# Patient Record
Sex: MALE | Race: WHITE | Employment: OTHER | URBAN - METROPOLITAN AREA
[De-identification: names, ages, dates, MRNs, and addresses within clinical notes are randomized per-mention and may not be internally consistent; named-entity substitution may affect disease eponyms.]

---

## 2017-01-09 ENCOUNTER — OFFICE VISIT (OUTPATIENT)
Dept: CARDIOLOGY CLINIC | Age: 77
End: 2017-01-09

## 2017-01-09 VITALS
BODY MASS INDEX: 26.67 KG/M2 | SYSTOLIC BLOOD PRESSURE: 116 MMHG | WEIGHT: 180.6 LBS | DIASTOLIC BLOOD PRESSURE: 68 MMHG | HEART RATE: 68 BPM

## 2017-01-09 DIAGNOSIS — I25.10 ATHEROSCLEROSIS OF NATIVE CORONARY ARTERY OF NATIVE HEART WITHOUT ANGINA PECTORIS: ICD-10-CM

## 2017-01-09 DIAGNOSIS — I49.5 SINUS NODE DYSFUNCTION (HCC): ICD-10-CM

## 2017-01-09 DIAGNOSIS — I10 ESSENTIAL HYPERTENSION: Primary | ICD-10-CM

## 2017-01-09 PROCEDURE — 99214 OFFICE O/P EST MOD 30 MIN: CPT | Performed by: INTERNAL MEDICINE

## 2017-01-11 DIAGNOSIS — I82.492 DEEP VEIN THROMBOSIS (DVT) OF OTHER VEIN OF LEFT LOWER EXTREMITY: Primary | ICD-10-CM

## 2017-01-12 ENCOUNTER — HOSPITAL ENCOUNTER (OUTPATIENT)
Dept: VASCULAR LAB | Age: 77
Discharge: HOME | End: 2017-01-12
Attending: FAMILY MEDICINE | Admitting: FAMILY MEDICINE

## 2017-01-12 DIAGNOSIS — I82.492 ACUTE EMBOLISM AND THROMBOSIS OF OTHER SPECIFIED DEEP VEIN OF LEFT LOWER EXTREMITY (HCC): ICD-10-CM

## 2017-01-12 DIAGNOSIS — I82.492 DEEP VEIN THROMBOSIS (DVT) OF OTHER VEIN OF LEFT LOWER EXTREMITY: Primary | ICD-10-CM

## 2017-01-29 ENCOUNTER — CARE COORDINATION (OUTPATIENT)
Dept: CASE MANAGEMENT | Age: 77
End: 2017-01-29

## 2017-01-30 ENCOUNTER — OFFICE VISIT (OUTPATIENT)
Dept: CARDIOLOGY CLINIC | Age: 77
End: 2017-01-30

## 2017-01-30 ENCOUNTER — TELEPHONE (OUTPATIENT)
Dept: PHARMACY | Facility: CLINIC | Age: 77
End: 2017-01-30

## 2017-01-30 VITALS
HEART RATE: 72 BPM | DIASTOLIC BLOOD PRESSURE: 74 MMHG | SYSTOLIC BLOOD PRESSURE: 128 MMHG | HEIGHT: 70 IN | BODY MASS INDEX: 27.83 KG/M2 | WEIGHT: 194.4 LBS

## 2017-01-30 DIAGNOSIS — I50.22 CHF (CONGESTIVE HEART FAILURE), NYHA CLASS I, CHRONIC, SYSTOLIC (HCC): ICD-10-CM

## 2017-01-30 DIAGNOSIS — I48.0 PAROXYSMAL ATRIAL FIBRILLATION (HCC): Primary | ICD-10-CM

## 2017-01-30 DIAGNOSIS — I25.10 CORONARY ARTERY DISEASE INVOLVING NATIVE CORONARY ARTERY OF NATIVE HEART WITHOUT ANGINA PECTORIS: ICD-10-CM

## 2017-01-30 PROCEDURE — 1036F TOBACCO NON-USER: CPT | Performed by: INTERNAL MEDICINE

## 2017-01-30 PROCEDURE — G8484 FLU IMMUNIZE NO ADMIN: HCPCS | Performed by: INTERNAL MEDICINE

## 2017-01-30 PROCEDURE — G8420 CALC BMI NORM PARAMETERS: HCPCS | Performed by: INTERNAL MEDICINE

## 2017-01-30 PROCEDURE — G8427 DOCREV CUR MEDS BY ELIG CLIN: HCPCS | Performed by: INTERNAL MEDICINE

## 2017-01-30 PROCEDURE — G8598 ASA/ANTIPLAT THER USED: HCPCS | Performed by: INTERNAL MEDICINE

## 2017-01-30 PROCEDURE — 99214 OFFICE O/P EST MOD 30 MIN: CPT | Performed by: INTERNAL MEDICINE

## 2017-01-30 PROCEDURE — 4040F PNEUMOC VAC/ADMIN/RCVD: CPT | Performed by: INTERNAL MEDICINE

## 2017-01-30 PROCEDURE — 1111F DSCHRG MED/CURRENT MED MERGE: CPT | Performed by: INTERNAL MEDICINE

## 2017-01-30 PROCEDURE — 1123F ACP DISCUSS/DSCN MKR DOCD: CPT | Performed by: INTERNAL MEDICINE

## 2017-01-30 RX ORDER — FUROSEMIDE 40 MG/1
40 TABLET ORAL 2 TIMES DAILY
Qty: 60 TABLET | Refills: 3 | Status: SHIPPED | OUTPATIENT
Start: 2017-01-30 | End: 2017-01-30 | Stop reason: SDUPTHER

## 2017-01-30 RX ORDER — ISOSORBIDE MONONITRATE 30 MG/1
30 TABLET, EXTENDED RELEASE ORAL DAILY
Qty: 30 TABLET | Refills: 0 | Status: SHIPPED | OUTPATIENT
Start: 2017-01-30 | End: 2017-02-07 | Stop reason: SDUPTHER

## 2017-01-30 RX ORDER — POTASSIUM CHLORIDE 1.5 G/1.77G
20 POWDER, FOR SOLUTION ORAL 2 TIMES DAILY
Qty: 30 EACH | Refills: 3 | Status: SHIPPED | OUTPATIENT
Start: 2017-01-30 | End: 2017-01-30 | Stop reason: SDUPTHER

## 2017-01-30 RX ORDER — FUROSEMIDE 40 MG/1
40 TABLET ORAL 2 TIMES DAILY
Qty: 60 TABLET | Refills: 0 | Status: SHIPPED | OUTPATIENT
Start: 2017-01-30 | End: 2017-01-30 | Stop reason: SDUPTHER

## 2017-01-30 RX ORDER — FUROSEMIDE 40 MG/1
40 TABLET ORAL 2 TIMES DAILY
Qty: 60 TABLET | Refills: 0 | Status: SHIPPED | OUTPATIENT
Start: 2017-01-30 | End: 2017-02-28 | Stop reason: SDUPTHER

## 2017-01-30 RX ORDER — POTASSIUM CHLORIDE 1.5 G/1.77G
20 POWDER, FOR SOLUTION ORAL 2 TIMES DAILY
Qty: 60 EACH | Refills: 0 | Status: SHIPPED | OUTPATIENT
Start: 2017-01-30 | End: 2017-07-05 | Stop reason: DRUGHIGH

## 2017-02-01 ENCOUNTER — OFFICE VISIT (OUTPATIENT)
Dept: FAMILY MEDICINE CLINIC | Age: 77
End: 2017-02-01

## 2017-02-01 VITALS
RESPIRATION RATE: 15 BRPM | HEART RATE: 63 BPM | OXYGEN SATURATION: 98 % | WEIGHT: 189.9 LBS | BODY MASS INDEX: 28.78 KG/M2 | HEIGHT: 68 IN | SYSTOLIC BLOOD PRESSURE: 136 MMHG | DIASTOLIC BLOOD PRESSURE: 62 MMHG

## 2017-02-01 DIAGNOSIS — K62.5 GASTROINTESTINAL HEMORRHAGE ASSOCIATED WITH ANORECTAL SOURCE: Primary | ICD-10-CM

## 2017-02-01 DIAGNOSIS — I25.10 CORONARY ARTERY DISEASE INVOLVING NATIVE CORONARY ARTERY OF NATIVE HEART WITHOUT ANGINA PECTORIS: ICD-10-CM

## 2017-02-01 DIAGNOSIS — I49.5 SICK SINUS SYNDROME (HCC): ICD-10-CM

## 2017-02-01 DIAGNOSIS — D50.0 IRON DEFICIENCY ANEMIA DUE TO CHRONIC BLOOD LOSS: ICD-10-CM

## 2017-02-01 DIAGNOSIS — I10 ESSENTIAL HYPERTENSION: ICD-10-CM

## 2017-02-01 DIAGNOSIS — E78.2 MIXED HYPERLIPIDEMIA: ICD-10-CM

## 2017-02-01 DIAGNOSIS — I50.22 CHF (CONGESTIVE HEART FAILURE), NYHA CLASS I, CHRONIC, SYSTOLIC (HCC): ICD-10-CM

## 2017-02-01 DIAGNOSIS — I20.9 ANGINAL SYNDROME (HCC): ICD-10-CM

## 2017-02-01 LAB
ANION GAP SERPL CALCULATED.3IONS-SCNC: 13 MMOL/L (ref 3–16)
BASOPHILS ABSOLUTE: 0.1 K/UL (ref 0–0.2)
BASOPHILS RELATIVE PERCENT: 1 %
BUN BLDV-MCNC: 13 MG/DL (ref 7–20)
CALCIUM SERPL-MCNC: 8.3 MG/DL (ref 8.3–10.6)
CHLORIDE BLD-SCNC: 100 MMOL/L (ref 99–110)
CO2: 27 MMOL/L (ref 21–32)
CREAT SERPL-MCNC: 1.2 MG/DL (ref 0.8–1.3)
EOSINOPHILS ABSOLUTE: 0.4 K/UL (ref 0–0.6)
EOSINOPHILS RELATIVE PERCENT: 6.7 %
GFR AFRICAN AMERICAN: >60
GFR NON-AFRICAN AMERICAN: 59
GLUCOSE BLD-MCNC: 86 MG/DL (ref 70–99)
HCT VFR BLD CALC: 29.3 % (ref 40.5–52.5)
HEMOGLOBIN: 9.6 G/DL (ref 13.5–17.5)
LYMPHOCYTES ABSOLUTE: 1.9 K/UL (ref 1–5.1)
LYMPHOCYTES RELATIVE PERCENT: 30.7 %
MCH RBC QN AUTO: 29.2 PG (ref 26–34)
MCHC RBC AUTO-ENTMCNC: 32.8 G/DL (ref 31–36)
MCV RBC AUTO: 89.2 FL (ref 80–100)
MONOCYTES ABSOLUTE: 0.6 K/UL (ref 0–1.3)
MONOCYTES RELATIVE PERCENT: 8.8 %
NEUTROPHILS ABSOLUTE: 3.3 K/UL (ref 1.7–7.7)
NEUTROPHILS RELATIVE PERCENT: 52.8 %
PDW BLD-RTO: 15.8 % (ref 12.4–15.4)
PLATELET # BLD: 199 K/UL (ref 135–450)
PMV BLD AUTO: 9.2 FL (ref 5–10.5)
POTASSIUM SERPL-SCNC: 4.3 MMOL/L (ref 3.5–5.1)
RBC # BLD: 3.28 M/UL (ref 4.2–5.9)
SODIUM BLD-SCNC: 140 MMOL/L (ref 136–145)
WBC # BLD: 6.3 K/UL (ref 4–11)

## 2017-02-01 ASSESSMENT — PATIENT HEALTH QUESTIONNAIRE - PHQ9
SUM OF ALL RESPONSES TO PHQ9 QUESTIONS 1 & 2: 2
2. FEELING DOWN, DEPRESSED OR HOPELESS: 2
1. LITTLE INTEREST OR PLEASURE IN DOING THINGS: 0
SUM OF ALL RESPONSES TO PHQ QUESTIONS 1-9: 2

## 2017-02-03 ENCOUNTER — CARE COORDINATION (OUTPATIENT)
Dept: CASE MANAGEMENT | Age: 77
End: 2017-02-03

## 2017-02-08 ENCOUNTER — CARE COORDINATION (OUTPATIENT)
Dept: CASE MANAGEMENT | Age: 77
End: 2017-02-08

## 2017-02-08 RX ORDER — ISOSORBIDE MONONITRATE 30 MG/1
30 TABLET, EXTENDED RELEASE ORAL DAILY
Qty: 90 TABLET | Refills: 3 | Status: SHIPPED | OUTPATIENT
Start: 2017-02-08 | End: 2017-02-20 | Stop reason: DRUGHIGH

## 2017-02-15 ENCOUNTER — CARE COORDINATION (OUTPATIENT)
Dept: CASE MANAGEMENT | Age: 77
End: 2017-02-15

## 2017-02-15 ENCOUNTER — CARE COORDINATION (OUTPATIENT)
Dept: FAMILY MEDICINE CLINIC | Age: 77
End: 2017-02-15

## 2017-02-20 ENCOUNTER — OFFICE VISIT (OUTPATIENT)
Dept: CARDIOLOGY CLINIC | Age: 77
End: 2017-02-20

## 2017-02-20 VITALS
BODY MASS INDEX: 27.83 KG/M2 | WEIGHT: 183 LBS | SYSTOLIC BLOOD PRESSURE: 150 MMHG | HEART RATE: 68 BPM | DIASTOLIC BLOOD PRESSURE: 70 MMHG

## 2017-02-20 DIAGNOSIS — I48.0 PAROXYSMAL ATRIAL FIBRILLATION (HCC): ICD-10-CM

## 2017-02-20 DIAGNOSIS — I10 ESSENTIAL HYPERTENSION: Primary | ICD-10-CM

## 2017-02-20 DIAGNOSIS — Z95.0 PACEMAKER: ICD-10-CM

## 2017-02-20 DIAGNOSIS — I49.5 SSS (SICK SINUS SYNDROME) (HCC): ICD-10-CM

## 2017-02-20 PROCEDURE — G8420 CALC BMI NORM PARAMETERS: HCPCS | Performed by: INTERNAL MEDICINE

## 2017-02-20 PROCEDURE — 1036F TOBACCO NON-USER: CPT | Performed by: INTERNAL MEDICINE

## 2017-02-20 PROCEDURE — G8598 ASA/ANTIPLAT THER USED: HCPCS | Performed by: INTERNAL MEDICINE

## 2017-02-20 PROCEDURE — G8427 DOCREV CUR MEDS BY ELIG CLIN: HCPCS | Performed by: INTERNAL MEDICINE

## 2017-02-20 PROCEDURE — 1123F ACP DISCUSS/DSCN MKR DOCD: CPT | Performed by: INTERNAL MEDICINE

## 2017-02-20 PROCEDURE — 1111F DSCHRG MED/CURRENT MED MERGE: CPT | Performed by: INTERNAL MEDICINE

## 2017-02-20 PROCEDURE — 4040F PNEUMOC VAC/ADMIN/RCVD: CPT | Performed by: INTERNAL MEDICINE

## 2017-02-20 PROCEDURE — 99214 OFFICE O/P EST MOD 30 MIN: CPT | Performed by: INTERNAL MEDICINE

## 2017-02-20 PROCEDURE — G8484 FLU IMMUNIZE NO ADMIN: HCPCS | Performed by: INTERNAL MEDICINE

## 2017-02-20 RX ORDER — IRON POLYSACCHARIDE COMPLEX 180 MG
CAPSULE ORAL
Refills: 0 | COMMUNITY
Start: 2017-02-03 | End: 2018-03-21

## 2017-02-20 RX ORDER — POLYETHYLENE GLYCOL 3350 17 G/17G
17 POWDER ORAL DAILY
COMMUNITY
End: 2021-08-25 | Stop reason: ALTCHOICE

## 2017-02-20 ASSESSMENT — ENCOUNTER SYMPTOMS
SHORTNESS OF BREATH: 0
ABDOMINAL DISTENTION: 0
APNEA: 0
CHEST TIGHTNESS: 0
CHOKING: 0
COUGH: 0

## 2017-02-21 RX ORDER — METOPROLOL SUCCINATE 25 MG/1
25 TABLET, EXTENDED RELEASE ORAL DAILY
Qty: 30 TABLET | Refills: 3 | Status: SHIPPED | OUTPATIENT
Start: 2017-02-21 | End: 2017-07-05 | Stop reason: DRUGHIGH

## 2017-02-21 RX ORDER — ISOSORBIDE MONONITRATE 60 MG/1
60 TABLET, EXTENDED RELEASE ORAL DAILY
Qty: 30 TABLET | Refills: 3 | Status: SHIPPED | OUTPATIENT
Start: 2017-02-21 | End: 2017-07-05 | Stop reason: DRUGHIGH

## 2017-02-26 PROCEDURE — 99496 TRANSJ CARE MGMT HIGH F2F 7D: CPT | Performed by: FAMILY MEDICINE

## 2017-02-27 ENCOUNTER — OFFICE VISIT (OUTPATIENT)
Dept: CARDIOLOGY CLINIC | Age: 77
End: 2017-02-27

## 2017-02-27 ENCOUNTER — HOSPITAL ENCOUNTER (OUTPATIENT)
Dept: OTHER | Age: 77
Discharge: HOME | End: 2017-02-27
Attending: INTERNAL MEDICINE | Admitting: INTERNAL MEDICINE

## 2017-02-27 VITALS
SYSTOLIC BLOOD PRESSURE: 134 MMHG | DIASTOLIC BLOOD PRESSURE: 82 MMHG | BODY MASS INDEX: 26.37 KG/M2 | RESPIRATION RATE: 18 BRPM | HEIGHT: 70 IN | HEART RATE: 71 BPM | WEIGHT: 184.2 LBS

## 2017-02-27 DIAGNOSIS — I49.5 SINUS NODE DYSFUNCTION (HCC): ICD-10-CM

## 2017-02-27 DIAGNOSIS — I10 ESSENTIAL HYPERTENSION: ICD-10-CM

## 2017-02-27 DIAGNOSIS — Z95.0 PACEMAKER: ICD-10-CM

## 2017-02-27 DIAGNOSIS — I48.0 PAROXYSMAL ATRIAL FIBRILLATION (HCC): Primary | ICD-10-CM

## 2017-02-27 DIAGNOSIS — R00.1 BRADYCARDIA: ICD-10-CM

## 2017-02-27 DIAGNOSIS — I25.10 ATHEROSCLEROSIS OF NATIVE CORONARY ARTERY OF NATIVE HEART WITHOUT ANGINA PECTORIS: ICD-10-CM

## 2017-02-27 LAB
A/G RATIO: 1.1 (ref 1.1–2.2)
ALBUMIN SERPL-MCNC: 3.7 G/DL (ref 3.4–5)
ALP BLD-CCNC: 74 U/L (ref 40–129)
ALT SERPL-CCNC: 10 U/L (ref 10–40)
ANION GAP SERPL CALCULATED.3IONS-SCNC: 12 MMOL/L (ref 3–16)
AST SERPL-CCNC: 19 U/L (ref 15–37)
BASOPHILS ABSOLUTE: 0 K/UL (ref 0–0.2)
BASOPHILS RELATIVE PERCENT: 0.1 %
BILIRUB SERPL-MCNC: 0.4 MG/DL (ref 0–1)
BUN BLDV-MCNC: 18 MG/DL (ref 7–20)
CALCIUM SERPL-MCNC: 8.6 MG/DL (ref 8.3–10.6)
CHLORIDE BLD-SCNC: 102 MMOL/L (ref 99–110)
CO2: 26 MMOL/L (ref 21–32)
CREAT SERPL-MCNC: 1 MG/DL (ref 0.8–1.3)
EOSINOPHILS ABSOLUTE: 0.5 K/UL (ref 0–0.6)
EOSINOPHILS RELATIVE PERCENT: 6.9 %
GFR AFRICAN AMERICAN: >60
GFR NON-AFRICAN AMERICAN: >60
GLOBULIN: 3.4 G/DL
GLUCOSE BLD-MCNC: 79 MG/DL (ref 70–99)
HCT VFR BLD CALC: 32.5 % (ref 40.5–52.5)
HEMOGLOBIN: 10.7 G/DL (ref 13.5–17.5)
LYMPHOCYTES ABSOLUTE: 2.1 K/UL (ref 1–5.1)
LYMPHOCYTES RELATIVE PERCENT: 29.4 %
MCH RBC QN AUTO: 28.6 PG (ref 26–34)
MCHC RBC AUTO-ENTMCNC: 32.8 G/DL (ref 31–36)
MCV RBC AUTO: 87 FL (ref 80–100)
MONOCYTES ABSOLUTE: 0.5 K/UL (ref 0–1.3)
MONOCYTES RELATIVE PERCENT: 7 %
NEUTROPHILS ABSOLUTE: 4 K/UL (ref 1.7–7.7)
NEUTROPHILS RELATIVE PERCENT: 56.6 %
PDW BLD-RTO: 15.1 % (ref 12.4–15.4)
PLATELET # BLD: 241 K/UL (ref 135–450)
PMV BLD AUTO: 9 FL (ref 5–10.5)
POTASSIUM SERPL-SCNC: 4.5 MMOL/L (ref 3.5–5.1)
RBC # BLD: 3.73 M/UL (ref 4.2–5.9)
SODIUM BLD-SCNC: 140 MMOL/L (ref 136–145)
TOTAL PROTEIN: 7.1 G/DL (ref 6.4–8.2)
WBC # BLD: 7 K/UL (ref 4–11)

## 2017-02-27 PROCEDURE — 1036F TOBACCO NON-USER: CPT | Performed by: NURSE PRACTITIONER

## 2017-02-27 PROCEDURE — 99214 OFFICE O/P EST MOD 30 MIN: CPT | Performed by: NURSE PRACTITIONER

## 2017-02-27 PROCEDURE — 1111F DSCHRG MED/CURRENT MED MERGE: CPT | Performed by: NURSE PRACTITIONER

## 2017-02-27 PROCEDURE — G8598 ASA/ANTIPLAT THER USED: HCPCS | Performed by: NURSE PRACTITIONER

## 2017-02-27 PROCEDURE — G8420 CALC BMI NORM PARAMETERS: HCPCS | Performed by: NURSE PRACTITIONER

## 2017-02-27 PROCEDURE — 1123F ACP DISCUSS/DSCN MKR DOCD: CPT | Performed by: NURSE PRACTITIONER

## 2017-02-27 PROCEDURE — 4040F PNEUMOC VAC/ADMIN/RCVD: CPT | Performed by: NURSE PRACTITIONER

## 2017-02-27 PROCEDURE — G8484 FLU IMMUNIZE NO ADMIN: HCPCS | Performed by: NURSE PRACTITIONER

## 2017-02-27 PROCEDURE — G8427 DOCREV CUR MEDS BY ELIG CLIN: HCPCS | Performed by: NURSE PRACTITIONER

## 2017-02-28 RX ORDER — FUROSEMIDE 40 MG/1
TABLET ORAL
Qty: 60 TABLET | Refills: 0 | Status: SHIPPED | OUTPATIENT
Start: 2017-02-28 | End: 2017-07-24 | Stop reason: SDUPTHER

## 2017-03-02 RX ORDER — OSELTAMIVIR PHOSPHATE 75 MG/1
CAPSULE ORAL
Qty: 10 CAPSULE | Refills: 0 | Status: SHIPPED | OUTPATIENT
Start: 2017-03-02 | End: 2017-03-17

## 2017-03-03 ENCOUNTER — OFFICE VISIT (OUTPATIENT)
Dept: CARDIOLOGY CLINIC | Age: 77
End: 2017-03-03

## 2017-03-03 VITALS
HEART RATE: 52 BPM | SYSTOLIC BLOOD PRESSURE: 120 MMHG | WEIGHT: 183.6 LBS | DIASTOLIC BLOOD PRESSURE: 68 MMHG | BODY MASS INDEX: 26.34 KG/M2

## 2017-03-03 DIAGNOSIS — I48.0 PAROXYSMAL ATRIAL FIBRILLATION (HCC): ICD-10-CM

## 2017-03-03 DIAGNOSIS — I10 ESSENTIAL HYPERTENSION: ICD-10-CM

## 2017-03-03 DIAGNOSIS — I49.5 SICK SINUS SYNDROME (HCC): Primary | ICD-10-CM

## 2017-03-03 PROCEDURE — G8428 CUR MEDS NOT DOCUMENT: HCPCS | Performed by: INTERNAL MEDICINE

## 2017-03-03 PROCEDURE — 4040F PNEUMOC VAC/ADMIN/RCVD: CPT | Performed by: INTERNAL MEDICINE

## 2017-03-03 PROCEDURE — G8420 CALC BMI NORM PARAMETERS: HCPCS | Performed by: INTERNAL MEDICINE

## 2017-03-03 PROCEDURE — 1036F TOBACCO NON-USER: CPT | Performed by: INTERNAL MEDICINE

## 2017-03-03 PROCEDURE — 99214 OFFICE O/P EST MOD 30 MIN: CPT | Performed by: INTERNAL MEDICINE

## 2017-03-03 PROCEDURE — G8598 ASA/ANTIPLAT THER USED: HCPCS | Performed by: INTERNAL MEDICINE

## 2017-03-03 PROCEDURE — 1123F ACP DISCUSS/DSCN MKR DOCD: CPT | Performed by: INTERNAL MEDICINE

## 2017-03-03 PROCEDURE — G8484 FLU IMMUNIZE NO ADMIN: HCPCS | Performed by: INTERNAL MEDICINE

## 2017-03-06 RX ORDER — DRONEDARONE 400 MG/1
TABLET, FILM COATED ORAL
Qty: 180 TABLET | Refills: 0 | Status: SHIPPED | OUTPATIENT
Start: 2017-03-06 | End: 2017-06-02 | Stop reason: SDUPTHER

## 2017-03-20 ENCOUNTER — OFFICE VISIT (OUTPATIENT)
Dept: FAMILY MEDICINE CLINIC | Age: 77
End: 2017-03-20

## 2017-03-20 VITALS
HEART RATE: 66 BPM | WEIGHT: 189.4 LBS | HEIGHT: 69 IN | RESPIRATION RATE: 14 BRPM | BODY MASS INDEX: 28.05 KG/M2 | SYSTOLIC BLOOD PRESSURE: 130 MMHG | DIASTOLIC BLOOD PRESSURE: 80 MMHG | OXYGEN SATURATION: 99 %

## 2017-03-20 DIAGNOSIS — D50.0 IRON DEFICIENCY ANEMIA DUE TO CHRONIC BLOOD LOSS: Primary | ICD-10-CM

## 2017-03-20 LAB — HGB, POC: 11.4

## 2017-03-20 PROCEDURE — 4040F PNEUMOC VAC/ADMIN/RCVD: CPT | Performed by: FAMILY MEDICINE

## 2017-03-20 PROCEDURE — 1123F ACP DISCUSS/DSCN MKR DOCD: CPT | Performed by: FAMILY MEDICINE

## 2017-03-20 PROCEDURE — G8420 CALC BMI NORM PARAMETERS: HCPCS | Performed by: FAMILY MEDICINE

## 2017-03-20 PROCEDURE — 1036F TOBACCO NON-USER: CPT | Performed by: FAMILY MEDICINE

## 2017-03-20 PROCEDURE — G8484 FLU IMMUNIZE NO ADMIN: HCPCS | Performed by: FAMILY MEDICINE

## 2017-03-20 PROCEDURE — G8598 ASA/ANTIPLAT THER USED: HCPCS | Performed by: FAMILY MEDICINE

## 2017-03-20 PROCEDURE — 99213 OFFICE O/P EST LOW 20 MIN: CPT | Performed by: FAMILY MEDICINE

## 2017-03-20 PROCEDURE — G8427 DOCREV CUR MEDS BY ELIG CLIN: HCPCS | Performed by: FAMILY MEDICINE

## 2017-03-20 PROCEDURE — 85018 HEMOGLOBIN: CPT | Performed by: FAMILY MEDICINE

## 2017-03-23 RX ORDER — RANOLAZINE 500 MG/1
TABLET, FILM COATED, EXTENDED RELEASE ORAL
Qty: 180 TABLET | Refills: 1 | Status: SHIPPED | OUTPATIENT
Start: 2017-03-23 | End: 2017-09-19 | Stop reason: SDUPTHER

## 2017-03-29 ENCOUNTER — HOSPITAL ENCOUNTER (OUTPATIENT)
Dept: OTHER | Age: 77
Discharge: HOME | End: 2017-03-29
Attending: INTERNAL MEDICINE | Admitting: INTERNAL MEDICINE

## 2017-03-29 LAB
BASOPHILS ABSOLUTE: 0 K/UL (ref 0–0.2)
BASOPHILS RELATIVE PERCENT: 0.8 %
EOSINOPHILS ABSOLUTE: 0.2 K/UL (ref 0–0.6)
EOSINOPHILS RELATIVE PERCENT: 4.4 %
HCT VFR BLD CALC: 33.8 % (ref 40.5–52.5)
HEMOGLOBIN: 11 G/DL (ref 13.5–17.5)
LYMPHOCYTES ABSOLUTE: 1.6 K/UL (ref 1–5.1)
LYMPHOCYTES RELATIVE PERCENT: 31.8 %
MCH RBC QN AUTO: 27.7 PG (ref 26–34)
MCHC RBC AUTO-ENTMCNC: 32.5 G/DL (ref 31–36)
MCV RBC AUTO: 85.3 FL (ref 80–100)
MONOCYTES ABSOLUTE: 0.6 K/UL (ref 0–1.3)
MONOCYTES RELATIVE PERCENT: 11.6 %
NEUTROPHILS ABSOLUTE: 2.6 K/UL (ref 1.7–7.7)
NEUTROPHILS RELATIVE PERCENT: 51.4 %
PDW BLD-RTO: 15.3 % (ref 12.4–15.4)
PLATELET # BLD: 166 K/UL (ref 135–450)
PMV BLD AUTO: 9.5 FL (ref 5–10.5)
RBC # BLD: 3.96 M/UL (ref 4.2–5.9)
WBC # BLD: 5.1 K/UL (ref 4–11)

## 2017-04-07 ENCOUNTER — HOSPITAL ENCOUNTER (OUTPATIENT)
Dept: ENDOSCOPY | Age: 77
Discharge: HOME | End: 2017-04-07
Attending: INTERNAL MEDICINE | Admitting: INTERNAL MEDICINE

## 2017-04-07 VITALS
BODY MASS INDEX: 26.66 KG/M2 | SYSTOLIC BLOOD PRESSURE: 134 MMHG | TEMPERATURE: 98 F | HEIGHT: 69 IN | RESPIRATION RATE: 18 BRPM | WEIGHT: 180 LBS | OXYGEN SATURATION: 99 % | DIASTOLIC BLOOD PRESSURE: 67 MMHG | HEART RATE: 58 BPM

## 2017-04-07 ASSESSMENT — PAIN - FUNCTIONAL ASSESSMENT: PAIN_FUNCTIONAL_ASSESSMENT: 0-10

## 2017-05-04 ENCOUNTER — HOSPITAL ENCOUNTER (OUTPATIENT)
Dept: OTHER | Age: 77
Discharge: HOME | End: 2017-05-04
Attending: INTERNAL MEDICINE | Admitting: INTERNAL MEDICINE

## 2017-05-04 LAB
BASOPHILS ABSOLUTE: 0 K/UL (ref 0–0.2)
BASOPHILS RELATIVE PERCENT: 0.7 %
EOSINOPHILS ABSOLUTE: 0.2 K/UL (ref 0–0.6)
EOSINOPHILS RELATIVE PERCENT: 2.7 %
HCT VFR BLD CALC: 34.6 % (ref 40.5–52.5)
HEMOGLOBIN: 11.3 G/DL (ref 13.5–17.5)
LYMPHOCYTES ABSOLUTE: 2.4 K/UL (ref 1–5.1)
LYMPHOCYTES RELATIVE PERCENT: 37.7 %
MCH RBC QN AUTO: 27.9 PG (ref 26–34)
MCHC RBC AUTO-ENTMCNC: 32.7 G/DL (ref 31–36)
MCV RBC AUTO: 85.2 FL (ref 80–100)
MONOCYTES ABSOLUTE: 0.5 K/UL (ref 0–1.3)
MONOCYTES RELATIVE PERCENT: 8.5 %
NEUTROPHILS ABSOLUTE: 3.3 K/UL (ref 1.7–7.7)
NEUTROPHILS RELATIVE PERCENT: 50.4 %
PDW BLD-RTO: 16.7 % (ref 12.4–15.4)
PLATELET # BLD: 179 K/UL (ref 135–450)
PMV BLD AUTO: 9.2 FL (ref 5–10.5)
RBC # BLD: 4.06 M/UL (ref 4.2–5.9)
WBC # BLD: 6.5 K/UL (ref 4–11)

## 2017-05-08 ENCOUNTER — OFFICE VISIT (OUTPATIENT)
Dept: CARDIOLOGY CLINIC | Age: 77
End: 2017-05-08

## 2017-05-08 VITALS
SYSTOLIC BLOOD PRESSURE: 122 MMHG | BODY MASS INDEX: 27.32 KG/M2 | WEIGHT: 185 LBS | DIASTOLIC BLOOD PRESSURE: 66 MMHG | HEART RATE: 82 BPM

## 2017-05-08 DIAGNOSIS — I49.5 SICK SINUS SYNDROME (HCC): ICD-10-CM

## 2017-05-08 DIAGNOSIS — I10 ESSENTIAL HYPERTENSION: ICD-10-CM

## 2017-05-08 DIAGNOSIS — E78.2 MIXED HYPERLIPIDEMIA: Primary | ICD-10-CM

## 2017-05-08 DIAGNOSIS — I48.0 PAROXYSMAL ATRIAL FIBRILLATION (HCC): ICD-10-CM

## 2017-05-08 PROCEDURE — G8598 ASA/ANTIPLAT THER USED: HCPCS | Performed by: INTERNAL MEDICINE

## 2017-05-08 PROCEDURE — 93000 ELECTROCARDIOGRAM COMPLETE: CPT | Performed by: INTERNAL MEDICINE

## 2017-05-08 PROCEDURE — 99214 OFFICE O/P EST MOD 30 MIN: CPT | Performed by: INTERNAL MEDICINE

## 2017-05-08 PROCEDURE — 1036F TOBACCO NON-USER: CPT | Performed by: INTERNAL MEDICINE

## 2017-05-08 PROCEDURE — G8420 CALC BMI NORM PARAMETERS: HCPCS | Performed by: INTERNAL MEDICINE

## 2017-05-08 PROCEDURE — 4040F PNEUMOC VAC/ADMIN/RCVD: CPT | Performed by: INTERNAL MEDICINE

## 2017-05-08 PROCEDURE — G8427 DOCREV CUR MEDS BY ELIG CLIN: HCPCS | Performed by: INTERNAL MEDICINE

## 2017-05-08 PROCEDURE — 1123F ACP DISCUSS/DSCN MKR DOCD: CPT | Performed by: INTERNAL MEDICINE

## 2017-05-09 ENCOUNTER — HOSPITAL ENCOUNTER (OUTPATIENT)
Dept: OTHER | Age: 77
Discharge: HOME | End: 2017-05-09
Attending: INTERNAL MEDICINE | Admitting: INTERNAL MEDICINE

## 2017-05-09 LAB
A/G RATIO: 1.3 (ref 1.1–2.2)
ALBUMIN SERPL-MCNC: 3.8 G/DL (ref 3.4–5)
ALP BLD-CCNC: 67 U/L (ref 40–129)
ALT SERPL-CCNC: 13 U/L (ref 10–40)
ANION GAP SERPL CALCULATED.3IONS-SCNC: 11 MMOL/L (ref 3–16)
AST SERPL-CCNC: 22 U/L (ref 15–37)
BASOPHILS ABSOLUTE: 0 K/UL (ref 0–0.2)
BASOPHILS RELATIVE PERCENT: 0.4 %
BILIRUB SERPL-MCNC: 0.5 MG/DL (ref 0–1)
BUN BLDV-MCNC: 13 MG/DL (ref 7–20)
CALCIUM SERPL-MCNC: 8.4 MG/DL (ref 8.3–10.6)
CHLORIDE BLD-SCNC: 102 MMOL/L (ref 99–110)
CO2: 27 MMOL/L (ref 21–32)
CREAT SERPL-MCNC: 1 MG/DL (ref 0.8–1.3)
EOSINOPHILS ABSOLUTE: 0.2 K/UL (ref 0–0.6)
EOSINOPHILS RELATIVE PERCENT: 3.4 %
GFR AFRICAN AMERICAN: >60
GFR NON-AFRICAN AMERICAN: >60
GLOBULIN: 3 G/DL
GLUCOSE BLD-MCNC: 110 MG/DL (ref 70–99)
HCT VFR BLD CALC: 33.1 % (ref 40.5–52.5)
HEMOGLOBIN: 10.7 G/DL (ref 13.5–17.5)
LYMPHOCYTES ABSOLUTE: 1.8 K/UL (ref 1–5.1)
LYMPHOCYTES RELATIVE PERCENT: 36 %
MCH RBC QN AUTO: 27.4 PG (ref 26–34)
MCHC RBC AUTO-ENTMCNC: 32.4 G/DL (ref 31–36)
MCV RBC AUTO: 84.6 FL (ref 80–100)
MONOCYTES ABSOLUTE: 0.4 K/UL (ref 0–1.3)
MONOCYTES RELATIVE PERCENT: 8.1 %
NEUTROPHILS ABSOLUTE: 2.7 K/UL (ref 1.7–7.7)
NEUTROPHILS RELATIVE PERCENT: 52.1 %
PDW BLD-RTO: 17.1 % (ref 12.4–15.4)
PLATELET # BLD: 166 K/UL (ref 135–450)
PMV BLD AUTO: 9.2 FL (ref 5–10.5)
POTASSIUM SERPL-SCNC: 4.6 MMOL/L (ref 3.5–5.1)
RBC # BLD: 3.92 M/UL (ref 4.2–5.9)
SODIUM BLD-SCNC: 140 MMOL/L (ref 136–145)
TOTAL PROTEIN: 6.8 G/DL (ref 6.4–8.2)
WBC # BLD: 5.1 K/UL (ref 4–11)

## 2017-05-10 ENCOUNTER — TELEPHONE (OUTPATIENT)
Dept: CARDIOLOGY | Age: 77
End: 2017-05-10

## 2017-05-10 DIAGNOSIS — K92.2 GASTROINTESTINAL HEMORRHAGE, UNSPECIFIED GASTROINTESTINAL HEMORRHAGE TYPE: Primary | ICD-10-CM

## 2017-05-16 ENCOUNTER — HOSPITAL ENCOUNTER (OUTPATIENT)
Dept: OTHER | Age: 77
Discharge: HOME | End: 2017-05-16
Attending: INTERNAL MEDICINE | Admitting: INTERNAL MEDICINE

## 2017-05-16 LAB
HCT VFR BLD CALC: 34.2 % (ref 40.5–52.5)
HEMOGLOBIN: 11.2 G/DL (ref 13.5–17.5)
MCH RBC QN AUTO: 27.6 PG (ref 26–34)
MCHC RBC AUTO-ENTMCNC: 32.7 G/DL (ref 31–36)
MCV RBC AUTO: 84.4 FL (ref 80–100)
PDW BLD-RTO: 17.2 % (ref 12.4–15.4)
PLATELET # BLD: 176 K/UL (ref 135–450)
PMV BLD AUTO: 9.2 FL (ref 5–10.5)
RBC # BLD: 4.05 M/UL (ref 4.2–5.9)
WBC # BLD: 8.6 K/UL (ref 4–11)

## 2017-06-05 RX ORDER — DRONEDARONE 400 MG/1
TABLET, FILM COATED ORAL
Qty: 180 TABLET | Refills: 3 | Status: SHIPPED | OUTPATIENT
Start: 2017-06-05 | End: 2018-05-31 | Stop reason: SDUPTHER

## 2017-06-08 ENCOUNTER — HOSPITAL ENCOUNTER (OUTPATIENT)
Dept: OTHER | Age: 77
Discharge: HOME | End: 2017-06-08
Attending: INTERNAL MEDICINE | Admitting: INTERNAL MEDICINE

## 2017-06-08 LAB
BASOPHILS ABSOLUTE: 0 K/UL (ref 0–0.2)
BASOPHILS RELATIVE PERCENT: 0.5 %
EOSINOPHILS ABSOLUTE: 0.2 K/UL (ref 0–0.6)
EOSINOPHILS RELATIVE PERCENT: 3.3 %
HCT VFR BLD CALC: 34.9 % (ref 40.5–52.5)
HEMOGLOBIN: 11.4 G/DL (ref 13.5–17.5)
LYMPHOCYTES ABSOLUTE: 2.1 K/UL (ref 1–5.1)
LYMPHOCYTES RELATIVE PERCENT: 33.5 %
MCH RBC QN AUTO: 27.3 PG (ref 26–34)
MCHC RBC AUTO-ENTMCNC: 32.6 G/DL (ref 31–36)
MCV RBC AUTO: 83.8 FL (ref 80–100)
MONOCYTES ABSOLUTE: 0.5 K/UL (ref 0–1.3)
MONOCYTES RELATIVE PERCENT: 8.3 %
NEUTROPHILS ABSOLUTE: 3.4 K/UL (ref 1.7–7.7)
NEUTROPHILS RELATIVE PERCENT: 54.4 %
OVALOCYTES: ABNORMAL
PDW BLD-RTO: 17.3 % (ref 12.4–15.4)
PLATELET # BLD: 206 K/UL (ref 135–450)
PMV BLD AUTO: 9.1 FL (ref 5–10.5)
POIKILOCYTES: ABNORMAL
RBC # BLD: 4.17 M/UL (ref 4.2–5.9)
WBC # BLD: 6.3 K/UL (ref 4–11)

## 2017-06-12 ENCOUNTER — OFFICE VISIT (OUTPATIENT)
Dept: CARDIOLOGY CLINIC | Age: 77
End: 2017-06-12

## 2017-06-12 VITALS
BODY MASS INDEX: 27.32 KG/M2 | HEART RATE: 70 BPM | DIASTOLIC BLOOD PRESSURE: 60 MMHG | SYSTOLIC BLOOD PRESSURE: 110 MMHG | WEIGHT: 185 LBS

## 2017-06-12 DIAGNOSIS — E78.1 PURE HYPERGLYCERIDEMIA: ICD-10-CM

## 2017-06-12 DIAGNOSIS — I48.0 PAROXYSMAL ATRIAL FIBRILLATION (HCC): Primary | ICD-10-CM

## 2017-06-12 DIAGNOSIS — I10 ESSENTIAL HYPERTENSION: ICD-10-CM

## 2017-06-12 PROCEDURE — G8598 ASA/ANTIPLAT THER USED: HCPCS | Performed by: INTERNAL MEDICINE

## 2017-06-12 PROCEDURE — 93000 ELECTROCARDIOGRAM COMPLETE: CPT | Performed by: INTERNAL MEDICINE

## 2017-06-12 PROCEDURE — G8419 CALC BMI OUT NRM PARAM NOF/U: HCPCS | Performed by: INTERNAL MEDICINE

## 2017-06-12 PROCEDURE — 99213 OFFICE O/P EST LOW 20 MIN: CPT | Performed by: INTERNAL MEDICINE

## 2017-06-12 PROCEDURE — 4040F PNEUMOC VAC/ADMIN/RCVD: CPT | Performed by: INTERNAL MEDICINE

## 2017-06-12 PROCEDURE — 1036F TOBACCO NON-USER: CPT | Performed by: INTERNAL MEDICINE

## 2017-06-12 PROCEDURE — G8427 DOCREV CUR MEDS BY ELIG CLIN: HCPCS | Performed by: INTERNAL MEDICINE

## 2017-06-12 PROCEDURE — 1123F ACP DISCUSS/DSCN MKR DOCD: CPT | Performed by: INTERNAL MEDICINE

## 2017-06-15 ENCOUNTER — OFFICE VISIT (OUTPATIENT)
Dept: DERMATOLOGY | Age: 77
End: 2017-06-15

## 2017-06-15 DIAGNOSIS — L82.0 INFLAMED SEBORRHEIC KERATOSIS: ICD-10-CM

## 2017-06-15 DIAGNOSIS — L57.0 AK (ACTINIC KERATOSIS): Primary | ICD-10-CM

## 2017-06-15 PROCEDURE — 17000 DESTRUCT PREMALG LESION: CPT | Performed by: DERMATOLOGY

## 2017-06-15 PROCEDURE — 1036F TOBACCO NON-USER: CPT | Performed by: DERMATOLOGY

## 2017-06-15 PROCEDURE — 17110 DESTRUCTION B9 LES UP TO 14: CPT | Performed by: DERMATOLOGY

## 2017-06-16 RX ORDER — ROSUVASTATIN CALCIUM 5 MG/1
TABLET, COATED ORAL
Qty: 90 TABLET | Refills: 3 | Status: SHIPPED | OUTPATIENT
Start: 2017-06-16 | End: 2018-06-11 | Stop reason: SDUPTHER

## 2017-06-20 ENCOUNTER — NURSE ONLY (OUTPATIENT)
Dept: CARDIOLOGY CLINIC | Age: 77
End: 2017-06-20

## 2017-06-20 DIAGNOSIS — Z95.0 CARDIAC PACEMAKER IN SITU: ICD-10-CM

## 2017-06-20 DIAGNOSIS — R00.1 BRADYCARDIA: ICD-10-CM

## 2017-06-20 PROCEDURE — 93294 REM INTERROG EVL PM/LDLS PM: CPT | Performed by: INTERNAL MEDICINE

## 2017-06-20 PROCEDURE — 93296 REM INTERROG EVL PM/IDS: CPT | Performed by: INTERNAL MEDICINE

## 2017-07-03 ENCOUNTER — HOSPITAL ENCOUNTER (OUTPATIENT)
Dept: OTHER | Age: 77
Discharge: HOME | End: 2017-07-03
Attending: INTERNAL MEDICINE | Admitting: INTERNAL MEDICINE

## 2017-07-03 DIAGNOSIS — I10 ESSENTIAL HYPERTENSION: ICD-10-CM

## 2017-07-03 DIAGNOSIS — I48.0 PAROXYSMAL ATRIAL FIBRILLATION (HCC): ICD-10-CM

## 2017-07-03 DIAGNOSIS — E78.1 PURE HYPERGLYCERIDEMIA: ICD-10-CM

## 2017-07-03 LAB
A/G RATIO: 1.1 (ref 1.1–2.2)
ALBUMIN SERPL-MCNC: 3.9 G/DL (ref 3.4–5)
ALP BLD-CCNC: 67 U/L (ref 40–129)
ALT SERPL-CCNC: 13 U/L (ref 10–40)
ANION GAP SERPL CALCULATED.3IONS-SCNC: 13 MMOL/L (ref 3–16)
AST SERPL-CCNC: 23 U/L (ref 15–37)
BASOPHILS ABSOLUTE: 0 K/UL (ref 0–0.2)
BASOPHILS RELATIVE PERCENT: 0.4 %
BILIRUB SERPL-MCNC: 0.5 MG/DL (ref 0–1)
BUN BLDV-MCNC: 15 MG/DL (ref 7–20)
CALCIUM SERPL-MCNC: 8.7 MG/DL (ref 8.3–10.6)
CHLORIDE BLD-SCNC: 105 MMOL/L (ref 99–110)
CO2: 27 MMOL/L (ref 21–32)
CREAT SERPL-MCNC: 1 MG/DL (ref 0.8–1.3)
EOSINOPHILS ABSOLUTE: 0.3 K/UL (ref 0–0.6)
EOSINOPHILS RELATIVE PERCENT: 4.9 %
GFR AFRICAN AMERICAN: >60
GFR NON-AFRICAN AMERICAN: >60
GLOBULIN: 3.5 G/DL
GLUCOSE BLD-MCNC: 79 MG/DL (ref 70–99)
HCT VFR BLD CALC: 33.4 % (ref 40.5–52.5)
HEMOGLOBIN: 11.2 G/DL (ref 13.5–17.5)
LYMPHOCYTES ABSOLUTE: 2.5 K/UL (ref 1–5.1)
LYMPHOCYTES RELATIVE PERCENT: 42 %
MCH RBC QN AUTO: 28.9 PG (ref 26–34)
MCHC RBC AUTO-ENTMCNC: 33.5 G/DL (ref 31–36)
MCV RBC AUTO: 86.2 FL (ref 80–100)
MONOCYTES ABSOLUTE: 0.5 K/UL (ref 0–1.3)
MONOCYTES RELATIVE PERCENT: 8.7 %
NEUTROPHILS ABSOLUTE: 2.6 K/UL (ref 1.7–7.7)
NEUTROPHILS RELATIVE PERCENT: 44 %
PDW BLD-RTO: 16.8 % (ref 12.4–15.4)
PLATELET # BLD: 174 K/UL (ref 135–450)
PMV BLD AUTO: 9.2 FL (ref 5–10.5)
POTASSIUM SERPL-SCNC: 4.5 MMOL/L (ref 3.5–5.1)
RBC # BLD: 3.88 M/UL (ref 4.2–5.9)
SODIUM BLD-SCNC: 145 MMOL/L (ref 136–145)
TOTAL PROTEIN: 7.4 G/DL (ref 6.4–8.2)
WBC # BLD: 5.9 K/UL (ref 4–11)

## 2017-07-05 ENCOUNTER — TELEPHONE (OUTPATIENT)
Dept: CARDIOLOGY CLINIC | Age: 77
End: 2017-07-05

## 2017-07-07 ENCOUNTER — OFFICE VISIT (OUTPATIENT)
Dept: CARDIOLOGY CLINIC | Age: 77
End: 2017-07-07

## 2017-07-07 VITALS
WEIGHT: 189.2 LBS | BODY MASS INDEX: 27.94 KG/M2 | DIASTOLIC BLOOD PRESSURE: 60 MMHG | SYSTOLIC BLOOD PRESSURE: 130 MMHG | HEART RATE: 65 BPM

## 2017-07-07 DIAGNOSIS — Z95.0 CARDIAC PACEMAKER IN SITU: ICD-10-CM

## 2017-07-07 DIAGNOSIS — I48.0 PAROXYSMAL ATRIAL FIBRILLATION (HCC): Primary | ICD-10-CM

## 2017-07-07 DIAGNOSIS — I25.10 CORONARY ARTERY DISEASE INVOLVING NATIVE CORONARY ARTERY OF NATIVE HEART WITHOUT ANGINA PECTORIS: ICD-10-CM

## 2017-07-07 PROCEDURE — G8598 ASA/ANTIPLAT THER USED: HCPCS | Performed by: INTERNAL MEDICINE

## 2017-07-07 PROCEDURE — G8419 CALC BMI OUT NRM PARAM NOF/U: HCPCS | Performed by: INTERNAL MEDICINE

## 2017-07-07 PROCEDURE — 1036F TOBACCO NON-USER: CPT | Performed by: INTERNAL MEDICINE

## 2017-07-07 PROCEDURE — 4040F PNEUMOC VAC/ADMIN/RCVD: CPT | Performed by: INTERNAL MEDICINE

## 2017-07-07 PROCEDURE — G8427 DOCREV CUR MEDS BY ELIG CLIN: HCPCS | Performed by: INTERNAL MEDICINE

## 2017-07-07 PROCEDURE — 1123F ACP DISCUSS/DSCN MKR DOCD: CPT | Performed by: INTERNAL MEDICINE

## 2017-07-07 PROCEDURE — 93000 ELECTROCARDIOGRAM COMPLETE: CPT | Performed by: INTERNAL MEDICINE

## 2017-07-07 PROCEDURE — 99214 OFFICE O/P EST MOD 30 MIN: CPT | Performed by: INTERNAL MEDICINE

## 2017-07-07 RX ORDER — PANTOPRAZOLE SODIUM 40 MG/1
40 TABLET, DELAYED RELEASE ORAL
Qty: 30 TABLET | Refills: 5 | Status: SHIPPED | OUTPATIENT
Start: 2017-07-07 | End: 2017-12-30 | Stop reason: SDUPTHER

## 2017-07-07 RX ORDER — NITROGLYCERIN 0.4 MG/1
0.4 TABLET SUBLINGUAL EVERY 5 MIN PRN
Qty: 25 TABLET | Refills: 11 | Status: SHIPPED | OUTPATIENT
Start: 2017-07-07 | End: 2018-10-29 | Stop reason: SDUPTHER

## 2017-07-07 RX ORDER — LISINOPRIL 5 MG/1
5 TABLET ORAL DAILY
Qty: 30 TABLET | Refills: 5 | Status: SHIPPED | OUTPATIENT
Start: 2017-07-07 | End: 2017-12-29

## 2017-07-11 ENCOUNTER — OFFICE VISIT (OUTPATIENT)
Dept: FAMILY MEDICINE CLINIC | Age: 77
End: 2017-07-11

## 2017-07-11 VITALS
WEIGHT: 187 LBS | HEART RATE: 59 BPM | HEIGHT: 69 IN | OXYGEN SATURATION: 98 % | DIASTOLIC BLOOD PRESSURE: 50 MMHG | RESPIRATION RATE: 16 BRPM | BODY MASS INDEX: 27.7 KG/M2 | SYSTOLIC BLOOD PRESSURE: 98 MMHG

## 2017-07-11 DIAGNOSIS — E78.2 MIXED HYPERLIPIDEMIA: ICD-10-CM

## 2017-07-11 DIAGNOSIS — Z95.0 CARDIAC PACEMAKER IN SITU: ICD-10-CM

## 2017-07-11 DIAGNOSIS — I25.10 CORONARY ARTERY DISEASE INVOLVING NATIVE CORONARY ARTERY OF NATIVE HEART WITHOUT ANGINA PECTORIS: ICD-10-CM

## 2017-07-11 DIAGNOSIS — I48.0 PAROXYSMAL ATRIAL FIBRILLATION (HCC): ICD-10-CM

## 2017-07-11 DIAGNOSIS — I10 ESSENTIAL HYPERTENSION: ICD-10-CM

## 2017-07-11 DIAGNOSIS — I50.22 CHF (CONGESTIVE HEART FAILURE), NYHA CLASS I, CHRONIC, SYSTOLIC (HCC): Primary | ICD-10-CM

## 2017-07-11 PROCEDURE — G8427 DOCREV CUR MEDS BY ELIG CLIN: HCPCS | Performed by: FAMILY MEDICINE

## 2017-07-11 PROCEDURE — G8419 CALC BMI OUT NRM PARAM NOF/U: HCPCS | Performed by: FAMILY MEDICINE

## 2017-07-11 PROCEDURE — 4040F PNEUMOC VAC/ADMIN/RCVD: CPT | Performed by: FAMILY MEDICINE

## 2017-07-11 PROCEDURE — 1036F TOBACCO NON-USER: CPT | Performed by: FAMILY MEDICINE

## 2017-07-11 PROCEDURE — G8598 ASA/ANTIPLAT THER USED: HCPCS | Performed by: FAMILY MEDICINE

## 2017-07-11 PROCEDURE — 99214 OFFICE O/P EST MOD 30 MIN: CPT | Performed by: FAMILY MEDICINE

## 2017-07-11 PROCEDURE — 1123F ACP DISCUSS/DSCN MKR DOCD: CPT | Performed by: FAMILY MEDICINE

## 2017-07-24 RX ORDER — FUROSEMIDE 40 MG/1
TABLET ORAL
Qty: 180 TABLET | Refills: 0 | Status: SHIPPED | OUTPATIENT
Start: 2017-07-24 | End: 2017-10-22 | Stop reason: SDUPTHER

## 2017-07-24 RX ORDER — NITROGLYCERIN 80 MG/1
PATCH TRANSDERMAL
Qty: 90 PATCH | Refills: 2 | Status: SHIPPED | OUTPATIENT
Start: 2017-07-24 | End: 2018-08-31 | Stop reason: SDUPTHER

## 2017-08-10 ENCOUNTER — HOSPITAL ENCOUNTER (OUTPATIENT)
Dept: OTHER | Age: 77
Discharge: HOME | End: 2017-08-10
Attending: INTERNAL MEDICINE | Admitting: INTERNAL MEDICINE

## 2017-08-10 LAB
BASOPHILS ABSOLUTE: 0 K/UL (ref 0–0.2)
BASOPHILS RELATIVE PERCENT: 0.5 %
EOSINOPHILS ABSOLUTE: 0.2 K/UL (ref 0–0.6)
EOSINOPHILS RELATIVE PERCENT: 4.1 %
HCT VFR BLD CALC: 31.7 % (ref 40.5–52.5)
HEMOGLOBIN: 10.5 G/DL (ref 13.5–17.5)
LYMPHOCYTES ABSOLUTE: 2.5 K/UL (ref 1–5.1)
LYMPHOCYTES RELATIVE PERCENT: 41.3 %
MCH RBC QN AUTO: 28.9 PG (ref 26–34)
MCHC RBC AUTO-ENTMCNC: 33.2 G/DL (ref 31–36)
MCV RBC AUTO: 87.2 FL (ref 80–100)
MONOCYTES ABSOLUTE: 0.6 K/UL (ref 0–1.3)
MONOCYTES RELATIVE PERCENT: 9.3 %
NEUTROPHILS ABSOLUTE: 2.7 K/UL (ref 1.7–7.7)
NEUTROPHILS RELATIVE PERCENT: 44.8 %
PDW BLD-RTO: 15.4 % (ref 12.4–15.4)
PLATELET # BLD: 198 K/UL (ref 135–450)
PMV BLD AUTO: 9 FL (ref 5–10.5)
RBC # BLD: 3.64 M/UL (ref 4.2–5.9)
WBC # BLD: 6 K/UL (ref 4–11)

## 2017-08-11 ENCOUNTER — OFFICE VISIT (OUTPATIENT)
Dept: CARDIOLOGY CLINIC | Age: 77
End: 2017-08-11

## 2017-08-11 VITALS
SYSTOLIC BLOOD PRESSURE: 120 MMHG | DIASTOLIC BLOOD PRESSURE: 68 MMHG | BODY MASS INDEX: 26.76 KG/M2 | WEIGHT: 181.2 LBS | HEART RATE: 76 BPM

## 2017-08-11 DIAGNOSIS — I25.10 CORONARY ARTERY DISEASE INVOLVING NATIVE CORONARY ARTERY OF NATIVE HEART WITHOUT ANGINA PECTORIS: Primary | ICD-10-CM

## 2017-08-11 PROCEDURE — 99214 OFFICE O/P EST MOD 30 MIN: CPT | Performed by: INTERNAL MEDICINE

## 2017-08-11 PROCEDURE — 93000 ELECTROCARDIOGRAM COMPLETE: CPT | Performed by: INTERNAL MEDICINE

## 2017-08-11 PROCEDURE — G8427 DOCREV CUR MEDS BY ELIG CLIN: HCPCS | Performed by: INTERNAL MEDICINE

## 2017-08-11 PROCEDURE — G8419 CALC BMI OUT NRM PARAM NOF/U: HCPCS | Performed by: INTERNAL MEDICINE

## 2017-08-11 PROCEDURE — 4040F PNEUMOC VAC/ADMIN/RCVD: CPT | Performed by: INTERNAL MEDICINE

## 2017-08-11 PROCEDURE — G8598 ASA/ANTIPLAT THER USED: HCPCS | Performed by: INTERNAL MEDICINE

## 2017-08-11 PROCEDURE — 1036F TOBACCO NON-USER: CPT | Performed by: INTERNAL MEDICINE

## 2017-08-11 PROCEDURE — 1123F ACP DISCUSS/DSCN MKR DOCD: CPT | Performed by: INTERNAL MEDICINE

## 2017-08-11 RX ORDER — ACETAMINOPHEN 500 MG
500 TABLET ORAL
COMMUNITY
Start: 2017-07-14 | End: 2018-03-21

## 2017-08-17 ENCOUNTER — OFFICE VISIT (OUTPATIENT)
Dept: FAMILY MEDICINE CLINIC | Age: 77
End: 2017-08-17

## 2017-08-17 VITALS
HEART RATE: 72 BPM | WEIGHT: 181 LBS | BODY MASS INDEX: 26.81 KG/M2 | OXYGEN SATURATION: 98 % | DIASTOLIC BLOOD PRESSURE: 70 MMHG | SYSTOLIC BLOOD PRESSURE: 124 MMHG | RESPIRATION RATE: 14 BRPM | HEIGHT: 69 IN

## 2017-08-17 DIAGNOSIS — E78.2 MIXED HYPERLIPIDEMIA: ICD-10-CM

## 2017-08-17 DIAGNOSIS — I50.22 CHF (CONGESTIVE HEART FAILURE), NYHA CLASS I, CHRONIC, SYSTOLIC (HCC): ICD-10-CM

## 2017-08-17 DIAGNOSIS — I50.22 CHF (CONGESTIVE HEART FAILURE), NYHA CLASS I, CHRONIC, SYSTOLIC (HCC): Primary | ICD-10-CM

## 2017-08-17 DIAGNOSIS — I48.0 PAROXYSMAL ATRIAL FIBRILLATION (HCC): ICD-10-CM

## 2017-08-17 DIAGNOSIS — I25.10 CORONARY ARTERY DISEASE INVOLVING NATIVE CORONARY ARTERY OF NATIVE HEART WITHOUT ANGINA PECTORIS: ICD-10-CM

## 2017-08-17 DIAGNOSIS — I10 ESSENTIAL HYPERTENSION: ICD-10-CM

## 2017-08-17 LAB
BASOPHILS ABSOLUTE: 0 K/UL (ref 0–0.2)
BASOPHILS RELATIVE PERCENT: 0.4 %
EOSINOPHILS ABSOLUTE: 0.3 K/UL (ref 0–0.6)
EOSINOPHILS RELATIVE PERCENT: 4.6 %
HCT VFR BLD CALC: 33.1 % (ref 40.5–52.5)
HEMOGLOBIN: 11.1 G/DL (ref 13.5–17.5)
LYMPHOCYTES ABSOLUTE: 1.9 K/UL (ref 1–5.1)
LYMPHOCYTES RELATIVE PERCENT: 29.7 %
MCH RBC QN AUTO: 29.3 PG (ref 26–34)
MCHC RBC AUTO-ENTMCNC: 33.4 G/DL (ref 31–36)
MCV RBC AUTO: 87.9 FL (ref 80–100)
MONOCYTES ABSOLUTE: 0.6 K/UL (ref 0–1.3)
MONOCYTES RELATIVE PERCENT: 8.7 %
NEUTROPHILS ABSOLUTE: 3.6 K/UL (ref 1.7–7.7)
NEUTROPHILS RELATIVE PERCENT: 56.6 %
PDW BLD-RTO: 15.9 % (ref 12.4–15.4)
PLATELET # BLD: 190 K/UL (ref 135–450)
PMV BLD AUTO: 9.1 FL (ref 5–10.5)
RBC # BLD: 3.77 M/UL (ref 4.2–5.9)
WBC # BLD: 6.4 K/UL (ref 4–11)

## 2017-08-17 PROCEDURE — 1036F TOBACCO NON-USER: CPT | Performed by: FAMILY MEDICINE

## 2017-08-17 PROCEDURE — G8598 ASA/ANTIPLAT THER USED: HCPCS | Performed by: FAMILY MEDICINE

## 2017-08-17 PROCEDURE — 36415 COLL VENOUS BLD VENIPUNCTURE: CPT | Performed by: FAMILY MEDICINE

## 2017-08-17 PROCEDURE — G8419 CALC BMI OUT NRM PARAM NOF/U: HCPCS | Performed by: FAMILY MEDICINE

## 2017-08-17 PROCEDURE — 4040F PNEUMOC VAC/ADMIN/RCVD: CPT | Performed by: FAMILY MEDICINE

## 2017-08-17 PROCEDURE — G8427 DOCREV CUR MEDS BY ELIG CLIN: HCPCS | Performed by: FAMILY MEDICINE

## 2017-08-17 PROCEDURE — 99214 OFFICE O/P EST MOD 30 MIN: CPT | Performed by: FAMILY MEDICINE

## 2017-08-17 PROCEDURE — 1123F ACP DISCUSS/DSCN MKR DOCD: CPT | Performed by: FAMILY MEDICINE

## 2017-08-18 LAB
ANION GAP SERPL CALCULATED.3IONS-SCNC: 12 MMOL/L (ref 3–16)
BUN BLDV-MCNC: 16 MG/DL (ref 7–20)
CALCIUM SERPL-MCNC: 9.1 MG/DL (ref 8.3–10.6)
CHLORIDE BLD-SCNC: 103 MMOL/L (ref 99–110)
CO2: 25 MMOL/L (ref 21–32)
CREAT SERPL-MCNC: 0.8 MG/DL (ref 0.8–1.3)
GFR AFRICAN AMERICAN: >60
GFR NON-AFRICAN AMERICAN: >60
GLUCOSE BLD-MCNC: 78 MG/DL (ref 70–99)
POTASSIUM SERPL-SCNC: 4.8 MMOL/L (ref 3.5–5.1)
SODIUM BLD-SCNC: 140 MMOL/L (ref 136–145)

## 2017-08-29 ENCOUNTER — PROCEDURE VISIT (OUTPATIENT)
Dept: CARDIOLOGY CLINIC | Age: 77
End: 2017-08-29

## 2017-08-29 ENCOUNTER — OFFICE VISIT (OUTPATIENT)
Dept: CARDIOLOGY CLINIC | Age: 77
End: 2017-08-29

## 2017-08-29 VITALS
WEIGHT: 179.6 LBS | DIASTOLIC BLOOD PRESSURE: 60 MMHG | HEART RATE: 65 BPM | BODY MASS INDEX: 26.52 KG/M2 | SYSTOLIC BLOOD PRESSURE: 120 MMHG

## 2017-08-29 DIAGNOSIS — I48.91 ATRIAL FIBRILLATION, UNSPECIFIED TYPE (HCC): ICD-10-CM

## 2017-08-29 DIAGNOSIS — R00.1 BRADYCARDIA: ICD-10-CM

## 2017-08-29 DIAGNOSIS — Z95.0 CARDIAC PACEMAKER IN SITU: ICD-10-CM

## 2017-08-29 DIAGNOSIS — I48.0 PAROXYSMAL ATRIAL FIBRILLATION (HCC): Primary | ICD-10-CM

## 2017-08-29 PROCEDURE — G8419 CALC BMI OUT NRM PARAM NOF/U: HCPCS | Performed by: INTERNAL MEDICINE

## 2017-08-29 PROCEDURE — 4040F PNEUMOC VAC/ADMIN/RCVD: CPT | Performed by: INTERNAL MEDICINE

## 2017-08-29 PROCEDURE — 1123F ACP DISCUSS/DSCN MKR DOCD: CPT | Performed by: INTERNAL MEDICINE

## 2017-08-29 PROCEDURE — G8598 ASA/ANTIPLAT THER USED: HCPCS | Performed by: INTERNAL MEDICINE

## 2017-08-29 PROCEDURE — 99215 OFFICE O/P EST HI 40 MIN: CPT | Performed by: INTERNAL MEDICINE

## 2017-08-29 PROCEDURE — 93280 PM DEVICE PROGR EVAL DUAL: CPT | Performed by: INTERNAL MEDICINE

## 2017-08-29 PROCEDURE — 1036F TOBACCO NON-USER: CPT | Performed by: INTERNAL MEDICINE

## 2017-08-29 PROCEDURE — G8427 DOCREV CUR MEDS BY ELIG CLIN: HCPCS | Performed by: INTERNAL MEDICINE

## 2017-09-19 RX ORDER — RANOLAZINE 500 MG/1
TABLET, FILM COATED, EXTENDED RELEASE ORAL
Qty: 180 TABLET | Refills: 3 | Status: SHIPPED | OUTPATIENT
Start: 2017-09-19 | End: 2018-09-14 | Stop reason: SDUPTHER

## 2017-09-20 ENCOUNTER — HOSPITAL ENCOUNTER (OUTPATIENT)
Dept: OTHER | Age: 77
Discharge: HOME | End: 2017-09-20
Attending: INTERNAL MEDICINE | Admitting: INTERNAL MEDICINE

## 2017-09-20 LAB
BASOPHILS ABSOLUTE: 0 K/UL (ref 0–0.2)
BASOPHILS RELATIVE PERCENT: 0.3 %
EOSINOPHILS ABSOLUTE: 0.3 K/UL (ref 0–0.6)
EOSINOPHILS RELATIVE PERCENT: 5.2 %
FERRITIN: 113.3 NG/ML (ref 30–400)
HCT VFR BLD CALC: 33.7 % (ref 40.5–52.5)
HEMOGLOBIN: 11.3 G/DL (ref 13.5–17.5)
IRON SATURATION: 22 % (ref 20–50)
IRON: 63 UG/DL (ref 59–158)
LYMPHOCYTES ABSOLUTE: 2.3 K/UL (ref 1–5.1)
LYMPHOCYTES RELATIVE PERCENT: 39.6 %
MCH RBC QN AUTO: 29.6 PG (ref 26–34)
MCHC RBC AUTO-ENTMCNC: 33.4 G/DL (ref 31–36)
MCV RBC AUTO: 88.6 FL (ref 80–100)
MONOCYTES ABSOLUTE: 0.4 K/UL (ref 0–1.3)
MONOCYTES RELATIVE PERCENT: 7.2 %
NEUTROPHILS ABSOLUTE: 2.8 K/UL (ref 1.7–7.7)
NEUTROPHILS RELATIVE PERCENT: 47.7 %
PDW BLD-RTO: 15.3 % (ref 12.4–15.4)
PLATELET # BLD: 187 K/UL (ref 135–450)
PMV BLD AUTO: 9.4 FL (ref 5–10.5)
RBC # BLD: 3.81 M/UL (ref 4.2–5.9)
TOTAL IRON BINDING CAPACITY: 282 UG/DL (ref 260–445)
WBC # BLD: 5.9 K/UL (ref 4–11)

## 2017-09-25 ENCOUNTER — OFFICE VISIT (OUTPATIENT)
Dept: CARDIOLOGY CLINIC | Age: 77
End: 2017-09-25

## 2017-09-25 VITALS
DIASTOLIC BLOOD PRESSURE: 70 MMHG | SYSTOLIC BLOOD PRESSURE: 124 MMHG | WEIGHT: 178.8 LBS | BODY MASS INDEX: 26.4 KG/M2 | HEART RATE: 78 BPM

## 2017-09-25 DIAGNOSIS — I48.0 PAROXYSMAL ATRIAL FIBRILLATION (HCC): Primary | ICD-10-CM

## 2017-09-25 PROCEDURE — 4040F PNEUMOC VAC/ADMIN/RCVD: CPT | Performed by: INTERNAL MEDICINE

## 2017-09-25 PROCEDURE — G8598 ASA/ANTIPLAT THER USED: HCPCS | Performed by: INTERNAL MEDICINE

## 2017-09-25 PROCEDURE — G8428 CUR MEDS NOT DOCUMENT: HCPCS | Performed by: INTERNAL MEDICINE

## 2017-09-25 PROCEDURE — G8417 CALC BMI ABV UP PARAM F/U: HCPCS | Performed by: INTERNAL MEDICINE

## 2017-09-25 PROCEDURE — 1123F ACP DISCUSS/DSCN MKR DOCD: CPT | Performed by: INTERNAL MEDICINE

## 2017-09-25 PROCEDURE — 99213 OFFICE O/P EST LOW 20 MIN: CPT | Performed by: INTERNAL MEDICINE

## 2017-09-25 PROCEDURE — 93000 ELECTROCARDIOGRAM COMPLETE: CPT | Performed by: INTERNAL MEDICINE

## 2017-09-25 PROCEDURE — 1036F TOBACCO NON-USER: CPT | Performed by: INTERNAL MEDICINE

## 2017-09-26 RX ORDER — POTASSIUM CHLORIDE 750 MG/1
TABLET, EXTENDED RELEASE ORAL
Qty: 270 TABLET | Refills: 3 | Status: SHIPPED | OUTPATIENT
Start: 2017-09-26 | End: 2018-09-23 | Stop reason: SDUPTHER

## 2017-09-30 ENCOUNTER — CARE COORDINATION (OUTPATIENT)
Dept: CARE COORDINATION | Age: 77
End: 2017-09-30

## 2017-10-11 ENCOUNTER — OFFICE VISIT (OUTPATIENT)
Dept: FAMILY MEDICINE CLINIC | Age: 77
End: 2017-10-11

## 2017-10-11 VITALS
DIASTOLIC BLOOD PRESSURE: 68 MMHG | BODY MASS INDEX: 27.02 KG/M2 | SYSTOLIC BLOOD PRESSURE: 124 MMHG | WEIGHT: 183 LBS | HEART RATE: 72 BPM | TEMPERATURE: 96.9 F | RESPIRATION RATE: 16 BRPM

## 2017-10-11 DIAGNOSIS — I10 ESSENTIAL HYPERTENSION: ICD-10-CM

## 2017-10-11 DIAGNOSIS — I25.10 CORONARY ARTERY DISEASE INVOLVING NATIVE CORONARY ARTERY OF NATIVE HEART WITHOUT ANGINA PECTORIS: ICD-10-CM

## 2017-10-11 DIAGNOSIS — E78.2 MIXED HYPERLIPIDEMIA: Primary | ICD-10-CM

## 2017-10-11 PROCEDURE — 4040F PNEUMOC VAC/ADMIN/RCVD: CPT | Performed by: FAMILY MEDICINE

## 2017-10-11 PROCEDURE — G8598 ASA/ANTIPLAT THER USED: HCPCS | Performed by: FAMILY MEDICINE

## 2017-10-11 PROCEDURE — 1036F TOBACCO NON-USER: CPT | Performed by: FAMILY MEDICINE

## 2017-10-11 PROCEDURE — G8427 DOCREV CUR MEDS BY ELIG CLIN: HCPCS | Performed by: FAMILY MEDICINE

## 2017-10-11 PROCEDURE — 99214 OFFICE O/P EST MOD 30 MIN: CPT | Performed by: FAMILY MEDICINE

## 2017-10-11 PROCEDURE — G8482 FLU IMMUNIZE ORDER/ADMIN: HCPCS | Performed by: FAMILY MEDICINE

## 2017-10-11 PROCEDURE — G8417 CALC BMI ABV UP PARAM F/U: HCPCS | Performed by: FAMILY MEDICINE

## 2017-10-11 PROCEDURE — 1123F ACP DISCUSS/DSCN MKR DOCD: CPT | Performed by: FAMILY MEDICINE

## 2017-10-11 NOTE — PROGRESS NOTES
Rosalie Rose is a 68 y.o. male. HPI:sees cardio, electrophysiologist, gi, pain, pod  No new chest pain lately  Watchman procedure was offered by electrophysiologist but his cardiologist Dr Alessio Rainey does not feel he is a candidate for that  Supposed be taking Lasix 40 twice a day but only takes 20 in today because he doesn't like to go the bathroom that often  No dyspnea on exertion but does have some increased edema  Very tired all the time which he continues to his fentanyl patch she gets from pain doctors, they do not want to change that medicine this time  Meds, vitamins and allergies reviewed with pt    ROS: No TIA's or unusual headaches, no dysphagia. No prolonged cough. No dyspnea or chest pain on exertion. No abdominal pain, change in bowel habits, black or bloody stools. No urinary tract symptoms. No new or unusual musculoskeletal symptoms. Prior to Visit Medications    Medication Sig Taking?  Authorizing Provider   KLOR-CON M10 10 MEQ extended release tablet TAKE 3 TABLETS DAILY Yes Russel Givens MD   RANEXA 500 MG extended release tablet TAKE 1 TABLET TWICE A DAY Yes Russel Givens MD   furosemide (LASIX) 40 MG tablet TAKE 1 TABLET TWICE A DAY Yes Russel Givens MD   nitroGLYCERIN (NITRODUR) 0.4 MG/HR APPLY 1 PATCH DAILY Yes Russel Givens MD   naproxen (NAPROSYN) 500 MG tablet Take 0.5 tablets by mouth 2 times daily (with meals) Yes Ezekiel Gandhi CNP   pantoprazole (PROTONIX) 40 MG tablet Take 1 tablet by mouth every morning (before breakfast) Yes Russel Givens MD   nitroGLYCERIN (NITROSTAT) 0.4 MG SL tablet Place 1 tablet under the tongue every 5 minutes as needed for Chest pain Yes Russel Givens MD   lisinopril (PRINIVIL;ZESTRIL) 5 MG tablet Take 1 tablet by mouth daily Yes Russel Givens MD   isosorbide mononitrate (IMDUR) 30 MG extended release tablet Take 30 mg by mouth daily Yes Historical Provider, MD   metoprolol succinate (TOPROL XL) 25 MG extended same medication    3.5 chf  Stable for now but worry about his edema and decreasing his Lasix so that in the future he may become short of breath    Suggest to compromise and have him take Lasix 20 mg orally with 40 mg every other day    4 anemia  Table same medication i.e. iron tablet daily    5 chronic pain  Follow-up with pain doctor and voiced concerns about fatigue      25 minutes with patient.  50% of time discussing treatment options

## 2017-10-16 ENCOUNTER — CARE COORDINATION (OUTPATIENT)
Dept: CARE COORDINATION | Age: 77
End: 2017-10-16

## 2017-10-16 NOTE — CARE COORDINATION
Second outreach call attempted to discuss [facility] and benefits. Unable to reach pt. Message left with reason for call requesting call back. If no return call will attempt to reach pt in a week. Chart reviewed. Pt's PCP recently saw pt and expressed concerns related to how pt was taking his Lasix (half dose) and possibility of SOB if dose not being increased. Will attempt to address at time of next call.      Lucrecia BALBUENAN, RN Care Coordinator  [address] [place] [address]  [phone]

## 2017-10-17 ENCOUNTER — OFFICE VISIT (OUTPATIENT)
Dept: CARDIOLOGY CLINIC | Age: 77
End: 2017-10-17

## 2017-10-17 ENCOUNTER — PROCEDURE VISIT (OUTPATIENT)
Dept: CARDIOLOGY CLINIC | Age: 77
End: 2017-10-17

## 2017-10-17 VITALS
SYSTOLIC BLOOD PRESSURE: 100 MMHG | BODY MASS INDEX: 27.43 KG/M2 | WEIGHT: 181 LBS | HEART RATE: 66 BPM | HEIGHT: 68 IN | DIASTOLIC BLOOD PRESSURE: 70 MMHG

## 2017-10-17 DIAGNOSIS — I48.91 ATRIAL FIBRILLATION, UNSPECIFIED TYPE (HCC): ICD-10-CM

## 2017-10-17 DIAGNOSIS — I25.10 CORONARY ARTERY DISEASE INVOLVING NATIVE CORONARY ARTERY OF NATIVE HEART WITHOUT ANGINA PECTORIS: Primary | ICD-10-CM

## 2017-10-17 DIAGNOSIS — R07.9 CHEST PAIN, UNSPECIFIED TYPE: ICD-10-CM

## 2017-10-17 DIAGNOSIS — I48.0 PAROXYSMAL ATRIAL FIBRILLATION (HCC): ICD-10-CM

## 2017-10-17 DIAGNOSIS — Z95.0 CARDIAC PACEMAKER IN SITU: ICD-10-CM

## 2017-10-17 DIAGNOSIS — R00.1 BRADYCARDIA: ICD-10-CM

## 2017-10-17 PROCEDURE — G8482 FLU IMMUNIZE ORDER/ADMIN: HCPCS | Performed by: INTERNAL MEDICINE

## 2017-10-17 PROCEDURE — G8427 DOCREV CUR MEDS BY ELIG CLIN: HCPCS | Performed by: INTERNAL MEDICINE

## 2017-10-17 PROCEDURE — 1123F ACP DISCUSS/DSCN MKR DOCD: CPT | Performed by: INTERNAL MEDICINE

## 2017-10-17 PROCEDURE — 1036F TOBACCO NON-USER: CPT | Performed by: INTERNAL MEDICINE

## 2017-10-17 PROCEDURE — G8417 CALC BMI ABV UP PARAM F/U: HCPCS | Performed by: INTERNAL MEDICINE

## 2017-10-17 PROCEDURE — 99214 OFFICE O/P EST MOD 30 MIN: CPT | Performed by: INTERNAL MEDICINE

## 2017-10-17 PROCEDURE — 93280 PM DEVICE PROGR EVAL DUAL: CPT | Performed by: INTERNAL MEDICINE

## 2017-10-17 PROCEDURE — G8598 ASA/ANTIPLAT THER USED: HCPCS | Performed by: INTERNAL MEDICINE

## 2017-10-17 PROCEDURE — 4040F PNEUMOC VAC/ADMIN/RCVD: CPT | Performed by: INTERNAL MEDICINE

## 2017-10-17 NOTE — PROGRESS NOTES
Interrogation and programming evaluation of the device shows normal function, with stable sensing and pacing thresholds. See interrogation for details. The pt will see Dr Harleen Moon today. Recheck remotely 3 mos.

## 2017-10-23 ENCOUNTER — CARE COORDINATION (OUTPATIENT)
Dept: CARE COORDINATION | Age: 77
End: 2017-10-23

## 2017-10-23 NOTE — CARE COORDINATION
Third Outreach call attempted. Unable to reach pt or leave message due to phone continually ringing. No further outreach at this time.     Octavia BALBUENAN, RN Care Coordinator  [address] [place] [address]  [phone]

## 2017-10-24 RX ORDER — FUROSEMIDE 40 MG/1
TABLET ORAL
Qty: 180 TABLET | Refills: 5 | Status: SHIPPED | OUTPATIENT
Start: 2017-10-24 | End: 2018-10-01 | Stop reason: ALTCHOICE

## 2017-11-22 RX ORDER — METOPROLOL SUCCINATE 25 MG/1
12.5 TABLET, EXTENDED RELEASE ORAL 2 TIMES DAILY
Qty: 30 TABLET | Refills: 5 | Status: SHIPPED | OUTPATIENT
Start: 2017-11-22 | End: 2017-12-27 | Stop reason: SDUPTHER

## 2017-12-11 ENCOUNTER — OFFICE VISIT (OUTPATIENT)
Dept: DERMATOLOGY | Age: 77
End: 2017-12-11

## 2017-12-11 DIAGNOSIS — D22.5 NEVUS OF BACK: ICD-10-CM

## 2017-12-11 DIAGNOSIS — Z85.828 HISTORY OF BASAL CELL CARCINOMA: ICD-10-CM

## 2017-12-11 DIAGNOSIS — L72.0 EPIDERMOID CYST: ICD-10-CM

## 2017-12-11 DIAGNOSIS — L82.1 SK (SEBORRHEIC KERATOSIS): Primary | ICD-10-CM

## 2017-12-11 DIAGNOSIS — Z87.2 HISTORY OF ACTINIC KERATOSIS: ICD-10-CM

## 2017-12-11 PROCEDURE — 4040F PNEUMOC VAC/ADMIN/RCVD: CPT | Performed by: DERMATOLOGY

## 2017-12-11 PROCEDURE — 99213 OFFICE O/P EST LOW 20 MIN: CPT | Performed by: DERMATOLOGY

## 2017-12-11 PROCEDURE — 1036F TOBACCO NON-USER: CPT | Performed by: DERMATOLOGY

## 2017-12-11 PROCEDURE — G8482 FLU IMMUNIZE ORDER/ADMIN: HCPCS | Performed by: DERMATOLOGY

## 2017-12-11 PROCEDURE — G8598 ASA/ANTIPLAT THER USED: HCPCS | Performed by: DERMATOLOGY

## 2017-12-11 PROCEDURE — 1123F ACP DISCUSS/DSCN MKR DOCD: CPT | Performed by: DERMATOLOGY

## 2017-12-11 PROCEDURE — G8427 DOCREV CUR MEDS BY ELIG CLIN: HCPCS | Performed by: DERMATOLOGY

## 2017-12-11 PROCEDURE — G8417 CALC BMI ABV UP PARAM F/U: HCPCS | Performed by: DERMATOLOGY

## 2017-12-11 NOTE — PROGRESS NOTES
[facility]  MD Elizabeth Jamison [address]  1940    68 y.o. male     Date of Visit: 12/11/2017    Chief Complaint: skin lesions    History of Present Illness:    1., 2.  Here today for several lesions/growths on the back. 3.  He complains of an asymptomatic lesion on the back of the neck. 4.  He has history of AKs - not aware of any new lesions today. 5.  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 Past Medical History, Family History, Surgical History, Medications and Allergies reviewed.     Past Medical History:   Diagnosis Date    Allergic rhinitis     Atrial fibrillation (HCC)     Benign prostatic hypertrophy     CAD (coronary artery disease)     Cancer (HCC)     skin    CH[facility]congestive heart failure) ([facility]) 08/17/2017    DDD (degenerative disc disease), lumbar     Falls 08/17/2017    Hyperlipidemia     Hypertension     MI (myocardial infarction)     MRSA (methicillin resistant staph aureus) culture positive     h/o infection in the blood    Pneumonia     S/P PTCA (percutaneous transluminal coronary angioplasty) stents x 8    SSS (sick sinus syndrome) ([address]     Unspecified sleep apnea      Past Surgical History:   Procedure Laterality Date    APPENDECTOMY      CARDIAC PACEMAKER PLACEMENT      CARPAL TUNNEL RELEASE      CORONARY ANGIOPLASTY      CORONARY ANGIOPLASTY WITH STENT PLACEMENT      DIAGNOSTIC CARDIAC CATH LAB PROCEDURE      EYE SURGERY      PACEMAKER PLACEMENT         Allergies   Allergen Reactions    Avelox [Moxifloxacin Hcl In Nacl] Anaphylaxis    Epinephrine Base     Other      [place] per PT - swelling size of silver dollar at site per PT      Outpatient Prescriptions Marked as Taking for the 12/11/17 encounter (Office Visit) with [place] MD Martha posterolateral neck - round skin colored nodule with overlying punctum. 4.  Clear. 5.  Clear. Assessment and Plan     1. SK (seborrheic keratosis) - few    Reassurance. 2. Nevus of back - benign appearing    Reassurance. 3. Epidermoid cyst - not inflamed, asymptomatic    Reassurance. 4. History of actinic keratosis - clear    Encouraged sun protective behaviors. 5. History of basal cell carcinoma - clear    Call for any signs of recurrence. Return in about 6 months (around 6/11/2018).

## 2017-12-27 NOTE — TELEPHONE ENCOUNTER
Medication: Metoprolol     Strength:12.5 mg     Directions: One tablet by mouth two times daily     Last visit :11/22/2017    Next Visit 12/29/2017 [place])    Last fill: 10/17/2017

## 2017-12-28 RX ORDER — METOPROLOL SUCCINATE 25 MG/1
12.5 TABLET, EXTENDED RELEASE ORAL 2 TIMES DAILY
Qty: 90 TABLET | Refills: 3 | Status: SHIPPED | OUTPATIENT
Start: 2017-12-28 | End: 2017-12-29 | Stop reason: SDUPTHER

## 2017-12-29 ENCOUNTER — OFFICE VISIT (OUTPATIENT)
Dept: CARDIOLOGY CLINIC | Age: 77
End: 2017-12-29

## 2017-12-29 VITALS
HEART RATE: 66 BPM | DIASTOLIC BLOOD PRESSURE: 60 MMHG | SYSTOLIC BLOOD PRESSURE: 90 MMHG | WEIGHT: 180.2 LBS | BODY MASS INDEX: 27.4 KG/M2

## 2017-12-29 DIAGNOSIS — I48.0 PAROXYSMAL ATRIAL FIBRILLATION (HCC): Primary | ICD-10-CM

## 2017-12-29 DIAGNOSIS — I49.5 SICK SINUS SYNDROME (HCC): ICD-10-CM

## 2017-12-29 DIAGNOSIS — I25.10 CORONARY ARTERY DISEASE INVOLVING NATIVE CORONARY ARTERY OF NATIVE HEART WITHOUT ANGINA PECTORIS: ICD-10-CM

## 2017-12-29 DIAGNOSIS — I10 ESSENTIAL HYPERTENSION: ICD-10-CM

## 2017-12-29 PROCEDURE — 93000 ELECTROCARDIOGRAM COMPLETE: CPT | Performed by: INTERNAL MEDICINE

## 2017-12-29 PROCEDURE — 1123F ACP DISCUSS/DSCN MKR DOCD: CPT | Performed by: INTERNAL MEDICINE

## 2017-12-29 PROCEDURE — G8417 CALC BMI ABV UP PARAM F/U: HCPCS | Performed by: INTERNAL MEDICINE

## 2017-12-29 PROCEDURE — 1036F TOBACCO NON-USER: CPT | Performed by: INTERNAL MEDICINE

## 2017-12-29 PROCEDURE — G8598 ASA/ANTIPLAT THER USED: HCPCS | Performed by: INTERNAL MEDICINE

## 2017-12-29 PROCEDURE — 4040F PNEUMOC VAC/ADMIN/RCVD: CPT | Performed by: INTERNAL MEDICINE

## 2017-12-29 PROCEDURE — 99213 OFFICE O/P EST LOW 20 MIN: CPT | Performed by: INTERNAL MEDICINE

## 2017-12-29 PROCEDURE — G8482 FLU IMMUNIZE ORDER/ADMIN: HCPCS | Performed by: INTERNAL MEDICINE

## 2017-12-29 PROCEDURE — G8428 CUR MEDS NOT DOCUMENT: HCPCS | Performed by: INTERNAL MEDICINE

## 2017-12-29 RX ORDER — METOPROLOL SUCCINATE 25 MG/1
12.5 TABLET, EXTENDED RELEASE ORAL 2 TIMES DAILY
Qty: 90 TABLET | Refills: 3 | Status: SHIPPED | OUTPATIENT
Start: 2017-12-29 | End: 2018-12-22 | Stop reason: SDUPTHER

## 2017-12-29 RX ORDER — ISOSORBIDE MONONITRATE 30 MG/1
30 TABLET, EXTENDED RELEASE ORAL DAILY
Qty: 90 TABLET | Refills: 3 | Status: SHIPPED | OUTPATIENT
Start: 2017-12-29 | End: 2018-12-24 | Stop reason: SDUPTHER

## 2018-01-02 RX ORDER — PANTOPRAZOLE SODIUM 40 MG/1
TABLET, DELAYED RELEASE ORAL
Qty: 30 TABLET | Refills: 5 | Status: SHIPPED | OUTPATIENT
Start: 2018-01-02 | End: 2019-07-18 | Stop reason: SDUPTHER

## 2018-01-02 RX ORDER — LISINOPRIL 5 MG/1
5 TABLET ORAL DAILY
Qty: 30 TABLET | Refills: 5 | Status: SHIPPED | OUTPATIENT
Start: 2018-01-02 | End: 2018-04-26

## 2018-01-02 NOTE — TELEPHONE ENCOUNTER
Requested Prescriptions     Pending Prescriptions Disp Refills    pantoprazole (PROTONIX) 40 MG tablet [Pharmacy Med Name: PANTOPRAZOLE 40MG TABLETS] 30 tablet 5     Sig: TAKE 1 TABLET BY MOUTH EVERY MORNING BEFORE BREAKFAST    lisinopril (PRINIVIL;ZESTRIL) 5 MG tablet [Pharmacy Med Name: LISINOPRIL 5MG TABLETS] 30 tablet 5     Sig: TAKE 1 TABLET BY MOUTH DAILY         Last ov:12/29/17    Next ov:3/9/18    Last fill:7/7/17                 Lisinopril d/c 12/29/17

## 2018-01-10 ENCOUNTER — OFFICE VISIT (OUTPATIENT)
Dept: FAMILY MEDICINE CLINIC | Age: 78
End: 2018-01-10

## 2018-01-10 VITALS
BODY MASS INDEX: 26.51 KG/M2 | TEMPERATURE: 97.6 F | HEIGHT: 69 IN | WEIGHT: 179 LBS | DIASTOLIC BLOOD PRESSURE: 72 MMHG | RESPIRATION RATE: 14 BRPM | SYSTOLIC BLOOD PRESSURE: 122 MMHG | OXYGEN SATURATION: 97 %

## 2018-01-10 DIAGNOSIS — E78.2 MIXED HYPERLIPIDEMIA: ICD-10-CM

## 2018-01-10 DIAGNOSIS — Z12.5 PROSTATE CANCER SCREENING: ICD-10-CM

## 2018-01-10 DIAGNOSIS — R53.83 OTHER FATIGUE: ICD-10-CM

## 2018-01-10 DIAGNOSIS — I10 ESSENTIAL HYPERTENSION: Primary | ICD-10-CM

## 2018-01-10 DIAGNOSIS — I48.0 PAROXYSMAL ATRIAL FIBRILLATION (HCC): ICD-10-CM

## 2018-01-10 DIAGNOSIS — N40.0 BENIGN PROSTATIC HYPERPLASIA WITHOUT LOWER URINARY TRACT SYMPTOMS: ICD-10-CM

## 2018-01-10 DIAGNOSIS — Z87.891 FORMER HEAVY TOBACCO SMOKER: ICD-10-CM

## 2018-01-10 DIAGNOSIS — I25.10 CORONARY ARTERY DISEASE INVOLVING NATIVE CORONARY ARTERY OF NATIVE HEART WITHOUT ANGINA PECTORIS: ICD-10-CM

## 2018-01-10 DIAGNOSIS — Z95.0 CARDIAC PACEMAKER IN SITU: ICD-10-CM

## 2018-01-10 PROCEDURE — G8427 DOCREV CUR MEDS BY ELIG CLIN: HCPCS | Performed by: FAMILY MEDICINE

## 2018-01-10 PROCEDURE — 1123F ACP DISCUSS/DSCN MKR DOCD: CPT | Performed by: FAMILY MEDICINE

## 2018-01-10 PROCEDURE — 99214 OFFICE O/P EST MOD 30 MIN: CPT | Performed by: FAMILY MEDICINE

## 2018-01-10 PROCEDURE — 4040F PNEUMOC VAC/ADMIN/RCVD: CPT | Performed by: FAMILY MEDICINE

## 2018-01-10 PROCEDURE — G8417 CALC BMI ABV UP PARAM F/U: HCPCS | Performed by: FAMILY MEDICINE

## 2018-01-10 PROCEDURE — G8599 NO ASA/ANTIPLAT THER USE RNG: HCPCS | Performed by: FAMILY MEDICINE

## 2018-01-10 PROCEDURE — G8482 FLU IMMUNIZE ORDER/ADMIN: HCPCS | Performed by: FAMILY MEDICINE

## 2018-01-10 PROCEDURE — 1036F TOBACCO NON-USER: CPT | Performed by: FAMILY MEDICINE

## 2018-01-10 NOTE — PROGRESS NOTES
Yes Ana Danielle MD   naproxen (NAPROSYN) 500 MG tablet Take 0.5 tablets by mouth 2 times daily (with meals) Yes Baldomero Gandhi CNP   nitroGLYCERIN (NITROSTAT) 0.4 MG SL tablet Place 1 tablet under the tongue every 5 minutes as needed for Chest pain Yes Ana Danielle MD   rosuvastatin (CRESTOR) 5 MG tablet TAKE 1 TABLET DAILY Yes Ana Danielle MD   MULTAQ 400 MG TABS TAKE 1 TABLET TWICE A DAY WITH MEALS Yes Ana Danielle MD   PROFE 391.3 (180 FE) MG CAPS TK ONE C PO BID Yes Historical Provider, MD   polyethylene glycol (MIRALAX) POWD powder Take 17 g by mouth daily Yes Historical Provider, MD   Coenzyme Q10 (CO Q 10) 100 MG CAPS Take 200 mg by mouth daily Yes Historical Provider, MD   fentaNYL ([address]) 50 MCG/HR Place 1 patch onto the skin every 48 hours Yes Eleni Hardy MD   therapeutic multivitamin-minerals (THERAGRAN-M) tablet Take 1 tablet by mouth daily Indications: WITH IRON  Yes Historical Provider, MD       Past Medical History:   Diagnosis Date    Allergic rhinitis     [facility]     Benign prostatic hypertrophy     CAD (coronary artery disease)     Cancer ([facility]     skin    CHF (congestive heart failure) ([facility] 08/17/2017    DDD (degenerative disc disease), lumbar     Falls 08/17/2017    Hyperlipidemia     Hypertension     MI (myocardial infarction)     MRSA (methicillin resistant staph aureus) culture positive     h/o infection in the blood    Pneumonia     S/P PTCA (percutaneous transluminal coronary angioplasty) stents x 8    SSS (sick sinus syndrome) ([facility]     Unspecified sleep apnea        Social History   Substance Use Topics    Smoking status: Former Smoker     Packs/day: 1.00     Years: 30.00     Quit date: 4/12/2004    Smokeless tobacco: Never Used    Alcohol use No       Family History   Problem Relation Age of Onset    Cancer Sister     Coronary Art Dis Brother        Allergies   Allergen Reactions    Avelox [Moxifloxacin Hcl In

## 2018-02-06 ENCOUNTER — NURSE ONLY (OUTPATIENT)
Dept: CARDIOLOGY CLINIC | Age: 78
End: 2018-02-06

## 2018-02-06 DIAGNOSIS — R00.1 BRADYCARDIA: ICD-10-CM

## 2018-02-06 DIAGNOSIS — Z95.0 CARDIAC PACEMAKER IN SITU: ICD-10-CM

## 2018-02-06 NOTE — LETTER
[address] [facility] [phone] [address] [facility] [phone]    Pacemaker/Defibrillator Clinic            02/12/18        [address] [facility] [address]        Dear Riaz Levine      This letter is to inform you that we received a transmission from your monitor at home that checks your pacemaker and/or defibrillator, or implanted heart monitor. Everything is within normal limits. Your next scheduled transmission date is 7/10/18. You will receive a reminder call 1-3 days beforehand. Please remember to transmit only on your scheduled date and only once. Please be aware that the remote device transmission sites are periodically monitored only during regular business hours during which simultaneous in-office device clinics are being run. If your transmission requires attention, we will contact you as soon as possible. Thank you.                  Tala Veras

## 2018-02-07 PROCEDURE — 93294 REM INTERROG EVL PM/LDLS PM: CPT | Performed by: INTERNAL MEDICINE

## 2018-02-07 PROCEDURE — 93296 REM INTERROG EVL PM/IDS: CPT | Performed by: INTERNAL MEDICINE

## 2018-02-23 ENCOUNTER — CARE COORDINATION (OUTPATIENT)
Dept: FAMILY MEDICINE CLINIC | Age: 78
End: 2018-02-23

## 2018-02-23 NOTE — LETTER
2/23/2018    [id number] have been selected by your primary care provider to participate in a Care Coordination Program that provides additional support and resources to provide a higher level of care to our patients. One of those resources is a Nurse Care Coordinator, Jonny Nolan who can offer support in managing the health of our patients who have chronic health conditions, multiple health conditions, and or complex health needs. Examples of the types of support Jonny Nolan RN will provide include service coordination (finding providers in your network and community, assistance in scheduling services, ensuring test results are available, etc), education, and promoting your ability to follow your doctor's recommended treatment plan. You have been selected to take part in this unique program that will include one on one interaction with Jonny Nolan RN. Jonny Nolan RN will work with you following some of your appointments with me in our office. She/He will also contact you via phone to help you learn and understand how to manage your health and work towards your chosen health goals. I hope that you will be as excited by this program as we are. Jonny Nolan RN will be contacting you in the next week to speak with you regarding your plan of care.     Sincerely,     Emanuel Bautista MD

## 2018-02-23 NOTE — CARE COORDINATION
Initial Outreach letter mailed. Will follow up with pt in 2 weeks regarding falls/Community Resources.      Darryl BALBUENAN, RN Care Coordinator  [facility],  [facility],   [address] [place] [address] Alex   [phone]

## 2018-03-07 ENCOUNTER — CARE COORDINATION (OUTPATIENT)
Dept: CARE COORDINATION | Age: 78
End: 2018-03-07

## 2018-03-07 NOTE — CARE COORDINATION
Second Outreach call attempted. Unable to reach pt. Message left requesting return call. F/U letter mailed with contact information. Will attempt to reach pt in next 2 weeks.     Royal BALBUENAN, RN Care Coordinator  [facility],   [address] [place] [address]   [phone]

## 2018-03-07 NOTE — LETTER
3/7/2018    [address]    I am following up on my previous contact. I wanted to introduce myself and the care coordination program offered here at Emanuel Bautista MD's office. Our goal is to support you in your efforts to be as healthy as possible. [facility] ([facility]) is committed to walk with you on your journey to better health. Please let me know if you have any questions or if you would like to schedule an appointment with me. You can reach me at the numbers listed below.      In good health,     Akanksha BALBUENAN, RN Care Coordinator  [facility],  [facility],   [address] [facility]   [phone]

## 2018-03-09 ENCOUNTER — HOSPITAL ENCOUNTER (OUTPATIENT)
Dept: OTHER | Age: 78
Discharge: HOME | End: 2018-03-09
Attending: FAMILY MEDICINE | Admitting: FAMILY MEDICINE

## 2018-03-09 LAB
A/G RATIO: 1.3 (ref 1.1–2.2)
ALBUMIN SERPL-MCNC: 4.1 G/DL (ref 3.4–5)
ALP BLD-CCNC: 71 U/L (ref 40–129)
ALT SERPL-CCNC: 11 U/L (ref 10–40)
ANION GAP SERPL CALCULATED.3IONS-SCNC: 16 MMOL/L (ref 3–16)
AST SERPL-CCNC: 20 U/L (ref 15–37)
BASOPHILS ABSOLUTE: 0 K/UL (ref 0–0.2)
BASOPHILS RELATIVE PERCENT: 0.7 %
BILIRUB SERPL-MCNC: 0.5 MG/DL (ref 0–1)
BUN BLDV-MCNC: 17 MG/DL (ref 7–20)
CALCIUM SERPL-MCNC: 8.7 MG/DL (ref 8.3–10.6)
CHLORIDE BLD-SCNC: 104 MMOL/L (ref 99–110)
CO2: 25 MMOL/L (ref 21–32)
CREAT SERPL-MCNC: 1.1 MG/DL (ref 0.8–1.3)
EOSINOPHILS ABSOLUTE: 0.2 K/UL (ref 0–0.6)
EOSINOPHILS RELATIVE PERCENT: 3.5 %
GFR AFRICAN AMERICAN: >60
GFR NON-AFRICAN AMERICAN: >60
GLOBULIN: 3.2 G/DL
GLUCOSE BLD-MCNC: 85 MG/DL (ref 70–99)
HCT VFR BLD CALC: 33.8 % (ref 40.5–52.5)
HEMOGLOBIN: 11.3 G/DL (ref 13.5–17.5)
LYMPHOCYTES ABSOLUTE: 2.2 K/UL (ref 1–5.1)
LYMPHOCYTES RELATIVE PERCENT: 37.9 %
MCH RBC QN AUTO: 29.8 PG (ref 26–34)
MCHC RBC AUTO-ENTMCNC: 33.6 G/DL (ref 31–36)
MCV RBC AUTO: 88.7 FL (ref 80–100)
MONOCYTES ABSOLUTE: 0.5 K/UL (ref 0–1.3)
MONOCYTES RELATIVE PERCENT: 8.8 %
NEUTROPHILS ABSOLUTE: 2.9 K/UL (ref 1.7–7.7)
NEUTROPHILS RELATIVE PERCENT: 49.1 %
PDW BLD-RTO: 14.9 % (ref 12.4–15.4)
PLATELET # BLD: 182 K/UL (ref 135–450)
PMV BLD AUTO: 9.5 FL (ref 5–10.5)
POTASSIUM SERPL-SCNC: 4.8 MMOL/L (ref 3.5–5.1)
PROSTATE SPECIFIC ANTIGEN: 0.04 NG/ML (ref 0–4)
RBC # BLD: 3.81 M/UL (ref 4.2–5.9)
SODIUM BLD-SCNC: 145 MMOL/L (ref 136–145)
TOTAL PROTEIN: 7.3 G/DL (ref 6.4–8.2)
TSH SERPL DL<=0.05 MIU/L-ACNC: 3.41 UIU/ML (ref 0.27–4.2)
WBC # BLD: 5.8 K/UL (ref 4–11)

## 2018-03-15 ENCOUNTER — OFFICE VISIT (OUTPATIENT)
Dept: FAMILY MEDICINE CLINIC | Age: 78
End: 2018-03-15

## 2018-03-15 VITALS
OXYGEN SATURATION: 95 % | TEMPERATURE: 98 F | HEIGHT: 65 IN | DIASTOLIC BLOOD PRESSURE: 60 MMHG | RESPIRATION RATE: 14 BRPM | SYSTOLIC BLOOD PRESSURE: 110 MMHG | WEIGHT: 180 LBS | BODY MASS INDEX: 29.99 KG/M2 | HEART RATE: 71 BPM

## 2018-03-15 DIAGNOSIS — I48.0 PAROXYSMAL ATRIAL FIBRILLATION (HCC): ICD-10-CM

## 2018-03-15 DIAGNOSIS — I20.9 ANGINAL SYNDROME (HCC): ICD-10-CM

## 2018-03-15 DIAGNOSIS — I25.10 CORONARY ARTERY DISEASE INVOLVING NATIVE CORONARY ARTERY OF NATIVE HEART WITHOUT ANGINA PECTORIS: ICD-10-CM

## 2018-03-15 DIAGNOSIS — R60.0 LOCALIZED EDEMA: Primary | ICD-10-CM

## 2018-03-15 DIAGNOSIS — I50.9 CONGESTIVE HEART FAILURE, UNSPECIFIED CONGESTIVE HEART FAILURE CHRONICITY, UNSPECIFIED CONGESTIVE HEART FAILURE TYPE: ICD-10-CM

## 2018-03-15 DIAGNOSIS — I10 ESSENTIAL HYPERTENSION: ICD-10-CM

## 2018-03-15 DIAGNOSIS — I50.22 CHRONIC SYSTOLIC CONGESTIVE HEART FAILURE, NYHA CLASS 1 (HCC): ICD-10-CM

## 2018-03-15 PROCEDURE — 1123F ACP DISCUSS/DSCN MKR DOCD: CPT | Performed by: FAMILY MEDICINE

## 2018-03-15 PROCEDURE — G8482 FLU IMMUNIZE ORDER/ADMIN: HCPCS | Performed by: FAMILY MEDICINE

## 2018-03-15 PROCEDURE — G8599 NO ASA/ANTIPLAT THER USE RNG: HCPCS | Performed by: FAMILY MEDICINE

## 2018-03-15 PROCEDURE — 99213 OFFICE O/P EST LOW 20 MIN: CPT | Performed by: FAMILY MEDICINE

## 2018-03-15 PROCEDURE — 4040F PNEUMOC VAC/ADMIN/RCVD: CPT | Performed by: FAMILY MEDICINE

## 2018-03-15 PROCEDURE — G8417 CALC BMI ABV UP PARAM F/U: HCPCS | Performed by: FAMILY MEDICINE

## 2018-03-15 PROCEDURE — 1036F TOBACCO NON-USER: CPT | Performed by: FAMILY MEDICINE

## 2018-03-15 PROCEDURE — G8427 DOCREV CUR MEDS BY ELIG CLIN: HCPCS | Performed by: FAMILY MEDICINE

## 2018-03-15 RX ORDER — CEPHALEXIN 500 MG/1
500 CAPSULE ORAL 3 TIMES DAILY
Qty: 21 CAPSULE | Refills: 0 | Status: SHIPPED | OUTPATIENT
Start: 2018-03-15 | End: 2018-03-21

## 2018-03-15 ASSESSMENT — PATIENT HEALTH QUESTIONNAIRE - PHQ9
2. FEELING DOWN, DEPRESSED OR HOPELESS: 0
SUM OF ALL RESPONSES TO PHQ QUESTIONS 1-9: 0
1. LITTLE INTEREST OR PLEASURE IN DOING THINGS: 0
SUM OF ALL RESPONSES TO PHQ9 QUESTIONS 1 & 2: 0

## 2018-03-15 NOTE — PROGRESS NOTES
clicks  PULM:  Chest is clear, no wheezing ,  symmetric air entry throughout both lung fields. NT  EXT: Bilateral 3-4+ pitting edema with weeping of clear/yellow fluid from the left shin slight redness in the left shin but no warmth or tenderness  NEURO: Alert and oriented ×3    ASSESSMENT/PLAN:  1. Localized edema  Increase Lasix to 40 mg daily elevate feet, low-salt diet  Question early cellulitis-Keflex 500 3 times a day for 7 days  Return to office 6 weeks and check BNP/BMP    2. Chronic systolic congestive heart failure, NYHA class 1 (HCC)  Continue present medication and increase Lasix as above    3. Essential hypertension  Watch for hypotension    4. Coronary artery disease involving native coronary artery of native heart without angina pectoris  Stable continue present medication    5.  Paroxysmal atrial fibrillation (HCC)  Normal sinus rhythm today

## 2018-03-16 ENCOUNTER — TELEPHONE (OUTPATIENT)
Dept: FAMILY MEDICINE CLINIC | Age: 78
End: 2018-03-16

## 2018-03-16 RX ORDER — CLINDAMYCIN HYDROCHLORIDE 150 MG/1
150 CAPSULE ORAL 3 TIMES DAILY
Qty: 30 CAPSULE | Refills: 0 | Status: SHIPPED | OUTPATIENT
Start: 2018-03-16 | End: 2018-03-26

## 2018-03-19 ENCOUNTER — TELEPHONE (OUTPATIENT)
Dept: FAMILY MEDICINE CLINIC | Age: 78
End: 2018-03-19

## 2018-03-20 ENCOUNTER — CARE COORDINATION (OUTPATIENT)
Dept: CARE COORDINATION | Age: 78
End: 2018-03-20

## 2018-03-21 ENCOUNTER — OFFICE VISIT (OUTPATIENT)
Dept: FAMILY MEDICINE CLINIC | Age: 78
End: 2018-03-21

## 2018-03-21 VITALS
SYSTOLIC BLOOD PRESSURE: 120 MMHG | BODY MASS INDEX: 28.49 KG/M2 | WEIGHT: 171 LBS | HEART RATE: 67 BPM | HEIGHT: 65 IN | OXYGEN SATURATION: 96 % | DIASTOLIC BLOOD PRESSURE: 80 MMHG

## 2018-03-21 DIAGNOSIS — R60.0 LOCALIZED EDEMA: ICD-10-CM

## 2018-03-21 DIAGNOSIS — I48.0 PAROXYSMAL ATRIAL FIBRILLATION (HCC): ICD-10-CM

## 2018-03-21 DIAGNOSIS — L03.116 CELLULITIS OF LEFT LOWER EXTREMITY: Primary | ICD-10-CM

## 2018-03-21 DIAGNOSIS — I10 ESSENTIAL HYPERTENSION: ICD-10-CM

## 2018-03-21 DIAGNOSIS — I25.10 CORONARY ARTERY DISEASE INVOLVING NATIVE CORONARY ARTERY OF NATIVE HEART WITHOUT ANGINA PECTORIS: ICD-10-CM

## 2018-03-21 DIAGNOSIS — I50.22 CHRONIC SYSTOLIC CONGESTIVE HEART FAILURE, NYHA CLASS 1 (HCC): ICD-10-CM

## 2018-03-21 PROCEDURE — G8482 FLU IMMUNIZE ORDER/ADMIN: HCPCS | Performed by: FAMILY MEDICINE

## 2018-03-21 PROCEDURE — G8427 DOCREV CUR MEDS BY ELIG CLIN: HCPCS | Performed by: FAMILY MEDICINE

## 2018-03-21 PROCEDURE — G8599 NO ASA/ANTIPLAT THER USE RNG: HCPCS | Performed by: FAMILY MEDICINE

## 2018-03-21 PROCEDURE — 1036F TOBACCO NON-USER: CPT | Performed by: FAMILY MEDICINE

## 2018-03-21 PROCEDURE — G8417 CALC BMI ABV UP PARAM F/U: HCPCS | Performed by: FAMILY MEDICINE

## 2018-03-21 PROCEDURE — 4040F PNEUMOC VAC/ADMIN/RCVD: CPT | Performed by: FAMILY MEDICINE

## 2018-03-21 PROCEDURE — 99213 OFFICE O/P EST LOW 20 MIN: CPT | Performed by: FAMILY MEDICINE

## 2018-03-21 PROCEDURE — 1123F ACP DISCUSS/DSCN MKR DOCD: CPT | Performed by: FAMILY MEDICINE

## 2018-03-21 RX ORDER — SULFAMETHOXAZOLE AND TRIMETHOPRIM 800; 160 MG/1; MG/1
1 TABLET ORAL 2 TIMES DAILY
Qty: 20 TABLET | Refills: 0 | Status: SHIPPED | OUTPATIENT
Start: 2018-03-21 | End: 2018-03-27 | Stop reason: SDUPTHER

## 2018-03-21 RX ORDER — SULFAMETHOXAZOLE AND TRIMETHOPRIM 800; 160 MG/1; MG/1
1 TABLET ORAL 2 TIMES DAILY
Qty: 20 TABLET | Refills: 0 | Status: SHIPPED | OUTPATIENT
Start: 2018-03-21 | End: 2018-03-31

## 2018-03-22 NOTE — PROGRESS NOTES
tablet TAKE 1 TABLET TWICE A DAY Yes Vladimir Patel MD   KLOR-CON M10 10 MEQ extended release tablet TAKE 3 TABLETS DAILY Yes Vladimir Patel MD   RANEXA 500 MG extended release tablet TAKE 1 TABLET TWICE A DAY Yes Vladimir Patel MD   nitroGLYCERIN (NITRODUR) 0.4 MG/HR APPLY 1 PATCH DAILY Yes Vladimir Patel MD   nitroGLYCERIN (NITROSTAT) 0.4 MG SL tablet Place 1 tablet under the tongue every 5 minutes as needed for Chest pain Yes Vladimir Patel MD   rosuvastatin (CRESTOR) 5 MG tablet TAKE 1 TABLET DAILY Yes Vladimir Patel MD   MULTAQ 400 MG TABS TAKE 1 TABLET TWICE A DAY WITH MEALS Yes Vladimir Patel MD   polyethylene glycol (MIRALAX) POWD powder Take 17 g by mouth daily Yes Historical Provider, MD   Coenzyme Q10 (CO Q 10) 100 MG CAPS Take 200 mg by mouth daily Yes Historical Provider, MD   fentaNYL (DURAGESIC) 50 MCG/HR Place 1 patch onto the skin every 48 hours Yes Kiah Dennis MD       OBJECTIVE:  /80   Pulse 67   Ht 5' 5\" (1.651 m)   Wt 171 lb (77.6 kg)   SpO2 96%   BMI 28.46 kg/m²   GEN:  in NAD  HEENT:  NCAT, TMs:normal and throat: clear  NECK:  Supple without adenopathy. CV:  Regular rate and rhythm, S1 and S2 normal, no murmurs, clicks  PULM:  Chest is clear, no wheezing ,  symmetric air entry throughout both lung fields. ABD: Soft, NT  EXT: 2+ pitting edema left, 1 + right ankle, 5 open superficial ulcer with surrounding redness, slo pus  NEURO: Alert and oriented ×3    ASSESSMENT/PLAN:  1. Cellulitis of left lower extremity  Getting worse, add  - sulfamethoxazole-trimethoprim (BACTRIM DS) 800-160 MG per tablet; Take 1 tablet by mouth 2 times daily for 10 days  Dispense: 20 tablet; Refill: 0  Finish clinda  2. Localized edema  Inc lasix to 40 bid   Get bedside commode    3. Paroxysmal atrial fibrillation (HCC)  stable    4. Coronary artery disease involving native coronary artery of native heart without angina pectoris  stable    5.  Chronic systolic congestive heart

## 2018-03-27 ENCOUNTER — OFFICE VISIT (OUTPATIENT)
Dept: FAMILY MEDICINE CLINIC | Age: 78
End: 2018-03-27

## 2018-03-27 VITALS
HEART RATE: 74 BPM | OXYGEN SATURATION: 94 % | SYSTOLIC BLOOD PRESSURE: 120 MMHG | BODY MASS INDEX: 25.33 KG/M2 | DIASTOLIC BLOOD PRESSURE: 70 MMHG | HEIGHT: 69 IN | WEIGHT: 171 LBS | RESPIRATION RATE: 14 BRPM

## 2018-03-27 DIAGNOSIS — L03.116 CELLULITIS OF LEFT LOWER EXTREMITY: Primary | ICD-10-CM

## 2018-03-27 PROCEDURE — G8599 NO ASA/ANTIPLAT THER USE RNG: HCPCS | Performed by: FAMILY MEDICINE

## 2018-03-27 PROCEDURE — 1123F ACP DISCUSS/DSCN MKR DOCD: CPT | Performed by: FAMILY MEDICINE

## 2018-03-27 PROCEDURE — G8427 DOCREV CUR MEDS BY ELIG CLIN: HCPCS | Performed by: FAMILY MEDICINE

## 2018-03-27 PROCEDURE — 1036F TOBACCO NON-USER: CPT | Performed by: FAMILY MEDICINE

## 2018-03-27 PROCEDURE — 99213 OFFICE O/P EST LOW 20 MIN: CPT | Performed by: FAMILY MEDICINE

## 2018-03-27 PROCEDURE — G8482 FLU IMMUNIZE ORDER/ADMIN: HCPCS | Performed by: FAMILY MEDICINE

## 2018-03-27 PROCEDURE — 4040F PNEUMOC VAC/ADMIN/RCVD: CPT | Performed by: FAMILY MEDICINE

## 2018-03-27 PROCEDURE — G8417 CALC BMI ABV UP PARAM F/U: HCPCS | Performed by: FAMILY MEDICINE

## 2018-03-27 NOTE — PATIENT INSTRUCTIONS
Patient Education        Cellulitis: Care Instructions  Your Care Instructions    Cellulitis is a skin infection. It often occurs after a break in the skin from a scrape, cut, bite, or puncture, or after a rash. The doctor has checked you carefully, but problems can develop later. If you notice any problems or new symptoms, get medical treatment right away. Follow-up care is a key part of your treatment and safety. Be sure to make and go to all appointments, and call your doctor if you are having problems. It's also a good idea to know your test results and keep a list of the medicines you take. How can you care for yourself at home? · Take your antibiotics as directed. Do not stop taking them just because you feel better. You need to take the full course of antibiotics. · Prop up the infected area on pillows to reduce pain and swelling. Try to keep the area above the level of your heart as often as you can. · If your doctor told you how to care for your wound, follow your doctor's instructions. If you did not get instructions, follow this general advice:  ¨ Wash the wound with clean water 2 times a day. Don't use hydrogen peroxide or alcohol, which can slow healing. ¨ You may cover the wound with a thin layer of petroleum jelly, such as Vaseline, and a nonstick bandage. ¨ Apply more petroleum jelly and replace the bandage as needed. · Be safe with medicines. Take pain medicines exactly as directed. ¨ If the doctor gave you a prescription medicine for pain, take it as prescribed. ¨ If you are not taking a prescription pain medicine, ask your doctor if you can take an over-the-counter medicine. To prevent cellulitis in the future  · Try to prevent cuts, scrapes, or other injuries to your skin. Cellulitis most often occurs where there is a break in the skin. · If you get a scrape, cut, mild burn, or bite, wash the wound with clean water as soon as you can to help avoid infection.  Don't use hydrogen peroxide or alcohol, which can slow healing. · If you have swelling in your legs (edema), support stockings and good skin care may help prevent leg sores and cellulitis. · Take care of your feet, especially if you have diabetes or other conditions that increase the risk of infection. Wear shoes and socks. Do not go barefoot. If you have athlete's foot or other skin problems on your feet, talk to your doctor about how to treat them. When should you call for help? Call your doctor now or seek immediate medical care if:  ? · You have signs that your infection is getting worse, such as:  ¨ Increased pain, swelling, warmth, or redness. ¨ Red streaks leading from the area. ¨ Pus draining from the area. ¨ A fever. ? · You get a rash. ? Watch closely for changes in your health, and be sure to contact your doctor if:  ? · You are not getting better after 1 day (24 hours). ? · You do not get better as expected. Where can you learn more? Go to https://[employer]Dreams Ed[employer]oft.[employer]agBet. org and sign in to your [employer] account. Enter T859 in the [employer] box to learn more about \"Cellulitis: Care Instructions. \"     If you do not have an account, please click on the \"Sign Up Now\" link. Current as of: October 13, 2016  Content Version: 11.5  © 1221-8348 [employer], Incorporated. Care instructions adapted under license by [facility] ([facility]). If you have questions about a medical condition or this instruction, always ask your healthcare professional. [place] [address] any warranty or liability for your use of this information.

## 2018-04-10 ENCOUNTER — OFFICE VISIT (OUTPATIENT)
Dept: FAMILY MEDICINE CLINIC | Age: 78
End: 2018-04-10

## 2018-04-10 VITALS
WEIGHT: 172 LBS | HEART RATE: 63 BPM | RESPIRATION RATE: 14 BRPM | OXYGEN SATURATION: 96 % | SYSTOLIC BLOOD PRESSURE: 110 MMHG | BODY MASS INDEX: 25.48 KG/M2 | DIASTOLIC BLOOD PRESSURE: 70 MMHG | HEIGHT: 69 IN

## 2018-04-10 DIAGNOSIS — I10 ESSENTIAL HYPERTENSION: ICD-10-CM

## 2018-04-10 DIAGNOSIS — L03.116 CELLULITIS OF LEFT LOWER EXTREMITY: ICD-10-CM

## 2018-04-10 DIAGNOSIS — I25.10 CORONARY ARTERY DISEASE INVOLVING NATIVE CORONARY ARTERY OF NATIVE HEART WITHOUT ANGINA PECTORIS: Primary | ICD-10-CM

## 2018-04-10 DIAGNOSIS — I50.22 CHRONIC SYSTOLIC CONGESTIVE HEART FAILURE, NYHA CLASS 1 (HCC): ICD-10-CM

## 2018-04-10 PROCEDURE — 99213 OFFICE O/P EST LOW 20 MIN: CPT | Performed by: FAMILY MEDICINE

## 2018-04-10 PROCEDURE — G8417 CALC BMI ABV UP PARAM F/U: HCPCS | Performed by: FAMILY MEDICINE

## 2018-04-10 PROCEDURE — 1123F ACP DISCUSS/DSCN MKR DOCD: CPT | Performed by: FAMILY MEDICINE

## 2018-04-10 PROCEDURE — 1036F TOBACCO NON-USER: CPT | Performed by: FAMILY MEDICINE

## 2018-04-10 PROCEDURE — G8599 NO ASA/ANTIPLAT THER USE RNG: HCPCS | Performed by: FAMILY MEDICINE

## 2018-04-10 PROCEDURE — 4040F PNEUMOC VAC/ADMIN/RCVD: CPT | Performed by: FAMILY MEDICINE

## 2018-04-10 PROCEDURE — G8427 DOCREV CUR MEDS BY ELIG CLIN: HCPCS | Performed by: FAMILY MEDICINE

## 2018-04-11 ENCOUNTER — OFFICE VISIT (OUTPATIENT)
Dept: CARDIOLOGY CLINIC | Age: 78
End: 2018-04-11

## 2018-04-11 ENCOUNTER — PROCEDURE VISIT (OUTPATIENT)
Dept: CARDIOLOGY CLINIC | Age: 78
End: 2018-04-11

## 2018-04-11 VITALS — WEIGHT: 170.6 LBS | HEART RATE: 66 BPM | BODY MASS INDEX: 25.19 KG/M2

## 2018-04-11 DIAGNOSIS — Z95.0 PACEMAKER: ICD-10-CM

## 2018-04-11 DIAGNOSIS — I48.0 PAF (PAROXYSMAL ATRIAL FIBRILLATION) (HCC): ICD-10-CM

## 2018-04-11 DIAGNOSIS — Z95.0 CARDIAC PACEMAKER IN SITU: Primary | ICD-10-CM

## 2018-04-11 DIAGNOSIS — I49.5 SICK SINUS SYNDROME (HCC): ICD-10-CM

## 2018-04-11 DIAGNOSIS — I49.5 SSS (SICK SINUS SYNDROME) (HCC): Primary | ICD-10-CM

## 2018-04-11 PROCEDURE — 93280 PM DEVICE PROGR EVAL DUAL: CPT | Performed by: INTERNAL MEDICINE

## 2018-04-11 PROCEDURE — 1123F ACP DISCUSS/DSCN MKR DOCD: CPT | Performed by: INTERNAL MEDICINE

## 2018-04-11 PROCEDURE — 4040F PNEUMOC VAC/ADMIN/RCVD: CPT | Performed by: INTERNAL MEDICINE

## 2018-04-11 PROCEDURE — G8427 DOCREV CUR MEDS BY ELIG CLIN: HCPCS | Performed by: INTERNAL MEDICINE

## 2018-04-11 PROCEDURE — 99214 OFFICE O/P EST MOD 30 MIN: CPT | Performed by: INTERNAL MEDICINE

## 2018-04-11 PROCEDURE — 1036F TOBACCO NON-USER: CPT | Performed by: INTERNAL MEDICINE

## 2018-04-11 PROCEDURE — G8599 NO ASA/ANTIPLAT THER USE RNG: HCPCS | Performed by: INTERNAL MEDICINE

## 2018-04-11 PROCEDURE — G8417 CALC BMI ABV UP PARAM F/U: HCPCS | Performed by: INTERNAL MEDICINE

## 2018-04-26 ENCOUNTER — OFFICE VISIT (OUTPATIENT)
Dept: CARDIOLOGY CLINIC | Age: 78
End: 2018-04-26

## 2018-04-26 VITALS
HEART RATE: 80 BPM | SYSTOLIC BLOOD PRESSURE: 106 MMHG | WEIGHT: 170.6 LBS | DIASTOLIC BLOOD PRESSURE: 60 MMHG | BODY MASS INDEX: 25.19 KG/M2

## 2018-04-26 DIAGNOSIS — Z98.61 POSTSURGICAL PERCUTANEOUS TRANSLUMINAL CORONARY ANGIOPLASTY STATUS: Primary | ICD-10-CM

## 2018-04-26 DIAGNOSIS — E78.2 MIXED HYPERLIPIDEMIA: ICD-10-CM

## 2018-04-26 DIAGNOSIS — I49.5 SICK SINUS SYNDROME (HCC): ICD-10-CM

## 2018-04-26 PROCEDURE — 99213 OFFICE O/P EST LOW 20 MIN: CPT | Performed by: INTERNAL MEDICINE

## 2018-04-26 PROCEDURE — 1123F ACP DISCUSS/DSCN MKR DOCD: CPT | Performed by: INTERNAL MEDICINE

## 2018-04-26 PROCEDURE — G8417 CALC BMI ABV UP PARAM F/U: HCPCS | Performed by: INTERNAL MEDICINE

## 2018-04-26 PROCEDURE — 4040F PNEUMOC VAC/ADMIN/RCVD: CPT | Performed by: INTERNAL MEDICINE

## 2018-04-26 PROCEDURE — 1036F TOBACCO NON-USER: CPT | Performed by: INTERNAL MEDICINE

## 2018-04-26 PROCEDURE — G8599 NO ASA/ANTIPLAT THER USE RNG: HCPCS | Performed by: INTERNAL MEDICINE

## 2018-04-26 PROCEDURE — G8427 DOCREV CUR MEDS BY ELIG CLIN: HCPCS | Performed by: INTERNAL MEDICINE

## 2018-05-21 ENCOUNTER — HOSPITAL ENCOUNTER (OUTPATIENT)
Dept: OTHER | Age: 78
Discharge: HOME | End: 2018-05-21
Attending: INTERNAL MEDICINE | Admitting: INTERNAL MEDICINE

## 2018-05-21 LAB
BASOPHILS ABSOLUTE: 0 K/UL (ref 0–0.2)
BASOPHILS RELATIVE PERCENT: 0.5 %
EOSINOPHILS ABSOLUTE: 0.2 K/UL (ref 0–0.6)
EOSINOPHILS RELATIVE PERCENT: 3.4 %
HCT VFR BLD CALC: 31.7 % (ref 40.5–52.5)
HEMOGLOBIN: 11 G/DL (ref 13.5–17.5)
LYMPHOCYTES ABSOLUTE: 2.5 K/UL (ref 1–5.1)
LYMPHOCYTES RELATIVE PERCENT: 33.9 %
MCH RBC QN AUTO: 30 PG (ref 26–34)
MCHC RBC AUTO-ENTMCNC: 34.6 G/DL (ref 31–36)
MCV RBC AUTO: 86.5 FL (ref 80–100)
MONOCYTES ABSOLUTE: 0.7 K/UL (ref 0–1.3)
MONOCYTES RELATIVE PERCENT: 9.2 %
NEUTROPHILS ABSOLUTE: 3.8 K/UL (ref 1.7–7.7)
NEUTROPHILS RELATIVE PERCENT: 53 %
PDW BLD-RTO: 14.5 % (ref 12.4–15.4)
PLATELET # BLD: 183 K/UL (ref 135–450)
PMV BLD AUTO: 9 FL (ref 5–10.5)
RBC # BLD: 3.67 M/UL (ref 4.2–5.9)
WBC # BLD: 7.3 K/UL (ref 4–11)

## 2018-05-25 ENCOUNTER — OFFICE VISIT (OUTPATIENT)
Dept: FAMILY MEDICINE CLINIC | Age: 78
End: 2018-05-25

## 2018-05-25 VITALS
WEIGHT: 173.4 LBS | DIASTOLIC BLOOD PRESSURE: 58 MMHG | SYSTOLIC BLOOD PRESSURE: 102 MMHG | HEART RATE: 95 BPM | OXYGEN SATURATION: 98 % | BODY MASS INDEX: 25.61 KG/M2

## 2018-05-25 DIAGNOSIS — I48.0 PAROXYSMAL ATRIAL FIBRILLATION (HCC): ICD-10-CM

## 2018-05-25 DIAGNOSIS — I25.10 CORONARY ARTERY DISEASE INVOLVING NATIVE CORONARY ARTERY OF NATIVE HEART WITHOUT ANGINA PECTORIS: ICD-10-CM

## 2018-05-25 DIAGNOSIS — L03.116 CELLULITIS OF LEFT LOWER EXTREMITY: Primary | ICD-10-CM

## 2018-05-25 PROCEDURE — 1123F ACP DISCUSS/DSCN MKR DOCD: CPT | Performed by: FAMILY MEDICINE

## 2018-05-25 PROCEDURE — 4040F PNEUMOC VAC/ADMIN/RCVD: CPT | Performed by: FAMILY MEDICINE

## 2018-05-25 PROCEDURE — G8417 CALC BMI ABV UP PARAM F/U: HCPCS | Performed by: FAMILY MEDICINE

## 2018-05-25 PROCEDURE — 1036F TOBACCO NON-USER: CPT | Performed by: FAMILY MEDICINE

## 2018-05-25 PROCEDURE — G8599 NO ASA/ANTIPLAT THER USE RNG: HCPCS | Performed by: FAMILY MEDICINE

## 2018-05-25 PROCEDURE — 99213 OFFICE O/P EST LOW 20 MIN: CPT | Performed by: FAMILY MEDICINE

## 2018-05-25 PROCEDURE — G8427 DOCREV CUR MEDS BY ELIG CLIN: HCPCS | Performed by: FAMILY MEDICINE

## 2018-05-25 RX ORDER — CLINDAMYCIN HYDROCHLORIDE 300 MG/1
300 CAPSULE ORAL 3 TIMES DAILY
Qty: 30 CAPSULE | Refills: 0 | Status: SHIPPED | OUTPATIENT
Start: 2018-05-25 | End: 2018-05-31 | Stop reason: SDUPTHER

## 2018-05-25 RX ORDER — ONDANSETRON 4 MG/1
4 TABLET, ORALLY DISINTEGRATING ORAL EVERY 8 HOURS PRN
Qty: 10 TABLET | Refills: 1 | Status: SHIPPED | OUTPATIENT
Start: 2018-05-25 | End: 2018-05-31 | Stop reason: SDUPTHER

## 2018-05-31 ENCOUNTER — OFFICE VISIT (OUTPATIENT)
Dept: FAMILY MEDICINE CLINIC | Age: 78
End: 2018-05-31

## 2018-05-31 VITALS
DIASTOLIC BLOOD PRESSURE: 70 MMHG | HEART RATE: 70 BPM | SYSTOLIC BLOOD PRESSURE: 122 MMHG | RESPIRATION RATE: 15 BRPM | OXYGEN SATURATION: 97 % | WEIGHT: 169 LBS | BODY MASS INDEX: 24.96 KG/M2

## 2018-05-31 DIAGNOSIS — L03.116 CELLULITIS OF LEFT LOWER EXTREMITY: ICD-10-CM

## 2018-05-31 PROCEDURE — 1123F ACP DISCUSS/DSCN MKR DOCD: CPT | Performed by: FAMILY MEDICINE

## 2018-05-31 PROCEDURE — G8599 NO ASA/ANTIPLAT THER USE RNG: HCPCS | Performed by: FAMILY MEDICINE

## 2018-05-31 PROCEDURE — 99213 OFFICE O/P EST LOW 20 MIN: CPT | Performed by: FAMILY MEDICINE

## 2018-05-31 PROCEDURE — 4040F PNEUMOC VAC/ADMIN/RCVD: CPT | Performed by: FAMILY MEDICINE

## 2018-05-31 PROCEDURE — G8420 CALC BMI NORM PARAMETERS: HCPCS | Performed by: FAMILY MEDICINE

## 2018-05-31 PROCEDURE — 1036F TOBACCO NON-USER: CPT | Performed by: FAMILY MEDICINE

## 2018-05-31 PROCEDURE — G8427 DOCREV CUR MEDS BY ELIG CLIN: HCPCS | Performed by: FAMILY MEDICINE

## 2018-05-31 RX ORDER — SULFAMETHOXAZOLE AND TRIMETHOPRIM 800; 160 MG/1; MG/1
1 TABLET ORAL 2 TIMES DAILY
Qty: 20 TABLET | Refills: 0 | Status: SHIPPED | OUTPATIENT
Start: 2018-05-31 | End: 2018-10-01 | Stop reason: SDUPTHER

## 2018-05-31 RX ORDER — ONDANSETRON 4 MG/1
4 TABLET, ORALLY DISINTEGRATING ORAL EVERY 8 HOURS PRN
Qty: 10 TABLET | Refills: 1 | Status: SHIPPED | OUTPATIENT
Start: 2018-05-31 | End: 2019-07-02 | Stop reason: SDUPTHER

## 2018-05-31 RX ORDER — CLINDAMYCIN HYDROCHLORIDE 300 MG/1
300 CAPSULE ORAL 3 TIMES DAILY
Qty: 30 CAPSULE | Refills: 0 | Status: SHIPPED | OUTPATIENT
Start: 2018-05-31 | End: 2018-06-10

## 2018-06-06 RX ORDER — DRONEDARONE 400 MG/1
TABLET, FILM COATED ORAL
Qty: 180 TABLET | Refills: 3 | Status: SHIPPED | OUTPATIENT
Start: 2018-06-06 | End: 2019-05-26 | Stop reason: SDUPTHER

## 2018-06-13 ENCOUNTER — OFFICE VISIT (OUTPATIENT)
Dept: FAMILY MEDICINE CLINIC | Age: 78
End: 2018-06-13

## 2018-06-13 VITALS
TEMPERATURE: 97.6 F | SYSTOLIC BLOOD PRESSURE: 122 MMHG | DIASTOLIC BLOOD PRESSURE: 64 MMHG | OXYGEN SATURATION: 98 % | WEIGHT: 168 LBS | BODY MASS INDEX: 24.81 KG/M2

## 2018-06-13 DIAGNOSIS — I48.0 PAROXYSMAL ATRIAL FIBRILLATION (HCC): ICD-10-CM

## 2018-06-13 DIAGNOSIS — I10 ESSENTIAL HYPERTENSION: ICD-10-CM

## 2018-06-13 DIAGNOSIS — I50.22 CHRONIC SYSTOLIC CONGESTIVE HEART FAILURE, NYHA CLASS 1 (HCC): ICD-10-CM

## 2018-06-13 DIAGNOSIS — L03.116 CELLULITIS OF LEFT LOWER EXTREMITY: Primary | ICD-10-CM

## 2018-06-13 PROCEDURE — G8420 CALC BMI NORM PARAMETERS: HCPCS | Performed by: FAMILY MEDICINE

## 2018-06-13 PROCEDURE — 1123F ACP DISCUSS/DSCN MKR DOCD: CPT | Performed by: FAMILY MEDICINE

## 2018-06-13 PROCEDURE — G8427 DOCREV CUR MEDS BY ELIG CLIN: HCPCS | Performed by: FAMILY MEDICINE

## 2018-06-13 PROCEDURE — 1036F TOBACCO NON-USER: CPT | Performed by: FAMILY MEDICINE

## 2018-06-13 PROCEDURE — 4040F PNEUMOC VAC/ADMIN/RCVD: CPT | Performed by: FAMILY MEDICINE

## 2018-06-13 PROCEDURE — G8599 NO ASA/ANTIPLAT THER USE RNG: HCPCS | Performed by: FAMILY MEDICINE

## 2018-06-13 PROCEDURE — 99213 OFFICE O/P EST LOW 20 MIN: CPT | Performed by: FAMILY MEDICINE

## 2018-06-13 RX ORDER — ROSUVASTATIN CALCIUM 5 MG/1
TABLET, COATED ORAL
Qty: 90 TABLET | Refills: 3 | Status: SHIPPED | OUTPATIENT
Start: 2018-06-13 | End: 2019-06-06 | Stop reason: SDUPTHER

## 2018-07-10 ENCOUNTER — OFFICE VISIT (OUTPATIENT)
Dept: FAMILY MEDICINE CLINIC | Age: 78
End: 2018-07-10

## 2018-07-10 ENCOUNTER — NURSE ONLY (OUTPATIENT)
Dept: CARDIOLOGY CLINIC | Age: 78
End: 2018-07-10

## 2018-07-10 VITALS
HEART RATE: 62 BPM | OXYGEN SATURATION: 96 % | BODY MASS INDEX: 24.96 KG/M2 | RESPIRATION RATE: 16 BRPM | DIASTOLIC BLOOD PRESSURE: 68 MMHG | WEIGHT: 169 LBS | SYSTOLIC BLOOD PRESSURE: 132 MMHG

## 2018-07-10 DIAGNOSIS — I25.10 CORONARY ARTERY DISEASE INVOLVING NATIVE CORONARY ARTERY OF NATIVE HEART WITHOUT ANGINA PECTORIS: ICD-10-CM

## 2018-07-10 DIAGNOSIS — Z95.0 CARDIAC PACEMAKER IN SITU: ICD-10-CM

## 2018-07-10 DIAGNOSIS — I50.22 CHRONIC SYSTOLIC CONGESTIVE HEART FAILURE, NYHA CLASS 1 (HCC): ICD-10-CM

## 2018-07-10 DIAGNOSIS — I48.0 PAROXYSMAL ATRIAL FIBRILLATION (HCC): Primary | ICD-10-CM

## 2018-07-10 DIAGNOSIS — R00.1 BRADYCARDIA: ICD-10-CM

## 2018-07-10 DIAGNOSIS — I10 ESSENTIAL HYPERTENSION: ICD-10-CM

## 2018-07-10 DIAGNOSIS — Z23 NEED FOR SHINGLES VACCINE: ICD-10-CM

## 2018-07-10 DIAGNOSIS — E78.2 MIXED HYPERLIPIDEMIA: ICD-10-CM

## 2018-07-10 PROCEDURE — 93294 REM INTERROG EVL PM/LDLS PM: CPT | Performed by: INTERNAL MEDICINE

## 2018-07-10 PROCEDURE — 4040F PNEUMOC VAC/ADMIN/RCVD: CPT | Performed by: FAMILY MEDICINE

## 2018-07-10 PROCEDURE — 99214 OFFICE O/P EST MOD 30 MIN: CPT | Performed by: FAMILY MEDICINE

## 2018-07-10 PROCEDURE — 93296 REM INTERROG EVL PM/IDS: CPT | Performed by: INTERNAL MEDICINE

## 2018-07-10 PROCEDURE — G8420 CALC BMI NORM PARAMETERS: HCPCS | Performed by: FAMILY MEDICINE

## 2018-07-10 PROCEDURE — G8427 DOCREV CUR MEDS BY ELIG CLIN: HCPCS | Performed by: FAMILY MEDICINE

## 2018-07-10 PROCEDURE — 1036F TOBACCO NON-USER: CPT | Performed by: FAMILY MEDICINE

## 2018-07-10 PROCEDURE — 1123F ACP DISCUSS/DSCN MKR DOCD: CPT | Performed by: FAMILY MEDICINE

## 2018-07-10 PROCEDURE — G8598 ASA/ANTIPLAT THER USED: HCPCS | Performed by: FAMILY MEDICINE

## 2018-07-10 NOTE — PROGRESS NOTES
Kenyatta Olmos is a 66 y.o. male. HPI: Here for 3 month follow-up  Sees pain doctor every 2 months and gets his fentanyl patch 50 mg every 48 hours  This controls his pain but does make him sleepy  The rash and open areas on his legs have resolved but he still has some pedal edema  No chest pain at this time  Back on iron for mild anemia  Back on baby aspirin for A. fib but hesitant to take any stronger due to history of GI bleed  Does bruise easily but no blood in the urine blood in the stool or  Meds, vitamins and allergies reviewed with pt    ROS: No TIA's or unusual headaches, no dysphagia. No prolonged cough. No dyspnea or chest pain on exertion. No abdominal pain, change in bowel habits, black or bloody stools. No urinary tract symptoms. No new or unusual musculoskeletal symptoms. Prior to Visit Medications    Medication Sig Taking?  Authorizing Provider   Polysaccharide Iron Complex (PROFE PO) Take 185 mg by mouth Yes Historical Provider, MD   rosuvastatin (CRESTOR) 5 MG tablet TAKE 1 TABLET DAILY Yes Yolanda Puentes MD   MULTAQ 400 MG TABS TAKE 1 TABLET TWICE A DAY WITH MEALS Yes Yolanda Puentes MD   ondansetron (ZOFRAN ODT) 4 MG disintegrating tablet Take 1 tablet by mouth every 8 hours as needed for Nausea or Vomiting Yes Yoandy Muñoz MD   pantoprazole (PROTONIX) 40 MG tablet TAKE 1 TABLET BY MOUTH EVERY MORNING BEFORE BREAKFAST Yes Yolanda Puentes MD   metoprolol succinate (TOPROL XL) 25 MG extended release tablet Take 0.5 tablets by mouth 2 times daily Yes Yolanda Puentes MD   isosorbide mononitrate (IMDUR) 30 MG extended release tablet Take 1 tablet by mouth daily  Patient taking differently: Take 30 mg by mouth daily  Yes Yolanda Puentes MD   furosemide (LASIX) 40 MG tablet TAKE 1 TABLET TWICE A DAY Yes Yolanda Puentes MD   KLOR-CON M10 10 MEQ extended release tablet TAKE 3 TABLETS DAILY Yes Yolanda Puentes MD   RANEXA 500 MG extended release tablet TAKE 1 TABLET TWICE A DAY Yes Court Salazar MD   nitroGLYCERIN (NITRODUR) 0.4 MG/HR APPLY 1 PATCH DAILY Yes Court Salazar MD   nitroGLYCERIN (NITROSTAT) 0.4 MG SL tablet Place 1 tablet under the tongue every 5 minutes as needed for Chest pain Yes Court Salazar MD   polyethylene glycol (MIRALAX) POWD powder Take 17 g by mouth daily Yes Historical Provider, MD   Coenzyme Q10 (CO Q 10) 100 MG CAPS Take 200 mg by mouth daily Yes Historical Provider, MD   fentaNYL ([address]) 50 MCG/HR Place 1 patch onto the skin every 48 hours Yes Madison Unger MD       Past Medical History:   Diagnosis Date    Allergic rhinitis     [facility] fibrillation ([facility]     Benign prostatic hypertrophy     CAD (coronary artery disease)     Cancer ([facility]     skin    CHF (congestive heart failure) ([facility] 08/17/2017    DDD (degenerative disc disease), lumbar     Falls 08/17/2017    Hyperlipidemia     Hypertension     MI (myocardial infarction)     MRSA (methicillin resistant staph aureus) culture positive     h/o infection in the blood    Pneumonia     S/P PTCA (percutaneous transluminal coronary angioplasty) stents x 8    SSS (sick sinus syndrome) ([facility]     Unspecified sleep apnea        Social History   Substance Use Topics    Smoking status: Former Smoker     Packs/day: 1.00     Years: 30.00     Quit date: 4/12/2004    Smokeless tobacco: Never Used    Alcohol use No       Family History   Problem Relation Age of Onset    Cancer Sister     Coronary Art Dis Brother        Allergies   Allergen Reactions    Avelox [Moxifloxacin Hcl In Nacl] Anaphylaxis    Epinephrine Base     Other      [place] per PT - swelling size of silver dollar at site per PT     Keflex [Cephalexin] Nausea And Vomiting       OBJECTIVE:  /68 (Site: Right Arm, Position: Sitting, Cuff Size: Medium Adult)   Pulse 62   Resp 16   Wt 169 lb (76.7 kg)   SpO2 96%   BMI 24.96 kg/m²   GEN:  in NAD, Flat affect  NECK:  Supple without adenopathy.  No bruits  CV: Irregularly irregular rate and rhythm, S1 and S2 normal, no murmurs, clicks  PULM:  Chest is clear, no wheezing ,  symmetric air entry throughout both lung fields. EXT: No rash 1+ pitting edema right leg 2+ pitting edema left ankle  NEURO: nonfocal      Lab Results   Component Value Date     03/09/2018    K 4.8 03/09/2018     03/09/2018    CO2 25 03/09/2018    BUN 17 03/09/2018    CREATININE 1.1 03/09/2018    GLUCOSE 85 03/09/2018    CALCIUM 8.7 03/09/2018    PROT 7.3 03/09/2018    LABALBU 4.1 03/09/2018    BILITOT 0.5 03/09/2018    ALKPHOS 71 03/09/2018    AST 20 03/09/2018    ALT 11 03/09/2018    LABGLOM >60 03/09/2018    GFRAA >60 03/09/2018    AGRATIO 1.3 03/09/2018    GLOB 3.2 03/09/2018     Lab Results   Component Value Date    WBC 7.3 05/21/2018    HGB 11.0 (L) 05/21/2018    HCT 31.7 (L) 05/21/2018    MCV 86.5 05/21/2018     05/21/2018     Lab Results   Component Value Date    PSA 0.04 03/09/2018    PSA 0.35 02/21/2013    PSADIA 0.24 08/05/2014     Lab Results   Component Value Date    CHOL 112 06/16/2015    CHOL 101 08/05/2014    CHOL 97 02/21/2013     Lab Results   Component Value Date    TRIG 88 06/16/2015    TRIG 86 08/05/2014    TRIG 96 02/21/2013     Lab Results   Component Value Date    HDL 42 06/16/2015    HDL 38 (L) 08/05/2014    HDL 32 (L) 02/21/2013     Lab Results   Component Value Date    LDLCALC 52 06/16/2015    LDLCALC 46 08/05/2014    LDLCALC 46 02/21/2013     Lab Results   Component Value Date    LABVLDL 18 06/16/2015    LABVLDL 17 08/05/2014    LABVLDL 19 02/21/2013     No results found for: CHOLHDLRATIO     OAS report reviewed and assessed. Consistent. ASSESSMENT/PLAN:  1. Paroxysmal atrial fibrillation (HCC)  Stable continue present medication    2. Chronic systolic congestive heart failure, NYHA class 1 ([address]  Well compensated, continue present medication    3. Mixed hyperlipidemia  Stable and at goal, continue present medication    4.  Coronary artery disease involving native coronary artery of native heart without angina pectoris  sTable continue present medication    5. Cardiac pacemaker in situ  Just had pacemaker check up via phone today awaiting results    6. Essential hypertension  Stable continue present medication    7. Need for shingles vaccine  Despite having the Zostavax for 5 years ago suggest a new shingles vaccine  - Zoster recombinant [facility])    8 anemia  Back on iron    #9 chronic pain  OARRS report reviewed and assessed. Consistent.   Managed by pain specialist    #10 health maintenance not interested and lung cancer screening CT scan    Spent 25 minutes with patient and his wife, greater than 50% of time discussing all his needs, counseling medication use, coordinating care

## 2018-07-10 NOTE — LETTER
[address] [facility] [phone]  [place]- [address] [phone]    Pacemaker/Defibrillator Clinic            07/13/18        [address] [facility] [address]        Dear Kimmie Rodrigues      This letter is to inform you that we received a transmission from your monitor at home that checks your pacemaker and/or defibrillator, or implanted heart monitor. Your next scheduled transmission date is 1/15/19. You will receive a reminder call 1-3 days beforehand. Please remember to transmit only on your scheduled date and only once. Please be aware that the remote device transmission sites are periodically monitored only during regular business hours during which simultaneous in-office device clinics are being run. If your transmission requires attention, we will contact you as soon as possible. Thank you.                  Tala Veras

## 2018-09-05 RX ORDER — NITROGLYCERIN 80 MG/1
PATCH TRANSDERMAL
Qty: 90 PATCH | Refills: 0 | Status: SHIPPED | OUTPATIENT
Start: 2018-09-05 | End: 2019-06-14 | Stop reason: SDUPTHER

## 2018-09-06 ENCOUNTER — OFFICE VISIT (OUTPATIENT)
Dept: DERMATOLOGY | Age: 78
End: 2018-09-06

## 2018-09-06 DIAGNOSIS — L90.9 SKIN ATROPHY: Primary | ICD-10-CM

## 2018-09-06 DIAGNOSIS — Z85.828 HISTORY OF BASAL CELL CARCINOMA: ICD-10-CM

## 2018-09-06 DIAGNOSIS — D48.5 NEOPLASM OF UNCERTAIN BEHAVIOR OF SKIN: ICD-10-CM

## 2018-09-06 PROCEDURE — G8420 CALC BMI NORM PARAMETERS: HCPCS | Performed by: DERMATOLOGY

## 2018-09-06 PROCEDURE — 99213 OFFICE O/P EST LOW 20 MIN: CPT | Performed by: DERMATOLOGY

## 2018-09-06 PROCEDURE — 1123F ACP DISCUSS/DSCN MKR DOCD: CPT | Performed by: DERMATOLOGY

## 2018-09-06 PROCEDURE — 1101F PT FALLS ASSESS-DOCD LE1/YR: CPT | Performed by: DERMATOLOGY

## 2018-09-06 PROCEDURE — 4040F PNEUMOC VAC/ADMIN/RCVD: CPT | Performed by: DERMATOLOGY

## 2018-09-06 PROCEDURE — G8598 ASA/ANTIPLAT THER USED: HCPCS | Performed by: DERMATOLOGY

## 2018-09-06 PROCEDURE — 1036F TOBACCO NON-USER: CPT | Performed by: DERMATOLOGY

## 2018-09-06 PROCEDURE — G8427 DOCREV CUR MEDS BY ELIG CLIN: HCPCS | Performed by: DERMATOLOGY

## 2018-09-06 PROCEDURE — 11100 PR BIOPSY OF SKIN LESION: CPT | Performed by: DERMATOLOGY

## 2018-09-06 NOTE — PROGRESS NOTES
[facility]  Ana Paula Auguste MD  [address]  1940    66 y.o. male     Date of Visit: 9/6/2018    Chief Complaint: skin lesions    History of Present Illness:    1. He c/o fragile skin on the arms - tears easily. Also bruises easily. 2.  Unknown duration of a pink lesion on the R temple. 3.  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 Past Medical History, Family History, Surgical History, Medications and Allergies reviewed.     Past Medical History:   Diagnosis Date    Allergic rhinitis     Atrial fibrillation (HCC)     Benign prostatic hypertrophy     CAD (coronary artery disease)     Cancer (HCC)     skin    CHF (congestive heart failure) ([address] 08/17/2017    DDD (degenerative disc disease), lumbar     Falls 08/17/2017    Hyperlipidemia     Hypertension     MI (myocardial infarction) ([address]     MRSA (methicillin resistant staph aureus) culture positive     h/o infection in the blood    Pneumonia     S/P PTCA (percutaneous transluminal coronary angioplasty) stents x 8    SSS (sick sinus syndrome) ([address]     Unspecified sleep apnea      Past Surgical History:   Procedure Laterality Date    APPENDECTOMY      CARDIAC PACEMAKER PLACEMENT      CARPAL TUNNEL RELEASE      CORONARY ANGIOPLASTY      CORONARY ANGIOPLASTY WITH STENT PLACEMENT      DIAGNOSTIC CARDIAC CATH LAB PROCEDURE      EYE SURGERY      PACEMAKER PLACEMENT         Allergies   Allergen Reactions    Avelox [Moxifloxacin Hcl In Nacl] Anaphylaxis    Epinephrine Base     Other      [place] per PT - swelling size of silver dollar at site per PT     Keflex [Cephalexin] Nausea And Vomiting     Outpatient Prescriptions Marked as Taking for the 9/6/18 encounter (Office Visit) with Kristan Goltz, MD   Medication Sig and Plan     1. Skin atrophy - age related    Education and reassurance. 2. Neoplasm of uncertain behavior of skin, R temple - r/o BCC    Discussed possible diagnosis; patient agreeable to biopsy (verbal consent obtained). The area(s) to be biopsied were marked with a surgical pen. Alcohol was used to cleanse the site. Local anesthesia was acheived with 1% lidocaine without epinephrine. Shave biopsy was performed using a razor blade. Hemostasis was achieved with aluminum chloride. The wound(s) were dressed with petrolatum and covered with a bandage. Wound care instructions were reviewed. 1 Specimen (s) sent to pathology. The specimen bottles were appropriately labeled. We also reviewed the risks of bleeding, scar, and infection. 3. History of basal cell carcinoma - clear    Sun protective behaviors and self skin examinations were encouraged. Call for any new or concerning lesions. Return in about 1 year (around 9/6/2019).

## 2018-09-10 ENCOUNTER — HOSPITAL ENCOUNTER (OUTPATIENT)
Dept: OTHER | Age: 78
Discharge: HOME | End: 2018-09-10
Attending: INTERNAL MEDICINE | Admitting: INTERNAL MEDICINE

## 2018-09-10 LAB
BASOPHILS ABSOLUTE: 0 K/UL (ref 0–0.2)
BASOPHILS RELATIVE PERCENT: 0.5 %
DERMATOLOGY PATHOLOGY REPORT: ABNORMAL
EOSINOPHILS ABSOLUTE: 0.1 K/UL (ref 0–0.6)
EOSINOPHILS RELATIVE PERCENT: 2.6 %
HCT VFR BLD CALC: 32.8 % (ref 40.5–52.5)
HEMOGLOBIN: 10.9 G/DL (ref 13.5–17.5)
LYMPHOCYTES ABSOLUTE: 2 K/UL (ref 1–5.1)
LYMPHOCYTES RELATIVE PERCENT: 34.5 %
MCH RBC QN AUTO: 29.2 PG (ref 26–34)
MCHC RBC AUTO-ENTMCNC: 33.2 G/DL (ref 31–36)
MCV RBC AUTO: 88 FL (ref 80–100)
MONOCYTES ABSOLUTE: 0.6 K/UL (ref 0–1.3)
MONOCYTES RELATIVE PERCENT: 9.5 %
NEUTROPHILS ABSOLUTE: 3.1 K/UL (ref 1.7–7.7)
NEUTROPHILS RELATIVE PERCENT: 52.9 %
PDW BLD-RTO: 15.2 % (ref 12.4–15.4)
PLATELET # BLD: 190 K/UL (ref 135–450)
PMV BLD AUTO: 9 FL (ref 5–10.5)
RBC # BLD: 3.73 M/UL (ref 4.2–5.9)
WBC # BLD: 5.8 K/UL (ref 4–11)

## 2018-09-14 RX ORDER — RANOLAZINE 500 MG/1
TABLET, FILM COATED, EXTENDED RELEASE ORAL
Qty: 180 TABLET | Refills: 1 | Status: SHIPPED | OUTPATIENT
Start: 2018-09-14 | End: 2019-03-13 | Stop reason: SDUPTHER

## 2018-09-24 RX ORDER — POTASSIUM CHLORIDE 750 MG/1
TABLET, EXTENDED RELEASE ORAL
Qty: 270 TABLET | Refills: 1 | Status: SHIPPED | OUTPATIENT
Start: 2018-09-24 | End: 2019-03-22 | Stop reason: SDUPTHER

## 2018-09-25 ENCOUNTER — OFFICE VISIT (OUTPATIENT)
Dept: FAMILY MEDICINE CLINIC | Age: 78
End: 2018-09-25
Payer: MEDICARE

## 2018-09-25 VITALS
BODY MASS INDEX: 25.12 KG/M2 | HEIGHT: 69 IN | HEART RATE: 76 BPM | OXYGEN SATURATION: 96 % | DIASTOLIC BLOOD PRESSURE: 66 MMHG | SYSTOLIC BLOOD PRESSURE: 120 MMHG | WEIGHT: 169.6 LBS

## 2018-09-25 DIAGNOSIS — I10 ESSENTIAL HYPERTENSION: ICD-10-CM

## 2018-09-25 DIAGNOSIS — I25.10 CORONARY ARTERY DISEASE INVOLVING NATIVE CORONARY ARTERY OF NATIVE HEART WITHOUT ANGINA PECTORIS: ICD-10-CM

## 2018-09-25 DIAGNOSIS — I48.0 PAROXYSMAL ATRIAL FIBRILLATION (HCC): Primary | ICD-10-CM

## 2018-09-25 DIAGNOSIS — E78.2 MIXED HYPERLIPIDEMIA: ICD-10-CM

## 2018-09-25 DIAGNOSIS — I50.22 CHRONIC SYSTOLIC CONGESTIVE HEART FAILURE, NYHA CLASS 1 (HCC): ICD-10-CM

## 2018-09-25 DIAGNOSIS — R60.1 GENERALIZED EDEMA: ICD-10-CM

## 2018-09-25 LAB
A/G RATIO: 1.2 (ref 1.1–2.2)
ALBUMIN SERPL-MCNC: 4.1 G/DL (ref 3.4–5)
ALP BLD-CCNC: 82 U/L (ref 40–129)
ALT SERPL-CCNC: 12 U/L (ref 10–40)
ANION GAP SERPL CALCULATED.3IONS-SCNC: 15 MMOL/L (ref 3–16)
AST SERPL-CCNC: 25 U/L (ref 15–37)
BASOPHILS ABSOLUTE: 0 K/UL (ref 0–0.2)
BASOPHILS RELATIVE PERCENT: 0.4 %
BILIRUB SERPL-MCNC: 0.4 MG/DL (ref 0–1)
BUN BLDV-MCNC: 21 MG/DL (ref 7–20)
CALCIUM SERPL-MCNC: 8.7 MG/DL (ref 8.3–10.6)
CHLORIDE BLD-SCNC: 97 MMOL/L (ref 99–110)
CO2: 28 MMOL/L (ref 21–32)
CREAT SERPL-MCNC: 1.3 MG/DL (ref 0.8–1.3)
EOSINOPHILS ABSOLUTE: 0.2 K/UL (ref 0–0.6)
EOSINOPHILS RELATIVE PERCENT: 3.3 %
GFR AFRICAN AMERICAN: >60
GFR NON-AFRICAN AMERICAN: 53
GLOBULIN: 3.4 G/DL
GLUCOSE BLD-MCNC: 76 MG/DL (ref 70–99)
HCT VFR BLD CALC: 31.6 % (ref 40.5–52.5)
HEMOGLOBIN: 10.7 G/DL (ref 13.5–17.5)
LYMPHOCYTES ABSOLUTE: 1.9 K/UL (ref 1–5.1)
LYMPHOCYTES RELATIVE PERCENT: 33.7 %
MCH RBC QN AUTO: 29.6 PG (ref 26–34)
MCHC RBC AUTO-ENTMCNC: 33.9 G/DL (ref 31–36)
MCV RBC AUTO: 87.3 FL (ref 80–100)
MONOCYTES ABSOLUTE: 0.5 K/UL (ref 0–1.3)
MONOCYTES RELATIVE PERCENT: 9.1 %
NEUTROPHILS ABSOLUTE: 3 K/UL (ref 1.7–7.7)
NEUTROPHILS RELATIVE PERCENT: 53.5 %
PDW BLD-RTO: 14.7 % (ref 12.4–15.4)
PLATELET # BLD: 192 K/UL (ref 135–450)
PMV BLD AUTO: 9.2 FL (ref 5–10.5)
POTASSIUM SERPL-SCNC: 3.6 MMOL/L (ref 3.5–5.1)
RBC # BLD: 3.62 M/UL (ref 4.2–5.9)
SODIUM BLD-SCNC: 140 MMOL/L (ref 136–145)
TOTAL PROTEIN: 7.5 G/DL (ref 6.4–8.2)
WBC # BLD: 5.7 K/UL (ref 4–11)

## 2018-09-25 PROCEDURE — G8427 DOCREV CUR MEDS BY ELIG CLIN: HCPCS | Performed by: FAMILY MEDICINE

## 2018-09-25 PROCEDURE — 1101F PT FALLS ASSESS-DOCD LE1/YR: CPT | Performed by: FAMILY MEDICINE

## 2018-09-25 PROCEDURE — 1123F ACP DISCUSS/DSCN MKR DOCD: CPT | Performed by: FAMILY MEDICINE

## 2018-09-25 PROCEDURE — G8510 SCR DEP NEG, NO PLAN REQD: HCPCS | Performed by: FAMILY MEDICINE

## 2018-09-25 PROCEDURE — 4040F PNEUMOC VAC/ADMIN/RCVD: CPT | Performed by: FAMILY MEDICINE

## 2018-09-25 PROCEDURE — 99214 OFFICE O/P EST MOD 30 MIN: CPT | Performed by: FAMILY MEDICINE

## 2018-09-25 PROCEDURE — G8417 CALC BMI ABV UP PARAM F/U: HCPCS | Performed by: FAMILY MEDICINE

## 2018-09-25 PROCEDURE — G8598 ASA/ANTIPLAT THER USED: HCPCS | Performed by: FAMILY MEDICINE

## 2018-09-25 PROCEDURE — 1036F TOBACCO NON-USER: CPT | Performed by: FAMILY MEDICINE

## 2018-09-25 RX ORDER — FUROSEMIDE 40 MG/1
TABLET ORAL
Qty: 180 TABLET | Refills: 5 | Status: CANCELLED | OUTPATIENT
Start: 2018-09-25

## 2018-09-25 RX ORDER — TORSEMIDE 100 MG/1
100 TABLET ORAL DAILY
Qty: 30 TABLET | Refills: 3 | Status: SHIPPED | OUTPATIENT
Start: 2018-09-25 | End: 2018-12-04

## 2018-09-25 ASSESSMENT — PATIENT HEALTH QUESTIONNAIRE - PHQ9
SUM OF ALL RESPONSES TO PHQ9 QUESTIONS 1 & 2: 0
SUM OF ALL RESPONSES TO PHQ QUESTIONS 1-9: 0
1. LITTLE INTEREST OR PLEASURE IN DOING THINGS: 0
2. FEELING DOWN, DEPRESSED OR HOPELESS: 0
SUM OF ALL RESPONSES TO PHQ QUESTIONS 1-9: 0

## 2018-09-25 NOTE — PROGRESS NOTES
tablet Take 0.5 tablets by mouth 2 times daily Yes Delano Bamberger, MD   isosorbide mononitrate (IMDUR) 30 MG extended release tablet Take 1 tablet by mouth daily  Patient taking differently: Take 30 mg by mouth daily  Yes Delano Bamberger, MD   furosemide (LASIX) 40 MG tablet TAKE 1 TABLET TWICE A DAY Yes Delano Bamberger, MD   nitroGLYCERIN (NITROSTAT) 0.4 MG SL tablet Place 1 tablet under the tongue every 5 minutes as needed for Chest pain Yes Delano Bamberger, MD   polyethylene glycol (MIRALAX) POWD powder Take 17 g by mouth daily Yes Historical Provider, MD   Coenzyme Q10 (CO Q 10) 100 MG CAPS Take 200 mg by mouth daily Yes Historical Provider, MD   fentaNYL (DURAGESIC) 50 MCG/HR Place 1 patch onto the skin every 48 hours Yes Apollo Reid MD       Past Medical History:   Diagnosis Date    Allergic rhinitis     [facility]     Benign prostatic hypertrophy     CAD (coronary artery disease)     Cancer ([facility]     skin    CHF (congestive heart failure) ([facility] 08/17/2017    DDD (degenerative disc disease), lumbar     Falls 08/17/2017    Hyperlipidemia     Hypertension     [facility]     MRSA (methicillin resistant staph aureus) culture positive     h/o infection in the blood    Pneumonia     S/P PTCA (percutaneous transluminal coronary angioplasty) stents x 8    SSS (sick sinus syndrome) ([facility]     Unspecified sleep apnea        Social History   Substance Use Topics    Smoking status: Former Smoker     Packs/day: 1.00     Years: 30.00     Quit date: 4/12/2004    Smokeless tobacco: Never Used    Alcohol use No       Family History   Problem Relation Age of Onset    Cancer Sister     Coronary Art Dis Brother        Allergies   Allergen Reactions    Avelox [Moxifloxacin Hcl In Nacl] Anaphylaxis    Epinephrine Base     Other      [place] per PT - swelling size of silver dollar at site per PT     Keflex [Cephalexin] Nausea And Vomiting       OBJECTIVE:  BP 120/66   Pulse 76   Ht 5' 9\" (1.753 m)   Wt 169 lb 9.6 oz (76.9 kg)   SpO2 96%   BMI 25.05 kg/m²   GEN:  in NADFlat affect  NECK:  Supple without adenopathy. No bruits  CV:  Regular rate and rhythm, S1 and S2 normal, no murmurs, clicks  PULM:  Chest is clear, no wheezing ,  symmetric air entry throughout both lung fields. EXT: 1+ pitting ankle edema on the right, 2-3+ pitting ankle edema on the left  2 cm superficial vesicle on the right it is open pink and dry 3 cm vesicle on the left that is open and draining clear fluid    Lab Results   Component Value Date     03/09/2018    K 4.8 03/09/2018     03/09/2018    CO2 25 03/09/2018    BUN 17 03/09/2018    CREATININE 1.1 03/09/2018    GLUCOSE 85 03/09/2018    CALCIUM 8.7 03/09/2018      Lab Results   Component Value Date    WBC 5.8 09/10/2018    HGB 10.9 (L) 09/10/2018    HCT 32.8 (L) 09/10/2018    MCV 88.0 09/10/2018     09/10/2018     Lab Results   Component Value Date    CHOL 112 06/16/2015    CHOL 101 08/05/2014    CHOL 97 02/21/2013     Lab Results   Component Value Date    TRIG 88 06/16/2015    TRIG 86 08/05/2014    TRIG 96 02/21/2013     Lab Results   Component Value Date    HDL 42 06/16/2015    HDL 38 (L) 08/05/2014    HDL 32 (L) 02/21/2013     Lab Results   Component Value Date    LDLCALC 52 06/16/2015    LDLCALC 46 08/05/2014    LDLCALC 46 02/21/2013     Lab Results   Component Value Date    LABVLDL 18 06/16/2015    LABVLDL 17 08/05/2014    LABVLDL 19 02/21/2013     No results found for: CHOLHDLRATIO  ASSESSMENT/PLAN:  1. Paroxysmal atrial fibrillation (HCC)  In normal sinus rhythm today    2. Chronic systolic congestive heart failure, NYHA class 1 (HCC)  Appears to be well compensated continue present medication    3. Mixed hyperlipidemia  Stable, continue present medication    4. Coronary artery disease involving native coronary artery of native heart without angina pectoris  Stable, continue present medication    5.  Essential

## 2018-09-26 ENCOUNTER — TELEPHONE (OUTPATIENT)
Dept: FAMILY MEDICINE CLINIC | Age: 78
End: 2018-09-26

## 2018-09-26 DIAGNOSIS — L03.116 CELLULITIS OF LEFT LOWER EXTREMITY: Primary | ICD-10-CM

## 2018-09-26 RX ORDER — CLINDAMYCIN HYDROCHLORIDE 300 MG/1
300 CAPSULE ORAL 3 TIMES DAILY
Qty: 30 CAPSULE | Refills: 0 | Status: SHIPPED | OUTPATIENT
Start: 2018-09-26 | End: 2018-10-11 | Stop reason: SDUPTHER

## 2018-10-01 ENCOUNTER — TELEPHONE (OUTPATIENT)
Dept: FAMILY MEDICINE CLINIC | Age: 78
End: 2018-10-01

## 2018-10-01 ENCOUNTER — OFFICE VISIT (OUTPATIENT)
Dept: FAMILY MEDICINE CLINIC | Age: 78
End: 2018-10-01
Payer: MEDICARE

## 2018-10-01 VITALS
HEART RATE: 72 BPM | WEIGHT: 168 LBS | BODY MASS INDEX: 24.81 KG/M2 | DIASTOLIC BLOOD PRESSURE: 62 MMHG | OXYGEN SATURATION: 99 % | SYSTOLIC BLOOD PRESSURE: 118 MMHG

## 2018-10-01 DIAGNOSIS — L03.116 CELLULITIS OF LEFT LOWER EXTREMITY: Primary | ICD-10-CM

## 2018-10-01 DIAGNOSIS — R60.9 CHRONIC EDEMA: ICD-10-CM

## 2018-10-01 PROCEDURE — 4040F PNEUMOC VAC/ADMIN/RCVD: CPT | Performed by: NURSE PRACTITIONER

## 2018-10-01 PROCEDURE — G8427 DOCREV CUR MEDS BY ELIG CLIN: HCPCS | Performed by: NURSE PRACTITIONER

## 2018-10-01 PROCEDURE — G8598 ASA/ANTIPLAT THER USED: HCPCS | Performed by: NURSE PRACTITIONER

## 2018-10-01 PROCEDURE — 1101F PT FALLS ASSESS-DOCD LE1/YR: CPT | Performed by: NURSE PRACTITIONER

## 2018-10-01 PROCEDURE — G8420 CALC BMI NORM PARAMETERS: HCPCS | Performed by: NURSE PRACTITIONER

## 2018-10-01 PROCEDURE — 1036F TOBACCO NON-USER: CPT | Performed by: NURSE PRACTITIONER

## 2018-10-01 PROCEDURE — G8482 FLU IMMUNIZE ORDER/ADMIN: HCPCS | Performed by: NURSE PRACTITIONER

## 2018-10-01 PROCEDURE — 1123F ACP DISCUSS/DSCN MKR DOCD: CPT | Performed by: NURSE PRACTITIONER

## 2018-10-01 PROCEDURE — 99213 OFFICE O/P EST LOW 20 MIN: CPT | Performed by: NURSE PRACTITIONER

## 2018-10-01 RX ORDER — SULFAMETHOXAZOLE AND TRIMETHOPRIM 800; 160 MG/1; MG/1
1 TABLET ORAL 2 TIMES DAILY
Qty: 20 TABLET | Refills: 0 | Status: SHIPPED | OUTPATIENT
Start: 2018-10-01 | End: 2018-10-11 | Stop reason: SDUPTHER

## 2018-10-01 ASSESSMENT — ENCOUNTER SYMPTOMS
ORTHOPNEA: 0
SHORTNESS OF BREATH: 0

## 2018-10-03 ENCOUNTER — TELEPHONE (OUTPATIENT)
Dept: DERMATOLOGY | Age: 78
End: 2018-10-03

## 2018-10-05 ENCOUNTER — OFFICE VISIT (OUTPATIENT)
Dept: DERMATOLOGY | Age: 78
End: 2018-10-05
Payer: MEDICARE

## 2018-10-05 DIAGNOSIS — C44.310 BCC (BASAL CELL CARCINOMA), FACE: Primary | ICD-10-CM

## 2018-10-05 PROCEDURE — 17280 DSTR MAL LS F/E/E/N/L/M .5/<: CPT | Performed by: DERMATOLOGY

## 2018-10-11 ENCOUNTER — OFFICE VISIT (OUTPATIENT)
Dept: FAMILY MEDICINE CLINIC | Age: 78
End: 2018-10-11
Payer: MEDICARE

## 2018-10-11 VITALS
HEART RATE: 70 BPM | DIASTOLIC BLOOD PRESSURE: 78 MMHG | WEIGHT: 166 LBS | OXYGEN SATURATION: 94 % | BODY MASS INDEX: 24.51 KG/M2 | RESPIRATION RATE: 15 BRPM | SYSTOLIC BLOOD PRESSURE: 124 MMHG

## 2018-10-11 DIAGNOSIS — L03.116 CELLULITIS OF LEFT LOWER EXTREMITY WITHOUT FOOT: Primary | ICD-10-CM

## 2018-10-11 PROCEDURE — 1101F PT FALLS ASSESS-DOCD LE1/YR: CPT | Performed by: FAMILY MEDICINE

## 2018-10-11 PROCEDURE — 4040F PNEUMOC VAC/ADMIN/RCVD: CPT | Performed by: FAMILY MEDICINE

## 2018-10-11 PROCEDURE — G8598 ASA/ANTIPLAT THER USED: HCPCS | Performed by: FAMILY MEDICINE

## 2018-10-11 PROCEDURE — 99213 OFFICE O/P EST LOW 20 MIN: CPT | Performed by: FAMILY MEDICINE

## 2018-10-11 PROCEDURE — G8482 FLU IMMUNIZE ORDER/ADMIN: HCPCS | Performed by: FAMILY MEDICINE

## 2018-10-11 PROCEDURE — G8420 CALC BMI NORM PARAMETERS: HCPCS | Performed by: FAMILY MEDICINE

## 2018-10-11 PROCEDURE — 1036F TOBACCO NON-USER: CPT | Performed by: FAMILY MEDICINE

## 2018-10-11 PROCEDURE — G8427 DOCREV CUR MEDS BY ELIG CLIN: HCPCS | Performed by: FAMILY MEDICINE

## 2018-10-11 PROCEDURE — 1123F ACP DISCUSS/DSCN MKR DOCD: CPT | Performed by: FAMILY MEDICINE

## 2018-10-11 RX ORDER — SULFAMETHOXAZOLE AND TRIMETHOPRIM 800; 160 MG/1; MG/1
1 TABLET ORAL 2 TIMES DAILY
Qty: 20 TABLET | Refills: 0 | Status: SHIPPED | OUTPATIENT
Start: 2018-10-11 | End: 2018-11-08 | Stop reason: SDUPTHER

## 2018-10-11 RX ORDER — CLINDAMYCIN HYDROCHLORIDE 300 MG/1
300 CAPSULE ORAL 3 TIMES DAILY
Qty: 30 CAPSULE | Refills: 0 | Status: SHIPPED | OUTPATIENT
Start: 2018-10-11 | End: 2018-11-08 | Stop reason: SDUPTHER

## 2018-10-11 NOTE — PROGRESS NOTES
 No narrative on file         OBJECTIVE:  /78 (Site: Left Upper Arm, Position: Sitting, Cuff Size: Small Adult)   Pulse 70   Resp 15   Wt 166 lb (75.3 kg)   SpO2 94%   BMI 24.51 kg/m²   GEN:  WDWN, NAD  HEENT:  NCAT  EXT: 1+ pitting edema bilaterally. No erythema in R lower leg.  5cm area of erythema in L lateral lower leg  PSYCH: normal mood and affect. Intact judgement and insight  NEURO: A&O x 3    ASSESSMENT/PLAN:  1. Cellulitis of left lower extremity without foot  Continue abx until completely resolved  - sulfamethoxazole-trimethoprim (BACTRIM DS) 800-160 MG per tablet; Take 1 tablet by mouth 2 times daily for 10 days  Dispense: 20 tablet; Refill: 0  - clindamycin (CLEOCIN) 300 MG capsule; Take 1 capsule by mouth 3 times daily for 10 days  Dispense: 30 capsule;  Refill: 0

## 2018-10-16 ENCOUNTER — OFFICE VISIT (OUTPATIENT)
Dept: CARDIOLOGY CLINIC | Age: 78
End: 2018-10-16
Payer: MEDICARE

## 2018-10-16 ENCOUNTER — PROCEDURE VISIT (OUTPATIENT)
Dept: CARDIOLOGY CLINIC | Age: 78
End: 2018-10-16
Payer: MEDICARE

## 2018-10-16 VITALS
HEART RATE: 67 BPM | BODY MASS INDEX: 24.59 KG/M2 | HEIGHT: 69 IN | OXYGEN SATURATION: 94 % | DIASTOLIC BLOOD PRESSURE: 68 MMHG | RESPIRATION RATE: 16 BRPM | WEIGHT: 166 LBS | SYSTOLIC BLOOD PRESSURE: 112 MMHG

## 2018-10-16 DIAGNOSIS — R00.1 BRADYCARDIA: ICD-10-CM

## 2018-10-16 DIAGNOSIS — Z95.0 PACEMAKER: ICD-10-CM

## 2018-10-16 DIAGNOSIS — I25.10 CORONARY ARTERY DISEASE INVOLVING NATIVE CORONARY ARTERY OF NATIVE HEART WITHOUT ANGINA PECTORIS: ICD-10-CM

## 2018-10-16 DIAGNOSIS — I49.5 SSS (SICK SINUS SYNDROME) (HCC): ICD-10-CM

## 2018-10-16 DIAGNOSIS — R00.1 BRADYCARDIA: Primary | ICD-10-CM

## 2018-10-16 DIAGNOSIS — I49.5 SICK SINUS SYNDROME (HCC): ICD-10-CM

## 2018-10-16 DIAGNOSIS — Z95.0 CARDIAC PACEMAKER IN SITU: ICD-10-CM

## 2018-10-16 PROCEDURE — G8427 DOCREV CUR MEDS BY ELIG CLIN: HCPCS | Performed by: NURSE PRACTITIONER

## 2018-10-16 PROCEDURE — 4040F PNEUMOC VAC/ADMIN/RCVD: CPT | Performed by: NURSE PRACTITIONER

## 2018-10-16 PROCEDURE — 1123F ACP DISCUSS/DSCN MKR DOCD: CPT | Performed by: NURSE PRACTITIONER

## 2018-10-16 PROCEDURE — 1036F TOBACCO NON-USER: CPT | Performed by: NURSE PRACTITIONER

## 2018-10-16 PROCEDURE — 99214 OFFICE O/P EST MOD 30 MIN: CPT | Performed by: NURSE PRACTITIONER

## 2018-10-16 PROCEDURE — 93280 PM DEVICE PROGR EVAL DUAL: CPT | Performed by: INTERNAL MEDICINE

## 2018-10-16 PROCEDURE — G8420 CALC BMI NORM PARAMETERS: HCPCS | Performed by: NURSE PRACTITIONER

## 2018-10-16 PROCEDURE — 1101F PT FALLS ASSESS-DOCD LE1/YR: CPT | Performed by: NURSE PRACTITIONER

## 2018-10-16 PROCEDURE — G8482 FLU IMMUNIZE ORDER/ADMIN: HCPCS | Performed by: NURSE PRACTITIONER

## 2018-10-16 PROCEDURE — G8598 ASA/ANTIPLAT THER USED: HCPCS | Performed by: NURSE PRACTITIONER

## 2018-10-29 ENCOUNTER — OFFICE VISIT (OUTPATIENT)
Dept: CARDIOLOGY CLINIC | Age: 78
End: 2018-10-29
Payer: MEDICARE

## 2018-10-29 VITALS
SYSTOLIC BLOOD PRESSURE: 110 MMHG | DIASTOLIC BLOOD PRESSURE: 70 MMHG | WEIGHT: 170 LBS | HEIGHT: 70 IN | OXYGEN SATURATION: 98 % | HEART RATE: 64 BPM | BODY MASS INDEX: 24.34 KG/M2

## 2018-10-29 DIAGNOSIS — R00.1 BRADYCARDIA: ICD-10-CM

## 2018-10-29 DIAGNOSIS — Z95.0 CARDIAC PACEMAKER IN SITU: ICD-10-CM

## 2018-10-29 DIAGNOSIS — I25.10 CORONARY ARTERY DISEASE INVOLVING NATIVE CORONARY ARTERY OF NATIVE HEART WITHOUT ANGINA PECTORIS: ICD-10-CM

## 2018-10-29 DIAGNOSIS — I49.5 SICK SINUS SYNDROME (HCC): Primary | ICD-10-CM

## 2018-10-29 PROCEDURE — G8598 ASA/ANTIPLAT THER USED: HCPCS | Performed by: INTERNAL MEDICINE

## 2018-10-29 PROCEDURE — 99213 OFFICE O/P EST LOW 20 MIN: CPT | Performed by: INTERNAL MEDICINE

## 2018-10-29 PROCEDURE — G8482 FLU IMMUNIZE ORDER/ADMIN: HCPCS | Performed by: INTERNAL MEDICINE

## 2018-10-29 PROCEDURE — G8427 DOCREV CUR MEDS BY ELIG CLIN: HCPCS | Performed by: INTERNAL MEDICINE

## 2018-10-29 PROCEDURE — G8420 CALC BMI NORM PARAMETERS: HCPCS | Performed by: INTERNAL MEDICINE

## 2018-10-29 PROCEDURE — 1123F ACP DISCUSS/DSCN MKR DOCD: CPT | Performed by: INTERNAL MEDICINE

## 2018-10-29 PROCEDURE — 1101F PT FALLS ASSESS-DOCD LE1/YR: CPT | Performed by: INTERNAL MEDICINE

## 2018-10-29 PROCEDURE — 4040F PNEUMOC VAC/ADMIN/RCVD: CPT | Performed by: INTERNAL MEDICINE

## 2018-10-29 PROCEDURE — 1036F TOBACCO NON-USER: CPT | Performed by: INTERNAL MEDICINE

## 2018-11-08 ENCOUNTER — OFFICE VISIT (OUTPATIENT)
Dept: FAMILY MEDICINE CLINIC | Age: 78
End: 2018-11-08
Payer: MEDICARE

## 2018-11-08 VITALS
BODY MASS INDEX: 24.36 KG/M2 | OXYGEN SATURATION: 97 % | HEART RATE: 80 BPM | WEIGHT: 169.8 LBS | DIASTOLIC BLOOD PRESSURE: 74 MMHG | RESPIRATION RATE: 16 BRPM | SYSTOLIC BLOOD PRESSURE: 120 MMHG

## 2018-11-08 DIAGNOSIS — T78.40XA ALLERGIC REACTION, INITIAL ENCOUNTER: ICD-10-CM

## 2018-11-08 DIAGNOSIS — L03.116 CELLULITIS OF LEFT LOWER EXTREMITY WITHOUT FOOT: Primary | ICD-10-CM

## 2018-11-08 PROCEDURE — G8598 ASA/ANTIPLAT THER USED: HCPCS | Performed by: FAMILY MEDICINE

## 2018-11-08 PROCEDURE — 4040F PNEUMOC VAC/ADMIN/RCVD: CPT | Performed by: FAMILY MEDICINE

## 2018-11-08 PROCEDURE — 1101F PT FALLS ASSESS-DOCD LE1/YR: CPT | Performed by: FAMILY MEDICINE

## 2018-11-08 PROCEDURE — 1123F ACP DISCUSS/DSCN MKR DOCD: CPT | Performed by: FAMILY MEDICINE

## 2018-11-08 PROCEDURE — 99214 OFFICE O/P EST MOD 30 MIN: CPT | Performed by: FAMILY MEDICINE

## 2018-11-08 PROCEDURE — G8420 CALC BMI NORM PARAMETERS: HCPCS | Performed by: FAMILY MEDICINE

## 2018-11-08 PROCEDURE — 1036F TOBACCO NON-USER: CPT | Performed by: FAMILY MEDICINE

## 2018-11-08 PROCEDURE — G8427 DOCREV CUR MEDS BY ELIG CLIN: HCPCS | Performed by: FAMILY MEDICINE

## 2018-11-08 PROCEDURE — G8482 FLU IMMUNIZE ORDER/ADMIN: HCPCS | Performed by: FAMILY MEDICINE

## 2018-11-08 RX ORDER — CLINDAMYCIN HYDROCHLORIDE 300 MG/1
300 CAPSULE ORAL 3 TIMES DAILY
Qty: 42 CAPSULE | Refills: 0 | Status: SHIPPED | OUTPATIENT
Start: 2018-11-08 | End: 2018-11-21 | Stop reason: SDUPTHER

## 2018-11-08 RX ORDER — SULFAMETHOXAZOLE AND TRIMETHOPRIM 800; 160 MG/1; MG/1
1 TABLET ORAL 2 TIMES DAILY
Qty: 28 TABLET | Refills: 0 | Status: SHIPPED | OUTPATIENT
Start: 2018-11-08 | End: 2018-11-21 | Stop reason: SDUPTHER

## 2018-11-08 NOTE — PROGRESS NOTES
Nain Saunders is a 66 y.o. male. HPI:  Recent cellulitis that resolved about 2 weeks ago so stopped abx. A few days ago symptoms returned. Has has swelling, redness, pain in L lower leg. Back of L leg has been weeping clear fluid. Restarted clinda/bactrim 2 days ago, thinks it looks better already. No fevers/chills. Had cut on finger, applied polysporin with bandaid and developed bright red bumpy rash where bandaid was applied. ROS:  Gen:  Denies fever, chills, headaches. HEENT:  Denies cold symptoms, sore throat. CV:  Denies chest pain or tightness, palpitations. Pulm:  Denies shortness of breath, cough. Abd:  Denies abdominal pain, change in bowel habits. I have reviewed the patient's medical/surgical/family/social in detail and updated the computerized patient record as appropriate.     Current Outpatient Prescriptions   Medication Sig Dispense Refill                  torsemide (DEMADEX) 100 MG tablet Take 1 tablet by mouth daily 30 tablet 3    KLOR-CON M10 10 MEQ extended release tablet TAKE 3 TABLETS DAILY 270 tablet 1    RANEXA 500 MG extended release tablet TAKE 1 TABLET TWICE A  tablet 1    nitroGLYCERIN (NITRODUR) 0.4 MG/HR APPLY 1 PATCH DAILY 90 patch 0    Polysaccharide Iron Complex (PROFE PO) Take 185 mg by mouth      aspirin 81 MG tablet Take 81 mg by mouth daily      rosuvastatin (CRESTOR) 5 MG tablet TAKE 1 TABLET DAILY 90 tablet 3    MULTAQ 400 MG TABS TAKE 1 TABLET TWICE A DAY WITH MEALS 180 tablet 3    ondansetron (ZOFRAN ODT) 4 MG disintegrating tablet Take 1 tablet by mouth every 8 hours as needed for Nausea or Vomiting 10 tablet 1    pantoprazole (PROTONIX) 40 MG tablet TAKE 1 TABLET BY MOUTH EVERY MORNING BEFORE BREAKFAST 30 tablet 5    metoprolol succinate (TOPROL XL) 25 MG extended release tablet Take 0.5 tablets by mouth 2 times daily 90 tablet 3    isosorbide mononitrate (IMDUR) 30 MG extended release tablet Take 1 tablet by mouth daily (Patient

## 2018-11-21 ENCOUNTER — OFFICE VISIT (OUTPATIENT)
Dept: FAMILY MEDICINE CLINIC | Age: 78
End: 2018-11-21
Payer: MEDICARE

## 2018-11-21 VITALS
OXYGEN SATURATION: 98 % | DIASTOLIC BLOOD PRESSURE: 80 MMHG | RESPIRATION RATE: 16 BRPM | HEART RATE: 78 BPM | SYSTOLIC BLOOD PRESSURE: 122 MMHG

## 2018-11-21 DIAGNOSIS — L03.116 CELLULITIS OF LEFT LOWER EXTREMITY WITHOUT FOOT: ICD-10-CM

## 2018-11-21 PROCEDURE — G8482 FLU IMMUNIZE ORDER/ADMIN: HCPCS | Performed by: FAMILY MEDICINE

## 2018-11-21 PROCEDURE — 1036F TOBACCO NON-USER: CPT | Performed by: FAMILY MEDICINE

## 2018-11-21 PROCEDURE — 1123F ACP DISCUSS/DSCN MKR DOCD: CPT | Performed by: FAMILY MEDICINE

## 2018-11-21 PROCEDURE — G8598 ASA/ANTIPLAT THER USED: HCPCS | Performed by: FAMILY MEDICINE

## 2018-11-21 PROCEDURE — 4040F PNEUMOC VAC/ADMIN/RCVD: CPT | Performed by: FAMILY MEDICINE

## 2018-11-21 PROCEDURE — 99213 OFFICE O/P EST LOW 20 MIN: CPT | Performed by: FAMILY MEDICINE

## 2018-11-21 PROCEDURE — G8427 DOCREV CUR MEDS BY ELIG CLIN: HCPCS | Performed by: FAMILY MEDICINE

## 2018-11-21 PROCEDURE — G8420 CALC BMI NORM PARAMETERS: HCPCS | Performed by: FAMILY MEDICINE

## 2018-11-21 PROCEDURE — 1101F PT FALLS ASSESS-DOCD LE1/YR: CPT | Performed by: FAMILY MEDICINE

## 2018-11-21 RX ORDER — SULFAMETHOXAZOLE AND TRIMETHOPRIM 800; 160 MG/1; MG/1
1 TABLET ORAL 2 TIMES DAILY
Qty: 28 TABLET | Refills: 0 | Status: SHIPPED | OUTPATIENT
Start: 2018-11-21 | End: 2019-07-02 | Stop reason: SDUPTHER

## 2018-11-21 RX ORDER — CLINDAMYCIN HYDROCHLORIDE 300 MG/1
300 CAPSULE ORAL 3 TIMES DAILY
Qty: 42 CAPSULE | Refills: 0 | Status: SHIPPED | OUTPATIENT
Start: 2018-11-21 | End: 2018-12-05

## 2018-11-29 ENCOUNTER — OFFICE VISIT (OUTPATIENT)
Dept: ORTHOPEDIC SURGERY | Age: 78
End: 2018-11-29
Payer: MEDICARE

## 2018-11-29 VITALS
WEIGHT: 170 LBS | DIASTOLIC BLOOD PRESSURE: 71 MMHG | HEART RATE: 68 BPM | SYSTOLIC BLOOD PRESSURE: 133 MMHG | HEIGHT: 70 IN | BODY MASS INDEX: 24.34 KG/M2

## 2018-11-29 DIAGNOSIS — S32.010A CLOSED COMPRESSION FRACTURE OF FIRST LUMBAR VERTEBRA, INITIAL ENCOUNTER: Primary | ICD-10-CM

## 2018-11-29 DIAGNOSIS — M54.5 CHRONIC LOW BACK PAIN, UNSPECIFIED BACK PAIN LATERALITY, WITH SCIATICA PRESENCE UNSPECIFIED: ICD-10-CM

## 2018-11-29 DIAGNOSIS — S22.000A CLOSED COMPRESSION FRACTURE OF THORACIC VERTEBRA, INITIAL ENCOUNTER (HCC): ICD-10-CM

## 2018-11-29 DIAGNOSIS — G89.29 CHRONIC LOW BACK PAIN, UNSPECIFIED BACK PAIN LATERALITY, WITH SCIATICA PRESENCE UNSPECIFIED: ICD-10-CM

## 2018-11-29 PROCEDURE — 99213 OFFICE O/P EST LOW 20 MIN: CPT | Performed by: NURSE PRACTITIONER

## 2018-11-29 PROCEDURE — G8427 DOCREV CUR MEDS BY ELIG CLIN: HCPCS | Performed by: NURSE PRACTITIONER

## 2018-11-29 PROCEDURE — G8482 FLU IMMUNIZE ORDER/ADMIN: HCPCS | Performed by: NURSE PRACTITIONER

## 2018-11-29 PROCEDURE — G8420 CALC BMI NORM PARAMETERS: HCPCS | Performed by: NURSE PRACTITIONER

## 2018-11-29 PROCEDURE — 1101F PT FALLS ASSESS-DOCD LE1/YR: CPT | Performed by: NURSE PRACTITIONER

## 2018-11-30 ENCOUNTER — TELEPHONE (OUTPATIENT)
Dept: FAMILY MEDICINE CLINIC | Age: 78
End: 2018-11-30

## 2018-11-30 ENCOUNTER — TELEPHONE (OUTPATIENT)
Dept: ORTHOPEDIC SURGERY | Age: 78
End: 2018-11-30

## 2018-11-30 DIAGNOSIS — L03.119 CELLULITIS OF LOWER EXTREMITY, UNSPECIFIED LATERALITY: Primary | ICD-10-CM

## 2018-11-30 NOTE — TELEPHONE ENCOUNTER
Spoke to pt and he has not completed his antibiotics.  Pt stated that Dr. Celio Perez suggested wound care

## 2018-11-30 NOTE — TELEPHONE ENCOUNTER
Patient is calling back about celluitis infection in left leg. Dr. Radu Marks asked to have him call back in a week from last ov. Seems to be worse not improving please advise lpt still swollen and tender red.  And warm and still draining

## 2018-12-04 ENCOUNTER — HOSPITAL ENCOUNTER (OUTPATIENT)
Dept: VASCULAR LAB | Age: 78
Discharge: HOME | End: 2018-12-04
Payer: MEDICARE

## 2018-12-04 ENCOUNTER — HOSPITAL ENCOUNTER (OUTPATIENT)
Dept: WOUND CARE | Age: 78
Discharge: HOME | End: 2018-12-04
Payer: MEDICARE

## 2018-12-04 VITALS
HEIGHT: 70 IN | TEMPERATURE: 97.4 F | WEIGHT: 170 LBS | HEART RATE: 65 BPM | SYSTOLIC BLOOD PRESSURE: 107 MMHG | RESPIRATION RATE: 18 BRPM | BODY MASS INDEX: 24.34 KG/M2 | DIASTOLIC BLOOD PRESSURE: 52 MMHG

## 2018-12-04 DIAGNOSIS — I73.9 PVD (PERIPHERAL VASCULAR DISEASE) (HCC): Primary | ICD-10-CM

## 2018-12-04 DIAGNOSIS — R60.0 EDEMA OF LEFT LOWER EXTREMITY: ICD-10-CM

## 2018-12-04 DIAGNOSIS — R60.0 LOCALIZED EDEMA: ICD-10-CM

## 2018-12-04 DIAGNOSIS — I87.2 VENOUS (PERIPHERAL) INSUFFICIENCY: ICD-10-CM

## 2018-12-04 PROCEDURE — 93971 EXTREMITY STUDY: CPT

## 2018-12-04 PROCEDURE — 99212 OFFICE O/P EST SF 10 MIN: CPT

## 2018-12-04 RX ORDER — FUROSEMIDE 40 MG/1
40 TABLET ORAL 2 TIMES DAILY
COMMUNITY
End: 2019-07-18 | Stop reason: SDUPTHER

## 2018-12-05 NOTE — PROGRESS NOTES
suggesting dried out drainage surrounded by some erythema. No open ulcer seen. The 2nd toes show hammer toe deformity with a nodular callus tissue protruding and impinging on the grt toe bilaterally. The left 2nd toe is mildly swollen with mild erythema. Neurologic:  no cranial nerve deficit, coordination and speech normal.      Assessment:      Patient Active Problem List   Diagnosis Code    Hyperlipidemia E78.5    Benign prostatic hyperplasia N40.0    Paroxysmal atrial fibrillation (HCC) I48.0    Coronary artery disease involving native coronary artery of native heart without angina pectoris I25.10    Cardiac pacemaker in situ Z95.0    Postsurgical percutaneous transluminal coronary angioplasty status Z98.61    History of nonmelanoma skin cancer Z85.828    Tinea manuum, pedis, and unguium B35.2, B35.3, B35.1    AK (actinic keratosis) L57.0    Angina pectoris ([facility]) I20.9    CHF (congestive heart failure), NYHA class I ([facility]) I50.9    Essential hypertension I10    Sick sinus syndrome ([facility]) I49.5    Bradycardia R00.1    Gastrointestinal hemorrhage K92.2    Pure hyperglyceridemia E78.1    Localized edema R60.0    Venous (peripheral) insufficiency I87.2            Procedure Note : none           Plan:       Treatment Note please see attached Discharge Instructions    New Medication(s) at this visit:   New Prescriptions    No medications on file       Smoking Cessation: Counseling given: Not Answered        In my professional opinion this patient would benefit from HBO Therapy: No       Patient is to return to wound care center in:  1 week(s)    Written patient dismissal instructions given to patient and signed by patient or POA. Discharge [facility] Physician Orders and Discharge Instructions  [facility] [address]  [address] [facility] [facility]  Telephone: [phone]    NAME:  Amira Gold  DATE OF BIRTH:

## 2018-12-11 ENCOUNTER — HOSPITAL ENCOUNTER (OUTPATIENT)
Dept: WOUND CARE | Age: 78
Discharge: HOME | End: 2018-12-11
Payer: MEDICARE

## 2018-12-11 VITALS
TEMPERATURE: 97.5 F | DIASTOLIC BLOOD PRESSURE: 63 MMHG | HEART RATE: 76 BPM | SYSTOLIC BLOOD PRESSURE: 114 MMHG | RESPIRATION RATE: 20 BRPM

## 2018-12-11 PROCEDURE — 29581 APPL MULTLAYER CMPRN SYS LEG: CPT

## 2018-12-11 PROCEDURE — 6370000000 HC RX 637 (ALT 250 FOR IP): Performed by: SURGERY

## 2018-12-11 RX ORDER — LIDOCAINE HYDROCHLORIDE 40 MG/ML
2.5 SOLUTION TOPICAL ONCE
Status: COMPLETED | OUTPATIENT
Start: 2018-12-11 | End: 2018-12-11

## 2018-12-11 RX ADMIN — LIDOCAINE HYDROCHLORIDE 2.5 ML: 40 SOLUTION TOPICAL at 10:59

## 2018-12-11 ASSESSMENT — PAIN DESCRIPTION - ORIENTATION: ORIENTATION: MID;LOWER

## 2018-12-11 ASSESSMENT — PAIN DESCRIPTION - ONSET: ONSET: ON-GOING

## 2018-12-11 ASSESSMENT — PAIN DESCRIPTION - PAIN TYPE: TYPE: CHRONIC PAIN

## 2018-12-11 ASSESSMENT — PAIN DESCRIPTION - LOCATION: LOCATION: BACK

## 2018-12-11 ASSESSMENT — PAIN SCALES - GENERAL: PAINLEVEL_OUTOF10: 3

## 2018-12-11 ASSESSMENT — PAIN DESCRIPTION - PROGRESSION: CLINICAL_PROGRESSION: NOT CHANGED

## 2018-12-11 ASSESSMENT — PAIN DESCRIPTION - DESCRIPTORS: DESCRIPTORS: ACHING;NUMBNESS

## 2018-12-11 ASSESSMENT — PAIN DESCRIPTION - FREQUENCY: FREQUENCY: CONTINUOUS

## 2018-12-12 NOTE — PROGRESS NOTES
by mouth daily ) 90 tablet 3    Coenzyme Q10 (CO Q 10) 100 MG CAPS Take 200 mg by mouth daily      fentaNYL (DURAGESIC) 50 MCG/HR Place 1 patch onto the skin every 48 hours 15 patch 0    ondansetron (ZOFRAN ODT) 4 MG disintegrating tablet Take 1 tablet by mouth every 8 hours as needed for Nausea or Vomiting 10 tablet 1    polyethylene glycol (MIRALAX) POWD powder Take 17 g by mouth daily       No current facility-administered medications on file prior to encounter. REVIEW OF SYSTEMS    A comprehensive review of systems was negative. Objective:      /63   Pulse 76   Temp 97.5 °F (36.4 °C) (Oral)   Resp 20     Wt Readings from Last 3 Encounters:   12/04/18 170 lb (77.1 kg)   11/29/18 170 lb (77.1 kg)   11/08/18 169 lb 12.8 oz (77 kg)       PHYSICAL EXAM    General Appearance: alert and oriented to person, place and time, well developed and well- nourished, in no acute distress  Skin: warm and dry, no rash or erythema  Head: normocephalic and atraumatic  Eyes: pupils equal, round, and reactive to light, extraocular eye movements intact, conjunctivae normal  ENT:  external ear and ear canal normal bilaterally, nose without deformity  Neck: supple and non-tender without mass, no thyromegaly or thyroid nodules, no cervical lymphadenopathy  Pulmonary/Chest: clear to auscultation bilaterally- no wheezes, rales or rhonchi, normal air movement, no respiratory distress  Cardiovascular: normal rate, regular rhythm, normal S1 and S2, no murmurs, rubs, clicks, or gallops  Abdomen: soft, non-tender, non-distended, normal bowel sounds, no masses or organomegaly  Extremities: no cyanosis, clubbing. 2-3 + edema both legs. Lt leg with crusted drainage and mild erythema.   Neurologic:  no cranial nerve deficit, coordination and speech normal      Assessment:      Patient Active Problem List   Diagnosis Code    Hyperlipidemia E78.5    Benign prostatic hyperplasia N40.0    Paroxysmal atrial fibrillation ([facility] diabetic diet and check glucose prior to meals or as instructed by your physician. Dietary Supplements:  [] Ensure EnLive [] Jeff [] 30ml ProMod/ProStat   [] Other:   Take supplements twice a day or as directed as followed:     Smoking:  Counseling given: Not Answered    Please talk with your Primary Health Care Provider about assistance to help stop smoking. Please read the additional information attached to these instructions if included. Return Appointment:  [] Wound and dressing supply provider:   [] ECF or Home Healthcare:  [x] Wound reassessment : This Friday  [x] Return Appointment: With Dr. Autumn Schreiber  in  [facility])      [] Ordered tests:  [] Blood work [] Vascular Test(arterial/venous)   [] X-ray  [] Wound Culture   [] Other:    Referrals: (see attached referral)    [] Weight Management [] Diabetes Education [] Vascular Surgery      [] Endocrinology  [] Nutrition Counseling  [] Lymphedema Therapy                  [] Plastic & Rescontruction Surgery    Your nurse  is:  Gricelda Stratton Dr.     Electronically signed by Brigida Soto RN on 12/11/2018 at 7:51 R Laura Huff 8 Information: Should you experience any significant changes in your wound(s) or have questions about your wound care, please contact the [address] [place] [address] at [phone] Monday-Friday from 8:00 am - 4:30 pm except for Wednesdays which hours are from 8:00 am - 2:00 pm.   If you need help with your wound outside these hours and cannot wait until we are again available, contact your PCP or go to the hospital emergency room. PLEASE NOTE: IF YOU ARE UNABLE TO OBTAIN WOUND SUPPLIES, CONTINUE TO USE THE SUPPLIES YOU HAVE AVAILABLE UNTIL YOU ARE ABLE TO REACH US. IT IS MOST IMPORTANT TO KEEP THE WOUND COVERED AT ALL TIMES.       Physician orders by:     [] Dr Cal Diallo [] Dr Jeniffer Robbins    [] Dr Yeimy Rock     [x] Dr Josesito Mancini   [] Dr Carine Pham  [] Dr Kai Nagel  [] Dr Renee Limb       Physician

## 2018-12-14 ENCOUNTER — HOSPITAL ENCOUNTER (OUTPATIENT)
Dept: WOUND CARE | Age: 78
Discharge: HOME | End: 2018-12-14
Payer: MEDICARE

## 2018-12-14 PROCEDURE — 29581 APPL MULTLAYER CMPRN SYS LEG: CPT

## 2018-12-18 ENCOUNTER — HOSPITAL ENCOUNTER (OUTPATIENT)
Dept: WOUND CARE | Age: 78
Discharge: HOME | End: 2018-12-18
Payer: MEDICARE

## 2018-12-18 VITALS
TEMPERATURE: 98.2 F | RESPIRATION RATE: 18 BRPM | DIASTOLIC BLOOD PRESSURE: 49 MMHG | SYSTOLIC BLOOD PRESSURE: 101 MMHG | HEART RATE: 62 BPM

## 2018-12-18 PROCEDURE — 29581 APPL MULTLAYER CMPRN SYS LEG: CPT

## 2018-12-18 RX ORDER — CHOLECALCIFEROL (VITAMIN D3) 125 MCG
500 CAPSULE ORAL DAILY
COMMUNITY

## 2018-12-18 ASSESSMENT — PAIN SCALES - GENERAL: PAINLEVEL_OUTOF10: 0

## 2018-12-18 NOTE — PROGRESS NOTES
Multilayer Compression Wrap   (Not Unna) Below the Knee    NAME:  Kamini Richardson OF BIRTH:  1940  MEDICAL RECORD NUMBER:  [id number]  DATE:  12/18/2018       [x] Removed old Multilayer wrap if present and washed leg with mild soap/water.  [x] Applied moisturizing agent to dry skin as needed.  [x] Applied primary and secondary dressing as ordered     [x] Applied multilayered dressing below the knee to Bilateral lower leg(s)  (2 Layer Lite compression wrap) per 's instructions.  [x] Instructed patient/caregiver not to remove dressing and to keep it clean and dry.  [x] Instructed patient/caregiver on complications to report to provider, such as pain, numbness in toes, heavy drainage, and slippage of dressing.  [x] Instructed patient on purpose of compression dressing and on activity and exercise recommendations.        Applied per   Guidelines    Electronically signed by Marcin Kowalski RN on 12/18/2018 at 10:17 AM

## 2018-12-18 NOTE — PROGRESS NOTES
[facility]  Progress Note and Procedure Note      Deloris Pena RECORD NUMBER:  [id number]  AGE: 66 y.o. GENDER: male  : 1940  EPISODE DATE:  2018    Subjective:     Chief Complaint   Patient presents with    Wound Check     bilateral lower legs         HISTORY of PRESENT ILLNESS HPI      Juan Grant is a 66 y. o. male who presents today for wound/ulcer evaluation. History of Wound Context: pt presents with swelling and ulceration lt leg several months. Has had chronic swelling but gives h/o cellulitis and ulceration with drainage for the past few months. Has been treated with antibiotics with some relief. Currently has been prescribed clindamycin and Bactrim DS for 14 days. Has problems with kyphosis and compression fractures of the vertebra with chronic back pain issues. H/o A-Fib. Not on anticoagulants except ASA d/t GI bleed. Has coronary stents. Has pacemaker. H/o CHF and MI.   Wound/Ulcer Pain Timing/Severity: mild  Quality of pain: aching  Severity:  2 / 10   Modifying Factors: None  Associated Signs/Symptoms: edema, erythema, drainage and pain     Ulcer Identification:  Ulcer Type: venous  Contributing Factors: edema and venous stasis     Wound: N/A             PAST MEDICAL HISTORY        Diagnosis Date    Allergic rhinitis     Atrial fibrillation (HCC)     Benign prostatic hypertrophy     CAD (coronary artery disease)     Cancer (HCC)     skin    CHF [facility]congestive heart failure) ([facility]) 2017    DDD (degenerative disc disease), lumbar     Falls 2017    GI bleeding     Hyperlipidemia     Hypertension     MI (myocardial infarction) ([address]     MRSA (methicillin resistant staph aureus) culture positive     h/o infection in the blood    Pneumonia     S/P PTCA (percutaneous transluminal coronary angioplasty) stents x 8    SSS (sick sinus syndrome) ([address]     Unspecified sleep apnea        PAST SURGICAL HISTORY    Past Surgical History:  Paroxysmal atrial fibrillation (HCC) I48.0    Coronary artery disease involving native coronary artery of native heart without angina pectoris I25.10    Cardiac pacemaker in situ Z95.0    Postsurgical percutaneous transluminal coronary angioplasty status Z98.61    History of nonmelanoma skin cancer Z85.828    Tinea manuum, pedis, and unguium B35.2, B35.3, B35.1    AK (actinic keratosis) L57.0    Angina pectoris (HCC) I20.9    CHF (congestive heart failure), NYHA class I (HCC) I50.9    Essential hypertension I10    Sick sinus syndrome (HCC) I49.5    Bradycardia R00.1    Gastrointestinal hemorrhage K92.2    Pure hyperglyceridemia E78.1    Localized edema R60.0    Venous (peripheral) insufficiency I87.2            Procedure Note : none             Plan:       Treatment Note please see attached Discharge Instructions    New Medication(s) at this visit:   Eliseo Baez    by Does not apply route 1 pair 20-30 mmHg compression stockings, wide band with grippers. Smoking Cessation: Counseling given: Not Answered        In my professional opinion this patient would benefit from HBO Therapy: No       Patient is to return to wound care center in:  1 week(s)    Written patient dismissal instructions given to patient and signed by patient or POA. Discharge [facility] Physician Orders and Discharge Instructions  [facility] [address] [address]  Telephone: [phone]    NAME:  Beti Bernabe OF BIRTH:  1940  MEDICAL RECORD NUMBER:  [id number]  DATE:  12/18/2018    Wash hands with soap and water prior to and after every dressing change. Wound Cleansing:   · Do not scrub or use excessive force. · With each dressing change, rinse wounds with 0.9% Saline. (May use wound wash or soft contact solution. Both can be purchased at a local drug store).    · If

## 2018-12-21 ENCOUNTER — TELEPHONE (OUTPATIENT)
Dept: WOUND CARE | Age: 78
End: 2018-12-21

## 2018-12-21 NOTE — TELEPHONE ENCOUNTER
Vaishali Hernandez from [employer] called [phone]), states home care would like to still come out to patients house to check on dressing, yesterday (12/20) checked on patient, coflex wraps were too tight, small adjustments were made to help loosen the wrap and allow the patient to continue wearing the wrap till next week. Wrap was cut slightly and reinforced with kerlex to help keep compression up.

## 2018-12-26 ENCOUNTER — HOSPITAL ENCOUNTER (OUTPATIENT)
Dept: WOUND CARE | Age: 78
Discharge: HOME | End: 2018-12-26
Payer: MEDICARE

## 2018-12-26 VITALS
HEART RATE: 64 BPM | RESPIRATION RATE: 18 BRPM | SYSTOLIC BLOOD PRESSURE: 107 MMHG | TEMPERATURE: 98.1 F | DIASTOLIC BLOOD PRESSURE: 57 MMHG

## 2018-12-26 DIAGNOSIS — I87.2 VENOUS (PERIPHERAL) INSUFFICIENCY: ICD-10-CM

## 2018-12-26 DIAGNOSIS — R60.0 LOCALIZED EDEMA: Primary | ICD-10-CM

## 2018-12-26 PROCEDURE — 99212 OFFICE O/P EST SF 10 MIN: CPT | Performed by: SPECIALIST

## 2018-12-26 PROCEDURE — 99212 OFFICE O/P EST SF 10 MIN: CPT

## 2018-12-28 RX ORDER — ISOSORBIDE MONONITRATE 30 MG/1
TABLET, EXTENDED RELEASE ORAL
Qty: 90 TABLET | Refills: 2 | Status: SHIPPED | OUTPATIENT
Start: 2018-12-28 | End: 2019-04-10 | Stop reason: ALTCHOICE

## 2018-12-28 RX ORDER — METOPROLOL SUCCINATE 25 MG/1
TABLET, EXTENDED RELEASE ORAL
Qty: 90 TABLET | Refills: 3 | Status: SHIPPED | OUTPATIENT
Start: 2018-12-28 | End: 2019-04-09 | Stop reason: SDUPTHER

## 2019-01-08 ENCOUNTER — OFFICE VISIT (OUTPATIENT)
Dept: FAMILY MEDICINE CLINIC | Age: 79
End: 2019-01-08
Payer: MEDICARE

## 2019-01-08 VITALS
HEART RATE: 64 BPM | BODY MASS INDEX: 23.91 KG/M2 | SYSTOLIC BLOOD PRESSURE: 95 MMHG | DIASTOLIC BLOOD PRESSURE: 60 MMHG | OXYGEN SATURATION: 98 % | WEIGHT: 167 LBS | HEIGHT: 70 IN

## 2019-01-08 DIAGNOSIS — I10 ESSENTIAL HYPERTENSION: ICD-10-CM

## 2019-01-08 DIAGNOSIS — I48.0 PAROXYSMAL ATRIAL FIBRILLATION (HCC): Primary | ICD-10-CM

## 2019-01-08 DIAGNOSIS — I20.9 ANGINA PECTORIS (HCC): ICD-10-CM

## 2019-01-08 DIAGNOSIS — I50.22 CHRONIC SYSTOLIC CONGESTIVE HEART FAILURE, NYHA CLASS 1 (HCC): ICD-10-CM

## 2019-01-08 DIAGNOSIS — E78.2 MIXED HYPERLIPIDEMIA: ICD-10-CM

## 2019-01-08 DIAGNOSIS — I25.10 CORONARY ARTERY DISEASE INVOLVING NATIVE CORONARY ARTERY OF NATIVE HEART WITHOUT ANGINA PECTORIS: ICD-10-CM

## 2019-01-08 LAB
ANION GAP SERPL CALCULATED.3IONS-SCNC: 17 MMOL/L (ref 3–16)
BASOPHILS ABSOLUTE: 0 K/UL (ref 0–0.2)
BASOPHILS RELATIVE PERCENT: 0.2 %
BUN BLDV-MCNC: 18 MG/DL (ref 7–20)
CALCIUM SERPL-MCNC: 9.3 MG/DL (ref 8.3–10.6)
CHLORIDE BLD-SCNC: 99 MMOL/L (ref 99–110)
CO2: 28 MMOL/L (ref 21–32)
CREAT SERPL-MCNC: 1.3 MG/DL (ref 0.8–1.3)
EOSINOPHILS ABSOLUTE: 0.4 K/UL (ref 0–0.6)
EOSINOPHILS RELATIVE PERCENT: 5.7 %
GFR AFRICAN AMERICAN: >60
GFR NON-AFRICAN AMERICAN: 53
GLUCOSE BLD-MCNC: 80 MG/DL (ref 70–99)
HCT VFR BLD CALC: 35.8 % (ref 40.5–52.5)
HEMOGLOBIN: 11.9 G/DL (ref 13.5–17.5)
LYMPHOCYTES ABSOLUTE: 1.5 K/UL (ref 1–5.1)
LYMPHOCYTES RELATIVE PERCENT: 24.1 %
MCH RBC QN AUTO: 28.9 PG (ref 26–34)
MCHC RBC AUTO-ENTMCNC: 33.2 G/DL (ref 31–36)
MCV RBC AUTO: 87.3 FL (ref 80–100)
MONOCYTES ABSOLUTE: 0.5 K/UL (ref 0–1.3)
MONOCYTES RELATIVE PERCENT: 7.5 %
NEUTROPHILS ABSOLUTE: 3.8 K/UL (ref 1.7–7.7)
NEUTROPHILS RELATIVE PERCENT: 62.5 %
PDW BLD-RTO: 15.4 % (ref 12.4–15.4)
PLATELET # BLD: 212 K/UL (ref 135–450)
PMV BLD AUTO: 9.8 FL (ref 5–10.5)
POTASSIUM SERPL-SCNC: 4 MMOL/L (ref 3.5–5.1)
PRO-BNP: 661 PG/ML (ref 0–449)
RBC # BLD: 4.1 M/UL (ref 4.2–5.9)
SODIUM BLD-SCNC: 144 MMOL/L (ref 136–145)
WBC # BLD: 6.2 K/UL (ref 4–11)

## 2019-01-08 PROCEDURE — G8427 DOCREV CUR MEDS BY ELIG CLIN: HCPCS | Performed by: FAMILY MEDICINE

## 2019-01-08 PROCEDURE — 1036F TOBACCO NON-USER: CPT | Performed by: FAMILY MEDICINE

## 2019-01-08 PROCEDURE — 1123F ACP DISCUSS/DSCN MKR DOCD: CPT | Performed by: FAMILY MEDICINE

## 2019-01-08 PROCEDURE — 4040F PNEUMOC VAC/ADMIN/RCVD: CPT | Performed by: FAMILY MEDICINE

## 2019-01-08 PROCEDURE — G8482 FLU IMMUNIZE ORDER/ADMIN: HCPCS | Performed by: FAMILY MEDICINE

## 2019-01-08 PROCEDURE — G8420 CALC BMI NORM PARAMETERS: HCPCS | Performed by: FAMILY MEDICINE

## 2019-01-08 PROCEDURE — G8598 ASA/ANTIPLAT THER USED: HCPCS | Performed by: FAMILY MEDICINE

## 2019-01-08 PROCEDURE — 99214 OFFICE O/P EST MOD 30 MIN: CPT | Performed by: FAMILY MEDICINE

## 2019-01-08 PROCEDURE — 1101F PT FALLS ASSESS-DOCD LE1/YR: CPT | Performed by: FAMILY MEDICINE

## 2019-01-15 ENCOUNTER — NURSE ONLY (OUTPATIENT)
Dept: CARDIOLOGY CLINIC | Age: 79
End: 2019-01-15
Payer: MEDICARE

## 2019-01-15 DIAGNOSIS — R00.1 BRADYCARDIA: ICD-10-CM

## 2019-01-15 DIAGNOSIS — Z95.0 CARDIAC PACEMAKER IN SITU: ICD-10-CM

## 2019-01-15 PROCEDURE — 93296 REM INTERROG EVL PM/IDS: CPT | Performed by: INTERNAL MEDICINE

## 2019-01-15 PROCEDURE — 93294 REM INTERROG EVL PM/LDLS PM: CPT | Performed by: INTERNAL MEDICINE

## 2019-02-08 ENCOUNTER — OFFICE VISIT (OUTPATIENT)
Dept: CARDIOLOGY CLINIC | Age: 79
End: 2019-02-08
Payer: MEDICARE

## 2019-02-08 VITALS
SYSTOLIC BLOOD PRESSURE: 110 MMHG | DIASTOLIC BLOOD PRESSURE: 80 MMHG | WEIGHT: 171 LBS | HEART RATE: 70 BPM | BODY MASS INDEX: 24.54 KG/M2

## 2019-02-08 DIAGNOSIS — E78.00 PURE HYPERCHOLESTEROLEMIA: ICD-10-CM

## 2019-02-08 DIAGNOSIS — I10 ESSENTIAL HYPERTENSION: ICD-10-CM

## 2019-02-08 DIAGNOSIS — I48.0 PAROXYSMAL ATRIAL FIBRILLATION (HCC): Primary | ICD-10-CM

## 2019-02-08 DIAGNOSIS — I25.10 CORONARY ARTERY DISEASE INVOLVING NATIVE CORONARY ARTERY OF NATIVE HEART WITHOUT ANGINA PECTORIS: ICD-10-CM

## 2019-02-08 DIAGNOSIS — R60.0 LOCALIZED EDEMA: ICD-10-CM

## 2019-02-08 DIAGNOSIS — I50.20 SYSTOLIC CONGESTIVE HEART FAILURE, NYHA CLASS 1, UNSPECIFIED CONGESTIVE HEART FAILURE CHRONICITY (HCC): ICD-10-CM

## 2019-02-08 PROCEDURE — 4040F PNEUMOC VAC/ADMIN/RCVD: CPT | Performed by: INTERNAL MEDICINE

## 2019-02-08 PROCEDURE — G8420 CALC BMI NORM PARAMETERS: HCPCS | Performed by: INTERNAL MEDICINE

## 2019-02-08 PROCEDURE — G8598 ASA/ANTIPLAT THER USED: HCPCS | Performed by: INTERNAL MEDICINE

## 2019-02-08 PROCEDURE — G8428 CUR MEDS NOT DOCUMENT: HCPCS | Performed by: INTERNAL MEDICINE

## 2019-02-08 PROCEDURE — G8482 FLU IMMUNIZE ORDER/ADMIN: HCPCS | Performed by: INTERNAL MEDICINE

## 2019-02-08 PROCEDURE — 1123F ACP DISCUSS/DSCN MKR DOCD: CPT | Performed by: INTERNAL MEDICINE

## 2019-02-08 PROCEDURE — 99214 OFFICE O/P EST MOD 30 MIN: CPT | Performed by: INTERNAL MEDICINE

## 2019-02-08 PROCEDURE — 1101F PT FALLS ASSESS-DOCD LE1/YR: CPT | Performed by: INTERNAL MEDICINE

## 2019-02-08 PROCEDURE — 1036F TOBACCO NON-USER: CPT | Performed by: INTERNAL MEDICINE

## 2019-02-13 ENCOUNTER — OFFICE VISIT (OUTPATIENT)
Dept: DERMATOLOGY | Age: 79
End: 2019-02-13
Payer: MEDICARE

## 2019-02-13 DIAGNOSIS — L57.0 AK (ACTINIC KERATOSIS): Primary | ICD-10-CM

## 2019-02-13 DIAGNOSIS — L72.0 EPIDERMOID CYST: ICD-10-CM

## 2019-02-13 DIAGNOSIS — B37.42 CANDIDAL BALANITIS: ICD-10-CM

## 2019-02-13 PROCEDURE — 1123F ACP DISCUSS/DSCN MKR DOCD: CPT | Performed by: DERMATOLOGY

## 2019-02-13 PROCEDURE — G8427 DOCREV CUR MEDS BY ELIG CLIN: HCPCS | Performed by: DERMATOLOGY

## 2019-02-13 PROCEDURE — 4040F PNEUMOC VAC/ADMIN/RCVD: CPT | Performed by: DERMATOLOGY

## 2019-02-13 PROCEDURE — 1101F PT FALLS ASSESS-DOCD LE1/YR: CPT | Performed by: DERMATOLOGY

## 2019-02-13 PROCEDURE — G8420 CALC BMI NORM PARAMETERS: HCPCS | Performed by: DERMATOLOGY

## 2019-02-13 PROCEDURE — G8482 FLU IMMUNIZE ORDER/ADMIN: HCPCS | Performed by: DERMATOLOGY

## 2019-02-13 PROCEDURE — G8598 ASA/ANTIPLAT THER USED: HCPCS | Performed by: DERMATOLOGY

## 2019-02-13 PROCEDURE — 1036F TOBACCO NON-USER: CPT | Performed by: DERMATOLOGY

## 2019-02-13 PROCEDURE — 99213 OFFICE O/P EST LOW 20 MIN: CPT | Performed by: DERMATOLOGY

## 2019-02-13 RX ORDER — NYSTATIN 100000 U/G
CREAM TOPICAL
Qty: 15 G | Refills: 1 | Status: SHIPPED | OUTPATIENT
Start: 2019-02-13 | End: 2021-08-25 | Stop reason: ALTCHOICE

## 2019-02-13 RX ORDER — NYSTATIN 100000 U/G
CREAM TOPICAL
Qty: 15 G | Refills: 1 | Status: SHIPPED | OUTPATIENT
Start: 2019-02-13 | End: 2019-02-13 | Stop reason: SDUPTHER

## 2019-03-13 RX ORDER — RANOLAZINE 500 MG/1
TABLET, FILM COATED, EXTENDED RELEASE ORAL
Qty: 180 TABLET | Refills: 2 | Status: SHIPPED | OUTPATIENT
Start: 2019-03-13 | End: 2019-05-13

## 2019-03-25 RX ORDER — POTASSIUM CHLORIDE 750 MG/1
TABLET, EXTENDED RELEASE ORAL
Qty: 270 TABLET | Refills: 2 | Status: SHIPPED | OUTPATIENT
Start: 2019-03-25 | End: 2019-12-24

## 2019-04-03 NOTE — PROGRESS NOTES
[facility]   Cardiac Consultation    Referring Provider:  Brielle Moscoso MD     Chief Concerns: PAF, SSS, bradycardia, s/p DDD- pacemaker follow up. HPI:  Manuela Franco is a 78 y.o. male being followed for PAF, SSS, s/p MDT DDD PPM (Dr Anabella Everett). PMH of CAD, MI, HLD, PAF, SSS, and bradycardia. Also h/o GI bleeding r/t diverticular disease (resolved), & fall with head and hip trauma (resolved). He has been off Xarelto. Dr Anabella Everett had discussed Watchman procedure in the past, but he was not interested. We discussed the Watchman procedure again today. He has been seeing Dr. Daryle Puff for GI, but office notes not available. The pt reports that he needs his esophagus stretched. JONNIE-VASc 4. Currently on Multaq 400 mg BID and Toprol XL 12.5 mg BID. Device interrogation today shows normally functioning PPM.  AP 90%,  0.1%. AF burden is 0.1%. He's had 6 episodes of AF, 3 of them lasting > 5 min and the longest being 13 min. Denies complaints of palpitations, dizziness, CP, SOB, orthopnea, presyncope, or syncope. He reports having issues with nausea every afternoon. He has been trying to adjust the timing of his meds with some improvement. Past Medical History:   has a past medical history of Allergic rhinitis, Atrial fibrillation ([address] Benign prostatic hypertrophy, CAD (coronary artery disease), Cancer ([address] CHF (congestive heart failure) ([address] DDD (degenerative disc disease), lumbar, Falls, GI bleeding, Hyperlipidemia, Hypertension, MI (myocardial infarction) ([address] MRSA (methicillin resistant staph aureus) culture positive, Pneumonia, S/P PTCA (percutaneous transluminal coronary angioplasty), SSS (sick sinus syndrome) ([address] and Unspecified sleep apnea. Surgical History:   has a past surgical history that includes Carpal tunnel release; Coronary angioplasty with stent; Diagnostic Cardiac Cath Lab Procedure; Coronary angioplasty; Cardiac pacemaker placement;  Appendectomy; fentaNYL (DURAGESIC) 50 MCG/HR Place 1 patch onto the skin every 48 hours 15 patch 0     No facility-administered encounter medications on file as of 4/10/2019. Allergies: Avelox [moxifloxacin hcl in nacl]; Epinephrine base; Other; Keflex [cephalexin]; and Polysporin [bacitracin-polymyxin b]     Review of Systems   Constitutional: Negative. HENT: Negative. Eyes: Negative. Respiratory: Negative. Cardiovascular: Negative. Gastrointestinal: Negative. Genitourinary: Negative. Musculoskeletal: Negative. Skin: Negative. Neurological: Negative. Hematological: Negative. Psychiatric/Behavioral: Negative. There were no vitals taken for this visit. ECG 4/3/19, Personally reviewed. Echo 12/12/15  -Normal left ventricle size, wall thickness and systolic function with an   estimated ejection fraction of 55%.  -No regional wall motion abnormalities are seen.   -Pacer / ICD wire is visualized in the right heart.   -There is mild-moderate tricuspid regurgitation with RVSP estimated at 38   mmHg. Objective:  Physical Exam   Constitutional: He is oriented to person, place, and time. He appears well-developed and well-nourished. HENT:   Head: Normocephalic and atraumatic. Eyes: Pupils are equal, round, and reactive to light. Neck: Normal range of motion. Cardiovascular: Normal rate, regular rhythm and normal heart sounds. Pulmonary/Chest: Effort normal and breath sounds normal.   Abdominal: Soft. No tenderness. Musculoskeletal: Normal range of motion. He exhibits no edema. Neurological: He is alert and oriented to person, place, and time. Skin: Skin is warm and dry. Psychiatric: He has a normal mood and affect. Assessment:  1. SSS-s/p DDD Medtronic pacemaker (1/18/18) by Dr Aristeo Tobar. AP 85%. Device function appropriately. 2. PAF- today EKG showed SR. Pacemaker showed no AF detected since 3/2017.  He has been off Xarelto d/t GI bleeding & recent fall-head &

## 2019-04-09 ENCOUNTER — OFFICE VISIT (OUTPATIENT)
Dept: FAMILY MEDICINE CLINIC | Age: 79
End: 2019-04-09
Payer: MEDICARE

## 2019-04-09 VITALS
HEART RATE: 69 BPM | WEIGHT: 177.8 LBS | DIASTOLIC BLOOD PRESSURE: 72 MMHG | HEIGHT: 70 IN | OXYGEN SATURATION: 98 % | BODY MASS INDEX: 25.45 KG/M2 | SYSTOLIC BLOOD PRESSURE: 130 MMHG

## 2019-04-09 DIAGNOSIS — E78.2 MIXED HYPERLIPIDEMIA: Primary | ICD-10-CM

## 2019-04-09 DIAGNOSIS — I25.10 CORONARY ARTERY DISEASE INVOLVING NATIVE CORONARY ARTERY OF NATIVE HEART WITHOUT ANGINA PECTORIS: ICD-10-CM

## 2019-04-09 DIAGNOSIS — I10 ESSENTIAL HYPERTENSION: ICD-10-CM

## 2019-04-09 DIAGNOSIS — I48.0 PAROXYSMAL ATRIAL FIBRILLATION (HCC): ICD-10-CM

## 2019-04-09 PROCEDURE — 1123F ACP DISCUSS/DSCN MKR DOCD: CPT | Performed by: FAMILY MEDICINE

## 2019-04-09 PROCEDURE — G8598 ASA/ANTIPLAT THER USED: HCPCS | Performed by: FAMILY MEDICINE

## 2019-04-09 PROCEDURE — 99214 OFFICE O/P EST MOD 30 MIN: CPT | Performed by: FAMILY MEDICINE

## 2019-04-09 PROCEDURE — 1036F TOBACCO NON-USER: CPT | Performed by: FAMILY MEDICINE

## 2019-04-09 PROCEDURE — G8417 CALC BMI ABV UP PARAM F/U: HCPCS | Performed by: FAMILY MEDICINE

## 2019-04-09 PROCEDURE — G8427 DOCREV CUR MEDS BY ELIG CLIN: HCPCS | Performed by: FAMILY MEDICINE

## 2019-04-09 PROCEDURE — 4040F PNEUMOC VAC/ADMIN/RCVD: CPT | Performed by: FAMILY MEDICINE

## 2019-04-09 ASSESSMENT — PATIENT HEALTH QUESTIONNAIRE - PHQ9
2. FEELING DOWN, DEPRESSED OR HOPELESS: 0
1. LITTLE INTEREST OR PLEASURE IN DOING THINGS: 0
SUM OF ALL RESPONSES TO PHQ9 QUESTIONS 1 & 2: 0
SUM OF ALL RESPONSES TO PHQ QUESTIONS 1-9: 0
SUM OF ALL RESPONSES TO PHQ QUESTIONS 1-9: 0

## 2019-04-09 NOTE — PROGRESS NOTES
Lenoria Cowden is a 78 y.o. male. HPI: Here with wife for [facility] medical visit  Still sees a pain specialist where he gets fentanyl patch 50 mg every 2 days  Continues to have low back pain  Had cut his Lasix back from 40 mg 4 times a day down to 1-2 a day  Having some nausea and abdominal discomfort thinks it could be due to his cardiac meds  Seizes electro cardiologist in 2 days  Sees his regular cardiologist in 3 weeks  Has several toes are bothersome but surgery is the recommended treatment he does not wish to undergo anesthesia  Meds, vitamins and allergies reviewed with pt    ROS: No TIA's or unusual headaches, no dysphagia. No prolonged cough. No dyspnea or chest pain on exertion. No abdominal pain, change in bowel habits, black or bloody stools. No urinary tract symptoms. No new or unusual musculoskeletal symptoms. Prior to Visit Medications    Medication Sig Taking? Authorizing Provider   KLOR-CON M10 10 MEQ extended release tablet TAKE 3 TABLETS DAILY Yes Ingrid Bassett MD   RANEXA 500 MG extended release tablet TAKE 1 TABLET TWICE A DAY Yes SCOTT Go CNP   nystatin (MYCOSTATIN) 994952 UNIT/GM cream Apply topically 2 times daily until improved. Yes Sanjuanita Mckeon MD   metoprolol succinate (TOPROL XL) 25 MG extended release tablet TAKE ONE-HALF (1/2) TABLET TWICE A DAY Yes SCOTT Larsen CNP   vitamin B-12 (CYANOCOBALAMIN) 500 MCG tablet Take 500 mcg by mouth daily Yes Historical Provider, MD   Compression Stockings MISC by Does not apply route 1 pair 20-30 mmHg compression stockings, wide band with grippers.  Yes Asa Pastor MD   furosemide (LASIX) 40 MG tablet Take 40 mg by mouth 2 times daily 2 TABS BID Yes Historical Provider, MD   nitroGLYCERIN (NITRODUR) 0.4 MG/HR APPLY 1 PATCH DAILY Yes Ingrid Bassett MD   Polysaccharide Iron Complex (PROFE PO) Take 185 mg by mouth Yes Historical Provider, MD   aspirin 81 MG tablet Take 81 mg by mouth daily Yes Historical Provider, MD   rosuvastatin (CRESTOR) 5 MG tablet TAKE 1 TABLET DAILY Yes Milton Bae MD   MULTAQ 400 MG TABS TAKE 1 TABLET TWICE A DAY WITH MEALS Yes Milton Bae MD   ondansetron (ZOFRAN ODT) 4 MG disintegrating tablet Take 1 tablet by mouth every 8 hours as needed for Nausea or Vomiting Yes Pete Quinones MD   pantoprazole (PROTONIX) 40 MG tablet TAKE 1 TABLET BY MOUTH EVERY MORNING BEFORE BREAKFAST Yes Milton Bae MD   polyethylene glycol (MIRALAX) POWD powder Take 17 g by mouth daily Yes Historical Provider, MD   Coenzyme Q10 (CO Q 10) 100 MG CAPS Take 200 mg by mouth daily Yes Historical Provider, MD   fentaNYL ([address]) 50 MCG/HR Place 1 patch onto the skin every 48 hours Yes Peggy De La Rosa MD   isosorbide mononitrate (IMDUR) 30 MG extended release tablet TAKE 1 TABLET DAILY  Bryant Conrad, APRN - CNP       Past Medical History:   Diagnosis Date    Allergic rhinitis     [facility] fibrillation ([facility]     Benign prostatic hypertrophy     CAD (coronary artery disease)     Cancer ([address]     skin    CHF (congestive heart failure) ([address] 08/17/2017    DDD (degenerative disc disease), lumbar     Falls 08/17/2017    GI bleeding     Hyperlipidemia     Hypertension     MI (myocardial infarction) ([address]     MRSA (methicillin resistant staph aureus) culture positive     h/o infection in the blood    Pneumonia     S/P PTCA (percutaneous transluminal coronary angioplasty) stents x 8    SSS (sick sinus syndrome) ([facility]     Unspecified sleep apnea        Social History     Tobacco Use    Smoking status: Former Smoker     Packs/day: 1.00     Years: 30.00     Pack years: 30.00     Last attempt to quit: 4/12/2004     Years since quitting: 15.0    Smokeless tobacco: Never Used   Substance Use Topics    Alcohol use: No     Alcohol/week: 0.0 oz    Drug use: No       Family History   Problem Relation Age of Onset    Cancer Sister     Coronary Art Dis Brother        Allergies  Paroxysmal atrial fibrillation (HCC) Appears to be in normal sinus rhythm today, cannot have anticoagulation due to prior bleeding     Coronary artery disease involving native coronary artery of native heart without angina pectoris Stable follow-up with cardiology     Essential hypertension Stable continue present medication    Acute on chronic congestive heart failure-increase Lasix to 40-60 mg daily follow-up with cardiology  Nausea-suspect due to his cardiac meds will have him discuss with his electrical cardiologist  Visitations do only for the new shingles vaccine at the pharmacy when available    And 25 minutes with patient and his wife greater than 50% of time reviewing his medications, listing and supporting counseling him with his chronic pain issues, and adjusting his meds to help with his congestive heart failure while corneas clear

## 2019-04-10 ENCOUNTER — OFFICE VISIT (OUTPATIENT)
Dept: CARDIOLOGY CLINIC | Age: 79
End: 2019-04-10
Payer: MEDICARE

## 2019-04-10 ENCOUNTER — PROCEDURE VISIT (OUTPATIENT)
Dept: CARDIOLOGY CLINIC | Age: 79
End: 2019-04-10
Payer: MEDICARE

## 2019-04-10 VITALS
BODY MASS INDEX: 25.53 KG/M2 | SYSTOLIC BLOOD PRESSURE: 122 MMHG | HEIGHT: 69 IN | WEIGHT: 172.4 LBS | DIASTOLIC BLOOD PRESSURE: 70 MMHG | HEART RATE: 65 BPM

## 2019-04-10 DIAGNOSIS — I48.0 PAROXYSMAL ATRIAL FIBRILLATION (HCC): ICD-10-CM

## 2019-04-10 DIAGNOSIS — I49.5 SICK SINUS SYNDROME (HCC): ICD-10-CM

## 2019-04-10 DIAGNOSIS — I50.20 SYSTOLIC CONGESTIVE HEART FAILURE, NYHA CLASS 1, UNSPECIFIED CONGESTIVE HEART FAILURE CHRONICITY (HCC): ICD-10-CM

## 2019-04-10 DIAGNOSIS — I49.5 SSS (SICK SINUS SYNDROME) (HCC): Primary | ICD-10-CM

## 2019-04-10 DIAGNOSIS — Z95.0 PACEMAKER: ICD-10-CM

## 2019-04-10 DIAGNOSIS — Z95.0 CARDIAC PACEMAKER IN SITU: ICD-10-CM

## 2019-04-10 DIAGNOSIS — R00.1 BRADYCARDIA: ICD-10-CM

## 2019-04-10 DIAGNOSIS — I25.10 CORONARY ARTERY DISEASE INVOLVING NATIVE CORONARY ARTERY OF NATIVE HEART WITHOUT ANGINA PECTORIS: ICD-10-CM

## 2019-04-10 DIAGNOSIS — I10 ESSENTIAL HYPERTENSION: ICD-10-CM

## 2019-04-10 PROCEDURE — 93280 PM DEVICE PROGR EVAL DUAL: CPT | Performed by: INTERNAL MEDICINE

## 2019-04-10 PROCEDURE — 93000 ELECTROCARDIOGRAM COMPLETE: CPT | Performed by: INTERNAL MEDICINE

## 2019-04-10 PROCEDURE — 4040F PNEUMOC VAC/ADMIN/RCVD: CPT | Performed by: INTERNAL MEDICINE

## 2019-04-10 PROCEDURE — G8417 CALC BMI ABV UP PARAM F/U: HCPCS | Performed by: INTERNAL MEDICINE

## 2019-04-10 PROCEDURE — G8427 DOCREV CUR MEDS BY ELIG CLIN: HCPCS | Performed by: INTERNAL MEDICINE

## 2019-04-10 PROCEDURE — G8598 ASA/ANTIPLAT THER USED: HCPCS | Performed by: INTERNAL MEDICINE

## 2019-04-10 PROCEDURE — 1123F ACP DISCUSS/DSCN MKR DOCD: CPT | Performed by: INTERNAL MEDICINE

## 2019-04-10 PROCEDURE — 99214 OFFICE O/P EST MOD 30 MIN: CPT | Performed by: INTERNAL MEDICINE

## 2019-04-10 PROCEDURE — 1036F TOBACCO NON-USER: CPT | Performed by: INTERNAL MEDICINE

## 2019-04-10 NOTE — PROGRESS NOTES
Interrogation and programming evaluation of the device shows normal function, with stable sensing and pacing thresholds. See interrogation for details. The pt saw Dr Danyel Yi today. Recheck remotely 3 mos.

## 2019-04-10 NOTE — PATIENT INSTRUCTIONS
Ask Dr. Herminia Geller about risk of GI bleeding. Dr. Brittney Peña would like to start blood thinner for Afib, if he feels it is safe to do so.

## 2019-04-12 RX ORDER — METOPROLOL SUCCINATE 25 MG/1
12.5 TABLET, EXTENDED RELEASE ORAL 2 TIMES DAILY
Qty: 30 TABLET | Refills: 6 | Status: SHIPPED | OUTPATIENT
Start: 2019-04-12 | End: 2019-06-20

## 2019-04-12 RX ORDER — METOPROLOL SUCCINATE 25 MG/1
TABLET, EXTENDED RELEASE ORAL
Qty: 90 TABLET | Refills: 2 | Status: SHIPPED | OUTPATIENT
Start: 2019-04-12 | End: 2019-12-26

## 2019-05-13 ENCOUNTER — OFFICE VISIT (OUTPATIENT)
Dept: CARDIOLOGY CLINIC | Age: 79
End: 2019-05-13
Payer: MEDICARE

## 2019-05-13 VITALS
BODY MASS INDEX: 25.99 KG/M2 | SYSTOLIC BLOOD PRESSURE: 110 MMHG | HEART RATE: 60 BPM | WEIGHT: 176 LBS | DIASTOLIC BLOOD PRESSURE: 70 MMHG

## 2019-05-13 DIAGNOSIS — I10 ESSENTIAL HYPERTENSION: ICD-10-CM

## 2019-05-13 DIAGNOSIS — E78.2 MIXED HYPERLIPIDEMIA: ICD-10-CM

## 2019-05-13 DIAGNOSIS — R00.1 BRADYCARDIA: ICD-10-CM

## 2019-05-13 DIAGNOSIS — I50.30 DIASTOLIC CONGESTIVE HEART FAILURE, NYHA CLASS 1, UNSPECIFIED CONGESTIVE HEART FAILURE CHRONICITY (HCC): ICD-10-CM

## 2019-05-13 DIAGNOSIS — Z98.61 CAD S/P PERCUTANEOUS CORONARY ANGIOPLASTY: ICD-10-CM

## 2019-05-13 DIAGNOSIS — I48.0 PAROXYSMAL ATRIAL FIBRILLATION (HCC): ICD-10-CM

## 2019-05-13 DIAGNOSIS — Z95.0 CARDIAC PACEMAKER IN SITU: Primary | ICD-10-CM

## 2019-05-13 DIAGNOSIS — I25.10 CAD S/P PERCUTANEOUS CORONARY ANGIOPLASTY: ICD-10-CM

## 2019-05-13 PROCEDURE — 99214 OFFICE O/P EST MOD 30 MIN: CPT | Performed by: INTERNAL MEDICINE

## 2019-05-13 PROCEDURE — 4040F PNEUMOC VAC/ADMIN/RCVD: CPT | Performed by: INTERNAL MEDICINE

## 2019-05-13 PROCEDURE — G8598 ASA/ANTIPLAT THER USED: HCPCS | Performed by: INTERNAL MEDICINE

## 2019-05-13 PROCEDURE — G8417 CALC BMI ABV UP PARAM F/U: HCPCS | Performed by: INTERNAL MEDICINE

## 2019-05-13 PROCEDURE — 1036F TOBACCO NON-USER: CPT | Performed by: INTERNAL MEDICINE

## 2019-05-13 PROCEDURE — G8427 DOCREV CUR MEDS BY ELIG CLIN: HCPCS | Performed by: INTERNAL MEDICINE

## 2019-05-13 PROCEDURE — 1123F ACP DISCUSS/DSCN MKR DOCD: CPT | Performed by: INTERNAL MEDICINE

## 2019-05-13 RX ORDER — FLUTICASONE PROPIONATE 50 MCG
1 SPRAY, SUSPENSION (ML) NASAL DAILY
COMMUNITY
End: 2021-08-25

## 2019-05-13 NOTE — PROGRESS NOTES
Subjective:      Patient ID: Harlan Aguero is a 78 y.o. male. CC:  S/p GI bleed. F/u HTN    HPI: Mr Harmon He is here for a 6 moth f/u. Carolyn Cruz He denies chest pain today but has chronic swelling. He c/o of fatigue and low b/p. No LOC but fell and hit his hip and has bad leg pain. Back is bad and is using a 4 pt walker. Worried about bleeding and back surgeons shy away from surgery on back d/t bleeding. Has paroxysmal brief episodes of AF, less than 1%. He had life threatening GI bleeding on NOAC. He doesn't want watchman.         Allergies   Allergen Reactions    Avelox [Moxifloxacin Hcl In Nacl] Anaphylaxis    Epinephrine Base     Other      [place] per PT - swelling size of silver dollar at site per PT     Keflex [Cephalexin] Nausea And Vomiting    Polysporin [Bacitracin-Polymyxin B] Rash        Social History     Socioeconomic History    Marital status:      Spouse name: Not on file    Number of children: Not on file    Years of education: Not on file    Highest education level: Not on file   Occupational History    Not on file   Social Needs    Financial resource strain: Not on file    Food insecurity:     Worry: Not on file     Inability: Not on file    Transportation needs:     Medical: Not on file     Non-medical: Not on file   Tobacco Use    Smoking status: Former Smoker     Packs/day: 1.00     Years: 30.00     Pack years: 30.00     Last attempt to quit: 4/12/2004     Years since quitting: 15.0    Smokeless tobacco: Never Used   Substance and Sexual Activity    Alcohol use: No     Alcohol/week: 0.0 oz    Drug use: No    Sexual activity: Yes     Comment:  living with spouse   Lifestyle    Physical activity:     Days per week: Not on file     Minutes per session: Not on file    Stress: Not on file   Relationships    Social connections:     Talks on phone: Not on file     Gets together: Not on file     Attends Jewish service: Not on file     Active member of club or organization: Not on file     Attends meetings of clubs or organizations: Not on file     Relationship status: Not on file    Intimate partner violence:     Fear of current or ex partner: Not on file     Emotionally abused: Not on file     Physically abused: Not on file     Forced sexual activity: Not on file   Other Topics Concern    Not on file   Social History Narrative    Not on file        Patient has a family history includes Cancer in his sister; Coronary Art Dis in his brother. Patient  has a past medical history of Allergic rhinitis, Atrial fibrillation ([address] Benign prostatic hypertrophy, CAD (coronary artery disease), Cancer ([address] CHF (congestive heart failure) ([address] DDD (degenerative disc disease), lumbar, Falls, GI bleeding, Hyperlipidemia, Hypertension, [facility]) [facility] MRSA (methicillin resistant staph aureus) culture positive, Pneumonia, S/P PTCA (percutaneous transluminal coronary angioplasty), SSS (sick sinus syndrome) ([facility] and Unspecified sleep apnea. Current Outpatient Medications   Medication Sig Dispense Refill    fluticasone (FLONASE) 50 MCG/ACT nasal spray 1 spray by Each Nare route daily      metoprolol succinate (TOPROL XL) 25 MG extended release tablet Take 0.5 tablets by mouth 2 times daily 30 tablet 6    KLOR-CON M10 10 MEQ extended release tablet TAKE 3 TABLETS DAILY 270 tablet 2    vitamin B-12 (CYANOCOBALAMIN) 500 MCG tablet Take 500 mcg by mouth daily      Compression Stockings MISC by Does not apply route 1 pair 20-30 mmHg compression stockings, wide band with grippers.  1 each 2    furosemide (LASIX) 40 MG tablet Take 40 mg by mouth 2 times daily 2 TABS BID      nitroGLYCERIN (NITRODUR) 0.4 MG/HR APPLY 1 PATCH DAILY 90 patch 0    Polysaccharide Iron Complex (PROFE PO) Take 185 mg by mouth      aspirin 81 MG tablet Take 81 mg by mouth daily      rosuvastatin (CRESTOR) 5 MG tablet TAKE 1 TABLET DAILY 90 tablet 3    MULTAQ 400 MG TABS TAKE 1 He has no rebound and no guarding. Musculoskeletal: tender left leg and swelling. Lymphadenopathy:     He has no cervical adenopathy. Neurological: He is alert and oriented to person, place, and time. No cranial nerve deficit. Coordination normal.   Skin: Skin is warm and dry. No rash noted. He is not diaphoretic. No erythema. No pallor. Psychiatric: He has a normal mood and affect. His behavior is normal. Judgment and thought content normal.       Assessment:       Diagnosis Orders   1. Cardiac pacemaker in situ  Lipid Panel    CBC    Comprehensive Metabolic Panel   2. Paroxysmal atrial fibrillation (HCC)  Lipid Panel    CBC    Comprehensive Metabolic Panel   3. Diastolic congestive heart failure, NYHA class 1, unspecified congestive heart failure chronicity (HCC)  Lipid Panel    CBC    Comprehensive Metabolic Panel   4. Essential hypertension  Lipid Panel    CBC    Comprehensive Metabolic Panel   5. Bradycardia  Lipid Panel    CBC    Comprehensive Metabolic Panel   6. Mixed hyperlipidemia  Lipid Panel    CBC    Comprehensive Metabolic Panel   7. CAD S/P percutaneous coronary angioplasty  Lipid Panel    CBC    Comprehensive Metabolic Panel           Plan:        CAD:  Stable. No chest pains but had GI bleed - still off A/C for paroxysmal a fib. Can get Gi eval to include EGD and colonoscopy. Stop ranexa. Rhythm: MRI compatible pacer  HTN:  Episodic and low today. Had LLE skin infection and wears compression stockings. Now taking lasix 40 mg am and pm.  AT/AF:  Has episodes and feels tired. Taking multaq to maintain SR. Reviewed jesse vasc.

## 2019-05-28 NOTE — TELEPHONE ENCOUNTER
Requested Prescriptions     Pending Prescriptions Disp Refills    MULTAQ 400 MG TABS [Pharmacy Med Name: Sara Rivas TABS 400MG] 180 tablet 1     Sig: TAKE 1 TABLET TWICE A DAY WITH MEALS         Last ov:5/13/19    Next ov:7/10/19    Last fill:6/6/18    Last labs:1/8/19

## 2019-05-30 RX ORDER — DRONEDARONE 400 MG/1
TABLET, FILM COATED ORAL
Qty: 180 TABLET | Refills: 1 | Status: SHIPPED | OUTPATIENT
Start: 2019-05-30 | End: 2019-12-24

## 2019-06-06 RX ORDER — ROSUVASTATIN CALCIUM 5 MG/1
TABLET, COATED ORAL
Qty: 90 TABLET | Refills: 3 | Status: SHIPPED | OUTPATIENT
Start: 2019-06-06 | End: 2020-06-03

## 2019-06-06 NOTE — TELEPHONE ENCOUNTER
Requested Prescriptions     Pending Prescriptions Disp Refills    rosuvastatin (CRESTOR) 5 MG tablet [Pharmacy Med Name: ROSUVASTATIN TABS 5MG] 90 tablet 3     Sig: TAKE 1 TABLET DAILY           Last ov:5/13/19    Next ov:7/10/19    Last fill:6/13/18    Last labs:1/8/19

## 2019-06-17 NOTE — TELEPHONE ENCOUNTER
Requested Prescriptions     Pending Prescriptions Disp Refills    nitroGLYCERIN (NITRODUR) 0.4 MG/HR [Pharmacy Med Name: NITROGLYCERIN TD PATCH 0.4MG/H]  0     Sig: APPLY 1 PATCH DAILY     Number: 90    Refills: 0    Last Office Visit: 05/13/2019    Next Office Visit: 07/10/2019    Last Refill: 09/05/2018    Last Labs: 01/08/2019 BNP/ BMP/CBC

## 2019-06-19 RX ORDER — NITROGLYCERIN 80 MG/1
PATCH TRANSDERMAL
Qty: 90 PATCH | Refills: 0 | Status: SHIPPED | OUTPATIENT
Start: 2019-06-19 | End: 2019-09-23 | Stop reason: SDUPTHER

## 2019-06-20 ENCOUNTER — OFFICE VISIT (OUTPATIENT)
Dept: FAMILY MEDICINE CLINIC | Age: 79
End: 2019-06-20
Payer: MEDICARE

## 2019-06-20 ENCOUNTER — TELEPHONE (OUTPATIENT)
Dept: FAMILY MEDICINE CLINIC | Age: 79
End: 2019-06-20

## 2019-06-20 VITALS
WEIGHT: 178 LBS | SYSTOLIC BLOOD PRESSURE: 120 MMHG | BODY MASS INDEX: 26.29 KG/M2 | OXYGEN SATURATION: 97 % | DIASTOLIC BLOOD PRESSURE: 80 MMHG | HEART RATE: 63 BPM

## 2019-06-20 DIAGNOSIS — L03.119 CELLULITIS OF LOWER LEG: Primary | ICD-10-CM

## 2019-06-20 PROCEDURE — G8598 ASA/ANTIPLAT THER USED: HCPCS | Performed by: FAMILY MEDICINE

## 2019-06-20 PROCEDURE — 1036F TOBACCO NON-USER: CPT | Performed by: FAMILY MEDICINE

## 2019-06-20 PROCEDURE — 4040F PNEUMOC VAC/ADMIN/RCVD: CPT | Performed by: FAMILY MEDICINE

## 2019-06-20 PROCEDURE — G8427 DOCREV CUR MEDS BY ELIG CLIN: HCPCS | Performed by: FAMILY MEDICINE

## 2019-06-20 PROCEDURE — G8417 CALC BMI ABV UP PARAM F/U: HCPCS | Performed by: FAMILY MEDICINE

## 2019-06-20 PROCEDURE — 99214 OFFICE O/P EST MOD 30 MIN: CPT | Performed by: FAMILY MEDICINE

## 2019-06-20 PROCEDURE — 1123F ACP DISCUSS/DSCN MKR DOCD: CPT | Performed by: FAMILY MEDICINE

## 2019-06-20 RX ORDER — SULFAMETHOXAZOLE AND TRIMETHOPRIM 800; 160 MG/1; MG/1
1 TABLET ORAL 2 TIMES DAILY
Qty: 20 TABLET | Refills: 0 | Status: SHIPPED | OUTPATIENT
Start: 2019-06-20 | End: 2019-06-30

## 2019-06-20 NOTE — PROGRESS NOTES
[address] Note    Date: 6/20/2019                                               Subjective/Objective:     Chief Complaint   Patient presents with    Leg Injury     Infected. . bleeding. . puss. [place] HPI   Rash with sore on L leg starting 1-2 days ago. Is draining some pus. +pain starting last night. Is getting bigger. Pt has hx of several infections over the last year. Has hx of CHF and leg edema. Last infection was 12/2018. Bactrim DS has worked well in the past. Got sick with Keflex, and clinda was ineffective. Pt was released from wound clinic a few months ago.            Patient Active Problem List    Diagnosis Date Noted    Pure hyperglyceridemia      Priority: High    Gastrointestinal hemorrhage      Priority: High    Localized edema 12/04/2018    Venous (peripheral) insufficiency 12/04/2018    Bradycardia     Sick sinus syndrome ([address] 08/29/2016    CHF (congestive heart failure), NYHA class I (HCC)     Essential hypertension     Angina pectoris ([address] 06/16/2015    History of nonmelanoma skin cancer 10/01/2013    Tinea manuum, pedis, and unguium 10/01/2013    AK (actinic keratosis) 10/01/2013    Coronary artery disease involving native coronary artery of native heart without angina pectoris 06/17/2011    Cardiac pacemaker in situ 06/17/2011    Postsurgical percutaneous transluminal coronary angioplasty status 06/17/2011    Hyperlipidemia     Benign prostatic hyperplasia     Paroxysmal atrial fibrillation (HCC)        Past Medical History:   Diagnosis Date    Allergic rhinitis     Atrial fibrillation (HCC)     Benign prostatic hypertrophy     CAD (coronary artery disease)     Cancer (HCC)     skin    CHF (congestive heart failure) ([address] 08/17/2017    DDD (degenerative disc disease), lumbar     Falls 08/17/2017    GI bleeding     Hyperlipidemia     Hypertension     MI (myocardial infarction) ([address]     MRSA (methicillin resistant staph aureus) culture positive     h/o infection in the blood    Pneumonia     S/P PTCA (percutaneous transluminal coronary angioplasty) stents x 8    SSS (sick sinus syndrome) (HCC)     Unspecified sleep apnea        Current Outpatient Medications   Medication Sig Dispense Refill    sulfamethoxazole-trimethoprim (BACTRIM DS;SEPTRA DS) 800-160 MG per tablet Take 1 tablet by mouth 2 times daily for 10 days 20 tablet 0    nitroGLYCERIN (NITRODUR) 0.4 MG/HR APPLY 1 PATCH DAILY 90 patch 0    rosuvastatin (CRESTOR) 5 MG tablet TAKE 1 TABLET DAILY 90 tablet 3    MULTAQ 400 MG TABS TAKE 1 TABLET TWICE A DAY WITH MEALS 180 tablet 1    fluticasone (FLONASE) 50 MCG/ACT nasal spray 1 spray by Each Nare route daily      metoprolol succinate (TOPROL XL) 25 MG extended release tablet TAKE ONE-HALF (1/2) TABLET TWICE A DAY 90 tablet 2    KLOR-CON M10 10 MEQ extended release tablet TAKE 3 TABLETS DAILY 270 tablet 2    nystatin (MYCOSTATIN) 718419 UNIT/GM cream Apply topically 2 times daily until improved. 15 g 1    vitamin B-12 (CYANOCOBALAMIN) 500 MCG tablet Take 500 mcg by mouth daily      Compression Stockings MISC by Does not apply route 1 pair 20-30 mmHg compression stockings, wide band with grippers.  1 each 2    furosemide (LASIX) 40 MG tablet Take 40 mg by mouth 2 times daily 2 TABS BID      Polysaccharide Iron Complex (PROFE PO) Take 185 mg by mouth      aspirin 81 MG tablet Take 81 mg by mouth daily      ondansetron (ZOFRAN ODT) 4 MG disintegrating tablet Take 1 tablet by mouth every 8 hours as needed for Nausea or Vomiting 10 tablet 1    pantoprazole (PROTONIX) 40 MG tablet TAKE 1 TABLET BY MOUTH EVERY MORNING BEFORE BREAKFAST 30 tablet 5    polyethylene glycol (MIRALAX) POWD powder Take 17 g by mouth daily      Coenzyme Q10 (CO Q 10) 100 MG CAPS Take 200 mg by mouth daily      fentaNYL (DURAGESIC) 50 MCG/HR Place 1 patch onto the skin every 48 hours 15 patch 0     No current facility-administered medications for this visit. Allergies   Allergen Reactions    Avelox [Moxifloxacin Hcl In Nacl] Anaphylaxis    Epinephrine Base     Other      [place] per PT - swelling size of silver dollar at site per PT     Keflex [Cephalexin] Nausea And Vomiting    Polysporin [Bacitracin-Polymyxin B] Rash       Review of Systems   No vomiting, no fever    Vitals:  /80   Pulse 63   Wt 178 lb (80.7 kg)   SpO2 97%   BMI 26.29 kg/m²     Physical Exam   General:  Well-appearing, NAD, alert, non-toxic  HEENT:  Normocephalic, atraumatic. CHEST/LUNGS: No dyspnea  EXTREMETIES: +2+edema of BL lower legs  SKIN:  +erythema and warmth of L lower leg, confluent. +1.5cm open sore on lateral lower leg with some white drainage  PSYCH:  A+O x 3; normal affect  NEURO:  GCS 15, CN2-12 grossly intact, no focal motor/sensory deficits, + ambulating with cane, normal speech      Assessment/Plan     70-year-old male with cellulitis of the left lower leg. Will treat with Bactrim. Patient advised to go the emergency room if symptoms worsen on the medicine. Daily wound care advised. Continue Lasix for CHF. Discussed with patient medication/s dosage, instructions, potential S/E, A/R and Drug Interaction  Educational material provided regarding patient's condition  Tylenol or Motrin as needed for pain or fever  Advise to return here if worse or go to nearest ER  Encourage fluids  Pt discharged in stable condition at 12:33      1. Cellulitis of lower leg    - sulfamethoxazole-trimethoprim (BACTRIM DS;SEPTRA DS) 800-160 MG per tablet; Take 1 tablet by mouth 2 times daily for 10 days  Dispense: 20 tablet; Refill: 0       No orders of the defined types were placed in this encounter. Return if symptoms worsen or fail to improve.     Sonny Acuña MD    6/20/2019  12:32 PM

## 2019-06-20 NOTE — TELEPHONE ENCOUNTER
Pt called and stated he has an absess on his left leg that has reopened and is draining. Pt would like to be seen today.  Please call back [phone]

## 2019-06-20 NOTE — PATIENT INSTRUCTIONS

## 2019-06-24 ENCOUNTER — OFFICE VISIT (OUTPATIENT)
Dept: FAMILY MEDICINE CLINIC | Age: 79
End: 2019-06-24
Payer: MEDICARE

## 2019-06-24 VITALS
OXYGEN SATURATION: 97 % | HEART RATE: 87 BPM | SYSTOLIC BLOOD PRESSURE: 130 MMHG | BODY MASS INDEX: 25.25 KG/M2 | WEIGHT: 171 LBS | DIASTOLIC BLOOD PRESSURE: 80 MMHG

## 2019-06-24 DIAGNOSIS — L03.119 CELLULITIS OF LOWER LEG: Primary | ICD-10-CM

## 2019-06-24 PROCEDURE — G8427 DOCREV CUR MEDS BY ELIG CLIN: HCPCS | Performed by: FAMILY MEDICINE

## 2019-06-24 PROCEDURE — 4040F PNEUMOC VAC/ADMIN/RCVD: CPT | Performed by: FAMILY MEDICINE

## 2019-06-24 PROCEDURE — 1123F ACP DISCUSS/DSCN MKR DOCD: CPT | Performed by: FAMILY MEDICINE

## 2019-06-24 PROCEDURE — G8598 ASA/ANTIPLAT THER USED: HCPCS | Performed by: FAMILY MEDICINE

## 2019-06-24 PROCEDURE — 1036F TOBACCO NON-USER: CPT | Performed by: FAMILY MEDICINE

## 2019-06-24 PROCEDURE — 99213 OFFICE O/P EST LOW 20 MIN: CPT | Performed by: FAMILY MEDICINE

## 2019-06-24 PROCEDURE — G8417 CALC BMI ABV UP PARAM F/U: HCPCS | Performed by: FAMILY MEDICINE

## 2019-06-24 RX ORDER — CLINDAMYCIN HYDROCHLORIDE 300 MG/1
300 CAPSULE ORAL 3 TIMES DAILY
Qty: 30 CAPSULE | Refills: 0 | Status: SHIPPED | OUTPATIENT
Start: 2019-06-24 | End: 2019-08-29 | Stop reason: SDUPTHER

## 2019-06-24 NOTE — PROGRESS NOTES
[address] Note    Date: 6/24/2019                                               Subjective/Objective:     Chief Complaint   Patient presents with    Check-Up     Infected leg. HPI   Patient is here for follow-up on cellulitis of left leg starting 5 to 6 days ago. He started taking Bactrim 4 days ago. Patient reports that symptoms have improved somewhat.          Patient Active Problem List    Diagnosis Date Noted    Pure hyperglyceridemia      Priority: High    Gastrointestinal hemorrhage      Priority: High    Localized edema 12/04/2018    Venous (peripheral) insufficiency 12/04/2018    Bradycardia     Sick sinus syndrome ([address] 08/29/2016    CHF (congestive heart failure), NYHA class I ([facility])     Essential hypertension     Angina pectoris ([address] 06/16/2015    History of nonmelanoma skin cancer 10/01/2013    Tinea manuum, pedis, and unguium 10/01/2013    AK (actinic keratosis) 10/01/2013    Coronary artery disease involving native coronary artery of native heart without angina pectoris 06/17/2011    Cardiac pacemaker in situ 06/17/2011    Postsurgical percutaneous transluminal coronary angioplasty status 06/17/2011    Hyperlipidemia     Benign prostatic hyperplasia     Paroxysmal atrial fibrillation (HCC)        Past Medical History:   Diagnosis Date    Allergic rhinitis     Atrial fibrillation (HCC)     Benign prostatic hypertrophy     CAD (coronary artery disease)     Cancer (HCC)     skin    CHF (congestive heart failure) ([address] 08/17/2017    DDD (degenerative disc disease), lumbar     Falls 08/17/2017    GI bleeding     Hyperlipidemia     Hypertension     MI (myocardial infarction) ([address]     MRSA (methicillin resistant staph aureus) culture positive     h/o infection in the blood    Pneumonia     S/P PTCA (percutaneous transluminal coronary angioplasty) stents x 8    SSS (sick sinus syndrome) ([address]     Unspecified sleep apnea        Current Outpatient Medications   Medication Sig Dispense Refill    clindamycin (CLEOCIN) 300 MG capsule Take 1 capsule by mouth 3 times daily for 10 days 30 capsule 0    sulfamethoxazole-trimethoprim (BACTRIM DS;SEPTRA DS) 800-160 MG per tablet Take 1 tablet by mouth 2 times daily for 10 days 20 tablet 0    nitroGLYCERIN (NITRODUR) 0.4 MG/HR APPLY 1 PATCH DAILY 90 patch 0    rosuvastatin (CRESTOR) 5 MG tablet TAKE 1 TABLET DAILY 90 tablet 3    MULTAQ 400 MG TABS TAKE 1 TABLET TWICE A DAY WITH MEALS 180 tablet 1    fluticasone (FLONASE) 50 MCG/ACT nasal spray 1 spray by Each Nare route daily      metoprolol succinate (TOPROL XL) 25 MG extended release tablet TAKE ONE-HALF (1/2) TABLET TWICE A DAY 90 tablet 2    KLOR-CON M10 10 MEQ extended release tablet TAKE 3 TABLETS DAILY 270 tablet 2    nystatin (MYCOSTATIN) 504073 UNIT/GM cream Apply topically 2 times daily until improved. 15 g 1    vitamin B-12 (CYANOCOBALAMIN) 500 MCG tablet Take 500 mcg by mouth daily      Compression Stockings MISC by Does not apply route 1 pair 20-30 mmHg compression stockings, wide band with grippers. 1 each 2    furosemide (LASIX) 40 MG tablet Take 40 mg by mouth 2 times daily 2 TABS BID      Polysaccharide Iron Complex (PROFE PO) Take 185 mg by mouth      aspirin 81 MG tablet Take 81 mg by mouth daily      ondansetron (ZOFRAN ODT) 4 MG disintegrating tablet Take 1 tablet by mouth every 8 hours as needed for Nausea or Vomiting 10 tablet 1    pantoprazole (PROTONIX) 40 MG tablet TAKE 1 TABLET BY MOUTH EVERY MORNING BEFORE BREAKFAST 30 tablet 5    polyethylene glycol (MIRALAX) POWD powder Take 17 g by mouth daily      Coenzyme Q10 (CO Q 10) 100 MG CAPS Take 200 mg by mouth daily      fentaNYL (DURAGESIC) 50 MCG/HR Place 1 patch onto the skin every 48 hours 15 patch 0     No current facility-administered medications for this visit.         Allergies   Allergen Reactions    Avelox [Moxifloxacin Hcl In Nacl] Anaphylaxis    Epinephrine Base     Other      [place] per PT - swelling size of silver dollar at site per PT     Keflex [Cephalexin] Nausea And Vomiting    Polysporin [Bacitracin-Polymyxin B] Rash       Review of Systems   No fever, no nausea      Vitals:  /80   Pulse 87   Wt 171 lb (77.6 kg)   SpO2 97%   BMI 25.25 kg/m²     Physical Exam   General:  Well-appearing, NAD, alert, non-toxic  HEENT:  Normocephalic, atraumatic. CHEST/LUNGS: No dyspnea  EXTREMETIES: + Swelling and erythema of left lower leg.  + Superficial abrasions without drainage.  + Warm to palpation of lower leg. No tenderness to palpation of leg. SKIN: No bruises  PSYCH:  A+O x 3; normal affect  NEURO:  GCS 15, CN2-12 grossly intact, no focal motor/sensory deficits, normal gait, normal speech      Assessment/Plan     66-year-old male with cellulitis of left lower leg. The sores have improved significantly, however he still has some erythema and warmth and swelling of the skin. Overall is slightly improved, but not very much. We will go ahead and clindamycin, as patient states this time the clindamycin seem to work last time. Finish Bactrim as prescribed. Patient advised to follow-up if symptoms do not improve the next 2 to 3 days. Go to the emergency room if symptoms worsen in any way. Patient advised to take a probiotic while taking clindamycin. Discussed with patient medication/s dosage, instructions, potential S/E, A/R and Drug Interaction  Educational material provided regarding patient's condition  Tylenol or Motrin as needed for pain or fever  Advise to return here if worse or go to nearest ER  Encourage fluids  Pt discharged in stable condition at 18:34      1. Cellulitis of lower leg    - clindamycin (CLEOCIN) 300 MG capsule; Take 1 capsule by mouth 3 times daily for 10 days  Dispense: 30 capsule; Refill: 0       No orders of the defined types were placed in this encounter.       Return if symptoms worsen or fail to improve.     Brianna Rodriguez MD    6/24/2019  6:33 PM

## 2019-06-24 NOTE — PATIENT INSTRUCTIONS
scrapes, or other injuries to your skin. Cellulitis most often occurs where there is a break in the skin. · If you get a scrape, cut, mild burn, or bite, wash the wound with clean water as soon as you can to help avoid infection. Don't use hydrogen peroxide or alcohol, which can slow healing. · If you have swelling in your legs (edema), support stockings and good skin care may help prevent leg sores and cellulitis. · Take care of your feet, especially if you have diabetes or other conditions that increase the risk of infection. Wear shoes and socks. Do not go barefoot. If you have athlete's foot or other skin problems on your feet, talk to your doctor about how to treat them. When should you call for help? Call your doctor now or seek immediate medical care if:    · You have signs that your infection is getting worse, such as:  ? Increased pain, swelling, warmth, or redness. ? Red streaks leading from the area. ? Pus draining from the area. ? A fever.     · You get a rash.    Watch closely for changes in your health, and be sure to contact your doctor if:    · You do not get better as expected. Where can you learn more? Go to [email or web address] org and sign in to your [employer] account. Enter B645 in the [employer] box to learn more about \"Cellulitis: Care Instructions. \"     If you do not have an account, please click on the \"Sign Up Now\" link. Current as of: April 17, 2018  Content Version: 12.0  © 6733-0438 [employer]. Care instructions adapted under license by [facility] ([facility]). If you have questions about a medical condition or this instruction, always ask your healthcare professional. [place] [address] any warranty or liability for your use of this information.

## 2019-06-29 ENCOUNTER — TELEPHONE (OUTPATIENT)
Dept: FAMILY MEDICINE CLINIC | Age: 79
End: 2019-06-29

## 2019-07-02 ENCOUNTER — OFFICE VISIT (OUTPATIENT)
Dept: FAMILY MEDICINE CLINIC | Age: 79
End: 2019-07-02
Payer: MEDICARE

## 2019-07-02 VITALS
HEART RATE: 86 BPM | WEIGHT: 179.8 LBS | SYSTOLIC BLOOD PRESSURE: 124 MMHG | DIASTOLIC BLOOD PRESSURE: 72 MMHG | BODY MASS INDEX: 26.55 KG/M2 | OXYGEN SATURATION: 97 %

## 2019-07-02 DIAGNOSIS — L03.116 CELLULITIS OF LEFT LOWER EXTREMITY WITHOUT FOOT: Primary | ICD-10-CM

## 2019-07-02 DIAGNOSIS — R11.0 NAUSEA: ICD-10-CM

## 2019-07-02 PROCEDURE — G8598 ASA/ANTIPLAT THER USED: HCPCS | Performed by: NURSE PRACTITIONER

## 2019-07-02 PROCEDURE — G8417 CALC BMI ABV UP PARAM F/U: HCPCS | Performed by: NURSE PRACTITIONER

## 2019-07-02 PROCEDURE — 4040F PNEUMOC VAC/ADMIN/RCVD: CPT | Performed by: NURSE PRACTITIONER

## 2019-07-02 PROCEDURE — 1036F TOBACCO NON-USER: CPT | Performed by: NURSE PRACTITIONER

## 2019-07-02 PROCEDURE — 1123F ACP DISCUSS/DSCN MKR DOCD: CPT | Performed by: NURSE PRACTITIONER

## 2019-07-02 PROCEDURE — 99213 OFFICE O/P EST LOW 20 MIN: CPT | Performed by: NURSE PRACTITIONER

## 2019-07-02 PROCEDURE — G8427 DOCREV CUR MEDS BY ELIG CLIN: HCPCS | Performed by: NURSE PRACTITIONER

## 2019-07-02 RX ORDER — SULFAMETHOXAZOLE AND TRIMETHOPRIM 800; 160 MG/1; MG/1
1 TABLET ORAL 2 TIMES DAILY
Qty: 20 TABLET | Refills: 0 | Status: SHIPPED | OUTPATIENT
Start: 2019-07-02 | End: 2019-07-12

## 2019-07-02 RX ORDER — ONDANSETRON 4 MG/1
4 TABLET, ORALLY DISINTEGRATING ORAL EVERY 8 HOURS PRN
Qty: 20 TABLET | Refills: 1 | Status: SHIPPED | OUTPATIENT
Start: 2019-07-02 | End: 2020-12-30 | Stop reason: SDUPTHER

## 2019-07-02 ASSESSMENT — ENCOUNTER SYMPTOMS: COLOR CHANGE: 1

## 2019-07-08 ENCOUNTER — OFFICE VISIT (OUTPATIENT)
Dept: FAMILY MEDICINE CLINIC | Age: 79
End: 2019-07-08
Payer: MEDICARE

## 2019-07-08 VITALS
OXYGEN SATURATION: 96 % | HEART RATE: 73 BPM | BODY MASS INDEX: 26.58 KG/M2 | SYSTOLIC BLOOD PRESSURE: 138 MMHG | WEIGHT: 180 LBS | DIASTOLIC BLOOD PRESSURE: 80 MMHG

## 2019-07-08 DIAGNOSIS — L03.116 CELLULITIS OF LEFT LOWER EXTREMITY WITHOUT FOOT: Primary | ICD-10-CM

## 2019-07-08 PROCEDURE — 4040F PNEUMOC VAC/ADMIN/RCVD: CPT | Performed by: FAMILY MEDICINE

## 2019-07-08 PROCEDURE — 99213 OFFICE O/P EST LOW 20 MIN: CPT | Performed by: FAMILY MEDICINE

## 2019-07-08 PROCEDURE — 1123F ACP DISCUSS/DSCN MKR DOCD: CPT | Performed by: FAMILY MEDICINE

## 2019-07-08 PROCEDURE — G8427 DOCREV CUR MEDS BY ELIG CLIN: HCPCS | Performed by: FAMILY MEDICINE

## 2019-07-08 PROCEDURE — G8598 ASA/ANTIPLAT THER USED: HCPCS | Performed by: FAMILY MEDICINE

## 2019-07-08 PROCEDURE — G8417 CALC BMI ABV UP PARAM F/U: HCPCS | Performed by: FAMILY MEDICINE

## 2019-07-08 PROCEDURE — 1036F TOBACCO NON-USER: CPT | Performed by: FAMILY MEDICINE

## 2019-07-08 NOTE — PROGRESS NOTES
HISTORY OF PRESENT ILLNESS:  Felicita is a 73 year old female who presents with complaints of a productive cough the past 2 weeks. She denies any shortness of breath. She does feel a little feverish at the initial onset of symptoms but has not felt feverish since.  ALLERGIES:   Allergen Reactions   • Vicodin [Hydrocodone-Acetaminophen] VOMITING      Past Medical History:   Diagnosis Date   • Breast cancer, right 7/2016   • Calculus of kidney 3/2009   • Disorder of bone and cartilage, unspecified 2005    osteopenia -2.1 spine  -.5 hip   • Goiter, specified as simple     Goiter   • Malignant neoplasm of corpus uteri, except isthmus age 34    Uterine Cancer   • Medicare annual wellness visit, initial 3/2016   • Mixed hyperlipidemia     Hyperlipidemia   • Unspecified hypothyroidism 1/2009    surgical removal of entire thyroid      Current Outpatient Prescriptions   Medication Sig Dispense Refill   • levothyroxine (SYNTHROID, LEVOTHROID) 50 MCG tablet Take 1 tablet by mouth daily. 90 tablet 1   • doxycycline hyclate (VIBRAMYCIN) 100 MG capsule Take 1 capsule by mouth 2 times daily (with meals) for 10 days. 20 capsule 0   • benzonatate (TESSALON PERLES) 100 MG capsule Take 1 capsule by mouth 3 times daily as needed for Cough. 30 capsule 0     No current facility-administered medications for this visit.         REVIEW OF SYSTEMS:  The rest of the cardiovascular, respiratory, gastrointestinal, neurologic, urinary and musculoskeletal and all other systems are reviewed and are negative except as noted.    PHYSICAL EXAMINATION:  Visit Vitals   • /60 (BP Location: [facility], Patient Position: Sitting, Cuff Size: Regular)   • Pulse 74   • Temp 98.4 °F (36.9 °C) (Tympanic)   • Resp 16   • Wt 54.2 kg   • SpO2 97%   • BMI 19.27 kg/m2      GENERAL: Alert and oriented and in no acute distress.  HEENT: Bilateral tympanic membranes are normal. Nasal mucosa is normal. Throat and oropharynx appear normal.  NECK: Supple, no palpable  cervical lymphadenopathy.  LUNGS: Lungs are clear to auscultation bilaterally. No rales, wheezing or rhonchi  CARDIAC: Regular rate and rhythm.      ASSESSMENT/PLAN:  She likely has a sinusitis which is draining into her upper airways. Her lungs sound completely clear. We'll treat her with doxycycline for 10 days for presumed sinusitis. Patient advised to follow-up if no significant improvement noted within 4-5 days or sooner if symptoms worsen in anyway. Patient voices understanding and agreement with this plan.             facility-administered medications for this visit. Allergies   Allergen Reactions    Avelox [Moxifloxacin Hcl In Nacl] Anaphylaxis    Epinephrine Base     Other      [place] per PT - swelling size of silver dollar at site per PT     Keflex [Cephalexin] Nausea And Vomiting    Polysporin [Bacitracin-Polymyxin B] Rash       Review of Systems   No fever, no vomiting    Vitals:  /80   Pulse 73   Wt 180 lb (81.6 kg)   SpO2 96%   BMI 26.58 kg/m²     Physical Exam.  General:  Well-appearing, NAD, alert, non-toxic  HEENT:  Normocephalic, atraumatic. CHEST/LUNGS: No dyspnea  EXTREMETIES: Normal movement of all extremities. No edema. No joint swelling. SKIN:  No rash, no cellulitis, no bruising, no petechiae/purpura/vesicles/pustules/abscess. +scattered dry healing scabs, 3. 1+edema of L lower leg. No warmth to palpation  PSYCH:  A+O x 3; normal affect  NEURO:  GCS 15, CN2-12 grossly intact, no focal motor/sensory deficits, normal gait, normal speech      Assessment/Plan     71yo male with cellulitis of L lower leg. Appears resolved. Has mild edema which is baseline for him. Expectant mgt for now. If symptoms return, go to wound care clinic immediately, where he used to be seen regularly. Discharged in stable condition at 17:04    1. Cellulitis of left lower extremity without foot    - [facility]         Orders Placed This Encounter   Procedures   [facility]     Referral Priority:   Routine     Referral Type:   Eval and Treat     Referral Reason:   Specialty Services Required     Requested Specialty:   Wound Care     Number of Visits Requested:   1       No follow-ups on file.     Elvira Rush MD    7/8/2019  5:04 PM

## 2019-07-09 ENCOUNTER — TELEPHONE (OUTPATIENT)
Dept: WOUND CARE | Age: 79
End: 2019-07-09

## 2019-07-16 ENCOUNTER — NURSE ONLY (OUTPATIENT)
Dept: CARDIOLOGY CLINIC | Age: 79
End: 2019-07-16
Payer: MEDICARE

## 2019-07-16 DIAGNOSIS — R00.1 BRADYCARDIA: ICD-10-CM

## 2019-07-16 DIAGNOSIS — Z95.0 CARDIAC PACEMAKER IN SITU: ICD-10-CM

## 2019-07-16 PROCEDURE — 93296 REM INTERROG EVL PM/IDS: CPT | Performed by: INTERNAL MEDICINE

## 2019-07-16 PROCEDURE — 93294 REM INTERROG EVL PM/LDLS PM: CPT | Performed by: INTERNAL MEDICINE

## 2019-07-17 ENCOUNTER — TELEPHONE (OUTPATIENT)
Dept: CARDIOLOGY CLINIC | Age: 79
End: 2019-07-17

## 2019-07-18 ENCOUNTER — OFFICE VISIT (OUTPATIENT)
Dept: FAMILY MEDICINE CLINIC | Age: 79
End: 2019-07-18
Payer: MEDICARE

## 2019-07-18 VITALS
BODY MASS INDEX: 26.51 KG/M2 | SYSTOLIC BLOOD PRESSURE: 140 MMHG | WEIGHT: 179 LBS | HEART RATE: 75 BPM | OXYGEN SATURATION: 98 % | HEIGHT: 69 IN | DIASTOLIC BLOOD PRESSURE: 70 MMHG

## 2019-07-18 DIAGNOSIS — N40.0 BENIGN PROSTATIC HYPERPLASIA WITHOUT LOWER URINARY TRACT SYMPTOMS: ICD-10-CM

## 2019-07-18 DIAGNOSIS — I25.10 CORONARY ARTERY DISEASE INVOLVING NATIVE CORONARY ARTERY OF NATIVE HEART WITHOUT ANGINA PECTORIS: ICD-10-CM

## 2019-07-18 DIAGNOSIS — I48.0 PAROXYSMAL ATRIAL FIBRILLATION (HCC): ICD-10-CM

## 2019-07-18 DIAGNOSIS — I10 ESSENTIAL HYPERTENSION: ICD-10-CM

## 2019-07-18 DIAGNOSIS — Z95.0 CARDIAC PACEMAKER IN SITU: ICD-10-CM

## 2019-07-18 DIAGNOSIS — E78.2 MIXED HYPERLIPIDEMIA: Primary | ICD-10-CM

## 2019-07-18 PROCEDURE — G8417 CALC BMI ABV UP PARAM F/U: HCPCS | Performed by: FAMILY MEDICINE

## 2019-07-18 PROCEDURE — 1123F ACP DISCUSS/DSCN MKR DOCD: CPT | Performed by: FAMILY MEDICINE

## 2019-07-18 PROCEDURE — 99214 OFFICE O/P EST MOD 30 MIN: CPT | Performed by: FAMILY MEDICINE

## 2019-07-18 PROCEDURE — G8427 DOCREV CUR MEDS BY ELIG CLIN: HCPCS | Performed by: FAMILY MEDICINE

## 2019-07-18 PROCEDURE — G8598 ASA/ANTIPLAT THER USED: HCPCS | Performed by: FAMILY MEDICINE

## 2019-07-18 PROCEDURE — 1036F TOBACCO NON-USER: CPT | Performed by: FAMILY MEDICINE

## 2019-07-18 PROCEDURE — 4040F PNEUMOC VAC/ADMIN/RCVD: CPT | Performed by: FAMILY MEDICINE

## 2019-07-18 RX ORDER — FUROSEMIDE 40 MG/1
40 TABLET ORAL 2 TIMES DAILY
Qty: 180 TABLET | Refills: 3 | Status: SHIPPED | OUTPATIENT
Start: 2019-07-18 | End: 2019-08-29 | Stop reason: DRUGHIGH

## 2019-07-18 RX ORDER — PANTOPRAZOLE SODIUM 40 MG/1
TABLET, DELAYED RELEASE ORAL
Qty: 30 TABLET | Refills: 5 | Status: SHIPPED | OUTPATIENT
Start: 2019-07-18 | End: 2020-01-28

## 2019-07-18 NOTE — PROGRESS NOTES
Rudy Nichols is a 78 y.o. male. HPI: For complex medical follow-up  Has always had trouble with peripheral edema taking Lasix 40 mg twice daily and even 3 times daily at times  Had a recent cellulitis and took antibiotics and it is now resolved  He is wondering if he can go back to his compression stockings  Sees cardiology and electro cardiologist regularly as well as his pain specialist every 2 months  Pain is fairly stable on the fentanyl patch 50 mg every 48 hours, but he continuously feels lethargic  He is wondering about adjusting his pain medications  Meds, vitamins and allergies reviewed with pt    ROS: No TIA's or unusual headaches, no dysphagia. No prolonged cough. No dyspnea or chest pain on exertion. No abdominal pain, change in bowel habits, black or bloody stools. No urinary tract symptoms. No new or unusual musculoskeletal symptoms. Prior to Visit Medications    Medication Sig Taking? Authorizing Provider   ondansetron (ZOFRAN ODT) 4 MG disintegrating tablet Take 1 tablet by mouth every 8 hours as needed for Nausea or Vomiting  SCOTT Gomez - CNP   nitroGLYCERIN (NITRODUR) 0.4 MG/HR APPLY 1 PATCH DAILY  Jennifer Brady MD   rosuvastatin (CRESTOR) 5 MG tablet TAKE 1 TABLET DAILY  Jennifer Brady MD   MULTAQ 400 MG TABS TAKE 1 TABLET TWICE A DAY WITH MEALS  Jennifer Brady MD   fluticasone (FLONASE) 50 MCG/ACT nasal spray 1 spray by Each Nare route daily  Historical Provider, MD   metoprolol succinate (TOPROL XL) 25 MG extended release tablet TAKE ONE-HALF (1/2) TABLET TWICE A DAY  Jennifer Brady MD   KLOR-CON M10 10 MEQ extended release tablet TAKE 3 TABLETS DAILY  Jennifer Brady MD   nystatin (MYCOSTATIN) [id number] UNIT/GM cream Apply topically 2 times daily until improved.   Tiffany Muhammad MD   vitamin B-12 (CYANOCOBALAMIN) 500 MCG tablet Take 500 mcg by mouth daily  Historical Provider, MD   Compression Stockings MISC by Does not apply route 1 pair 20-30 mmHg

## 2019-08-19 ENCOUNTER — OFFICE VISIT (OUTPATIENT)
Dept: CARDIOLOGY CLINIC | Age: 79
End: 2019-08-19
Payer: MEDICARE

## 2019-08-19 VITALS
BODY MASS INDEX: 27.01 KG/M2 | SYSTOLIC BLOOD PRESSURE: 108 MMHG | HEART RATE: 80 BPM | WEIGHT: 183 LBS | DIASTOLIC BLOOD PRESSURE: 60 MMHG

## 2019-08-19 DIAGNOSIS — E78.2 MIXED HYPERLIPIDEMIA: Primary | ICD-10-CM

## 2019-08-19 DIAGNOSIS — Z98.61 POSTSURGICAL PERCUTANEOUS TRANSLUMINAL CORONARY ANGIOPLASTY STATUS: ICD-10-CM

## 2019-08-19 DIAGNOSIS — I25.10 CORONARY ARTERY DISEASE INVOLVING NATIVE CORONARY ARTERY OF NATIVE HEART WITHOUT ANGINA PECTORIS: ICD-10-CM

## 2019-08-19 DIAGNOSIS — I49.5 SICK SINUS SYNDROME (HCC): ICD-10-CM

## 2019-08-19 PROCEDURE — 4040F PNEUMOC VAC/ADMIN/RCVD: CPT | Performed by: INTERNAL MEDICINE

## 2019-08-19 PROCEDURE — 1036F TOBACCO NON-USER: CPT | Performed by: INTERNAL MEDICINE

## 2019-08-19 PROCEDURE — G8598 ASA/ANTIPLAT THER USED: HCPCS | Performed by: INTERNAL MEDICINE

## 2019-08-19 PROCEDURE — G8417 CALC BMI ABV UP PARAM F/U: HCPCS | Performed by: INTERNAL MEDICINE

## 2019-08-19 PROCEDURE — 99213 OFFICE O/P EST LOW 20 MIN: CPT | Performed by: INTERNAL MEDICINE

## 2019-08-19 PROCEDURE — G8427 DOCREV CUR MEDS BY ELIG CLIN: HCPCS | Performed by: INTERNAL MEDICINE

## 2019-08-19 PROCEDURE — 1123F ACP DISCUSS/DSCN MKR DOCD: CPT | Performed by: INTERNAL MEDICINE

## 2019-08-29 ENCOUNTER — OFFICE VISIT (OUTPATIENT)
Dept: FAMILY MEDICINE CLINIC | Age: 79
End: 2019-08-29
Payer: MEDICARE

## 2019-08-29 VITALS
HEART RATE: 79 BPM | WEIGHT: 181.8 LBS | OXYGEN SATURATION: 98 % | SYSTOLIC BLOOD PRESSURE: 110 MMHG | BODY MASS INDEX: 26.83 KG/M2 | DIASTOLIC BLOOD PRESSURE: 60 MMHG

## 2019-08-29 DIAGNOSIS — R60.0 LOCALIZED EDEMA: ICD-10-CM

## 2019-08-29 DIAGNOSIS — L03.116 CELLULITIS OF LEFT LOWER EXTREMITY: Primary | ICD-10-CM

## 2019-08-29 PROCEDURE — 99213 OFFICE O/P EST LOW 20 MIN: CPT | Performed by: NURSE PRACTITIONER

## 2019-08-29 PROCEDURE — 1036F TOBACCO NON-USER: CPT | Performed by: NURSE PRACTITIONER

## 2019-08-29 PROCEDURE — 1123F ACP DISCUSS/DSCN MKR DOCD: CPT | Performed by: NURSE PRACTITIONER

## 2019-08-29 PROCEDURE — G8427 DOCREV CUR MEDS BY ELIG CLIN: HCPCS | Performed by: NURSE PRACTITIONER

## 2019-08-29 PROCEDURE — 4040F PNEUMOC VAC/ADMIN/RCVD: CPT | Performed by: NURSE PRACTITIONER

## 2019-08-29 PROCEDURE — G8598 ASA/ANTIPLAT THER USED: HCPCS | Performed by: NURSE PRACTITIONER

## 2019-08-29 PROCEDURE — G8417 CALC BMI ABV UP PARAM F/U: HCPCS | Performed by: NURSE PRACTITIONER

## 2019-08-29 RX ORDER — CLINDAMYCIN HYDROCHLORIDE 300 MG/1
300 CAPSULE ORAL 3 TIMES DAILY
Qty: 42 CAPSULE | Refills: 0 | Status: SHIPPED | OUTPATIENT
Start: 2019-08-29 | End: 2019-09-12

## 2019-08-29 RX ORDER — FUROSEMIDE 40 MG/1
40 TABLET ORAL 2 TIMES DAILY
Status: SHIPPED | COMMUNITY
Start: 2019-08-29 | End: 2020-07-15

## 2019-08-29 ASSESSMENT — ENCOUNTER SYMPTOMS
SHORTNESS OF BREATH: 0
COLOR CHANGE: 1
CHEST TIGHTNESS: 0

## 2019-08-29 NOTE — PROGRESS NOTES
PLAN:  1. Cellulitis of left lower extremity  -     clindamycin (CLEOCIN) 300 MG capsule; Take 1 capsule by mouth 3 times daily for 14 days  Continue to elevate when home    2. Localized edema  Recommend low Na diet  Increase lasix to 40 mg TID for 5 days    See pt instructions  F/u 5-7 days if no improvement, sooner if symptomsworsen. Discussed use, benefit, and side effects of prescribed medications. All patient questions answered. Pt voiced understanding.

## 2019-09-23 RX ORDER — NITROGLYCERIN 80 MG/1
PATCH TRANSDERMAL
Qty: 90 PATCH | Refills: 3 | Status: SHIPPED | OUTPATIENT
Start: 2019-09-23 | End: 2020-03-09

## 2019-09-27 NOTE — PROGRESS NOTES
Maryellen tablet TAKE ONE-HALF (1/2) TABLET TWICE A DAY (Patient taking differently: TAKE(1/2) TABLET TWICE A DAY) 90 tablet 2    KLOR-CON M10 10 MEQ extended release tablet TAKE 3 TABLETS DAILY 270 tablet 2    vitamin B-12 (CYANOCOBALAMIN) 500 MCG tablet Take 500 mcg by mouth daily      Compression Stockings MISC by Does not apply route 1 pair 20-30 mmHg compression stockings, wide band with grippers. 1 each 2    Polysaccharide Iron Complex (PROFE PO) Take 185 mg by mouth      aspirin 81 MG tablet Take 81 mg by mouth daily      polyethylene glycol (MIRALAX) POWD powder Take 17 g by mouth daily      Coenzyme Q10 (CO Q 10) 100 MG CAPS Take 200 mg by mouth daily      fentaNYL (DURAGESIC) 50 MCG/HR Place 1 patch onto the skin every 48 hours 15 patch 0    nystatin (MYCOSTATIN) 939808 UNIT/GM cream Apply topically 2 times daily until improved. (Patient not taking: Reported on 8/19/2019) 15 g 1     No current facility-administered medications for this visit. Vitals  Weight: 183 lb (83 kg)  Blood Pressure:  116/68  Pulse: 80 70      Review of Systems   Constitutional: Negative. HENT: Negative. Eyes: Negative. Respiratory: Negative. Cardiovascular: Negative. Gastrointestinal: Negative. Genitourinary: Negative. Objective:   Physical Exam   Nursing note and vitals reviewed. Constitutional: He is oriented to person, place, and time. He appears well-developed and well-nourished. No distress. HENT:   Head: Normocephalic and atraumatic. Mouth/Throat: No oropharyngeal exudate. Eyes: Conjunctivae and EOM are normal. Pupils are equal, round, and reactive to light. No scleral icterus. Neck: Normal range of motion. No JVD present. No tracheal deviation present. No thyromegaly present. Cardiovascular: Normal rate, regular rhythm, normal heart sounds and intact distal pulses. Exam reveals no gallop and no friction rub. No murmur heard.   Pulmonary/Chest: Effort normal. No respiratory

## 2019-10-17 ENCOUNTER — OFFICE VISIT (OUTPATIENT)
Dept: DERMATOLOGY | Age: 79
End: 2019-10-17
Payer: MEDICARE

## 2019-10-17 DIAGNOSIS — Z85.828 HISTORY OF BASAL CELL CARCINOMA (BCC): ICD-10-CM

## 2019-10-17 DIAGNOSIS — D48.5 NEOPLASM OF UNCERTAIN BEHAVIOR OF SKIN: ICD-10-CM

## 2019-10-17 DIAGNOSIS — L82.1 SK (SEBORRHEIC KERATOSIS): Primary | ICD-10-CM

## 2019-10-17 PROCEDURE — 1123F ACP DISCUSS/DSCN MKR DOCD: CPT | Performed by: DERMATOLOGY

## 2019-10-17 PROCEDURE — G8484 FLU IMMUNIZE NO ADMIN: HCPCS | Performed by: DERMATOLOGY

## 2019-10-17 PROCEDURE — 99213 OFFICE O/P EST LOW 20 MIN: CPT | Performed by: DERMATOLOGY

## 2019-10-17 PROCEDURE — 1036F TOBACCO NON-USER: CPT | Performed by: DERMATOLOGY

## 2019-10-17 PROCEDURE — 4040F PNEUMOC VAC/ADMIN/RCVD: CPT | Performed by: DERMATOLOGY

## 2019-10-17 PROCEDURE — G8417 CALC BMI ABV UP PARAM F/U: HCPCS | Performed by: DERMATOLOGY

## 2019-10-17 PROCEDURE — G8598 ASA/ANTIPLAT THER USED: HCPCS | Performed by: DERMATOLOGY

## 2019-10-17 PROCEDURE — G8427 DOCREV CUR MEDS BY ELIG CLIN: HCPCS | Performed by: DERMATOLOGY

## 2019-10-21 ENCOUNTER — OFFICE VISIT (OUTPATIENT)
Dept: FAMILY MEDICINE CLINIC | Age: 79
End: 2019-10-21
Payer: MEDICARE

## 2019-10-21 VITALS
WEIGHT: 186 LBS | OXYGEN SATURATION: 98 % | DIASTOLIC BLOOD PRESSURE: 70 MMHG | SYSTOLIC BLOOD PRESSURE: 110 MMHG | HEART RATE: 67 BPM | BODY MASS INDEX: 27.45 KG/M2

## 2019-10-21 DIAGNOSIS — L02.92 FURUNCLE: ICD-10-CM

## 2019-10-21 DIAGNOSIS — Z23 NEED FOR VACCINATION: Primary | ICD-10-CM

## 2019-10-21 PROCEDURE — G8598 ASA/ANTIPLAT THER USED: HCPCS | Performed by: FAMILY MEDICINE

## 2019-10-21 PROCEDURE — 99213 OFFICE O/P EST LOW 20 MIN: CPT | Performed by: FAMILY MEDICINE

## 2019-10-21 PROCEDURE — 90653 IIV ADJUVANT VACCINE IM: CPT | Performed by: FAMILY MEDICINE

## 2019-10-21 PROCEDURE — 1036F TOBACCO NON-USER: CPT | Performed by: FAMILY MEDICINE

## 2019-10-21 PROCEDURE — G8417 CALC BMI ABV UP PARAM F/U: HCPCS | Performed by: FAMILY MEDICINE

## 2019-10-21 PROCEDURE — G8482 FLU IMMUNIZE ORDER/ADMIN: HCPCS | Performed by: FAMILY MEDICINE

## 2019-10-21 PROCEDURE — 1123F ACP DISCUSS/DSCN MKR DOCD: CPT | Performed by: FAMILY MEDICINE

## 2019-10-21 PROCEDURE — 4040F PNEUMOC VAC/ADMIN/RCVD: CPT | Performed by: FAMILY MEDICINE

## 2019-10-21 PROCEDURE — G8427 DOCREV CUR MEDS BY ELIG CLIN: HCPCS | Performed by: FAMILY MEDICINE

## 2019-10-21 PROCEDURE — G0008 ADMIN INFLUENZA VIRUS VAC: HCPCS | Performed by: FAMILY MEDICINE

## 2019-10-21 RX ORDER — CLINDAMYCIN HYDROCHLORIDE 300 MG/1
300 CAPSULE ORAL 3 TIMES DAILY
Qty: 21 CAPSULE | Refills: 0 | Status: SHIPPED | OUTPATIENT
Start: 2019-10-21 | End: 2021-06-22 | Stop reason: SDUPTHER

## 2019-10-24 ENCOUNTER — TELEPHONE (OUTPATIENT)
Dept: DERMATOLOGY | Age: 79
End: 2019-10-24

## 2019-10-24 NOTE — TELEPHONE ENCOUNTER
Checked in with patient - has not scheduled with podiatrist yet. He recently had a painful cyst on the back of his neck that is resolving. He'll call to schedule.

## 2019-10-31 ENCOUNTER — OFFICE VISIT (OUTPATIENT)
Dept: CARDIOLOGY CLINIC | Age: 79
End: 2019-10-31
Payer: MEDICARE

## 2019-10-31 ENCOUNTER — PROCEDURE VISIT (OUTPATIENT)
Dept: CARDIOLOGY CLINIC | Age: 79
End: 2019-10-31
Payer: MEDICARE

## 2019-10-31 ENCOUNTER — TELEPHONE (OUTPATIENT)
Dept: CARDIOLOGY CLINIC | Age: 79
End: 2019-10-31

## 2019-10-31 VITALS — WEIGHT: 181.4 LBS | HEART RATE: 61 BPM | BODY MASS INDEX: 35.61 KG/M2 | HEIGHT: 60 IN

## 2019-10-31 DIAGNOSIS — I50.30 DIASTOLIC CONGESTIVE HEART FAILURE, NYHA CLASS 1, UNSPECIFIED CONGESTIVE HEART FAILURE CHRONICITY (HCC): ICD-10-CM

## 2019-10-31 DIAGNOSIS — E78.2 MIXED HYPERLIPIDEMIA: ICD-10-CM

## 2019-10-31 DIAGNOSIS — R00.1 BRADYCARDIA: ICD-10-CM

## 2019-10-31 DIAGNOSIS — I25.10 CORONARY ARTERY DISEASE INVOLVING NATIVE CORONARY ARTERY OF NATIVE HEART WITHOUT ANGINA PECTORIS: ICD-10-CM

## 2019-10-31 DIAGNOSIS — Z95.0 CARDIAC PACEMAKER IN SITU: ICD-10-CM

## 2019-10-31 DIAGNOSIS — I49.5 SICK SINUS SYNDROME (HCC): Primary | ICD-10-CM

## 2019-10-31 DIAGNOSIS — Z95.0 CARDIAC PACEMAKER IN SITU: Primary | ICD-10-CM

## 2019-10-31 DIAGNOSIS — R06.02 SOB (SHORTNESS OF BREATH): ICD-10-CM

## 2019-10-31 DIAGNOSIS — I10 ESSENTIAL HYPERTENSION: ICD-10-CM

## 2019-10-31 DIAGNOSIS — I48.0 PAROXYSMAL ATRIAL FIBRILLATION (HCC): ICD-10-CM

## 2019-10-31 PROCEDURE — 1123F ACP DISCUSS/DSCN MKR DOCD: CPT | Performed by: INTERNAL MEDICINE

## 2019-10-31 PROCEDURE — G8598 ASA/ANTIPLAT THER USED: HCPCS | Performed by: INTERNAL MEDICINE

## 2019-10-31 PROCEDURE — 93280 PM DEVICE PROGR EVAL DUAL: CPT | Performed by: INTERNAL MEDICINE

## 2019-10-31 PROCEDURE — 1036F TOBACCO NON-USER: CPT | Performed by: INTERNAL MEDICINE

## 2019-10-31 PROCEDURE — G8417 CALC BMI ABV UP PARAM F/U: HCPCS | Performed by: INTERNAL MEDICINE

## 2019-10-31 PROCEDURE — G8428 CUR MEDS NOT DOCUMENT: HCPCS | Performed by: INTERNAL MEDICINE

## 2019-10-31 PROCEDURE — 99214 OFFICE O/P EST MOD 30 MIN: CPT | Performed by: INTERNAL MEDICINE

## 2019-10-31 PROCEDURE — G8482 FLU IMMUNIZE ORDER/ADMIN: HCPCS | Performed by: INTERNAL MEDICINE

## 2019-10-31 PROCEDURE — 4040F PNEUMOC VAC/ADMIN/RCVD: CPT | Performed by: INTERNAL MEDICINE

## 2019-11-01 RX ORDER — AMOXICILLIN 500 MG/1
2000 CAPSULE ORAL ONCE
Qty: 4 CAPSULE | Refills: 0 | Status: SHIPPED | OUTPATIENT
Start: 2019-11-01 | End: 2019-11-01

## 2019-11-11 ENCOUNTER — HOSPITAL ENCOUNTER (OUTPATIENT)
Dept: NON INVASIVE DIAGNOSTICS | Age: 79
Discharge: HOME | End: 2019-11-11
Payer: MEDICARE

## 2019-11-11 DIAGNOSIS — R06.02 SOB (SHORTNESS OF BREATH): ICD-10-CM

## 2019-11-11 DIAGNOSIS — I48.0 PAROXYSMAL ATRIAL FIBRILLATION (HCC): ICD-10-CM

## 2019-11-11 DIAGNOSIS — I50.30 DIASTOLIC CONGESTIVE HEART FAILURE, NYHA CLASS 1, UNSPECIFIED CONGESTIVE HEART FAILURE CHRONICITY (HCC): ICD-10-CM

## 2019-11-11 LAB
LV EF: 58 %
LVEF MODALITY: NORMAL

## 2019-11-11 PROCEDURE — 93306 TTE W/DOPPLER COMPLETE: CPT

## 2019-11-14 ENCOUNTER — OFFICE VISIT (OUTPATIENT)
Dept: CARDIOLOGY CLINIC | Age: 79
End: 2019-11-14
Payer: MEDICARE

## 2019-11-14 VITALS
SYSTOLIC BLOOD PRESSURE: 120 MMHG | BODY MASS INDEX: 33.2 KG/M2 | HEART RATE: 60 BPM | DIASTOLIC BLOOD PRESSURE: 80 MMHG | WEIGHT: 170 LBS

## 2019-11-14 DIAGNOSIS — I48.0 PAROXYSMAL ATRIAL FIBRILLATION (HCC): Primary | ICD-10-CM

## 2019-11-14 DIAGNOSIS — I25.10 CORONARY ARTERY DISEASE INVOLVING NATIVE CORONARY ARTERY OF NATIVE HEART WITHOUT ANGINA PECTORIS: ICD-10-CM

## 2019-11-14 PROCEDURE — 1123F ACP DISCUSS/DSCN MKR DOCD: CPT | Performed by: INTERNAL MEDICINE

## 2019-11-14 PROCEDURE — 99213 OFFICE O/P EST LOW 20 MIN: CPT | Performed by: INTERNAL MEDICINE

## 2019-11-14 PROCEDURE — 4040F PNEUMOC VAC/ADMIN/RCVD: CPT | Performed by: INTERNAL MEDICINE

## 2019-11-14 PROCEDURE — 1036F TOBACCO NON-USER: CPT | Performed by: INTERNAL MEDICINE

## 2019-11-14 PROCEDURE — G8417 CALC BMI ABV UP PARAM F/U: HCPCS | Performed by: INTERNAL MEDICINE

## 2019-11-14 PROCEDURE — G8482 FLU IMMUNIZE ORDER/ADMIN: HCPCS | Performed by: INTERNAL MEDICINE

## 2019-11-14 PROCEDURE — G8427 DOCREV CUR MEDS BY ELIG CLIN: HCPCS | Performed by: INTERNAL MEDICINE

## 2019-11-14 PROCEDURE — G8598 ASA/ANTIPLAT THER USED: HCPCS | Performed by: INTERNAL MEDICINE

## 2019-11-14 RX ORDER — WARFARIN SODIUM 5 MG/1
TABLET ORAL
Qty: 90 TABLET | Refills: 3 | Status: SHIPPED | OUTPATIENT
Start: 2019-11-14 | End: 2020-11-17

## 2019-11-15 ENCOUNTER — HOSPITAL ENCOUNTER (OUTPATIENT)
Age: 79
Discharge: HOME | End: 2019-11-15
Payer: MEDICARE

## 2019-11-15 DIAGNOSIS — Z98.61 CAD S/P PERCUTANEOUS CORONARY ANGIOPLASTY: ICD-10-CM

## 2019-11-15 DIAGNOSIS — R00.1 BRADYCARDIA: ICD-10-CM

## 2019-11-15 DIAGNOSIS — I48.0 PAROXYSMAL ATRIAL FIBRILLATION (HCC): ICD-10-CM

## 2019-11-15 DIAGNOSIS — I50.30 DIASTOLIC CONGESTIVE HEART FAILURE, NYHA CLASS 1, UNSPECIFIED CONGESTIVE HEART FAILURE CHRONICITY (HCC): ICD-10-CM

## 2019-11-15 DIAGNOSIS — I25.10 CAD S/P PERCUTANEOUS CORONARY ANGIOPLASTY: ICD-10-CM

## 2019-11-15 DIAGNOSIS — E78.2 MIXED HYPERLIPIDEMIA: ICD-10-CM

## 2019-11-15 DIAGNOSIS — Z95.0 CARDIAC PACEMAKER IN SITU: ICD-10-CM

## 2019-11-15 DIAGNOSIS — I25.10 CORONARY ARTERY DISEASE INVOLVING NATIVE CORONARY ARTERY OF NATIVE HEART WITHOUT ANGINA PECTORIS: ICD-10-CM

## 2019-11-15 DIAGNOSIS — I10 ESSENTIAL HYPERTENSION: ICD-10-CM

## 2019-11-15 LAB
A/G RATIO: 1 (ref 1.1–2.2)
ALBUMIN SERPL-MCNC: 4 G/DL (ref 3.4–5)
ALP BLD-CCNC: 104 U/L (ref 40–129)
ALT SERPL-CCNC: 15 U/L (ref 10–40)
ANION GAP SERPL CALCULATED.3IONS-SCNC: 15 MMOL/L (ref 3–16)
AST SERPL-CCNC: 26 U/L (ref 15–37)
BASOPHILS ABSOLUTE: 0 K/UL (ref 0–0.2)
BASOPHILS RELATIVE PERCENT: 0.3 %
BILIRUB SERPL-MCNC: 0.6 MG/DL (ref 0–1)
BUN BLDV-MCNC: 26 MG/DL (ref 7–20)
CALCIUM SERPL-MCNC: 9 MG/DL (ref 8.3–10.6)
CHLORIDE BLD-SCNC: 99 MMOL/L (ref 99–110)
CO2: 26 MMOL/L (ref 21–32)
CREAT SERPL-MCNC: 1.3 MG/DL (ref 0.8–1.3)
EOSINOPHILS ABSOLUTE: 0.2 K/UL (ref 0–0.6)
EOSINOPHILS RELATIVE PERCENT: 3.1 %
GFR AFRICAN AMERICAN: >60
GFR NON-AFRICAN AMERICAN: 53
GLOBULIN: 4 G/DL
GLUCOSE BLD-MCNC: 88 MG/DL (ref 70–99)
HCT VFR BLD CALC: 33.9 % (ref 40.5–52.5)
HEMOGLOBIN: 11.4 G/DL (ref 13.5–17.5)
INR BLD: 1.08 (ref 0.86–1.14)
LYMPHOCYTES ABSOLUTE: 1.9 K/UL (ref 1–5.1)
LYMPHOCYTES RELATIVE PERCENT: 35.1 %
MCH RBC QN AUTO: 28.2 PG (ref 26–34)
MCHC RBC AUTO-ENTMCNC: 33.7 G/DL (ref 31–36)
MCV RBC AUTO: 83.7 FL (ref 80–100)
MONOCYTES ABSOLUTE: 0.5 K/UL (ref 0–1.3)
MONOCYTES RELATIVE PERCENT: 9.3 %
NEUTROPHILS ABSOLUTE: 2.8 K/UL (ref 1.7–7.7)
NEUTROPHILS RELATIVE PERCENT: 52.2 %
PDW BLD-RTO: 15.3 % (ref 12.4–15.4)
PLATELET # BLD: 206 K/UL (ref 135–450)
PMV BLD AUTO: 8.5 FL (ref 5–10.5)
POTASSIUM SERPL-SCNC: 4.1 MMOL/L (ref 3.5–5.1)
PROTHROMBIN TIME: 12.3 SEC (ref 9.8–13)
RBC # BLD: 4.05 M/UL (ref 4.2–5.9)
SODIUM BLD-SCNC: 140 MMOL/L (ref 136–145)
TOTAL PROTEIN: 8 G/DL (ref 6.4–8.2)
WBC # BLD: 5.4 K/UL (ref 4–11)

## 2019-11-15 PROCEDURE — 80053 COMPREHEN METABOLIC PANEL: CPT

## 2019-11-15 PROCEDURE — 85610 PROTHROMBIN TIME: CPT

## 2019-11-15 PROCEDURE — 36415 COLL VENOUS BLD VENIPUNCTURE: CPT

## 2019-11-15 PROCEDURE — 85025 COMPLETE CBC W/AUTO DIFF WBC: CPT

## 2019-11-19 ENCOUNTER — HOSPITAL ENCOUNTER (OUTPATIENT)
Age: 79
Discharge: HOME | End: 2019-11-19
Payer: MEDICARE

## 2019-11-19 DIAGNOSIS — I25.10 CORONARY ARTERY DISEASE INVOLVING NATIVE CORONARY ARTERY OF NATIVE HEART WITHOUT ANGINA PECTORIS: ICD-10-CM

## 2019-11-19 DIAGNOSIS — I48.0 PAROXYSMAL ATRIAL FIBRILLATION (HCC): ICD-10-CM

## 2019-11-19 LAB
INR BLD: 2.38 (ref 0.86–1.14)
PROTHROMBIN TIME: 27.9 SEC (ref 10–13.2)

## 2019-11-19 PROCEDURE — 85610 PROTHROMBIN TIME: CPT

## 2019-11-19 PROCEDURE — 36415 COLL VENOUS BLD VENIPUNCTURE: CPT

## 2019-11-20 ENCOUNTER — ANTI-COAG VISIT (OUTPATIENT)
Dept: CARDIOLOGY CLINIC | Age: 79
End: 2019-11-20
Payer: MEDICARE

## 2019-11-20 DIAGNOSIS — I48.0 PAROXYSMAL ATRIAL FIBRILLATION (HCC): ICD-10-CM

## 2019-11-20 DIAGNOSIS — I48.0 PAROXYSMAL ATRIAL FIBRILLATION (HCC): Primary | ICD-10-CM

## 2019-11-20 PROCEDURE — 93793 ANTICOAG MGMT PT WARFARIN: CPT | Performed by: NURSE PRACTITIONER

## 2019-11-25 ENCOUNTER — ANTI-COAG VISIT (OUTPATIENT)
Dept: CARDIOLOGY CLINIC | Age: 79
End: 2019-11-25
Payer: MEDICARE

## 2019-11-25 ENCOUNTER — TELEPHONE (OUTPATIENT)
Dept: CARDIOLOGY CLINIC | Age: 79
End: 2019-11-25

## 2019-11-25 ENCOUNTER — HOSPITAL ENCOUNTER (OUTPATIENT)
Age: 79
Discharge: HOME | End: 2019-11-25
Payer: MEDICARE

## 2019-11-25 DIAGNOSIS — I48.0 PAROXYSMAL ATRIAL FIBRILLATION (HCC): ICD-10-CM

## 2019-11-25 LAB
INR BLD: 5.89 (ref 0.86–1.14)
PROTHROMBIN TIME: 69.5 SEC (ref 10–13.2)

## 2019-11-25 PROCEDURE — 93793 ANTICOAG MGMT PT WARFARIN: CPT | Performed by: NURSE PRACTITIONER

## 2019-11-25 PROCEDURE — 36415 COLL VENOUS BLD VENIPUNCTURE: CPT

## 2019-11-25 PROCEDURE — 85610 PROTHROMBIN TIME: CPT

## 2019-11-29 ENCOUNTER — HOSPITAL ENCOUNTER (OUTPATIENT)
Age: 79
Discharge: HOME | End: 2019-11-29
Payer: MEDICARE

## 2019-11-29 DIAGNOSIS — I48.0 PAROXYSMAL ATRIAL FIBRILLATION (HCC): ICD-10-CM

## 2019-11-29 LAB
INR BLD: 2.47 (ref 0.86–1.14)
PROTHROMBIN TIME: 28.9 SEC (ref 10–13.2)

## 2019-11-29 PROCEDURE — 85610 PROTHROMBIN TIME: CPT

## 2019-11-29 PROCEDURE — 36415 COLL VENOUS BLD VENIPUNCTURE: CPT

## 2019-12-02 ENCOUNTER — HOSPITAL ENCOUNTER (OUTPATIENT)
Age: 79
Discharge: HOME | End: 2019-12-02
Payer: MEDICARE

## 2019-12-02 ENCOUNTER — ANTI-COAG VISIT (OUTPATIENT)
Dept: CARDIOLOGY CLINIC | Age: 79
End: 2019-12-02
Payer: MEDICARE

## 2019-12-02 ENCOUNTER — ANTI-COAG VISIT (OUTPATIENT)
Dept: CARDIOLOGY CLINIC | Age: 79
End: 2019-12-02

## 2019-12-02 ENCOUNTER — OFFICE VISIT (OUTPATIENT)
Dept: CARDIOLOGY CLINIC | Age: 79
End: 2019-12-02
Payer: MEDICARE

## 2019-12-02 VITALS
WEIGHT: 180 LBS | BODY MASS INDEX: 35.15 KG/M2 | HEART RATE: 70 BPM | DIASTOLIC BLOOD PRESSURE: 74 MMHG | SYSTOLIC BLOOD PRESSURE: 110 MMHG

## 2019-12-02 DIAGNOSIS — I48.0 PAROXYSMAL ATRIAL FIBRILLATION (HCC): Primary | ICD-10-CM

## 2019-12-02 DIAGNOSIS — I10 ESSENTIAL HYPERTENSION: ICD-10-CM

## 2019-12-02 DIAGNOSIS — I25.10 CORONARY ARTERY DISEASE INVOLVING NATIVE CORONARY ARTERY OF NATIVE HEART WITHOUT ANGINA PECTORIS: ICD-10-CM

## 2019-12-02 DIAGNOSIS — I48.0 PAROXYSMAL ATRIAL FIBRILLATION (HCC): ICD-10-CM

## 2019-12-02 DIAGNOSIS — I50.21 ACUTE SYSTOLIC CONGESTIVE HEART FAILURE, NYHA CLASS 1 (HCC): ICD-10-CM

## 2019-12-02 LAB
INR BLD: 4.1 (ref 0.86–1.14)
PROTHROMBIN TIME: 48.2 SEC (ref 10–13.2)

## 2019-12-02 PROCEDURE — 85610 PROTHROMBIN TIME: CPT

## 2019-12-02 PROCEDURE — G8482 FLU IMMUNIZE ORDER/ADMIN: HCPCS | Performed by: INTERNAL MEDICINE

## 2019-12-02 PROCEDURE — G8417 CALC BMI ABV UP PARAM F/U: HCPCS | Performed by: INTERNAL MEDICINE

## 2019-12-02 PROCEDURE — 36415 COLL VENOUS BLD VENIPUNCTURE: CPT

## 2019-12-02 PROCEDURE — 4040F PNEUMOC VAC/ADMIN/RCVD: CPT | Performed by: INTERNAL MEDICINE

## 2019-12-02 PROCEDURE — 1123F ACP DISCUSS/DSCN MKR DOCD: CPT | Performed by: INTERNAL MEDICINE

## 2019-12-02 PROCEDURE — 1036F TOBACCO NON-USER: CPT | Performed by: INTERNAL MEDICINE

## 2019-12-02 PROCEDURE — G8598 ASA/ANTIPLAT THER USED: HCPCS | Performed by: INTERNAL MEDICINE

## 2019-12-02 PROCEDURE — 99213 OFFICE O/P EST LOW 20 MIN: CPT | Performed by: INTERNAL MEDICINE

## 2019-12-02 PROCEDURE — G8427 DOCREV CUR MEDS BY ELIG CLIN: HCPCS | Performed by: INTERNAL MEDICINE

## 2019-12-02 PROCEDURE — 93793 ANTICOAG MGMT PT WARFARIN: CPT | Performed by: NURSE PRACTITIONER

## 2019-12-02 RX ORDER — WARFARIN SODIUM 1 MG/1
1 TABLET ORAL DAILY
Qty: 60 TABLET | Refills: 3 | Status: SHIPPED | OUTPATIENT
Start: 2019-12-02 | End: 2019-12-09 | Stop reason: SDUPTHER

## 2019-12-06 ENCOUNTER — ANTI-COAG VISIT (OUTPATIENT)
Dept: CARDIOLOGY CLINIC | Age: 79
End: 2019-12-06
Payer: MEDICARE

## 2019-12-06 ENCOUNTER — HOSPITAL ENCOUNTER (OUTPATIENT)
Age: 79
Discharge: HOME | End: 2019-12-06
Payer: MEDICARE

## 2019-12-06 DIAGNOSIS — I48.0 PAROXYSMAL ATRIAL FIBRILLATION (HCC): ICD-10-CM

## 2019-12-06 DIAGNOSIS — I25.10 CORONARY ARTERY DISEASE INVOLVING NATIVE CORONARY ARTERY OF NATIVE HEART WITHOUT ANGINA PECTORIS: ICD-10-CM

## 2019-12-06 LAB
A/G RATIO: 0.9 (ref 1.1–2.2)
ALBUMIN SERPL-MCNC: 3.8 G/DL (ref 3.4–5)
ALP BLD-CCNC: 100 U/L (ref 40–129)
ALT SERPL-CCNC: 13 U/L (ref 10–40)
ANION GAP SERPL CALCULATED.3IONS-SCNC: 13 MMOL/L (ref 3–16)
AST SERPL-CCNC: 25 U/L (ref 15–37)
BASOPHILS ABSOLUTE: 0 K/UL (ref 0–0.2)
BASOPHILS RELATIVE PERCENT: 0.4 %
BILIRUB SERPL-MCNC: 0.5 MG/DL (ref 0–1)
BUN BLDV-MCNC: 20 MG/DL (ref 7–20)
CALCIUM SERPL-MCNC: 9 MG/DL (ref 8.3–10.6)
CHLORIDE BLD-SCNC: 97 MMOL/L (ref 99–110)
CO2: 27 MMOL/L (ref 21–32)
CREAT SERPL-MCNC: 1.2 MG/DL (ref 0.8–1.3)
EOSINOPHILS ABSOLUTE: 0.1 K/UL (ref 0–0.6)
EOSINOPHILS RELATIVE PERCENT: 1.7 %
GFR AFRICAN AMERICAN: >60
GFR NON-AFRICAN AMERICAN: 58
GLOBULIN: 4.2 G/DL
GLUCOSE BLD-MCNC: 88 MG/DL (ref 70–99)
HCT VFR BLD CALC: 34.3 % (ref 40.5–52.5)
HEMOGLOBIN: 11.4 G/DL (ref 13.5–17.5)
INR BLD: 1.59 (ref 0.86–1.14)
LYMPHOCYTES ABSOLUTE: 1.9 K/UL (ref 1–5.1)
LYMPHOCYTES RELATIVE PERCENT: 31.3 %
MCH RBC QN AUTO: 27.9 PG (ref 26–34)
MCHC RBC AUTO-ENTMCNC: 33.3 G/DL (ref 31–36)
MCV RBC AUTO: 83.7 FL (ref 80–100)
MONOCYTES ABSOLUTE: 0.5 K/UL (ref 0–1.3)
MONOCYTES RELATIVE PERCENT: 9.1 %
NEUTROPHILS ABSOLUTE: 3.4 K/UL (ref 1.7–7.7)
NEUTROPHILS RELATIVE PERCENT: 57.5 %
PDW BLD-RTO: 15 % (ref 12.4–15.4)
PLATELET # BLD: 198 K/UL (ref 135–450)
PMV BLD AUTO: 8.9 FL (ref 5–10.5)
POTASSIUM SERPL-SCNC: 4.2 MMOL/L (ref 3.5–5.1)
PROTHROMBIN TIME: 18.5 SEC (ref 10–13.2)
RBC # BLD: 4.1 M/UL (ref 4.2–5.9)
SODIUM BLD-SCNC: 137 MMOL/L (ref 136–145)
TOTAL PROTEIN: 8 G/DL (ref 6.4–8.2)
WBC # BLD: 5.9 K/UL (ref 4–11)

## 2019-12-06 PROCEDURE — 93793 ANTICOAG MGMT PT WARFARIN: CPT | Performed by: NURSE PRACTITIONER

## 2019-12-06 PROCEDURE — 85025 COMPLETE CBC W/AUTO DIFF WBC: CPT

## 2019-12-06 PROCEDURE — 85610 PROTHROMBIN TIME: CPT

## 2019-12-06 PROCEDURE — 36415 COLL VENOUS BLD VENIPUNCTURE: CPT

## 2019-12-06 PROCEDURE — 80053 COMPREHEN METABOLIC PANEL: CPT

## 2019-12-09 ENCOUNTER — OFFICE VISIT (OUTPATIENT)
Dept: FAMILY MEDICINE CLINIC | Age: 79
End: 2019-12-09
Payer: MEDICARE

## 2019-12-09 VITALS
OXYGEN SATURATION: 96 % | SYSTOLIC BLOOD PRESSURE: 130 MMHG | HEART RATE: 64 BPM | WEIGHT: 181 LBS | HEIGHT: 60 IN | TEMPERATURE: 97.1 F | DIASTOLIC BLOOD PRESSURE: 74 MMHG | BODY MASS INDEX: 35.53 KG/M2

## 2019-12-09 DIAGNOSIS — I48.0 PAROXYSMAL ATRIAL FIBRILLATION (HCC): ICD-10-CM

## 2019-12-09 DIAGNOSIS — K62.5 GASTROINTESTINAL HEMORRHAGE ASSOCIATED WITH ANORECTAL SOURCE: ICD-10-CM

## 2019-12-09 DIAGNOSIS — E78.2 MIXED HYPERLIPIDEMIA: ICD-10-CM

## 2019-12-09 DIAGNOSIS — I50.21 ACUTE SYSTOLIC CONGESTIVE HEART FAILURE, NYHA CLASS 1 (HCC): ICD-10-CM

## 2019-12-09 DIAGNOSIS — I49.5 SICK SINUS SYNDROME (HCC): ICD-10-CM

## 2019-12-09 DIAGNOSIS — I10 ESSENTIAL HYPERTENSION: ICD-10-CM

## 2019-12-09 DIAGNOSIS — Z00.00 ROUTINE GENERAL MEDICAL EXAMINATION AT A HEALTH CARE FACILITY: Primary | ICD-10-CM

## 2019-12-09 DIAGNOSIS — N40.0 BENIGN PROSTATIC HYPERPLASIA WITHOUT LOWER URINARY TRACT SYMPTOMS: ICD-10-CM

## 2019-12-09 PROCEDURE — 1123F ACP DISCUSS/DSCN MKR DOCD: CPT | Performed by: FAMILY MEDICINE

## 2019-12-09 PROCEDURE — 4040F PNEUMOC VAC/ADMIN/RCVD: CPT | Performed by: FAMILY MEDICINE

## 2019-12-09 PROCEDURE — G0438 PPPS, INITIAL VISIT: HCPCS | Performed by: FAMILY MEDICINE

## 2019-12-09 PROCEDURE — G8598 ASA/ANTIPLAT THER USED: HCPCS | Performed by: FAMILY MEDICINE

## 2019-12-09 PROCEDURE — G8482 FLU IMMUNIZE ORDER/ADMIN: HCPCS | Performed by: FAMILY MEDICINE

## 2019-12-09 ASSESSMENT — PATIENT HEALTH QUESTIONNAIRE - PHQ9: SUM OF ALL RESPONSES TO PHQ QUESTIONS 1-9: 13

## 2019-12-13 ENCOUNTER — HOSPITAL ENCOUNTER (OUTPATIENT)
Age: 79
Discharge: HOME | End: 2019-12-13
Payer: MEDICARE

## 2019-12-13 DIAGNOSIS — I48.0 PAROXYSMAL ATRIAL FIBRILLATION (HCC): ICD-10-CM

## 2019-12-13 LAB
INR BLD: 1.44 (ref 0.86–1.14)
PROTHROMBIN TIME: 16.8 SEC (ref 10–13.2)

## 2019-12-13 PROCEDURE — 85610 PROTHROMBIN TIME: CPT

## 2019-12-13 PROCEDURE — 36415 COLL VENOUS BLD VENIPUNCTURE: CPT

## 2019-12-17 ENCOUNTER — ANTI-COAG VISIT (OUTPATIENT)
Dept: CARDIOLOGY CLINIC | Age: 79
End: 2019-12-17

## 2019-12-20 ENCOUNTER — HOSPITAL ENCOUNTER (OUTPATIENT)
Age: 79
Discharge: HOME | End: 2019-12-20
Payer: MEDICARE

## 2019-12-20 DIAGNOSIS — I48.0 PAROXYSMAL ATRIAL FIBRILLATION (HCC): ICD-10-CM

## 2019-12-20 LAB
INR BLD: 1.27 (ref 0.86–1.14)
PROTHROMBIN TIME: 14.8 SEC (ref 10–13.2)

## 2019-12-20 PROCEDURE — 36415 COLL VENOUS BLD VENIPUNCTURE: CPT

## 2019-12-20 PROCEDURE — 85610 PROTHROMBIN TIME: CPT

## 2019-12-24 RX ORDER — DRONEDARONE 400 MG/1
TABLET, FILM COATED ORAL
Qty: 180 TABLET | Refills: 4 | Status: SHIPPED | OUTPATIENT
Start: 2019-12-24 | End: 2021-02-22

## 2019-12-24 RX ORDER — POTASSIUM CHLORIDE 750 MG/1
TABLET, EXTENDED RELEASE ORAL
Qty: 270 TABLET | Refills: 4 | Status: SHIPPED | OUTPATIENT
Start: 2019-12-24 | End: 2021-02-22

## 2019-12-26 RX ORDER — METOPROLOL SUCCINATE 25 MG/1
TABLET, EXTENDED RELEASE ORAL
Qty: 90 TABLET | Refills: 4 | Status: SHIPPED | OUTPATIENT
Start: 2019-12-26 | End: 2021-02-22

## 2019-12-27 ENCOUNTER — HOSPITAL ENCOUNTER (OUTPATIENT)
Age: 79
Discharge: HOME | End: 2019-12-27
Payer: MEDICARE

## 2019-12-27 ENCOUNTER — ANTI-COAG VISIT (OUTPATIENT)
Dept: CARDIOLOGY CLINIC | Age: 79
End: 2019-12-27
Payer: MEDICARE

## 2019-12-27 LAB
INR BLD: 1.44 (ref 0.86–1.14)
PROTHROMBIN TIME: 16.8 SEC (ref 10–13.2)

## 2019-12-27 PROCEDURE — 85610 PROTHROMBIN TIME: CPT

## 2019-12-27 PROCEDURE — 93793 ANTICOAG MGMT PT WARFARIN: CPT | Performed by: NURSE PRACTITIONER

## 2019-12-27 PROCEDURE — 36415 COLL VENOUS BLD VENIPUNCTURE: CPT

## 2019-12-27 NOTE — PROGRESS NOTES
ANTICOAGULATION MONITORING    Nery Veronica, 1940      Anticoagulation Indication(s):  Afib    Referring Physician:   Dr. Mimi Contreras  Goal INR Range:  2.0-3.0  Duration of Anticoagulation Therapy:  Indefinite  Home or Lab Draw: Lab  Product patient has at home: warfarin 5 mg    Recent INR Results:  Lab Results   Component Value Date    INR 1.27 (H) 12/20/2019    INR 1.44 (H) 12/13/2019    INR 1.59 (H) 12/06/2019    INR 4.10 (H) 12/02/2019       INR Summary                          Warfarin regimen (mg)  Date INR A/P Sun Mon Tue Wed Thu Fri Sat Mg/wk   12/20/19 1.27 Below goal, increase by 5 2.5 2 2.5 2 2.5 10 2 23.5   12/13/19 1.44 Below goal, increase by 3 2.5 2 2.5 2 2.5 5 2 18.5   12/6/19 1.59 Below goal, increase by. 2.5 1 2.5 1 2.5 5 1 15.5   12/2/19 4.10 Above goal, hold tonight  hold 2.5 2.5 2.5 Recheck     11/29/19 2.47 At goal, no change 5 2.5 2.5 2.5 2.5 5 5 25   11/25/16 5.86 Above goal 5 hold hold 2.5 5 recheck  12.5   11/19/19 2.38 At goal, continue 5 5 5 5 5 5 5 35         Assessment/Plan:    Recent hospitalizations/HC visits None reported   Recent medication changes None reported   Medications taken regularly that may interact with warfarin or alter INR No significant drug interactions identified   Warfarin dose taken as prescribed Patient uses pillbox? no   Signs/symptoms of bleeding History of bleeding? Yes, GIB   Vitamin K intake Normally has ~1-2 servings of green, leafy vegetables per week   Recent vomiting/diarrhea/fever None reported   Changes in weight, activity, stress None reported   Tobacco or alcohol use Patient reports smoking 0 PPD  Patient reports having 0 drinks per day   Upcoming surgeries or procedures None reported     Patient was also reminded to maintain consistent vitamin K intake and call with any bleeding, medication changes, or fever/vomiting/diarrhea. Next INR check:  12/27/19    LVM for pt to call back to confirm dose.  .    Addendum:  Reviewed documentation and plan of care from RN  Agree with above  Monica Friedman NP

## 2019-12-27 NOTE — PROGRESS NOTES
Pt returned call he stated that he takes coumadin   Monday 2.5 mg   Tuesday 2.5 mg  Wednesday 2 mg  Thursday 2.5 mg  Friday 5 mg  Saturday 2 mg  Sunday 2.5 mg     He also stated he was told to take 10 mg of coumadin today. Pt feels this dose is to high and will continue with previous dosing. He will call back Monday to speak with Teena Fischer to figure out how to get his blood stabilized.

## 2019-12-31 ENCOUNTER — ANTI-COAG VISIT (OUTPATIENT)
Dept: CARDIOLOGY CLINIC | Age: 79
End: 2019-12-31
Payer: MEDICARE

## 2019-12-31 PROCEDURE — 93793 ANTICOAG MGMT PT WARFARIN: CPT | Performed by: NURSE PRACTITIONER

## 2019-12-31 NOTE — PROGRESS NOTES
ANTICOAGULATION MONITORING    De Watson, 1940      Anticoagulation Indication(s):  Afib    Referring Physician:   Dr. Mauricio Laura  Goal INR Range:  2.0-3.0  Duration of Anticoagulation Therapy:  Indefinite  Home or Lab Draw: Lab  Product patient has at home: warfarin 5 mg    Recent INR Results:  Lab Results   Component Value Date    INR 1.44 (H) 12/27/2019    INR 1.27 (H) 12/20/2019    INR 1.44 (H) 12/13/2019    INR 1.59 (H) 12/06/2019       INR Summary                          Warfarin regimen (mg)  Date INR A/P Sun Mon Tue Wed Thu Fri Sat Mg/wk   12/27/19 1.44 Below goal, increased by 6.5 mg See note 2.5 5 2.5 5 2.5 5 2.5 25   12/20/19 1.27 Below goal, increase by 5 2.5 2 2.5 2 2.5 10(Pt did not increase as instructed) he took 5 mg 2 23.5    (he actually took 18.5 mg)   12/13/19 1.44 Below goal, increase by 3 2.5 2 2.5 2 2.5 5 2 18.5   12/6/19 1.59 Below goal, increase by. 2.5 1 2.5 1 2.5 5 1 15.5   12/2/19 4.10 Above goal, hold tonight  hold 2.5 2.5 2.5 Recheck     11/29/19 2.47 At goal, no change 5 2.5 2.5 2.5 2.5 5 5 25   11/25/16 5.86 Above goal 5 hold hold 2.5 5 recheck  12.5   11/19/19 2.38 At goal, continue 5 5 5 5 5 5 5 35         Assessment/Plan:    Recent hospitalizations/HC visits None reported   Recent medication changes None reported   Medications taken regularly that may interact with warfarin or alter INR No significant drug interactions identified   Warfarin dose taken as prescribed Patient uses pillbox? no   Signs/symptoms of bleeding History of bleeding?  Yes, GIB   Vitamin K intake Normally has ~1-2 servings of green, leafy vegetables per week   Recent vomiting/diarrhea/fever None reported   Changes in weight, activity, stress None reported   Tobacco or alcohol use Patient reports smoking 0 PPD  Patient reports having 0 drinks per day   Upcoming surgeries or procedures None reported     Patient was also reminded to maintain consistent vitamin K intake and call with any bleeding, medication changes, or fever/vomiting/diarrhea. Next INR check:  1/6/20    Spoke with pt and confirmed dose. Addendum:  Reviewed documentation and plan of care from RN  Agree with above  Savanna Dawkins NP     Pt took 18.5 mg last week.  Will increase by 6.5 mg for a total of 25 mg

## 2020-01-06 ENCOUNTER — HOSPITAL ENCOUNTER (OUTPATIENT)
Age: 80
Discharge: HOME | End: 2020-01-06
Payer: MEDICARE

## 2020-01-06 LAB
INR BLD: 2.82 (ref 0.86–1.14)
PROTHROMBIN TIME: 33 SEC (ref 10–13.2)

## 2020-01-06 PROCEDURE — 36415 COLL VENOUS BLD VENIPUNCTURE: CPT

## 2020-01-06 PROCEDURE — 85610 PROTHROMBIN TIME: CPT

## 2020-01-07 ENCOUNTER — TELEPHONE (OUTPATIENT)
Dept: FAMILY MEDICINE CLINIC | Age: 80
End: 2020-01-07

## 2020-01-13 ENCOUNTER — HOSPITAL ENCOUNTER (OUTPATIENT)
Age: 80
Discharge: HOME | End: 2020-01-13
Payer: MEDICARE

## 2020-01-13 LAB
INR BLD: 2.82 (ref 0.86–1.14)
PROTHROMBIN TIME: 33.1 SEC (ref 10–13.2)

## 2020-01-13 PROCEDURE — 36415 COLL VENOUS BLD VENIPUNCTURE: CPT

## 2020-01-13 PROCEDURE — 85610 PROTHROMBIN TIME: CPT

## 2020-01-15 ENCOUNTER — ANTI-COAG VISIT (OUTPATIENT)
Dept: CARDIOLOGY CLINIC | Age: 80
End: 2020-01-15
Payer: MEDICARE

## 2020-01-15 PROCEDURE — 93793 ANTICOAG MGMT PT WARFARIN: CPT | Performed by: NURSE PRACTITIONER

## 2020-01-15 NOTE — PROGRESS NOTES
ANTICOAGULATION MONITORING    Maru Sequeira, 1940      Anticoagulation Indication(s):  Afib    Referring Physician:   Dr. Zoltan Wagoner  Goal INR Range:  2.0-3.0  Duration of Anticoagulation Therapy:  Indefinite  Home or Lab Draw: Lab  Product patient has at home: warfarin 5 mg    Recent INR Results:  Lab Results   Component Value Date    INR 2.82 (H) 01/13/2020    INR 2.82 (H) 01/06/2020    INR 1.44 (H) 12/27/2019    INR 1.27 (H) 12/20/2019       INR Summary                          Warfarin regimen (mg)  Date INR A/P Sun Mon Tue Wed Thu Fri Sat Mg/wk   1/13/20 2.8 At goal, no change 2.5 5 2.5 5 2.5 5 2.5 25   12/27/19 1.44 Below goal, increased by 6.5 mg See note 2.5 5 2.5 5 2.5 5 2.5 25   12/20/19 1.27 Below goal, increase by 5 2.5 2 2.5 2 2.5 10(Pt did not increase as instructed) he took 5 mg 2 23.5    (he actually took 18.5 mg)   12/13/19 1.44 Below goal, increase by 3 2.5 2 2.5 2 2.5 5 2 18.5   12/6/19 1.59 Below goal, increase by. 2.5 1 2.5 1 2.5 5 1 15.5   12/2/19 4.10 Above goal, hold tonight  hold 2.5 2.5 2.5 Recheck     11/29/19 2.47 At goal, no change 5 2.5 2.5 2.5 2.5 5 5 25   11/25/16 5.86 Above goal 5 hold hold 2.5 5 recheck  12.5   11/19/19 2.38 At goal, continue 5 5 5 5 5 5 5 35         Assessment/Plan:    Recent hospitalizations/HC visits None reported   Recent medication changes None reported   Medications taken regularly that may interact with warfarin or alter INR No significant drug interactions identified   Warfarin dose taken as prescribed Patient uses pillbox? no   Signs/symptoms of bleeding History of bleeding?  Yes, GIB   Vitamin K intake Normally has ~1-2 servings of green, leafy vegetables per week   Recent vomiting/diarrhea/fever None reported   Changes in weight, activity, stress None reported   Tobacco or alcohol use Patient reports smoking 0 PPD  Patient reports having 0 drinks per day   Upcoming surgeries or procedures None reported     Patient was also reminded to maintain

## 2020-01-27 ENCOUNTER — HOSPITAL ENCOUNTER (OUTPATIENT)
Age: 80
Discharge: HOME | End: 2020-01-27
Payer: MEDICARE

## 2020-01-27 LAB
INR BLD: 2.64 (ref 0.86–1.14)
PROTHROMBIN TIME: 30.9 SEC (ref 10–13.2)

## 2020-01-27 PROCEDURE — 85610 PROTHROMBIN TIME: CPT

## 2020-01-27 PROCEDURE — 36415 COLL VENOUS BLD VENIPUNCTURE: CPT

## 2020-01-28 ENCOUNTER — ANTI-COAG VISIT (OUTPATIENT)
Dept: CARDIOLOGY CLINIC | Age: 80
End: 2020-01-28

## 2020-01-28 RX ORDER — PANTOPRAZOLE SODIUM 40 MG/1
TABLET, DELAYED RELEASE ORAL
Qty: 90 TABLET | Refills: 3 | Status: SHIPPED | OUTPATIENT
Start: 2020-01-28 | End: 2021-01-25

## 2020-01-28 NOTE — TELEPHONE ENCOUNTER
Medication:   Requested Prescriptions     Pending Prescriptions Disp Refills    pantoprazole (PROTONIX) 40 MG tablet [Pharmacy Med Name: PANTOPRAZOLE 40MG TABLETS] 30 tablet 5     Sig: TAKE 1 TABLET BY MOUTH EVERY MORNING BEFORE BREAKFAST        Last Filled:  7/18/19 #30, 5 RF    Patient Phone Number: [phone] (home)     Last appt: 12/9/2019 Annual exam  Next appt: 6/9/2020

## 2020-02-04 NOTE — PROGRESS NOTES
[address]   Electrophysiology    Date: 2/5/2020      Chief Complaint   Patient presents with    Bradycardia    Atrial Fibrillation     Cardiac HX: Chandu Lu is a 78 y.o.  man with a h/o of HLD, HTN, BARRY, CAD, s/p MI, s/p PCI x 8, follows with Dr. Jermaine Tovar,  pAF - on Multaq and Xarelto, SSS, s/p dual chamber MDT PPM (1/18/2008, Dr. Abril Perez), s/p gen change (10/10/2016, Dr. Gianna Caceres), chronic dCHF, s/p GIB (1/2018) on Xarelto requiring blood transfusion, [address] [place] [address] d/c'd, Watchman discussed - patient not interested, now on Warfarin. Today: Patient is here for 3 month f/u. He had seen Dr. Jermaine Tovar and was placed on warfarin - having some issue with getting the blood regulated. He does c/o bilat LE edema, the L>R, LLE appears to be reddened and he has been tx'd for cellulitis over the past year. He does not sleep well at night. 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 Device check shows a low AF burden of 1.0%, HR avg in AF in the 90's. He denies CP or SOB - his main c/o is of fatigue.      Home medications:   Current Outpatient Medications on File Prior to Visit   Medication Sig Dispense Refill    pantoprazole (PROTONIX) 40 MG tablet TAKE 1 TABLET BY MOUTH EVERY MORNING BEFORE BREAKFAST 90 tablet 3    metoprolol succinate (TOPROL XL) 25 MG extended release tablet TAKE ONE-HALF (1/2) TABLET TWICE A DAY 90 tablet 4    MULTAQ 400 MG TABS TAKE 1 TABLET TWICE A DAY WITH MEALS 180 tablet 4    KLOR-CON M10 10 MEQ extended release tablet TAKE 3 TABLETS DAILY 270 tablet 4    warfarin (COUMADIN) 5 MG tablet Take 1 pill daily at 6 pm. (Patient taking differently: PATIENT TAKES 2.5 IN THE EVENING  ALTERNATE WITH 1MG IN THE EVENING) 90 tablet 3    nitroGLYCERIN (NITRODUR) 0.4 MG/HR APPLY 1 PATCH DAILY 90 patch 3    furosemide (LASIX) 40 MG tablet Take 1 tablet by mouth 2 times daily 2 TABS BID      ondansetron (ZOFRAN ODT) 4 MG disintegrating tablet Take 1 tablet by mouth every 8 hours as needed for Nausea or Vomiting 20 tablet 1    rosuvastatin (CRESTOR) 5 MG tablet TAKE 1 TABLET DAILY 90 tablet 3    fluticasone (FLONASE) 50 MCG/ACT nasal spray 1 spray by Each Nare route daily      nystatin (MYCOSTATIN) 570622 UNIT/GM cream Apply topically 2 times daily until improved. 15 g 1    vitamin B-12 (CYANOCOBALAMIN) 500 MCG tablet Take 500 mcg by mouth daily      Compression Stockings MISC by Does not apply route 1 pair 20-30 mmHg compression stockings, wide band with grippers. 1 each 2    Polysaccharide Iron Complex (PROFE PO) Take 185 mg by mouth      aspirin 81 MG tablet Take 81 mg by mouth daily      polyethylene glycol (MIRALAX) POWD powder Take 17 g by mouth daily      Coenzyme Q10 (CO Q 10) 100 MG CAPS Take 200 mg by mouth daily      fentaNYL (DURAGESIC) 50 MCG/HR Place 1 patch onto the skin every 48 hours 15 patch 0     No current facility-administered medications on file prior to visit.         Past Medical History:   Diagnosis Date    Allergic rhinitis     Atrial fibrillation (HCC)     Benign prostatic hypertrophy     CAD (coronary artery disease)     Cancer (HCC)     skin    CHF (congestive heart failure) ([address] 08/17/2017    Coronary artery disease involving native coronary artery of native heart without angina pectoris 6/17/2011    DDD (degenerative disc disease), lumbar     Falls 08/17/2017    GI bleeding     Hyperlipidemia     Hypertension     MI (myocardial infarction) ([address]     MRSA (methicillin resistant staph aureus) culture positive     h/o infection in the blood    Pneumonia     S/P PTCA (percutaneous transluminal coronary angioplasty) stents x 8    SSS (sick sinus syndrome) ([address]     Unspecified sleep apnea     Venous (peripheral) insufficiency 12/4/2018        Past Surgical History:   Procedure Laterality Date    APPENDECTOMY      CARDIAC PACEMAKER PLACEMENT      CARPAL TUNNEL RELEASE      CORONARY ANGIOPLASTY      CORONARY ANGIOPLASTY WITH STENT PLACEMENT      DIAGNOSTIC CARDIAC CATH LAB PROCEDURE      EYE SURGERY      PACEMAKER PLACEMENT         Allergies   Allergen Reactions    Avelox [Moxifloxacin Hcl In Nacl] Anaphylaxis    Epinephrine Base     Other      [place] per PT - swelling size of silver dollar at site per PT     Keflex [Cephalexin] Nausea And Vomiting    Polysporin [Bacitracin-Polymyxin B] Rash       Social History:  Reviewed. reports that he quit smoking about 15 years ago. He has a 30.00 pack-year smoking history. He has never used smokeless tobacco. He reports that he does not drink alcohol or use drugs. Family History:  Reviewed. family history includes Cancer in his sister; Coronary Art Dis in his brother. Review of System:    · Constitutional: No fevers, chills. · Eyes: No visual changes or diplopia. No scleral icterus. · ENT: No Headaches. No mouth sores or sore throat. · Cardiovascular: Yes for chronic chest pain, Yes for dyspnea on exertion, Yes for palpitations or No for loss of consciousness. No cough, hemoptysis, No for pleuritic pain, or phlebitis. · Respiratory: No for cough or wheezing. No hematemesis. · Gastrointestinal: No abdominal pain, blood in stools. · Genitourinary: No dysuria, or hematuria. · Musculoskeletal: No gait disturbance,    · Integumentary: No rash or pruritis. · Neurological: No headache, change in muscle strength, numbness or tingling. · Psychiatric: No anxiety, or depression. · Endocrine: No temperature intolerance. No excessive thirst, fluid intake, or urination. · Hem/Lymph: No abnormal bruising or bleeding, blood clots or swollen lymph nodes. · Allergic/Immunologic: No nasal congestion or hives. Physical Examination:  Vitals:    02/05/20 1348   BP: 130/80   Pulse: 77        Wt Readings from Last 3 Encounters:   02/05/20 187 lb (84.8 kg)   12/09/19 181 lb (82.1 kg)   12/02/19 180 lb (81.6 kg)     · Constitutional: Oriented. No distress.    · Head: carotid artery appears to have a lower limits of 16-49%   diameter reducing stenosis based on velocity criteria. - The left internal carotid artery appears to have a 1-15% diameter reducing   stenosis based on velocity criteria. - Normal antegrade flow noted in vertebral arteries bilaterally. Patient Active Problem List    Diagnosis Date Noted    Pure hyperglyceridemia      Priority: High    Gastrointestinal hemorrhage      Priority: High    Bradycardia      Priority: Low    Sick sinus syndrome (HCC) 08/29/2016     Priority: Low    CHF (congestive heart failure), NYHA class I ([facility]      Priority: Low    Essential hypertension      Priority: Low    Angina pectoris ([facility] 06/16/2015     Priority: Low    History of nonmelanoma skin cancer 10/01/2013     Priority: Low    Tinea manuum, pedis, and unguium 10/01/2013     Priority: Low    AK (actinic keratosis) 10/01/2013     Priority: Low    Coronary artery disease involving native coronary artery of native heart without angina pectoris 06/17/2011     Priority: Low    Cardiac pacemaker in situ 06/17/2011     Priority: Low    Postsurgical percutaneous transluminal coronary angioplasty status 06/17/2011     Priority: Low    Hyperlipidemia      Priority: Low    Benign prostatic hyperplasia      Priority: Low    Paroxysmal atrial fibrillation ([address]      Priority: Low    Localized edema 12/04/2018    Venous (peripheral) insufficiency 12/04/2018       Assessment:   1. Paroxysmal atrial fibrillation (HCC)    2. SSS (sick sinus syndrome) ([facility]    3. Sinus bradycardia    4. Pacemaker    5.  Chronic diastolic CHF (congestive heart failure) ([facility]      Cardiac HX: Sammy Romero is a 78 y.o.  man with a h/o of HLD, HTN, BARRY, CAD, s/p MI, s/p PCI x 8, follows with Dr. Opal Thompson,  pAF - on Multaq and Xarelto, SSS, s/p dual chamber MDT PPM (1/18/2008, Dr. Osmin Serrano), s/p gen change (10/10/2016, Dr. Devendra Ramirez), chronic dCHF, s/p GIB (1/2018) on Xarelto requiring blood transfusion, [facility] d/c'd, Edmundo discussed - patient not interested, now on Warfarin. CHADSVASC 4. TSH 3.41 (3/2018). Bradycardia/SSS  - S/p dual chamber MDT PPM - (implant 2008, gen change 2016)   - Device check today showed AP 91%, V paced 0.4%. AF burden 1.0%, longest episode 4 hours, other parameters are stable. - On Toprol XL 25 mg daily   - F/u with Dr. Mariam Sahni in 3 months  - ECG ordered and results personally reviewed     pAF  - Low AF burden on device - 1.0%, longest 4 hours  - On warfarin - last INR 2.64 - managed by our office  - On Multaq 400 mg BID - continue  - Consider increasing BB if HR elevated in AF    Chronic dCHF  - 2-3+ bilat LE edema  - L>R  - On lasix 40 mg BID - minimal improvement of edema - sometimes takes 3 tabs daily  - Daily weights    CP/CAD  - Follows with Dr. Radha Scott  - On isosorbide - no c/o CP, only fatigue  - On ASA, statin, BB     EF of 00%  No systolic HF  Statin for CAD, on Xarelto for AF  No tobacco use. All questions and concerns were addressed to the patient/family. Alternatives to my treatment were discussed. The note was completed using EMR. Every effort was made to ensure accuracy; however, inadvertent computerized transcription errors may be present.      Thank you for allowing me to participate in the care of [address] [place] [address]

## 2020-02-05 ENCOUNTER — OFFICE VISIT (OUTPATIENT)
Dept: CARDIOLOGY CLINIC | Age: 80
End: 2020-02-05
Payer: MEDICARE

## 2020-02-05 ENCOUNTER — NURSE ONLY (OUTPATIENT)
Dept: CARDIOLOGY CLINIC | Age: 80
End: 2020-02-05
Payer: MEDICARE

## 2020-02-05 ENCOUNTER — HOSPITAL ENCOUNTER (OUTPATIENT)
Age: 80
Discharge: HOME | End: 2020-02-05
Payer: MEDICARE

## 2020-02-05 VITALS
SYSTOLIC BLOOD PRESSURE: 130 MMHG | HEIGHT: 65 IN | BODY MASS INDEX: 31.16 KG/M2 | HEART RATE: 77 BPM | DIASTOLIC BLOOD PRESSURE: 80 MMHG | WEIGHT: 187 LBS

## 2020-02-05 LAB
INR BLD: 3.93 (ref 0.86–1.14)
PROTHROMBIN TIME: 46.2 SEC (ref 10–13.2)

## 2020-02-05 PROCEDURE — G8482 FLU IMMUNIZE ORDER/ADMIN: HCPCS | Performed by: NURSE PRACTITIONER

## 2020-02-05 PROCEDURE — 93280 PM DEVICE PROGR EVAL DUAL: CPT | Performed by: INTERNAL MEDICINE

## 2020-02-05 PROCEDURE — 85610 PROTHROMBIN TIME: CPT

## 2020-02-05 PROCEDURE — 1036F TOBACCO NON-USER: CPT | Performed by: NURSE PRACTITIONER

## 2020-02-05 PROCEDURE — 99214 OFFICE O/P EST MOD 30 MIN: CPT | Performed by: NURSE PRACTITIONER

## 2020-02-05 PROCEDURE — 93000 ELECTROCARDIOGRAM COMPLETE: CPT | Performed by: NURSE PRACTITIONER

## 2020-02-05 PROCEDURE — 1123F ACP DISCUSS/DSCN MKR DOCD: CPT | Performed by: NURSE PRACTITIONER

## 2020-02-05 PROCEDURE — G8427 DOCREV CUR MEDS BY ELIG CLIN: HCPCS | Performed by: NURSE PRACTITIONER

## 2020-02-05 PROCEDURE — 36415 COLL VENOUS BLD VENIPUNCTURE: CPT

## 2020-02-05 PROCEDURE — G8417 CALC BMI ABV UP PARAM F/U: HCPCS | Performed by: NURSE PRACTITIONER

## 2020-02-05 PROCEDURE — 4040F PNEUMOC VAC/ADMIN/RCVD: CPT | Performed by: NURSE PRACTITIONER

## 2020-02-06 ENCOUNTER — ANTI-COAG VISIT (OUTPATIENT)
Dept: CARDIOLOGY CLINIC | Age: 80
End: 2020-02-06
Payer: MEDICARE

## 2020-02-06 LAB
BASOPHILS ABSOLUTE: 0.03 THOU/MCL (ref 0–0.2)
BASOPHILS ABSOLUTE: 1 %
EOSINOPHILS ABSOLUTE: 0.17 THOU/MCL (ref 0.03–0.45)
EOSINOPHILS RELATIVE PERCENT: 3 %
HCT VFR BLD CALC: 30.9 % (ref 40–50)
HEMOGLOBIN: 10.2 G/DL (ref 13.5–16.5)
LYMPHOCYTES ABSOLUTE: 1.82 THOU/MCL (ref 1–4)
LYMPHOCYTES RELATIVE PERCENT: 29 %
MCH RBC QN AUTO: 27 PG (ref 27–33)
MCHC RBC AUTO-ENTMCNC: 32.9 G/DL (ref 32–36)
MCV RBC AUTO: 82 FL (ref 82–97)
MONOCYTES # BLD: 9 %
MONOCYTES ABSOLUTE: 0.59 THOU/MCL (ref 0.2–0.9)
NEUTROPHILS ABSOLUTE: 3.7 THOU/MCL (ref 1.8–7.7)
PDW BLD-RTO: 16.2 %
PLATELET # BLD: 209 THOU/MCL (ref 140–375)
PMV BLD AUTO: 9.8 FL (ref 7.4–11.5)
RBC # BLD: 3.77 MIL/MCL (ref 4.4–5.8)
SEG NEUTROPHILS: 58 %
WBC # BLD: 6.3 THOU/MCL (ref 3.6–10.5)

## 2020-02-06 PROCEDURE — 93793 ANTICOAG MGMT PT WARFARIN: CPT | Performed by: NURSE PRACTITIONER

## 2020-02-06 NOTE — PROGRESS NOTES
[address] [facility], 1940      Anticoagulation Indication(s):  Afib    Referring Physician:   Dr. Mauricio Laura  Goal INR Range:  2.0-3.0  Duration of Anticoagulation Therapy:  Indefinite  Home or Lab Draw: Lab  Product patient has at home: warfarin 5 mg    Recent INR Results:  Lab Results   Component Value Date    INR 3.93 (H) 02/05/2020    INR 2.64 (H) 01/27/2020    INR 2.82 (H) 01/13/2020    INR 2.82 (H) 01/06/2020       INR Summary                          Warfarin regimen (mg)  Date INR A/P Sun Mon Tue Wed Thu Fri Sat Mg/wk   2/5/20 3.93 Above goal,hold todays dose and decrease by 2.5 2.5 5 2.5 5 hold 2.5 2.5 20   1/27/20 2.64 At goal, no change 2.5 5 2.5 5 2.5 5 2.5 25   1/13/20 2.8 At goal, no change 2.5 5 2.5 5 2.5 5 2.5 25   12/27/19 1.44 Below goal, increased by 6.5 mg See note 2.5 5 2.5 5 2.5 5 2.5 25   12/20/19 1.27 Below goal, increase by 5 2.5 2 2.5 2 2.5 10(Pt did not increase as instructed) he took 5 mg 2 23.5    (he actually took 18.5 mg)   12/13/19 1.44 Below goal, increase by 3 2.5 2 2.5 2 2.5 5 2 18.5   12/6/19 1.59 Below goal, increase by. 2.5 1 2.5 1 2.5 5 1 15.5   12/2/19 4.10 Above goal, hold tonight  hold 2.5 2.5 2.5 Recheck     11/29/19 2.47 At goal, no change 5 2.5 2.5 2.5 2.5 5 5 25   11/25/16 5.86 Above goal 5 hold hold 2.5 5 recheck  12.5   11/19/19 2.38 At goal, continue 5 5 5 5 5 5 5 35         Assessment/Plan:    Recent hospitalizations/HC visits None reported   Recent medication changes None reported   Medications taken regularly that may interact with warfarin or alter INR No significant drug interactions identified   Warfarin dose taken as prescribed Patient uses pillbox? no   Signs/symptoms of bleeding History of bleeding?  Yes, GIB   Vitamin K intake Normally has ~1-2 servings of green, leafy vegetables per week   Recent vomiting/diarrhea/fever None reported   Changes in weight, activity, stress None reported   Tobacco or alcohol use Patient reports smoking 0 PPD  Patient reports having 0 drinks per day   Upcoming surgeries or procedures None reported     Patient was also reminded to maintain consistent vitamin K intake and call with any bleeding, medication changes, or fever/vomiting/diarrhea. Next INR check:  2/6/20    Spoke with pt regarding results. Pt verbalized understanding and all questions answered at this time.     Addendum:  Reviewed documentation and plan of care from RN  Agree with above  Xiao Tsang NP

## 2020-02-14 ENCOUNTER — HOSPITAL ENCOUNTER (OUTPATIENT)
Age: 80
Discharge: HOME | End: 2020-02-14
Payer: MEDICARE

## 2020-02-14 LAB
INR BLD: 2.27 (ref 0.86–1.14)
PROTHROMBIN TIME: 26.5 SEC (ref 10–13.2)

## 2020-02-14 PROCEDURE — 85610 PROTHROMBIN TIME: CPT

## 2020-02-14 PROCEDURE — 36415 COLL VENOUS BLD VENIPUNCTURE: CPT

## 2020-02-17 ENCOUNTER — TELEPHONE (OUTPATIENT)
Dept: FAMILY MEDICINE CLINIC | Age: 80
End: 2020-02-17

## 2020-02-17 ENCOUNTER — ANTI-COAG VISIT (OUTPATIENT)
Dept: CARDIOLOGY CLINIC | Age: 80
End: 2020-02-17
Payer: MEDICARE

## 2020-02-17 PROCEDURE — 93793 ANTICOAG MGMT PT WARFARIN: CPT | Performed by: NURSE PRACTITIONER

## 2020-02-17 NOTE — PROGRESS NOTES
[address] [facility], 1940      Anticoagulation Indication(s):  Afib    Referring Physician:   Dr. Leonard Gordon  Goal INR Range:  2.0-3.0  Duration of Anticoagulation Therapy:  Indefinite  Home or Lab Draw: Lab  Product patient has at home: warfarin 5 mg    Recent INR Results:  Lab Results   Component Value Date    INR 2.27 (H) 02/14/2020    INR 3.93 (H) 02/05/2020    INR 2.64 (H) 01/27/2020    INR 2.82 (H) 01/13/2020       INR Summary                          Warfarin regimen (mg)  Date INR A/P Sun Mon Tue Wed Thu Fri Sat Mg/wk   2/14/20 2.27 At goal, no change 2.5 5 2.5 5 2.5 2.5 2.5 22.5   2/5/20 3.93 Above goal,hold todays dose and decrease by 2.5 2.5 5 2.5 5 hold 2.5 2.5 20   1/27/20 2.64 At goal, no change 2.5 5 2.5 5 2.5 5 2.5 25   1/13/20 2.8 At goal, no change 2.5 5 2.5 5 2.5 5 2.5 25   12/27/19 1.44 Below goal, increased by 6.5 mg See note 2.5 5 2.5 5 2.5 5 2.5 25   12/20/19 1.27 Below goal, increase by 5 2.5 2 2.5 2 2.5 10(Pt did not increase as instructed) he took 5 mg 2 23.5    (he actually took 18.5 mg)   12/13/19 1.44 Below goal, increase by 3 2.5 2 2.5 2 2.5 5 2 18.5   12/6/19 1.59 Below goal, increase by. 2.5 1 2.5 1 2.5 5 1 15.5   12/2/19 4.10 Above goal, hold tonight  hold 2.5 2.5 2.5 Recheck     11/29/19 2.47 At goal, no change 5 2.5 2.5 2.5 2.5 5 5 25   11/25/16 5.86 Above goal 5 hold hold 2.5 5 recheck  12.5   11/19/19 2.38 At goal, continue 5 5 5 5 5 5 5 35         Assessment/Plan:    Recent hospitalizations/HC visits None reported   Recent medication changes None reported   Medications taken regularly that may interact with warfarin or alter INR No significant drug interactions identified   Warfarin dose taken as prescribed Patient uses pillbox? no   Signs/symptoms of bleeding History of bleeding?  Yes, GIB   Vitamin K intake Normally has ~1-2 servings of green, leafy vegetables per week   Recent vomiting/diarrhea/fever None reported   Changes in weight, activity,

## 2020-02-26 ENCOUNTER — HOSPITAL ENCOUNTER (OUTPATIENT)
Age: 80
Discharge: HOME | End: 2020-02-26
Payer: MEDICARE

## 2020-02-26 LAB
INR BLD: 2.78 (ref 0.86–1.14)
PROTHROMBIN TIME: 32.6 SEC (ref 10–13.2)

## 2020-02-26 PROCEDURE — 85610 PROTHROMBIN TIME: CPT

## 2020-02-26 PROCEDURE — 36415 COLL VENOUS BLD VENIPUNCTURE: CPT

## 2020-02-27 ENCOUNTER — ANTI-COAG VISIT (OUTPATIENT)
Dept: CARDIOLOGY CLINIC | Age: 80
End: 2020-02-27
Payer: MEDICARE

## 2020-02-27 PROCEDURE — 93793 ANTICOAG MGMT PT WARFARIN: CPT | Performed by: NURSE PRACTITIONER

## 2020-03-05 ENCOUNTER — HOSPITAL ENCOUNTER (OUTPATIENT)
Age: 80
Discharge: HOME | End: 2020-03-05
Payer: MEDICARE

## 2020-03-05 LAB
INR BLD: 3.07 (ref 0.86–1.14)
PROTHROMBIN TIME: 36 SEC (ref 10–13.2)

## 2020-03-05 PROCEDURE — 85610 PROTHROMBIN TIME: CPT

## 2020-03-05 PROCEDURE — 36415 COLL VENOUS BLD VENIPUNCTURE: CPT

## 2020-03-06 ENCOUNTER — ANTI-COAG VISIT (OUTPATIENT)
Dept: CARDIOLOGY CLINIC | Age: 80
End: 2020-03-06
Payer: MEDICARE

## 2020-03-06 PROCEDURE — 93793 ANTICOAG MGMT PT WARFARIN: CPT | Performed by: NURSE PRACTITIONER

## 2020-03-06 NOTE — PROGRESS NOTES
~1-2 servings of green, leafy vegetables per week   Recent vomiting/diarrhea/fever None reported   Changes in weight, activity, stress None reported   Tobacco or alcohol use Patient reports smoking 0 PPD  Patient reports having 0 drinks per day   Upcoming surgeries or procedures None reported     Patient was also reminded to maintain consistent vitamin K intake and call with any bleeding, medication changes, or fever/vomiting/diarrhea.     Next INR check:  3/11/20    Spoke with pt, instructed to hold todays dose of Coumadin    Addendum:  Reviewed documentation and plan of care from RN  Agree with above  Tomasz Castillo NP

## 2020-03-09 ENCOUNTER — OFFICE VISIT (OUTPATIENT)
Dept: CARDIOLOGY CLINIC | Age: 80
End: 2020-03-09
Payer: MEDICARE

## 2020-03-09 VITALS
SYSTOLIC BLOOD PRESSURE: 110 MMHG | HEART RATE: 77 BPM | DIASTOLIC BLOOD PRESSURE: 58 MMHG | WEIGHT: 186 LBS | BODY MASS INDEX: 30.95 KG/M2

## 2020-03-09 PROCEDURE — G8482 FLU IMMUNIZE ORDER/ADMIN: HCPCS | Performed by: INTERNAL MEDICINE

## 2020-03-09 PROCEDURE — 4040F PNEUMOC VAC/ADMIN/RCVD: CPT | Performed by: INTERNAL MEDICINE

## 2020-03-09 PROCEDURE — 99213 OFFICE O/P EST LOW 20 MIN: CPT | Performed by: INTERNAL MEDICINE

## 2020-03-09 PROCEDURE — 1036F TOBACCO NON-USER: CPT | Performed by: INTERNAL MEDICINE

## 2020-03-09 PROCEDURE — G8417 CALC BMI ABV UP PARAM F/U: HCPCS | Performed by: INTERNAL MEDICINE

## 2020-03-09 PROCEDURE — G8427 DOCREV CUR MEDS BY ELIG CLIN: HCPCS | Performed by: INTERNAL MEDICINE

## 2020-03-09 PROCEDURE — 1123F ACP DISCUSS/DSCN MKR DOCD: CPT | Performed by: INTERNAL MEDICINE

## 2020-03-09 RX ORDER — NITROGLYCERIN 40 MG/1
1 PATCH TRANSDERMAL DAILY
Qty: 30 PATCH | Refills: 3 | Status: SHIPPED | OUTPATIENT
Start: 2020-03-09 | End: 2020-03-11 | Stop reason: SDUPTHER

## 2020-03-09 NOTE — PROGRESS NOTES
Subjective:      Patient ID: Cathlene Kehr is a [de-identified] y.o. male. CC:  S/p GI bleed. F/u HTN  CAD. S/p pacer. HPI: Mr Kaylynn Blum is here for a 6 moth f/u. [facility] He denies chest pain today but has chronic swelling. He c/o of fatigue and low b/p. No LOC but fell and hit his hip and has bad leg pain. Back is bad and is using a 4 pt walker. Worried about bleeding and back surgeons shy away from surgery on back d/t bleeding. Has paroxysmal brief episodes of AF, less than 1%. He had life threatening GI bleeding on NOAC. He doesn't want watchman. Has more AF on pacer interrogation. GI said ok for [facility]. Has edema. Has dizziness and will decrease nitrodur patch 0.2mg/hr.          Allergies   Allergen Reactions    Avelox [Moxifloxacin Hcl In Nacl] Anaphylaxis    Epinephrine Base     Other      [place] per PT - swelling size of silver dollar at site per PT     Keflex [Cephalexin] Nausea And Vomiting    Polysporin [Bacitracin-Polymyxin B] Rash        Social History     Socioeconomic History    Marital status:      Spouse name: Not on file    Number of children: Not on file    Years of education: Not on file    Highest education level: Not on file   Occupational History    Not on file   Social Needs    Financial resource strain: Not on file    Food insecurity     Worry: Not on file     Inability: Not on file    Transportation needs     Medical: Not on file     Non-medical: Not on file   Tobacco Use    Smoking status: Former Smoker     Packs/day: 1.00     Years: 30.00     Pack years: 30.00     Last attempt to quit: 4/12/2004     Years since quitting: 15.9    Smokeless tobacco: Never Used   Substance and Sexual Activity    Alcohol use: No     Alcohol/week: 0.0 standard drinks    Drug use: No    Sexual activity: Yes     Comment:  living with spouse   Lifestyle    Physical activity     Days per week: Not on file     Minutes per session: Not on file    Stress: Not on file   Relationships    regular rhythm, normal heart sounds and intact distal pulses. Exam reveals no gallop and no friction rub. No murmur heard. Pulmonary/Chest: Effort normal. No respiratory distress. He has no wheezes. He has no rales. He exhibits no tenderness. Abdominal: Soft. Bowel sounds are normal. He exhibits no distension and no mass. No tenderness. He has no rebound and no guarding. Musculoskeletal: tender left leg and swelling. Lymphadenopathy:     He has no cervical adenopathy. Neurological: He is alert and oriented to person, place, and time. No cranial nerve deficit. Coordination normal.   Skin: Skin is warm and dry. No rash noted. He is not diaphoretic. No erythema. No pallor. Psychiatric: He has a normal mood and affect. His behavior is normal. Judgment and thought content normal.       Assessment:       Diagnosis Orders   1. Essential hypertension     2. Angina pectoris ([address]     3. Coronary artery disease involving native coronary artery of native heart without angina pectoris     4. Mixed hyperlipidemia             Plan:        CAD:  Stable. No chest pains but had GI bleed - still off A/C for paroxysmal a fib. Off ranexa. Rhythm: MRI compatible pacer   HTN:  Episodic and low today. Had LLE skin infection and wears compression stockings. Now taking lasix 40 mg am and pm.  AT/AF:  Has episodes and feels tired. Taking multaq to maintain SR. Reviewed chads vasc. Check labs. He has tremendous fatigue.

## 2020-03-16 RX ORDER — NITROGLYCERIN 40 MG/1
1 PATCH TRANSDERMAL DAILY
Qty: 90 PATCH | Refills: 3 | Status: SHIPPED | OUTPATIENT
Start: 2020-03-16 | End: 2021-02-25

## 2020-03-24 ENCOUNTER — HOSPITAL ENCOUNTER (OUTPATIENT)
Age: 80
Discharge: HOME | End: 2020-03-24
Payer: MEDICARE

## 2020-03-24 LAB
BASOPHILS ABSOLUTE: 0 K/UL (ref 0–0.2)
BASOPHILS RELATIVE PERCENT: 0.5 %
EOSINOPHILS ABSOLUTE: 0.4 K/UL (ref 0–0.6)
EOSINOPHILS RELATIVE PERCENT: 7.7 %
HCT VFR BLD CALC: 32.2 % (ref 40.5–52.5)
HEMOGLOBIN: 10.6 G/DL (ref 13.5–17.5)
INR BLD: 3.42 (ref 0.86–1.14)
LYMPHOCYTES ABSOLUTE: 2 K/UL (ref 1–5.1)
LYMPHOCYTES RELATIVE PERCENT: 34.3 %
MCH RBC QN AUTO: 26.7 PG (ref 26–34)
MCHC RBC AUTO-ENTMCNC: 32.9 G/DL (ref 31–36)
MCV RBC AUTO: 81 FL (ref 80–100)
MONOCYTES ABSOLUTE: 0.6 K/UL (ref 0–1.3)
MONOCYTES RELATIVE PERCENT: 9.7 %
NEUTROPHILS ABSOLUTE: 2.8 K/UL (ref 1.7–7.7)
NEUTROPHILS RELATIVE PERCENT: 47.8 %
PDW BLD-RTO: 16.1 % (ref 12.4–15.4)
PLATELET # BLD: 202 K/UL (ref 135–450)
PMV BLD AUTO: 9 FL (ref 5–10.5)
PROTHROMBIN TIME: 40.2 SEC (ref 10–13.2)
RBC # BLD: 3.97 M/UL (ref 4.2–5.9)
WBC # BLD: 5.8 K/UL (ref 4–11)

## 2020-03-24 PROCEDURE — 85025 COMPLETE CBC W/AUTO DIFF WBC: CPT

## 2020-03-24 PROCEDURE — 36415 COLL VENOUS BLD VENIPUNCTURE: CPT

## 2020-03-24 PROCEDURE — 85610 PROTHROMBIN TIME: CPT

## 2020-03-30 ENCOUNTER — ANTI-COAG VISIT (OUTPATIENT)
Dept: CARDIOLOGY CLINIC | Age: 80
End: 2020-03-30

## 2020-03-30 ENCOUNTER — TELEPHONE (OUTPATIENT)
Dept: CARDIOLOGY CLINIC | Age: 80
End: 2020-03-30

## 2020-03-30 DIAGNOSIS — I48.91 ATRIAL FIBRILLATION, UNSPECIFIED TYPE (HCC): ICD-10-CM

## 2020-03-30 LAB
INR BLD: 3.7 (ref 0.86–1.14)
PROTHROMBIN TIME: 43.5 SEC (ref 10–13.2)

## 2020-03-30 NOTE — PROGRESS NOTES
[address] [facility], 1940      Anticoagulation Indication(s):  Afib    Referring Physician:   Dr. Shane Ayala  Goal INR Range:  2.0-3.0  Duration of Anticoagulation Therapy:  Indefinite  Home or Lab Draw: Lab  Product patient has at home: warfarin 5 mg    Recent INR Results:  Lab Results   Component Value Date    INR 3.42 (H) 03/24/2020    INR 3.07 (H) 03/05/2020    INR 2.78 (H) 02/26/2020    INR 2.27 (H) 02/14/2020     INR was 3.42   On 3/24/20    INR Summary                          Warfarin regimen (mg)  Date INR A/P Sun Mon Tue Wed Thu Fri Sat Mg/wk   3/5/20 3.07 Above goal, hold todays dose 2.5 5 2.5 5 2.5 hold 2.5 20   2/26/20 2.78 At goal, no change 2.5 5 2.5 5 2.5 2.5 2.5 22.5   2/14/20 2.27 At goal, no change 2.5 5 2.5 5 2.5 2.5 2.5 22.5   2/5/20 3.93 Above goal,hold todays dose and decrease by 2.5 2.5 5 2.5 5 hold 2.5 2.5 20   1/27/20 2.64 At goal, no change 2.5 5 2.5 5 2.5 5 2.5 25   1/13/20 2.8 At goal, no change 2.5 5 2.5 5 2.5 5 2.5 25   12/27/19 1.44 Below goal, increased by 6.5 mg See note 2.5 5 2.5 5 2.5 5 2.5 25   12/20/19 1.27 Below goal, increase by 5 2.5 2 2.5 2 2.5 10(Pt did not increase as instructed) he took 5 mg 2 23.5    (he actually took 18.5 mg)   12/13/19 1.44 Below goal, increase by 3 2.5 2 2.5 2 2.5 5 2 18.5   12/6/19 1.59 Below goal, increase by. 2.5 1 2.5 1 2.5 5 1 15.5   12/2/19 4.10 Above goal, hold tonight  hold 2.5 2.5 2.5 Recheck     11/29/19 2.47 At goal, no change 5 2.5 2.5 2.5 2.5 5 5 25   11/25/16 5.86 Above goal 5 hold hold 2.5 5 recheck  12.5   11/19/19 2.38 At goal, continue 5 5 5 5 5 5 5 35         Assessment/Plan:    Recent hospitalizations/HC visits None reported   Recent medication changes None reported   Medications taken regularly that may interact with warfarin or alter INR No significant drug interactions identified   Warfarin dose taken as prescribed Patient uses pillbox? no   Signs/symptoms of bleeding History of bleeding?  Yes, GIB Vitamin K intake Normally has ~1-2 servings of green, leafy vegetables per week   Recent vomiting/diarrhea/fever None reported   Changes in weight, activity, stress None reported   Tobacco or alcohol use Patient reports smoking 0 PPD  Patient reports having 0 drinks per day   Upcoming surgeries or procedures None reported     Patient was also reminded to maintain consistent vitamin K intake and call with any bleeding, medication changes, or fever/vomiting/diarrhea. Next INR check:  3/30/20    Instructed pt to go and get INR rechecked. Results were not sent to this office.   Pt voiced an understanding     INR was 3.42   On 3/24/20

## 2020-03-30 NOTE — TELEPHONE ENCOUNTER
Patient called requesting to speak to a nurse regarding his blood thinner please call to discuss this matter regarding his INR patient had blood work  done 3/24/20 haven't received a call from anyone please advised

## 2020-03-31 ENCOUNTER — ANTI-COAG VISIT (OUTPATIENT)
Dept: CARDIOLOGY CLINIC | Age: 80
End: 2020-03-31
Payer: MEDICARE

## 2020-03-31 PROCEDURE — 93793 ANTICOAG MGMT PT WARFARIN: CPT | Performed by: NURSE PRACTITIONER

## 2020-03-31 NOTE — PROGRESS NOTES
prescribed Patient uses pillbox? no   Signs/symptoms of bleeding History of bleeding? Yes, GIB   Vitamin K intake Normally has ~1-2 servings of green, leafy vegetables per week   Recent vomiting/diarrhea/fever None reported   Changes in weight, activity, stress None reported   Tobacco or alcohol use Patient reports smoking 0 PPD  Patient reports having 0 drinks per day   Upcoming surgeries or procedures None reported     Patient was also reminded to maintain consistent vitamin K intake and call with any bleeding, medication changes, or fever/vomiting/diarrhea. Next INR check: 4/6/20    Informed pt, to hold todays dose and to decrease dose to 2.5mg everyday.     Reviewed INR   At goal / not at goal INR   Next date blood to be drawn as above     IRINA Mcgregor FNP

## 2020-04-07 LAB
INR BLD: 1.7 (ref 0.8–1.2)
PROTHROMBIN TIME: 20 SECONDS (ref 11.7–14.2)

## 2020-04-08 ENCOUNTER — ANTI-COAG VISIT (OUTPATIENT)
Dept: CARDIOLOGY CLINIC | Age: 80
End: 2020-04-08
Payer: MEDICARE

## 2020-04-08 PROCEDURE — 93793 ANTICOAG MGMT PT WARFARIN: CPT | Performed by: NURSE PRACTITIONER

## 2020-04-08 NOTE — PROGRESS NOTES
[address] [facility], 1940      Anticoagulation Indication(s):  Afib    Referring Physician:   Dr. Emily Casiano  Goal INR Range:  2.0-3.0  Duration of Anticoagulation Therapy:  Indefinite  Home or Lab Draw: Lab  Product patient has at home: warfarin 5 mg    Recent INR Results:  Lab Results   Component Value Date    INR 1.7 (H) 04/07/2020    INR 3.70 (H) 03/30/2020    INR 3.42 (H) 03/24/2020    INR 3.07 (H) 03/05/2020         INR Summary                          Warfarin regimen (mg)  Date INR A/P Sun Mon Tue Wed Thu Fri Sat Mg/wk   4/7/20 1.7 Below goal, increase by 2.5 2.5 2.5 2.5 5 2.5 2.5 2.5 20   3/30/20 3.70 Above goal, hold todays  And decrease by 5mg  2.5 2.5 hold 2.5 2.5 2.5 2.5 15   3/5/20 3.07 Above goal, hold todays dose 2.5 5 2.5 5 2.5 hold 2.5 20   2/26/20 2.78 At goal, no change 2.5 5 2.5 5 2.5 2.5 2.5 22.5   2/14/20 2.27 At goal, no change 2.5 5 2.5 5 2.5 2.5 2.5 22.5   2/5/20 3.93 Above goal,hold todays dose and decrease by 2.5 2.5 5 2.5 5 hold 2.5 2.5 20   1/27/20 2.64 At goal, no change 2.5 5 2.5 5 2.5 5 2.5 25   1/13/20 2.8 At goal, no change 2.5 5 2.5 5 2.5 5 2.5 25   12/27/19 1.44 Below goal, increased by 6.5 mg See note 2.5 5 2.5 5 2.5 5 2.5 25   12/20/19 1.27 Below goal, increase by 5 2.5 2 2.5 2 2.5 10(Pt did not increase as instructed) he took 5 mg 2 23.5    (he actually took 18.5 mg)   12/13/19 1.44 Below goal, increase by 3 2.5 2 2.5 2 2.5 5 2 18.5   12/6/19 1.59 Below goal, increase by.   2.5 1 2.5 1 2.5 5 1 15.5   12/2/19 4.10 Above goal, hold tonight  hold 2.5 2.5 2.5 Recheck     11/29/19 2.47 At goal, no change 5 2.5 2.5 2.5 2.5 5 5 25   11/25/16 5.86 Above goal 5 hold hold 2.5 5 recheck  12.5   11/19/19 2.38 At goal, continue 5 5 5 5 5 5 5 35         Assessment/Plan:    Recent hospitalizations/HC visits None reported   Recent medication changes None reported   Medications taken regularly that may interact with warfarin or alter INR No significant drug

## 2020-04-15 LAB
INR BLD: 1.9 (ref 0.8–1.2)
PROTHROMBIN TIME: 22.1 SECONDS (ref 11.7–14.2)

## 2020-04-16 ENCOUNTER — ANTI-COAG VISIT (OUTPATIENT)
Dept: CARDIOLOGY CLINIC | Age: 80
End: 2020-04-16
Payer: MEDICARE

## 2020-04-16 PROCEDURE — 93793 ANTICOAG MGMT PT WARFARIN: CPT | Performed by: NURSE PRACTITIONER

## 2020-04-30 LAB
INR BLD: 1.8 (ref 0.8–1.2)
PROTHROMBIN TIME: 21.4 SECONDS (ref 11.7–14.2)

## 2020-05-05 ENCOUNTER — TELEPHONE (OUTPATIENT)
Dept: CARDIOLOGY CLINIC | Age: 80
End: 2020-05-05

## 2020-05-05 ENCOUNTER — ANTI-COAG VISIT (OUTPATIENT)
Dept: CARDIOLOGY CLINIC | Age: 80
End: 2020-05-05
Payer: MEDICARE

## 2020-05-05 PROCEDURE — 93793 ANTICOAG MGMT PT WARFARIN: CPT | Performed by: NURSE PRACTITIONER

## 2020-05-07 LAB
INR BLD: 2.1 (ref 0.8–1.2)
PROTHROMBIN TIME: 23.9 SECONDS (ref 11.7–14.2)

## 2020-05-08 ENCOUNTER — ANTI-COAG VISIT (OUTPATIENT)
Dept: CARDIOLOGY CLINIC | Age: 80
End: 2020-05-08
Payer: MEDICARE

## 2020-05-08 ENCOUNTER — TELEPHONE (OUTPATIENT)
Dept: CARDIOLOGY CLINIC | Age: 80
End: 2020-05-08

## 2020-05-08 PROCEDURE — 93793 ANTICOAG MGMT PT WARFARIN: CPT | Performed by: NURSE PRACTITIONER

## 2020-05-13 ENCOUNTER — PATIENT MESSAGE (OUTPATIENT)
Dept: FAMILY MEDICINE CLINIC | Age: 80
End: 2020-05-13

## 2020-05-14 LAB
INR BLD: 2.7 (ref 0.8–1.2)
PROTHROMBIN TIME: 29.1 SECONDS (ref 11.7–14.2)

## 2020-05-14 RX ORDER — CLINDAMYCIN HYDROCHLORIDE 150 MG/1
150 CAPSULE ORAL 3 TIMES DAILY
Qty: 21 CAPSULE | Refills: 0 | Status: SHIPPED | OUTPATIENT
Start: 2020-05-14 | End: 2020-05-21

## 2020-05-14 NOTE — TELEPHONE ENCOUNTER
From: Lito Ribeiro  To: Colten Hull MD  Sent: 5/13/2020 6:33 PM EDT  Subject: Prescription Question    Hi Dr. Destiny Holman, This is Andreea Baez on behalf of my father Shailesh Rod. He has been having external fluid draining from his lower legs where his edema acts up at this time of year and he has rather rapidly developed a large, ulcerated sore. It's approx 2-3 inches diameter and draining a yellowish thick liquid. I will attach a picture as well. If you are able to send in a prescription to [facility] for an oral antibiotic so we can avoid the extra exposure of bringing him in for an appt that would be great.   Thanks again for all your help, Andreea Baez

## 2020-05-18 ENCOUNTER — ANTI-COAG VISIT (OUTPATIENT)
Dept: CARDIOLOGY CLINIC | Age: 80
End: 2020-05-18
Payer: MEDICARE

## 2020-05-18 ENCOUNTER — TELEPHONE (OUTPATIENT)
Dept: CARDIOLOGY CLINIC | Age: 80
End: 2020-05-18

## 2020-05-18 PROCEDURE — 93793 ANTICOAG MGMT PT WARFARIN: CPT | Performed by: NURSE PRACTITIONER

## 2020-05-18 NOTE — PROGRESS NOTES
[address] [facility], 1940      Anticoagulation Indication(s):  Afib  pAF   Referring Physician:   Dr. Bubba Arana  Goal INR Range:  2.0-3.0  Duration of Anticoagulation Therapy:  Indefinite  Home or Lab Draw: Lab  Product patient has at home: warfarin 5 mg    Recent INR Results:  Lab Results   Component Value Date    INR 2.7 (H) 05/14/2020    INR 2.1 (H) 05/07/2020    INR 1.8 (H) 04/30/2020    INR 1.9 (H) 04/15/2020       INR Summary                          Warfarin regimen (mg)  Date INR A/P Sun Mon Tue Wed Thu Fri Sat Mg/wk   5/14/20 2.7 At goal, continue dose 2.5 2.5 5 5 2.5 2.5 2.5 22.5   5/7/20 2.1 At goal, continue dose 2.5 2.5 5 5 2.5 2.5 2.5 22.5   4/30/20 1.8 Below goal, increase dose by 2.5 mg 2.5 2.5 5 5 2.5 2.5 2.5 22.5   4/15/20 1.9 At goal, no change 2.5 2.5 2.5 5 2.5 2.5 2.5 20   4/7/20 1.7 Below goal, increase by 2.5 2.5 2.5 2.5 5 2.5 2.5 2.5 20   3/30/20 3.70 Above goal, hold todays  And decrease by 5mg  2.5 2.5 hold 2.5 2.5 2.5 2.5 15   3/5/20 3.07 Above goal, hold todays dose 2.5 5 2.5 5 2.5 hold 2.5 20   2/26/20 2.78 At goal, no change 2.5 5 2.5 5 2.5 2.5 2.5 22.5   2/14/20 2.27 At goal, no change 2.5 5 2.5 5 2.5 2.5 2.5 22.5   2/5/20 3.93 Above goal,hold todays dose and decrease by 2.5 2.5 5 2.5 5 hold 2.5 2.5 20   1/27/20 2.64 At goal, no change 2.5 5 2.5 5 2.5 5 2.5 25   1/13/20 2.8 At goal, no change 2.5 5 2.5 5 2.5 5 2.5 25   12/27/19 1.44 Below goal, increased by 6.5 mg See note 2.5 5 2.5 5 2.5 5 2.5 25   12/20/19 1.27 Below goal, increase by 5 2.5 2 2.5 2 2.5 10(Pt did not increase as instructed) he took 5 mg 2 23.5    (he actually took 18.5 mg)   12/13/19 1.44 Below goal, increase by 3 2.5 2 2.5 2 2.5 5 2 18.5   12/6/19 1.59 Below goal, increase by.   2.5 1 2.5 1 2.5 5 1 15.5   12/2/19 4.10 Above goal, hold tonight  hold 2.5 2.5 2.5 Recheck     11/29/19 2.47 At goal, no change 5 2.5 2.5 2.5 2.5 5 5 25   11/25/16 5.86 Above goal 5 hold hold 2.5 5 recheck  12.5 11/19/19 2.38 At goal, continue 5 5 5 5 5 5 5 35         Assessment/Plan:    Recent hospitalizations/HC visits None reported   Recent medication changes None reported   Medications taken regularly that may interact with warfarin or alter INR No significant drug interactions identified   Warfarin dose taken as prescribed Patient uses pillbox? no   Signs/symptoms of bleeding History of bleeding? Yes, GIB   Vitamin K intake Normally has ~1-2 servings of green, leafy vegetables per week   Recent vomiting/diarrhea/fever None reported   Changes in weight, activity, stress None reported   Tobacco or alcohol use Patient reports smoking 0 PPD  Patient reports having 0 drinks per day   Upcoming surgeries or procedures None reported     Patient was also reminded to maintain consistent vitamin K intake and call with any bleeding, medication changes, or fever/vomiting/diarrhea. Next INR check: 5/21/20  Reviewed INR   At goal  Next date blood to be drawn  5/21/20     IRINA Rodriguez FNP           Informed pt INR was within goal, reviewed pt's dosing, recheck in 1 week. Pt verbalized understanding.

## 2020-05-18 NOTE — TELEPHONE ENCOUNTER
Patient calling to receive his INR results from last week. Usually he hears the following day but has not heard anything. His results are 2.7 per chart. Please call patient with dosage instructions.

## 2020-05-19 ENCOUNTER — NURSE ONLY (OUTPATIENT)
Dept: CARDIOLOGY CLINIC | Age: 80
End: 2020-05-19
Payer: MEDICARE

## 2020-05-19 PROCEDURE — 93296 REM INTERROG EVL PM/IDS: CPT | Performed by: INTERNAL MEDICINE

## 2020-05-19 PROCEDURE — 93294 REM INTERROG EVL PM/LDLS PM: CPT | Performed by: INTERNAL MEDICINE

## 2020-05-21 LAB
INR BLD: 2.7 (ref 0.8–1.2)
PROTHROMBIN TIME: 29.4 SECONDS (ref 11.7–14.2)

## 2020-05-27 LAB
INR BLD: 2 (ref 0.8–1.2)
PROTHROMBIN TIME: 23 SECONDS (ref 11.7–14.2)

## 2020-05-28 ENCOUNTER — ANTI-COAG VISIT (OUTPATIENT)
Dept: CARDIOLOGY CLINIC | Age: 80
End: 2020-05-28
Payer: MEDICARE

## 2020-05-28 PROCEDURE — 93793 ANTICOAG MGMT PT WARFARIN: CPT | Performed by: NURSE PRACTITIONER

## 2020-05-28 NOTE — PROGRESS NOTES
[address] [facility], 1940      Anticoagulation Indication(s):  Afib  pAF   Referring Physician:   Dr. Angelika Simpson  Goal INR Range:  2.0-3.0  Duration of Anticoagulation Therapy:  Indefinite  Home or Lab Draw: Lab  Product patient has at home: warfarin 5 mg    Recent INR Results:  Lab Results   Component Value Date    INR 2.0 (H) 05/27/2020    INR 2.7 (H) 05/21/2020    INR 2.7 (H) 05/14/2020    INR 2.1 (H) 05/07/2020       INR Summary                          Warfarin regimen (mg)  Date INR A/P Sun Mon Tue Wed Thu Fri Sat Mg/wk   5/27/20 2.0 At goal, continue dose 2.5 2.5 5 5 2.5 2.5 2.5 22.5   5/14/20 2.7 At goal, continue dose 2.5 2.5 5 5 2.5 2.5 2.5 22.5   5/7/20 2.1 At goal, continue dose 2.5 2.5 5 5 2.5 2.5 2.5 22.5   4/30/20 1.8 Below goal, increase dose by 2.5 mg 2.5 2.5 5 5 2.5 2.5 2.5 22.5   4/15/20 1.9 At goal, no change 2.5 2.5 2.5 5 2.5 2.5 2.5 20   4/7/20 1.7 Below goal, increase by 2.5 2.5 2.5 2.5 5 2.5 2.5 2.5 20   3/30/20 3.70 Above goal, hold todays  And decrease by 5mg  2.5 2.5 hold 2.5 2.5 2.5 2.5 15   3/5/20 3.07 Above goal, hold todays dose 2.5 5 2.5 5 2.5 hold 2.5 20   2/26/20 2.78 At goal, no change 2.5 5 2.5 5 2.5 2.5 2.5 22.5   2/14/20 2.27 At goal, no change 2.5 5 2.5 5 2.5 2.5 2.5 22.5   2/5/20 3.93 Above goal,hold todays dose and decrease by 2.5 2.5 5 2.5 5 hold 2.5 2.5 20   1/27/20 2.64 At goal, no change 2.5 5 2.5 5 2.5 5 2.5 25   1/13/20 2.8 At goal, no change 2.5 5 2.5 5 2.5 5 2.5 25   12/27/19 1.44 Below goal, increased by 6.5 mg See note 2.5 5 2.5 5 2.5 5 2.5 25   12/20/19 1.27 Below goal, increase by 5 2.5 2 2.5 2 2.5 10(Pt did not increase as instructed) he took 5 mg 2 23.5    (he actually took 18.5 mg)   12/13/19 1.44 Below goal, increase by 3 2.5 2 2.5 2 2.5 5 2 18.5   12/6/19 1.59 Below goal, increase by.   2.5 1 2.5 1 2.5 5 1 15.5   12/2/19 4.10 Above goal, hold tonight  hold 2.5 2.5 2.5 Recheck     11/29/19 2.47 At goal, no change 5 2.5 2.5 2.5 2.5 5 5

## 2020-06-02 ENCOUNTER — OFFICE VISIT (OUTPATIENT)
Dept: FAMILY MEDICINE CLINIC | Age: 80
End: 2020-06-02
Payer: MEDICARE

## 2020-06-02 ENCOUNTER — NURSE TRIAGE (OUTPATIENT)
Dept: OTHER | Facility: CLINIC | Age: 80
End: 2020-06-02

## 2020-06-02 ENCOUNTER — TELEPHONE (OUTPATIENT)
Dept: FAMILY MEDICINE CLINIC | Age: 80
End: 2020-06-02

## 2020-06-02 VITALS
DIASTOLIC BLOOD PRESSURE: 76 MMHG | HEART RATE: 67 BPM | OXYGEN SATURATION: 94 % | SYSTOLIC BLOOD PRESSURE: 114 MMHG | HEIGHT: 65 IN | BODY MASS INDEX: 29.16 KG/M2 | WEIGHT: 175 LBS

## 2020-06-02 PROCEDURE — 4040F PNEUMOC VAC/ADMIN/RCVD: CPT | Performed by: FAMILY MEDICINE

## 2020-06-02 PROCEDURE — 1123F ACP DISCUSS/DSCN MKR DOCD: CPT | Performed by: FAMILY MEDICINE

## 2020-06-02 PROCEDURE — 99213 OFFICE O/P EST LOW 20 MIN: CPT | Performed by: FAMILY MEDICINE

## 2020-06-02 PROCEDURE — G8417 CALC BMI ABV UP PARAM F/U: HCPCS | Performed by: FAMILY MEDICINE

## 2020-06-02 PROCEDURE — G8427 DOCREV CUR MEDS BY ELIG CLIN: HCPCS | Performed by: FAMILY MEDICINE

## 2020-06-02 PROCEDURE — 1036F TOBACCO NON-USER: CPT | Performed by: FAMILY MEDICINE

## 2020-06-02 ASSESSMENT — PATIENT HEALTH QUESTIONNAIRE - PHQ9
SUM OF ALL RESPONSES TO PHQ QUESTIONS 1-9: 0
SUM OF ALL RESPONSES TO PHQ9 QUESTIONS 1 & 2: 0
1. LITTLE INTEREST OR PLEASURE IN DOING THINGS: 0
2. FEELING DOWN, DEPRESSED OR HOPELESS: 0
SUM OF ALL RESPONSES TO PHQ QUESTIONS 1-9: 0

## 2020-06-02 NOTE — TELEPHONE ENCOUNTER
I am at home doing video visits today. It also looks like everyone else in my practice at the office is full today but if there is an open slot he could be offered an appointment with 1 of them.   Otherwise I will squeeze him in tomorrow on Wednesday, Kecia 3 at 12 noon

## 2020-06-02 NOTE — PROGRESS NOTES
of native heart without angina pectoris 2011    DDD (degenerative disc disease), lumbar     Falls 2017    GI bleeding     Hyperlipidemia     Hypertension     MI ([address]ocardial infarction) [address] [address]     MRSA (methicillin resistant staph aureus) culture positive     h/o infection in the blood    Pneumonia     S/P PTCA (percutaneous transluminal coronary angioplasty) stents x 8    SSS (sick sinus syndrome) ([facility] [address]     Unspecified sleep apnea     Venous (peripheral) insufficiency 2018       Social History     Tobacco Use    Smoking status: Former Smoker     Packs/day: 1.00     Years: 30.00     Pack years: 30.00     Last attempt to quit: 2004     Years since quittin.1    Smokeless tobacco: Never Used   Substance Use Topics    Alcohol use: No     Alcohol/week: 0.0 standard drinks       Family History   Problem Relation Age of Onset    Cancer Sister     Coronary Art Dis Brother        OBJECTIVE:  /76   Pulse 67   Ht 5' 5\" (1.651 m)   Wt 175 lb (79.4 kg)   SpO2 94%   BMI 29.12 kg/m²   GEN:  in NAD, pacing  HEENT:  NCAT, large wax plug in his right canal  NECK:  Supple without adenopathy. PULM:  Chest is clear, no wheezing ,  symmetric air entry throughout both lung fields. ABD: Soft, NT  EXT: No rash or edema  NEURO: Alert oriented ×3, uses cane    ASSESSMENT/PLAN:  1.  Hearing loss due to cerumen impaction, right  Ear lavage help loosen it up and then I was able to curette a large piece of wax out of his right ear canal  .  2. Proxy a fib  Check PT/INR at [facility] lab as they do not want to go to [facility] lab, too far

## 2020-06-03 ENCOUNTER — TELEPHONE (OUTPATIENT)
Dept: FAMILY MEDICINE CLINIC | Age: 80
End: 2020-06-03

## 2020-06-03 RX ORDER — ROSUVASTATIN CALCIUM 5 MG/1
TABLET, COATED ORAL
Qty: 90 TABLET | Refills: 3 | Status: SHIPPED | OUTPATIENT
Start: 2020-06-03 | End: 2021-03-01 | Stop reason: SDUPTHER

## 2020-06-03 NOTE — TELEPHONE ENCOUNTER
Ariadna with [facility] advised that PT was not collected yesterday while patient was in office. She had no additional questions.

## 2020-06-03 NOTE — TELEPHONE ENCOUNTER
Ariadna from [facility] called asking if the PT was collected at the office yesterday and if it is still at the office?   Contact Jose Jones at [facility] [phone]

## 2020-06-04 ENCOUNTER — ANTI-COAG VISIT (OUTPATIENT)
Dept: CARDIOLOGY CLINIC | Age: 80
End: 2020-06-04
Payer: MEDICARE

## 2020-06-04 PROCEDURE — 93793 ANTICOAG MGMT PT WARFARIN: CPT | Performed by: NURSE PRACTITIONER

## 2020-06-17 ENCOUNTER — ANTI-COAG VISIT (OUTPATIENT)
Dept: CARDIOLOGY CLINIC | Age: 80
End: 2020-06-17
Payer: MEDICARE

## 2020-06-17 PROCEDURE — 93793 ANTICOAG MGMT PT WARFARIN: CPT | Performed by: NURSE PRACTITIONER

## 2020-06-25 ENCOUNTER — OFFICE VISIT (OUTPATIENT)
Dept: FAMILY MEDICINE CLINIC | Age: 80
End: 2020-06-25
Payer: MEDICARE

## 2020-06-25 VITALS
HEART RATE: 62 BPM | WEIGHT: 171 LBS | DIASTOLIC BLOOD PRESSURE: 80 MMHG | OXYGEN SATURATION: 94 % | SYSTOLIC BLOOD PRESSURE: 124 MMHG | BODY MASS INDEX: 28.49 KG/M2 | TEMPERATURE: 99 F | HEIGHT: 65 IN

## 2020-06-25 PROCEDURE — 4040F PNEUMOC VAC/ADMIN/RCVD: CPT | Performed by: FAMILY MEDICINE

## 2020-06-25 PROCEDURE — 99213 OFFICE O/P EST LOW 20 MIN: CPT | Performed by: FAMILY MEDICINE

## 2020-06-25 PROCEDURE — 1036F TOBACCO NON-USER: CPT | Performed by: FAMILY MEDICINE

## 2020-06-25 PROCEDURE — 1123F ACP DISCUSS/DSCN MKR DOCD: CPT | Performed by: FAMILY MEDICINE

## 2020-06-25 PROCEDURE — G8417 CALC BMI ABV UP PARAM F/U: HCPCS | Performed by: FAMILY MEDICINE

## 2020-06-25 PROCEDURE — G8427 DOCREV CUR MEDS BY ELIG CLIN: HCPCS | Performed by: FAMILY MEDICINE

## 2020-07-02 ENCOUNTER — ANTI-COAG VISIT (OUTPATIENT)
Dept: CARDIOLOGY CLINIC | Age: 80
End: 2020-07-02
Payer: MEDICARE

## 2020-07-02 PROCEDURE — 93793 ANTICOAG MGMT PT WARFARIN: CPT | Performed by: NURSE PRACTITIONER

## 2020-07-02 NOTE — PROGRESS NOTES
[address] [facility], 1940      Anticoagulation Indication(s):  Afib  pAF   Referring Physician:   Dr. Samantha Hernandez  Goal INR Range:  2.0-3.0  Duration of Anticoagulation Therapy:  Indefinite  Home or Lab Draw: Lab  Product patient has at home: warfarin 5 mg    Recent INR Results:  Lab Results   Component Value Date    INR 1.8 (H) 07/01/2020    INR 1.9 (H) 06/17/2020    INR 2.1 (H) 06/04/2020    INR 2.0 (H) 05/27/2020       INR Summary                          Warfarin regimen (mg)  Date INR A/P Sun Mon Tue Wed Thu Fri Sat Mg/wk   7/1/20 1.8 Below goal, increase by 2.5 2.5 2.5 5 5 5 2.5 2.5 25   6/17/20 1.9 At goal, no change 2.5 2.5 5 5 2.5 2.5 2.5 22.5   6/4/20 2.1 At goal, continue dose 2.5 2.5 5 5 2.5 2.5 2.5 22.5   5/27/20 2.0 At goal, continue dose 2.5 2.5 5 5 2.5 2.5 2.5 22.5   5/14/20 2.7 At goal, continue dose 2.5 2.5 5 5 2.5 2.5 2.5 22.5   5/7/20 2.1 At goal, continue dose 2.5 2.5 5 5 2.5 2.5 2.5 22.5   4/30/20 1.8 Below goal, increase dose by 2.5 mg 2.5 2.5 5 5 2.5 2.5 2.5 22.5   4/15/20 1.9 At goal, no change 2.5 2.5 2.5 5 2.5 2.5 2.5 20   4/7/20 1.7 Below goal, increase by 2.5 2.5 2.5 2.5 5 2.5 2.5 2.5 20   3/30/20 3.70 Above goal, hold todays  And decrease by 5mg  2.5 2.5 hold 2.5 2.5 2.5 2.5 15   3/5/20 3.07 Above goal, hold todays dose 2.5 5 2.5 5 2.5 hold 2.5 20   2/26/20 2.78 At goal, no change 2.5 5 2.5 5 2.5 2.5 2.5 22.5   2/14/20 2.27 At goal, no change 2.5 5 2.5 5 2.5 2.5 2.5 22.5   2/5/20 3.93 Above goal,hold todays dose and decrease by 2.5 2.5 5 2.5 5 hold 2.5 2.5 20   1/27/20 2.64 At goal, no change 2.5 5 2.5 5 2.5 5 2.5 25   1/13/20 2.8 At goal, no change 2.5 5 2.5 5 2.5 5 2.5 25   12/27/19 1.44 Below goal, increased by 6.5 mg See note 2.5 5 2.5 5 2.5 5 2.5 25   12/20/19 1.27 Below goal, increase by 5 2.5 2 2.5 2 2.5 10(Pt did not increase as instructed) he took 5 mg 2 23.5    (he actually took 18.5 mg)   12/13/19 1.44 Below goal, increase by 3 2.5 2 2.5 2 2.5 5 2 18.5   12/6/19 1.59 Below goal, increase by. 2.5 1 2.5 1 2.5 5 1 15.5   12/2/19 4.10 Above goal, hold tonight  hold 2.5 2.5 2.5 Recheck     11/29/19 2.47 At goal, no change 5 2.5 2.5 2.5 2.5 5 5 25   11/25/16 5.86 Above goal 5 hold hold 2.5 5 recheck  12.5   11/19/19 2.38 At goal, continue 5 5 5 5 5 5 5 35         Assessment/Plan:    Recent hospitalizations/HC visits None reported   Recent medication changes None reported   Medications taken regularly that may interact with warfarin or alter INR No significant drug interactions identified   Warfarin dose taken as prescribed Patient uses pillbox? no   Signs/symptoms of bleeding History of bleeding? Yes, GIB   Vitamin K intake Normally has ~1-2 servings of green, leafy vegetables per week   Recent vomiting/diarrhea/fever None reported   Changes in weight, activity, stress None reported   Tobacco or alcohol use Patient reports smoking 0 PPD  Patient reports having 0 drinks per day   Upcoming surgeries or procedures None reported     Patient was also reminded to maintain consistent vitamin K intake and call with any bleeding, medication changes, or fever/vomiting/diarrhea.     Next INR check: 7/15/20  Reviewed INR   Next date blood to be drawn  7/15/20     IRINA Painting          Instructed pt to increase Coumadin to 5 mg on Thursdays

## 2020-07-06 NOTE — PROGRESS NOTES
[address]   Cardiac Consultation    Referring Provider:  Ismael Akbar MD     Chief Concerns: PAF, SSS, bradycardia, s/p DDD- pacemaker follow up. HPI:  Ermias South is a [de-identified] y.o. male being followed for PAF, SSS, s/p MDT DDD PPM (10/10/16, Dr Ashley Carranza). PMH of CAD, MI, HLD, PAF, SSS, and bradycardia. Also h/o GI bleeding r/t diverticular disease (resolved), & fall with head and hip trauma (resolved). He has been off Xarelto. Dr Ashley Carranza had discussed Watchman procedure in the past, but he was not interested. I have discussed the Watchman procedure in the past; however, he remains uninterested. He has been seeing Dr. Audrey Elam for GI, but office notes not available. Pt reports that he has been seeing Dr. Audrey Elam and he remains off AC. JONNIE-VASc 4.  Echo (11/11/19) showed EF of 55-60%. Currently on Multaq 400 mg BID, Toprol XL 12.5 mg BID, baby ASA, and warfarin (resumed by Dr. Davon Harris in 11/2019). He is here today in a wheelchair accompanied by his daughter. Device interrogation today shows normally functioning PPM.  AP 83%,  0.5%. AF burden is 1.3%. This is likely and underestimate. 52 of the AF episodes lasted more than 10 min. NANCY at 6.5 years. His main complaint today is fatigue. He stays fatigued until later in the afternoon, and then he \"perks up. \"  Denies complaints of palpitations, dizziness, CP, SOB, orthopnea, presyncope, or syncope. He has a bad back with 2 collapsed vertebrates and nothing can be done. 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     Past Medical History:   has a past medical history of Allergic rhinitis, Atrial fibrillation ([address] Benign prostatic hypertrophy, CAD (coronary artery disease), Cancer ([address] CHF (congestive heart failure) ([address] Coronary artery disease involving native coronary artery of native heart without angina pectoris, DDD (degenerative disc disease), lumbar, Falls, GI bleeding, Hyperlipidemia, Hypertension, MI (myocardial infarction) ([facility] [address] MRSA (methicillin resistant staph aureus) culture positive, Pneumonia, S/P PTCA (percutaneous transluminal coronary angioplasty), SSS (sick sinus syndrome) ([facility] [address] Unspecified sleep apnea, and Venous (peripheral) insufficiency. Surgical History:   has a past surgical history that includes Carpal tunnel release; Coronary angioplasty with stent; Diagnostic Cardiac Cath Lab Procedure; Coronary angioplasty; Cardiac pacemaker placement; Appendectomy; pacemaker placement; and eye surgery. Social History:   reports that he quit smoking about 16 years ago. He has a 30.00 pack-year smoking history. He has never used smokeless tobacco. He reports that he does not drink alcohol or use drugs. Family History:  family history includes Cancer in his sister; Coronary Art Dis in his brother.      Home Medications:  Outpatient Encounter Medications as of 7/8/2020   Medication Sig Dispense Refill    ferrous sulfate (IRON 325) 325 (65 Fe) MG tablet Take 180 mg by mouth daily (with breakfast)       rosuvastatin (CRESTOR) 5 MG tablet TAKE 1 TABLET DAILY 90 tablet 3    nitroGLYCERIN (NITRO-DUR) 0.2 MG/HR Place 1 patch onto the skin daily 90 patch 3    pantoprazole (PROTONIX) 40 MG tablet TAKE 1 TABLET BY MOUTH EVERY MORNING BEFORE BREAKFAST 90 tablet 3    metoprolol succinate (TOPROL XL) 25 MG extended release tablet TAKE ONE-HALF (1/2) TABLET TWICE A DAY 90 tablet 4    MULTAQ 400 MG TABS TAKE 1 TABLET TWICE A DAY WITH MEALS 180 tablet 4    KLOR-CON M10 10 MEQ extended release tablet TAKE 3 TABLETS DAILY 270 tablet 4    warfarin (COUMADIN) 5 MG tablet Take 1 pill daily at 6 pm. (Patient taking differently: PATIENT TAKES 2.5 IN THE EVENING  ALTERNATE WITH 1MG IN THE EVENING) 90 tablet 3    furosemide (LASIX) 40 MG tablet Take 1 tablet by mouth 2 times daily 2 TABS BID      ondansetron (ZOFRAN ODT) 4 MG disintegrating tablet Take 1 tablet by mouth every 8 hours as needed for Nausea or Vomiting 20 tablet 1    fluticasone (FLONASE) 50 MCG/ACT nasal spray 1 spray by Each Nare route daily      nystatin (MYCOSTATIN) 751661 UNIT/GM cream Apply topically 2 times daily until improved. 15 g 1    vitamin B-12 (CYANOCOBALAMIN) 500 MCG tablet Take 500 mcg by mouth daily      Compression Stockings MISC by Does not apply route 1 pair 20-30 mmHg compression stockings, wide band with grippers. 1 each 2    Polysaccharide Iron Complex (PROFE PO) Take 185 mg by mouth      aspirin 81 MG tablet Take 81 mg by mouth daily      polyethylene glycol (MIRALAX) POWD powder Take 17 g by mouth daily      Coenzyme Q10 (CO Q 10) 100 MG CAPS Take 200 mg by mouth daily      fentaNYL (DURAGESIC) 50 MCG/HR Place 1 patch onto the skin every 48 hours 15 patch 0     No facility-administered encounter medications on file as of 7/8/2020. Allergies: Avelox [moxifloxacin hcl in nacl]; Epinephrine base; Other; Keflex [cephalexin]; and Polysporin [bacitracin-polymyxin b]     Review of Systems   Constitutional: Negative. HENT: Negative. Eyes: Negative. Respiratory: Negative. Cardiovascular: Negative. Gastrointestinal: Negative. Genitourinary: Negative. Musculoskeletal: Negative. Skin: Negative. Neurological: Negative. Hematological: Negative. Psychiatric/Behavioral: Negative. BP (!) 110/55   Pulse 64   Temp 97.1 °F (36.2 °C)   Wt 172 lb 9.6 oz (78.3 kg)   BMI 28.72 kg/m²     ECG 7/8/20, Personally reviewed. AP    Echo 11/11/19  Summary   Left ventricular cavity size is normal. There is mild concentric left   ventricular hypertrophy. Left ventricular function is normal with ejection   fraction estimated at 55-60%. No regional wall motion abnormalities are   noted. Indeterminate diastolic function. Aortic valve appears   thickened/calcified but opens adequately. Mild tricuspid regurgitation.    Estimated pulmonary artery systolic pressure is at 50 mmHg assuming a right   atrial pressure of 15 mmHg. Objective:  Physical Exam   Constitutional: He is oriented to person, place, and time. He appears well-developed and well-nourished. HENT:   Head: Normocephalic and atraumatic. Eyes: Pupils are equal, round, and reactive to light. Neck: Normal range of motion. Cardiovascular: Normal rate, regular rhythm and normal heart sounds. Pulmonary/Chest: Effort normal and breath sounds normal.   Abdominal: Soft. No tenderness. Musculoskeletal: Normal range of motion. He exhibits no edema. Neurological: He is alert and oriented to person, place, and time. Skin: Skin is warm and dry. Psychiatric: He has a normal mood and affect. Assessment:  1. SSS-s/p DDD Medtronic pacemaker 10/10/16. AP 88%. Device function appropriately. 2. PAF- Pacemaker showed no AF detected since 3/2017. He has been off Xarelto d/t GI bleeding &  fall-head & hip trauma. JONNIE-VASc:4.  Warfarin was resumed 11/2019. On Multaq 400 mg BID and Toprol XL 12.5 mg BID. TSH 3.41 (3/2018). AF burden of 1.3% with episodes lasting over 10 min. This is likely an underestimate. 3. DDD-pacemaker  4. Hx of GI bleeding-F/U Dr Jimena Carvalho. Last Hgb: 11.9 (1/2019)    Plan:  1. No change in current medications  2. Continue remote home transmissions q 3 months  3. Follow up in 6 months with device check    Donna DENG RN, am scribing for and in the presence of Dr. Jared Park. 07/08/20 2:13 PM  Donna Multani RN    I, Dr. Jared Park, personally performed the services described in this documentation as scribed by Donna Multani RN in my presence, and it is both accurate and complete.       Jared Park M.D.

## 2020-07-08 ENCOUNTER — NURSE ONLY (OUTPATIENT)
Dept: CARDIOLOGY CLINIC | Age: 80
End: 2020-07-08
Payer: MEDICARE

## 2020-07-08 ENCOUNTER — OFFICE VISIT (OUTPATIENT)
Dept: CARDIOLOGY CLINIC | Age: 80
End: 2020-07-08
Payer: MEDICARE

## 2020-07-08 VITALS
WEIGHT: 172.6 LBS | SYSTOLIC BLOOD PRESSURE: 110 MMHG | HEART RATE: 64 BPM | DIASTOLIC BLOOD PRESSURE: 55 MMHG | BODY MASS INDEX: 28.72 KG/M2 | TEMPERATURE: 97.1 F

## 2020-07-08 PROCEDURE — G8427 DOCREV CUR MEDS BY ELIG CLIN: HCPCS | Performed by: INTERNAL MEDICINE

## 2020-07-08 PROCEDURE — 1123F ACP DISCUSS/DSCN MKR DOCD: CPT | Performed by: INTERNAL MEDICINE

## 2020-07-08 PROCEDURE — 4040F PNEUMOC VAC/ADMIN/RCVD: CPT | Performed by: INTERNAL MEDICINE

## 2020-07-08 PROCEDURE — 99214 OFFICE O/P EST MOD 30 MIN: CPT | Performed by: INTERNAL MEDICINE

## 2020-07-08 PROCEDURE — G8417 CALC BMI ABV UP PARAM F/U: HCPCS | Performed by: INTERNAL MEDICINE

## 2020-07-08 PROCEDURE — 93280 PM DEVICE PROGR EVAL DUAL: CPT | Performed by: INTERNAL MEDICINE

## 2020-07-08 PROCEDURE — 1036F TOBACCO NON-USER: CPT | Performed by: INTERNAL MEDICINE

## 2020-07-08 RX ORDER — FERROUS SULFATE 325(65) MG
180 TABLET ORAL
COMMUNITY
End: 2021-06-22

## 2020-07-15 RX ORDER — FUROSEMIDE 40 MG/1
TABLET ORAL
Qty: 180 TABLET | Refills: 3 | Status: SHIPPED | OUTPATIENT
Start: 2020-07-15 | End: 2021-07-30

## 2020-07-15 NOTE — TELEPHONE ENCOUNTER
Medication:   Requested Prescriptions     Pending Prescriptions Disp Refills    furosemide (LASIX) 40 MG tablet [Pharmacy Med Name: FUROSEMIDE 40MG TABLETS] 180 tablet      Sig: TAKE 1 TABLET BY MOUTH TWICE DAILY       Last Filled:   8/29/19     Patient Phone Number: [phone] (home)     Last appt: 6/25/2020 bursitis   Next appt: Visit date not found    Lab Results   Component Value Date     12/06/2019    K 4.2 12/06/2019    CL 97 (L) 12/06/2019    CO2 27 12/06/2019    BUN 20 12/06/2019    CREATININE 1.2 12/06/2019    GLUCOSE 88 12/06/2019    CALCIUM 9.0 12/06/2019    PROT 8.0 12/06/2019    LABALBU 3.8 12/06/2019    BILITOT 0.5 12/06/2019    ALKPHOS 100 12/06/2019    AST 25 12/06/2019    ALT 13 12/06/2019    LABGLOM 58 (A) 12/06/2019    GFRAA >60 12/06/2019    AGRATIO 0.9 (L) 12/06/2019    GLOB 4.2 12/06/2019

## 2020-07-17 ENCOUNTER — ANTI-COAG VISIT (OUTPATIENT)
Dept: CARDIOLOGY CLINIC | Age: 80
End: 2020-07-17
Payer: MEDICARE

## 2020-07-17 PROCEDURE — 93793 ANTICOAG MGMT PT WARFARIN: CPT | Performed by: NURSE PRACTITIONER

## 2020-07-17 NOTE — PROGRESS NOTES
[address] [facility], 1940      Anticoagulation Indication(s):  Afib  pAF   Referring Physician:   Dr. Mary Webster  Goal INR Range:  2.0-3.0  Duration of Anticoagulation Therapy:  Indefinite  Home or Lab Draw: Lab  Product patient has at home: warfarin 5 mg    Recent INR Results:  Lab Results   Component Value Date    INR 2.7 (H) 07/16/2020    INR 1.8 (H) 07/01/2020    INR 1.9 (H) 06/17/2020    INR 2.1 (H) 06/04/2020       INR Summary                          Warfarin regimen (mg)  Date INR A/P Sun Mon Tue Wed Thu Fri Sat Mg/wk   7/16/20 2.7 At goal, no change 2.5 2.5 5 5 5 2.5 2.5 25   7/1/20 1.8 Below goal, increase by 2.5 2.5 2.5 5 5 5 2.5 2.5 25   6/17/20 1.9 At goal, no change 2.5 2.5 5 5 2.5 2.5 2.5 22.5   6/4/20 2.1 At goal, continue dose 2.5 2.5 5 5 2.5 2.5 2.5 22.5   5/27/20 2.0 At goal, continue dose 2.5 2.5 5 5 2.5 2.5 2.5 22.5   5/14/20 2.7 At goal, continue dose 2.5 2.5 5 5 2.5 2.5 2.5 22.5   5/7/20 2.1 At goal, continue dose 2.5 2.5 5 5 2.5 2.5 2.5 22.5   4/30/20 1.8 Below goal, increase dose by 2.5 mg 2.5 2.5 5 5 2.5 2.5 2.5 22.5   4/15/20 1.9 At goal, no change 2.5 2.5 2.5 5 2.5 2.5 2.5 20   4/7/20 1.7 Below goal, increase by 2.5 2.5 2.5 2.5 5 2.5 2.5 2.5 20   3/30/20 3.70 Above goal, hold todays  And decrease by 5mg  2.5 2.5 hold 2.5 2.5 2.5 2.5 15   3/5/20 3.07 Above goal, hold todays dose 2.5 5 2.5 5 2.5 hold 2.5 20   2/26/20 2.78 At goal, no change 2.5 5 2.5 5 2.5 2.5 2.5 22.5   2/14/20 2.27 At goal, no change 2.5 5 2.5 5 2.5 2.5 2.5 22.5   2/5/20 3.93 Above goal,hold todays dose and decrease by 2.5 2.5 5 2.5 5 hold 2.5 2.5 20   1/27/20 2.64 At goal, no change 2.5 5 2.5 5 2.5 5 2.5 25   1/13/20 2.8 At goal, no change 2.5 5 2.5 5 2.5 5 2.5 25   12/27/19 1.44 Below goal, increased by 6.5 mg See note 2.5 5 2.5 5 2.5 5 2.5 25   12/20/19 1.27 Below goal, increase by 5 2.5 2 2.5 2 2.5 10(Pt did not increase as instructed) he took 5 mg 2 23.5    (he actually took 18.5 mg) 12/13/19 1.44 Below goal, increase by 3 2.5 2 2.5 2 2.5 5 2 18.5   12/6/19 1.59 Below goal, increase by. 2.5 1 2.5 1 2.5 5 1 15.5   12/2/19 4.10 Above goal, hold tonight  hold 2.5 2.5 2.5 Recheck     11/29/19 2.47 At goal, no change 5 2.5 2.5 2.5 2.5 5 5 25   11/25/16 5.86 Above goal 5 hold hold 2.5 5 recheck  12.5   11/19/19 2.38 At goal, continue 5 5 5 5 5 5 5 35         Assessment/Plan:    Recent hospitalizations/HC visits None reported   Recent medication changes None reported   Medications taken regularly that may interact with warfarin or alter INR No significant drug interactions identified   Warfarin dose taken as prescribed Patient uses pillbox? no   Signs/symptoms of bleeding History of bleeding? Yes, GIB   Vitamin K intake Normally has ~1-2 servings of green, leafy vegetables per week   Recent vomiting/diarrhea/fever None reported   Changes in weight, activity, stress None reported   Tobacco or alcohol use Patient reports smoking 0 PPD  Patient reports having 0 drinks per day   Upcoming surgeries or procedures None reported     Patient was also reminded to maintain consistent vitamin K intake and call with any bleeding, medication changes, or fever/vomiting/diarrhea. Next INR check: 8/6/20      Informed pt to continue same dose and to repeat in one month      Addendum:  7/17/20  Reviewed RN assessment and plan. INR is at goal, continue current dose   Repeat INR in 4 week/s. Patient/family instructed by RN.      Janae Siddiqui CNP

## 2020-08-13 ENCOUNTER — ANTI-COAG VISIT (OUTPATIENT)
Dept: CARDIOLOGY CLINIC | Age: 80
End: 2020-08-13
Payer: MEDICARE

## 2020-08-13 PROCEDURE — 93793 ANTICOAG MGMT PT WARFARIN: CPT | Performed by: NURSE PRACTITIONER

## 2020-08-13 NOTE — PROGRESS NOTES
[address] [facility], 1940      Anticoagulation Indication(s):  Afib  pAF   Referring Physician:   Dr. Gabino Trammell  Goal INR Range:  2.0-3.0  Duration of Anticoagulation Therapy:  Indefinite  Home or Lab Draw: Lab  Product patient has at home: warfarin 5 mg    Recent INR Results:  Lab Results   Component Value Date    INR 2.1 (H) 08/12/2020    INR 2.7 (H) 07/16/2020    INR 1.8 (H) 07/01/2020    INR 1.9 (H) 06/17/2020       INR Summary                          Warfarin regimen (mg)  Date INR A/P Sun Mon Tue Wed Thu Fri Sat Mg/wk   8/12/20 2.1 At goal, no change 2.5 5 2.5 5 2.5 5 2.5 25   7/16/20 2.7 At goal, no change 2.5 2.5 5 5 5 2.5 2.5 25   7/1/20 1.8 Below goal, increase by 2.5 2.5 2.5 5 5 5 2.5 2.5 25   6/17/20 1.9 At goal, no change 2.5 2.5 5 5 2.5 2.5 2.5 22.5   6/4/20 2.1 At goal, continue dose 2.5 2.5 5 5 2.5 2.5 2.5 22.5   5/27/20 2.0 At goal, continue dose 2.5 2.5 5 5 2.5 2.5 2.5 22.5   5/14/20 2.7 At goal, continue dose 2.5 2.5 5 5 2.5 2.5 2.5 22.5   5/7/20 2.1 At goal, continue dose 2.5 2.5 5 5 2.5 2.5 2.5 22.5   4/30/20 1.8 Below goal, increase dose by 2.5 mg 2.5 2.5 5 5 2.5 2.5 2.5 22.5   4/15/20 1.9 At goal, no change 2.5 2.5 2.5 5 2.5 2.5 2.5 20   4/7/20 1.7 Below goal, increase by 2.5 2.5 2.5 2.5 5 2.5 2.5 2.5 20   3/30/20 3.70 Above goal, hold todays  And decrease by 5mg  2.5 2.5 hold 2.5 2.5 2.5 2.5 15   3/5/20 3.07 Above goal, hold todays dose 2.5 5 2.5 5 2.5 hold 2.5 20   2/26/20 2.78 At goal, no change 2.5 5 2.5 5 2.5 2.5 2.5 22.5   2/14/20 2.27 At goal, no change 2.5 5 2.5 5 2.5 2.5 2.5 22.5   2/5/20 3.93 Above goal,hold todays dose and decrease by 2.5 2.5 5 2.5 5 hold 2.5 2.5 20   1/27/20 2.64 At goal, no change 2.5 5 2.5 5 2.5 5 2.5 25   1/13/20 2.8 At goal, no change 2.5 5 2.5 5 2.5 5 2.5 25   12/27/19 1.44 Below goal, increased by 6.5 mg See note 2.5 5 2.5 5 2.5 5 2.5 25   12/20/19 1.27 Below goal, increase by 5 2.5 2 2.5 2 2.5 10(Pt did not increase as instructed) he took 5 mg 2 23.5    (he actually took 18.5 mg)   12/13/19 1.44 Below goal, increase by 3 2.5 2 2.5 2 2.5 5 2 18.5   12/6/19 1.59 Below goal, increase by. 2.5 1 2.5 1 2.5 5 1 15.5   12/2/19 4.10 Above goal, hold tonight  hold 2.5 2.5 2.5 Recheck     11/29/19 2.47 At goal, no change 5 2.5 2.5 2.5 2.5 5 5 25   11/25/16 5.86 Above goal 5 hold hold 2.5 5 recheck  12.5   11/19/19 2.38 At goal, continue 5 5 5 5 5 5 5 35         Assessment/Plan:    Recent hospitalizations/HC visits None reported   Recent medication changes None reported   Medications taken regularly that may interact with warfarin or alter INR No significant drug interactions identified   Warfarin dose taken as prescribed Patient uses pillbox? no   Signs/symptoms of bleeding History of bleeding? Yes, GIB   Vitamin K intake Normally has ~1-2 servings of green, leafy vegetables per week   Recent vomiting/diarrhea/fever None reported   Changes in weight, activity, stress None reported   Tobacco or alcohol use Patient reports smoking 0 PPD  Patient reports having 0 drinks per day   Upcoming surgeries or procedures None reported     Patient was also reminded to maintain consistent vitamin K intake and call with any bleeding, medication changes, or fever/vomiting/diarrhea. Next INR check: 9/2/20    Spoke with patient and went over dosing instructions, next week patient is going to switch to 5 mg MWF and 2.5 mg the rest of the time. Recheck in 3 weeks. Patient verbalized understanding.      Addendum:  Reviewed documentation and plan of care from RN  Agree with above  Yoandy Espinoza NP

## 2020-08-20 ENCOUNTER — OFFICE VISIT (OUTPATIENT)
Dept: CARDIOLOGY CLINIC | Age: 80
End: 2020-08-20
Payer: MEDICARE

## 2020-08-20 VITALS
TEMPERATURE: 97.1 F | DIASTOLIC BLOOD PRESSURE: 90 MMHG | BODY MASS INDEX: 26.46 KG/M2 | SYSTOLIC BLOOD PRESSURE: 134 MMHG | WEIGHT: 159 LBS | HEART RATE: 88 BPM

## 2020-08-20 PROCEDURE — G8417 CALC BMI ABV UP PARAM F/U: HCPCS | Performed by: INTERNAL MEDICINE

## 2020-08-20 PROCEDURE — 1036F TOBACCO NON-USER: CPT | Performed by: INTERNAL MEDICINE

## 2020-08-20 PROCEDURE — 4040F PNEUMOC VAC/ADMIN/RCVD: CPT | Performed by: INTERNAL MEDICINE

## 2020-08-20 PROCEDURE — 1123F ACP DISCUSS/DSCN MKR DOCD: CPT | Performed by: INTERNAL MEDICINE

## 2020-08-20 PROCEDURE — 99213 OFFICE O/P EST LOW 20 MIN: CPT | Performed by: INTERNAL MEDICINE

## 2020-08-20 PROCEDURE — G8427 DOCREV CUR MEDS BY ELIG CLIN: HCPCS | Performed by: INTERNAL MEDICINE

## 2020-08-20 NOTE — PROGRESS NOTES
Subjective:      Patient ID: Rhoda Meraz is a [de-identified] y.o. male. CC:  S/p GI bleed. F/u HTN  CAD. S/p pacer. Fatigue      HPI: Mr Guerrero Huerta is here for a 6 moth f/u. Jade Mcmanus He denies chest pain today but has chronic swelling. He c/o of fatigue and low b/p. No LOC but fell and hit his hip and has bad leg pain. Back is bad and is using a 4 pt walker. Worried about bleeding and back surgeons shy away from surgery on back d/t bleeding. Has paroxysmal brief episodes of AF, less than 1%. He had life threatening GI bleeding on NOAC. He doesn't want watchman. Has more AF on pacer interrogation. GI said ok for [facility]. Has edema. Has dizziness and will decrease nitrodur patch 0.2mg/hr.          Allergies   Allergen Reactions    Avelox [Moxifloxacin Hcl In Nacl] Anaphylaxis    Epinephrine Base     Other      [place] per PT - swelling size of silver dollar at site per PT     Keflex [Cephalexin] Nausea And Vomiting    Polysporin [Bacitracin-Polymyxin B] Rash        Social History     Socioeconomic History    Marital status:      Spouse name: Not on file    Number of children: Not on file    Years of education: Not on file    Highest education level: Not on file   Occupational History    Not on file   Social Needs    Financial resource strain: Not on file    Food insecurity     Worry: Not on file     Inability: Not on file    Transportation needs     Medical: Not on file     Non-medical: Not on file   Tobacco Use    Smoking status: Former Smoker     Packs/day: 1.00     Years: 30.00     Pack years: 30.00     Last attempt to quit: 2004     Years since quittin.3    Smokeless tobacco: Never Used   Substance and Sexual Activity    Alcohol use: No     Alcohol/week: 0.0 standard drinks    Drug use: No    Sexual activity: Yes     Comment:  living with spouse   Lifestyle    Physical activity     Days per week: Not on file     Minutes per session: Not on file    Stress: Not on file Relationships    Social connections     Talks on phone: Not on file     Gets together: Not on file     Attends Shinto service: Not on file     Active member of club or organization: Not on file     Attends meetings of clubs or organizations: Not on file     Relationship status: Not on file    Intimate partner violence     Fear of current or ex partner: Not on file     Emotionally abused: Not on file     Physically abused: Not on file     Forced sexual activity: Not on file   Other Topics Concern    Not on file   Social History Narrative    Not on file        Patient has a family history includes Cancer in his sister; Coronary Art Dis in his brother. Patient  has a past medical history of Allergic rhinitis, Atrial fibrillation ([facility] [address] Benign prostatic hypertrophy, CAD (coronary artery disease), Cancer ([facility] [address] CHF (congestive heart failure) ([facility] [address] Coronary artery disease involving native coronary artery of native heart without angina pectoris, DDD (degenerative disc disease), lumbar, Falls, GI bleeding, Hyperlipidemia, Hypertension, MI (myocardial infarction) ([facility] [address] MRSA (methicillin resistant staph aureus) culture positive, Pneumonia, S/P PTCA (percutaneous transluminal coronary angioplasty), SSS (sick sinus syndrome) ([facility] [address] Unspecified sleep apnea, and Venous (peripheral) insufficiency.      Current Outpatient Medications   Medication Sig Dispense Refill    furosemide (LASIX) 40 MG tablet TAKE 1 TABLET BY MOUTH TWICE DAILY 180 tablet 3    ferrous sulfate (IRON 325) 325 (65 Fe) MG tablet Take 180 mg by mouth daily (with breakfast)       rosuvastatin (CRESTOR) 5 MG tablet TAKE 1 TABLET DAILY 90 tablet 3    nitroGLYCERIN (NITRO-DUR) 0.2 MG/HR Place 1 patch onto the skin daily 90 patch 3    pantoprazole (PROTONIX) 40 MG tablet TAKE 1 TABLET BY MOUTH EVERY MORNING BEFORE BREAKFAST 90 tablet 3    metoprolol succinate (TOPROL XL) 25 MG extended release tablet TAKE ONE-HALF (1/2) TABLET TWICE A DAY 90 tablet 4    MULTAQ 400 MG TABS TAKE 1 TABLET TWICE A DAY WITH MEALS 180 tablet 4    KLOR-CON M10 10 MEQ extended release tablet TAKE 3 TABLETS DAILY 270 tablet 4    warfarin (COUMADIN) 5 MG tablet Take 1 pill daily at 6 pm. (Patient taking differently: PATIENT TAKES 2.5 IN THE EVENING  ALTERNATE WITH 1MG IN THE EVENING) 90 tablet 3    ondansetron (ZOFRAN ODT) 4 MG disintegrating tablet Take 1 tablet by mouth every 8 hours as needed for Nausea or Vomiting 20 tablet 1    fluticasone (FLONASE) 50 MCG/ACT nasal spray 1 spray by Each Nare route daily      nystatin (MYCOSTATIN) 369016 UNIT/GM cream Apply topically 2 times daily until improved. 15 g 1    vitamin B-12 (CYANOCOBALAMIN) 500 MCG tablet Take 500 mcg by mouth daily      Compression Stockings MISC by Does not apply route 1 pair 20-30 mmHg compression stockings, wide band with grippers. 1 each 2    Polysaccharide Iron Complex (PROFE PO) Take 185 mg by mouth      aspirin 81 MG tablet Take 81 mg by mouth daily      polyethylene glycol (MIRALAX) POWD powder Take 17 g by mouth daily      Coenzyme Q10 (CO Q 10) 100 MG CAPS Take 200 mg by mouth daily      fentaNYL (DURAGESIC) 50 MCG/HR Place 1 patch onto the skin every 48 hours 15 patch 0     No current facility-administered medications for this visit. Vitals  Weight: 159 lb (72.1 kg)  Blood Pressure:  116/68  Pulse: 88 70      Review of Systems   Constitutional: Negative. HENT: Negative. Eyes: Negative. Respiratory: Negative. Cardiovascular: Negative. Gastrointestinal: Negative. Genitourinary: Negative. Objective:   Physical Exam   Nursing note and vitals reviewed. Constitutional: He is oriented to person, place, and time. He appears well-developed and well-nourished. No distress. HENT:   Head: Normocephalic and atraumatic. Mouth/Throat: No oropharyngeal exudate. Eyes: Conjunctivae and EOM are normal. Pupils are equal, round, and reactive to light. No scleral icterus. Neck: Normal range of motion. No JVD present. No tracheal deviation present. No thyromegaly present. Cardiovascular: Normal rate, regular rhythm, normal heart sounds and intact distal pulses. Exam reveals no gallop and no friction rub. No murmur heard. Pulmonary/Chest: Effort normal. No respiratory distress. He has no wheezes. He has no rales. He exhibits no tenderness. Abdominal: Soft. Bowel sounds are normal. He exhibits no distension and no mass. No tenderness. He has no rebound and no guarding. Musculoskeletal: tender left leg and swelling. Lymphadenopathy:     He has no cervical adenopathy. Neurological: He is alert and oriented to person, place, and time. No cranial nerve deficit. Coordination normal.   Skin: Skin is warm and dry. No rash noted. He is not diaphoretic. No erythema. No pallor. Psychiatric: He has a normal mood and affect. His behavior is normal. Judgment and thought content normal.       Assessment:       Diagnosis Orders   1. Dyslipidemia     2. Essential hypertension     3. Coronary artery disease involving native coronary artery of native heart without angina pectoris             Plan:        CAD:  Stable. No chest pains but had GI bleed - still off A/C for paroxysmal a fib. Off ranexa. Rhythm: MRI compatible pacer   HTN:  Episodic and low today. Had LLE skin infection and wears compression stockings. Now taking lasix 40 mg am and pm.  AT/AF:  Has episodes and feels tired. Taking multaq to maintain SR. Reviewed chads vasc. Check labs. He has tremendous fatigue.

## 2020-08-31 LAB
INR BLD: 2.7 (ref 0.8–1.2)
PROTHROMBIN TIME: 28.2 SECONDS (ref 11.7–14.2)

## 2020-09-01 ENCOUNTER — ANTI-COAG VISIT (OUTPATIENT)
Dept: CARDIOLOGY CLINIC | Age: 80
End: 2020-09-01
Payer: MEDICARE

## 2020-09-01 PROCEDURE — 93793 ANTICOAG MGMT PT WARFARIN: CPT | Performed by: NURSE PRACTITIONER

## 2020-09-01 NOTE — PROGRESS NOTES
by 5 2.5 2 2.5 2 2.5 10(Pt did not increase as instructed) he took 5 mg 2 23.5    (he actually took 18.5 mg)   12/13/19 1.44 Below goal, increase by 3 2.5 2 2.5 2 2.5 5 2 18.5   12/6/19 1.59 Below goal, increase by. 2.5 1 2.5 1 2.5 5 1 15.5   12/2/19 4.10 Above goal, hold tonight  hold 2.5 2.5 2.5 Recheck     11/29/19 2.47 At goal, no change 5 2.5 2.5 2.5 2.5 5 5 25   11/25/16 5.86 Above goal 5 hold hold 2.5 5 recheck  12.5   11/19/19 2.38 At goal, continue 5 5 5 5 5 5 5 35         Assessment/Plan:    Recent hospitalizations/HC visits None reported   Recent medication changes None reported   Medications taken regularly that may interact with warfarin or alter INR No significant drug interactions identified   Warfarin dose taken as prescribed Patient uses pillbox? no   Signs/symptoms of bleeding History of bleeding? Yes, GIB   Vitamin K intake Normally has ~1-2 servings of green, leafy vegetables per week   Recent vomiting/diarrhea/fever None reported   Changes in weight, activity, stress None reported   Tobacco or alcohol use Patient reports smoking 0 PPD  Patient reports having 0 drinks per day   Upcoming surgeries or procedures None reported     Patient was also reminded to maintain consistent vitamin K intake and call with any bleeding, medication changes, or fever/vomiting/diarrhea. Next INR check: 9/28/20  Reviewed INR   At goal / not at goal INR   Next date blood to be drawn  9/8/20    Esequiel CHAVEZ, CVNP FNP           Spoke with pt regarding results.   No change

## 2020-09-08 ENCOUNTER — PATIENT MESSAGE (OUTPATIENT)
Dept: DERMATOLOGY | Age: 80
End: 2020-09-08

## 2020-09-08 LAB
INR BLD: 2 (ref 0.8–1.2)
PROTHROMBIN TIME: 22.8 SECONDS (ref 11.7–14.2)

## 2020-09-08 NOTE — TELEPHONE ENCOUNTER
From: Stefanie Abebe  To: Nadene Saul MD  Sent: 9/8/2020 11:54 AM EDT  Subject: Visit Tena Devoncooper Winslow,     I have a routine follow-up visit scheduled with you for Monday 9/21 at 1:15 PM. I also have an appt. scheduled with my pain management clinic that I am unfortunately unable to change due to needed meds. I tried to reschedule with your team today by phone but was told there were no appts. available until Jan. I have several spots that need to be checked sooner. Is there any way you can work me into your schedule sooner than Jan? I would greatly appreciate it.      Thanks very much,  Jim Gordon

## 2020-09-09 ENCOUNTER — ANTI-COAG VISIT (OUTPATIENT)
Dept: CARDIOLOGY CLINIC | Age: 80
End: 2020-09-09
Payer: MEDICARE

## 2020-09-09 PROCEDURE — 93793 ANTICOAG MGMT PT WARFARIN: CPT | Performed by: NURSE PRACTITIONER

## 2020-09-09 NOTE — PROGRESS NOTES
[address] [facility], 1940      Anticoagulation Indication(s):  Afib  pAF   Referring Physician:   Dr. Ricardo Chavis  Goal INR Range:  2.0-3.0  Duration of Anticoagulation Therapy:  Indefinite  Home or Lab Draw: Lab  Product patient has at home: warfarin 5 mg    Recent INR Results:  Lab Results   Component Value Date    INR 2.0 (H) 09/08/2020    INR 2.7 (H) 08/31/2020    INR 2.1 (H) 08/12/2020    INR 2.7 (H) 07/16/2020       INR Summary                          Warfarin regimen (mg)  Date INR A/P Sun Mon Tue Wed Thu Fri Sat Mg/wk   9/8/20 2.0 At goal, no change 2.5 5 2.5 5 2.5 5 2.5 25   8/31/20 2.7 At goal, no change 2.5 5 2.5 5 2.5 5 2.5 25   8/12/20 2.1 At goal, no change 2.5 5 2.5 5 2.5 5 2.5 25   7/16/20 2.7 At goal, no change 2.5 2.5 5 5 5 2.5 2.5 25   7/1/20 1.8 Below goal, increase by 2.5 2.5 2.5 5 5 5 2.5 2.5 25   6/17/20 1.9 At goal, no change 2.5 2.5 5 5 2.5 2.5 2.5 22.5   6/4/20 2.1 At goal, continue dose 2.5 2.5 5 5 2.5 2.5 2.5 22.5   5/27/20 2.0 At goal, continue dose 2.5 2.5 5 5 2.5 2.5 2.5 22.5   5/14/20 2.7 At goal, continue dose 2.5 2.5 5 5 2.5 2.5 2.5 22.5   5/7/20 2.1 At goal, continue dose 2.5 2.5 5 5 2.5 2.5 2.5 22.5   4/30/20 1.8 Below goal, increase dose by 2.5 mg 2.5 2.5 5 5 2.5 2.5 2.5 22.5   4/15/20 1.9 At goal, no change 2.5 2.5 2.5 5 2.5 2.5 2.5 20   4/7/20 1.7 Below goal, increase by 2.5 2.5 2.5 2.5 5 2.5 2.5 2.5 20   3/30/20 3.70 Above goal, hold todays  And decrease by 5mg  2.5 2.5 hold 2.5 2.5 2.5 2.5 15   3/5/20 3.07 Above goal, hold todays dose 2.5 5 2.5 5 2.5 hold 2.5 20   2/26/20 2.78 At goal, no change 2.5 5 2.5 5 2.5 2.5 2.5 22.5   2/14/20 2.27 At goal, no change 2.5 5 2.5 5 2.5 2.5 2.5 22.5   2/5/20 3.93 Above goal,hold todays dose and decrease by 2.5 2.5 5 2.5 5 hold 2.5 2.5 20   1/27/20 2.64 At goal, no change 2.5 5 2.5 5 2.5 5 2.5 25   1/13/20 2.8 At goal, no change 2.5 5 2.5 5 2.5 5 2.5 25   12/27/19 1.44 Below goal, increased by 6.5 mg See note 2.5 5 2.5 5 2.5 5 2.5 25   12/20/19 1.27 Below goal, increase by 5 2.5 2 2.5 2 2.5 10(Pt did not increase as instructed) he took 5 mg 2 23.5    (he actually took 18.5 mg)   12/13/19 1.44 Below goal, increase by 3 2.5 2 2.5 2 2.5 5 2 18.5   12/6/19 1.59 Below goal, increase by. 2.5 1 2.5 1 2.5 5 1 15.5   12/2/19 4.10 Above goal, hold tonight  hold 2.5 2.5 2.5 Recheck     11/29/19 2.47 At goal, no change 5 2.5 2.5 2.5 2.5 5 5 25   11/25/16 5.86 Above goal 5 hold hold 2.5 5 recheck  12.5   11/19/19 2.38 At goal, continue 5 5 5 5 5 5 5 35         Assessment/Plan:    Recent hospitalizations/HC visits None reported   Recent medication changes None reported   Medications taken regularly that may interact with warfarin or alter INR No significant drug interactions identified   Warfarin dose taken as prescribed Patient uses pillbox? no   Signs/symptoms of bleeding History of bleeding? Yes, GIB   Vitamin K intake Normally has ~1-2 servings of green, leafy vegetables per week   Recent vomiting/diarrhea/fever None reported   Changes in weight, activity, stress None reported   Tobacco or alcohol use Patient reports smoking 0 PPD  Patient reports having 0 drinks per day   Upcoming surgeries or procedures None reported     Patient was also reminded to maintain consistent vitamin K intake and call with any bleeding, medication changes, or fever/vomiting/diarrhea. Next INR check:9/14/20     Spoke with pt regarding results.   No change      Addendum:  Reviewed documentation and plan of care from RN  Agree with above  Andreina Davila NP

## 2020-09-15 ENCOUNTER — OFFICE VISIT (OUTPATIENT)
Dept: DERMATOLOGY | Age: 80
End: 2020-09-15
Payer: MEDICARE

## 2020-09-15 VITALS — TEMPERATURE: 96.8 F

## 2020-09-15 PROCEDURE — G8417 CALC BMI ABV UP PARAM F/U: HCPCS | Performed by: DERMATOLOGY

## 2020-09-15 PROCEDURE — 1123F ACP DISCUSS/DSCN MKR DOCD: CPT | Performed by: DERMATOLOGY

## 2020-09-15 PROCEDURE — G8427 DOCREV CUR MEDS BY ELIG CLIN: HCPCS | Performed by: DERMATOLOGY

## 2020-09-15 PROCEDURE — 1036F TOBACCO NON-USER: CPT | Performed by: DERMATOLOGY

## 2020-09-15 PROCEDURE — 99213 OFFICE O/P EST LOW 20 MIN: CPT | Performed by: DERMATOLOGY

## 2020-09-15 PROCEDURE — 4040F PNEUMOC VAC/ADMIN/RCVD: CPT | Performed by: DERMATOLOGY

## 2020-09-15 RX ORDER — MOMETASONE FUROATE 1 MG/G
CREAM TOPICAL
Qty: 15 G | Refills: 2 | Status: SHIPPED | OUTPATIENT
Start: 2020-09-15 | End: 2021-06-22

## 2020-09-15 NOTE — PROGRESS NOTES
CATH LAB PROCEDURE      EYE SURGERY      PACEMAKER PLACEMENT         Allergies   Allergen Reactions    Avelox [Moxifloxacin Hcl In Nacl] Anaphylaxis    Epinephrine Base     Other      [place] per PT - swelling size of silver dollar at site per PT     Keflex [Cephalexin] Nausea And Vomiting    Polysporin [Bacitracin-Polymyxin B] Rash     Outpatient Medications Marked as Taking for the 9/15/20 encounter (Office Visit) with Nayla Zavala MD   Medication Sig Dispense Refill    mometasone (ELOCON) 0.1 % cream Apply to affected areas of the skin twice daily for up 2 weeks or until improved. For bug bites. 15 g 2    furosemide (LASIX) 40 MG tablet TAKE 1 TABLET BY MOUTH TWICE DAILY 180 tablet 3    ferrous sulfate (IRON 325) 325 (65 Fe) MG tablet Take 180 mg by mouth daily (with breakfast)       rosuvastatin (CRESTOR) 5 MG tablet TAKE 1 TABLET DAILY 90 tablet 3    nitroGLYCERIN (NITRO-DUR) 0.2 MG/HR Place 1 patch onto the skin daily 90 patch 3    metoprolol succinate (TOPROL XL) 25 MG extended release tablet TAKE ONE-HALF (1/2) TABLET TWICE A DAY 90 tablet 4    MULTAQ 400 MG TABS TAKE 1 TABLET TWICE A DAY WITH MEALS 180 tablet 4    KLOR-CON M10 10 MEQ extended release tablet TAKE 3 TABLETS DAILY 270 tablet 4    warfarin (COUMADIN) 5 MG tablet Take 1 pill daily at 6 pm. (Patient taking differently: PATIENT TAKES 2.5 IN THE EVENING  ALTERNATE WITH 1MG IN THE EVENING) 90 tablet 3    ondansetron (ZOFRAN ODT) 4 MG disintegrating tablet Take 1 tablet by mouth every 8 hours as needed for Nausea or Vomiting 20 tablet 1    fluticasone (FLONASE) 50 MCG/ACT nasal spray 1 spray by Each Nare route daily      nystatin (MYCOSTATIN) 453573 UNIT/GM cream Apply topically 2 times daily until improved. 15 g 1    Compression Stockings MISC by Does not apply route 1 pair 20-30 mmHg compression stockings, wide band with grippers.  1 each 2    Polysaccharide Iron Complex (PROFE PO) Take 185 mg by mouth      polyethylene glycol (MIRALAX) POWD powder Take 17 g by mouth daily      Coenzyme Q10 (CO Q 10) 100 MG CAPS Take 200 mg by mouth daily      fentaNYL (DURAGESIC) 50 MCG/HR Place 1 patch onto the skin every 48 hours 15 patch 0         Physical Examination     He declines a full skin examination. The following were examined and determined to be normal: Psych/Neuro, Scalp/hair, Head/face, Conjunctivae/eyelids, Gums/teeth/lips, Neck, Breast/axilla/chest, Abdomen and Back. The following were examined and determined to be abnormal: RUE and LUE. Well-appearing. 1.  Right lower abdomen and back with several stuck on appearing verrucous tan papules. 2.  Right proximal extensor forearm with a round erythematous edematous papule. 3.  Extensor forearms with round purpuric macules and patches. 4.  Clear. Assessment and Plan     1. SK (seborrheic keratosis)     Reassurance. 2. Insect bite of forearm with local reaction, right, initial encounter     Mometasone cream twice daily until improved. 3. Solar purpura     Reassurance. 4. History of basal cell carcinoma - clear    Sun protective behaviors and self skin examinations were encouraged. Call for any new or concerning lesions. Return in about 1 year (around 9/15/2021).

## 2020-10-06 ENCOUNTER — NURSE ONLY (OUTPATIENT)
Dept: CARDIOLOGY CLINIC | Age: 80
End: 2020-10-06
Payer: MEDICARE

## 2020-10-06 PROCEDURE — 93294 REM INTERROG EVL PM/LDLS PM: CPT | Performed by: INTERNAL MEDICINE

## 2020-10-06 PROCEDURE — 93296 REM INTERROG EVL PM/IDS: CPT | Performed by: INTERNAL MEDICINE

## 2020-10-06 NOTE — PROGRESS NOTES
We received remote transmission from patient's monitor at home. Transmission shows normal sensing and pacing function. EP physician will review. See interrogation under cardiology tab in the [address] [facility] [address] field for more details. Hx pAF/AT (coumadin, toprol xl, multaq)  Episodes Since: 08-Jul-2020  50 Monitored AT/AF, AT/AF burden 0.6% and longest 2 hrs on 9/27/20. Follow up in 3 months via carelink.

## 2020-10-15 ENCOUNTER — NURSE ONLY (OUTPATIENT)
Dept: FAMILY MEDICINE CLINIC | Age: 80
End: 2020-10-15
Payer: MEDICARE

## 2020-10-15 PROCEDURE — 90694 VACC AIIV4 NO PRSRV 0.5ML IM: CPT | Performed by: FAMILY MEDICINE

## 2020-10-15 PROCEDURE — G0008 ADMIN INFLUENZA VIRUS VAC: HCPCS | Performed by: FAMILY MEDICINE

## 2020-11-06 ENCOUNTER — ANTI-COAG VISIT (OUTPATIENT)
Dept: CARDIOLOGY CLINIC | Age: 80
End: 2020-11-06
Payer: MEDICARE

## 2020-11-06 PROCEDURE — 93793 ANTICOAG MGMT PT WARFARIN: CPT | Performed by: NURSE PRACTITIONER

## 2020-11-06 NOTE — PROGRESS NOTES
12/27/19 1.44 Below goal, increased by 6.5 mg See note 2.5 5 2.5 5 2.5 5 2.5 25   12/20/19 1.27 Below goal, increase by 5 2.5 2 2.5 2 2.5 10(Pt did not increase as instructed) he took 5 mg 2 23.5    (he actually took 18.5 mg)   12/13/19 1.44 Below goal, increase by 3 2.5 2 2.5 2 2.5 5 2 18.5   12/6/19 1.59 Below goal, increase by. 2.5 1 2.5 1 2.5 5 1 15.5   12/2/19 4.10 Above goal, hold tonight  hold 2.5 2.5 2.5 Recheck     11/29/19 2.47 At goal, no change 5 2.5 2.5 2.5 2.5 5 5 25   11/25/16 5.86 Above goal 5 hold hold 2.5 5 recheck  12.5   11/19/19 2.38 At goal, continue 5 5 5 5 5 5 5 35         Assessment/Plan:    Recent hospitalizations/HC visits None reported   Recent medication changes None reported   Medications taken regularly that may interact with warfarin or alter INR No significant drug interactions identified   Warfarin dose taken as prescribed Patient uses pillbox? no   Signs/symptoms of bleeding History of bleeding? Yes, GIB   Vitamin K intake Normally has ~1-2 servings of green, leafy vegetables per week   Recent vomiting/diarrhea/fever None reported   Changes in weight, activity, stress None reported   Tobacco or alcohol use Patient reports smoking 0 PPD  Patient reports having 0 drinks per day   Upcoming surgeries or procedures None reported     Patient was also reminded to maintain consistent vitamin K intake and call with any bleeding, medication changes, or fever/vomiting/diarrhea. Next INR check:11/4/20     Spoke with pt regarding results. No change and updated dose per pt        Addendum:  11/6/20  Reviewed RN assessment and plan. INR is at goal, continue current dose   Repeat INR in 4 week/s. Patient/family instructed by MERVAT.      Kane Cramer, CNP

## 2020-11-09 ENCOUNTER — OFFICE VISIT (OUTPATIENT)
Dept: CARDIOLOGY CLINIC | Age: 80
End: 2020-11-09
Payer: MEDICARE

## 2020-11-09 VITALS
TEMPERATURE: 97.5 F | BODY MASS INDEX: 29.39 KG/M2 | HEART RATE: 68 BPM | WEIGHT: 176.6 LBS | SYSTOLIC BLOOD PRESSURE: 132 MMHG | DIASTOLIC BLOOD PRESSURE: 70 MMHG

## 2020-11-09 PROBLEM — I73.9 PVD (PERIPHERAL VASCULAR DISEASE) (HCC): Status: ACTIVE | Noted: 2020-11-09

## 2020-11-09 PROCEDURE — G8427 DOCREV CUR MEDS BY ELIG CLIN: HCPCS | Performed by: INTERNAL MEDICINE

## 2020-11-09 PROCEDURE — 1123F ACP DISCUSS/DSCN MKR DOCD: CPT | Performed by: INTERNAL MEDICINE

## 2020-11-09 PROCEDURE — 4040F PNEUMOC VAC/ADMIN/RCVD: CPT | Performed by: INTERNAL MEDICINE

## 2020-11-09 PROCEDURE — G8484 FLU IMMUNIZE NO ADMIN: HCPCS | Performed by: INTERNAL MEDICINE

## 2020-11-09 PROCEDURE — 99214 OFFICE O/P EST MOD 30 MIN: CPT | Performed by: INTERNAL MEDICINE

## 2020-11-09 PROCEDURE — G8417 CALC BMI ABV UP PARAM F/U: HCPCS | Performed by: INTERNAL MEDICINE

## 2020-11-09 PROCEDURE — 1036F TOBACCO NON-USER: CPT | Performed by: INTERNAL MEDICINE

## 2020-11-09 NOTE — PROGRESS NOTES
Subjective:      Patient ID: Sue Neff is a [de-identified] y.o. male. CC:  S/p GI bleed. F/u HTN  CAD. S/p pacer. Fatigue      HPI: Mr Marisol Silva is here for a 6 moth f/u. Leila Espinoza He denies chest pain today but has chronic swelling. He c/o of fatigue and low b/p. No LOC but fell and hit his hip and has bad leg pain. Back is bad and is using a 4 pt walker. Worried about bleeding and back surgeons shy away from surgery on back d/t bleeding. Has paroxysmal brief episodes of AF, less than 1%. He had life threatening GI bleeding on NOAC. He doesn't want watchman. Has more AF on pacer interrogation. GI said ok for [facility]. Has edema. Has dizziness and will decrease nitrodur patch 0.2mg/hr.          Allergies   Allergen Reactions    Avelox [Moxifloxacin Hcl In Nacl] Anaphylaxis    Epinephrine Base     Other      [place] per PT - swelling size of silver dollar at site per PT     Keflex [Cephalexin] Nausea And Vomiting    Polysporin [Bacitracin-Polymyxin B] Rash        Social History     Socioeconomic History    Marital status:      Spouse name: Not on file    Number of children: Not on file    Years of education: Not on file    Highest education level: Not on file   Occupational History    Not on file   Social Needs    Financial resource strain: Not on file    Food insecurity     Worry: Not on file     Inability: Not on file    Transportation needs     Medical: Not on file     Non-medical: Not on file   Tobacco Use    Smoking status: Former Smoker     Packs/day: 1.00     Years: 30.00     Pack years: 30.00     Last attempt to quit: 2004     Years since quittin.5    Smokeless tobacco: Never Used   Substance and Sexual Activity    Alcohol use: No     Alcohol/week: 0.0 standard drinks    Drug use: No    Sexual activity: Yes     Comment:  living with spouse   Lifestyle    Physical activity     Days per week: Not on file     Minutes per session: Not on file    Stress: Not on file Relationships    Social connections     Talks on phone: Not on file     Gets together: Not on file     Attends [employer] service: Not on file     Active member of club or organization: Not on file     Attends meetings of clubs or organizations: Not on file     Relationship status: Not on file    Intimate partner violence     Fear of current or ex partner: Not on file     Emotionally abused: Not on file     Physically abused: Not on file     Forced sexual activity: Not on file   Other Topics Concern    Not on file   Social History Narrative    Not on file        Patient has a family history includes Cancer in his sister; Coronary Art Dis in his brother. Patient  has a past medical history of Allergic rhinitis, Atrial fibrillation ([facility] [address] Benign prostatic hypertrophy, CAD (coronary artery disease), Cancer ([facility] [address] CHF (congestive heart failure) ([facility] [address] Coronary artery disease involving native coronary artery of native heart without angina pectoris, DDD (degenerative disc disease), lumbar, Falls, GI bleeding, Hyperlipidemia, Hypertension, MI (myocardial infarction) ([facility] [address] MRSA (methicillin resistant staph aureus) culture positive, Pneumonia, S/P PTCA (percutaneous transluminal coronary angioplasty), SSS (sick sinus syndrome) ([facility] [address] Unspecified sleep apnea, and Venous (peripheral) insufficiency. Current Outpatient Medications   Medication Sig Dispense Refill    mometasone (ELOCON) 0.1 % cream Apply to affected areas of the skin twice daily for up 2 weeks or until improved. For bug bites.  (Patient not taking: Reported on 11/9/2020) 15 g 2    furosemide (LASIX) 40 MG tablet TAKE 1 TABLET BY MOUTH TWICE DAILY 180 tablet 3    ferrous sulfate (IRON 325) 325 (65 Fe) MG tablet Take 180 mg by mouth daily (with breakfast)       rosuvastatin (CRESTOR) 5 MG tablet TAKE 1 TABLET DAILY 90 tablet 3    nitroGLYCERIN (NITRO-DUR) 0.2 MG/HR Place 1 patch onto the skin daily 90 patch 3    pantoprazole (PROTONIX) 40 MG well-developed and well-nourished. No distress. HENT:   Head: Normocephalic and atraumatic. Mouth/Throat: No oropharyngeal exudate. Eyes: Conjunctivae and EOM are normal. Pupils are equal, round, and reactive to light. No scleral icterus. Neck: Normal range of motion. No JVD present. No tracheal deviation present. No thyromegaly present. Cardiovascular: Normal rate, regular rhythm, normal heart sounds and intact distal pulses. Exam reveals no gallop and no friction rub. No murmur heard. Pulmonary/Chest: Effort normal. No respiratory distress. He has no wheezes. He has no rales. He exhibits no tenderness. Abdominal: Soft. Bowel sounds are normal. He exhibits no distension and no mass. No tenderness. He has no rebound and no guarding. Musculoskeletal: tender left leg and swelling. Lymphadenopathy:     He has no cervical adenopathy. Neurological: He is alert and oriented to person, place, and time. No cranial nerve deficit. Coordination normal.   Skin: Skin is warm and dry. No rash noted. He is not diaphoretic. No erythema. No pallor. Psychiatric: He has a normal mood and affect. His behavior is normal. Judgment and thought content normal.       Assessment:       Diagnosis Orders   1. Mixed hyperlipidemia     2. Essential hypertension     3. Postsurgical percutaneous transluminal coronary angioplasty status     4. Coronary artery disease involving native coronary artery of native heart without angina pectoris     5. Paroxysmal atrial fibrillation (HCC)             Plan:        CAD:  Stable. No chest pains but had GI bleed - still off A/C for paroxysmal a fib. Off ranexa. Rhythm: MRI compatible pacer   HTN:  Episodic and low today. Had LLE skin infection and wears compression stockings. Now taking lasix 40 mg am and pm.  AT/AF:  Has episodes and feels tired. Taking multaq to maintain SR. Reviewed chads vasc. Check labs. He has tremendous fatigue.

## 2020-11-17 RX ORDER — WARFARIN SODIUM 5 MG/1
TABLET ORAL
Qty: 90 TABLET | Refills: 2 | Status: SHIPPED | OUTPATIENT
Start: 2020-11-17 | End: 2021-08-11

## 2020-12-10 LAB
INR BLD: 1.7 (ref 0.8–1.2)
PROTHROMBIN TIME: 20.3 SECONDS (ref 11.7–14.2)

## 2020-12-11 ENCOUNTER — ANTI-COAG VISIT (OUTPATIENT)
Dept: CARDIOLOGY CLINIC | Age: 80
End: 2020-12-11
Payer: MEDICARE

## 2020-12-11 PROCEDURE — 93793 ANTICOAG MGMT PT WARFARIN: CPT | Performed by: NURSE PRACTITIONER

## 2020-12-11 NOTE — PROGRESS NOTES
[address] [facility], 1940      Anticoagulation Indication(s):  Afib  pAF   Referring Physician:   Dr. Kinjal Hernandez  Goal INR Range:  2.0-3.0  Duration of Anticoagulation Therapy:  Indefinite  Home or Lab Draw: Lab  Product patient has at home: warfarin 5 mg    Recent INR Results:  Lab Results   Component Value Date    INR 1.7 (H) 12/10/2020    INR 2.1 (H) 11/04/2020    INR 2.0 (H) 09/08/2020    INR 2.7 (H) 08/31/2020       INR Summary                          Warfarin regimen (mg)  Date INR A/P Sun Mon Tue Wed Thu Fri Sat Mg/wk   12/10/20 1.7 Below goal, increase by 2.5 mg 2.5 5 2.5 2.5 2.5 5 5 25   11/4/20 2.1 At goal, no change 2.5 5 2.5 2.5 2.5 5 2.5 22.5   9/8/20 2.0 At goal, no change 2.5 5 2.5 5 2.5 5 2.5 25   8/31/20 2.7 At goal, no change 2.5 5 2.5 5 2.5 5 2.5 25   8/12/20 2.1 At goal, no change 2.5 5 2.5 5 2.5 5 2.5 25   7/16/20 2.7 At goal, no change 2.5 2.5 5 5 5 2.5 2.5 25   7/1/20 1.8 Below goal, increase by 2.5 2.5 2.5 5 5 5 2.5 2.5 25   6/17/20 1.9 At goal, no change 2.5 2.5 5 5 2.5 2.5 2.5 22.5   6/4/20 2.1 At goal, continue dose 2.5 2.5 5 5 2.5 2.5 2.5 22.5   5/27/20 2.0 At goal, continue dose 2.5 2.5 5 5 2.5 2.5 2.5 22.5   5/14/20 2.7 At goal, continue dose 2.5 2.5 5 5 2.5 2.5 2.5 22.5   5/7/20 2.1 At goal, continue dose 2.5 2.5 5 5 2.5 2.5 2.5 22.5   4/30/20 1.8 Below goal, increase dose by 2.5 mg 2.5 2.5 5 5 2.5 2.5 2.5 22.5   4/15/20 1.9 At goal, no change 2.5 2.5 2.5 5 2.5 2.5 2.5 20   4/7/20 1.7 Below goal, increase by 2.5 2.5 2.5 2.5 5 2.5 2.5 2.5 20   3/30/20 3.70 Above goal, hold todays  And decrease by 5mg  2.5 2.5 hold 2.5 2.5 2.5 2.5 15   3/5/20 3.07 Above goal, hold todays dose 2.5 5 2.5 5 2.5 hold 2.5 20   2/26/20 2.78 At goal, no change 2.5 5 2.5 5 2.5 2.5 2.5 22.5   2/14/20 2.27 At goal, no change 2.5 5 2.5 5 2.5 2.5 2.5 22.5   2/5/20 3.93 Above goal,hold todays dose and decrease by 2.5 2.5 5 2.5 5 hold 2.5 2.5 20   1/27/20 2.64 At goal, no change 2.5 5 2.5 5

## 2020-12-30 RX ORDER — ONDANSETRON 4 MG/1
4 TABLET, ORALLY DISINTEGRATING ORAL EVERY 8 HOURS PRN
Qty: 20 TABLET | Refills: 3 | Status: SHIPPED | OUTPATIENT
Start: 2020-12-30 | End: 2021-06-28

## 2021-01-04 LAB
INR BLD: 2 (ref 0.8–1.2)
PROTHROMBIN TIME: 22.2 SECONDS (ref 11.7–14.2)

## 2021-01-05 NOTE — PROGRESS NOTES
[address]   Electrophysiology    Date: 1/13/2021      Chief Complaint   Patient presents with    Atrial Fibrillation    Bradycardia    Congestive Heart Failure    Edema     Cardiac HX: Radha Rivas is a [de-identified] y.o.  man with a h/o of HLD, HTN, BARRY, CAD, s/p MI, s/p PCI x 8, follows with Dr. Na Sotelo,  pAF - on Multaq and Xarelto, SSS, s/p dual chamber MDT PPM (1/18/2008, Dr. Gala Brito), s/p gen change (10/10/2016, Dr. Richard Brewster), chronic dCHF, s/p GIB (1/2018) on Xarelto requiring blood transfusion, Watchman discussed - patient not interested, now on Warfarin. Today: Patient is here for follow-up for SSS, PPM management and paroxysmal AF. He is very fatigued and c/o L arm numbness. He has chronic back issues and that causes him a lot of pain and mobility issues. He denies CP or SOB, he has 2-3+ edema in his LE and has some weeping in the LLE. He takes Lasix twice a day and occasionally takes an extra dose if his legs are swollen more than normal.  States this happens a couple times a week. He has not taken any Lasix today as he had to come to the doctor's office. He has not felt any heart racing, palpitations or irregular heartbeats. Review of his device today shows 179 AT/AF episodes since July 8, 2020 with the longest being 2 minutes and 49 seconds in length, the episodes that occurred after October 6 when he had a remote all happened on October 24 between 19:28 PM and 20:45 PM.  Not had any symptoms. He atrially paces 86.4% of the time and ventricularly paces 0.5% of the time. He denies any chest pain, shortness of breath, PND, orthopnea or lower extremity edema. 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     Home medications:   Current Outpatient Medications on File Prior to Visit   Medication Sig Dispense Refill  ondansetron (ZOFRAN ODT) 4 MG disintegrating tablet Take 1 tablet by mouth every 8 hours as needed for Nausea or Vomiting 20 tablet 3    warfarin (COUMADIN) 5 MG tablet TAKE 1 TABLET BY MOUTH DAILY AT 6 PM 90 tablet 2    mometasone (ELOCON) 0.1 % cream Apply to affected areas of the skin twice daily for up 2 weeks or until improved. For bug bites. 15 g 2    furosemide (LASIX) 40 MG tablet TAKE 1 TABLET BY MOUTH TWICE DAILY 180 tablet 3    ferrous sulfate (IRON 325) 325 (65 Fe) MG tablet Take 180 mg by mouth daily (with breakfast)       rosuvastatin (CRESTOR) 5 MG tablet TAKE 1 TABLET DAILY 90 tablet 3    nitroGLYCERIN (NITRO-DUR) 0.2 MG/HR Place 1 patch onto the skin daily 90 patch 3    pantoprazole (PROTONIX) 40 MG tablet TAKE 1 TABLET BY MOUTH EVERY MORNING BEFORE BREAKFAST 90 tablet 3    metoprolol succinate (TOPROL XL) 25 MG extended release tablet TAKE ONE-HALF (1/2) TABLET TWICE A DAY 90 tablet 4    MULTAQ 400 MG TABS TAKE 1 TABLET TWICE A DAY WITH MEALS 180 tablet 4    KLOR-CON M10 10 MEQ extended release tablet TAKE 3 TABLETS DAILY 270 tablet 4    fluticasone (FLONASE) 50 MCG/ACT nasal spray 1 spray by Each Nare route daily      nystatin (MYCOSTATIN) 878434 UNIT/GM cream Apply topically 2 times daily until improved. 15 g 1    vitamin B-12 (CYANOCOBALAMIN) 500 MCG tablet Take 500 mcg by mouth daily      Compression Stockings MISC by Does not apply route 1 pair 20-30 mmHg compression stockings, wide band with grippers. 1 each 2    Polysaccharide Iron Complex (PROFE PO) Take 185 mg by mouth      aspirin 81 MG tablet Take 81 mg by mouth daily      polyethylene glycol (MIRALAX) POWD powder Take 17 g by mouth daily      Coenzyme Q10 (CO Q 10) 100 MG CAPS Take 200 mg by mouth daily      fentaNYL (DURAGESIC) 50 MCG/HR Place 1 patch onto the skin every 48 hours 15 patch 0     No current facility-administered medications on file prior to visit.         Past Medical History:   Diagnosis Date  Allergic rhinitis     Atrial fibrillation (HCC)     Benign prostatic hypertrophy     CAD (coronary artery disease)     Cancer (HCC)     skin    CHF (congestive heart failure) ([facility] [address] 08/17/2017    Coronary artery disease involving native coronary artery of native heart without angina pectoris 6/17/2011    DDD (degenerative disc disease), lumbar     Falls 08/17/2017    GI bleeding     Hyperlipidemia     Hypertension     MI (myocardial infarction) ([facility] [address]     MRSA (methicillin resistant staph aureus) culture positive     h/o infection in the blood    Pneumonia     S/P PTCA (percutaneous transluminal coronary angioplasty) stents x 8    [facility]     Unspecified sleep apnea     Venous (peripheral) insufficiency 12/4/2018        Past Surgical History:   Procedure Laterality Date    APPENDECTOMY      CARDIAC PACEMAKER PLACEMENT      CARPAL TUNNEL RELEASE      CORONARY ANGIOPLASTY      CORONARY ANGIOPLASTY WITH STENT PLACEMENT      DIAGNOSTIC CARDIAC CATH LAB PROCEDURE      EYE SURGERY      PACEMAKER PLACEMENT         Allergies   Allergen Reactions    Avelox [Moxifloxacin Hcl In Nacl] Anaphylaxis    Epinephrine Base     Other      [place] per PT - swelling size of silver dollar at site per PT     Keflex [Cephalexin] Nausea And Vomiting    Polysporin [Bacitracin-Polymyxin B] Rash       Social History:  Reviewed. reports that he quit smoking about 16 years ago. He has a 30.00 pack-year smoking history. He has never used smokeless tobacco. He reports that he does not drink alcohol or use drugs. Family History:  Reviewed. family history includes Cancer in his sister; Coronary Art Dis in his brother. Review of System:    · Constitutional: No fevers, chills. · Eyes: No visual changes or diplopia. No scleral icterus. · ENT: No Headaches. No mouth sores or sore throat. · Cardiovascular: Yes for chronic chest pain, Yes for dyspnea on exertion, Yes for palpitations or No for loss of consciousness. No cough, hemoptysis, No for pleuritic pain, or phlebitis. · Respiratory: No for cough or wheezing. No hematemesis. · Gastrointestinal: No abdominal pain, blood in stools. · Genitourinary: No dysuria, or hematuria. · Musculoskeletal: No gait disturbance,    · Integumentary: No rash or pruritis. · Neurological: No headache, change in muscle strength, numbness or tingling. · Psychiatric: No anxiety, or depression. · Endocrine: No temperature intolerance. No excessive thirst, fluid intake, or urination. · Hem/Lymph: No abnormal bruising or bleeding, blood clots or swollen lymph nodes. · Allergic/Immunologic: No nasal congestion or hives. Physical Examination:  Vitals:    01/13/21 1405   BP: 118/62   Temp: (!) 71 °F (21.7 °C)        Wt Readings from Last 3 Encounters:   01/13/21 175 lb (79.4 kg)   11/09/20 176 lb 9.6 oz (80.1 kg)   08/20/20 159 lb (72.1 kg)     · Constitutional: Oriented. No distress. · Head: Normocephalic and atraumatic. · Mouth/Throat: Oropharynx is clear and moist.   · Eyes: Conjunctivae normal. EOM are normal.   · Neck: Neck supple. No rigidity. No JVD present. · Cardiovascular: Normal rate, regular rhythm, S1&S2. · Pulmonary/Chest: Bilateral respiratory sounds. No wheezes, No rhonchi. · Abdominal: Soft. Bowel sounds present. No distension, No tenderness. · Musculoskeletal: No tenderness. 1+ LLE edema  - chronic  · Lymphadenopathy: Has no cervical adenopathy. · Neurological: Alert and oriented. Cranial nerve appears intact, No Gross deficit   · Skin: Skin is warm and dry. No rash noted. Left chest incision has healed well. · Psychiatric: Has a normal mood, affect and behavior     Labs:  Reviewed.      Lab Results   Component Value Date    CKTOTAL 268 03/06/2012    CKMB 2.1 03/06/2012    TROPONINI <0.01 07/05/2017     Lab Results Component Value Date    .0 03/06/2012     Lab Results   Component Value Date    PROTIME 22.2 01/04/2021    PROTIME 20.3 12/10/2020    PROTIME 23.4 11/04/2020    INR 2.0 01/04/2021    INR 1.7 12/10/2020    INR 2.1 11/04/2020     Lab Results   Component Value Date    CHOL 112 06/16/2015    HDL 42 06/16/2015    HDL 30 03/07/2012    TRIG 88 06/16/2015       ECG: Personally reviewed: AP, VS, HR 71, , , QTc 416    ECHO: 12/2015 -Normal left ventricle size, wall thickness and systolic function with an   estimated ejection fraction of 55%.  -No regional wall motion abnormalities are seen.   -Pacer / ICD wire is visualized in the right heart.   -There is mild-moderate tricuspid regurgitation with RVSP estimated at 38   mmHg. Cardiac Angiography: Cath- 6/18/12- Successful Angio-Seal of right femoral arteriotomy. NARENDRA- 6/23/15-   Small sized inferior fixed defect consistent with infarction in the   territory of the mid and distal LCx and/or RCA . No ischemia   Normal LVEF. Carotid- 10/20/15-   - The right internal carotid artery appears to have a lower limits of 16-49%   diameter reducing stenosis based on velocity criteria. - The left internal carotid artery appears to have a 1-15% diameter reducing   stenosis based on velocity criteria. - Normal antegrade flow noted in vertebral arteries bilaterally.        Patient Active Problem List    Diagnosis Date Noted    Pure hyperglyceridemia      Priority: High    Gastrointestinal hemorrhage      Priority: High    Bradycardia      Priority: Low    Sick sinus syndrome (HCC) 08/29/2016     Priority: Low    CHF (congestive heart failure), NYHA class I ([facility])      Priority: Low    Essential hypertension      Priority: Low    Angina pectoris ([facility] [address] 06/16/2015     Priority: Low    History of nonmelanoma skin cancer 10/01/2013     Priority: Low    Tinea manuum, pedis, and unguium 10/01/2013     Priority: Low - On lasix 40 mg BID - minimal improvement of edema - sometimes takes 3 tabs daily  - Daily weights  - Low sodium diet    CP/CAD  - Follows with Dr. Jackson Face  - On isosorbide - no c/o CP, only fatigue  - On ASA, statin, BB     EF of 25%  No systolic HF  Statin for CAD, on Xarelto for AF  No tobacco use. All questions and concerns were addressed to the patient/family. Alternatives to my treatment were discussed. The note was completed using EMR. Every effort was made to ensure accuracy; however, inadvertent computerized transcription errors may be present.      Thank you for allowing me to participate in the care of [address] [place] [address]

## 2021-01-06 ENCOUNTER — ANTI-COAG VISIT (OUTPATIENT)
Dept: CARDIOLOGY CLINIC | Age: 81
End: 2021-01-06
Payer: MEDICARE

## 2021-01-06 DIAGNOSIS — I48.0 PAROXYSMAL ATRIAL FIBRILLATION (HCC): ICD-10-CM

## 2021-01-06 PROCEDURE — 93793 ANTICOAG MGMT PT WARFARIN: CPT | Performed by: NURSE PRACTITIONER

## 2021-01-06 NOTE — PROGRESS NOTES
ANTICOAGULATION MONITORING    Nabil Benjamin, 1940      Anticoagulation Indication(s):  Afib  pAF   Referring Physician:   Dr. Pato Nuñez  Goal INR Range:  2.0-3.0  Duration of Anticoagulation Therapy:  Indefinite  Home or Lab Draw: Lab  Product patient has at home: warfarin 5 mg    Recent INR Results:  Lab Results   Component Value Date    INR 2.0 (H) 01/04/2021    INR 1.7 (H) 12/10/2020    INR 2.1 (H) 11/04/2020    INR 2.0 (H) 09/08/2020       INR Summary                          Warfarin regimen (mg)  Date INR A/P Sun Mon Tue Wed Thu Fri Sat Mg/wk   1/4/20 2.0 At goal, no change.  Pt went back to original dosing 2.5 5 2.5 2.5 2.5 5 2.5 22.5   12/10/20 1.7 Below goal, increase by 2.5 mg 2.5 5 2.5 2.5 2.5 5 5 25   11/4/20 2.1 At goal, no change 2.5 5 2.5 2.5 2.5 5 2.5 22.5   9/8/20 2.0 At goal, no change 2.5 5 2.5 5 2.5 5 2.5 25   8/31/20 2.7 At goal, no change 2.5 5 2.5 5 2.5 5 2.5 25   8/12/20 2.1 At goal, no change 2.5 5 2.5 5 2.5 5 2.5 25   7/16/20 2.7 At goal, no change 2.5 2.5 5 5 5 2.5 2.5 25   7/1/20 1.8 Below goal, increase by 2.5 2.5 2.5 5 5 5 2.5 2.5 25   6/17/20 1.9 At goal, no change 2.5 2.5 5 5 2.5 2.5 2.5 22.5   6/4/20 2.1 At goal, continue dose 2.5 2.5 5 5 2.5 2.5 2.5 22.5   5/27/20 2.0 At goal, continue dose 2.5 2.5 5 5 2.5 2.5 2.5 22.5   5/14/20 2.7 At goal, continue dose 2.5 2.5 5 5 2.5 2.5 2.5 22.5   5/7/20 2.1 At goal, continue dose 2.5 2.5 5 5 2.5 2.5 2.5 22.5   4/30/20 1.8 Below goal, increase dose by 2.5 mg 2.5 2.5 5 5 2.5 2.5 2.5 22.5   4/15/20 1.9 At goal, no change 2.5 2.5 2.5 5 2.5 2.5 2.5 20   4/7/20 1.7 Below goal, increase by 2.5 2.5 2.5 2.5 5 2.5 2.5 2.5 20   3/30/20 3.70 Above goal, hold todays  And decrease by 5mg  2.5 2.5 hold 2.5 2.5 2.5 2.5 15   3/5/20 3.07 Above goal, hold todays dose 2.5 5 2.5 5 2.5 hold 2.5 20   2/26/20 2.78 At goal, no change 2.5 5 2.5 5 2.5 2.5 2.5 22.5   2/14/20 2.27 At goal, no change 2.5 5 2.5 5 2.5 2.5 2.5 22.5 2/5/20 3.93 Above goal,hold todays dose and decrease by 2.5 2.5 5 2.5 5 hold 2.5 2.5 20   1/27/20 2.64 At goal, no change 2.5 5 2.5 5 2.5 5 2.5 25   1/13/20 2.8 At goal, no change 2.5 5 2.5 5 2.5 5 2.5 25   12/27/19 1.44 Below goal, increased by 6.5 mg See note 2.5 5 2.5 5 2.5 5 2.5 25   12/20/19 1.27 Below goal, increase by 5 2.5 2 2.5 2 2.5 10(Pt did not increase as instructed) he took 5 mg 2 23.5    (he actually took 18.5 mg)   12/13/19 1.44 Below goal, increase by 3 2.5 2 2.5 2 2.5 5 2 18.5   12/6/19 1.59 Below goal, increase by. 2.5 1 2.5 1 2.5 5 1 15.5   12/2/19 4.10 Above goal, hold tonight  hold 2.5 2.5 2.5 Recheck     11/29/19 2.47 At goal, no change 5 2.5 2.5 2.5 2.5 5 5 25   11/25/16 5.86 Above goal 5 hold hold 2.5 5 recheck  12.5   11/19/19 2.38 At goal, continue 5 5 5 5 5 5 5 35         Assessment/Plan:    Recent hospitalizations/HC visits None reported   Recent medication changes None reported   Medications taken regularly that may interact with warfarin or alter INR No significant drug interactions identified   Warfarin dose taken as prescribed Patient uses pillbox? no   Signs/symptoms of bleeding History of bleeding? Yes, GIB   Vitamin K intake Normally has ~1-2 servings of green, leafy vegetables per week   Recent vomiting/diarrhea/fever None reported   Changes in weight, activity, stress None reported   Tobacco or alcohol use Patient reports smoking 0 PPD  Patient reports having 0 drinks per day   Upcoming surgeries or procedures None reported     Patient was also reminded to maintain consistent vitamin K intake and call with any bleeding, medication changes, or fever/vomiting/diarrhea.     Next INR check:1/18/20     Addendum:  Reviewed documentation and plan of care from RN  Agree with above  Davina Floor NP

## 2021-01-13 ENCOUNTER — OFFICE VISIT (OUTPATIENT)
Dept: CARDIOLOGY CLINIC | Age: 81
End: 2021-01-13
Payer: MEDICARE

## 2021-01-13 ENCOUNTER — NURSE ONLY (OUTPATIENT)
Dept: CARDIOLOGY CLINIC | Age: 81
End: 2021-01-13
Payer: MEDICARE

## 2021-01-13 VITALS
SYSTOLIC BLOOD PRESSURE: 118 MMHG | HEIGHT: 65 IN | DIASTOLIC BLOOD PRESSURE: 62 MMHG | TEMPERATURE: 71 F | BODY MASS INDEX: 29.16 KG/M2 | WEIGHT: 175 LBS

## 2021-01-13 DIAGNOSIS — I50.21 ACUTE SYSTOLIC CONGESTIVE HEART FAILURE, NYHA CLASS 1 (HCC): ICD-10-CM

## 2021-01-13 DIAGNOSIS — I48.0 PAROXYSMAL ATRIAL FIBRILLATION (HCC): ICD-10-CM

## 2021-01-13 DIAGNOSIS — Z95.0 CARDIAC PACEMAKER IN SITU: ICD-10-CM

## 2021-01-13 DIAGNOSIS — I49.5 SICK SINUS SYNDROME (HCC): ICD-10-CM

## 2021-01-13 DIAGNOSIS — R00.1 SINUS BRADYCARDIA: ICD-10-CM

## 2021-01-13 DIAGNOSIS — I48.0 PAROXYSMAL ATRIAL FIBRILLATION (HCC): Primary | ICD-10-CM

## 2021-01-13 DIAGNOSIS — I50.32 CHRONIC DIASTOLIC HEART FAILURE (HCC): ICD-10-CM

## 2021-01-13 DIAGNOSIS — Z95.0 PACEMAKER: ICD-10-CM

## 2021-01-13 DIAGNOSIS — R00.1 BRADYCARDIA: ICD-10-CM

## 2021-01-13 DIAGNOSIS — I49.5 SSS (SICK SINUS SYNDROME) (HCC): ICD-10-CM

## 2021-01-13 PROCEDURE — 99214 OFFICE O/P EST MOD 30 MIN: CPT | Performed by: NURSE PRACTITIONER

## 2021-01-13 PROCEDURE — G8417 CALC BMI ABV UP PARAM F/U: HCPCS | Performed by: NURSE PRACTITIONER

## 2021-01-13 PROCEDURE — 93280 PM DEVICE PROGR EVAL DUAL: CPT | Performed by: INTERNAL MEDICINE

## 2021-01-13 PROCEDURE — 1036F TOBACCO NON-USER: CPT | Performed by: NURSE PRACTITIONER

## 2021-01-13 PROCEDURE — 4040F PNEUMOC VAC/ADMIN/RCVD: CPT | Performed by: NURSE PRACTITIONER

## 2021-01-13 PROCEDURE — 1123F ACP DISCUSS/DSCN MKR DOCD: CPT | Performed by: NURSE PRACTITIONER

## 2021-01-13 PROCEDURE — 93000 ELECTROCARDIOGRAM COMPLETE: CPT | Performed by: NURSE PRACTITIONER

## 2021-01-13 PROCEDURE — G8484 FLU IMMUNIZE NO ADMIN: HCPCS | Performed by: NURSE PRACTITIONER

## 2021-01-13 PROCEDURE — G8427 DOCREV CUR MEDS BY ELIG CLIN: HCPCS | Performed by: NURSE PRACTITIONER

## 2021-01-13 NOTE — PATIENT INSTRUCTIONS
Patient Education        Low Sodium Diet (2,000 Milligram): Care Instructions  Your Care Instructions     Too much sodium causes your body to hold on to extra water. This can raise your blood pressure and force your heart and kidneys to work harder. In very serious cases, this could cause you to be put in the hospital. It might even be life-threatening. By limiting sodium, you will feel better and lower your risk of serious problems. The most common source of sodium is salt. People get most of the salt in their diet from canned, prepared, and packaged foods. Fast food and restaurant meals also are very high in sodium. Your doctor will probably limit your sodium to less than 2,000 milligrams (mg) a day. This limit counts all the sodium in prepared and packaged foods and any salt you add to your food. Follow-up care is a key part of your treatment and safety. Be sure to make and go to all appointments, and call your doctor if you are having problems. It's also a good idea to know your test results and keep a list of the medicines you take. How can you care for yourself at home? Read food labels  · Read labels on cans and food packages. The labels tell you how much sodium is in each serving. Make sure that you look at the serving size. If you eat more than the serving size, you have eaten more sodium. · Food labels also tell you the Percent Daily Value for sodium. Choose products with low Percent Daily Values for sodium. · Be aware that sodium can come in forms other than salt, including monosodium glutamate (MSG), sodium citrate, and sodium bicarbonate (baking soda). MSG is often added to Asian food. When you eat out, you can sometimes ask for food without MSG or added salt.   Buy low-sodium foods · Buy foods that are labeled \"unsalted\" (no salt added), \"sodium-free\" (less than 5 mg of sodium per serving), or \"low-sodium\" (less than 140 mg of sodium per serving). Foods labeled \"reduced-sodium\" and \"light sodium\" may still have too much sodium. Be sure to read the label to see how much sodium you are getting. · Buy fresh vegetables, or frozen vegetables without added sauces. Buy low-sodium versions of canned vegetables, soups, and other canned goods. Prepare low-sodium meals  · Cut back on the amount of salt you use in cooking. This will help you adjust to the taste. Do not add salt after cooking. One teaspoon of salt has about 2,300 mg of sodium. · Take the salt shaker off the table. · Flavor your food with garlic, lemon juice, onion, vinegar, herbs, and spices. Do not use soy sauce, lite soy sauce, steak sauce, onion salt, garlic salt, celery salt, mustard, or ketchup on your food. · Use low-sodium salad dressings, sauces, and ketchup. Or make your own salad dressings and sauces without adding salt. · Use less salt (or none) when recipes call for it. You can often use half the salt a recipe calls for without losing flavor. Other foods such as rice, pasta, and grains do not need added salt. · Rinse canned vegetables, and cook them in fresh water. This removes somebut not allof the salt. · Avoid water that is naturally high in sodium or that has been treated with water softeners, which add sodium. Call your local water company to find out the sodium content of your water supply. If you buy bottled water, read the label and choose a sodium-free brand. Avoid high-sodium foods  · Avoid eating:  ? Smoked, cured, salted, and canned meat, fish, and poultry. ? Ham, walsh, hot dogs, and luncheon meats. ? Regular, hard, and processed cheese and regular peanut butter. ? Crackers with salted tops, and other salted snack foods such as pretzels, chips, and salted popcorn. ? Frozen prepared meals, unless labeled low-sodium. ? Canned and dried soups, broths, and bouillon, unless labeled sodium-free or low-sodium. ? Canned vegetables, unless labeled sodium-free or low-sodium. ? Western [place] fries, pizza, tacos, and other fast foods. ? Pickles, olives, ketchup, and other condiments, especially soy sauce, unless labeled sodium-free or low-sodium. Where can you learn more? Go to https://SMGBBpeParcelGenie.Codelearn. org and sign in to your Chictini account. Enter F810 in the [facility] box to learn more about \"Low Sodium Diet (2,000 Milligram): Care Instructions. \"     If you do not have an account, please click on the \"Sign Up Now\" link. Current as of: August 22, 2019               Content Version: 12.6  © 9257-0363 [employer]. Care instructions adapted under license by [facility] ([facility]). If you have questions about a medical condition or this instruction, always ask your healthcare professional. [place] [address] any warranty or liability for your use of this information. Patient Education        Learning About Low-Sodium Foods  What foods are low in sodium? The foods you eat contain nutrients, such as vitamins and minerals. Sodium is a nutrient. Your body needs the right amount to stay healthy and work as it should. You can use the list below to help you make choices about which foods to eat.   Fruits  · Fresh, frozen, canned, or dried fruit  Vegetables  · Fresh or frozen vegetables, with no added salt  · Canned vegetables, low-sodium or with no added salt  Grains  · Bagels without salted tops  · Cereal with no added salt  · Corn tortillas  · Crackers with no added salt  · Oatmeal, cooked without salt  · Popcorn with no salt  · Pasta and noodles, cooked without salt  · Rice, cooked without salt  · Unsalted pretzels  Dairy and dairy alternatives  · Butter, unsalted  · Cream cheese  · Ice cream  · Milk  · Soy milk Meats and other protein foods  · Beans and peas, canned with no salt  · Eggs  · Fresh fish (not smoked)  · Fresh meats (not smoked or cured)  · Nuts and nut butter, prepared without salt  · Poultry, not packaged with sodium solution  · Tofu, unseasoned  · Tuna, canned without salt  Seasonings  · Garlic  · Herbs and spices  · Lemon juice  · Mustard  · Olive oil  · Salt-free seasoning mixes  · Vinegar  Work with your doctor to find out how much of this nutrient you need. Depending on your health, you may need more or less of it in your diet. Where can you learn more? Go to https://Triprental.compepiceweb.Showcase. org and sign in to your shenzhoufu account. Enter O543 in the [facility] box to learn more about \"Learning About Low-Sodium Foods. \"     If you do not have an account, please click on the \"Sign Up Now\" link. Current as of: August 22, 2019               Content Version: 12.6  © 1321-1145 [employer]Quum[employer]. Care instructions adapted under license by [facility] ([facility]). If you have questions about a medical condition or this instruction, always ask your healthcare professional. [place] [address] any warranty or liability for your use of this information. Patient Education        How to Read a Food Label to Limit Sodium: Care Instructions  Your Care Instructions  Sodium causes your body to hold on to extra water. This can raise your blood pressure and force your heart and kidneys to work harder. In very serious cases, this could cause you to be put in the hospital. It might even be life-threatening. By limiting sodium, you will feel better and lower your risk of serious problems. Processed foods, fast food, and restaurant foods are the major sources of dietary sodium. The most common name for sodium is salt. Try to limit how much sodium you eat to less than 2,300 milligrams (mg) a day. If you limit your sodium to 1,500 mg a day, you can lower your blood pressure even more. This limit counts all the salt that you eat in foods you cook or in packaged foods. Keep a list of everything you eat and drink. Follow-up care is a key part of your treatment and safety. Be sure to make and go to all appointments, and call your doctor if you are having problems. It's also a good idea to know your test results and keep a list of the medicines you take. How can you care for yourself at home? Read ingredient lists on food labels  · Read the list of ingredients on food labels to help you find how much sodium is in a food. The label lists the ingredients in a food in descending order (from the most to the least). If salt or sodium is high on the list, there may be a lot of sodium in the food. · Know that sodium has different names. Sodium is also called monosodium glutamate (MSG, common in [facility] food), sodium citrate, sodium alginate, sodium hydroxide, and sodium phosphate. Read Nutrition Facts labels  · On most foods, there is a Nutrition Facts label. This will tell you how much sodium is in one serving of food. Look at both the serving size and the sodium amount. The serving size is located at the top of the label, usually right under the \"Nutrition Facts\" title. The amount of sodium is given in the list under the title. It is given in milligrams (mg). ? Check the serving size carefully. A single serving is often very small, and you may eat more than one serving. If this is the case, you will eat more sodium than listed on the label. For example, if the serving size for a canned soup is 1 cup and the sodium amount is 470 mg, if you have 2 cups you will eat 940 mg of sodium. · The nutrition facts for fresh fruits and vegetables are not listed on the food. They may be listed somewhere in the store. These foods usually have no sodium or low sodium. · The Nutrition Facts label also gives you the Percent Daily Value for sodium. This is how much of the recommended amount of sodium a serving contains. The daily value for sodium is less than 2,300 mg. So if the Percent Daily Value says 50%, this means one serving is giving you half of this, or 1,150 mg. Buy low-sodium foods  · Look for foods that are made with less sodium. Watch for the following words on the label. ? \"Unsalted\" means there is no sodium added to the food. But there may be sodium already in the food naturally. ? \"Sodium-free\" means a serving has less than 5 mg of sodium. ? \"Very low sodium\" means a serving has 35 mg or less of sodium. ? \"Low-sodium\" means a serving has 140 mg or less of sodium. · \"Reduced-sodium\" means that there is 25% less sodium than what the food normally has. This is still usually too much sodium. Try not to buy foods with this on the label. · Buy fresh vegetables, or frozen vegetables without added sauces. Buy low-sodium versions of canned vegetables, soups, and other canned goods. Where can you learn more? Go to https://Light HarmonictacosgamesGRABR.Automsoft. org and sign in to your Consulting Services account. Enter 26 [id number] in the [facility] box to learn more about \"How to Read a Food Label to Limit Sodium: Care Instructions. \"     If you do not have an account, please click on the \"Sign Up Now\" link. Current as of: August 22, 2019               Content Version: 12.6  © 3207-0839 [employer]. Care instructions adapted under license by [facility] ([facility]). If you have questions about a medical condition or this instruction, always ask your healthcare professional. [address] any warranty or liability for your use of this information.          Patient Education Limiting Sodium With Heart Failure: Care Instructions  Your Care Instructions     Sodium causes your body to hold on to extra water. This may cause your heart failure symptoms to get worse. Limiting sodium may help you feel better. People get most of their sodium from processed foods. Fast food and restaurant meals also tend to be very high in sodium. Your doctor may suggest that you limit sodium. Your doctor can tell you how much sodium is right for you. An example is less than 3,000 mg a day. This includes all the salt you eat in cooked or packaged foods. Follow-up care is a key part of your treatment and safety. Be sure to make and go to all appointments, and call your doctor if you are having problems. It's also a good idea to know your test results and keep a list of the medicines you take. How can you care for yourself at home? Read food labels  · Read food labels on cans and food packages. The labels tell you how much sodium is in each serving. Make sure that you look at the serving size. If you eat more than the serving size, you have eaten more sodium than is listed for one serving. · Food labels also tell you the Percent Daily Value for sodium. Choose products with low Percent Daily Values for sodium. · Be aware that sodium can come in forms other than salt, including monosodium glutamate (MSG), sodium citrate, and sodium bicarbonate (baking soda). MSG is often added to Asian food. You can sometimes ask for food without MSG or salt. Buy low-sodium foods  · Buy foods that are labeled \"unsalted\" (no salt added), \"sodium-free\" (less than 5 mg of sodium per serving), or \"low-sodium\" (less than 140 mg of sodium per serving). A food labeled \"light sodium\" has less than half of the full-sodium version of that food. Foods labeled \"reduced-sodium\" may still have too much sodium. · Buy fresh vegetables or plain, frozen vegetables. Buy low-sodium versions of canned vegetables, soups, and other canned goods. Prepare low-sodium meals  · Use less salt each day when cooking. Reducing salt in this way will help you adjust to the taste. Do not add salt after cooking. Take the salt shaker off the table. · Flavor your food with garlic, lemon juice, onion, vinegar, herbs, and spices instead of salt. Do not use soy sauce, steak sauce, onion salt, garlic salt, mustard, or ketchup on your food. · Make your own salad dressings, sauces, and ketchup without adding salt. · Use less salt (or none) when recipes call for it. You can often use half the salt a recipe calls for without losing flavor. Other dishes like rice, pasta, and grains do not need added salt. · Rinse canned vegetables. This removes somebut not allof the salt. · Avoid water that has a naturally high sodium content or that has been treated with water softeners, which add sodium. Call your local water company to find out the sodium content of your water supply. If you buy bottled water, read the label and choose a sodium-free brand. Avoid high-sodium foods, such as:  · Smoked, cured, salted, and canned meat, fish, and poultry. · Ham, walsh, hot dogs, and luncheon meats. · Regular, hard, and processed cheese and regular peanut butter. · Crackers with salted tops. · Frozen prepared meals. · Canned and dried soups, broths, and bouillon, unless labeled sodium-free or low-sodium. · Canned vegetables, unless labeled sodium-free or low-sodium. · Salted snack foods such as chips and pretzels. · Western Maria Luisa fries, pizza, tacos, and other fast foods. · Pickles, olives, ketchup, and other condiments, especially soy sauce, unless labeled sodium-free or low-sodium. If you cannot cook for yourself  · Have family members or friends help you, or have someone cook low-sodium meals. · Check with your local senior nutrition program to find out where meals are served and whether they offer a low-sodium option. You can often find these programs through your local health department or hospital.  · Have meals delivered to your home. Most [facility] have a Meals on KRISS [place]. These programs provide one hot meal a day for older adults, delivered to their homes. Ask whether these meals are low-sodium. Let them know that you are on a low-sodium diet. Where can you learn more? Go to https://TurbulenzpeSemantra.Adocia. org and sign in to your BurstPoint Networks account. Enter A166 in the [facility] box to learn more about \"Limiting Sodium With Heart Failure: Care Instructions. \"     If you do not have an account, please click on the \"Sign Up Now\" link. Current as of: December 16, 2019               Content Version: 12.6  © 2193-4654 M.Setek. Care instructions adapted under license by [facility] ([facility]). If you have questions about a medical condition or this instruction, always ask your healthcare professional. [place] any warranty or liability for your use of this information. Patient Education        Heart-Healthy Diet: Care Instructions  Your Care Instructions     A heart-healthy diet has lots of vegetables, fruits, nuts, beans, and whole grains, and is low in salt. It limits foods that are high in saturated fat, such as meats, cheeses, and fried foods. It may be hard to change your diet, but even small changes can lower your risk of heart attack and heart disease. Follow-up care is a key part of your treatment and safety. Be sure to make and go to all appointments, and call your doctor if you are having problems. It's also a good idea to know your test results and keep a list of the medicines you take. How can you care for yourself at home?   Watch your portions · Learn what a serving is. A \"serving\" and a \"portion\" are not always the same thing. Make sure that you are not eating larger portions than are recommended. For example, a serving of pasta is ½ cup. A serving size of meat is 2 to 3 ounces. A 3-ounce serving is about the size of a deck of cards. Measure serving sizes until you are good at [place]" them. Keep in mind that restaurants often serve portions that are 2 or 3 times the size of one serving. · To keep your energy level up and keep you from feeling hungry, eat often but in smaller portions. · Eat only the number of calories you need to stay at a healthy weight. If you need to lose weight, eat fewer calories than your body burns (through exercise and other physical activity). Eat more fruits and vegetables  · Eat a variety of fruit and vegetables every day. Dark green, deep orange, red, or yellow fruits and vegetables are especially good for you. Examples include spinach, carrots, peaches, and berries. · Keep carrots, celery, and other veggies handy for snacks. Buy fruit that is in season and store it where you can see it so that you will be tempted to eat it. · Cook dishes that have a lot of veggies in them, such as stir-fries and soups. Limit saturated and trans fat  · Read food labels, and try to avoid saturated and trans fats. They increase your risk of heart disease. · Use olive or canola oil when you cook. · Bake, broil, grill, or steam foods instead of frying them. · Choose lean meats instead of high-fat meats such as hot dogs and sausages. Cut off all visible fat when you prepare meat. · Eat fish, skinless poultry, and meat alternatives such as soy products instead of high-fat meats. Soy products, such as tofu, may be especially good for your heart. · Choose low-fat or fat-free milk and dairy products.   Eat foods high in fiber · Eat a variety of grain products every day. Include whole-grain foods that have lots of fiber and nutrients. Examples of whole-grain foods include oats, whole wheat bread, and brown rice. · Buy whole-grain breads and cereals, instead of white bread or pastries. Limit salt and sodium  · Limit how much salt and sodium you eat to help lower your blood pressure. · Taste food before you salt it. Add only a little salt when you think you need it. With time, your taste buds will adjust to less salt. · Eat fewer snack items, fast foods, and other high-salt, processed foods. Check food labels for the amount of sodium in packaged foods. · Choose low-sodium versions of canned goods (such as soups, vegetables, and beans). Limit sugar  · Limit drinks and foods with added sugar. These include candy, desserts, and soda pop. Limit alcohol  · Limit alcohol to no more than 2 drinks a day for men and 1 drink a day for women. Too much alcohol can cause health problems. When should you call for help? Watch closely for changes in your health, and be sure to contact your doctor if:    · You would like help planning heart-healthy meals. Where can you learn more? Go to https://Osfam BrewingpeWSI Onlinebiz.healthBONDS.COM. org and sign in to your [employer] account. Enter V137 in the [facility] box to learn more about \"Heart-Healthy Diet: Care Instructions. \"     If you do not have an account, please click on the \"Sign Up Now\" link. Current as of: August 22, 2019               Content Version: 12.6  © 1845-3129 [employer]orporated. Care instructions adapted under license by [facility] ([facility]). If you have questions about a medical condition or this instruction, always ask your healthcare professional. [place] [address] any warranty or liability for your use of this information.

## 2021-01-19 NOTE — PROGRESS NOTES
Patient comes into the office for a device check and ov w/NPFW. Interrogation by [employer] rep. Normal device function  All testing WNL  3.00 V  (2.83V=RRT)  5.5 years remaining on battery  Presenting ApVs @ 60-65 bpm w/occasional ectopy. [place] AsVs @ 30 bpm w/ectopy  AP 86.9%   0.5%  AT/AF burden <0.1%  Since October 6, 2020:  50 episodes of AT/AF. Longest 2 mins 49 secs. Fastest 232/109 bpm.  Patient currently takes warfarin, furosemide, metoprolol. No changes made during this session  See Paceart report under the Cardiology tab. Patient will follow up in 3 months either in office and/or remotely.

## 2021-01-20 ENCOUNTER — ANTI-COAG VISIT (OUTPATIENT)
Dept: CARDIOLOGY CLINIC | Age: 81
End: 2021-01-20

## 2021-01-21 ENCOUNTER — IMMUNIZATION (OUTPATIENT)
Dept: PRIMARY CARE CLINIC | Age: 81
End: 2021-01-21
Payer: MEDICARE

## 2021-01-21 PROCEDURE — 91300 COVID-19, PFIZER VACCINE 30MCG/0.3ML DOSE: CPT

## 2021-01-21 PROCEDURE — 0001A PR IMM ADMN SARSCOV2 30MCG/0.3ML DIL RECON 1ST DOSE: CPT

## 2021-01-25 RX ORDER — PANTOPRAZOLE SODIUM 40 MG/1
TABLET, DELAYED RELEASE ORAL
Qty: 90 TABLET | Refills: 1 | Status: SHIPPED | OUTPATIENT
Start: 2021-01-25 | End: 2021-07-20

## 2021-01-25 NOTE — TELEPHONE ENCOUNTER
Medication:   Requested Prescriptions     Pending Prescriptions Disp Refills    pantoprazole (PROTONIX) 40 MG tablet [Pharmacy Med Name: PANTOPRAZOLE 40MG TABLETS] 90 tablet 3     Sig: TAKE 1 TABLET BY MOUTH EVERY MORNING BEFORE BREAKFAST        Last Filled:  1/28/20 #90, 3 RF     Patient Phone Number: [phone] (home)     Last appt: 6/25/2020  Joint swelling   Next appt: Visit date not found    Last OARRS:   RX Monitoring 7/10/2018   Attestation The Prescription Monitoring Report for this patient was reviewed today. Periodic Controlled Substance Monitoring No signs of potential drug abuse or diversion identified.

## 2021-02-09 ENCOUNTER — ANTI-COAG VISIT (OUTPATIENT)
Dept: CARDIOLOGY CLINIC | Age: 81
End: 2021-02-09
Payer: MEDICARE

## 2021-02-09 DIAGNOSIS — I48.0 PAROXYSMAL ATRIAL FIBRILLATION (HCC): ICD-10-CM

## 2021-02-09 PROCEDURE — 93793 ANTICOAG MGMT PT WARFARIN: CPT | Performed by: NURSE PRACTITIONER

## 2021-02-09 NOTE — PROGRESS NOTES
ANTICOAGULATION MONITORING    Adrianna Rodríguez, 1940      Anticoagulation Indication(s):  Afib  pAF   Referring Physician:   Dr. Aristeo Ontiveros  Goal INR Range:  2.0-3.0  Duration of Anticoagulation Therapy:  Indefinite  Home or Lab Draw: Lab  Product patient has at home: warfarin 5 mg    Recent INR Results:  Lab Results   Component Value Date    INR 2.0 (H) 02/08/2021    INR 2.0 (H) 01/04/2021    INR 1.7 (H) 12/10/2020    INR 2.1 (H) 11/04/2020       INR Summary                          Warfarin regimen (mg)  Date INR A/P Sun Mon Tue Wed Thu Fri Sat Mg/wk   2/8/21 2 At goal, no change 2.5 5 2.5 2.5 2.5 5 2.5 22.5   1/4/20 2.0 At goal, no change.  Pt went back to original dosing 2.5 5 2.5 2.5 2.5 5 2.5 22.5   12/10/20 1.7 Below goal, increase by 2.5 mg 2.5 5 2.5 2.5 2.5 5 5 25   11/4/20 2.1 At goal, no change 2.5 5 2.5 2.5 2.5 5 2.5 22.5   9/8/20 2.0 At goal, no change 2.5 5 2.5 5 2.5 5 2.5 25   8/31/20 2.7 At goal, no change 2.5 5 2.5 5 2.5 5 2.5 25   8/12/20 2.1 At goal, no change 2.5 5 2.5 5 2.5 5 2.5 25   7/16/20 2.7 At goal, no change 2.5 2.5 5 5 5 2.5 2.5 25   7/1/20 1.8 Below goal, increase by 2.5 2.5 2.5 5 5 5 2.5 2.5 25   6/17/20 1.9 At goal, no change 2.5 2.5 5 5 2.5 2.5 2.5 22.5   6/4/20 2.1 At goal, continue dose 2.5 2.5 5 5 2.5 2.5 2.5 22.5   5/27/20 2.0 At goal, continue dose 2.5 2.5 5 5 2.5 2.5 2.5 22.5   5/14/20 2.7 At goal, continue dose 2.5 2.5 5 5 2.5 2.5 2.5 22.5   5/7/20 2.1 At goal, continue dose 2.5 2.5 5 5 2.5 2.5 2.5 22.5   4/30/20 1.8 Below goal, increase dose by 2.5 mg 2.5 2.5 5 5 2.5 2.5 2.5 22.5   4/15/20 1.9 At goal, no change 2.5 2.5 2.5 5 2.5 2.5 2.5 20   4/7/20 1.7 Below goal, increase by 2.5 2.5 2.5 2.5 5 2.5 2.5 2.5 20   3/30/20 3.70 Above goal, hold todays  And decrease by 5mg  2.5 2.5 hold 2.5 2.5 2.5 2.5 15   3/5/20 3.07 Above goal, hold todays dose 2.5 5 2.5 5 2.5 hold 2.5 20   2/26/20 2.78 At goal, no change 2.5 5 2.5 5 2.5 2.5 2.5 22.5   2/14/20 2.27 At goal, no change 2.5 5 2.5 5 2.5 2.5 2.5 22.5   2/5/20 3.93 Above goal,hold todays dose and decrease by 2.5 2.5 5 2.5 5 hold 2.5 2.5 20   1/27/20 2.64 At goal, no change 2.5 5 2.5 5 2.5 5 2.5 25   1/13/20 2.8 At goal, no change 2.5 5 2.5 5 2.5 5 2.5 25   12/27/19 1.44 Below goal, increased by 6.5 mg See note 2.5 5 2.5 5 2.5 5 2.5 25   12/20/19 1.27 Below goal, increase by 5 2.5 2 2.5 2 2.5 10(Pt did not increase as instructed) he took 5 mg 2 23.5    (he actually took 18.5 mg)   12/13/19 1.44 Below goal, increase by 3 2.5 2 2.5 2 2.5 5 2 18.5   12/6/19 1.59 Below goal, increase by. 2.5 1 2.5 1 2.5 5 1 15.5   12/2/19 4.10 Above goal, hold tonight  hold 2.5 2.5 2.5 Recheck     11/29/19 2.47 At goal, no change 5 2.5 2.5 2.5 2.5 5 5 25   11/25/16 5.86 Above goal 5 hold hold 2.5 5 recheck  12.5   11/19/19 2.38 At goal, continue 5 5 5 5 5 5 5 35         Assessment/Plan:    Recent hospitalizations/HC visits None reported   Recent medication changes None reported   Medications taken regularly that may interact with warfarin or alter INR No significant drug interactions identified   Warfarin dose taken as prescribed Patient uses pillbox? no   Signs/symptoms of bleeding History of bleeding? Yes, GIB   Vitamin K intake Normally has ~1-2 servings of green, leafy vegetables per week   Recent vomiting/diarrhea/fever None reported   Changes in weight, activity, stress None reported   Tobacco or alcohol use Patient reports smoking 0 PPD  Patient reports having 0 drinks per day   Upcoming surgeries or procedures None reported     Patient was also reminded to maintain consistent vitamin K intake and call with any bleeding, medication changes, or fever/vomiting/diarrhea. Next INR check: 3/8/21     Spoke with patient and went over dosing instructions and when to recheck INR. Verbalized understanding.        Addendum:  Reviewed documentation and plan of care from RN  Agree with above  Sweta Garvey NP '

## 2021-02-11 ENCOUNTER — IMMUNIZATION (OUTPATIENT)
Dept: PRIMARY CARE CLINIC | Age: 81
End: 2021-02-11
Payer: MEDICARE

## 2021-02-11 PROCEDURE — 0002A COVID-19, PFIZER VACCINE 30MCG/0.3ML DOSE: CPT | Performed by: FAMILY MEDICINE

## 2021-02-11 PROCEDURE — 91300 COVID-19, PFIZER VACCINE 30MCG/0.3ML DOSE: CPT | Performed by: FAMILY MEDICINE

## 2021-02-22 RX ORDER — METOPROLOL SUCCINATE 25 MG/1
TABLET, EXTENDED RELEASE ORAL
Qty: 90 TABLET | Refills: 2 | Status: SHIPPED | OUTPATIENT
Start: 2021-02-22 | End: 2021-11-16

## 2021-02-22 RX ORDER — DRONEDARONE 400 MG/1
TABLET, FILM COATED ORAL
Qty: 180 TABLET | Refills: 3 | Status: SHIPPED | OUTPATIENT
Start: 2021-02-22 | End: 2022-02-02 | Stop reason: SDUPTHER

## 2021-02-22 RX ORDER — POTASSIUM CHLORIDE 750 MG/1
TABLET, EXTENDED RELEASE ORAL
Qty: 270 TABLET | Refills: 3 | Status: SHIPPED | OUTPATIENT
Start: 2021-02-22 | End: 2021-07-19 | Stop reason: SDUPTHER

## 2021-02-22 NOTE — TELEPHONE ENCOUNTER
Requested Prescriptions     Pending Prescriptions Disp Refills    potassium chloride (KLOR-CON M) 10 MEQ extended release tablet [Pharmacy Med Name: POTASSIUM CHLORIDE ER (DISP) TABS 10MEQ] 270 tablet 3     Sig: TAKE 3 TABLETS DAILY    MULTAQ 400 MG TABS [Pharmacy Med Name: Ira Morning TABS 400MG] 180 tablet 3     Sig: TAKE 1 TABLET TWICE A DAY WITH MEALS            Last Office Visit: 1/13/2021     Next Office Visit: 3/1/2021

## 2021-02-25 RX ORDER — NITROGLYCERIN 40 MG/1
PATCH TRANSDERMAL
Qty: 90 PATCH | Refills: 3 | Status: SHIPPED | OUTPATIENT
Start: 2021-02-25 | End: 2022-01-31

## 2021-02-25 NOTE — TELEPHONE ENCOUNTER
Requested Prescriptions     Pending Prescriptions Disp Refills    nitroGLYCERIN (NITRODUR) 0.2 MG/HR [Pharmacy Med Name: NITROGLYCERIN TD PATCH 0.2MG/H] 90 patch 3     Sig: PLACE 1 PATCH ON THE SKIN DAILY                  Last Office Visit: 1/13/2021     Next Office Visit: 3/1/2021

## 2021-03-01 ENCOUNTER — OFFICE VISIT (OUTPATIENT)
Dept: CARDIOLOGY CLINIC | Age: 81
End: 2021-03-01
Payer: MEDICARE

## 2021-03-01 VITALS
BODY MASS INDEX: 28.76 KG/M2 | HEART RATE: 70 BPM | TEMPERATURE: 96.9 F | DIASTOLIC BLOOD PRESSURE: 60 MMHG | SYSTOLIC BLOOD PRESSURE: 114 MMHG | WEIGHT: 172.8 LBS

## 2021-03-01 DIAGNOSIS — I48.91 ATRIAL FIBRILLATION, UNSPECIFIED TYPE (HCC): Primary | ICD-10-CM

## 2021-03-01 DIAGNOSIS — I25.10 CORONARY ARTERY DISEASE INVOLVING NATIVE CORONARY ARTERY OF NATIVE HEART WITHOUT ANGINA PECTORIS: ICD-10-CM

## 2021-03-01 DIAGNOSIS — I10 ESSENTIAL HYPERTENSION: ICD-10-CM

## 2021-03-01 DIAGNOSIS — M48.00 SPINAL STENOSIS, UNSPECIFIED SPINAL REGION: ICD-10-CM

## 2021-03-01 DIAGNOSIS — E78.2 MIXED HYPERLIPIDEMIA: ICD-10-CM

## 2021-03-01 PROCEDURE — 4040F PNEUMOC VAC/ADMIN/RCVD: CPT | Performed by: INTERNAL MEDICINE

## 2021-03-01 PROCEDURE — G8417 CALC BMI ABV UP PARAM F/U: HCPCS | Performed by: INTERNAL MEDICINE

## 2021-03-01 PROCEDURE — G8427 DOCREV CUR MEDS BY ELIG CLIN: HCPCS | Performed by: INTERNAL MEDICINE

## 2021-03-01 PROCEDURE — 1036F TOBACCO NON-USER: CPT | Performed by: INTERNAL MEDICINE

## 2021-03-01 PROCEDURE — G8484 FLU IMMUNIZE NO ADMIN: HCPCS | Performed by: INTERNAL MEDICINE

## 2021-03-01 PROCEDURE — 1123F ACP DISCUSS/DSCN MKR DOCD: CPT | Performed by: INTERNAL MEDICINE

## 2021-03-01 PROCEDURE — 99214 OFFICE O/P EST MOD 30 MIN: CPT | Performed by: INTERNAL MEDICINE

## 2021-03-01 RX ORDER — ROSUVASTATIN CALCIUM 5 MG/1
TABLET, COATED ORAL
Qty: 90 TABLET | Refills: 3 | Status: SHIPPED | OUTPATIENT
Start: 2021-03-01 | End: 2022-01-31

## 2021-03-01 NOTE — PROGRESS NOTES
Social connections     Talks on phone: Not on file     Gets together: Not on file     Attends Yarsanism service: Not on file     Active member of club or organization: Not on file     Attends meetings of clubs or organizations: Not on file     Relationship status: Not on file    Intimate partner violence     Fear of current or ex partner: Not on file     Emotionally abused: Not on file     Physically abused: Not on file     Forced sexual activity: Not on file   Other Topics Concern    Not on file   Social History Narrative    Not on file        Patient has a family history includes Cancer in his sister; Coronary Art Dis in his brother. Patient  has a past medical history of Allergic rhinitis, Atrial fibrillation ([facility] [address] Benign prostatic hypertrophy, CAD (coronary artery disease), Cancer ([facility] [address] CHF (congestive heart failure) ([facility] [address] Coronary artery disease involving native coronary artery of native heart without angina pectoris, DDD (degenerative disc disease), lumbar, Falls, GI bleeding, Hyperlipidemia, Hypertension, MI (myocardial infarction) ([facility] [address] MRSA (methicillin resistant staph aureus) culture positive, Pneumonia, S/P PTCA (percutaneous transluminal coronary angioplasty), SSS (sick sinus syndrome) ([facility] [address] Unspecified sleep apnea, and Venous (peripheral) insufficiency.      Current Outpatient Medications   Medication Sig Dispense Refill    nitroGLYCERIN (NITRODUR) 0.2 MG/HR PLACE 1 PATCH ON THE SKIN DAILY 90 patch 3    metoprolol succinate (TOPROL XL) 25 MG extended release tablet TAKE ONE-HALF (1/2) TABLET TWICE A DAY 90 tablet 2    potassium chloride (KLOR-CON M) 10 MEQ extended release tablet TAKE 3 TABLETS DAILY 270 tablet 3    MULTAQ 400 MG TABS TAKE 1 TABLET TWICE A DAY WITH MEALS 180 tablet 3    pantoprazole (PROTONIX) 40 MG tablet TAKE 1 TABLET BY MOUTH EVERY MORNING BEFORE BREAKFAST 90 tablet 1    ondansetron (ZOFRAN ODT) 4 MG disintegrating tablet Take 1 tablet by mouth every 8 hours as needed for Nausea or Vomiting 20 tablet 3    warfarin (COUMADIN) 5 MG tablet TAKE 1 TABLET BY MOUTH DAILY AT 6 PM 90 tablet 2    mometasone (ELOCON) 0.1 % cream Apply to affected areas of the skin twice daily for up 2 weeks or until improved. For bug bites. 15 g 2    furosemide (LASIX) 40 MG tablet TAKE 1 TABLET BY MOUTH TWICE DAILY 180 tablet 3    ferrous sulfate (IRON 325) 325 (65 Fe) MG tablet Take 180 mg by mouth daily (with breakfast)       rosuvastatin (CRESTOR) 5 MG tablet TAKE 1 TABLET DAILY 90 tablet 3    fluticasone (FLONASE) 50 MCG/ACT nasal spray 1 spray by Each Nare route daily      nystatin (MYCOSTATIN) 342229 UNIT/GM cream Apply topically 2 times daily until improved. 15 g 1    vitamin B-12 (CYANOCOBALAMIN) 500 MCG tablet Take 500 mcg by mouth daily      Compression Stockings MISC by Does not apply route 1 pair 20-30 mmHg compression stockings, wide band with grippers. 1 each 2    Polysaccharide Iron Complex (PROFE PO) Take 185 mg by mouth      aspirin 81 MG tablet Take 81 mg by mouth daily      polyethylene glycol (MIRALAX) POWD powder Take 17 g by mouth daily      Coenzyme Q10 (CO Q 10) 100 MG CAPS Take 200 mg by mouth daily      fentaNYL (DURAGESIC) 50 MCG/HR Place 1 patch onto the skin every 48 hours 15 patch 0     No current facility-administered medications for this visit. Vitals  Weight: 172 lb 12.8 oz (78.4 kg)  Blood Pressure:  116/68  Pulse: 70 70      Review of Systems   Constitutional: Negative. HENT: Negative. Eyes: Negative. Respiratory: Negative. Cardiovascular: Negative. Gastrointestinal: Negative. Genitourinary: Negative. Objective:   Physical Exam   Nursing note and vitals reviewed. Constitutional: He is oriented to person, place, and time. He appears well-developed and well-nourished. No distress. HENT:   Head: Normocephalic and atraumatic. Mouth/Throat: No oropharyngeal exudate.    Eyes: Conjunctivae and EOM are normal. Pupils are equal,

## 2021-03-23 ENCOUNTER — TELEPHONE (OUTPATIENT)
Dept: CARDIOLOGY CLINIC | Age: 81
End: 2021-03-23

## 2021-03-24 LAB
INR BLD: 1.5 (ref 0.8–1.2)
PROTHROMBIN TIME: 18 SECONDS (ref 11.7–14.2)

## 2021-03-25 ENCOUNTER — TELEPHONE (OUTPATIENT)
Dept: CARDIOLOGY CLINIC | Age: 81
End: 2021-03-25

## 2021-03-25 ENCOUNTER — ANTI-COAG VISIT (OUTPATIENT)
Dept: CARDIOLOGY CLINIC | Age: 81
End: 2021-03-25
Payer: MEDICARE

## 2021-03-25 DIAGNOSIS — I48.0 PAROXYSMAL ATRIAL FIBRILLATION (HCC): ICD-10-CM

## 2021-03-25 PROCEDURE — 93793 ANTICOAG MGMT PT WARFARIN: CPT | Performed by: NURSE PRACTITIONER

## 2021-04-22 LAB
INR BLD: 2 (ref 0.8–1.2)
PROTHROMBIN TIME: 22.7 SECONDS (ref 11.7–14.2)

## 2021-04-26 ENCOUNTER — ANTI-COAG VISIT (OUTPATIENT)
Dept: CARDIOLOGY CLINIC | Age: 81
End: 2021-04-26
Payer: MEDICARE

## 2021-04-26 DIAGNOSIS — I48.0 PAROXYSMAL ATRIAL FIBRILLATION (HCC): ICD-10-CM

## 2021-04-26 PROCEDURE — 93793 ANTICOAG MGMT PT WARFARIN: CPT | Performed by: NURSE PRACTITIONER

## 2021-04-26 NOTE — PROGRESS NOTES
[address] [facility], 1940      Anticoagulation Indication(s):  Afib  pAF   Referring Physician:   Dr. Barron Carrizales  Goal INR Range:  2.0-3.0  Duration of Anticoagulation Therapy:  Indefinite  Home or Lab Draw: Lab  Product patient has at home: warfarin 5 mg    Recent INR Results:  Lab Results   Component Value Date    INR 2.0 (H) 04/22/2021    INR 1.5 (H) 03/24/2021    INR 2.0 (H) 02/08/2021    INR 2.0 (H) 01/04/2021       INR Summary                          Warfarin regimen (mg)  Date INR A/P Sun Mon Tue Wed Thu Fri Sat Mg/wk   4/22/21 2.0 At goal, no change  2.5 5 2.5 2.5 5 5 2.5 25   3/24/21 1.5 Below goal, increase by 2.5 mg 2.5 5 2.5 2.5 5 5 2.5 25   2/8/21 2 At goal, no change 2.5 5 2.5 2.5 2.5 5 2.5 22.5   1/4/20 2.0 At goal, no change.  Pt went back to original dosing 2.5 5 2.5 2.5 2.5 5 2.5 22.5   12/10/20 1.7 Below goal, increase by 2.5 mg 2.5 5 2.5 2.5 2.5 5 5 25   11/4/20 2.1 At goal, no change 2.5 5 2.5 2.5 2.5 5 2.5 22.5   9/8/20 2.0 At goal, no change 2.5 5 2.5 5 2.5 5 2.5 25   8/31/20 2.7 At goal, no change 2.5 5 2.5 5 2.5 5 2.5 25   8/12/20 2.1 At goal, no change 2.5 5 2.5 5 2.5 5 2.5 25   7/16/20 2.7 At goal, no change 2.5 2.5 5 5 5 2.5 2.5 25   7/1/20 1.8 Below goal, increase by 2.5 2.5 2.5 5 5 5 2.5 2.5 25   6/17/20 1.9 At goal, no change 2.5 2.5 5 5 2.5 2.5 2.5 22.5   6/4/20 2.1 At goal, continue dose 2.5 2.5 5 5 2.5 2.5 2.5 22.5   5/27/20 2.0 At goal, continue dose 2.5 2.5 5 5 2.5 2.5 2.5 22.5   5/14/20 2.7 At goal, continue dose 2.5 2.5 5 5 2.5 2.5 2.5 22.5   5/7/20 2.1 At goal, continue dose 2.5 2.5 5 5 2.5 2.5 2.5 22.5   4/30/20 1.8 Below goal, increase dose by 2.5 mg 2.5 2.5 5 5 2.5 2.5 2.5 22.5   4/15/20 1.9 At goal, no change 2.5 2.5 2.5 5 2.5 2.5 2.5 20   4/7/20 1.7 Below goal, increase by 2.5 2.5 2.5 2.5 5 2.5 2.5 2.5 20   3/30/20 3.70 Above goal, hold todays  And decrease by 5mg  2.5 2.5 hold 2.5 2.5 2.5 2.5 15   3/5/20 3.07 Above goal, hold todays dose 2.5 5 2.5 5 2.5 hold 2.5 20   2/26/20 2.78 At goal, no change 2.5 5 2.5 5 2.5 2.5 2.5 22.5   2/14/20 2.27 At goal, no change 2.5 5 2.5 5 2.5 2.5 2.5 22.5   2/5/20 3.93 Above goal,hold todays dose and decrease by 2.5 2.5 5 2.5 5 hold 2.5 2.5 20   1/27/20 2.64 At goal, no change 2.5 5 2.5 5 2.5 5 2.5 25   1/13/20 2.8 At goal, no change 2.5 5 2.5 5 2.5 5 2.5 25   12/27/19 1.44 Below goal, increased by 6.5 mg See note 2.5 5 2.5 5 2.5 5 2.5 25   12/20/19 1.27 Below goal, increase by 5 2.5 2 2.5 2 2.5 10(Pt did not increase as instructed) he took 5 mg 2 23.5    (he actually took 18.5 mg)   12/13/19 1.44 Below goal, increase by 3 2.5 2 2.5 2 2.5 5 2 18.5   12/6/19 1.59 Below goal, increase by. 2.5 1 2.5 1 2.5 5 1 15.5   12/2/19 4.10 Above goal, hold tonight  hold 2.5 2.5 2.5 Recheck     11/29/19 2.47 At goal, no change 5 2.5 2.5 2.5 2.5 5 5 25   11/25/16 5.86 Above goal 5 hold hold 2.5 5 recheck  12.5   11/19/19 2.38 At goal, continue 5 5 5 5 5 5 5 35         Assessment/Plan:    Recent hospitalizations/HC visits None reported   Recent medication changes None reported   Medications taken regularly that may interact with warfarin or alter INR No significant drug interactions identified   Warfarin dose taken as prescribed Patient uses pillbox? no   Signs/symptoms of bleeding History of bleeding? Yes, GIB   Vitamin K intake Normally has ~1-2 servings of green, leafy vegetables per week   Recent vomiting/diarrhea/fever None reported   Changes in weight, activity, stress None reported   Tobacco or alcohol use Patient reports smoking 0 PPD  Patient reports having 0 drinks per day   Upcoming surgeries or procedures None reported     Patient was also reminded to maintain consistent vitamin K intake and call with any bleeding, medication changes, or fever/vomiting/diarrhea. Next INR check: 5/20/21     Spoke with patient and went over dosing instructions and when to recheck INR. Verbalized understanding.      Addendum:  Reviewed

## 2021-05-26 ENCOUNTER — ANTI-COAG VISIT (OUTPATIENT)
Dept: CARDIOLOGY CLINIC | Age: 81
End: 2021-05-26
Payer: MEDICARE

## 2021-05-26 DIAGNOSIS — I48.0 PAROXYSMAL ATRIAL FIBRILLATION (HCC): Primary | ICD-10-CM

## 2021-05-26 PROCEDURE — 93793 ANTICOAG MGMT PT WARFARIN: CPT | Performed by: NURSE PRACTITIONER

## 2021-05-26 NOTE — PROGRESS NOTES
[address] [facility], 1940      Anticoagulation Indication(s):  Afib  pAF     Referring Physician:   Dr. Kai Salazar  Goal INR Range:  2.0-3.0  Duration of Anticoagulation Therapy:  Indefinite  Home or Lab Draw: Lab  Product patient has at home: warfarin 5 mg    Recent INR Results:  Lab Results   Component Value Date    INR 2.1 (H) 05/25/2021    INR 2.0 (H) 04/22/2021    INR 1.5 (H) 03/24/2021    INR 2.0 (H) 02/08/2021       INR Summary                          Warfarin regimen (mg)  Date INR A/P Sun Mon Tue Wed Thu Fri Sat Mg/wk   5/25/21 2.1 At goal, no change 2.5 5 2.5 2.5 5 5 2.5 25   4/22/21 2.0 At goal, no change  2.5 5 2.5 2.5 5 5 2.5 25   3/24/21 1.5 Below goal, increase by 2.5 mg 2.5 5 2.5 2.5 5 5 2.5 25   2/8/21 2 At goal, no change 2.5 5 2.5 2.5 2.5 5 2.5 22.5   1/4/20 2.0 At goal, no change.  Pt went back to original dosing 2.5 5 2.5 2.5 2.5 5 2.5 22.5   12/10/20 1.7 Below goal, increase by 2.5 mg 2.5 5 2.5 2.5 2.5 5 5 25   11/4/20 2.1 At goal, no change 2.5 5 2.5 2.5 2.5 5 2.5 22.5   9/8/20 2.0 At goal, no change 2.5 5 2.5 5 2.5 5 2.5 25   8/31/20 2.7 At goal, no change 2.5 5 2.5 5 2.5 5 2.5 25   8/12/20 2.1 At goal, no change 2.5 5 2.5 5 2.5 5 2.5 25   7/16/20 2.7 At goal, no change 2.5 2.5 5 5 5 2.5 2.5 25   7/1/20 1.8 Below goal, increase by 2.5 2.5 2.5 5 5 5 2.5 2.5 25   6/17/20 1.9 At goal, no change 2.5 2.5 5 5 2.5 2.5 2.5 22.5   6/4/20 2.1 At goal, continue dose 2.5 2.5 5 5 2.5 2.5 2.5 22.5   5/27/20 2.0 At goal, continue dose 2.5 2.5 5 5 2.5 2.5 2.5 22.5   5/14/20 2.7 At goal, continue dose 2.5 2.5 5 5 2.5 2.5 2.5 22.5   5/7/20 2.1 At goal, continue dose 2.5 2.5 5 5 2.5 2.5 2.5 22.5   4/30/20 1.8 Below goal, increase dose by 2.5 mg 2.5 2.5 5 5 2.5 2.5 2.5 22.5   4/15/20 1.9 At goal, no change 2.5 2.5 2.5 5 2.5 2.5 2.5 20   4/7/20 1.7 Below goal, increase by 2.5 2.5 2.5 2.5 5 2.5 2.5 2.5 20   3/30/20 3.70 Above goal, hold todays  And decrease by 5mg  2.5 2.5 hold 2.5 2.5 2.5 2.5 15   3/5/20 3.07 Above goal, hold todays dose 2.5 5 2.5 5 2.5 hold 2.5 20   2/26/20 2.78 At goal, no change 2.5 5 2.5 5 2.5 2.5 2.5 22.5   2/14/20 2.27 At goal, no change 2.5 5 2.5 5 2.5 2.5 2.5 22.5   2/5/20 3.93 Above goal,hold todays dose and decrease by 2.5 2.5 5 2.5 5 hold 2.5 2.5 20   1/27/20 2.64 At goal, no change 2.5 5 2.5 5 2.5 5 2.5 25   1/13/20 2.8 At goal, no change 2.5 5 2.5 5 2.5 5 2.5 25   12/27/19 1.44 Below goal, increased by 6.5 mg See note 2.5 5 2.5 5 2.5 5 2.5 25   12/20/19 1.27 Below goal, increase by 5 2.5 2 2.5 2 2.5 10(Pt did not increase as instructed) he took 5 mg 2 23.5    (he actually took 18.5 mg)   12/13/19 1.44 Below goal, increase by 3 2.5 2 2.5 2 2.5 5 2 18.5   12/6/19 1.59 Below goal, increase by. 2.5 1 2.5 1 2.5 5 1 15.5   12/2/19 4.10 Above goal, hold tonight  hold 2.5 2.5 2.5 Recheck     11/29/19 2.47 At goal, no change 5 2.5 2.5 2.5 2.5 5 5 25   11/25/16 5.86 Above goal 5 hold hold 2.5 5 recheck  12.5   11/19/19 2.38 At goal, continue 5 5 5 5 5 5 5 35         Assessment/Plan:    Recent hospitalizations/HC visits None reported   Recent medication changes None reported   Medications taken regularly that may interact with warfarin or alter INR No significant drug interactions identified   Warfarin dose taken as prescribed Patient uses pillbox? no   Signs/symptoms of bleeding History of bleeding? Yes, GIB   Vitamin K intake Normally has ~1-2 servings of green, leafy vegetables per week   Recent vomiting/diarrhea/fever None reported   Changes in weight, activity, stress None reported   Tobacco or alcohol use Patient reports smoking 0 PPD  Patient reports having 0 drinks per day   Upcoming surgeries or procedures None reported     Patient was also reminded to maintain consistent vitamin K intake and call with any bleeding, medication changes, or fever/vomiting/diarrhea.     Next INR check:6/23/21     Spoke with patient and went over dosing instructions and when to recheck INR. Verbalized understanding.      Addendum:  Reviewed documentation and plan of care from RN  Agree with above  Timmy Otero NP   ]

## 2021-06-10 ENCOUNTER — TELEPHONE (OUTPATIENT)
Dept: FAMILY MEDICINE CLINIC | Age: 81
End: 2021-06-10

## 2021-06-10 NOTE — TELEPHONE ENCOUNTER
Pt had a fall on Tuesday. [place] (daughter, on [place]) said pt hurt his arm, skin very thin and wound is still bleeding. Pt is on blood thinner and she is concerned. Call to advise.

## 2021-06-10 NOTE — TELEPHONE ENCOUNTER
Could ask his daughter Francesca Carolina to bring him in for a nurse visit for inspection and pressure dressing.   Then I could see him if needed

## 2021-06-11 ENCOUNTER — PATIENT MESSAGE (OUTPATIENT)
Dept: FAMILY MEDICINE CLINIC | Age: 81
End: 2021-06-11

## 2021-06-11 RX ORDER — CLINDAMYCIN HYDROCHLORIDE 150 MG/1
150 CAPSULE ORAL 3 TIMES DAILY
Qty: 21 CAPSULE | Refills: 0 | Status: SHIPPED | OUTPATIENT
Start: 2021-06-11 | End: 2021-06-18

## 2021-06-11 NOTE — TELEPHONE ENCOUNTER
From: Lita Ellison  To: Naa Sebastian MD  Sent: 6/11/2021 2:53 PM EDT  Subject: Visit Nicholas Albert to have to message when we were just in yesterday, but my Father has developed 2 areas on his right lower leg that I am concerned about. One is a raw, ulcerated area that drained yellow liquid and some blood and the other is a large blister that is filled with yellow fluid. They appear to be pretty similar to the infections he has had in his lower legs in summers past. I dressed the raw wound and covered the blister with some clean guaze. He has resumed his Lasix dose of 3 times a day to try to take the extra fluid off his legs. Please let me know if there is anything additional I can do. If you think he needs any medication, the [facility] on [address] [place] [address] would be great. Thanks again!   Kimmie Howe

## 2021-06-22 ENCOUNTER — OFFICE VISIT (OUTPATIENT)
Dept: FAMILY MEDICINE CLINIC | Age: 81
End: 2021-06-22
Payer: MEDICARE

## 2021-06-22 VITALS
HEART RATE: 85 BPM | BODY MASS INDEX: 27.62 KG/M2 | SYSTOLIC BLOOD PRESSURE: 108 MMHG | OXYGEN SATURATION: 98 % | WEIGHT: 166 LBS | TEMPERATURE: 98.5 F | DIASTOLIC BLOOD PRESSURE: 60 MMHG

## 2021-06-22 DIAGNOSIS — D50.9 IRON DEFICIENCY ANEMIA, UNSPECIFIED IRON DEFICIENCY ANEMIA TYPE: ICD-10-CM

## 2021-06-22 DIAGNOSIS — I48.0 PAROXYSMAL ATRIAL FIBRILLATION (HCC): ICD-10-CM

## 2021-06-22 DIAGNOSIS — Z91.81 AT HIGH RISK FOR FALLS: ICD-10-CM

## 2021-06-22 DIAGNOSIS — L03.119 CELLULITIS OF LOWER EXTREMITY, UNSPECIFIED LATERALITY: Primary | ICD-10-CM
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
## 2021-06-22 DIAGNOSIS — S51.011A SKIN TEAR OF RIGHT ELBOW WITHOUT COMPLICATION, INITIAL ENCOUNTER: ICD-10-CM

## 2021-06-22 DIAGNOSIS — I73.9 PVD (PERIPHERAL VASCULAR DISEASE) (HCC): ICD-10-CM
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
## 2021-06-22 LAB
A/G RATIO: 0.9 (ref 1.1–2.2)
ALBUMIN SERPL-MCNC: 3.8 G/DL (ref 3.4–5)
ALP BLD-CCNC: 89 U/L (ref 40–129)
ALT SERPL-CCNC: 9 U/L (ref 10–40)
ANION GAP SERPL CALCULATED.3IONS-SCNC: 12 MMOL/L (ref 3–16)
AST SERPL-CCNC: 24 U/L (ref 15–37)
BASOPHILS ABSOLUTE: 0 K/UL (ref 0–0.2)
BASOPHILS RELATIVE PERCENT: 0.2 %
BILIRUB SERPL-MCNC: 0.4 MG/DL (ref 0–1)
BUN BLDV-MCNC: 24 MG/DL (ref 7–20)
CALCIUM SERPL-MCNC: 8.7 MG/DL (ref 8.3–10.6)
CHLORIDE BLD-SCNC: 96 MMOL/L (ref 99–110)
CO2: 30 MMOL/L (ref 21–32)
CREAT SERPL-MCNC: 1.3 MG/DL (ref 0.8–1.3)
EOSINOPHILS ABSOLUTE: 0.1 K/UL (ref 0–0.6)
EOSINOPHILS RELATIVE PERCENT: 1.7 %
FERRITIN: 121.4 NG/ML (ref 30–400)
FOLATE: 7.19 NG/ML (ref 4.78–24.2)
GFR AFRICAN AMERICAN: >60
GFR NON-AFRICAN AMERICAN: 53
GLOBULIN: 4.2 G/DL
GLUCOSE BLD-MCNC: 85 MG/DL (ref 70–99)
HCT VFR BLD CALC: 33.3 % (ref 40.5–52.5)
HEMOGLOBIN: 11.3 G/DL (ref 13.5–17.5)
INR BLD: 1.68 (ref 0.86–1.14)
IRON SATURATION: 12 % (ref 20–50)
IRON: 37 UG/DL (ref 59–158)
LYMPHOCYTES ABSOLUTE: 1.6 K/UL (ref 1–5.1)
LYMPHOCYTES RELATIVE PERCENT: 30 %
MCH RBC QN AUTO: 27.7 PG (ref 26–34)
MCHC RBC AUTO-ENTMCNC: 33.8 G/DL (ref 31–36)
MCV RBC AUTO: 82.1 FL (ref 80–100)
MONOCYTES ABSOLUTE: 0.5 K/UL (ref 0–1.3)
MONOCYTES RELATIVE PERCENT: 9.1 %
NEUTROPHILS ABSOLUTE: 3.1 K/UL (ref 1.7–7.7)
NEUTROPHILS RELATIVE PERCENT: 59 %
PDW BLD-RTO: 16.6 % (ref 12.4–15.4)
PLATELET # BLD: 202 K/UL (ref 135–450)
PMV BLD AUTO: 8.4 FL (ref 5–10.5)
POTASSIUM SERPL-SCNC: 4.1 MMOL/L (ref 3.5–5.1)
PROTHROMBIN TIME: 19.6 SEC (ref 10–13.2)
RBC # BLD: 4.06 M/UL (ref 4.2–5.9)
SODIUM BLD-SCNC: 138 MMOL/L (ref 136–145)
TOTAL IRON BINDING CAPACITY: 310 UG/DL (ref 260–445)
TOTAL PROTEIN: 8 G/DL (ref 6.4–8.2)
VITAMIN B-12: 375 PG/ML (ref 211–911)
WBC # BLD: 5.2 K/UL (ref 4–11)

## 2021-06-22 PROCEDURE — 1123F ACP DISCUSS/DSCN MKR DOCD: CPT | Performed by: NURSE PRACTITIONER

## 2021-06-22 PROCEDURE — G8417 CALC BMI ABV UP PARAM F/U: HCPCS | Performed by: NURSE PRACTITIONER

## 2021-06-22 PROCEDURE — 4040F PNEUMOC VAC/ADMIN/RCVD: CPT | Performed by: NURSE PRACTITIONER

## 2021-06-22 PROCEDURE — G8427 DOCREV CUR MEDS BY ELIG CLIN: HCPCS | Performed by: NURSE PRACTITIONER

## 2021-06-22 PROCEDURE — 1036F TOBACCO NON-USER: CPT | Performed by: NURSE PRACTITIONER

## 2021-06-22 PROCEDURE — 99214 OFFICE O/P EST MOD 30 MIN: CPT | Performed by: NURSE PRACTITIONER

## 2021-06-22 RX ORDER — CLINDAMYCIN HYDROCHLORIDE 300 MG/1
300 CAPSULE ORAL 3 TIMES DAILY
Qty: 30 CAPSULE | Refills: 0 | Status: SHIPPED | OUTPATIENT
Start: 2021-06-22 | End: 2021-07-02

## 2021-06-22 SDOH — ECONOMIC STABILITY: FOOD INSECURITY: WITHIN THE PAST 12 MONTHS, THE FOOD YOU BOUGHT JUST DIDN'T LAST AND YOU DIDN'T HAVE MONEY TO GET MORE.: NEVER TRUE

## 2021-06-22 SDOH — ECONOMIC STABILITY: FOOD INSECURITY: WITHIN THE PAST 12 MONTHS, YOU WORRIED THAT YOUR FOOD WOULD RUN OUT BEFORE YOU GOT MONEY TO BUY MORE.: NEVER TRUE

## 2021-06-22 ASSESSMENT — ENCOUNTER SYMPTOMS
SHORTNESS OF BREATH: 0
ROS SKIN COMMENTS: SKIN TEAR TO RIGHT FOREARM
COUGH: 0

## 2021-06-22 ASSESSMENT — SOCIAL DETERMINANTS OF HEALTH (SDOH): HOW HARD IS IT FOR YOU TO PAY FOR THE VERY BASICS LIKE FOOD, HOUSING, MEDICAL CARE, AND HEATING?: NOT HARD AT ALL

## 2021-06-22 ASSESSMENT — PATIENT HEALTH QUESTIONNAIRE - PHQ9
SUM OF ALL RESPONSES TO PHQ QUESTIONS 1-9: 0
SUM OF ALL RESPONSES TO PHQ QUESTIONS 1-9: 0
SUM OF ALL RESPONSES TO PHQ9 QUESTIONS 1 & 2: 0
2. FEELING DOWN, DEPRESSED OR HOPELESS: 0
SUM OF ALL RESPONSES TO PHQ QUESTIONS 1-9: 0
1. LITTLE INTEREST OR PLEASURE IN DOING THINGS: 0

## 2021-06-22 NOTE — PROGRESS NOTES
SUBJECTIVE:  Pt is a of 80 y.o. male comes in today with   Chief Complaint   Patient presents with    Leg Injury     Pt has infection/ulcer on back of right leg.  Arm Pain     Pt has a wound on right arm        Patient presenting today for evaluation of skin lesions. Pt had fall 6/8 when home. Injured right arm in fall, skin tear. Requesting eval today. No pain or bleeding or drainage. Covering with band-aid. Surrounding area pink- concerned about infection. Chronic cellulitis to lower legs. Recent flare up started approx 1 month ago. Redness and swelling to legs, L>R. Right posterior leg with open lesion, draining clear to yellow sticky fluid. Pt c/o burning in legs. Denies bleeding. Denies fevers. Pt with chronic PVD. States advised he needs toes amputated and he refused. A-fib: requesting INR be checked today. Managed by Dr. Rivas Villegas, cardiology. He is compliant with coumadin dosing, 5 mg M & F, 2.5 mg all other days. He is compliant with Toprolol Xl    H/o severe iron def anemia. He is requesting labs normally ordered by GI, Dr Cherie Webber. He is compliant with iron supplement. Prior to Visit Medications    Medication Sig Taking?  Authorizing Provider   clindamycin (CLEOCIN) 300 MG capsule Take 1 capsule by mouth 3 times daily for 10 days Yes SCOTT Wing CNP   rosuvastatin (CRESTOR) 5 MG tablet TAKE 1 TABLET DAILY Yes La Jimenes MD   nitroGLYCERIN (NITRODUR) 0.2 MG/HR PLACE 1 PATCH ON THE SKIN DAILY Yes La Jimenes MD   metoprolol succinate (TOPROL XL) 25 MG extended release tablet TAKE ONE-HALF (1/2) TABLET TWICE A DAY Yes SCOTT Gorman CNP   potassium chloride (KLOR-CON M) 10 MEQ extended release tablet TAKE 3 TABLETS DAILY Yes SCOTT Lind CNP   MULTAQ 400 MG TABS TAKE 1 TABLET TWICE A DAY WITH MEALS Yes SCOTT Lind CNP   pantoprazole (PROTONIX) 40 MG tablet TAKE 1 TABLET BY MOUTH EVERY MORNING BEFORE BREAKFAST Yes John Cabrales MD   ondansetron (ZOFRAN ODT) 4 MG disintegrating tablet Take 1 tablet by mouth every 8 hours as needed for Nausea or Vomiting Yes Kellie Dean MD   warfarin (COUMADIN) 5 MG tablet TAKE 1 TABLET BY MOUTH DAILY AT 6 PM Yes SCOTT Ortiz - CNP   furosemide (LASIX) 40 MG tablet TAKE 1 TABLET BY MOUTH TWICE DAILY Yes Kellie Dean MD   fluticasone (FLONASE) 50 MCG/ACT nasal spray 1 spray by Each Nare route daily Yes Historical Provider, MD   nystatin (MYCOSTATIN) 701046 UNIT/GM cream Apply topically 2 times daily until improved. Yes Imani Zhang MD   vitamin B-12 (CYANOCOBALAMIN) 500 MCG tablet Take 500 mcg by mouth daily Yes Historical Provider, MD   Compression Stockings MISC by Does not apply route 1 pair 20-30 mmHg compression stockings, wide band with grippers. Yes Falguni Marrero MD   Polysaccharide Iron Complex (PROFE PO) Take 185 mg by mouth Yes Historical Provider, MD   polyethylene glycol (MIRALAX) POWD powder Take 17 g by mouth daily Yes Historical Provider, MD   Coenzyme Q10 (CO Q 10) 100 MG CAPS Take 200 mg by mouth daily Yes Historical Provider, MD   fentaNYL (DURAGESIC) 50 MCG/HR Place 1 patch onto the skin every 48 hours Yes Kellie Dean MD         Patient's allergies, past medical, surgical, social and family histories werereviewed and updated as appropriate. Review of Systems   Constitutional: Positive for fatigue. Negative for fever. Respiratory: Negative for cough and shortness of breath. Cardiovascular: Positive for leg swelling. Negative for chest pain and palpitations. Skin: Positive for wound (lower legs with redness and swelling and draiange). Skin tear to right forearm   Neurological: Negative for dizziness, light-headedness and headaches. Physical Exam  Constitutional:       Appearance: He is well-developed. Cardiovascular:      Rate and Rhythm: Normal rate and regular rhythm.       Pulses:           Radial pulses are 2+ on the right side and 2+ on the left side. Heart sounds: Normal heart sounds. No murmur heard. Comments: (+) erythema, warmth and swelling noted to bilateral lower extremities, L>R. Flaking dry skin noted to left lower leg. Right calf: approx 1 cm open lesion present, erythematous base, serosanguinous drainage present. Legs are TTP   Pulmonary:      Effort: Pulmonary effort is normal.      Breath sounds: Normal breath sounds. Musculoskeletal:      Right lower le+ Edema present. Left lower le+ Edema present. Skin:     General: Skin is warm and dry. Neurological:      Mental Status: He is alert and oriented to person, place, and time. Vitals:    21 1345   BP: 108/60   Pulse: 85   Temp: 98.5 °F (36.9 °C)   SpO2: 98%   Weight: 166 lb (75.3 kg)             ASSESSMENT:  1. Cellulitis of lower extremity, unspecified laterality    2. PVD (peripheral vascular disease) ([facility] [address]    3. Skin tear of right elbow without complication, initial encounter    4. At high risk for falls    5. Fall in home, initial encounter    6. Paroxysmal atrial fibrillation (HCC)    7. Iron deficiency anemia, unspecified iron deficiency anemia type        PLAN:  1. Cellulitis of lower extremity, unspecified laterality  Increase dose to 300 mg-     clindamycin (CLEOCIN) 300 MG capsule; Take 1 capsule by mouth 3 times daily for 10 days  Change dressings daily  Will refer to wound clinic if no improvement    2. PVD (peripheral vascular disease) (HCC)  Stable    3. Skin tear of right elbow without complication, initial encounter  Improving  No changes today    4. At high risk for falls  On the basis of positive falls risk screening, assessment and plan is as follows: home safety tips provided  continue use of cane  . 5. Fall in home, initial encounter  See # 4    6. Paroxysmal atrial fibrillation (HCC)  Stable, continue treatment per Dr. Kai Salazar  Will need to follow up with Dr Kai Salazar for results-     Adela Garcia; Future    7.  Iron deficiency anemia, unspecified iron deficiency anemia type  Awaiting labs,advised will need to follow up with Dr Raul Baez to discuss results  -     CBC Auto Differential; Future  -     Comprehensive Metabolic Panel; Future  -     Iron and TIBC; Future  -     Vitamin B12 & Folate; Future  -     Ferritin; Future    See pt instructions  F/u 7-10 days if no improvement, sooner if symptomsworsen. Discussed use, benefit, and side effects of prescribed medications. All patient questions answered. Pt & daughter voiced understanding.

## 2021-06-22 NOTE — PATIENT INSTRUCTIONS
Patient Education        Cellulitis: Care Instructions  Your Care Instructions     Cellulitis is a skin infection caused by bacteria, most often strep or staph. It often occurs after a break in the skin from a scrape, cut, bite, or puncture, or after a rash. Cellulitis may be treated without doing tests to find out what caused it. But your doctor may do tests, if needed, to look for a specific bacteria, like methicillin-resistant Staphylococcus aureus (MRSA). The doctor has checked you carefully, but problems can develop later. If you notice any problems or new symptoms, get medical treatment right away. Follow-up care is a key part of your treatment and safety. Be sure to make and go to all appointments, and call your doctor if you are having problems. It's also a good idea to know your test results and keep a list of the medicines you take. How can you care for yourself at home? · Take your antibiotics as directed. Do not stop taking them just because you feel better. You need to take the full course of antibiotics. · Prop up the infected area on pillows to reduce pain and swelling. Try to keep the area above the level of your heart as often as you can. · If your doctor told you how to care for your wound, follow your doctor's instructions. If you did not get instructions, follow this general advice:  ? Wash the wound with clean water 2 times a day. Don't use hydrogen peroxide or alcohol, which can slow healing. ? You may cover the wound with a thin layer of petroleum jelly, such as Vaseline, and a nonstick bandage. ? Apply more petroleum jelly and replace the bandage as needed. · Be safe with medicines. Take pain medicines exactly as directed. ? If the doctor gave you a prescription medicine for pain, take it as prescribed. ? If you are not taking a prescription pain medicine, ask your doctor if you can take an over-the-counter medicine.   To prevent cellulitis in the future  · Try to prevent cuts, scrapes, or other injuries to your skin. Cellulitis most often occurs where there is a break in the skin. · If you get a scrape, cut, mild burn, or bite, wash the wound with clean water as soon as you can to help avoid infection. Don't use hydrogen peroxide or alcohol, which can slow healing. · If you have swelling in your legs (edema), support stockings and good skin care may help prevent leg sores and cellulitis. · Take care of your feet, especially if you have diabetes or other conditions that increase the risk of infection. Wear shoes and socks. Do not go barefoot. If you have athlete's foot or other skin problems on your feet, talk to your doctor about how to treat them. When should you call for help? Call your doctor now or seek immediate medical care if:    · You have signs that your infection is getting worse, such as:  ? Increased pain, swelling, warmth, or redness. ? Red streaks leading from the area. ? Pus draining from the area. ? A fever.     · You get a rash. Watch closely for changes in your health, and be sure to contact your doctor if:    · You do not get better as expected. Where can you learn more? Go to https://[email or web address].Music180.com. org and sign in to your [employer] account. Enter U885 in the [facility] box to learn more about \"Cellulitis: Care Instructions. \"     If you do not have an account, please click on the \"Sign Up Now\" link. Current as of: March 3, 2021               Content Version: 12.9  © 2006-2021 [employer]. Care instructions adapted under license by [facility] ([facility]). If you have questions about a medical condition or this instruction, always ask your healthcare professional. [place] [address] any warranty or liability for your use of this information.          Patient Education        Preventing Falls: Care Instructions  Your Care Instructions     Getting around your home safely can be a challenge if you have injuries or health problems that make it easy for you to fall. Loose rugs and furniture in walkways are among the dangers for many older people who have problems walking or who have poor eyesight. People who have conditions such as arthritis, osteoporosis, or dementia also have to be careful not to fall. You can make your home safer with a few simple measures. Follow-up care is a key part of your treatment and safety. Be sure to make and go to all appointments, and call your doctor if you are having problems. It's also a good idea to know your test results and keep a list of the medicines you take. How can you care for yourself at home? Taking care of yourself  · You may get dizzy if you do not drink enough water. To prevent dehydration, drink plenty of fluids. Choose water and other caffeine-free clear liquids. If you have kidney, heart, or liver disease and have to limit fluids, talk with your doctor before you increase the amount of fluids you drink. · Exercise regularly to improve your strength, muscle tone, and balance. Walk if you can. Swimming may be a good choice if you cannot walk easily. · Have your vision and hearing checked each year or any time you notice a change. If you have trouble seeing and hearing, you might not be able to avoid objects and could lose your balance. · Know the side effects of the medicines you take. Ask your doctor or pharmacist whether the medicines you take can affect your balance. Sleeping pills or sedatives can affect your balance. · Limit the amount of alcohol you drink. Alcohol can impair your balance and other senses. · Ask your doctor whether calluses or corns on your feet need to be removed. If you wear loose-fitting shoes because of calluses or corns, you can lose your balance and fall. · Talk to your doctor if you have numbness in your feet. Preventing falls at home  · Remove raised doorway thresholds, throw rugs, and clutter.  Repair loose carpet or raised areas in the floor.  · Move furniture and electrical cords to keep them out of walking paths. · Use nonskid floor wax, and wipe up spills right away, especially on ceramic tile floors. · If you use a walker or cane, put rubber tips on it. If you use crutches, clean the bottoms of them regularly with an abrasive pad, such as steel wool. · Keep your house well lit, especially [facility], and outside walkways. Use night-lights in areas such as hallways and bathrooms. Add extra light switches or use remote switches (such as switches that go on or off when you clap your hands) to make it easier to turn lights on if you have to get up during the night. · Install sturdy handrails on stairways. · Move items in your cabinets so that the things you use a lot are on the lower shelves (about waist level). · Keep a cordless phone and a flashlight with new batteries by your bed. If possible, put a phone in each of the main rooms of your house, or carry a cell phone in case you fall and cannot reach a phone. Or, you can wear a device around your neck or wrist. You push a button that sends a signal for help. · Wear low-heeled shoes that fit well and give your feet good support. Use footwear with nonskid soles. Check the heels and soles of your shoes for wear. Repair or replace worn heels or soles. · Do not wear socks without shoes on wood floors. · Walk on the grass when the sidewalks are slippery. If you live in an area that gets snow and ice in the winter, sprinkle salt on slippery steps and sidewalks. Preventing falls in the bath  · Install grab bars and nonskid mats inside and outside your shower or tub and near the toilet and sinks. · Use shower chairs and bath benches. · Use a hand-held shower head that will allow you to sit while showering.   · Get into a tub or shower by putting the weaker leg in first. Get out of a tub or shower with your strong side first.  · Repair loose toilet seats and consider installing a raised toilet seat to make getting on and off the toilet easier. · Keep your bathroom door unlocked while you are in the shower. Where can you learn more? Go to https://[employer]. org and sign in to your [employer] account. Enter [phone] in the [facility] box to learn more about \"Preventing Falls: Care Instructions. \"     If you do not have an account, please click on the \"Sign Up Now\" link. Current as of: December 7, 2020               Content Version: 12.9  © 8213-7175 Healthwise, Incorporated. Care instructions adapted under license by [facility] ([facility]). If you have questions about a medical condition or this instruction, always ask your healthcare professional. [address] any warranty or liability for your use of this information.

## 2021-06-24 ENCOUNTER — ANTI-COAG VISIT (OUTPATIENT)
Dept: CARDIOLOGY CLINIC | Age: 81
End: 2021-06-24
Payer: MEDICARE

## 2021-06-24 DIAGNOSIS — I48.0 PAROXYSMAL ATRIAL FIBRILLATION (HCC): Primary | ICD-10-CM

## 2021-06-24 PROCEDURE — 93793 ANTICOAG MGMT PT WARFARIN: CPT | Performed by: NURSE PRACTITIONER

## 2021-06-24 NOTE — PROGRESS NOTES
[address] [facility], 1940      Anticoagulation Indication(s):  Afib  pAF     Referring Physician:   Dr. Rivas Villegas  Goal INR Range:  2.0-3.0  Duration of Anticoagulation Therapy:  Indefinite  Home or Lab Draw: Lab  Product patient has at home: warfarin 5 mg    Recent INR Results:  Lab Results   Component Value Date    INR 1.68 (H) 06/22/2021    INR 2.1 (H) 05/25/2021    INR 2.0 (H) 04/22/2021    INR 1.5 (H) 03/24/2021       INR Summary                          Warfarin regimen (mg)  Date INR A/P Sun Mon Tue Wed Thu Fri Sat Mg/wk   6/22/21 1.68 Below goal, increase by 2.5 mg 2.5 5 2.5 2.5 7.5 5 2.5 27.5   5/25/21 2.1 At goal, no change 2.5 5 2.5 2.5 5 5 2.5 25   4/22/21 2.0 At goal, no change  2.5 5 2.5 2.5 5 5 2.5 25   3/24/21 1.5 Below goal, increase by 2.5 mg 2.5 5 2.5 2.5 5 5 2.5 25   2/8/21 2 At goal, no change 2.5 5 2.5 2.5 2.5 5 2.5 22.5   1/4/20 2.0 At goal, no change.  Pt went back to original dosing 2.5 5 2.5 2.5 2.5 5 2.5 22.5   12/10/20 1.7 Below goal, increase by 2.5 mg 2.5 5 2.5 2.5 2.5 5 5 25   11/4/20 2.1 At goal, no change 2.5 5 2.5 2.5 2.5 5 2.5 22.5   9/8/20 2.0 At goal, no change 2.5 5 2.5 5 2.5 5 2.5 25   8/31/20 2.7 At goal, no change 2.5 5 2.5 5 2.5 5 2.5 25   8/12/20 2.1 At goal, no change 2.5 5 2.5 5 2.5 5 2.5 25   7/16/20 2.7 At goal, no change 2.5 2.5 5 5 5 2.5 2.5 25   7/1/20 1.8 Below goal, increase by 2.5 2.5 2.5 5 5 5 2.5 2.5 25   6/17/20 1.9 At goal, no change 2.5 2.5 5 5 2.5 2.5 2.5 22.5   6/4/20 2.1 At goal, continue dose 2.5 2.5 5 5 2.5 2.5 2.5 22.5   5/27/20 2.0 At goal, continue dose 2.5 2.5 5 5 2.5 2.5 2.5 22.5   5/14/20 2.7 At goal, continue dose 2.5 2.5 5 5 2.5 2.5 2.5 22.5   5/7/20 2.1 At goal, continue dose 2.5 2.5 5 5 2.5 2.5 2.5 22.5   4/30/20 1.8 Below goal, increase dose by 2.5 mg 2.5 2.5 5 5 2.5 2.5 2.5 22.5   4/15/20 1.9 At goal, no change 2.5 2.5 2.5 5 2.5 2.5 2.5 20   4/7/20 1.7 Below goal, increase by 2.5 2.5 2.5 2.5 5 2.5 2.5 2.5 20 3/30/20 3.70 Above goal, hold todays  And decrease by 5mg  2.5 2.5 hold 2.5 2.5 2.5 2.5 15   3/5/20 3.07 Above goal, hold todays dose 2.5 5 2.5 5 2.5 hold 2.5 20   2/26/20 2.78 At goal, no change 2.5 5 2.5 5 2.5 2.5 2.5 22.5   2/14/20 2.27 At goal, no change 2.5 5 2.5 5 2.5 2.5 2.5 22.5   2/5/20 3.93 Above goal,hold todays dose and decrease by 2.5 2.5 5 2.5 5 hold 2.5 2.5 20   1/27/20 2.64 At goal, no change 2.5 5 2.5 5 2.5 5 2.5 25   1/13/20 2.8 At goal, no change 2.5 5 2.5 5 2.5 5 2.5 25   12/27/19 1.44 Below goal, increased by 6.5 mg See note 2.5 5 2.5 5 2.5 5 2.5 25   12/20/19 1.27 Below goal, increase by 5 2.5 2 2.5 2 2.5 10(Pt did not increase as instructed) he took 5 mg 2 23.5    (he actually took 18.5 mg)   12/13/19 1.44 Below goal, increase by 3 2.5 2 2.5 2 2.5 5 2 18.5   12/6/19 1.59 Below goal, increase by. 2.5 1 2.5 1 2.5 5 1 15.5   12/2/19 4.10 Above goal, hold tonight  hold 2.5 2.5 2.5 Recheck     11/29/19 2.47 At goal, no change 5 2.5 2.5 2.5 2.5 5 5 25   11/25/16 5.86 Above goal 5 hold hold 2.5 5 recheck  12.5   11/19/19 2.38 At goal, continue 5 5 5 5 5 5 5 35         Assessment/Plan:    Recent hospitalizations/HC visits None reported   Recent medication changes None reported   Medications taken regularly that may interact with warfarin or alter INR No significant drug interactions identified   Warfarin dose taken as prescribed Patient uses pillbox? no   Signs/symptoms of bleeding History of bleeding? Yes, GIB   Vitamin K intake Normally has ~1-2 servings of green, leafy vegetables per week   Recent vomiting/diarrhea/fever None reported   Changes in weight, activity, stress None reported   Tobacco or alcohol use Patient reports smoking 0 PPD  Patient reports having 0 drinks per day   Upcoming surgeries or procedures None reported     Patient was also reminded to maintain consistent vitamin K intake and call with any bleeding, medication changes, or fever/vomiting/diarrhea.     Next INR

## 2021-06-27 DIAGNOSIS — R11.0 NAUSEA: ICD-10-CM

## 2021-06-28 RX ORDER — ONDANSETRON 4 MG/1
TABLET, ORALLY DISINTEGRATING ORAL
Qty: 20 TABLET | Refills: 3 | Status: SHIPPED | OUTPATIENT
Start: 2021-06-28 | End: 2021-11-11

## 2021-06-28 NOTE — TELEPHONE ENCOUNTER
Medication:   Requested Prescriptions     Pending Prescriptions Disp Refills    ondansetron (ZOFRAN-ODT) 4 MG disintegrating tablet [Pharmacy Med Name: ONDANSETRON ODT 4MG TABLETS] 20 tablet 3     Sig: DISSOLVE 1 TABLET ON THE TONGUE EVERY 8 HOURS AS NEEDED FOR NAUSEA OR VOMITING        Last Filled:  12/30/2020 #20 3rf     Patient Phone Number: [phone] (home)     Last appt: 6/22/2021   Next appt: Visit date not found    Last OARRS:   RX Monitoring 7/10/2018   Attestation The Prescription Monitoring Report for this patient was reviewed today. Periodic Controlled Substance Monitoring No signs of potential drug abuse or diversion identified.

## 2021-06-29 ENCOUNTER — TELEPHONE (OUTPATIENT)
Dept: FAMILY MEDICINE CLINIC | Age: 81
End: 2021-06-29

## 2021-06-29 NOTE — TELEPHONE ENCOUNTER
DAUGHTER called,  Saw alyssa for cellulitis on legs, there is still heat coming off legs, skin still really purple cody on left leg, swelling, and it  looks like a lump on the left leg now. Is concerned about how it looks. No fever  Where can we work him in this week.

## 2021-06-30 ENCOUNTER — OFFICE VISIT (OUTPATIENT)
Dept: FAMILY MEDICINE CLINIC | Age: 81
End: 2021-06-30
Payer: MEDICARE

## 2021-06-30 VITALS
DIASTOLIC BLOOD PRESSURE: 64 MMHG | HEART RATE: 77 BPM | WEIGHT: 165 LBS | HEIGHT: 65 IN | OXYGEN SATURATION: 94 % | BODY MASS INDEX: 27.49 KG/M2 | SYSTOLIC BLOOD PRESSURE: 122 MMHG

## 2021-06-30 DIAGNOSIS — D69.2 SOLAR PURPURA (HCC): Primary | ICD-10-CM

## 2021-06-30 DIAGNOSIS — I48.0 PAROXYSMAL ATRIAL FIBRILLATION (HCC): ICD-10-CM

## 2021-06-30 PROCEDURE — 99213 OFFICE O/P EST LOW 20 MIN: CPT | Performed by: FAMILY MEDICINE

## 2021-06-30 PROCEDURE — G8417 CALC BMI ABV UP PARAM F/U: HCPCS | Performed by: FAMILY MEDICINE

## 2021-06-30 PROCEDURE — 1123F ACP DISCUSS/DSCN MKR DOCD: CPT | Performed by: FAMILY MEDICINE

## 2021-06-30 PROCEDURE — G8427 DOCREV CUR MEDS BY ELIG CLIN: HCPCS | Performed by: FAMILY MEDICINE

## 2021-06-30 PROCEDURE — 4040F PNEUMOC VAC/ADMIN/RCVD: CPT | Performed by: FAMILY MEDICINE

## 2021-06-30 PROCEDURE — 1036F TOBACCO NON-USER: CPT | Performed by: FAMILY MEDICINE

## 2021-06-30 NOTE — PROGRESS NOTES
Kelli Tipton is a 80 y.o. male. HPI:  Patient with multiple medical problems on Coumadin for A. fib and seeing 3 different cardiologists has been also dealing with cellulitis recently  He was seen here by our nurse practitioner and switch antibiotics to clindamycin. This is helped greatly he has got 1 to 2 days left  Still has numerous skin tears on his arms several areas on his ankles and calves where he had open weeping due to edema  His edema is better  There is no redness no fever no pus  All the lesions have dried up  Needs an INR today  Daughter Portia Manzo who does his wound dressings very concerned about his legs turning darker purple off and on  Wt Readings from Last 3 Encounters:   06/30/21 165 lb (74.8 kg)   06/22/21 166 lb (75.3 kg)   03/01/21 172 lb 12.8 oz (78.4 kg)     Meds, vitamins and allergies reviewed with Patient    ROS:  Gen: No fever  HEENT: No cold symptoms, no sore throat. CV:  Denies chest pain or palpitations. Pulm:  Denies shortness of breath, cough. Abd:  Denies abdominal pain, nausea and vomiting. Skin: no rash    Allergies   Allergen Reactions    Avelox [Moxifloxacin Hcl In Nacl] Anaphylaxis    Epinephrine Base     Other      [place] per PT - swelling size of silver dollar at site per PT     Keflex [Cephalexin] Nausea And Vomiting    Polysporin [Bacitracin-Polymyxin B] Rash       Prior to Visit Medications    Medication Sig Taking?  Authorizing Provider   ondansetron (ZOFRAN-ODT) 4 MG disintegrating tablet DISSOLVE 1 TABLET ON THE TONGUE EVERY 8 HOURS AS NEEDED FOR NAUSEA OR VOMITING Yes Lázaro Ruggiero MD   clindamycin (CLEOCIN) 300 MG capsule Take 1 capsule by mouth 3 times daily for 10 days Yes Yoly Peterson APRN - CNP   rosuvastatin (CRESTOR) 5 MG tablet TAKE 1 TABLET DAILY Yes Bailey Hawkins MD   nitroGLYCERIN (NITRODUR) 0.2 MG/HR PLACE 1 PATCH ON THE SKIN DAILY Yes Bailey Hawkins MD   metoprolol succinate (TOPROL XL) 25 MG extended release tablet TAKE ONE-HALF

## 2021-07-06 NOTE — PROGRESS NOTES
[address]   Cardiac Consultation    Referring Provider:  Suzie Rivera MD     Chief Concerns: PAF, SSS, bradycardia, s/p DDD- pacemaker follow up. HPI:  Janette Renae is a 80 y.o. male being followed for PAF, SSS, s/p MDT DDD PPM (10/10/16, Dr Mayra Maldonado). PMH of CAD, MI, HLD, PAF, SSS, and bradycardia. Also h/o GI bleeding r/t diverticular disease (resolved), & fall with head and hip trauma (resolved). He has been off Xarelto. Dr Mayra Maldonado had discussed Watchman procedure in the past, but he was not interested. I have discussed the Watchman procedure in the past; however, he remains uninterested. He has been seeing Dr. Bin Ma for GI, but office notes not available. Pt reports that he has been seeing Dr. Bin Ma and he remains off AC. JONNIE-VASc 4.  Echo (11/11/19) showed EF of 55-60%. Currently on Multaq 400 mg BID, Toprol XL 12.5 mg BID, baby ASA, and warfarin (resumed by Dr. Pearl Palmer in 11/2019). He is here today accompanied by his daughter, presenting in [employer] [facility] [place] with new LBBB. His only complaint today is dizziness. The daughter reports he is recovering from cellulitis in his BLE and has been requiring an increase in his diuretic for the past month. The  daughter states that this usually happens in the summertime. Device interrogation today shows normally functioning PPM.  AP 95%,  0.2%. AF burden is 0.4% with longest lasting 5 hrs. NANCY at 5 years. Denies complaints of palpitations, dizziness, CP, SOB, orthopnea, presyncope, or syncope. He remains fairly active and denies SOB / Angie Buttner. 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     Past Medical History:   has a past medical history of Allergic rhinitis, Atrial fibrillation ([address] Benign prostatic hypertrophy, CAD (coronary artery disease), Cancer ([address] CHF (congestive heart failure) ([address] Coronary artery disease involving native coronary artery of native heart without angina pectoris, DDD (degenerative disc disease), lumbar, Falls, GI bleeding, Hyperlipidemia, Hypertension, MI (myocardial infarction) ([facility] [address] MRSA (methicillin resistant staph aureus) culture positive, Pneumonia, S/P PTCA (percutaneous transluminal coronary angioplasty), SSS (sick sinus syndrome) ([facility] [address] Unspecified sleep apnea, and Venous (peripheral) insufficiency. Surgical History:   has a past surgical history that includes Carpal tunnel release; Coronary angioplasty with stent; Diagnostic Cardiac Cath Lab Procedure; Coronary angioplasty; Cardiac pacemaker placement; Appendectomy; pacemaker placement; and eye surgery. Social History:   reports that he quit smoking about 17 years ago. He has a 30.00 pack-year smoking history. He has never used smokeless tobacco. He reports that he does not drink alcohol and does not use drugs. Family History:  family history includes Cancer in his sister; Coronary Art Dis in his brother.      Home Medications:  Outpatient Encounter Medications as of 7/7/2021   Medication Sig Dispense Refill    ondansetron (ZOFRAN-ODT) 4 MG disintegrating tablet DISSOLVE 1 TABLET ON THE TONGUE EVERY 8 HOURS AS NEEDED FOR NAUSEA OR VOMITING 20 tablet 3    rosuvastatin (CRESTOR) 5 MG tablet TAKE 1 TABLET DAILY 90 tablet 3    nitroGLYCERIN (NITRODUR) 0.2 MG/HR PLACE 1 PATCH ON THE SKIN DAILY 90 patch 3    metoprolol succinate (TOPROL XL) 25 MG extended release tablet TAKE ONE-HALF (1/2) TABLET TWICE A DAY 90 tablet 2    potassium chloride (KLOR-CON M) 10 MEQ extended release tablet TAKE 3 TABLETS DAILY 270 tablet 3    MULTAQ 400 MG TABS TAKE 1 TABLET TWICE A DAY WITH MEALS 180 tablet 3    pantoprazole (PROTONIX) 40 MG tablet TAKE 1 TABLET BY MOUTH EVERY MORNING BEFORE BREAKFAST 90 tablet 1    warfarin (COUMADIN) 5 MG tablet TAKE 1 TABLET BY MOUTH DAILY AT 6 PM 90 tablet 2    furosemide (LASIX) 40 MG tablet TAKE 1 TABLET BY MOUTH TWICE DAILY 180 tablet 3    fluticasone (FLONASE) 50 MCG/ACT nasal spray 1 spray by Each Nare route daily      nystatin (MYCOSTATIN) 118537 UNIT/GM cream Apply topically 2 times daily until improved. 15 g 1    vitamin B-12 (CYANOCOBALAMIN) 500 MCG tablet Take 500 mcg by mouth daily      Compression Stockings MISC by Does not apply route 1 pair 20-30 mmHg compression stockings, wide band with grippers. 1 each 2    Polysaccharide Iron Complex (PROFE PO) Take 185 mg by mouth      polyethylene glycol (MIRALAX) POWD powder Take 17 g by mouth daily      Coenzyme Q10 (CO Q 10) 100 MG CAPS Take 200 mg by mouth daily      fentaNYL (DURAGESIC) 50 MCG/HR Place 1 patch onto the skin every 48 hours 15 patch 0     No facility-administered encounter medications on file as of 7/7/2021. Allergies: Avelox [moxifloxacin hcl in nacl], Epinephrine base, Other, Keflex [cephalexin], and Polysporin [bacitracin-polymyxin b]     Review of Systems   Constitutional: Negative. HENT: Negative. Eyes: Negative. Respiratory: Negative. Cardiovascular: Negative. Gastrointestinal: Negative. Genitourinary: Negative. Musculoskeletal: Negative. Skin: Negative. Neurological: Negative. Hematological: Negative. Psychiatric/Behavioral: Negative. /62   Pulse 62   Ht 5' 8\" (1.727 m)   Wt 163 lb 6.4 oz (74.1 kg)   BMI 24.84 kg/m²     ECG 7/7/21, Personally reviewed. SR with LBBB,     Echo 11/11/19  Summary   Left ventricular cavity size is normal. There is mild concentric left   ventricular hypertrophy. Left ventricular function is normal with ejection   fraction estimated at 55-60%. No regional wall motion abnormalities are   noted. Indeterminate diastolic function. Aortic valve appears   thickened/calcified but opens adequately. Mild tricuspid regurgitation. Estimated pulmonary artery systolic pressure is at 50 mmHg assuming a right   atrial pressure of 15 mmHg. Objective:  Physical Exam   Constitutional: He is oriented to person, place, and time.  He appears well-developed and well-nourished. HENT:   Head: Normocephalic and atraumatic. Eyes: Pupils are equal, round, and reactive to light. Neck: Normal range of motion. Cardiovascular: Normal rate, regular rhythm and normal heart sounds. Pulmonary/Chest: Effort normal and breath sounds normal.   Abdominal: Soft. No tenderness. Musculoskeletal: Normal range of motion. He exhibits no edema. Neurological: He is alert and oriented to person, place, and time. Skin: Skin is warm and dry. Psychiatric: He has a normal mood and affect. Assessment:  1. SSS-s/p DDD Medtronic pacemaker 10/10/16. AP 88%. Device function appropriately. 2. PAF- Pacemaker showed no AF detected since 3/2017. He has been off Xarelto d/t GI bleeding &  fall-head & hip trauma. JONNIE-VASc:4.  Warfarin was resumed 11/2019. On Multaq 400 mg BID and Toprol XL 12.5 mg BID. TSH 3.41 (3/2018). AF burden of 1.3% with episodes lasting over 10 min. This is likely an underestimate. 3. Hx of GI bleeding-F/U Dr Cinthia Sofia. Last Hgb: 11.9 (1/2019)  4. LBBB- new in 1/2021    Plan:  1. Obtain echo in light of LE edema and new LBBB  2. No change in current medications  2. Continue remote home transmissions q 3 months  3. Follow up in 6 months with EP NP    I, Lindsey Patel RN, am scribing for and in the presence of Dr. April Churchill. 07/07/21 4:22 PM  Lindsey Patel RN    I, Dr. April Churchill, personally performed the services described in this documentation as scribed by Lindsey Patel RN in my presence, and it is both accurate and complete.       April Churchill M.D.

## 2021-07-07 ENCOUNTER — ANTI-COAG VISIT (OUTPATIENT)
Dept: CARDIOLOGY CLINIC | Age: 81
End: 2021-07-07
Payer: MEDICARE

## 2021-07-07 ENCOUNTER — TELEPHONE (OUTPATIENT)
Dept: CARDIOLOGY CLINIC | Age: 81
End: 2021-07-07

## 2021-07-07 ENCOUNTER — NURSE ONLY (OUTPATIENT)
Dept: CARDIOLOGY CLINIC | Age: 81
End: 2021-07-07
Payer: MEDICARE

## 2021-07-07 ENCOUNTER — OFFICE VISIT (OUTPATIENT)
Dept: CARDIOLOGY CLINIC | Age: 81
End: 2021-07-07
Payer: MEDICARE

## 2021-07-07 VITALS
WEIGHT: 163.4 LBS | BODY MASS INDEX: 24.77 KG/M2 | DIASTOLIC BLOOD PRESSURE: 62 MMHG | SYSTOLIC BLOOD PRESSURE: 128 MMHG | HEART RATE: 62 BPM | HEIGHT: 68 IN

## 2021-07-07 DIAGNOSIS — I49.5 SICK SINUS SYNDROME (HCC): Primary | ICD-10-CM

## 2021-07-07 DIAGNOSIS — I48.0 PAROXYSMAL ATRIAL FIBRILLATION (HCC): ICD-10-CM

## 2021-07-07 DIAGNOSIS — R00.1 BRADYCARDIA: ICD-10-CM

## 2021-07-07 DIAGNOSIS — Z95.0 CARDIAC PACEMAKER IN SITU: ICD-10-CM

## 2021-07-07 DIAGNOSIS — I10 ESSENTIAL HYPERTENSION: ICD-10-CM

## 2021-07-07 DIAGNOSIS — I49.5 SICK SINUS SYNDROME (HCC): ICD-10-CM

## 2021-07-07 DIAGNOSIS — I48.0 PAROXYSMAL ATRIAL FIBRILLATION (HCC): Primary | ICD-10-CM

## 2021-07-07 DIAGNOSIS — I50.32 CHRONIC DIASTOLIC HEART FAILURE (HCC): ICD-10-CM

## 2021-07-07 DIAGNOSIS — R60.0 BILATERAL LOWER EXTREMITY EDEMA: ICD-10-CM

## 2021-07-07 DIAGNOSIS — E78.2 MIXED HYPERLIPIDEMIA: ICD-10-CM

## 2021-07-07 PROCEDURE — 4040F PNEUMOC VAC/ADMIN/RCVD: CPT | Performed by: INTERNAL MEDICINE

## 2021-07-07 PROCEDURE — G8420 CALC BMI NORM PARAMETERS: HCPCS | Performed by: INTERNAL MEDICINE

## 2021-07-07 PROCEDURE — 93793 ANTICOAG MGMT PT WARFARIN: CPT | Performed by: NURSE PRACTITIONER

## 2021-07-07 PROCEDURE — G8427 DOCREV CUR MEDS BY ELIG CLIN: HCPCS | Performed by: INTERNAL MEDICINE

## 2021-07-07 PROCEDURE — 1036F TOBACCO NON-USER: CPT | Performed by: INTERNAL MEDICINE

## 2021-07-07 PROCEDURE — 99214 OFFICE O/P EST MOD 30 MIN: CPT | Performed by: INTERNAL MEDICINE

## 2021-07-07 PROCEDURE — 1123F ACP DISCUSS/DSCN MKR DOCD: CPT | Performed by: INTERNAL MEDICINE

## 2021-07-07 NOTE — PROGRESS NOTES
[address] [facility] [place], 1940      Anticoagulation Indication(s):  Afib  pAF     Referring Physician:   Dr. Philomena Andres  Goal INR Range:  2.0-3.0  Duration of Anticoagulation Therapy:  Indefinite  Home or Lab Draw: Lab  Product patient has at home: warfarin 5 mg    Recent INR Results:  Lab Results   Component Value Date    INR 2.38 (H) 06/30/2021    INR 1.68 (H) 06/22/2021    INR 2.1 (H) 05/25/2021    INR 2.0 (H) 04/22/2021       INR Summary                          Warfarin regimen (mg)  Date INR A/P Sun Mon Tue Wed Thu Fri Sat Mg/wk   6/30221 2.38 At goal, no  change 2.5 5 2.5 2.5 7.5 5 2.5 27.5   6/22/21 1.68 Below goal, increase by 2.5 mg 2.5 5 2.5 2.5 7.5 5 2.5 27.5   5/25/21 2.1 At goal, no change 2.5 5 2.5 2.5 5 5 2.5 25   4/22/21 2.0 At goal, no change  2.5 5 2.5 2.5 5 5 2.5 25   3/24/21 1.5 Below goal, increase by 2.5 mg 2.5 5 2.5 2.5 5 5 2.5 25   2/8/21 2 At goal, no change 2.5 5 2.5 2.5 2.5 5 2.5 22.5   1/4/20 2.0 At goal, no change.  Pt went back to original dosing 2.5 5 2.5 2.5 2.5 5 2.5 22.5   12/10/20 1.7 Below goal, increase by 2.5 mg 2.5 5 2.5 2.5 2.5 5 5 25   11/4/20 2.1 At goal, no change 2.5 5 2.5 2.5 2.5 5 2.5 22.5   9/8/20 2.0 At goal, no change 2.5 5 2.5 5 2.5 5 2.5 25   8/31/20 2.7 At goal, no change 2.5 5 2.5 5 2.5 5 2.5 25   8/12/20 2.1 At goal, no change 2.5 5 2.5 5 2.5 5 2.5 25   7/16/20 2.7 At goal, no change 2.5 2.5 5 5 5 2.5 2.5 25   7/1/20 1.8 Below goal, increase by 2.5 2.5 2.5 5 5 5 2.5 2.5 25   6/17/20 1.9 At goal, no change 2.5 2.5 5 5 2.5 2.5 2.5 22.5   6/4/20 2.1 At goal, continue dose 2.5 2.5 5 5 2.5 2.5 2.5 22.5   5/27/20 2.0 At goal, continue dose 2.5 2.5 5 5 2.5 2.5 2.5 22.5   5/14/20 2.7 At goal, continue dose 2.5 2.5 5 5 2.5 2.5 2.5 22.5   5/7/20 2.1 At goal, continue dose 2.5 2.5 5 5 2.5 2.5 2.5 22.5   4/30/20 1.8 Below goal, increase dose by 2.5 mg 2.5 2.5 5 5 2.5 2.5 2.5 22.5   4/15/20 1.9 At goal, no change 2.5 2.5 2.5 5 2.5 2.5 2.5 20   4/7/20 1.7 Below goal, increase by 2.5 2.5 2.5 2.5 5 2.5 2.5 2.5 20   3/30/20 3.70 Above goal, hold todays  And decrease by 5mg  2.5 2.5 hold 2.5 2.5 2.5 2.5 15   3/5/20 3.07 Above goal, hold todays dose 2.5 5 2.5 5 2.5 hold 2.5 20   2/26/20 2.78 At goal, no change 2.5 5 2.5 5 2.5 2.5 2.5 22.5   2/14/20 2.27 At goal, no change 2.5 5 2.5 5 2.5 2.5 2.5 22.5   2/5/20 3.93 Above goal,hold todays dose and decrease by 2.5 2.5 5 2.5 5 hold 2.5 2.5 20   1/27/20 2.64 At goal, no change 2.5 5 2.5 5 2.5 5 2.5 25   1/13/20 2.8 At goal, no change 2.5 5 2.5 5 2.5 5 2.5 25   12/27/19 1.44 Below goal, increased by 6.5 mg See note 2.5 5 2.5 5 2.5 5 2.5 25   12/20/19 1.27 Below goal, increase by 5 2.5 2 2.5 2 2.5 10(Pt did not increase as instructed) he took 5 mg 2 23.5    (he actually took 18.5 mg)   12/13/19 1.44 Below goal, increase by 3 2.5 2 2.5 2 2.5 5 2 18.5   12/6/19 1.59 Below goal, increase by. 2.5 1 2.5 1 2.5 5 1 15.5   12/2/19 4.10 Above goal, hold tonight  hold 2.5 2.5 2.5 Recheck     11/29/19 2.47 At goal, no change 5 2.5 2.5 2.5 2.5 5 5 25   11/25/16 5.86 Above goal 5 hold hold 2.5 5 recheck  12.5   11/19/19 2.38 At goal, continue 5 5 5 5 5 5 5 35         Assessment/Plan:    Recent hospitalizations/HC visits None reported   Recent medication changes None reported   Medications taken regularly that may interact with warfarin or alter INR No significant drug interactions identified   Warfarin dose taken as prescribed Patient uses pillbox? no   Signs/symptoms of bleeding History of bleeding?  Yes, GIB   Vitamin K intake Normally has ~1-2 servings of green, leafy vegetables per week   Recent vomiting/diarrhea/fever None reported   Changes in weight, activity, stress None reported   Tobacco or alcohol use Patient reports smoking 0 PPD  Patient reports having 0 drinks per day   Upcoming surgeries or procedures None reported     Patient was also reminded to maintain consistent vitamin K intake and call with any bleeding, medication changes, or fever/vomiting/diarrhea.     Next INR check:7/30/21     Spoke with daughter, continue same dose    Addendum:  Reviewed documentation and plan of care from RN  Agree with above  rCow Delacruz NP

## 2021-07-07 NOTE — PROGRESS NOTES
Device check and ov w/JMB  Last device check on 1/13/2021  Normal device function. All testing wnl.  5 years to [facility]  Presenting ApVs @ 70-75 bpm  Underlying AsVs @ 45-50 bpm (inhibited)  AP 95.4%   0.2%  AT/AF burden 0.4%  Since 1/13/2021:  -71 AT/AF episodes (warfarin, metoprolol)  -Recent 7/4/21. Longest x 5 hours. Avg V rates predominantly controlled. PVC singles down from 227.6/hr to 53.2/hr.   0 changes  See Paceart report under the Cardiology tab. Follow up in 3 months in office and/or remotely.

## 2021-07-12 ENCOUNTER — PROCEDURE VISIT (OUTPATIENT)
Dept: CARDIOLOGY CLINIC | Age: 81
End: 2021-07-12
Payer: MEDICARE

## 2021-07-12 ENCOUNTER — TELEPHONE (OUTPATIENT)
Dept: CARDIOLOGY CLINIC | Age: 81
End: 2021-07-12

## 2021-07-12 DIAGNOSIS — Z95.0 CARDIAC PACEMAKER IN SITU: ICD-10-CM

## 2021-07-12 DIAGNOSIS — R00.1 BRADYCARDIA: ICD-10-CM

## 2021-07-12 DIAGNOSIS — R60.0 BILATERAL LOWER EXTREMITY EDEMA: ICD-10-CM

## 2021-07-12 LAB
LV EF: 50 %
LVEF MODALITY: NORMAL

## 2021-07-12 PROCEDURE — 93280 PM DEVICE PROGR EVAL DUAL: CPT | Performed by: INTERNAL MEDICINE

## 2021-07-12 NOTE — TELEPHONE ENCOUNTER
Patients daughter Krystle Carrero called in and said leti's echo was done today at [facility] and Krystle Carrero would like a call also with the results from the echo. saad's # [phone]

## 2021-07-13 PROCEDURE — 93306 TTE W/DOPPLER COMPLETE: CPT | Performed by: INTERNAL MEDICINE

## 2021-07-14 NOTE — TELEPHONE ENCOUNTER
LVEF of 50%, which is just slightly less than 2019 and may  be related to the conduction abnormality rather than a weakening of the heart muscle.

## 2021-07-19 ENCOUNTER — TELEPHONE (OUTPATIENT)
Dept: CARDIOLOGY CLINIC | Age: 81
End: 2021-07-19

## 2021-07-19 ENCOUNTER — OFFICE VISIT (OUTPATIENT)
Dept: CARDIOLOGY CLINIC | Age: 81
End: 2021-07-19
Payer: MEDICARE

## 2021-07-19 VITALS
BODY MASS INDEX: 25.7 KG/M2 | WEIGHT: 169 LBS | HEART RATE: 78 BPM | SYSTOLIC BLOOD PRESSURE: 124 MMHG | DIASTOLIC BLOOD PRESSURE: 76 MMHG

## 2021-07-19 DIAGNOSIS — L03.116 CELLULITIS OF LEFT LOWER EXTREMITY: Primary | ICD-10-CM

## 2021-07-19 DIAGNOSIS — R60.1 GENERALIZED EDEMA: ICD-10-CM

## 2021-07-19 PROCEDURE — 1123F ACP DISCUSS/DSCN MKR DOCD: CPT | Performed by: INTERNAL MEDICINE

## 2021-07-19 PROCEDURE — G8427 DOCREV CUR MEDS BY ELIG CLIN: HCPCS | Performed by: INTERNAL MEDICINE

## 2021-07-19 PROCEDURE — 4040F PNEUMOC VAC/ADMIN/RCVD: CPT | Performed by: INTERNAL MEDICINE

## 2021-07-19 PROCEDURE — G8417 CALC BMI ABV UP PARAM F/U: HCPCS | Performed by: INTERNAL MEDICINE

## 2021-07-19 PROCEDURE — 1036F TOBACCO NON-USER: CPT | Performed by: INTERNAL MEDICINE

## 2021-07-19 PROCEDURE — 99214 OFFICE O/P EST MOD 30 MIN: CPT | Performed by: INTERNAL MEDICINE

## 2021-07-19 RX ORDER — CLINDAMYCIN HYDROCHLORIDE 300 MG/1
600 CAPSULE ORAL 3 TIMES DAILY
Qty: 84 CAPSULE | Refills: 1 | Status: SHIPPED | OUTPATIENT
Start: 2021-07-19 | End: 2021-07-29

## 2021-07-19 RX ORDER — POTASSIUM CHLORIDE 750 MG/1
10 TABLET, EXTENDED RELEASE ORAL 4 TIMES DAILY
Qty: 360 TABLET | Refills: 3 | Status: SHIPPED | OUTPATIENT
Start: 2021-07-19 | End: 2022-03-23

## 2021-07-19 RX ORDER — METOLAZONE 5 MG/1
5 TABLET ORAL DAILY
Qty: 90 TABLET | Refills: 1 | Status: SHIPPED | OUTPATIENT
Start: 2021-07-19 | End: 2021-07-23

## 2021-07-19 NOTE — PROGRESS NOTES
Subjective:      Patient ID: Leonor Joshi is a 80 y.o. male. CC:  S/p GI bleed. F/u HTN  CAD. S/p pacer. Fatigue      HPI: Mr Elana Graham is here for a 6 moth f/u. Nj Sergio He denies chest pain today but has chronic swelling. He c/o of fatigue and low b/p. No LOC but fell and hit his hip and has bad leg pain. Back is bad and is using a 4 pt walker. Worried about bleeding and back surgeons shy away from surgery on back d/t bleeding. Has paroxysmal brief episodes of AF, less than 1%. He had life threatening GI bleeding on NOAC. He doesn't want watchman. Has more AF on pacer interrogation. GI said ok for [facility]. Has edema. Has dizziness and will decrease nitrodur patch 0.2mg/hr.    More edema and weeping yellow from both calfs        Allergies   Allergen Reactions    Avelox [Moxifloxacin Hcl In Nacl] Anaphylaxis    Epinephrine Base     Other      [place] per PT - swelling size of silver dollar at site per PT     Keflex [Cephalexin] Nausea And Vomiting    Polysporin [Bacitracin-Polymyxin B] Rash        Social History     Socioeconomic History    Marital status:      Spouse name: Not on file    Number of children: Not on file    Years of education: Not on file    Highest education level: Not on file   Occupational History    Not on file   Tobacco Use    Smoking status: Former Smoker     Packs/day: 1.00     Years: 30.00     Pack years: 30.00     Quit date: 2004     Years since quittin.2    Smokeless tobacco: Never Used   Vaping Use    Vaping Use: Never used   Substance and Sexual Activity    Alcohol use: No     Alcohol/week: 0.0 standard drinks    Drug use: No    Sexual activity: Yes     Comment:  living with spouse   Other Topics Concern    Not on file   Social History Narrative    Not on file     Social Determinants of Health     Financial Resource Strain: Low Risk     Difficulty of Paying Living Expenses: Not hard at all   Food Insecurity: No Food Insecurity    Worried About Running Out of Food in the Last Year: Never true    Ran Out of Food in the Last Year: Never true   Transportation Needs:     Lack of Transportation (Medical):  Lack of Transportation (Non-Medical):    Physical Activity:     Days of Exercise per Week:     Minutes of Exercise per Session:    Stress:     Feeling of Stress :    Social Connections:     Frequency of Communication with Friends and Family:     Frequency of Social Gatherings with Friends and Family:     Attends Bahai Services:     Active Member of Clubs or Organizations:     Attends Club or Organization Meetings:     Marital Status:    Intimate Partner Violence:     Fear of Current or Ex-Partner:     Emotionally Abused:     Physically Abused:     Sexually Abused:         Patient has a family history includes Cancer in his sister; Coronary Art Dis in his brother. Patient  has a past medical history of Allergic rhinitis, Atrial fibrillation ([facility] [address] Benign prostatic hypertrophy, CAD (coronary artery disease), Cancer ([facility] [address] CHF (congestive heart failure) ([facility] [address] Coronary artery disease involving native coronary artery of native heart without angina pectoris, DDD (degenerative disc disease), lumbar, Falls, GI bleeding, Hyperlipidemia, Hypertension, MI (myocardial infarction) ([address] MRSA (methicillin resistant staph aureus) culture positive, Pneumonia, S/P PTCA (percutaneous transluminal coronary angioplasty), SSS (sick sinus syndrome) ([facility] [address] Unspecified sleep apnea, and Venous (peripheral) insufficiency.      Current Outpatient Medications   Medication Sig Dispense Refill    ondansetron (ZOFRAN-ODT) 4 MG disintegrating tablet DISSOLVE 1 TABLET ON THE TONGUE EVERY 8 HOURS AS NEEDED FOR NAUSEA OR VOMITING 20 tablet 3    rosuvastatin (CRESTOR) 5 MG tablet TAKE 1 TABLET DAILY 90 tablet 3    nitroGLYCERIN (NITRODUR) 0.2 MG/HR PLACE 1 PATCH ON THE SKIN DAILY 90 patch 3    metoprolol succinate (TOPROL XL) 25 MG extended release tablet TAKE ONE-HALF (1/2) TABLET TWICE A DAY 90 tablet 2    potassium chloride (KLOR-CON M) 10 MEQ extended release tablet TAKE 3 TABLETS DAILY 270 tablet 3    MULTAQ 400 MG TABS TAKE 1 TABLET TWICE A DAY WITH MEALS 180 tablet 3    pantoprazole (PROTONIX) 40 MG tablet TAKE 1 TABLET BY MOUTH EVERY MORNING BEFORE BREAKFAST 90 tablet 1    warfarin (COUMADIN) 5 MG tablet TAKE 1 TABLET BY MOUTH DAILY AT 6 PM 90 tablet 2    furosemide (LASIX) 40 MG tablet TAKE 1 TABLET BY MOUTH TWICE DAILY 180 tablet 3    fluticasone (FLONASE) 50 MCG/ACT nasal spray 1 spray by Each Nare route daily      nystatin (MYCOSTATIN) 802494 UNIT/GM cream Apply topically 2 times daily until improved. 15 g 1    vitamin B-12 (CYANOCOBALAMIN) 500 MCG tablet Take 500 mcg by mouth daily      Compression Stockings MISC by Does not apply route 1 pair 20-30 mmHg compression stockings, wide band with grippers. 1 each 2    Polysaccharide Iron Complex (PROFE PO) Take 185 mg by mouth      polyethylene glycol (MIRALAX) POWD powder Take 17 g by mouth daily      Coenzyme Q10 (CO Q 10) 100 MG CAPS Take 200 mg by mouth daily      fentaNYL (DURAGESIC) 50 MCG/HR Place 1 patch onto the skin every 48 hours 15 patch 0     No current facility-administered medications for this visit. Vitals  Weight: 169 lb (76.7 kg)  Blood Pressure:  116/68  Pulse: 78 70      Review of Systems   Constitutional: Negative. HENT: Negative. Eyes: Negative. Respiratory: Negative. Cardiovascular: Negative. Gastrointestinal: Negative. Genitourinary: Negative. Objective:   Physical Exam   Nursing note and vitals reviewed. Constitutional: He is oriented to person, place, and time. He appears well-developed and well-nourished. No distress. HENT:   Head: Normocephalic and atraumatic. Mouth/Throat: No oropharyngeal exudate. Eyes: Conjunctivae and EOM are normal. Pupils are equal, round, and reactive to light. No scleral icterus. Neck: Normal range of motion. No JVD present. No tracheal deviation present. No thyromegaly present. Cardiovascular: Normal rate, regular rhythm, normal heart sounds and intact distal pulses. Exam reveals no gallop and no friction rub. No murmur heard. Pulmonary/Chest: Effort normal. No respiratory distress. He has no wheezes. He has no rales. He exhibits no tenderness. Abdominal: Soft. Bowel sounds are normal. He exhibits no distension and no mass. No tenderness. He has no rebound and no guarding. Musculoskeletal: tender left leg and swelling. Lymphadenopathy:     He has no cervical adenopathy. Neurological: He is alert and oriented to person, place, and time. No cranial nerve deficit. Coordination normal.   Skin: Skin is warm and dry. No rash noted. He is not diaphoretic. No erythema. No pallor. Psychiatric: He has a normal mood and affect. His behavior is normal. Judgment and thought content normal.       Assessment:        Diagnosis Orders   1. Cellulitis of left lower extremity  Protime-INR    Comprehensive Metabolic Panel    yellow drainage both calfs. 2. Generalized edema  Protime-INR    Comprehensive Metabolic Panel           Plan:        CAD:  Stable. No chest pains but had GI bleed - still off A/C for paroxysmal a fib. Off ranexa. Rhythm: MRI compatible pacer   HTN:  Episodic and low today. Had LLE skin infection and wears compression stockings. Now taking lasix 40 mg am and pm.  AT/AF:  Has episodes and feels tired. Taking multaq to maintain SR. Reviewed chads vasc. Check labs. He has tremendous fatigue. Edema: Add zaroxolyn 5mg q am.  80  Lasix am and 40 pm.  Increase k from 10 tid to qid. Weeping:  Increase clinda 600 tid. Bad infection.

## 2021-07-20 ENCOUNTER — TELEPHONE (OUTPATIENT)
Dept: CARDIOLOGY CLINIC | Age: 81
End: 2021-07-20

## 2021-07-20 RX ORDER — PANTOPRAZOLE SODIUM 40 MG/1
TABLET, DELAYED RELEASE ORAL
Qty: 90 TABLET | Refills: 1 | Status: SHIPPED | OUTPATIENT
Start: 2021-07-20 | End: 2022-01-20

## 2021-07-20 NOTE — TELEPHONE ENCOUNTER
Patient would like a call concerning his medication Clindamycin 300 mg. This is his third cycle and dosage has been double and patient got really sick .   Pt # [phone] or his daughter Virginia Hernandez @ [phone]

## 2021-07-20 NOTE — TELEPHONE ENCOUNTER
Medication:   Requested Prescriptions     Pending Prescriptions Disp Refills    pantoprazole (PROTONIX) 40 MG tablet [Pharmacy Med Name: PANTOPRAZOLE 40MG TABLETS] 90 tablet 1     Sig: TAKE 1 TABLET BY MOUTH EVERY MORNING BEFORE BREAKFAST        Last Filled:  1/25/21 #90, 1 RF     Patient Phone Number: [phone] (home)     Last appt: 6/30/2021 solar purpura   Next appt: Visit date not found    Last OARRS:   RX Monitoring 7/10/2018   Attestation The Prescription Monitoring Report for this patient was reviewed today. Periodic Controlled Substance Monitoring No signs of potential drug abuse or diversion identified.

## 2021-07-22 DIAGNOSIS — R60.1 GENERALIZED EDEMA: ICD-10-CM

## 2021-07-22 DIAGNOSIS — L03.116 CELLULITIS OF LEFT LOWER EXTREMITY: ICD-10-CM

## 2021-07-22 LAB
A/G RATIO: 1 (ref 1.1–2.2)
ALBUMIN SERPL-MCNC: 4.1 G/DL (ref 3.4–5)
ALP BLD-CCNC: 81 U/L (ref 40–129)
ALT SERPL-CCNC: 9 U/L (ref 10–40)
ANION GAP SERPL CALCULATED.3IONS-SCNC: 15 MMOL/L (ref 3–16)
AST SERPL-CCNC: 24 U/L (ref 15–37)
BILIRUB SERPL-MCNC: 0.6 MG/DL (ref 0–1)
BUN BLDV-MCNC: 41 MG/DL (ref 7–20)
CALCIUM SERPL-MCNC: 9.1 MG/DL (ref 8.3–10.6)
CHLORIDE BLD-SCNC: 88 MMOL/L (ref 99–110)
CO2: 30 MMOL/L (ref 21–32)
CREAT SERPL-MCNC: 1.9 MG/DL (ref 0.8–1.3)
GFR AFRICAN AMERICAN: 41
GFR NON-AFRICAN AMERICAN: 34
GLOBULIN: 4.2 G/DL
GLUCOSE BLD-MCNC: 102 MG/DL (ref 70–99)
INR BLD: 2.38 (ref 0.88–1.12)
POTASSIUM SERPL-SCNC: 3.4 MMOL/L (ref 3.5–5.1)
PROTHROMBIN TIME: 27.9 SEC (ref 9.9–12.7)
SODIUM BLD-SCNC: 133 MMOL/L (ref 136–145)
TOTAL PROTEIN: 8.3 G/DL (ref 6.4–8.2)

## 2021-07-23 DIAGNOSIS — R60.0 BILATERAL LOWER EXTREMITY EDEMA: Primary | ICD-10-CM

## 2021-07-23 RX ORDER — METOLAZONE 5 MG/1
5 TABLET ORAL
Qty: 90 TABLET | Refills: 1 | Status: SHIPPED | OUTPATIENT
Start: 2021-07-26 | End: 2022-01-20

## 2021-07-29 ENCOUNTER — TELEPHONE (OUTPATIENT)
Dept: CARDIOLOGY CLINIC | Age: 81
End: 2021-07-29

## 2021-07-29 DIAGNOSIS — I50.21 ACUTE SYSTOLIC CONGESTIVE HEART FAILURE, NYHA CLASS 1 (HCC): Primary | ICD-10-CM

## 2021-07-29 LAB
ALBUMIN SERPL-MCNC: 3.7 G/DL (ref 3.5–5.7)
ALP BLD-CCNC: 68 IU/L (ref 35–135)
ALT SERPL-CCNC: 11 IU/L (ref 10–60)
ANION GAP SERPL CALCULATED.3IONS-SCNC: 13 MMOL/L (ref 6–18)
AST SERPL-CCNC: 26 IU/L (ref 10–40)
BILIRUB SERPL-MCNC: 0.9 MG/DL (ref 0–1.2)
BUN BLDV-MCNC: 81 MG/DL (ref 8–26)
CALCIUM SERPL-MCNC: 8.9 MG/DL (ref 8.5–10.4)
CHLORIDE BLD-SCNC: 80 MEQ/L (ref 98–111)
CO2: 38 MMOL/L (ref 21–31)
CREAT SERPL-MCNC: 2.41 MG/DL (ref 0.7–1.3)
GFR AFRICAN AMERICAN: 27 ML/MIN/1.73 M2
GFR NON-AFRICAN AMERICAN: 24 ML/MIN/1.73 M2
GLUCOSE BLD-MCNC: 103 MG/DL (ref 70–99)
INR BLD: 1.7 (ref 0.8–1.2)
POTASSIUM SERPL-SCNC: 2.5 MEQ/L (ref 3.6–5.1)
PROTHROMBIN TIME: 19.8 SECONDS (ref 11.7–14.2)
SODIUM BLD-SCNC: 131 MEQ/L (ref 135–145)
TOTAL PROTEIN: 7.9 G/DL (ref 6–8)

## 2021-07-29 NOTE — TELEPHONE ENCOUNTER
Spoke with patient, per Dr. Buenrostro Ates increase potassium to 80 mg daily, decrease lasix 40 daily and then recheck blood work on Monday. Patient denies swelling. Patient verbalized understanding.

## 2021-07-30 ENCOUNTER — ANTI-COAG VISIT (OUTPATIENT)
Dept: CARDIOLOGY CLINIC | Age: 81
End: 2021-07-30

## 2021-07-30 RX ORDER — FUROSEMIDE 40 MG/1
40 TABLET ORAL DAILY
Qty: 90 TABLET | Refills: 1 | Status: SHIPPED | OUTPATIENT
Start: 2021-07-30 | End: 2021-08-23

## 2021-07-30 NOTE — PROGRESS NOTES
goal, no change 2.5 2.5 2.5 5 2.5 2.5 2.5 20   4/7/20 1.7 Below goal, increase by 2.5 2.5 2.5 2.5 5 2.5 2.5 2.5 20   3/30/20 3.70 Above goal, hold todays  And decrease by 5mg  2.5 2.5 hold 2.5 2.5 2.5 2.5 15   3/5/20 3.07 Above goal, hold todays dose 2.5 5 2.5 5 2.5 hold 2.5 20   2/26/20 2.78 At goal, no change 2.5 5 2.5 5 2.5 2.5 2.5 22.5   2/14/20 2.27 At goal, no change 2.5 5 2.5 5 2.5 2.5 2.5 22.5   2/5/20 3.93 Above goal,hold todays dose and decrease by 2.5 2.5 5 2.5 5 hold 2.5 2.5 20   1/27/20 2.64 At goal, no change 2.5 5 2.5 5 2.5 5 2.5 25   1/13/20 2.8 At goal, no change 2.5 5 2.5 5 2.5 5 2.5 25   12/27/19 1.44 Below goal, increased by 6.5 mg See note 2.5 5 2.5 5 2.5 5 2.5 25   12/20/19 1.27 Below goal, increase by 5 2.5 2 2.5 2 2.5 10(Pt did not increase as instructed) he took 5 mg 2 23.5    (he actually took 18.5 mg)   12/13/19 1.44 Below goal, increase by 3 2.5 2 2.5 2 2.5 5 2 18.5   12/6/19 1.59 Below goal, increase by. 2.5 1 2.5 1 2.5 5 1 15.5   12/2/19 4.10 Above goal, hold tonight  hold 2.5 2.5 2.5 Recheck     11/29/19 2.47 At goal, no change 5 2.5 2.5 2.5 2.5 5 5 25   11/25/16 5.86 Above goal 5 hold hold 2.5 5 recheck  12.5   11/19/19 2.38 At goal, continue 5 5 5 5 5 5 5 35         Assessment/Plan:    Recent hospitalizations/HC visits None reported   Recent medication changes None reported   Medications taken regularly that may interact with warfarin or alter INR No significant drug interactions identified   Warfarin dose taken as prescribed Patient uses pillbox? no   Signs/symptoms of bleeding History of bleeding?  Yes, GIB   Vitamin K intake Normally has ~1-2 servings of green, leafy vegetables per week   Recent vomiting/diarrhea/fever None reported   Changes in weight, activity, stress None reported   Tobacco or alcohol use Patient reports smoking 0 PPD  Patient reports having 0 drinks per day   Upcoming surgeries or procedures None reported     Patient was also reminded to maintain consistent vitamin K intake and call with any bleeding, medication changes, or fever/vomiting/diarrhea.     Next INR check:8/6/21     Spoke with pt and updated dosing schedule per pt

## 2021-08-02 DIAGNOSIS — L03.116 CELLULITIS OF LEFT LOWER EXTREMITY: ICD-10-CM

## 2021-08-02 DIAGNOSIS — R60.1 GENERALIZED EDEMA: ICD-10-CM

## 2021-08-02 LAB
A/G RATIO: 1 (ref 1.1–2.2)
ALBUMIN SERPL-MCNC: 3.7 G/DL (ref 3.4–5)
ALP BLD-CCNC: 71 U/L (ref 40–129)
ALT SERPL-CCNC: 12 U/L (ref 10–40)
ANION GAP SERPL CALCULATED.3IONS-SCNC: 12 MMOL/L (ref 3–16)
AST SERPL-CCNC: 26 U/L (ref 15–37)
BILIRUB SERPL-MCNC: 0.3 MG/DL (ref 0–1)
BUN BLDV-MCNC: 30 MG/DL (ref 7–20)
CALCIUM SERPL-MCNC: 8.5 MG/DL (ref 8.3–10.6)
CHLORIDE BLD-SCNC: 94 MMOL/L (ref 99–110)
CO2: 29 MMOL/L (ref 21–32)
CREAT SERPL-MCNC: 1.2 MG/DL (ref 0.8–1.3)
GFR AFRICAN AMERICAN: >60
GFR NON-AFRICAN AMERICAN: 58
GLOBULIN: 3.7 G/DL
GLUCOSE BLD-MCNC: 113 MG/DL (ref 70–99)
INR BLD: 1.89 (ref 0.88–1.12)
POTASSIUM SERPL-SCNC: 4 MMOL/L (ref 3.5–5.1)
PROTHROMBIN TIME: 21.9 SEC (ref 9.9–12.7)
SODIUM BLD-SCNC: 135 MMOL/L (ref 136–145)
TOTAL PROTEIN: 7.4 G/DL (ref 6.4–8.2)

## 2021-08-05 ENCOUNTER — TELEPHONE (OUTPATIENT)
Dept: CARDIOLOGY CLINIC | Age: 81
End: 2021-08-05

## 2021-08-05 NOTE — TELEPHONE ENCOUNTER
Daughter, Marda Cowden called. Pratima De La Cruz had labs drawn on 8/2/21. He is having some leg swelling again and she would like to know if he can resume Lasix and Zaroxolyn dose. Please call her about this at [phone]. Thanks.

## 2021-08-06 DIAGNOSIS — I50.21 ACUTE SYSTOLIC CONGESTIVE HEART FAILURE, NYHA CLASS 1 (HCC): Primary | ICD-10-CM

## 2021-08-06 RX ORDER — TORSEMIDE 20 MG/1
20 TABLET ORAL 2 TIMES DAILY
Qty: 60 TABLET | Refills: 1 | Status: SHIPPED | OUTPATIENT
Start: 2021-08-06 | End: 2021-08-10 | Stop reason: SINTOL

## 2021-08-06 NOTE — TELEPHONE ENCOUNTER
Informed daughter that MELODIE would like pt to start on Torsemide 20 mg BID and get repeat BMP in one week.   Continue to hold Lasix and Zaroxolyn

## 2021-08-10 ENCOUNTER — TELEPHONE (OUTPATIENT)
Dept: CARDIOLOGY CLINIC | Age: 81
End: 2021-08-10

## 2021-08-10 DIAGNOSIS — I50.21 ACUTE SYSTOLIC CONGESTIVE HEART FAILURE, NYHA CLASS 1 (HCC): Primary | ICD-10-CM

## 2021-08-11 RX ORDER — WARFARIN SODIUM 5 MG/1
TABLET ORAL
Qty: 90 TABLET | Refills: 2 | Status: SHIPPED | OUTPATIENT
Start: 2021-08-11

## 2021-08-17 LAB
BASOPHILS ABSOLUTE: 0 K/UL (ref 0–0.2)
BASOPHILS RELATIVE PERCENT: 0.8 %
EOSINOPHILS ABSOLUTE: 0.3 K/UL (ref 0–0.6)
EOSINOPHILS RELATIVE PERCENT: 6.2 %
HCT VFR BLD CALC: 32.3 % (ref 40.5–52.5)
HEMOGLOBIN: 10.8 G/DL (ref 13.5–17.5)
LYMPHOCYTES ABSOLUTE: 2.1 K/UL (ref 1–5.1)
LYMPHOCYTES RELATIVE PERCENT: 40.8 %
MCH RBC QN AUTO: 27.7 PG (ref 26–34)
MCHC RBC AUTO-ENTMCNC: 33.5 G/DL (ref 31–36)
MCV RBC AUTO: 82.5 FL (ref 80–100)
MONOCYTES ABSOLUTE: 0.5 K/UL (ref 0–1.3)
MONOCYTES RELATIVE PERCENT: 9.3 %
NEUTROPHILS ABSOLUTE: 2.2 K/UL (ref 1.7–7.7)
NEUTROPHILS RELATIVE PERCENT: 42.9 %
PDW BLD-RTO: 16.1 % (ref 12.4–15.4)
PLATELET # BLD: 202 K/UL (ref 135–450)
PMV BLD AUTO: 8.2 FL (ref 5–10.5)
RBC # BLD: 3.91 M/UL (ref 4.2–5.9)
WBC # BLD: 5.1 K/UL (ref 4–11)

## 2021-08-18 ENCOUNTER — ANTI-COAG VISIT (OUTPATIENT)
Dept: CARDIOLOGY CLINIC | Age: 81
End: 2021-08-18
Payer: MEDICARE

## 2021-08-18 DIAGNOSIS — I50.21 ACUTE SYSTOLIC CONGESTIVE HEART FAILURE, NYHA CLASS 1 (HCC): Primary | ICD-10-CM

## 2021-08-18 DIAGNOSIS — I48.0 PAROXYSMAL ATRIAL FIBRILLATION (HCC): Primary | ICD-10-CM

## 2021-08-18 PROCEDURE — 93793 ANTICOAG MGMT PT WARFARIN: CPT | Performed by: NURSE PRACTITIONER

## 2021-08-18 NOTE — PROGRESS NOTES
[address] [facility], 1940      Anticoagulation Indication(s):  Afib  pAF     Referring Physician:   Dr. Erika Patel  Goal INR Range:  2.0-3.0  Duration of Anticoagulation Therapy:  Indefinite  Home or Lab Draw: Lab  Product patient has at home: warfarin 5 mg    Recent INR Results:  Lab Results   Component Value Date    INR 2.40 (H) 08/17/2021    INR 1.89 (H) 08/02/2021    INR 1.7 (H) 07/29/2021    INR 2.38 (H) 07/22/2021       INR Summary                          Warfarin regimen (mg)  Date INR A/P Sun Mon Tue Wed Thu Fri Sat Mg/wk   8/17/21 2.40 At goal, no change 2.5 5 2.5 2.5 5 5 2.5 25   7/29/21 1.7 At goal, no change 2.5 5 2.5 2.5 5 5 2.5 25 6/30221 2.38 At goal, no  change 2.5 5 2.5 2.5 7.5 5 2.5 27.5   6/22/21 1.68 Below goal, increase by 2.5 mg 2.5 5 2.5 2.5 7.5 5 2.5 27.5   5/25/21 2.1 At goal, no change 2.5 5 2.5 2.5 5 5 2.5 25   4/22/21 2.0 At goal, no change  2.5 5 2.5 2.5 5 5 2.5 25   3/24/21 1.5 Below goal, increase by 2.5 mg 2.5 5 2.5 2.5 5 5 2.5 25   2/8/21 2 At goal, no change 2.5 5 2.5 2.5 2.5 5 2.5 22.5   1/4/20 2.0 At goal, no change.  Pt went back to original dosing 2.5 5 2.5 2.5 2.5 5 2.5 22.5   12/10/20 1.7 Below goal, increase by 2.5 mg 2.5 5 2.5 2.5 2.5 5 5 25   11/4/20 2.1 At goal, no change 2.5 5 2.5 2.5 2.5 5 2.5 22.5   9/8/20 2.0 At goal, no change 2.5 5 2.5 5 2.5 5 2.5 25   8/31/20 2.7 At goal, no change 2.5 5 2.5 5 2.5 5 2.5 25   8/12/20 2.1 At goal, no change 2.5 5 2.5 5 2.5 5 2.5 25   7/16/20 2.7 At goal, no change 2.5 2.5 5 5 5 2.5 2.5 25   7/1/20 1.8 Below goal, increase by 2.5 2.5 2.5 5 5 5 2.5 2.5 25   6/17/20 1.9 At goal, no change 2.5 2.5 5 5 2.5 2.5 2.5 22.5   6/4/20 2.1 At goal, continue dose 2.5 2.5 5 5 2.5 2.5 2.5 22.5   5/27/20 2.0 At goal, continue dose 2.5 2.5 5 5 2.5 2.5 2.5 22.5   5/14/20 2.7 At goal, continue dose 2.5 2.5 5 5 2.5 2.5 2.5 22.5   5/7/20 2.1 At goal, continue dose 2.5 2.5 5 5 2.5 2.5 2.5 22.5   4/30/20 1.8 Below goal, increase dose by 2.5 mg 2.5 2.5 5 5 2.5 2.5 2.5 22.5   4/15/20 1.9 At goal, no change 2.5 2.5 2.5 5 2.5 2.5 2.5 20   4/7/20 1.7 Below goal, increase by 2.5 2.5 2.5 2.5 5 2.5 2.5 2.5 20   3/30/20 3.70 Above goal, hold todays  And decrease by 5mg  2.5 2.5 hold 2.5 2.5 2.5 2.5 15   3/5/20 3.07 Above goal, hold todays dose 2.5 5 2.5 5 2.5 hold 2.5 20   2/26/20 2.78 At goal, no change 2.5 5 2.5 5 2.5 2.5 2.5 22.5   2/14/20 2.27 At goal, no change 2.5 5 2.5 5 2.5 2.5 2.5 22.5   2/5/20 3.93 Above goal,hold todays dose and decrease by 2.5 2.5 5 2.5 5 hold 2.5 2.5 20   1/27/20 2.64 At goal, no change 2.5 5 2.5 5 2.5 5 2.5 25   1/13/20 2.8 At goal, no change 2.5 5 2.5 5 2.5 5 2.5 25   12/27/19 1.44 Below goal, increased by 6.5 mg See note 2.5 5 2.5 5 2.5 5 2.5 25   12/20/19 1.27 Below goal, increase by 5 2.5 2 2.5 2 2.5 10(Pt did not increase as instructed) he took 5 mg 2 23.5    (he actually took 18.5 mg)   12/13/19 1.44 Below goal, increase by 3 2.5 2 2.5 2 2.5 5 2 18.5   12/6/19 1.59 Below goal, increase by. 2.5 1 2.5 1 2.5 5 1 15.5   12/2/19 4.10 Above goal, hold tonight  hold 2.5 2.5 2.5 Recheck     11/29/19 2.47 At goal, no change 5 2.5 2.5 2.5 2.5 5 5 25   11/25/16 5.86 Above goal 5 hold hold 2.5 5 recheck  12.5   11/19/19 2.38 At goal, continue 5 5 5 5 5 5 5 35         Assessment/Plan:    Recent hospitalizations/HC visits None reported   Recent medication changes None reported   Medications taken regularly that may interact with warfarin or alter INR No significant drug interactions identified   Warfarin dose taken as prescribed Patient uses pillbox? no   Signs/symptoms of bleeding History of bleeding?  Yes, GIB   Vitamin K intake Normally has ~1-2 servings of green, leafy vegetables per week   Recent vomiting/diarrhea/fever None reported   Changes in weight, activity, stress None reported   Tobacco or alcohol use Patient reports smoking 0 PPD  Patient reports having 0 drinks per day   Upcoming surgeries or procedures None reported     Patient was also reminded to maintain consistent vitamin K intake and call with any bleeding, medication changes, or fever/vomiting/diarrhea.     Next INR check:9/8/21  Reviewed INR   At goal       IRINA Clark FNP      Spoke with daughter and instructed her to continue same dose

## 2021-08-24 ENCOUNTER — APPOINTMENT (OUTPATIENT)
Dept: GENERAL RADIOLOGY | Age: 81
DRG: 504 | End: 2021-08-24
Payer: MEDICARE

## 2021-08-24 ENCOUNTER — HOSPITAL ENCOUNTER (INPATIENT)
Age: 81
LOS: 3 days | Discharge: HOME HEALTH | DRG: 504 | End: 2021-08-28
Attending: EMERGENCY MEDICINE | Admitting: HOSPITALIST
Payer: MEDICARE

## 2021-08-24 ENCOUNTER — NURSE TRIAGE (OUTPATIENT)
Dept: OTHER | Facility: CLINIC | Age: 81
End: 2021-08-24

## 2021-08-24 DIAGNOSIS — S92.502B OPEN FRACTURE OF PHALANX OF LEFT SECOND TOE, INITIAL ENCOUNTER: ICD-10-CM

## 2021-08-24 DIAGNOSIS — L03.115 BILATERAL CELLULITIS OF LOWER LEG: ICD-10-CM

## 2021-08-24 DIAGNOSIS — M86.9 OSTEOMYELITIS OF SECOND TOE OF LEFT FOOT (HCC): Primary | ICD-10-CM

## 2021-08-24 DIAGNOSIS — L03.116 BILATERAL CELLULITIS OF LOWER LEG: ICD-10-CM

## 2021-08-24 DIAGNOSIS — I50.9 CHRONIC CONGESTIVE HEART FAILURE, UNSPECIFIED HEART FAILURE TYPE (HCC): ICD-10-CM

## 2021-08-24 LAB
A/G RATIO: 0.8 (ref 1.1–2.2)
ALBUMIN SERPL-MCNC: 3.4 G/DL (ref 3.4–5)
ALP BLD-CCNC: 87 U/L (ref 40–129)
ALT SERPL-CCNC: 13 U/L (ref 10–40)
ANION GAP SERPL CALCULATED.3IONS-SCNC: 10 MMOL/L (ref 3–16)
AST SERPL-CCNC: 27 U/L (ref 15–37)
BASE EXCESS VENOUS: 5.3 MMOL/L (ref -3–3)
BASOPHILS ABSOLUTE: 0 K/UL (ref 0–0.2)
BASOPHILS RELATIVE PERCENT: 0.6 %
BILIRUB SERPL-MCNC: 0.5 MG/DL (ref 0–1)
BILIRUBIN URINE: NEGATIVE
BLOOD, URINE: NEGATIVE
BUN BLDV-MCNC: 21 MG/DL (ref 7–20)
CALCIUM SERPL-MCNC: 8.8 MG/DL (ref 8.3–10.6)
CARBOXYHEMOGLOBIN: 3.5 % (ref 0–1.5)
CHLORIDE BLD-SCNC: 98 MMOL/L (ref 99–110)
CLARITY: CLEAR
CO2: 27 MMOL/L (ref 21–32)
COLOR: YELLOW
CREAT SERPL-MCNC: 1.2 MG/DL (ref 0.8–1.3)
EOSINOPHILS ABSOLUTE: 0.1 K/UL (ref 0–0.6)
EOSINOPHILS RELATIVE PERCENT: 1.3 %
EPITHELIAL CELLS, UA: 1 /HPF (ref 0–5)
GFR AFRICAN AMERICAN: >60
GFR NON-AFRICAN AMERICAN: 58
GLOBULIN: 4.5 G/DL
GLUCOSE BLD-MCNC: 99 MG/DL (ref 70–99)
GLUCOSE URINE: NEGATIVE MG/DL
HCO3 VENOUS: 29 MMOL/L (ref 23–29)
HCT VFR BLD CALC: 33.1 % (ref 40.5–52.5)
HEMOGLOBIN: 11.2 G/DL (ref 13.5–17.5)
HYALINE CASTS: 0 /LPF (ref 0–8)
INR BLD: 3.99 (ref 0.88–1.12)
KETONES, URINE: NEGATIVE MG/DL
LACTIC ACID, SEPSIS: 1.1 MMOL/L (ref 0.4–1.9)
LEUKOCYTE ESTERASE, URINE: ABNORMAL
LYMPHOCYTES ABSOLUTE: 1.4 K/UL (ref 1–5.1)
LYMPHOCYTES RELATIVE PERCENT: 21.4 %
MCH RBC QN AUTO: 28.6 PG (ref 26–34)
MCHC RBC AUTO-ENTMCNC: 33.8 G/DL (ref 31–36)
MCV RBC AUTO: 84.5 FL (ref 80–100)
METHEMOGLOBIN VENOUS: 0.1 %
MICROSCOPIC EXAMINATION: YES
MONOCYTES ABSOLUTE: 0.6 K/UL (ref 0–1.3)
MONOCYTES RELATIVE PERCENT: 9.8 %
NEUTROPHILS ABSOLUTE: 4.4 K/UL (ref 1.7–7.7)
NEUTROPHILS RELATIVE PERCENT: 66.9 %
NITRITE, URINE: NEGATIVE
O2 CONTENT, VEN: 14 VOL %
O2 SAT, VEN: 100 %
O2 THERAPY: ABNORMAL
PCO2, VEN: 38.2 MMHG (ref 40–50)
PDW BLD-RTO: 16.1 % (ref 12.4–15.4)
PH UA: 6 (ref 5–8)
PH VENOUS: 7.49 (ref 7.35–7.45)
PLATELET # BLD: 249 K/UL (ref 135–450)
PMV BLD AUTO: 7.6 FL (ref 5–10.5)
PO2, VEN: 146 MMHG (ref 25–40)
POTASSIUM REFLEX MAGNESIUM: 4.1 MMOL/L (ref 3.5–5.1)
PRO-BNP: 1175 PG/ML (ref 0–449)
PROTEIN UA: NEGATIVE MG/DL
PROTHROMBIN TIME: 47.8 SEC (ref 9.9–12.7)
RBC # BLD: 3.91 M/UL (ref 4.2–5.9)
RBC UA: 1 /HPF (ref 0–4)
SODIUM BLD-SCNC: 135 MMOL/L (ref 136–145)
SPECIFIC GRAVITY UA: 1.01 (ref 1–1.03)
TCO2 CALC VENOUS: 68 MMOL/L
TOTAL PROTEIN: 7.9 G/DL (ref 6.4–8.2)
TROPONIN: <0.01 NG/ML
URINE REFLEX TO CULTURE: ABNORMAL
URINE TYPE: ABNORMAL
UROBILINOGEN, URINE: 0.2 E.U./DL
WBC # BLD: 6.5 K/UL (ref 4–11)
WBC UA: 7 /HPF (ref 0–5)

## 2021-08-24 PROCEDURE — 85610 PROTHROMBIN TIME: CPT

## 2021-08-24 PROCEDURE — 96375 TX/PRO/DX INJ NEW DRUG ADDON: CPT

## 2021-08-24 PROCEDURE — 85025 COMPLETE CBC W/AUTO DIFF WBC: CPT

## 2021-08-24 PROCEDURE — 96366 THER/PROPH/DIAG IV INF ADDON: CPT

## 2021-08-24 PROCEDURE — 83880 ASSAY OF NATRIURETIC PEPTIDE: CPT

## 2021-08-24 PROCEDURE — 73630 X-RAY EXAM OF FOOT: CPT

## 2021-08-24 PROCEDURE — 87040 BLOOD CULTURE FOR BACTERIA: CPT

## 2021-08-24 PROCEDURE — 80053 COMPREHEN METABOLIC PANEL: CPT

## 2021-08-24 PROCEDURE — 2580000003 HC RX 258: Performed by: PHYSICIAN ASSISTANT

## 2021-08-24 PROCEDURE — 6360000002 HC RX W HCPCS: Performed by: PHYSICIAN ASSISTANT

## 2021-08-24 PROCEDURE — 96367 TX/PROPH/DG ADDL SEQ IV INF: CPT

## 2021-08-24 PROCEDURE — 36415 COLL VENOUS BLD VENIPUNCTURE: CPT

## 2021-08-24 PROCEDURE — 82803 BLOOD GASES ANY COMBINATION: CPT

## 2021-08-24 PROCEDURE — 81001 URINALYSIS AUTO W/SCOPE: CPT

## 2021-08-24 PROCEDURE — 96365 THER/PROPH/DIAG IV INF INIT: CPT

## 2021-08-24 PROCEDURE — 93005 ELECTROCARDIOGRAM TRACING: CPT | Performed by: PHYSICIAN ASSISTANT

## 2021-08-24 PROCEDURE — 84484 ASSAY OF TROPONIN QUANT: CPT

## 2021-08-24 PROCEDURE — 99283 EMERGENCY DEPT VISIT LOW MDM: CPT

## 2021-08-24 PROCEDURE — 71045 X-RAY EXAM CHEST 1 VIEW: CPT

## 2021-08-24 PROCEDURE — 83605 ASSAY OF LACTIC ACID: CPT

## 2021-08-24 RX ORDER — SODIUM CHLORIDE 0.9 % (FLUSH) 0.9 %
5-40 SYRINGE (ML) INJECTION EVERY 12 HOURS SCHEDULED
Status: DISCONTINUED | OUTPATIENT
Start: 2021-08-24 | End: 2021-08-25 | Stop reason: SDUPTHER

## 2021-08-24 RX ORDER — SODIUM CHLORIDE 0.9 % (FLUSH) 0.9 %
5-40 SYRINGE (ML) INJECTION PRN
Status: DISCONTINUED | OUTPATIENT
Start: 2021-08-24 | End: 2021-08-25 | Stop reason: SDUPTHER

## 2021-08-24 RX ORDER — SODIUM CHLORIDE 9 MG/ML
25 INJECTION, SOLUTION INTRAVENOUS PRN
Status: DISCONTINUED | OUTPATIENT
Start: 2021-08-24 | End: 2021-08-25 | Stop reason: SDUPTHER

## 2021-08-24 RX ADMIN — VANCOMYCIN HYDROCHLORIDE 1000 MG: 1 INJECTION, POWDER, LYOPHILIZED, FOR SOLUTION INTRAVENOUS at 21:40

## 2021-08-24 RX ADMIN — CEFEPIME HYDROCHLORIDE 2000 MG: 2 INJECTION, POWDER, FOR SOLUTION INTRAVENOUS at 20:50

## 2021-08-24 RX ADMIN — Medication 10 ML: at 21:37

## 2021-08-24 ASSESSMENT — ENCOUNTER SYMPTOMS
VOMITING: 0
ABDOMINAL PAIN: 0
COLOR CHANGE: 1
SHORTNESS OF BREATH: 1
CHEST TIGHTNESS: 0
COUGH: 0
DIARRHEA: 0
NAUSEA: 0

## 2021-08-24 NOTE — ED PROVIDER NOTES
I independently performed a history and physical on Dave. All diagnostic, treatment, and disposition decisions were made by myself in conjunction with the advanced practice provider. Briefly, this is a 80 y.o. male here for suspected cellulitis to the left leg and infection left second toe. Patient has a history of recurrent cellulitis and has been on antibiotics in the past. However, symptoms became worse over the last few days with increased pain and redness. He is having some purulent drainage from the toe. No previous amputations. On exam, patient has diffuse redness and swelling throughout the left leg. He has an open ulceration/wound dorsal surface of the left second toe with deformity of the toe underlying this. There is mild purulent drainage from the area. He has a faint, but intact dorsalis pedis pulse on the left foot with intact capillary refill. EKG  The Ekg interpreted by me in the absence of a cardiologist shows. normal pacemaker rhythm with a rate of 60  LBBB pattern     No specific ST-T wave changes appreciated. No evidence of acute ischemia. No significant change from prior EKG dated 7/7/2021    MDM  Patient imaging shows evidence of likely osteomyelitis of the phalanx of the second toe with an underlying pathologic fracture. Initiated IV antibiotics, including Vanco and cefepime. Patient was afebrile without leukocytosis and does not meet criteria for sepsis. I consulted Dr. Pegge Nyhan through [facility] for admission. He reviewed the patient's history, physical exam, labs, imaging studies, and emergency department course and has decided to admit Dave for further evaluation and treatment.   As I have deemed necessary from their history, physical, and studies, I have considered and evaluated Dave for the following diagnoses:  COMPARTMENT SYNDROME, DEEP VENOUS THROMBOSIS, NECROTIZING FASCIITIS, SEVERE CELLULITIS, MAJOR FRACTURE, OSTEOMYELITIS, SEPTIC ARTHRITIS, TENDON INJURY, NEUROVASCULAR INJURY, DISLOCATION    FINAL IMPRESSION  1. Osteomyelitis of second toe of left foot ([facility] [address]    2. Bilateral cellulitis of lower leg    3. Chronic congestive heart failure, unspecified heart failure type (HCC)    4. Open fracture of phalanx of left second toe, initial encounter        Blood pressure (!) 131/50, pulse 64, temperature 98.8 °F (37.1 °C), temperature source Oral, resp. rate 14, height 5' 10\" (1.778 m), weight 165 lb (74.8 kg), SpO2 99 %.      For further details of [facility] emergency department encounter, please see documentation by advanced practice provider, ANDRESSA Parmar.       Kimberly Petty MD  08/24/21 [id number]

## 2021-08-24 NOTE — ED PROVIDER NOTES
[address] [facility]        Pt Name: Ezekiel Doty  MRN: [id number]  [place] 1940  Date of evaluation: 8/24/2021  Provider: Fredo Escobar PA-C  PCP: Bernadette Carter MD  Note Started: 4:27 PM EDT        I have seen and evaluated this patient with my supervising physician Villa Canavan, MD.    [address] [place] [address]       Chief Complaint   Patient presents with    Cellulitis     chf pt that presents with edema in left leg. Pt has cellulitus for 8 weeks in both feet that has not cleared up. Pt has been treated with clindimycin without any improvement. HISTORY OF PRESENT ILLNESS   (Location, Timing/Onset, Context/Setting, Quality, Duration, Modifying Factors, Severity, Associated Signs and Symptoms)  Note limiting factors. Chief Complaint: CHF with bilateral lower extremity left greater than right cellulitis    Ezekiel Doty is a 80 y.o. male who presents to the emergency department with difficulties as a pertains to his regular usual shortness of breath fluid overload status and difficulties as a pertains to bilateral lower extremity cellulitis. Patient has been seen and evaluated by his cardiologist at the end of July. He was having difficulties as a pertains to his CHF and his fluid status. Unfortunately he was also having difficulties with his left leg at the time demonstrating cellulitis. He has been off and on antibiotics including that of clindamycin over the last 8 weeks. Last course was finished last week. Unfortunately the cellulitis has returned. He states what changes today to bring him to the emergency department as he now has an open wound with significant swelling and blistering noted of the second toe on his left foot. Patient states that this is a new finding for him. He does not report he is not experiencing any significant substernal chest pain.   He has a history of atrial fibrillation and is anticoagulated now with warfarin after having a GI bleed on novel anticoagulation. Patient states he is not having fevers and or chills. He is having difficulties with levels of ambulation secondary to both of his legs. His daughter has been tending to his legs in the home environment. Dressings have been placed where there are some areas of ulceration. 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 Patient states he is not having significant pain and discomfort over his legs at the present time. He has no additional complaints or concerns noted. Daughter assist with the above-mentioned who is currently at bedside. Nursing Notes were all reviewed and agreed with or any disagreements were addressed in the HPI. REVIEW OF SYSTEMS    (2-9 systems for level 4, 10 or more for level 5)     Review of Systems   Constitutional: Negative for activity change, chills and fever. Respiratory: Positive for shortness of breath. Negative for cough and chest tightness. Cardiovascular: Positive for leg swelling. Negative for chest pain. Gastrointestinal: Negative for abdominal pain, diarrhea, nausea and vomiting. Genitourinary: Negative for dysuria and flank pain. Musculoskeletal: Positive for gait problem. Skin: Positive for color change and wound. Hematological: Bruises/bleeds easily. All other systems reviewed and are negative. Positives and Pertinent negatives as per HPI. Except as noted above in the ROS, all other systems were reviewed and negative.        PAST MEDICAL HISTORY     Past Medical History:   Diagnosis Date    Allergic rhinitis     Atrial fibrillation (HCC)     Benign prostatic hypertrophy     CAD (coronary artery disease)     Cancer ([facility] [address]     skin    CHF (congestive heart failure) ([facility] [address] 08/17/2017    Coronary artery disease involving native coronary artery of native heart without angina pectoris 6/17/2011    DDD (degenerative disc disease), lumbar POLYSACCHARIDE IRON COMPLEX (PROFE PO)    Take 185 mg by mouth    POTASSIUM CHLORIDE (KLOR-CON M) 10 MEQ EXTENDED RELEASE TABLET    Take 1 tablet by mouth 4 times daily    ROSUVASTATIN (CRESTOR) 5 MG TABLET    TAKE 1 TABLET DAILY    VITAMIN B-12 (CYANOCOBALAMIN) 500 MCG TABLET    Take 500 mcg by mouth daily    WARFARIN (COUMADIN) 5 MG TABLET    TAKE 1 TABLET BY MOUTH DAILY AT 6 PM         ALLERGIES     Avelox [moxifloxacin hcl in nacl], Epinephrine base, Other, Keflex [cephalexin], and Polysporin [bacitracin-polymyxin b]    FAMILYHISTORY       Family History   Problem Relation Age of Onset    Cancer Sister     Coronary Art Dis Brother           SOCIAL HISTORY       Social History     Tobacco Use    Smoking status: Former Smoker     Packs/day: 1.00     Years: 30.00     Pack years: 30.00     Quit date: 2004     Years since quittin.3    Smokeless tobacco: Never Used   Vaping Use    Vaping Use: Never used   Substance Use Topics    Alcohol use: No     Alcohol/week: 0.0 standard drinks    Drug use: No       SCREENINGS             PHYSICAL EXAM    (up to 7 for level 4, 8 or more for level 5)     ED Triage Vitals [21 1616]   BP Temp Temp Source Pulse Resp SpO2 Height Weight   132/63 98.8 °F (37.1 °C) Oral 67 14 97 % 5' 10\" (1.778 m) 165 lb (74.8 kg)       Physical Exam  Vitals and nursing note reviewed. Constitutional:       General: He is awake. He is not in acute distress. Appearance: Normal appearance. He is well-developed. He is ill-appearing (chronically). He is not diaphoretic. HENT:      Head: Normocephalic and atraumatic. No raccoon eyes, Mills's sign or laceration. Right Ear: External ear normal.      Left Ear: External ear normal.   Eyes:      General: No scleral icterus. Right eye: No discharge. Left eye: No discharge. Conjunctiva/sclera: Conjunctivae normal.   Neck:      Vascular: No JVD. Cardiovascular:      Rate and Rhythm: Normal rate.  Rhythm irregularly irregular. Heart sounds: No murmur heard. No friction rub. No gallop. Comments: Lateral lower extremities demonstrate evidence of pitting edema. Left calf circumferentially is larger than the right but the patient states this is baseline. Both have negative Homans. Pulmonary:      Effort: Pulmonary effort is normal. No tachypnea, accessory muscle usage or respiratory distress. Breath sounds: Normal breath sounds. No wheezing, rhonchi or rales. Comments: Diminished aeration bilateral bases. Musculoskeletal:      Cervical back: Normal range of motion. Right lower le+ Pitting Edema present. Left lower le+ Pitting Edema present. Skin:     General: Skin is warm and dry. Neurological:      Mental Status: He is alert and oriented to person, place, and time. GCS: GCS eye subscore is 4. GCS verbal subscore is 5. GCS motor subscore is 6. Cranial Nerves: No cranial nerve deficit. Sensory: No sensory deficit. Coordination: Coordination normal.   Psychiatric:         Behavior: Behavior normal. Behavior is cooperative.          DIAGNOSTIC RESULTS   LABS:    Labs Reviewed   CBC WITH AUTO DIFFERENTIAL - Abnormal; Notable for the following components:       Result Value    RBC 3.91 (*)     Hemoglobin 11.2 (*)     Hematocrit 33.1 (*)     RDW 16.1 (*)     All other components within normal limits    Narrative:     Performed at:  [facility] [address], [place], [facility] [employer]   Phone [phone]   COMPREHENSIVE METABOLIC PANEL W/ REFLEX TO MG FOR LOW K - Abnormal; Notable for the following components:    Sodium 135 (*)     Chloride 98 (*)     BUN 21 (*)     GFR Non-African American 58 (*)     Albumin/Globulin Ratio 0.8 (*)     All other components within normal limits    Narrative:     Performed at:  [facility] [address], [place], [facility] [employer]   Phone  NATRIURETIC PEPTIDE - Abnormal; Notable for the following components:    Pro-BNP 1,175 (*)     All other components within normal limits    Narrative:     Performed at:  [facility]  [employer]   Phone [phone]   PROTIME-INR - Abnormal; Notable for the following components:    Protime 47.8 (*)     INR 3.99 (*)     All other components within normal limits    Narrative:     Performed at:  [facility]  [employer]   Phone [phone]   URINE RT REFLEX TO CULTURE - Abnormal; Notable for the following components:    Leukocyte Esterase, Urine SMALL (*)     All other components within normal limits    Narrative:     Performed at:  [facility]  [employer]   Phone [phone]   MICROSCOPIC URINALYSIS - Abnormal; Notable for the following components:    WBC, UA 7 (*)     All other components within normal limits    Narrative:     Performed at:  [facility]  [employer]   Phone [phone]   CULTURE, BLOOD 1   CULTURE, BLOOD 2   TROPONIN    Narrative:     Performed at:  [facility]  [employer]   Phone [phone]   LACTATE, SEPSIS    Narrative:     Performed at:  [facility]  [employer]   Phone [phone]   BLOOD GAS, VENOUS   LACTATE, SEPSIS       When ordered only abnormal lab results are displayed. All other labs were within normal range or not returned as of this dictation. EKG: When ordered, EKG's are interpreted by the Emergency Department Physician in the absence of a cardiologist.  Please see their note for interpretation of EKG.     RADIOLOGY:   Non-plain film images such as CT, Ultrasound and MRI are read by the radiologist. Plain radiographic images are visualized and preliminarily interpreted by the ED Provider with the below findings:        Interpretation per the Radiologist below, if available at the time of this note:    XR CHEST PORTABLE   Final Result      XR FOOT LEFT (MIN 3 VIEWS)   Final Result   Osteomyelitis of the 2nd toe with associated displaced pathologic fracture,   as discussed             PROCEDURES   Unless otherwise noted below, none     Ortho Injury    Date/Time: 8/24/2021 5:29 PM  Performed by: Ashley Gant PA-C  Authorized by: Wylie Gosselin, MD   Consent given by: patient  Injury location: toe  Location details: left second toe  Injury type: fracture  Fracture type: distal phalanx  Pre-procedure distal perfusion: normal  Pre-procedure neurological function: normal  Pre-procedure range of motion: reduced  Immobilization: Postop shoe. Post-procedure neurovascular assessment: post-procedure neurovascularly intact  Post-procedure distal perfusion: normal  Post-procedure neurological function: normal  Post-procedure range of motion: unchanged          CRITICAL CARE TIME   N/A    CONSULTS:  None      EMERGENCY DEPARTMENT COURSE and DIFFERENTIAL DIAGNOSIS/MDM:   Vitals:    Vitals:    08/24/21 1616   BP: 132/63   Pulse: 67   Resp: 14   Temp: 98.8 °F (37.1 °C)   TempSrc: Oral   SpO2: 97%   Weight: 165 lb (74.8 kg)   Height: 5' 10\" (1.778 m)       Patient was given the following medications:  Medications   sodium chloride flush 0.9 % injection 5-40 mL (has no administration in time range)   sodium chloride flush 0.9 % injection 5-40 mL (has no administration in time range)   0.9 % sodium chloride infusion (has no administration in time range)   cefepime (MAXIPIME) 2000 mg IVPB minibag (has no administration in time range)   vancomycin (VANCOCIN) 1,750 mg in dextrose 5 % 500 mL IVPB (has no administration in time range)           The patient's detailed history of present illness is documented as above.  Upon arrival to the emergency department the patient's vital signs are as documented. The patient is noted to be hemodynamically stable and afebrile. Physical examination findings are as above. IV access was obtained laboratory testing and work-up was initiated. EKG demonstrates a paced rhythm with a rate of 60. Left bundle branch block. Please see attending physician details for further EKG interpretation and comparison. Radiographs of the patient's left foot does confirm osteomyelitis of the second toe with an associated displaced pathologic fracture. Patient will be placed in a postop shoe for this. Portable chest x-ray demonstrates no evidence of acute cardiopulmonary process. BC demonstrates no evidence of leukocytosis. Anemia at 11.2 and 33.1 respectively with no evidence of thrombocytopenia or thrombocytosis. BUN is 21 creatinine is 1.2 nonfasting glucose 99 sodium 135 chloride 98 CO2 28 gap of 10 LFTs unremarkable. Troponin is less than 0.01 proBNP is 1175. UA demonstrates possibility of infection but this will be covered with the antibiotics for the patient's osteomyelitis. Vancomycin and cefepime  initiated. Postop shoe. Unfortunately in light of the above-mentioned the patient will require ongoing care management on an inpatient basis. As it is the end of my shift, my attending physician will follow up on the outstanding test results and assume the final disposition of this patient. Please see attending physician note for further medical decision making, consultations, and final disposition of this patient. FINAL IMPRESSION      1. Osteomyelitis of second toe of left foot ([address]    2. Bilateral cellulitis of lower leg    3.  Chronic congestive heart failure, unspecified heart failure type ([address]          DISPOSITION/PLAN   DISPOSITION: Pending for admission       (Please note that portions of this note were completed with a voice recognition program.  Efforts were made to edit the dictations but occasionally words are mis-transcribed.)    Loco Gonzalez PA-C (electronically signed)           Adrian Luna PA-C  08/24/21 [id number]

## 2021-08-24 NOTE — ED NOTES
Bed: 10  Expected date:   Expected time:   Means of arrival:   Comments:  Apryl Barreto RN  08/24/21 [id number]

## 2021-08-25 LAB
A/G RATIO: 0.7 (ref 1.1–2.2)
ALBUMIN SERPL-MCNC: 3.3 G/DL (ref 3.4–5)
ALP BLD-CCNC: 90 U/L (ref 40–129)
ALT SERPL-CCNC: 13 U/L (ref 10–40)
ANION GAP SERPL CALCULATED.3IONS-SCNC: 10 MMOL/L (ref 3–16)
AST SERPL-CCNC: 25 U/L (ref 15–37)
BASOPHILS ABSOLUTE: 0 K/UL (ref 0–0.2)
BASOPHILS RELATIVE PERCENT: 0.5 %
BILIRUB SERPL-MCNC: 0.5 MG/DL (ref 0–1)
BUN BLDV-MCNC: 18 MG/DL (ref 7–20)
C-REACTIVE PROTEIN: 27.6 MG/L (ref 0–5.1)
CALCIUM SERPL-MCNC: 8.8 MG/DL (ref 8.3–10.6)
CHLORIDE BLD-SCNC: 99 MMOL/L (ref 99–110)
CO2: 27 MMOL/L (ref 21–32)
CREAT SERPL-MCNC: 1.1 MG/DL (ref 0.8–1.3)
EKG ATRIAL RATE: 60 BPM
EKG DIAGNOSIS: NORMAL
EKG Q-T INTERVAL: 488 MS
EKG QRS DURATION: 142 MS
EKG QTC CALCULATION (BAZETT): 488 MS
EKG R AXIS: 3 DEGREES
EKG T AXIS: 56 DEGREES
EKG VENTRICULAR RATE: 60 BPM
EOSINOPHILS ABSOLUTE: 0.2 K/UL (ref 0–0.6)
EOSINOPHILS RELATIVE PERCENT: 2.6 %
GFR AFRICAN AMERICAN: >60
GFR NON-AFRICAN AMERICAN: >60
GLOBULIN: 4.6 G/DL
GLUCOSE BLD-MCNC: 92 MG/DL (ref 70–99)
HCT VFR BLD CALC: 32.4 % (ref 40.5–52.5)
HEMOGLOBIN: 10.8 G/DL (ref 13.5–17.5)
INR BLD: 3.59 (ref 0.88–1.12)
LYMPHOCYTES ABSOLUTE: 1.9 K/UL (ref 1–5.1)
LYMPHOCYTES RELATIVE PERCENT: 29.1 %
MCH RBC QN AUTO: 28 PG (ref 26–34)
MCHC RBC AUTO-ENTMCNC: 33.3 G/DL (ref 31–36)
MCV RBC AUTO: 84.1 FL (ref 80–100)
MONOCYTES ABSOLUTE: 0.8 K/UL (ref 0–1.3)
MONOCYTES RELATIVE PERCENT: 11.8 %
NEUTROPHILS ABSOLUTE: 3.7 K/UL (ref 1.7–7.7)
NEUTROPHILS RELATIVE PERCENT: 56 %
PDW BLD-RTO: 16.3 % (ref 12.4–15.4)
PLATELET # BLD: 274 K/UL (ref 135–450)
PMV BLD AUTO: 6.8 FL (ref 5–10.5)
POTASSIUM REFLEX MAGNESIUM: 3.8 MMOL/L (ref 3.5–5.1)
PROTHROMBIN TIME: 42.8 SEC (ref 9.9–12.7)
RBC # BLD: 3.85 M/UL (ref 4.2–5.9)
SEDIMENTATION RATE, ERYTHROCYTE: 85 MM/HR (ref 0–20)
SODIUM BLD-SCNC: 136 MMOL/L (ref 136–145)
TOTAL PROTEIN: 7.9 G/DL (ref 6.4–8.2)
WBC # BLD: 6.7 K/UL (ref 4–11)

## 2021-08-25 PROCEDURE — 1200000000 HC SEMI PRIVATE

## 2021-08-25 PROCEDURE — 6370000000 HC RX 637 (ALT 250 FOR IP): Performed by: PHYSICIAN ASSISTANT

## 2021-08-25 PROCEDURE — 85610 PROTHROMBIN TIME: CPT

## 2021-08-25 PROCEDURE — 2580000003 HC RX 258: Performed by: HOSPITALIST

## 2021-08-25 PROCEDURE — 99223 1ST HOSP IP/OBS HIGH 75: CPT | Performed by: INTERNAL MEDICINE

## 2021-08-25 PROCEDURE — 97166 OT EVAL MOD COMPLEX 45 MIN: CPT

## 2021-08-25 PROCEDURE — 97530 THERAPEUTIC ACTIVITIES: CPT

## 2021-08-25 PROCEDURE — 6360000002 HC RX W HCPCS: Performed by: HOSPITALIST

## 2021-08-25 PROCEDURE — 86140 C-REACTIVE PROTEIN: CPT

## 2021-08-25 PROCEDURE — 6370000000 HC RX 637 (ALT 250 FOR IP): Performed by: HOSPITALIST

## 2021-08-25 PROCEDURE — 83036 HEMOGLOBIN GLYCOSYLATED A1C: CPT

## 2021-08-25 PROCEDURE — 97162 PT EVAL MOD COMPLEX 30 MIN: CPT

## 2021-08-25 PROCEDURE — 93010 ELECTROCARDIOGRAM REPORT: CPT | Performed by: INTERNAL MEDICINE

## 2021-08-25 PROCEDURE — 80053 COMPREHEN METABOLIC PANEL: CPT

## 2021-08-25 PROCEDURE — 6360000002 HC RX W HCPCS: Performed by: PHYSICIAN ASSISTANT

## 2021-08-25 PROCEDURE — 85025 COMPLETE CBC W/AUTO DIFF WBC: CPT

## 2021-08-25 PROCEDURE — 87070 CULTURE OTHR SPECIMN AEROBIC: CPT

## 2021-08-25 PROCEDURE — 36415 COLL VENOUS BLD VENIPUNCTURE: CPT

## 2021-08-25 PROCEDURE — 87205 SMEAR GRAM STAIN: CPT

## 2021-08-25 PROCEDURE — 85652 RBC SED RATE AUTOMATED: CPT

## 2021-08-25 RX ORDER — METOPROLOL SUCCINATE 25 MG/1
12.5 TABLET, EXTENDED RELEASE ORAL DAILY
Status: DISCONTINUED | OUTPATIENT
Start: 2021-08-25 | End: 2021-08-28 | Stop reason: HOSPADM

## 2021-08-25 RX ORDER — HYDROCODONE BITARTRATE AND ACETAMINOPHEN 5; 325 MG/1; MG/1
1 TABLET ORAL EVERY 6 HOURS PRN
Status: DISCONTINUED | OUTPATIENT
Start: 2021-08-25 | End: 2021-08-26

## 2021-08-25 RX ORDER — FUROSEMIDE 40 MG/1
40 TABLET ORAL 2 TIMES DAILY
Status: DISCONTINUED | OUTPATIENT
Start: 2021-08-25 | End: 2021-08-28 | Stop reason: HOSPADM

## 2021-08-25 RX ORDER — FENTANYL 50 UG/H
1 PATCH TRANSDERMAL
Status: DISCONTINUED | OUTPATIENT
Start: 2021-08-26 | End: 2021-08-28 | Stop reason: HOSPADM

## 2021-08-25 RX ORDER — ACETAMINOPHEN 650 MG/1
650 SUPPOSITORY RECTAL EVERY 6 HOURS PRN
Status: DISCONTINUED | OUTPATIENT
Start: 2021-08-25 | End: 2021-08-28 | Stop reason: HOSPADM

## 2021-08-25 RX ORDER — SODIUM CHLORIDE 0.9 % (FLUSH) 0.9 %
5-40 SYRINGE (ML) INJECTION EVERY 12 HOURS SCHEDULED
Status: DISCONTINUED | OUTPATIENT
Start: 2021-08-25 | End: 2021-08-28 | Stop reason: HOSPADM

## 2021-08-25 RX ORDER — POTASSIUM CHLORIDE 20 MEQ/1
40 TABLET, EXTENDED RELEASE ORAL PRN
Status: DISCONTINUED | OUTPATIENT
Start: 2021-08-25 | End: 2021-08-28 | Stop reason: HOSPADM

## 2021-08-25 RX ORDER — SODIUM CHLORIDE 9 MG/ML
25 INJECTION, SOLUTION INTRAVENOUS PRN
Status: DISCONTINUED | OUTPATIENT
Start: 2021-08-25 | End: 2021-08-28 | Stop reason: HOSPADM

## 2021-08-25 RX ORDER — FLUTICASONE PROPIONATE 50 MCG
1 SPRAY, SUSPENSION (ML) NASAL DAILY
Status: DISCONTINUED | OUTPATIENT
Start: 2021-08-25 | End: 2021-08-25

## 2021-08-25 RX ORDER — UBIDECARENONE 200 MG
200 CAPSULE ORAL DAILY
COMMUNITY

## 2021-08-25 RX ORDER — ACETAMINOPHEN 325 MG/1
650 TABLET ORAL EVERY 6 HOURS PRN
Status: DISCONTINUED | OUTPATIENT
Start: 2021-08-25 | End: 2021-08-28 | Stop reason: HOSPADM

## 2021-08-25 RX ORDER — POTASSIUM CHLORIDE 7.45 MG/ML
10 INJECTION INTRAVENOUS PRN
Status: DISCONTINUED | OUTPATIENT
Start: 2021-08-25 | End: 2021-08-28 | Stop reason: HOSPADM

## 2021-08-25 RX ORDER — KETOROLAC TROMETHAMINE 30 MG/ML
15 INJECTION, SOLUTION INTRAMUSCULAR; INTRAVENOUS ONCE
Status: COMPLETED | OUTPATIENT
Start: 2021-08-25 | End: 2021-08-25

## 2021-08-25 RX ORDER — LIDOCAINE 4 G/G
1 PATCH TOPICAL DAILY
Status: DISCONTINUED | OUTPATIENT
Start: 2021-08-25 | End: 2021-08-28 | Stop reason: HOSPADM

## 2021-08-25 RX ORDER — SODIUM CHLORIDE 0.9 % (FLUSH) 0.9 %
5-40 SYRINGE (ML) INJECTION PRN
Status: DISCONTINUED | OUTPATIENT
Start: 2021-08-25 | End: 2021-08-28 | Stop reason: HOSPADM

## 2021-08-25 RX ORDER — IRON POLYSACCHARIDE COMPLEX 180 MG
1 CAPSULE ORAL 2 TIMES DAILY
COMMUNITY

## 2021-08-25 RX ORDER — UBIDECARENONE 75 MG
500 CAPSULE ORAL DAILY
Status: DISCONTINUED | OUTPATIENT
Start: 2021-08-25 | End: 2021-08-25 | Stop reason: CLARIF

## 2021-08-25 RX ORDER — MAGNESIUM SULFATE IN WATER 40 MG/ML
2000 INJECTION, SOLUTION INTRAVENOUS PRN
Status: DISCONTINUED | OUTPATIENT
Start: 2021-08-25 | End: 2021-08-28 | Stop reason: HOSPADM

## 2021-08-25 RX ORDER — FLUTICASONE PROPIONATE 50 MCG
2 SPRAY, SUSPENSION (ML) NASAL DAILY PRN
Status: DISCONTINUED | OUTPATIENT
Start: 2021-08-25 | End: 2021-08-28 | Stop reason: HOSPADM

## 2021-08-25 RX ORDER — ROSUVASTATIN CALCIUM 10 MG/1
5 TABLET, COATED ORAL NIGHTLY
Status: DISCONTINUED | OUTPATIENT
Start: 2021-08-25 | End: 2021-08-28 | Stop reason: HOSPADM

## 2021-08-25 RX ORDER — PANTOPRAZOLE SODIUM 40 MG/1
40 TABLET, DELAYED RELEASE ORAL
Status: DISCONTINUED | OUTPATIENT
Start: 2021-08-25 | End: 2021-08-28 | Stop reason: HOSPADM

## 2021-08-25 RX ORDER — POLYETHYLENE GLYCOL 3350 17 G/17G
17 POWDER, FOR SOLUTION ORAL DAILY PRN
Status: DISCONTINUED | OUTPATIENT
Start: 2021-08-25 | End: 2021-08-28 | Stop reason: HOSPADM

## 2021-08-25 RX ADMIN — FUROSEMIDE 40 MG: 40 TABLET ORAL at 09:07

## 2021-08-25 RX ADMIN — DRONEDARONE 400 MG: 400 TABLET, FILM COATED ORAL at 18:45

## 2021-08-25 RX ADMIN — CEFEPIME HYDROCHLORIDE 2000 MG: 2 INJECTION, POWDER, FOR SOLUTION INTRAVENOUS at 09:07

## 2021-08-25 RX ADMIN — CEFEPIME HYDROCHLORIDE 2000 MG: 2 INJECTION, POWDER, FOR SOLUTION INTRAVENOUS at 22:52

## 2021-08-25 RX ADMIN — METOPROLOL SUCCINATE 12.5 MG: 25 TABLET, EXTENDED RELEASE ORAL at 09:09

## 2021-08-25 RX ADMIN — SODIUM CHLORIDE 25 ML: 9 INJECTION, SOLUTION INTRAVENOUS at 09:06

## 2021-08-25 RX ADMIN — PANTOPRAZOLE SODIUM 40 MG: 40 TABLET, DELAYED RELEASE ORAL at 09:08

## 2021-08-25 RX ADMIN — DRONEDARONE 400 MG: 400 TABLET, FILM COATED ORAL at 11:46

## 2021-08-25 RX ADMIN — Medication 10 ML: at 22:53

## 2021-08-25 RX ADMIN — ROSUVASTATIN 5 MG: 10 TABLET, FILM COATED ORAL at 22:44

## 2021-08-25 RX ADMIN — SODIUM CHLORIDE 25 ML: 9 INJECTION, SOLUTION INTRAVENOUS at 22:48

## 2021-08-25 RX ADMIN — FUROSEMIDE 40 MG: 40 TABLET ORAL at 17:57

## 2021-08-25 RX ADMIN — HYDROCODONE BITARTRATE AND ACETAMINOPHEN 1 TABLET: 5; 325 TABLET ORAL at 16:02

## 2021-08-25 RX ADMIN — HYDROCODONE BITARTRATE AND ACETAMINOPHEN 1 TABLET: 5; 325 TABLET ORAL at 22:44

## 2021-08-25 RX ADMIN — Medication 10 ML: at 09:21

## 2021-08-25 RX ADMIN — KETOROLAC TROMETHAMINE 15 MG: 30 INJECTION, SOLUTION INTRAMUSCULAR at 16:02

## 2021-08-25 ASSESSMENT — PAIN DESCRIPTION - FREQUENCY: FREQUENCY: CONTINUOUS

## 2021-08-25 ASSESSMENT — PAIN SCALES - GENERAL
PAINLEVEL_OUTOF10: 5
PAINLEVEL_OUTOF10: 5
PAINLEVEL_OUTOF10: 4
PAINLEVEL_OUTOF10: 3
PAINLEVEL_OUTOF10: 4
PAINLEVEL_OUTOF10: 3

## 2021-08-25 ASSESSMENT — ENCOUNTER SYMPTOMS
WHEEZING: 0
SHORTNESS OF BREATH: 0
SORE THROAT: 0
COUGH: 0
EYE REDNESS: 0
DIARRHEA: 0
BACK PAIN: 0
ABDOMINAL PAIN: 0
TROUBLE SWALLOWING: 0
NAUSEA: 0
CONSTIPATION: 0
RHINORRHEA: 0
EYE DISCHARGE: 0

## 2021-08-25 ASSESSMENT — PAIN DESCRIPTION - PAIN TYPE: TYPE: CHRONIC PAIN

## 2021-08-25 ASSESSMENT — PAIN DESCRIPTION - DESCRIPTORS: DESCRIPTORS: DULL

## 2021-08-25 ASSESSMENT — PAIN DESCRIPTION - ORIENTATION: ORIENTATION: LOWER

## 2021-08-25 ASSESSMENT — PAIN DESCRIPTION - LOCATION
LOCATION: BACK

## 2021-08-25 NOTE — CARE COORDINATION
Discharge Planning Assessment    SW discharge planner met with patient and dtr to discuss reason for admission, current living situation, and potential needs at the time of discharge. Demographics/Insurance verified:  Yes    Current type of dwelling:  House - 1 level - 1 step. Living arrangements:  W/spouse and son    Level of function/Support:  Pt is normally independent with ADLs  Has been fighting his infections in his legs for a long time but able to ambulate on them. 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 Son assisting with her care while pt is in the hospital.  Dtr is also helpful and lives nearby. PCP:  Dr. Dorothy Multani    Last Visit to PCP:  June 2021    DME:  [employer], [employer]    Active with any community resources/agencies/skilled home care:  None. Medication compliance issues:   No    Financial issues that could impact healthcare:  No    Tentative discharge plan:  PT/OT pending. Most likely home w/HHC. Possible need for home IV meds but unsure yet. SW to follow. Discussed with patient and/or family that on the day of discharge home tentative time of discharge will be between 10 AM and noon. Transportation at the time of discharge:  Family can transport home.     Electronically signed by LUZ Madison, WERO on 8/25/2021 at 1:05 PM

## 2021-08-25 NOTE — PROGRESS NOTES
[facility] PROGRESS NOTE    8/25/2021 5:29 PM        Name: Isabella Ramirez . Admitted: 8/24/2021  Primary Care Provider: Grayson Boone MD (Tel: [phone])      Subjective:  . Patient admitted with osteo of L 2nd toe. Has chronic back pain which is worse today. Has neuropathy so he denies pain in foot.      Reviewed interval ancillary notes    Current Medications  furosemide (LASIX) tablet 40 mg, BID  metoprolol succinate (TOPROL XL) extended release tablet 12.5 mg, Daily  dronedarone hcl (MULTAQ) tablet 400 mg, BID WC  pantoprazole (PROTONIX) tablet 40 mg, QAM AC  rosuvastatin (CRESTOR) tablet 5 mg, Nightly  sodium chloride flush 0.9 % injection 5-40 mL, 2 times per day  sodium chloride flush 0.9 % injection 5-40 mL, PRN  0.9 % sodium chloride infusion, PRN  polyethylene glycol (GLYCOLAX) packet 17 g, Daily PRN  acetaminophen (TYLENOL) tablet 650 mg, Q6H PRN   Or  acetaminophen (TYLENOL) suppository 650 mg, Q6H PRN  potassium chloride (KLOR-CON M) extended release tablet 40 mEq, PRN   Or  potassium bicarb-citric acid (EFFER-K) effervescent tablet 40 mEq, PRN   Or  potassium chloride 10 mEq/100 mL IVPB (Peripheral Line), PRN  magnesium sulfate 2000 mg in 50 mL IVPB premix, PRN  cefepime (MAXIPIME) 2000 mg IVPB minibag, Q12H  vancomycin 1000 mg IVPB in 250 mL D5W addavial, Q24H  fluticasone (FLONASE) 50 MCG/ACT nasal spray 2 spray, Daily PRN  HYDROcodone-acetaminophen (NORCO) 5-325 MG per tablet 1 tablet, Q6H PRN  lidocaine 4 % external patch 1 patch, Daily        Objective:  /67   Pulse 67   Temp 97.9 °F (36.6 °C) (Oral)   Resp 18   Ht 5' 9\" (1.753 m)   Wt 162 lb 1.6 oz (73.5 kg)   SpO2 97%   BMI 23.94 kg/m²     Intake/Output Summary (Last 24 hours) at 8/25/2021 1729  Last data filed at 8/25/2021 0095  Gross per 24 hour   Intake 10 ml   Output    Net 10 ml      Wt Readings from Last 3 Encounters: 08/25/21 162 lb 1.6 oz (73.5 kg)   07/19/21 169 lb (76.7 kg)   07/07/21 163 lb 6.4 oz (74.1 kg)       General appearance:  Appears comfortable  Eyes: Sclera clear. Pupils equal.  ENT: Moist oral mucosa. Trachea midline, no adenopathy. Cardiovascular: Regular rhythm, normal S1, S2. No murmur. No edema in lower extremities  Respiratory: Not using accessory muscles. Good inspiratory effort. Clear to auscultation bilaterally, no wheeze or crackles. GI: Abdomen soft, no tenderness, not distended, normal bowel sounds  Musculoskeletal: No cyanosis in digits, neck supple  Neurology: CN 2-12 grossly intact. No speech or motor deficits  Psych: Normal affect. Alert and oriented in time, place and person  Skin: Warm, dry, normal turgor    Labs and Tests:  CBC:   Recent Labs     08/24/21  1650 08/25/21  0613   WBC 6.5 6.7   HGB 11.2* 10.8*    274     BMP:    Recent Labs     08/24/21  1650 08/25/21  0613   * 136   K 4.1 3.8   CL 98* 99   CO2 27 27   BUN 21* 18   CREATININE 1.2 1.1   GLUCOSE 99 92     Hepatic:   Recent Labs     08/24/21  1650 08/25/21  0613   AST 27 25   ALT 13 13   BILITOT 0.5 0.5   ALKPHOS 87 90     Xray L foot  Osteomyelitis of the 2nd toe with associated displaced pathologic fracture,   as discussed         Problem List  Active Problems:    Pure hyperglyceridemia    Hyperlipidemia    Benign prostatic hyperplasia    Paroxysmal atrial fibrillation (HCC)    Coronary artery disease involving native coronary artery of native heart without angina pectoris    Cardiac pacemaker in situ    Chronic congestive heart failure (HCC)    Essential hypertension    Sick sinus syndrome (HCC)    Venous (peripheral) insufficiency    PVD (peripheral vascular disease) ([address]    Solar purpura ([address]    Osteomyelitis of second toe of left foot ([address]  Resolved Problems:    * No resolved hospital problems. *       Assessment & Plan:   1. Osteomyelitis L 2nd toe-on IV abx. ID and podiatry on board. Cultures pending.  Needs MRI. Planning for amputation of toe on Friday per Dr Margarito Marcelo. 2. L leg swelling-check duplex. 3. Chronic anticoagulation-on coumadin for afib. INR 3.59. Hold coumadin. May need to give vit K/FFP  4. Chronic back pain-exacerbated by laying on gurney all night. Add prn norco. Give one dose of toradol. Add lidoderm patch. Already has fentanyl patch-has worn this for years. Discussed plan with Daughter. Diet: ADULT DIET;  Regular; Low Fat/Low Chol/High Fiber/2 gm Na  Code:Full Code  DVT PPX: coumadin      Eulene Sample, AMAIRANI   8/25/2021 5:29 PM

## 2021-08-25 NOTE — PROGRESS NOTES
order to help improve his back pain and prevent a regression of his mobility. The pt will also benefit from skilled therapy in the home in order to help his return home safely and promote an increase of functional mobility. The pt is the primary caretake for his wife who is a stroke survivor. Treatment Diagnosis: decreased functional mobility and increased pain  Prognosis: Fair  Decision Making: Medium Complexity  Clinical Presentation: evolving  PT Education: PT Role;Plan of Care;General Safety  Patient Education: d/c recommendations--pt and daughter verbalized understanding  Barriers to Learning: none  REQUIRES PT FOLLOW UP: Yes  Activity Tolerance  Activity Tolerance: Patient limited by pain; Patient limited by endurance  Activity Tolerance: Pt reports experiencing large amounts of low back pain and states this is the main cause for his decreased ability to ambulate large distances       Patient Diagnosis(es): The primary encounter diagnosis was Osteomyelitis of second toe of left foot ([address] Diagnoses of Bilateral cellulitis of lower leg, Chronic congestive heart failure, unspecified heart failure type ([address] and Open fracture of phalanx of left second toe, initial encounter were also pertinent to this visit. has a past medical history of Allergic rhinitis, Atrial fibrillation ([address] Benign prostatic hypertrophy, CAD (coronary artery disease), Cancer ([address] CHF (congestive heart failure) ([address] Coronary artery disease involving native coronary artery of native heart without angina pectoris, DDD (degenerative disc disease), lumbar, Falls, GI bleeding, Hyperlipidemia, Hypertension, MI (myocardial infarction) ([address] MRSA (methicillin resistant staph aureus) culture positive, Pneumonia, S/P PTCA (percutaneous transluminal coronary angioplasty), SSS (sick sinus syndrome) ([address] Unspecified sleep apnea, and Venous (peripheral) insufficiency.    has a past surgical history that includes Carpal tunnel release; Coronary angioplasty with stent; Diagnostic Cardiac Cath Lab Procedure; Coronary angioplasty; Cardiac pacemaker placement; Appendectomy; pacemaker placement; and eye surgery. Restrictions  Restrictions/Precautions  Restrictions/Precautions: Fall Risk (moderate fall risk)  Required Braces or Orthoses?: No  Position Activity Restriction  Other position/activity restrictions: Has been having issues w/ Cellulitis LLE X few weeks now . Has been on PO Clindamycin as OP and has had 2-3 courses of same w/ his PCP  For the cellulitis issue. Since 1-2 days , he has been having an Open wound on the left foot 2nd toe and also having inc redness and erythema and edema in LLE . LLE is always edematous @ Baseline as well per patient . Hence he came to ED for further evaluation     Vision/Hearing  Vision: Impaired  Vision Exceptions: Wears glasses for reading  Hearing: Within functional limits       Subjective  General  Chart Reviewed: Yes  Patient assessed for rehabilitation services?: Yes  Response To Previous Treatment: Not applicable  Family / Caregiver Present: Yes (daughter)  Diagnosis: Osteomyelitis  Follows Commands: Within Functional Limits  Subjective  Subjective: Pt seated in bedside chair upon arrival, consented to PT/OT care.  pts nurse notified of his pain levels and medication was ordered  Pain Screening  Patient Currently in Pain: Yes  Pain Assessment  Pain Assessment: 0-10  Pain Level: 5  Pain Location: Back  Vital Signs  Patient Currently in Pain: Yes       Orientation  Orientation  Overall Orientation Status: Within Normal Limits     Social/Functional History  Social/Functional History  Lives With: Family (pt takes care of wife, son lives with them as well and daughter is close by)  Type of Home: House  Home Layout: One level  Home Access:  (one small step with a handicap ramp)  Bathroom Shower/Tub: Tub/Shower unit  Bathroom Toilet: Handicap height (raised with handrails)  Bathroom Equipment: Grab bars in shower, Shower chair, Grab bars around toilet  Home Equipment: Rolling walker, Cane, Wheelchair-electric (transport wheelchair)  ADL Assistance: Kathleen Danielle Responsibilities: Yes  Ambulation Assistance: Independent (uses cane)  Transfer Assistance: Independent  Active : Yes  Occupation: Retired  Additional Comments: fall at the beginning of june and saw primary care doctor for the fall     Cognition   Cognition  Overall Cognitive Status: WFL    Objective  Observation/Palpation  Posture: Poor (hyperkyphosis of thoracic spine)    AROM RLE (degrees)  RLE AROM: WFL  AROM LLE (degrees)  LLE AROM : WFL  Strength RLE  Strength RLE: WFL  Strength LLE  Strength LLE: WFL  Tone RLE  RLE Tone: Normotonic  Tone LLE  LLE Tone: Normotonic  Motor Control  Gross Motor?: WFL  Sensation  Overall Sensation Status: Impaired (numbeness in LUE, LLE)  Bed mobility  Comment: pt in bedside chair for duration of therapy  Transfers  Sit to Stand: Stand by assistance (x1 chair, x2 EOB)  Stand to sit: Stand by assistance  Ambulation  Ambulation?: Yes  Ambulation 1  Surface: level tile  Device: Single point cane  Assistance: Stand by assistance  Quality of Gait: kyperkyphotic spine, forward headposture, leaning anterior on cane placed out in front of his NICA  Distance: 61'  Comments: pt stood in room for ~5:00 minutes after returning from ambulation for back comfort  Stairs/Curb  Stairs?: No     Balance  Posture: Fair  Sitting - Static: Fair;+ (no LOB while seated EOB talking to PA ~5-8 minutes with intermittent UE support)  Sitting - Dynamic: Fair;- (no LOB during MMT, had support from single point cane)  Standing - Static: Fair;- (no LOB, leans heavily on cane)  Standing - Dynamic: Fair;- (no LOB during ambulation, uses cane for support)        Plan   Plan  Times per week: 3-5x  Times per day: Daily  Current Treatment Recommendations: Strengthening, Balance Training, Functional Mobility Training, Transfer Training, Endurance Training, Gait Training, Stair training, Safety Education & Training, Home Exercise Program, Positioning, Modalities, Equipment Evaluation, Education, & procurement, Patient/Caregiver Education & Training, Pain Management, Neuromuscular Re-education  Safety Devices  Type of devices: Left in chair, Call light within reach, Nurse notified, Gait belt, Patient at risk for falls  Restraints  Initially in place: No    G-Code       OutComes Score              AM-PAC Score  AM-PAC Inpatient Mobility Raw Score : 17 (08/25/21 1528)  AM-PAC Inpatient T-Scale Score : 42.13 (08/25/21 1528)  Mobility Inpatient CMS 0-100% Score: 50.57 (08/25/21 1528)  Mobility Inpatient CMS G-Code Modifier : CK (08/25/21 1528)          Goals  Short term goals  Time Frame for Short term goals: prior to d/c  Short term goal 1: pt will perform supine to sit independently  Short term goal 2: pt will perform sit to stand with single point cane MOD I  Short term goal 3: pt will ambulate 100' with single point cane and SBA  Short term goal 4: pt will negotiate curb step with single point cane and SBA       Therapy Time   Individual Concurrent Group Co-treatment   Time In 1425         Time Out 1508         Minutes 43              Timed Code Treatment Minutes:   28    Total Treatment Minutes:  2823 Chevy Quinonez, SPT    PT providing direct supervision during session and assisting in making skilled judgements throughout session.   [id number] [facility] PT, DPT [id number]    [id number] [facility], PT

## 2021-08-25 NOTE — CONSULTS
Infectious Diseases   Consult Note        Admission Date: 8/24/2021  Hospital Day: Hospital Day: 2   Attending: June Payne MD  Date of service: 8/25/21     Reason for admission: Osteomyelitis [facility]) [M86.9]    Chief complaint/ Reason for consult: Complicated left foot infection with osteomyelitis    Microbiology:        I have reviewed allavailable micro lab data and cultures    Blood culture (2/2) - collected on 8/24/2021: Negative    Antibiotics and immunizations:       Current antibiotics: All antibiotics and their doses were reviewed by me    Recent Abx Admin                   cefepime (MAXIPIME) 2000 mg IVPB minibag (mg) 2,000 mg New Bag 08/25/21 0907    vancomycin 1000 mg IVPB in 250 mL D5W addavial (mg) 1,000 mg New Bag 08/24/21 2140    cefepime (MAXIPIME) 2000 mg IVPB minibag (mg) 2,000 mg New Bag 08/24/21 2050                  Immunization History: All immunization history was reviewed by me today. Immunization History   Administered Date(s) Administered    COVID-19, Pfizer, PF, 30mcg/0.3mL 01/21/2021, 02/11/2021    Influenza 11/04/2010, 09/21/2011    Influenza Virus Vaccine 10/21/2013, 09/29/2017    Influenza Whole 10/21/2013    Influenza, High Dose (Fluzone 65 yrs and older) 10/08/2015, 10/20/2016, 09/29/2017, 09/11/2018    Influenza, Quadv, adjuvanted, 65 yrs +, IM, PF (Fluad) 10/15/2020    Influenza, Triv, inactivated, subunit, adjuvanted, IM (Fluad 65 yrs and older) 10/21/2019    Pneumococcal Conjugate 13-valent ([id number]) 09/17/2015    Pneumococcal Polysaccharide ([id number]) 10/10/2007    Tdap (Boostrix, Adacel) 07/28/2011, 09/04/2014    Zoster Live (Zostavax) 10/01/2013       Known drug allergies:      All allergies were reviewed and updated    Allergies   Allergen Reactions    Avelox [Moxifloxacin Hcl In Nacl] Anaphylaxis    Epinephrine Base     Other      [place] per PT - swelling size of silver dollar at site per PT     Keflex [Cephalexin] Nausea And Vomiting    Polysporin [Bacitracin-Polymyxin B] Rash       Social history:     Social History:  All social andepidemiologic history was reviewed and updated by me today as needed. · Tobacco use:   reports that he quit smoking about 17 years ago. He has a 30.00 pack-year smoking history. He has never used smokeless tobacco.  · Alcohol use:   reports no history of alcohol use. · Currently lives in: [address] [place] [address]  ·  reports no history of drug use. COVID VACCINATION AND LAB RESULT RECORDS:     Internal Administration   First Dose COVID-19, Pfizer, PF, 30mcg/0.3mL  01/21/2021   Second Dose COVID-19, Kashif Lemus, PF, 30mcg/0.3mL   02/11/2021       Last COVID Lab No results found for: SARS-COV-2, SARS-COV-2 RNA, SARS-COV-2, SARS-COV-2, SARS-COV-2 BY PCR, SARS-COV-2, SARS-COV-2, SARS-COV-2         Assessment:     The patient is a 80 y.o. old male who  has a past medical history of Allergic rhinitis, Atrial fibrillation ([address] Benign prostatic hypertrophy, CAD (coronary artery disease), Cancer ([facility] CHF (congestive heart failure) ([facility] [address] (08/17/2017), Coronary artery disease involving native coronary artery of native heart without angina pectoris (6/17/2011), DDD (degenerative disc disease), lumbar, Falls (08/17/2017), GI bleeding, Hyperlipidemia, Hypertension, MI (myocardial infarction) ([address] MRSA (methicillin resistant staph aureus) culture positive, Pneumonia, S/P PTCA (percutaneous transluminal coronary angioplasty) (stents x 8), SSS (sick sinus syndrome) ([address] Unspecified sleep apnea, and Venous (peripheral) insufficiency (12/4/2018).  with following problems:    · Complicated left foot infection with the osteomyelitis of the left second toe  · Transvenous pacemaker in place  · Failure of outpatient clindamycin  · Peripheral polyneuropathy  · Coronary artery disease  · History of MRSA  · Sick sinus syndrome  · History of sick sinus syndrome      Discussion:      The patient has longstanding peripheral neuropathy    He has been admitted with worsening left foot infection    He has significant swelling of the left left second toe with a wound on the superior and medial side. Left foot reviewed. Is concerning for left second toe osteomyelitis    He is up-to-date on his tetanus vaccination. He has received 2 doses of Pfizer COVID-19 vaccines in the past.    The patient was seen and examined in the ER. He is waiting for a room in the hospital upstairs. Plan:     Diagnostic Workup:    · Agree with blood cultures  · Will order left foot wound culture  · We will order baseline sed rate and CRP  · Check baseline HbA1c  · Continue to follow fever curve, WBC count and blood cultures  · Follow up on liverand renal functions closely    Antimicrobials:    · Agree with empiric IV vancomycin  Check Vancomycin trough before the 5th dose. Target vancomycin trough level of 15-20  mg/L or vancomycin area under curve (AUC) of 400-600 mg*h/L by Bayesian modeling method. If dosing vancomycin based on trough levels, keep vancomycin trough below 20 at all times. Avoid increasing the dose of vancomycin above a total of 4 grams in a 24-hour period in patients younger than 45 years and above 3 grams in a 24-hour period in a patient of age 39 years or older. Continue to monitor serum creatinine and Vanco levels closely, while the patient is on I/v Vancomycin. · We will continue IV cefepime 2 g every 12 hours  · Will likely need left second toe amputation. Will wait for podiatry input  · We will follow up on the culture results and clinical progress and will make further recommendations accordingly. · Continue close vitals monitoring. · Maintain good glycemic control. · Fall precautions. Aspiration precautions. · Continue to watch for new fever or diarrhea. · DVT prophylaxis. · Discussed all above with patient and RN.       Drug Monitoring:    · Continue serial monitoring for antibiotic toxicity as follows: Vancomycin trough, CBC, CMP  · Continue to watch for following: new or worsening fever, hypotension, hives, lip swelling and redness or purulence at vascular access sites. I/v access Management:    · Continue to monitor i.v access sites for erythema, induration, discharge or tenderness. · As always, continue efforts to minimizetubes/lines/drains as clinically appropriate to reduce chances of line associated infections. Current isolation precautions: There are no current isolations documented for this patient. Level of complexity of consult: High     Risk of Complications/Morbidity: High     · Illness(es)/ Infection present that pose threat to bodily function. · There is potential for severe exacerbation of infection/side effects of treatment. · Therapy requires intensive monitoring for antimicrobial agent toxicity. Thank you for involving me in the care of your patient. I will continue to follow. If you have any additional questions, please do not hesitate to contact me. Subjective:     Presenting complaint in ER:     Chief Complaint   Patient presents with    Cellulitis     chf pt that presents with edema in left leg. Pt has cellulitus for 8 weeks in both feet that has not cleared up. Pt has been treated with clindimycin without any improvement. HPI: Erwin Newby is a 80 y.o. male patient, who was seen at the request of Dr. Gabriele Zavala MD.    History was obtained from chart review and the patient. The patient was admitted on 8/24/2021. I have been consulted to see the patient for above mentioned reason(s). The patient has multiple medical comorbidities, and presented to the ER for concern for left second toe infection with osteomyelitis. The patient has longstanding peripheral polyneuropathy and has longstanding wound of the left foot. The patient was taking clindamycin as an outpatient without much improvement. He decided to come to the ER and was admitted.   X-ray of the left foot was concerning for left second toe osteomyelitis with pathological fracture    I have been asked for my opinion for management for this patient. Past Medical History: All past medical history reviewed today. Past Medical History:   Diagnosis Date    Allergic rhinitis     Atrial fibrillation (HCC)     Benign prostatic hypertrophy     CAD (coronary artery disease)     Cancer (HCC)     skin    CH[facility]congestive heart failure) ([facility]) 08/17/2017    Coronary artery disease involving native coronary artery of native heart without angina pectoris 6/17/2011    DDD (degenerative disc disease), lumbar     Falls 08/17/2017    GI bleeding     Hyperlipidemia     Hypertension     MI (myocardial infarction) ([address]     MRSA (methicillin resistant staph aureus) culture positive     h/o infection in the blood    Pneumonia     S/P PTCA (percutaneous transluminal coronary angioplasty) stents x 8    SSS (sick sinus syndrome) ([address]     Unspecified sleep apnea     Venous (peripheral) insufficiency 12/4/2018         Past Surgical History: All pastsurgical history was reviewed today. Past Surgical History:   Procedure Laterality Date    APPENDECTOMY      CARDIAC PACEMAKER PLACEMENT      CARPAL TUNNEL RELEASE      CORONARY ANGIOPLASTY      CORONARY ANGIOPLASTY WITH STENT PLACEMENT      DIAGNOSTIC CARDIAC CATH LAB PROCEDURE      EYE SURGERY      PACEMAKER PLACEMENT           Family History: All family history was reviewed today. Problem Relation Age of Onset    Cancer Sister     Coronary Art Dis Brother          Medications: All current and past medications were reviewed. Not in a hospital admission.      fluticasone  1 spray Each Nostril Daily    furosemide  40 mg Oral BID    metoprolol succinate  12.5 mg Oral Daily    dronedarone hcl  400 mg Oral BID WC    pantoprazole  40 mg Oral QAM AC    rosuvastatin  5 mg Oral Nightly    vitamin B-12  500 mcg Oral Daily    sodium chloride flush  5-40 mL IntraVENous 2 times per day    cefepime  2,000 mg IntraVENous Q12H    warfarin (COUMADIN) daily dosing (placeholder)   Other RX Placeholder    vancomycin  1,000 mg IntraVENous Q24H          REVIEW OF SYSTEMS:       Review of Systems   Constitutional: Positive for fatigue. Negative for chills, diaphoresis and fever. HENT: Negative for ear discharge, ear pain, rhinorrhea, sore throat and trouble swallowing. Eyes: Negative for discharge and redness. Respiratory: Negative for cough, shortness of breath and wheezing. Cardiovascular: Negative for chest pain and leg swelling. Gastrointestinal: Negative for abdominal pain, constipation, diarrhea and nausea. Endocrine: Negative for polyuria. Genitourinary: Negative for dysuria, flank pain, frequency, hematuria and urgency. Musculoskeletal: Negative for back pain and myalgias. Skin: Negative for rash. Swelling and ulceration of the left second toe   Neurological: Negative for dizziness, seizures and headaches. Hematological: Does not bruise/bleed easily. Psychiatric/Behavioral: Negative for hallucinations and suicidal ideas. All other systems reviewed and are negative. Objective:       PHYSICAL EXAM:      Vitals:   Vitals:    08/25/21 0330 08/25/21 0400 08/25/21 0530 08/25/21 0700   BP: 121/66 105/73 110/63 (!) 144/68   Pulse: 60 60 60 60   Resp: 12 14 11 10   Temp:       TempSrc:       SpO2: 94% 95% 100% 99%   Weight:       Height:           Physical Exam  Vitals and nursing note reviewed. Constitutional:       Appearance: Normal appearance. He is well-developed. HENT:      Head: Normocephalic and atraumatic. Right Ear: External ear normal.      Left Ear: External ear normal.      Nose: Nose normal. No congestion or rhinorrhea. Mouth/Throat:      Mouth: Mucous membranes are moist.      Pharynx: No oropharyngeal exudate or posterior oropharyngeal erythema. Eyes:      General: No scleral icterus. Right eye: No discharge. Left eye: No discharge. Conjunctiva/sclera: Conjunctivae normal.      Pupils: Pupils are equal, round, and reactive to light. Cardiovascular:      Rate and Rhythm: Normal rate and regular rhythm. Pulses: Normal pulses. Heart sounds: No murmur heard. No friction rub. Pulmonary:      Effort: Pulmonary effort is normal. No respiratory distress. Breath sounds: Normal breath sounds. No stridor. No wheezing, rhonchi or rales. Abdominal:      General: Bowel sounds are normal.      Palpations: Abdomen is soft. Tenderness: There is no abdominal tenderness. There is no right CVA tenderness, left CVA tenderness, guarding or rebound. Musculoskeletal:         General: Swelling present. No tenderness. Normal range of motion. Cervical back: Normal range of motion and neck supple. No rigidity. No muscular tenderness. Lymphadenopathy:      Cervical: No cervical adenopathy. Skin:     General: Skin is warm and dry. Coloration: Skin is not jaundiced. Findings: Erythema present. No rash. Comments: Bilateral toe deformities. Fusiform ulceration of the left second toe with ulceration concerning for chronic osteomyelitis   Neurological:      General: No focal deficit present. Mental Status: He is alert and oriented to person, place, and time. Mental status is at baseline. Motor: No abnormal muscle tone. Psychiatric:         Mood and Affect: Mood normal.         Behavior: Behavior normal.         Thought Content: Thought content normal.                 Lines and drains: All vascular access sites are healthy with no local erythema, discharge or tenderness. Intake and output:     No intake/output data recorded. Lab Data:   All available labs were reviewed by me today.      CBC:   Recent Labs     08/24/21  1650 08/25/21  0613   WBC 6.5 6.7   RBC 3.91* 3.85*   HGB 11.2* 10.8*   HCT 33.1* 32.4*    274   MCV 84.5 84.1   MCH 28.6 28.0   MCHC 33.8 33.3   RDW 16.1* 16.3*        BMP:  Recent Labs     08/24/21  1650 08/25/21  0613   * 136   K 4.1 3.8   CL 98* 99   CO2 27 27   BUN 21* 18   CREATININE 1.2 1.1   CALCIUM 8.8 8.8   GLUCOSE 99 92        Hepatic FunctionPanel:   Lab Results   Component Value Date    ALKPHOS 90 08/25/2021    ALT 13 08/25/2021    AST 25 08/25/2021    PROT 7.9 08/25/2021    PROT 7.3 02/21/2013    BILITOT 0.5 08/25/2021    BILIDIR <0.2 03/17/2017    IBILI see below 03/17/2017    LABALBU 3.3 08/25/2021       CPK:   Lab Results   Component Value Date    CKTOTAL 268 (H) 03/06/2012     ESR: No results found for: SEDRATE  CRP: No results found for: CRP      Imaging: All pertinent images and reports for the current visit were reviewed by meduring this visit. XR CHEST PORTABLE   Final Result      XR FOOT LEFT (MIN 3 VIEWS)   Final Result   Osteomyelitis of the 2nd toe with associated displaced pathologic fracture,   as discussed             Outside records:    Labs, Microbiology, Radiology and pertinent results from Care everywhere, if available, were reviewed as a part ofthe consultation.       Problem list:       Patient Active Problem List   Diagnosis Code    Hyperlipidemia E78.5    Benign prostatic hyperplasia N40.0    Paroxysmal atrial fibrillation (HCC) I48.0    Coronary artery disease involving native coronary artery of native heart without angina pectoris I25.10    Cardiac pacemaker in situ Z95.0    Postsurgical percutaneous transluminal coronary angioplasty status Z98.61    History of nonmelanoma skin cancer Z85.828    Tinea manuum, pedis, and unguium B35.2, B35.3, B35.1    AK (actinic keratosis) L57.0    Chronic congestive heart failure (HCC) I50.9    Essential hypertension I10    Sick sinus syndrome (HCC) I49.5    Bradycardia R00.1    Gastrointestinal hemorrhage K92.2    Pure hyperglyceridemia E78.1    Localized edema R60.0    Venous (peripheral) insufficiency I87.2    PVD (peripheral vascular disease) ([facility] I73.9    Solar purpura ([facility] D69.2    Osteomyelitis of second toe of left foot ([facility] [facility] M86.9    Bilateral cellulitis of lower leg L03.116, L03.115    Open fracture of second toe of left foot S92.502B    History of MRSA infection Z86.14    Failure of outpatient treatment Z78.9         Please note that this chart was generated using Dragon dictation software. Although every effort was made to ensure the accuracy of this automated transcription, some errors in transcription may have occurred inadvertently. If you may need any clarification, please do not hesitate to contact me through EPIC or at the phone number provided below with my electronic signature. Any pictures or media included in this note were obtained after taking informed verbal consent from the patient and with their approval to include those in the patient's medical record.       Wesley Ratliff MD, MPH  8/25/21, 9:51 AM EDT   [facility] Infectious Disease   [address] [place] [address], [address] [place] [address]  Office: [phone]  Fax: [phone]  Clinic days:  Tuesday & Thursday

## 2021-08-25 NOTE — H&P
_    [facility] HOSPITALIST HISTORY AND PHYSICAL/CONSULT    8/24/2021 11:18 PM    Patient Information:  Colleen Churchill is a 80 y.o. male [id number]    PCP:  Gerald Hauser MD (Tel: [phone] )    Date of Service:   Pt seen/examined on 8/24/21    Admitted to Inpatient with expected LOS greater than two midnights due to medical therapy. Chief complaint:    Chief Complaint   Patient presents with    Cellulitis     chf pt that presents with edema in left leg. Pt has cellulitus for 8 weeks in both feet that has not cleared up. Pt has been treated with clindimycin without any improvement. History of Present Illness:  Desmond Guan is a 80 y.o. male who presented with   · Has been having issues w/ Cellulitis LLE X few weeks now . Has been on PO Clindamycin as OP and has had 2-3 courses of same w/ his PCP  For the cellulitis issue . · Since 1-2 days , he has been having an Open wound on the left foot 2nd toe and also having inc redness and erythema and edema in LLE . LLE is always edematous @ Baseline as well per patient . Hence he came to ED for further evaluation       History and pertinent information obtained from   · ED provider   · Prior Chart    · Patient  /  Family        Notable/Significant ED Findings/VItals :   · VSS   · Is on RA        While in ED  · Patient care Given. · Appropriate Monitoring done. · Pertinent Labs done. See [facility] for details   · Pertinent Acute issues identified and managed . See Epic for details  · Pertinent consults called and case d/w them by ED Provider . See Epic for details. · Imaging  XR ,   done in ED . While in ED, Indicated/Pertinent Medications/Care given as below      · ED Chart reviewed. · Medications reviewed w/c were give in ED . See Epic for details on medications and orders      · Imaging/Labs/Work up done in ED reviewed and their interpretation/active and acute issues, as mentioned below in Assessment and Plan.        Currently, on my evaluation, patient's condition as below :   · Since arrival, post above Rx, patient is seen in ED 10   · Is AO X 3   · Ambulatory patient   · C/o pain in Left foot at site of toe wound       · No signs of s/o acute Life Threatening distress or acute hemodynamic instability, noted @ time of my evaluation of patient. · Is Breathing comfortably on current O2 needs and no distress noted . REVIEW OF SYSTEMS:     Pertinent positives and negatives are noted in the HPI . All other systems were reviewed and are negative. Past Medical History:         has a past medical history of Allergic rhinitis, Atrial fibrillation ([address] Benign prostatic hypertrophy, CAD (coronary artery disease), Cancer ([address] CHF (congestive heart failure) ([address] Coronary artery disease involving native coronary artery of native heart without angina pectoris, DDD (degenerative disc disease), lumbar, Falls, GI bleeding, Hyperlipidemia, Hypertension, MI (myocardial infarction) ([address] MRSA (methicillin resistant staph aureus) culture positive, Pneumonia, S/P PTCA (percutaneous transluminal coronary angioplasty), SSS (sick sinus syndrome) ([address] Unspecified sleep apnea, and Venous (peripheral) insufficiency. Past Surgical History:         has a past surgical history that includes Carpal tunnel release; Coronary angioplasty with stent; Diagnostic Cardiac Cath Lab Procedure; Coronary angioplasty; Cardiac pacemaker placement; Appendectomy; pacemaker placement; and eye surgery.        Medications:          Current Outpatient Medications on File Prior to Encounter   Medication Sig Dispense Refill    furosemide (LASIX) 40 MG tablet TAKE 1 TABLET BY MOUTH TWICE DAILY 180 tablet 3    warfarin (COUMADIN) 5 MG tablet TAKE 1 TABLET BY MOUTH DAILY AT 6 PM 90 tablet 2    metOLazone (ZAROXOLYN) 5 MG tablet Take 1 tablet by mouth Twice a Week 90 tablet 1    pantoprazole (PROTONIX) 40 MG tablet TAKE 1 TABLET BY MOUTH EVERY MORNING BEFORE BREAKFAST 90 tablet 1    potassium chloride (KLOR-CON M) 10 MEQ extended release tablet Take 1 tablet by mouth 4 times daily 360 tablet 3    ondansetron (ZOFRAN-ODT) 4 MG disintegrating tablet DISSOLVE 1 TABLET ON THE TONGUE EVERY 8 HOURS AS NEEDED FOR NAUSEA OR VOMITING 20 tablet 3    rosuvastatin (CRESTOR) 5 MG tablet TAKE 1 TABLET DAILY 90 tablet 3    nitroGLYCERIN (NITRODUR) 0.2 MG/HR PLACE 1 PATCH ON THE SKIN DAILY 90 patch 3    metoprolol succinate (TOPROL XL) 25 MG extended release tablet TAKE ONE-HALF (1/2) TABLET TWICE A DAY 90 tablet 2    MULTAQ 400 MG TABS TAKE 1 TABLET TWICE A DAY WITH MEALS 180 tablet 3    fluticasone (FLONASE) 50 MCG/ACT nasal spray 1 spray by Each Nare route daily      nystatin (MYCOSTATIN) 605239 UNIT/GM cream Apply topically 2 times daily until improved. 15 g 1    vitamin B-12 (CYANOCOBALAMIN) 500 MCG tablet Take 500 mcg by mouth daily      Compression Stockings MISC by Does not apply route 1 pair 20-30 mmHg compression stockings, wide band with grippers. 1 each 2    Polysaccharide Iron Complex (PROFE PO) Take 185 mg by mouth      polyethylene glycol (MIRALAX) POWD powder Take 17 g by mouth daily      Coenzyme Q10 (CO Q 10) 100 MG CAPS Take 200 mg by mouth daily      fentaNYL (DURAGESIC) 50 MCG/HR Place 1 patch onto the skin every 48 hours 15 patch 0         Allergies: Allergies   Allergen Reactions    Avelox [Moxifloxacin Hcl In Nacl] Anaphylaxis    Epinephrine Base     Other      [place] per PT - swelling size of silver dollar at site per PT     Keflex [Cephalexin] Nausea And Vomiting    Polysporin [Bacitracin-Polymyxin B] Rash          Social History:   reports that he quit smoking about 17 years ago. He has a 30.00 pack-year smoking history. He has never used smokeless tobacco. He reports that he does not drink alcohol and does not use drugs.        Family History:            Problem Relation Age of Onset    Cancer Sister     Coronary Art Dis Brother Physical Exam:  BP (!) 131/50   Pulse 64   Temp 98.8 °F (37.1 °C) (Oral)   Resp 14   Ht 5' 10\" (1.778 m)   Wt 165 lb (74.8 kg)   SpO2 99%   BMI 23.68 kg/m²     General appearance:  Appears comfortable. Eyes:  Sclera clear, pupils equal  ENT:  Moist mucus membranes, Trachea midline. Cardiovascular: Regular rhythm, normal S1, S2. LLE edema +    Respiratory:  Noted Clear to auscultation bilaterally,  No wheeze, good inspiratory effort   Gastrointestinal:  Abdomen soft,  non-tender,  not distended,  normal bowel sounds   Musculoskeletal:  No cyanosis in digits, neck supple  Neurology:  Cranial nerves grossly intact. Alert and oriented in time, place and person. No speech or motor deficits   Psychiatry:  Appropriate affect. Not agitated  Skin: left foot 2nd toe wound +       Labs:     Recent Labs     08/24/21  1650   WBC 6.5   HGB 11.2*   HCT 33.1*        Recent Labs     08/24/21  1650   *   K 4.1   CL 98*   CO2 27   BUN 21*   CREATININE 1.2   CALCIUM 8.8     Recent Labs     08/24/21  1650   AST 27   ALT 13   BILITOT 0.5   ALKPHOS 87     Recent Labs     08/24/21  1650   INR 3.99*     Recent Labs     08/24/21  1650   TROPONINI <0.01         Urinalysis:      Lab Results   Component Value Date    NITRU Negative 08/24/2021    WBCUA 7 08/24/2021    RBCUA 1 08/24/2021    BLOODU Negative 08/24/2021    SPECGRAV 1.009 08/24/2021    GLUCOSEU Negative 08/24/2021         Radiology:         EKG/Telemonitor :    I have reviewed the EKG  Paced rhytm   LBBB     IMAGING :     XR CHEST PORTABLE   Final Result      XR FOOT LEFT (MIN 3 VIEWS)   Final Result   Osteomyelitis of the 2nd toe with associated displaced pathologic fracture,   as discussed               PROBLEM LIST      There are no active hospital problems to display for this patient. PLAN/ORDERS FOR ACUTE MEDICAL ISSUES FOR THIS ADMISSION/HOSPITALIZATION       · Left foot 2nd toe OM , POA . Started [employer] Abx , as per orders . F/u Cx . Planned ID and podiatry c/s in AM   ·   · Essential Hypertension  . Chronic condition. For now, resumed home medications of  BB   · CAD . Stable   · Is s/p PPM   · PAF . Ramonita Dye Chronic condition. For now, resumed home medications of  Coumadin PO and MultaQ   · LLE edema . Chronic condition. For now, resumed home medications of  Lasix PO    · LBBB Hx   · Mixed Hyperlipidemia . Chronic condition. For now, resumed home medications of  Statins   · GERD . Chronic condition. For now, resumed home medications of  PPI   · Allergic rhinitis . Chronic condition. For now, resumed home medications of  Flonase   ·     ·  Home medications for chronic medical problems  reviewed and Held / Resumed / Pertinent changes made [as mentioned above] in home medications, as clinically warranted/Indicated . See EPIC orders for details         · DVT Prophylaxis . Is on coumadin PO   ; + SCDs   · Nutrition/Diet. No diet orders on file  · Code Status . Prior  · PT/OT and ambulatory Eval Status. Ambulate with Assist    · Probable LOS & future Disposition planned post discharge . Home in 2-3 days       Please see EPIC orders for detailed orders/recommendations of plan and medications. CONSULTS ORDERED @ ADMISSION  IP CONSULT TO INFECTIOUS DISEASES  IP CONSULT TO PHARMACY  IP CONSULT TO PHARMACY  IP CONSULT TO PODIATRY      Medical Decision Making : HIGH     Total patient Care [ Direct and Indirect ] time spent in evaluating the patient an discussing plan with appropriate staff/patient/family members is 54 min       Feliciano Swift MD    Hospitalist, [facility][note 3].    8/25/2021 1:23 AM

## 2021-08-25 NOTE — CONSULTS
Podiatric surgery consult note        CHIEF COMPLAINT:  \"  Chief Complaint   Patient presents with    Cellulitis     chf pt that presents with edema in left leg. Pt has cellulitus for 8 weeks in both feet that has not cleared up. Pt has been treated with clindimycin without any improvement. \"    Reason for Admission:  O[address]omyelitis ([facility] [M86.9]    History Obtained From:  patient, family member - daughter    HISTORY OF PRESENT ILLNESS:      The patient is a 80 y.o. male with significant past medical history of Listed below who presents with foot infection left with left leg cellulitis. He admits to having cellulitis for a couple of months. He states the left leg is been worse. He states the second toe started getting bad a few days ago which brought him into the emergency department. Denies current nausea, vomiting, fever, chills, shortness of breath or chest pain.     Past Medical History:        Diagnosis Date    Allergic rhinitis     Atrial fibrillation (HCC)     Benign prostatic hypertrophy     CAD (coronary artery disease)     Cancer (HCC)     skin    CHF (congestive heart failure) ([facility] 08/17/2017    Coronary artery disease involving native coronary artery of native heart without angina pectoris 6/17/2011    DDD (degenerative disc disease), lumbar     Falls 08/17/2017    GI bleeding     Hyperlipidemia     Hypertension     MI (myocardial infarction) ([facility]     MRSA (methicillin resistant staph aureus) culture positive     h/o infection in the blood    Pneumonia     S/P PTCA (percutaneous transluminal coronary angioplasty) stents x 8    SSS (sick sinus syndrome) ([facility]     Unspecified sleep apnea     Venous (peripheral) insufficiency 12/4/2018     Past Surgical History:        Procedure Laterality Date    APPENDECTOMY      CARDIAC PACEMAKER PLACEMENT      CARPAL TUNNEL RELEASE      CORONARY ANGIOPLASTY      CORONARY ANGIOPLASTY WITH STENT PLACEMENT      DIAGNOSTIC CARDIAC CATH LAB PROCEDURE      EYE SURGERY      PACEMAKER PLACEMENT           Current Facility-Administered Medications:     furosemide (LASIX) tablet 40 mg, 40 mg, Oral, BID, Janessa Anders MD, 40 mg at 08/25/21 0907    metoprolol succinate (TOPROL XL) extended release tablet 12.5 mg, 12.5 mg, Oral, Daily, Janessa Anders MD, 12.5 mg at 08/25/21 0909    dronedarone hcl (MULTAQ) tablet 400 mg, 400 mg, Oral, BID WC, Janessa Anders MD, 400 mg at 08/25/21 1146    pantoprazole (PROTONIX) tablet 40 mg, 40 mg, Oral, QAM AC, Janessa Anders MD, 40 mg at 08/25/21 0908    rosuvastatin (CRESTOR) tablet 5 mg, 5 mg, Oral, Nightly, Janessa Anders MD    sodium chloride flush 0.9 % injection 5-40 mL, 5-40 mL, IntraVENous, 2 times per day, Eden Hewitt MD, 10 mL at 08/25/21 0921    sodium chloride flush 0.9 % injection 5-40 mL, 5-40 mL, IntraVENous, PRN, Janessa Anders MD    0.9 % sodium chloride infusion, 25 mL, IntraVENous, PRN, Eden Hewitt MD, Last Rate: 100 mL/hr at 08/25/21 0906, 25 mL at 08/25/21 0906    polyethylene glycol (GLYCOLAX) packet 17 g, 17 g, Oral, Daily PRN, Eden Hewitt MD    acetaminophen (TYLENOL) tablet 650 mg, 650 mg, Oral, Q6H PRN **OR** acetaminophen (TYLENOL) suppository 650 mg, 650 mg, Rectal, Q6H PRN, Eden Hewitt MD    potassium chloride (KLOR-CON M) extended release tablet 40 mEq, 40 mEq, Oral, PRN **OR** potassium bicarb-citric acid (EFFER-K) effervescent tablet 40 mEq, 40 mEq, Oral, PRN **OR** potassium chloride 10 mEq/100 mL IVPB (Peripheral Line), 10 mEq, IntraVENous, PRN, Eden Hewitt MD    magnesium sulfate 2000 mg in 50 mL IVPB premix, 2,000 mg, IntraVENous, PRN, Eden Hewitt MD    cefepime (MAXIPIME) 2000 mg IVPB minibag, 2,000 mg, IntraVENous, Q12H, Eden Hewitt MD, Stopped at 08/25/21 7146    warfarin (COUMADIN) daily dosing (placeholder), , Other, RX Placeholder, Eden Hewitt MD    vancomycin 1000 mg IVPB in 250 mL D5W addavial, 1,000 mg, IntraVENous, Q24H, Janessa Anders MD    fluticasone (FLONASE) 50 MCG/ACT nasal spray 2 spray, 2 spray, Each Nostril, Daily PRN, Bubba Allison MD    Current Outpatient Medications:     Polysaccharide Iron Complex (PROFE) 391.3 (180 Fe) MG CAPS, Take 1 capsule by mouth 2 times daily, Disp: , Rfl:     furosemide (LASIX) 40 MG tablet, TAKE 1 TABLET BY MOUTH TWICE DAILY, Disp: 180 tablet, Rfl: 3    metOLazone (ZAROXOLYN) 5 MG tablet, Take 1 tablet by mouth Twice a Week, Disp: 90 tablet, Rfl: 1    pantoprazole (PROTONIX) 40 MG tablet, TAKE 1 TABLET BY MOUTH EVERY MORNING BEFORE BREAKFAST, Disp: 90 tablet, Rfl: 1    potassium chloride (KLOR-CON M) 10 MEQ extended release tablet, Take 1 tablet by mouth 4 times daily (Patient taking differently: Take 10 mEq by mouth 2 times daily 10 meq in AM an 20 meq at HS), Disp: 360 tablet, Rfl: 3    ondansetron (ZOFRAN-ODT) 4 MG disintegrating tablet, DISSOLVE 1 TABLET ON THE TONGUE EVERY 8 HOURS AS NEEDED FOR NAUSEA OR VOMITING, Disp: 20 tablet, Rfl: 3    rosuvastatin (CRESTOR) 5 MG tablet, TAKE 1 TABLET DAILY, Disp: 90 tablet, Rfl: 3    nitroGLYCERIN (NITRODUR) 0.2 MG/HR, PLACE 1 PATCH ON THE SKIN DAILY, Disp: 90 patch, Rfl: 3    metoprolol succinate (TOPROL XL) 25 MG extended release tablet, TAKE ONE-HALF (1/2) TABLET TWICE A DAY, Disp: 90 tablet, Rfl: 2    MULTAQ 400 MG TABS, TAKE 1 TABLET TWICE A DAY WITH MEALS, Disp: 180 tablet, Rfl: 3    fentaNYL (DURAGESIC) 50 MCG/HR, Place 1 patch onto the skin every 48 hours, Disp: 15 patch, Rfl: 0    warfarin (COUMADIN) 5 MG tablet, TAKE 1 TABLET BY MOUTH DAILY AT 6 PM, Disp: 90 tablet, Rfl: 2    vitamin B-12 (CYANOCOBALAMIN) 500 MCG tablet, Take 500 mcg by mouth daily, Disp: , Rfl:     Allergies:   Avelox [moxifloxacin hcl in nacl], Epinephrine base, Other, Keflex [cephalexin], and Polysporin [bacitracin-polymyxin b]    Social History     Socioeconomic History    Marital status:      Spouse name: right leg  Musculoskeletal: Pain with palpation and examination of the left second toe.  5/5 muscle strength in/eversion and dorsi/plantarflexion bilateral feet. No gross instability noted. ASSESSMENT AND PLAN:  -Infected left second toe with dactylitis and osteomyelitis in the setting of peripheral vascular disease    Plan for amputation of the left second toe  N.p.o. after midnight Thursday  Dry sterile dressing daily  Keep leg elevated at all times when not active  Patient is amendable to amputation of the left second toe.  - Discussed with pt that foot infections are a staged process of surgical procedures that allows the tissues to demarcate and then assess the extent of healthy tissues vs. Devitalized tissues. Explained that the 2nd metatarsal and digit are suggestive of infectious involvement via X-ray and MRI and that its not certain until surgical intervention commences to truly assess the extent of the infectious process. Explained to the patient that it is the goal of His primary service is to save and salvage as much of His foot as possible while still providing a functional/stable pedal structure post surgical.  - All potential risks, benefits, and complications were discussed with the patient prior to the scheduling of surgery. No guarantees or promises where made to what the outcomes will be. The patient wished to proceed with surgery, and informed written consent was obtained. DISPO: Await MRI to evaluate for abscess and level of infection and evaluate for salvage level. OR scheduled for Friday afternoon for I&D with amputation of the left second toe    Thanks for the opportunity to participate in this patient's care.      Ean Olsen DPM

## 2021-08-25 NOTE — PROGRESS NOTES
Occupational Therapy   Occupational Therapy Initial Assessment  Date: 2021   Patient Name: Leonor Joshi  MRN: [id number]     : 1940    Date of Service: 2021    Discharge Recommendations: Leonor Joshi scored a 19/24 on the AM-PAC ADL Inpatient form. Current research shows that an AM-PAC score of 18 or greater is typically associated with a discharge to the patient's home setting. Based on the patient's AM-PAC score, and their current ADL deficits, it is recommended that the patient have 2-3 sessions per week of Occupational Therapy at d/c to increase the patient's independence. At this time, this patient demonstrates the endurance and safety to discharge home with home services (home vs OP services) and a follow up treatment frequency of 2-3x/wk. Please see assessment section for further patient specific details. If patient discharges prior to next session this note will serve as a discharge summary. Please see below for the latest assessment towards goals. HOME HEALTH CARE: LEVEL 2 SOCIAL     - Initial home health evaluation to occur within 24-48 hours, in patient home   - Therapy to evaluate with goal of regaining prior level of functioning   - Therapy to evaluate if patient has [address] needs for personal care   -  evaluation within 24-48 hours, includes evaluation of resources and insurance to determine AL/IL/LTC/Medicaid options   - [place] on Aging Referral   - Dustin Nobles to discuss home support and care needs post discharge within two weeks of discharge. OT Equipment Recommendations  Equipment Needed: Yes    Assessment   Performance deficits / Impairments: Decreased functional mobility ; Decreased endurance;Decreased ADL status; Decreased balance  Assessment: Patient presents below baseline d/t above deficits; OT indicated to maximize safety/independence with ADL and IADL  Treatment Diagnosis: Above deficits associated with osteomyelitis  Prognosis: Good  Decision Making: Medium Complexity  Exam: as above  OT Education: OT Role;Plan of Care  Patient Education: eval, discharge--pt v/u  REQUIRES OT FOLLOW UP: Yes  Activity Tolerance  Activity Tolerance: Patient Tolerated treatment well  Safety Devices  Safety Devices in place: Yes  Type of devices: All fall risk precautions in place; Left in chair;Nurse notified;Gait belt;Call light within reach           Patient Diagnosis(es): The primary encounter diagnosis was Osteomyelitis of second toe of left foot ([facility] [address] Diagnoses of Bilateral cellulitis of lower leg, Chronic congestive heart failure, unspecified heart failure type ([address] and Open fracture of phalanx of left second toe, initial encounter were also pertinent to this visit. has a past medical history of Allergic rhinitis, Atrial fibrillation ([facility] [address] Benign prostatic hypertrophy, CAD (coronary artery disease), Cancer ([facility] [address] CHF (congestive heart failure) ([facility] [address] Coronary artery disease involving native coronary artery of native heart without angina pectoris, DDD (degenerative disc disease), lumbar, Falls, GI bleeding, Hyperlipidemia, Hypertension, MI (myocardial infarction) ([facility] [address] MRSA (methicillin resistant staph aureus) culture positive, Pneumonia, S/P PTCA (percutaneous transluminal coronary angioplasty), SSS (sick sinus syndrome) ([facility] [address] Unspecified sleep apnea, and Venous (peripheral) insufficiency. has a past surgical history that includes Carpal tunnel release; Coronary angioplasty with stent; Diagnostic Cardiac Cath Lab Procedure; Coronary angioplasty; Cardiac pacemaker placement; Appendectomy; pacemaker placement; and eye surgery.     Treatment Diagnosis: Above deficits associated with osteomyelitis      Restrictions  Restrictions/Precautions  Restrictions/Precautions: Fall Risk (moderate fall risk)  Required Braces or Orthoses?: No  Position Activity Restriction  Other position/activity restrictions: Has been having issues w/ Cellulitis LLE X few weeks now . Has been on PO Clindamycin as OP and has had 2-3 courses of same w/ his PCP  For the cellulitis issue. Since 1-2 days , he has been having an Open wound on the left foot 2nd toe and also having inc redness and erythema and edema in LLE . LLE is always edematous @ Baseline as well per patient .  Hence he came to ED for further evaluation    Subjective   General  Chart Reviewed: Yes  Patient assessed for rehabilitation services?: Yes  Diagnosis: Osteomyelitis  Subjective  Subjective: Patient seated in chair upon arrival, reports pain in back, RN aware and to provide pain medication  Patient Currently in Pain: Yes  Pain Assessment  Pain Assessment: 0-10  Pain Level: 4  Pain Location: Back  Pre Treatment Pain Screening  Intervention List: Patient able to continue with treatment;Nurse/Physician notified  Vital Signs  Patient Currently in Pain: Yes  Social/Functional History  Social/Functional History  Lives With: Family (pt takes care of wife, son lives with them as well and daughter is close by)  Type of Home: House  Home Layout: One level  Home Access:  (one small step with a handicap ramp)  Bathroom Shower/Tub: Tub/Shower unit  Bathroom Toilet: Handicap height (raised with handrails)  Bathroom Equipment: Grab bars in shower, Shower chair, Grab bars around toilet  Home Equipment: Rolling walker, Cane, Wheelchair-electric (transport wheelchair)  ADL Assistance: Independent  Homemaking Assistance: Independent  Homemaking Responsibilities: Yes  Ambulation Assistance: Independent (uses cane)  Transfer Assistance: Independent  Active : Yes  Occupation: Retired  Additional Comments: fall at the beginning of june and saw primary care doctor for the fall       Objective   Vision: Impaired  Vision Exceptions: Wears glasses for reading  Hearing: Within functional limits    Orientation  Overall Orientation Status: Within Functional Limits  Observation/Palpation  Posture: Poor (hyperkyphosis of thoracic spine)  Balance  Sitting Balance: Supervision  Standing Balance: Stand by assistance  Standing Balance  Time: Stands over 5 min in room for back relief, talking with therapists/dtr/PA  Functional Mobility  Functional - Mobility Device: Cane  Activity: Other  Assist Level: Stand by assistance  ADL  Additional Comments: Declines ADL  Tone RUE  RUE Tone: Normotonic  Tone LUE  LUE Tone: Normotonic  Coordination  Movements Are Fluid And Coordinated: Yes        Transfers  Sit to stand: Stand by assistance  Stand to sit: Stand by assistance     Cognition  Overall Cognitive Status: WFL        Sensation  Overall Sensation Status: Impaired (numbeness in LUE, LLE)        LUE AROM (degrees)  LUE AROM : WFL  RUE AROM (degrees)  RUE AROM : WFL                      Plan   Plan  Times per week: 3-5  Times per day: Daily  Current Treatment Recommendations: Strengthening, Patient/Caregiver Education & Training, Equipment Evaluation, Education, & procurement, Balance Training, Functional Mobility Training, Endurance Training, Self-Care / ADL, Safety Education & Training    G-Code     OutComes Score                                                  AM-PAC Score        AM-[facility] Inpatient Daily Activity Raw Score: 19 (08/25/21 1649)  AM-PAC Inpatient ADL T-Scale Score : 40.22 (08/25/21 1649)  ADL Inpatient CMS 0-100% Score: 42.8 (08/25/21 1649)  ADL Inpatient CMS G-Code Modifier : CK (08/25/21 1649)    Goals  Short term goals  Time Frame for Short term goals: discharge  Short term goal 1: UB ADL mod I  Short term goal 2: LB ADL mod I  Short term goal 3: Fxl transfers mod I  Short term goal 4: Fxl mob mod I  Short term goal 5:  Toileting mod I  Long term goals  Time Frame for Long term goals : LTG=STG       Therapy Time   Individual Concurrent Group Co-treatment   Time In 1428         Time Out 1508         Minutes 40            Timed Code Treatment Minutes:   25 minutes    Total Treatment Minutes:  40 minutes    Lyndon Ibanez, [facility] OTR/L [id number]    Lyndon Ibanez, OT

## 2021-08-25 NOTE — PROGRESS NOTES
Clinical Pharmacy Note:    Pharmacy consulted to dose Vancomycin for osteomyelitis per Dr. Antonio Yanez. Age: 80  Height: 5'10\"  Weight: 74.8 kg  Scr:1.2 mg/dL  Goal Trough: 10-20 mcgml      Started Vancomycin 1000 IV q24. Random level ordered for tomorrow morning with AM labs. Thanks for the consult!   Cassy Dolan, PharmD, [facility]

## 2021-08-25 NOTE — CARE COORDINATION
PT/OT is going to recommend [facility]. SW provided pt and dtr [facility] list.  Informed to choose an agency. Will need to follow up with patient and family with their choice.     Electronically signed by LUZ Hunt, WERO on 8/25/2021 at 3:29 PM

## 2021-08-26 ENCOUNTER — APPOINTMENT (OUTPATIENT)
Dept: MRI IMAGING | Age: 81
DRG: 504 | End: 2021-08-26
Payer: MEDICARE

## 2021-08-26 ENCOUNTER — ANESTHESIA EVENT (OUTPATIENT)
Dept: OPERATING ROOM | Age: 81
DRG: 504 | End: 2021-08-26
Payer: MEDICARE

## 2021-08-26 ENCOUNTER — TELEPHONE (OUTPATIENT)
Dept: CARDIOLOGY CLINIC | Age: 81
End: 2021-08-26

## 2021-08-26 ENCOUNTER — NURSE ONLY (OUTPATIENT)
Dept: CARDIOLOGY CLINIC | Age: 81
End: 2021-08-26
Payer: MEDICARE

## 2021-08-26 DIAGNOSIS — R00.1 BRADYCARDIA: ICD-10-CM

## 2021-08-26 DIAGNOSIS — I48.0 PAROXYSMAL ATRIAL FIBRILLATION (HCC): ICD-10-CM

## 2021-08-26 DIAGNOSIS — I49.5 SICK SINUS SYNDROME (HCC): ICD-10-CM

## 2021-08-26 DIAGNOSIS — Z95.0 CARDIAC PACEMAKER IN SITU: ICD-10-CM

## 2021-08-26 LAB
ANION GAP SERPL CALCULATED.3IONS-SCNC: 12 MMOL/L (ref 3–16)
BUN BLDV-MCNC: 22 MG/DL (ref 7–20)
CALCIUM SERPL-MCNC: 8.5 MG/DL (ref 8.3–10.6)
CHLORIDE BLD-SCNC: 100 MMOL/L (ref 99–110)
CO2: 27 MMOL/L (ref 21–32)
CREAT SERPL-MCNC: 1.4 MG/DL (ref 0.8–1.3)
ESTIMATED AVERAGE GLUCOSE: 125.5 MG/DL
GFR AFRICAN AMERICAN: 59
GFR NON-AFRICAN AMERICAN: 49
GLUCOSE BLD-MCNC: 93 MG/DL (ref 70–99)
HBA1C MFR BLD: 6 %
INR BLD: 2.43 (ref 0.88–1.12)
POTASSIUM REFLEX MAGNESIUM: 3.8 MMOL/L (ref 3.5–5.1)
PROTHROMBIN TIME: 28.5 SEC (ref 9.9–12.7)
SODIUM BLD-SCNC: 139 MMOL/L (ref 136–145)
VANCOMYCIN RANDOM: 22.3 UG/ML

## 2021-08-26 PROCEDURE — 6360000002 HC RX W HCPCS: Performed by: PHYSICIAN ASSISTANT

## 2021-08-26 PROCEDURE — 99233 SBSQ HOSP IP/OBS HIGH 50: CPT | Performed by: INTERNAL MEDICINE

## 2021-08-26 PROCEDURE — 6370000000 HC RX 637 (ALT 250 FOR IP): Performed by: PHYSICIAN ASSISTANT

## 2021-08-26 PROCEDURE — 99223 1ST HOSP IP/OBS HIGH 75: CPT | Performed by: INTERNAL MEDICINE

## 2021-08-26 PROCEDURE — 93288 INTERROG EVL PM/LDLS PM IP: CPT | Performed by: INTERNAL MEDICINE

## 2021-08-26 PROCEDURE — 85610 PROTHROMBIN TIME: CPT

## 2021-08-26 PROCEDURE — 6360000002 HC RX W HCPCS: Performed by: HOSPITALIST

## 2021-08-26 PROCEDURE — 1200000000 HC SEMI PRIVATE

## 2021-08-26 PROCEDURE — 2580000003 HC RX 258: Performed by: HOSPITALIST

## 2021-08-26 PROCEDURE — 36415 COLL VENOUS BLD VENIPUNCTURE: CPT

## 2021-08-26 PROCEDURE — 80202 ASSAY OF VANCOMYCIN: CPT

## 2021-08-26 PROCEDURE — 6370000000 HC RX 637 (ALT 250 FOR IP): Performed by: HOSPITALIST

## 2021-08-26 PROCEDURE — 80048 BASIC METABOLIC PNL TOTAL CA: CPT

## 2021-08-26 PROCEDURE — 73718 MRI LOWER EXTREMITY W/O DYE: CPT

## 2021-08-26 PROCEDURE — 93971 EXTREMITY STUDY: CPT

## 2021-08-26 RX ORDER — MORPHINE SULFATE 4 MG/ML
4 INJECTION, SOLUTION INTRAMUSCULAR; INTRAVENOUS EVERY 4 HOURS PRN
Status: DISCONTINUED | OUTPATIENT
Start: 2021-08-26 | End: 2021-08-26

## 2021-08-26 RX ORDER — MORPHINE SULFATE 2 MG/ML
2 INJECTION, SOLUTION INTRAMUSCULAR; INTRAVENOUS EVERY 4 HOURS PRN
Status: DISCONTINUED | OUTPATIENT
Start: 2021-08-26 | End: 2021-08-28 | Stop reason: HOSPADM

## 2021-08-26 RX ORDER — OXYCODONE HYDROCHLORIDE 5 MG/1
5 TABLET ORAL EVERY 4 HOURS PRN
Status: DISCONTINUED | OUTPATIENT
Start: 2021-08-26 | End: 2021-08-28 | Stop reason: HOSPADM

## 2021-08-26 RX ADMIN — VANCOMYCIN HYDROCHLORIDE 1000 MG: 1 INJECTION, POWDER, LYOPHILIZED, FOR SOLUTION INTRAVENOUS at 22:44

## 2021-08-26 RX ADMIN — Medication 10 ML: at 21:20

## 2021-08-26 RX ADMIN — DRONEDARONE 400 MG: 400 TABLET, FILM COATED ORAL at 17:47

## 2021-08-26 RX ADMIN — PANTOPRAZOLE SODIUM 40 MG: 40 TABLET, DELAYED RELEASE ORAL at 05:18

## 2021-08-26 RX ADMIN — MORPHINE SULFATE 2 MG: 2 INJECTION, SOLUTION INTRAMUSCULAR; INTRAVENOUS at 13:33

## 2021-08-26 RX ADMIN — OXYCODONE 5 MG: 5 TABLET ORAL at 21:19

## 2021-08-26 RX ADMIN — ROSUVASTATIN 5 MG: 10 TABLET, FILM COATED ORAL at 21:19

## 2021-08-26 RX ADMIN — Medication 10 ML: at 09:52

## 2021-08-26 RX ADMIN — FUROSEMIDE 40 MG: 40 TABLET ORAL at 09:42

## 2021-08-26 RX ADMIN — CEFEPIME HYDROCHLORIDE 2000 MG: 2 INJECTION, POWDER, FOR SOLUTION INTRAVENOUS at 09:45

## 2021-08-26 RX ADMIN — VANCOMYCIN HYDROCHLORIDE 1000 MG: 1 INJECTION, POWDER, LYOPHILIZED, FOR SOLUTION INTRAVENOUS at 00:50

## 2021-08-26 RX ADMIN — FUROSEMIDE 40 MG: 40 TABLET ORAL at 17:47

## 2021-08-26 RX ADMIN — CEFEPIME HYDROCHLORIDE 2000 MG: 2 INJECTION, POWDER, FOR SOLUTION INTRAVENOUS at 21:20

## 2021-08-26 RX ADMIN — METOPROLOL SUCCINATE 12.5 MG: 25 TABLET, EXTENDED RELEASE ORAL at 09:42

## 2021-08-26 RX ADMIN — DRONEDARONE 400 MG: 400 TABLET, FILM COATED ORAL at 09:43

## 2021-08-26 ASSESSMENT — ENCOUNTER SYMPTOMS
SHORTNESS OF BREATH: 0
TROUBLE SWALLOWING: 0
WHEEZING: 0
DIARRHEA: 0
COUGH: 0
EYE REDNESS: 0
SORE THROAT: 0
CONSTIPATION: 0
ABDOMINAL PAIN: 0
EYE DISCHARGE: 0
BACK PAIN: 0
RHINORRHEA: 0
NAUSEA: 0

## 2021-08-26 ASSESSMENT — PAIN DESCRIPTION - ORIENTATION
ORIENTATION: LOWER

## 2021-08-26 ASSESSMENT — PAIN SCALES - GENERAL
PAINLEVEL_OUTOF10: 6
PAINLEVEL_OUTOF10: 6
PAINLEVEL_OUTOF10: 3
PAINLEVEL_OUTOF10: 2
PAINLEVEL_OUTOF10: 2
PAINLEVEL_OUTOF10: 4
PAINLEVEL_OUTOF10: 4
PAINLEVEL_OUTOF10: 7
PAINLEVEL_OUTOF10: 2
PAINLEVEL_OUTOF10: 7

## 2021-08-26 ASSESSMENT — PAIN - FUNCTIONAL ASSESSMENT
PAIN_FUNCTIONAL_ASSESSMENT: PREVENTS OR INTERFERES SOME ACTIVE ACTIVITIES AND ADLS

## 2021-08-26 ASSESSMENT — PAIN DESCRIPTION - LOCATION
LOCATION: BACK

## 2021-08-26 ASSESSMENT — PAIN DESCRIPTION - PAIN TYPE
TYPE: CHRONIC PAIN

## 2021-08-26 ASSESSMENT — PAIN DESCRIPTION - FREQUENCY
FREQUENCY: CONTINUOUS

## 2021-08-26 ASSESSMENT — PAIN DESCRIPTION - ONSET
ONSET: ON-GOING

## 2021-08-26 ASSESSMENT — PAIN DESCRIPTION - PROGRESSION
CLINICAL_PROGRESSION: GRADUALLY WORSENING

## 2021-08-26 ASSESSMENT — PAIN DESCRIPTION - DESCRIPTORS
DESCRIPTORS: DULL;DISCOMFORT
DESCRIPTORS: DULL;DISCOMFORT
DESCRIPTORS: DULL

## 2021-08-26 NOTE — PROGRESS NOTES
[facility]    8/26/2021 1:50 PM        Name: Jostin Ulrich . Admitted: 8/24/2021  Primary Care Provider: David Farnco MD (Tel: [phone])      Subjective:  . Patient admitted with osteo of L 2nd toe. Has chronic back pain which is worse today-toradol, norco lidoderm patch did not help. Has neuropathy so he denies pain in foot.      Reviewed interval ancillary notes    Current Medications  morphine (PF) injection 2 mg, Q4H PRN  oxyCODONE (ROXICODONE) immediate release tablet 5 mg, Q4H PRN  furosemide (LASIX) tablet 40 mg, BID  metoprolol succinate (TOPROL XL) extended release tablet 12.5 mg, Daily  dronedarone hcl (MULTAQ) tablet 400 mg, BID WC  pantoprazole (PROTONIX) tablet 40 mg, QAM AC  rosuvastatin (CRESTOR) tablet 5 mg, Nightly  sodium chloride flush 0.9 % injection 5-40 mL, 2 times per day  sodium chloride flush 0.9 % injection 5-40 mL, PRN  0.9 % sodium chloride infusion, PRN  polyethylene glycol (GLYCOLAX) packet 17 g, Daily PRN  acetaminophen (TYLENOL) tablet 650 mg, Q6H PRN   Or  acetaminophen (TYLENOL) suppository 650 mg, Q6H PRN  potassium chloride (KLOR-CON M) extended release tablet 40 mEq, PRN   Or  potassium bicarb-citric acid (EFFER-K) effervescent tablet 40 mEq, PRN   Or  potassium chloride 10 mEq/100 mL IVPB (Peripheral Line), PRN  magnesium sulfate 2000 mg in 50 mL IVPB premix, PRN  cefepime (MAXIPIME) 2000 mg IVPB minibag, Q12H  vancomycin 1000 mg IVPB in 250 mL D5W addavial, Q24H  fluticasone (FLONASE) 50 MCG/ACT nasal spray 2 spray, Daily PRN  lidocaine 4 % external patch 1 patch, Daily  fentaNYL (DURAGESIC) 50 MCG/HR 1 patch, Q48H        Objective:  /67   Pulse 111   Temp 98.5 °F (36.9 °C) (Oral)   Resp 16   Ht 5' 9\" (1.753 m)   Wt 162 lb 3.2 oz (73.6 kg)   SpO2 96%   BMI 23.95 kg/m²     Intake/Output Summary (Last 24 hours) at 8/26/2021 0960  Last data filed at 8/25/2021 1646  Gross per 24 hour   Intake 414.08 ml   Output    Net 414.08 ml      Wt Readings from Last 3 Encounters:   08/26/21 162 lb 3.2 oz (73.6 kg)   07/19/21 169 lb (76.7 kg)   07/07/21 163 lb 6.4 oz (74.1 kg)       General appearance:  Appears comfortable  Eyes: Sclera clear. Pupils equal.  ENT: Moist oral mucosa. Trachea midline, no adenopathy. Cardiovascular: Regular rhythm, normal S1, S2. No murmur. No edema in lower extremities  Respiratory: Not using accessory muscles. Good inspiratory effort. Clear to auscultation bilaterally, no wheeze or crackles. GI: Abdomen soft, no tenderness, not distended, normal bowel sounds  Musculoskeletal: No cyanosis in digits, neck supple  Neurology: CN 2-12 grossly intact. No speech or motor deficits  Psych: Normal affect.  Alert and oriented in time, place and person  Skin: Warm, dry, normal turgor    Labs and Tests:  CBC:   Recent Labs     08/24/21  1650 08/25/21  0613   WBC 6.5 6.7   HGB 11.2* 10.8*    274     BMP:    Recent Labs     08/24/21  1650 08/25/21  0613 08/26/21  0610   * 136 139   K 4.1 3.8 3.8   CL 98* 99 100   CO2 27 27 27   BUN 21* 18 22*   CREATININE 1.2 1.1 1.4*   GLUCOSE 99 92 93     Hepatic:   Recent Labs     08/24/21  1650 08/25/21  0613   AST 27 25   ALT 13 13   BILITOT 0.5 0.5   ALKPHOS 87 90     Xray L foot  Osteomyelitis of the 2nd toe with associated displaced pathologic fracture,   as discussed         Problem List  Active Problems:    Pure hyperglyceridemia    Hyperlipidemia    Benign prostatic hyperplasia    Paroxysmal atrial fibrillation (HCC)    Coronary artery disease involving native coronary artery of native heart without angina pectoris    Cardiac pacemaker in situ    Chronic congestive heart failure (HCC)    Essential hypertension    Sick sinus syndrome (HCC)    Venous (peripheral) insufficiency    PVD (peripheral vascular disease) (HCC)    Solar purpura (HCC)    Osteomyelitis of second toe of left foot [facility])  Resolved Problems:    * No resolved hospital problems. *       Assessment & Plan:   1. Osteomyelitis L 2nd toe-on IV abx. ID and podiatry on board. Cultures pending. Needs MRI. Planning for amputation of toe on Friday per Dr Bernardo Lezama. 2. L leg swelling-likely from infection. Venous duplex negative. 3. Chronic anticoagulation-on coumadin for afib. INR 3.59 yesterday. Down to 2.5 today. Repeat in am. Continue to hold coumadin. May need to give vit K/FFP  4. Chronic back pain-exacerbated by laying on gurney all night. Add low dose morphine, oxycodone. Myron fentanyl patch-has worn this for years. Diet: ADULT DIET;  Regular; Low Fat/Low Chol/High Fiber/2 gm Na  Diet NPO  Code:Full Code  DVT PPX: coumadin      Mckenzie Graham PA-C   8/26/2021 1:50 PM

## 2021-08-26 NOTE — ANESTHESIA PRE PROCEDURE
Department of Anesthesiology  Preprocedure Note       Name:  Shaheed Patiño   Age:  80 y.o.  :  1940                                          MRN:  [id number]         Date:  2021      Surgeon: Yinka Meza):  Ean Olsen DPM    Procedure: Procedure(s):  INCISION AND DRAINAGE LEFT FOOT  AMPUTATION OF LEFT SECOND TOE    Medications prior to admission:   Prior to Admission medications    Medication Sig Start Date End Date Taking?  Authorizing Provider   Polysaccharide Iron Complex (PROFE) 391.3 (180 Fe) MG CAPS Take 1 capsule by mouth 2 times daily   Yes Historical Provider, MD   coenzyme Q10 200 MG CAPS capsule Take 200 mg by mouth daily   Yes Historical Provider, MD   furosemide (LASIX) 40 MG tablet TAKE 1 TABLET BY MOUTH TWICE DAILY  Patient taking differently: 40 mg Cardiology increased lasix to TID d/t leg swelling 21  Yes Reina Sarabia MD   warfarin (COUMADIN) 5 MG tablet TAKE 1 TABLET BY MOUTH DAILY AT 6 PM  Patient taking differently: Taking 5mg 4 days per week  Taking 2.5mg 3 days per week 21  Yes SCOTT Kowalski CNP   pantoprazole (PROTONIX) 40 MG tablet TAKE 1 TABLET BY MOUTH EVERY MORNING BEFORE BREAKFAST 21  Yes Reina Sarabia MD   potassium chloride (KLOR-CON M) 10 MEQ extended release tablet Take 1 tablet by mouth 4 times daily  Patient taking differently: Take 20 mEq by mouth 2 times daily  21  Yes Lyndsay Prather MD   ondansetron (ZOFRAN-ODT) 4 MG disintegrating tablet DISSOLVE 1 TABLET ON THE TONGUE EVERY 8 HOURS AS NEEDED FOR NAUSEA OR VOMITING 21  Yes Reina Sarabia MD   rosuvastatin (CRESTOR) 5 MG tablet TAKE 1 TABLET DAILY 3/1/21  Yes Lyndsay Prather MD   metoprolol succinate (TOPROL XL) 25 MG extended release tablet TAKE ONE-HALF (1/2) TABLET TWICE A DAY 21  Yes SCOTT Vargas CNP   MULTAQ 400 MG TABS TAKE 1 TABLET TWICE A DAY WITH MEALS 21  Yes SCOTT Kowalski CNP   vitamin B-12 (CYANOCOBALAMIN) 500 MCG tablet Take 500 mcg by mouth daily   Yes Historical Provider, MD   fentaNYL (DURAGESIC) 50 MCG/HR Place 1 patch onto the skin every 48 hours 7/13/15  Yes Grayson Boone MD   metOLazone (ZAROXOLYN) 5 MG tablet Take 1 tablet by mouth Twice a Week  Patient taking differently: Take 5 mg by mouth Twice a Week Cardiology d/c'd at this time 7/26/21   Maynor Kay MD   nitroGLYCERIN (NITRODUR) 0.2 MG/HR PLACE 1 PATCH ON THE SKIN DAILY 2/25/21   Maynor Kay MD       Current medications:    Current Facility-Administered Medications   Medication Dose Route Frequency Provider Last Rate Last Admin    furosemide (LASIX) tablet 40 mg  40 mg Oral BID Carly Amaro MD   40 mg at 08/25/21 1757    metoprolol succinate (TOPROL XL) extended release tablet 12.5 mg  12.5 mg Oral Daily Janessa Anders MD   12.5 mg at 08/25/21 0909    dronedarone hcl (MULTAQ) tablet 400 mg  400 mg Oral BID WC Janessa Anders MD   400 mg at 08/25/21 1845    pantoprazole (PROTONIX) tablet 40 mg  40 mg Oral QAM AC Janessa Anders MD   40 mg at 08/26/21 0518    rosuvastatin (CRESTOR) tablet 5 mg  5 mg Oral Nightly Janessa Anders MD   5 mg at 08/25/21 2244    sodium chloride flush 0.9 % injection 5-40 mL  5-40 mL IntraVENous 2 times per day Carly Amaro MD   10 mL at 08/25/21 2253    sodium chloride flush 0.9 % injection 5-40 mL  5-40 mL IntraVENous PRN Janessa Anders MD        0.9 % sodium chloride infusion  25 mL IntraVENous PRN Janessa Anders  mL/hr at 08/25/21 2248 25 mL at 08/25/21 2248    polyethylene glycol (GLYCOLAX) packet 17 g  17 g Oral Daily PRN Carly Amaro MD        acetaminophen (TYLENOL) tablet 650 mg  650 mg Oral Q6H PRN Carly Amaro MD        Or    acetaminophen (TYLENOL) suppository 650 mg  650 mg Rectal Q6H PRN Janessa Anders MD        potassium chloride (KLOR-CON M) extended release tablet 40 mEq  40 mEq Oral PRN Carly Amaro MD        Or    potassium hypertension I10    Sick sinus syndrome ([facility]) I49.5    Bradycardia R00.1    Gastrointestinal hemorrhage K92.2    Pure hyperglyceridemia E78.1    Localized edema R60.0    Venous (peripheral) insufficiency I87.2    PVD (peripheral vascular disease) ([facility]) I73.9    Solar purpura ([facility]) D69.2    Osteomyelitis of second toe of left foot ([facility]) M86.9    Bilateral cellulitis of lower leg L03.116, L03.115    Open fracture of second toe of left foot S92.502B    History of MRSA infection Z86.14    Failure of outpatient treatment Z78.9       Past Medical History:        Diagnosis Date    Allergic rhinitis     Atrial fibrillation ([facility])     Benign prostatic hypertrophy     CAD (coronary artery disease)     Cancer ([facility])     skin    CHF (congestive heart failure) ([facility]) 2017    Coronary artery disease involving native coronary artery of native heart without angina pectoris 2011    DDD (degenerative disc disease), lumbar     Falls 2017    GI bleeding     Hyperlipidemia     Hypertension     MI (myocardial infarction) ([facility] [address]     MRSA (methicillin resistant staph aureus) culture positive     h/o infection in the blood    Osteomyelitis ([facility])     left foot    Pneumonia     S/P PTCA (percutaneous transluminal coronary angioplasty) stents x 8    SSS (sick sinus syndrome) ([facility])     Unspecified sleep apnea     Venous (peripheral) insufficiency 2018       Past Surgical History:        Procedure Laterality Date    APPENDECTOMY      CARDIAC PACEMAKER PLACEMENT      CARPAL TUNNEL RELEASE      CORONARY ANGIOPLASTY      CORONARY ANGIOPLASTY WITH STENT PLACEMENT      DIAGNOSTIC CARDIAC CATH LAB PROCEDURE      EYE SURGERY      PACEMAKER CHANGE  10/2016    PACEMAKER PLACEMENT         Social History:    Social History     Tobacco Use    Smoking status: Former Smoker     Packs/day: 1.00     Years: 30.00     Pack years: 30.00     Quit date: 2004     Years since quittin.3    Smokeless tobacco: Never Used   Substance Use Topics    Alcohol use: No     Alcohol/week: 0.0 standard drinks                                Counseling given: Not Answered      Vital Signs (Current):   Vitals:    08/25/21 1722 08/25/21 2230 08/26/21 0045 08/26/21 0400   BP: 128/67 138/69 116/65 (!) 150/72   Pulse: 67 67 68 63   Resp: 18 18 18 18   Temp: 36.6 °C (97.9 °F) 36.9 °C (98.5 °F) 36.6 °C (97.8 °F) 36.5 °C (97.7 °F)   TempSrc: Oral Oral Oral Oral   SpO2: 97% 96% 97% 97%   Weight: 162 lb 1.6 oz (73.5 kg)   162 lb 3.2 oz (73.6 kg)   Height: 5' 9\" (1.753 m)                                                 BP Readings from Last 3 Encounters:   08/26/21 (!) 150/72   07/19/21 124/76   07/07/21 128/62       NPO Status:                                                                                 BMI:   Wt Readings from Last 3 Encounters:   08/26/21 162 lb 3.2 oz (73.6 kg)   07/19/21 169 lb (76.7 kg)   07/07/21 163 lb 6.4 oz (74.1 kg)     Body mass index is 23.95 kg/m². CBC:   Lab Results   Component Value Date    WBC 6.7 08/25/2021    RBC 3.85 08/25/2021    HGB 10.8 08/25/2021    HCT 32.4 08/25/2021    MCV 84.1 08/25/2021    RDW 16.3 08/25/2021     08/25/2021       CMP:   Lab Results   Component Value Date     08/25/2021    K 3.8 08/25/2021    CL 99 08/25/2021    CO2 27 08/25/2021    BUN 18 08/25/2021    CREATININE 1.1 08/25/2021    GFRAA >60 08/25/2021    GFRAA >60 02/21/2013    AGRATIO 0.7 08/25/2021    LABGLOM >60 08/25/2021    GLUCOSE 92 08/25/2021    PROT 7.9 08/25/2021    PROT 7.3 02/21/2013    CALCIUM 8.8 08/25/2021    BILITOT 0.5 08/25/2021    ALKPHOS 90 08/25/2021    AST 25 08/25/2021    ALT 13 08/25/2021       POC Tests: No results for input(s): POCGLU, POCNA, POCK, POCCL, POCBUN, POCHEMO, POCHCT in the last 72 hours.     Coags:   Lab Results   Component Value Date    PROTIME 28.5 08/26/2021    INR 2.43 08/26/2021    APTT 28.5 03/17/2017       HCG (If Applicable): No results found for: PREGTESTUR, PREGSERUM, HCG, HCGQUANT     ABGs: No results found for: PHART, PO2ART, IEZ7UXB, LHA5EIA, BEART, C8CFJTLN     Type & Screen (If Applicable):  No results found for: LABABO, LABRH    Drug/Infectious Status (If Applicable):  No results found for: HIV, HEPCAB    COVID-19 Screening (If Applicable): No results found for: COVID19        Anesthesia Evaluation  Patient summary reviewed and Nursing notes reviewed no history of anesthetic complications:   Airway:         Dental:          Pulmonary:   (+) sleep apnea:                             Cardiovascular:    (+) hypertension:, pacemaker:, past MI:, CAD:, dysrhythmias: atrial fibrillation, CHF:,       ECG reviewed      Echocardiogram reviewed         Beta Blocker:  Dose within 24 Hrs      ROS comment: -Normal left ventricle size, wall thickness with low normal systolic   function with an estimated ejection fraction of 50%. -No diagnostic regional wall motion abnormalities noted.   -Abnormal (paradoxical) septal motion is present likely due to right   ventricular pacing.   -Grade II diastolic dysfunction with elevated LV filling   pressures. E/e\"=12.4.   -Trivial mitral regurgitation.   -The aortic valve is mildly thickened/calcified with normal gradients. -Moderate tricuspid regurgitation.   -Estimated pulmonary artery systolic pressure is mildly elevated at 40 mmHg   assuming a right atrial pressure of 8 mmHg. -The left atrium is mildly dilated. SSS- pacemaker     Neuro/Psych:               GI/Hepatic/Renal:   (+) GERD: well controlled,           Endo/Other:    (+) blood dyscrasia: anticoagulation therapy:., malignancy/cancer ( skin). Abdominal:             Vascular:   + PVD, aortic or cerebral, . Other Findings:             Anesthesia Plan      MAC     ASA 3       Induction: intravenous. Anesthetic plan and risks discussed with patient. Plan discussed with attending.                   SCOTT Potts - CRNA

## 2021-08-26 NOTE — PROGRESS NOTES
[facility]   Electrophysiology Progress Note     Date: 8/27/2021  Admit Date: 8/24/2021     Reason for consultation: Atrial Fibrillation, Pacemaker problem    Chief Complaint:   Chief Complaint   Patient presents with    Cellulitis     chf pt that presents with edema in left leg. Pt has cellulitus for 8 weeks in both feet that has not cleared up. Pt has been treated with clindimycin without any improvement. History of Present Illness: History obtained from patient and medical record. Pinky Simon is a 80 y.o. male with a past medical history of HTN, HLD, PAF, dCHF, CAD, SSS s/p dual chamber Medtronic PPM (10/16, Dr. Gerald Buerger). Hx of diverticular GIB on Xarelto. Pt presented to hospital with left leg swelling. He was found to have cellulitis and osteomyelitis. EP consulted due to atrial fibrillation and possible pacemaker problem on telemetry. Interval Hx: Today, he is being seen for follow up. He is doing pretty well, eager for his surgery this afternoon. He remains in sinus rhythm with stable BP. No recurrent AF. No cardiac complaints. His main complaint is his chronic back pain    Patient seen and examined. Clinical notes reviewed. Telemetry reviewed. No new complaints today. No major events overnight. Denies having chest pain, palpitations, shortness of breath, orthopnea/PND, cough, or dizziness at the time of this visit. Allergies: Allergies   Allergen Reactions    Avelox [Moxifloxacin Hcl In Nacl] Anaphylaxis    Epinephrine Base     Other      [place] per PT - swelling size of silver dollar at site per PT     Keflex [Cephalexin] Nausea And Vomiting    Polysporin [Bacitracin-Polymyxin B] Rash     Home Meds:  Prior to Visit Medications    Medication Sig Taking?  Authorizing Provider   Polysaccharide Iron Complex (PROFE) 391.3 (180 Fe) MG CAPS Take 1 capsule by mouth 2 times daily Yes Historical Provider, MD   coenzyme Q10 200 MG CAPS capsule Take 200 mg by mouth daily Yes Historical Provider, MD   furosemide (LASIX) 40 MG tablet TAKE 1 TABLET BY MOUTH TWICE DAILY  Patient taking differently: 40 mg Cardiology increased lasix to TID d/t leg swelling Yes Aurelio Lee MD   warfarin (COUMADIN) 5 MG tablet TAKE 1 TABLET BY MOUTH DAILY AT 6 PM  Patient taking differently: Taking 5mg 4 days per week  Taking 2.5mg 3 days per week Yes SCOTT Gurrola CNP   pantoprazole (PROTONIX) 40 MG tablet TAKE 1 TABLET BY MOUTH EVERY MORNING BEFORE BREAKFAST Yes Aurelio Lee MD   potassium chloride (KLOR-CON M) 10 MEQ extended release tablet Take 1 tablet by mouth 4 times daily  Patient taking differently: Take 20 mEq by mouth 2 times daily  Yes Rut Avina MD   ondansetron (ZOFRAN-ODT) 4 MG disintegrating tablet DISSOLVE 1 TABLET ON THE TONGUE EVERY 8 HOURS AS NEEDED FOR NAUSEA OR VOMITING Yes Aurelio Lee MD   rosuvastatin (CRESTOR) 5 MG tablet TAKE 1 TABLET DAILY Yes Rut Avina MD   metoprolol succinate (TOPROL XL) 25 MG extended release tablet TAKE ONE-HALF (1/2) TABLET TWICE A DAY Yes SCOTT Vizcaino CNP   MULTAQ 400 MG TABS TAKE 1 TABLET TWICE A DAY WITH MEALS Yes SCOTT Gurrola CNP   vitamin B-12 (CYANOCOBALAMIN) 500 MCG tablet Take 500 mcg by mouth daily Yes Historical Provider, MD   fentaNYL (DURAGESIC) 50 MCG/HR Place 1 patch onto the skin every 48 hours Yes Aurelio Lee MD   metOLazone (ZAROXOLYN) 5 MG tablet Take 1 tablet by mouth Twice a Week  Patient taking differently: Take 5 mg by mouth Twice a Week Cardiology d/c'd at this time  Rut Avina MD   nitroGLYCERIN (NITRODUR) 0.2 MG/HR PLACE 1 PATCH ON THE SKIN DAILY  Rut Avina MD      Scheduled Meds:   furosemide  40 mg Oral BID    metoprolol succinate  12.5 mg Oral Daily    dronedarone hcl  400 mg Oral BID WC    pantoprazole  40 mg Oral QAM AC    rosuvastatin  5 mg Oral Nightly    sodium chloride flush  5-40 mL IntraVENous 2 times per day    cefepime  2,000 mg IntraVENous Q12H    vancomycin  1,000 mg IntraVENous Q24H    lidocaine  1 patch Transdermal Daily    fentaNYL  1 patch Transdermal Q48H     Continuous Infusions:   sodium chloride 25 mL (08/25/21 7065)     PRN Meds:morphine, oxyCODONE, sodium chloride flush, sodium chloride, polyethylene glycol, acetaminophen **OR** acetaminophen, potassium chloride **OR** potassium alternative oral replacement **OR** potassium chloride, magnesium sulfate, fluticasone     Past Medical History:  Past Medical History:   Diagnosis Date    Allergic rhinitis     Atrial fibrillation (HCC)     Benign prostatic hypertrophy     CAD (coronary artery disease)     Cancer (HCC)     skin    CHF (congestive heart failure) ([facility] 08/17/2017    Coronary artery disease involving native coronary artery of native heart without angina pectoris 6/17/2011    DDD (degenerative disc disease), lumbar     Falls 08/17/2017    GI bleeding     Hyperlipidemia     Hypertension     MI (myocardial infarction) ([facility]     MRSA (methicillin resistant staph aureus) culture positive     h/o infection in the blood    Osteomyelitis (HCC)     left foot    Pneumonia     S/P PTCA (percutaneous transluminal coronary angioplasty) stents x 8    SSS (sick sinus syndrome) ([facility]     Unspecified sleep apnea     Venous (peripheral) insufficiency 12/4/2018      Past Surgical History:    has a past surgical history that includes Carpal tunnel release; Coronary angioplasty with stent; Diagnostic Cardiac Cath Lab Procedure; Coronary angioplasty; Cardiac pacemaker placement; Appendectomy; pacemaker placement; eye surgery; and Pacemaker Change (10/2016). Social History:  Reviewed. reports that he quit smoking about 17 years ago. He has a 30.00 pack-year smoking history. He has never used smokeless tobacco. He reports that he does not drink alcohol and does not use drugs. Family History:  Reviewed.  family history includes Cancer in his sister; Coronary Art Dis in his brother. Review of Systems:  · Constitutional: Negative for fever, night sweats, chills, weight changes, or weakness  · Skin: Negative for rash, dry skin, pruritus, bruising, bleeding, blood clots, or changes in skin pigment  · HEENT: Negative for vision changes, ringing in the ears, sore throat, dysphagia, or swollen lymph nodes  · Respiratory: Reviewed in HPI  · Cardiovascular: Reviewed in HPI  · Gastrointestinal: Negative for abdominal pain, N/V/D, constipation, or black/tarry stools  · Genito-Urinary: Negative for dysuria, incontinence, urgency, or hematuria  · Musculoskeletal: Positive for back pain. Negative for joint swelling, muscle pain, or injuries  · Neurological/Psych: Negative for confusion, seizures, headaches, balance issues or TIA-like symptoms. No anxiety, depression, or insomnia    Physical Examination:  Vitals:    08/27/21 0800   BP: (!) 127/57   Pulse: 66   Resp: 16   Temp: 98.3 °F (36.8 °C)   SpO2: 99%      In: 250 [P.O.:240; I.V.:10]  Out: -    Wt Readings from Last 3 Encounters:   08/27/21 166 lb 3.6 oz (75.4 kg)   07/19/21 169 lb (76.7 kg)   07/07/21 163 lb 6.4 oz (74.1 kg)       Intake/Output Summary (Last 24 hours) at 8/27/2021 1104  Last data filed at 8/26/2021 2120  Gross per 24 hour   Intake 250 ml   Output    Net 250 ml       Telemetry: Personally Reviewed  - Sinus rhythm/A-paced (No AF since 8/26)  · Constitutional: Cooperative and in no apparent distress, and appears stated age  · Skin: Warm and pink; no pallor, cyanosis, bruising, or clubbing. + Wound to left leg  · HEENT: Symmetric and normocephalic. PERRL, EOM intact. Conjunctiva pink with clear sclera. Mucus membranes pink and moist. Teeth intact. Thyroid smooth without nodules or goiter. · Cardiovascular: Regular rate and rhythm. S1/S2 present without murmurs, rubs, or gallops. Peripheral pulses 2+, capillary refill < 3 seconds. No elevation of JVP.  No peripheral edema  · Respiratory: Respirations symmetric and unlabored. Lungs clear to auscultation bilaterally, no wheezing, crackles, or rhonchi  · Gastrointestinal: Abdomen soft and round. Bowel sounds normoactive in all quadrants without tenderness or masses. · Musculoskeletal: Bilateral upper and lower extremity strength 5/5 with full ROM. Uses cane  · Neurologic/Psych: Awake and orientated to person, place and time. Calm affect, appropriate mood    Pertinent labs, diagnostic, device, and imaging results reviewed as a part of this visit    Labs:    BMP:   Recent Labs     21  0613 21  0610 21  0524    139 140   K 3.8 3.8 3.3*   CL 99 100 99   CO2 27 27 29   BUN 18 22* 20   CREATININE 1.1 1.4* 1.2   MG  --   --  2.40     Estimated Creatinine Clearance: 48 mL/min (based on SCr of 1.2 mg/dL). CBC:   Recent Labs     21  1650 21  0613   WBC 6.5 6.7   HGB 11.2* 10.8*   HCT 33.1* 32.4*   MCV 84.5 84.1    274     Thyroid:   Lab Results   Component Value Date    TSH 3.41 2018     Lipids:   Lab Results   Component Value Date    CHOL 112 2015    HDL 42 2015    HDL 30 2012    TRIG 88 2015     LFTS:   Lab Results   Component Value Date    ALT 13 2021    AST 25 2021    ALKPHOS 90 2021    PROT 7.9 2021    PROT 7.3 2013    AGRATIO 0.7 2021    BILITOT 0.5 2021     Cardiac Enzymes:   Lab Results   Component Value Date    CKTOTAL 268 2012    CKMB 2.1 2012    TROPONINI <0.01 2021    TROPONINI <0.01 2017    TROPONINI <0.01 2017     Coags:   Lab Results   Component Value Date    PROTIME 21.4 2021    INR 1.85 2021       EC21  AV paced    ECHO:    -Normal left ventricle size, wall thickness with low normal systolic function with an estimated ejection fraction of 50%.    -No diagnostic regional wall motion abnormalities noted.   -Abnormal (paradoxical) septal motion is present likely due to right ventricular pacing.   -Grade II diastolic dysfunction with elevated LV filling pressures. E/e\"=12.4.   -Trivial mitral regurgitation.   -The aortic valve is mildly thickened/calcified with normal gradients. -Moderate tricuspid regurgitation.   -Estimated pulmonary artery systolic pressure is mildly elevated at 40 mmHg assuming a right atrial pressure of 8 mmHg. -The left atrium is mildly dilated. Stress Test: 6/15  Small sized inferior fixed defect consistent with infarction in the    territory of the mid and distal LCx and/or RCA .    No ischemia    Normal LVEF. CXR: 8/24/21  Transvenous pacer remains in place.  Search Stable Chest    Problem List:   Patient Active Problem List    Diagnosis Date Noted    Pure hyperglyceridemia     Gastrointestinal hemorrhage     [facility]     Encounter for medication counseling     Bilateral cellulitis of lower leg     Open fracture of second toe of left foot     History of MRSA infection     Failure of outpatient treatment     Osteomyelitis of second toe of left foot ([address] 08/24/2021    Solar purpura ([address] 06/30/2021    PVD (peripheral vascular disease) ([facility] 11/09/2020    Localized edema 12/04/2018    Venous (peripheral) insufficiency 12/04/2018    Bradycardia     Sick sinus syndrome ([address] 08/29/2016    Chronic congestive heart failure ([address]     Essential hypertension     History of nonmelanoma skin cancer 10/01/2013    Tinea manuum, pedis, and unguium 10/01/2013    AK (actinic keratosis) 10/01/2013    Coronary artery disease involving native coronary artery of native heart without angina pectoris 06/17/2011    Cardiac pacemaker in situ 06/17/2011    Postsurgical percutaneous transluminal coronary angioplasty status 06/17/2011    Hyperlipidemia     Benign prostatic hyperplasia     Paroxysmal atrial fibrillation (HCC)         Assessment and Plan:     1.  Paroxysmal Atrial Fibrillation  - Currently in NSR (will likely have recurrent AF, but is generally self limiting and will resolve on its own)  - Continue Toprol XL 12.5 mg QD, Multaq 400 mg BID    - [id number] score: high. High risk for stroke and thromboembolism. Anticoagulation is recommended.   ~ Coumadin on hold given high INR and plans for surgery; resume at d/c      - Pt has declined PANCHITO closure with Watchman in previous discussion with his primary cardiologists    2. Sick Sinus Syndrome  - S/p Dual chamber Medtronic PPM (10/16)  - I reviewed device interrogation today. Device functioning normally.   ~ A-paced 90.8% V-paced 0.4%. 1.8% AF burden  - Discussed with patient  - Follow up with device clinic as scheduled    3. LBBB   - EF 50%   - May benefit from CRT-D upgrade in future once acute infection resolves and if EF drops further    4. CAD  - Stable  - No complaints of angina  - Continue ASA, BB, and statin    5. Osteomyelitis, Cellulitis   - On ABX   - Plans for amputation today   - Management per ID/podiatry    No further EP recommendations. EP will sign off. Instructed pt to follow up with his primary cardiologists upon discharge. Please call if there are any questions. Thank you for consult. All pertinent information and plan of care discussed with the EP physician. All questions and concerns were addressed to the patient. Alternatives to my treatment were discussed. I have discussed the above stated plan with patient and the nurse. The patient verbalized understanding and agreed with the plan. Thank you for allowing to us to participate in the care of [address]    The patient was seen for >35 minutes. I reviewed interval history, physical exam, review of data including labs, imaging, development and implementation of treatment plan and coordination of complex care.     Anai Alejandra, SCOTT-CNP  [address]   Office: [phone]

## 2021-08-26 NOTE — PROGRESS NOTES
DenisaRajinder Hever 97 transmission received 08.26.21 @ 10:10am from [address] for patient's dual chamber pacemaker. Reason for Transmission: Other (please specify in Additional Notes)  Technical Findings: No device or lead performance issue(s) observed  Additional Notes:  Reason for Interrogation: No verbal report  DEVICE ASSESSMENT: Available daily battery/lead measurements within expected range. Lead trends stable. ARRHYTHMIA SUMMARY: Arrhythmias/high rate episodes detected since last interrogation on 07-Jul-2021 119 episodes of atrial arrhythmia most recent on 26Aug2021 Episode in progress See attached episode list & stored EGMs for details Recommend review of stored EGMs for rhythm determination. OBSERVATIONS: · 1 days with more than 6 hr AT/AF. · Avg. Ventricular Rate >= 100 bpm for 6 hr during AT/AF. Device appears to be currently functioning as programmed. Pt on warfarin, Multaq, Toprol XL. EP physician will review. See interrogation under cardiology tab in the [address] [place] [address] [address] field for more details.

## 2021-08-26 NOTE — CONSULTS
Clinical Pharmacy Note: Pharmacy to Dose Vancomcyin    Vancomycin Day: 3  Current Dosing Regimen: 1000 mg Q24H   Dosing Method: Bayesian Modeling    Random: 22.3    Recent Labs     08/25/21  0613 08/26/21  0610   BUN 18 22*       Recent Labs     08/25/21  0613 08/26/21  0610   CREATININE 1.1 1.4*       Recent Labs     08/24/21  1650 08/25/21  0613   WBC 6.5 6.7         Intake/Output Summary (Last 24 hours) at 8/26/2021 1157  Last data filed at 8/25/2021 1646  Gross per 24 hour   Intake 414.08 ml   Output    Net 414.08 ml         Ht Readings from Last 1 Encounters:   08/26/21 5' 9\" (1.753 m)        Wt Readings from Last 1 Encounters:   08/26/21 162 lb 3.2 oz (73.6 kg)         Body mass index is 23.95 kg/m². Estimated Creatinine Clearance: 41 mL/min (A) (based on SCr of 1.4 mg/dL (H)). Assessment/Plan:   Vancomycin level is therapeutic. Level was drawn appropriately in respect to last dose given.  Continue vancomycin regimen to 1000 every 24 hours. Bayesian Modeling predicts an AUC of 585 mg/L*hr and trough of 17.7 mg/L.  A vancomycin random level has been ordered on 8/27 at 0600 for follow-up. Patient's scr increased today, thus will check labs again tomorrow to make sure patient is not accumulating.  Changes in regimen will be determined based on culture results, renal function, and clinical response. [address] [place] [address] will continue to monitor and adjust regimen as necessary.     Thank you for the consult,    Pinky Cook, PharmD  Pharmacy Resident   [id number]

## 2021-08-26 NOTE — PLAN OF CARE
Problem: Falls - Risk of:  Goal: Will remain free from falls  Description: Will remain free from falls  8/26/2021 1428 by Erika Wolf RN  Outcome: Ongoing  Note: Pt walks frequently to deal with pain. Pt refuses bed alarm. Uses call light appropriately and asks for assistance when needed     Problem: Musculor/Skeletal Functional Status  Goal: Absence of falls  8/26/2021 1428 by Erika Wolf RN  Outcome: Ongoing     Problem: Pain:  Goal: Pain level will decrease  Description: Pain level will decrease  8/26/2021 1428 by Erika Wolf RN  Outcome: Ongoing  Note: Pt agreed to try stronger pain medication.  Pain has thus far not been managed well d/t pt not wanting to get \"groggy\"

## 2021-08-26 NOTE — TELEPHONE ENCOUNTER
----- Message from Ignacio Latham MD sent at 8/26/2021  3:06 PM EDT -----  Please f/u in next month at [place] with EP

## 2021-08-26 NOTE — PROGRESS NOTES
Comprehensive Nutrition Assessment    Type and Reason for Visit:  Initial, Positive Nutrition Screen    Nutrition Recommendations/Plan:   1. Modify current diet to no added salt and encourage PO intake  2. RD to add ensure enlive BID for extra protein and calories   3. Continue to monitor weights  4. onitor nutrition adequacy, pertinent labs, bowel habits, wt changes, and clinical progress    Nutrition Assessment:  Positive nutrition screen for wt loss, decreased appetite, wounds and difficulties chewing/swallowing: Patient admitted with osteo of L 2nd toe. Amputation of toe planned for tomorrow. Sleepy at time of visit. On cardiac restricted diet with low sodium. NPO at midnight for procedure tomorrow. Reported poor PO intake PTA. UBW= 165 lb. Downward trending weight loss since march. Pt was 172 lb on 3/1/21 (-5.8% weight loss x 6 months). Recommend liberalizing diet to no added salt diet to promote PO intake. Pt willing to trial ONS, RD to add ensure enlive BID for extra protein and calories. Will continue to monitor. Malnutrition Assessment:  Malnutrition Status: At risk for malnutrition (Comment)    Context:  Acute Illness     Findings of the 6 clinical characteristics of malnutrition:  Energy Intake:  Mild decrease in energy intake (Comment)   Fluid Accumulation:  7 - Moderate to Severe Extremities    Estimated Daily Nutrient Needs:  Energy (kcal):  8379-4220 kcals/day; Weight Used for Energy Requirements:  Current (74 kg)     Protein (g):  74-89 g/day; Weight Used for Protein Requirements:  Current (1-1.2 g/kg)        Method Used for Fluid Requirements:  1 ml/kcal      Nutrition Related Findings:  Labs reviewed. Downward trend of wt loss since march. Unsure of last BM. Reports no chewing or swallowing difficulties. + 3 pitting edema RLE. Wounds:  Multiple (Osteomyelitis of toe, abrasions.)       Current Nutrition Therapies:    ADULT DIET;  Regular; Low Fat/Low Chol/High Fiber/2 gm Na  Diet NPO    Anthropometric Measures:  · Height: 5' 9\" (175.3 cm)  · Current Body Weight: 162 lb (73.5 kg)      · Ideal Body Weight: 160 lbs; % Ideal Body Weight 101.3 %   · BMI: 23.9  · BMI Categories: Normal Weight (BMI 22.0 to 24.9) age over 72       Nutrition Diagnosis:   · Inadequate oral intake related to early satiety as evidenced by intake 0-25%, poor intake prior to admission, weight loss    Nutrition Interventions:   Food and/or Nutrient Delivery:  Modify Current Diet, Start Oral Nutrition Supplement  Nutrition Education/Counseling:  Education not indicated   Coordination of Nutrition Care:  Continue to monitor while inpatient    Goals:  Consume 50% or greater of 3 meals per day and ONS       Nutrition Monitoring and Evaluation:   Behavioral-Environmental Outcomes:  None Identified   Food/Nutrient Intake Outcomes:  Food and Nutrient Intake, Supplement Intake  Physical Signs/Symptoms Outcomes:  Biochemical Data, Constipation, Weight     Discharge Planning:    Continue current diet, Continue Oral Nutrition Supplement     Electronically signed by Brittany Romo, MS, RD, LD on 8/26/21 at 1:59 PM EDT    Contact: CHIVO Boss

## 2021-08-26 NOTE — PROGRESS NOTES
Referral received for prayer and communion - pt's request. Visited with patient, prayed with and for him and provided communion.  Expressed appreciation for visit and seemed at peace following - at least for a brief interval.

## 2021-08-26 NOTE — PROGRESS NOTES
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  · Failure of outpatient clindamycin  · Peripheral polyneuropathy    Microbiology:        I have reviewed allavailable micro lab data and cultures    · Blood culture (2/2) - collected on 8/24/2021: *Negative so far  · Left foot wound culture  - collected on 8/25/2021: *In process      Antibiotics and immunizations:       Current antibiotics: All antibiotics and their doses were reviewed by me    Recent Abx Admin                   cefepime (MAXIPIME) 2000 mg IVPB minibag (mg) 2,000 mg New Bag 08/26/21 0945     2,000 mg New Bag 08/25/21 2252    vancomycin 1000 mg IVPB in 250 mL D5W addavial (mg) 1,000 mg New Bag 08/26/21 0050                  Immunization History: All immunization history was reviewed by me today. Immunization History   Administered Date(s) Administered    COVID-19, Pfizer, PF, 30mcg/0.3mL 01/21/2021, 02/11/2021    Influenza 11/04/2010, 09/21/2011    Influenza Virus Vaccine 10/21/2013, 09/29/2017    Influenza Whole 10/21/2013    Influenza, High Dose (Fluzone 65 yrs and older) 10/08/2015, 10/20/2016, 09/29/2017, 09/11/2018    Influenza, Quadv, adjuvanted, 65 yrs +, IM, PF (Fluad) 10/15/2020    Influenza, Triv, inactivated, subunit, adjuvanted, IM (Fluad 65 yrs and older) 10/21/2019    Pneumococcal Conjugate 13-valent ([id number]) 09/17/2015    Pneumococcal Polysaccharide ([id number]) 10/10/2007    Tdap (Boostrix, Adacel) 07/28/2011, 09/04/2014    Zoster Live (Zostavax) 10/01/2013       Known drug allergies:      All allergies were reviewed and updated    Allergies   Allergen Reactions    Avelox [Moxifloxacin Hcl In Nacl] Anaphylaxis    Epinephrine Base     Other [place] per PT - swelling size of silver dollar at site per PT     Keflex [Cephalexin] Nausea And Vomiting    Polysporin [Bacitracin-Polymyxin B] Rash       Social history:     Social History:  All social andepidemiologic history was reviewed and updated by me today as needed. · Tobacco use:   reports that he quit smoking about 17 years ago. He has a 30.00 pack-year smoking history. He has never used smokeless tobacco.  · Alcohol use:   reports no history of alcohol use. · Currently lives in: [address] [place] [address]  ·  reports no history of drug use. COVID VACCINATION AND LAB RESULT RECORDS:     Internal Administration   First Dose COVID-19, Pfizer, PF, 30mcg/0.3mL  01/21/2021   Second Dose COVID-19, Kashif Lemus, PF, 30mcg/0.3mL   02/11/2021       Last COVID Lab No results found for: SARS-COV-2, SARS-COV-2 RNA, SARS-COV-2, SARS-COV-2, SARS-COV-2 BY PCR, SARS-COV-2, SARS-COV-2, SARS-COV-2         Assessment:     The patient is a 80 y.o. old male who  has a past medical history of Allergic rhinitis, Atrial fibrillation ([address] Benign prostatic hypertrophy, CAD (coronary artery disease), Cancer ([address] CHF (congestive heart failure) ([address] (08/17/2017), Coronary artery disease involving native coronary artery of native heart without angina pectoris (6/17/2011), DDD (degenerative disc disease), lumbar, Falls (08/17/2017), GI bleeding, Hyperlipidemia, Hypertension, MI (myocardial infarction) ([address] MRSA (methicillin resistant staph aureus) culture positive, Osteomyelitis ([address] Pneumonia, S/P PTCA (percutaneous transluminal coronary angioplasty) (stents x 8), SSS (sick sinus syndrome) ([address] Unspecified sleep apnea, and Venous (peripheral) insufficiency (12/4/2018).  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 I/v access Management:    · Continue to monitor i.v access sites for erythema, induration, discharge or tenderness. · As always, continue efforts to minimize tubes/lines/drains as clinically appropriate to reduce chances of line associated infections. Patient education and counseling:        · The patient was educated in detail about the side-effects of various antibiotics and things to watch for like new rashes, lip swelling, severe reaction, worsening diarrhea, break through fever etc.  · Discussed patient's condition and what to expect. All of the patient's questions were addressed in a satisfactory manner and patient verbalized understanding all instructions. Level of complexity of visit: High     Risk of Complications/Morbidity: High     · Illness(es)/ Infection present that pose threat to bodily function. · There is potential for severe exacerbation of infection/side effects of treatment. · Therapy requires intensive monitoring for antimicrobial agent toxicity. TIME SPENT TODAY:     - Spent over  36 minutes on visit (including interval history, physical exam, review of data including labs, cultures, imaging, development and implementation of treatment plan and coordination of complex care). More than 50 percent of this includes face-to-face time spent with the patient for counseling and coordination of care. Thank you for involving me in the care of your patient. I will continue to follow. If you have anyadditional questions, please do not hesitate to contact me. Subjective: Interval history: Interval history was obtained from chart review and patient/ RN. The patient is afebrile. He has ongoing pain and swelling of the left second toe area.   He is tolerating antibiotics okay     REVIEW OF SYSTEMS:      Review of Systems   Constitutional: Negative for chills, diaphoresis and fever. HENT: Negative for ear discharge, ear pain, rhinorrhea, sore throat and trouble swallowing. Eyes: Negative for discharge and redness. Respiratory: Negative for cough, shortness of breath and wheezing. Cardiovascular: Negative for chest pain and leg swelling. Gastrointestinal: Negative for abdominal pain, constipation, diarrhea and nausea. Endocrine: Negative for polyuria. Genitourinary: Negative for dysuria, flank pain, frequency, hematuria and urgency. Musculoskeletal: Positive for arthralgias. Negative for back pain and myalgias. Left second toe pain and swelling   Skin: Negative for rash. Neurological: Negative for dizziness, seizures and headaches. Hematological: Does not bruise/bleed easily. Psychiatric/Behavioral: Negative for hallucinations and suicidal ideas. All other systems reviewed and are negative. Past Medical History: All past medical history reviewed today. Past Medical History:   Diagnosis Date    Allergic rhinitis     Atrial fibrillation (HCC)     Benign prostatic hypertrophy     CAD (coronary artery disease)     Cancer [facility])     skin    CHF (congestive heart failure) ([facility]) 08/17/2017    Coronary artery disease involving native coronary artery of native heart without angina pectoris 6/17/2011    DDD (degenerative disc disease), lumbar     Falls 08/17/2017    GI bleeding     Hyperlipidemia     Hypertension     MI (myocardial infarction) ([address]     MRSA (methicillin resistant staph aureus) culture positive     h/o infection in the blood    Osteomyelitis (HCC)     left foot    Pneumonia     S/P PTCA (percutaneous transluminal coronary angioplasty) stents x 8    SSS (sick sinus syndrome) ([address]     Unspecified sleep apnea     Venous (peripheral) insufficiency 12/4/2018       Past Surgical History: All past surgical history was reviewed today.     Past Surgical History:   Procedure Laterality Date    APPENDECTOMY  CARDIAC PACEMAKER PLACEMENT      CARPAL TUNNEL RELEASE      CORONARY ANGIOPLASTY      CORONARY ANGIOPLASTY WITH STENT PLACEMENT      DIAGNOSTIC CARDIAC CATH LAB PROCEDURE      EYE SURGERY      PACEMAKER CHANGE  10/2016    PACEMAKER PLACEMENT         Family History: All family history was reviewed today. Problem Relation Age of Onset    Cancer Sister     Coronary Art Dis Brother        Objective:       PHYSICAL EXAM:      Vitals:   Vitals:    08/26/21 0900 08/26/21 0930 08/26/21 0942 08/26/21 0944   BP: 95/61 113/70 113/70    Pulse: 106  125    Resp: 18      Temp: 98.5 °F (36.9 °C)      TempSrc: Oral      SpO2: 95%      Weight:       Height:    5' 9\" (1.753 m)       Physical Exam  Vitals and nursing note reviewed. Constitutional:       Appearance: Normal appearance. He is well-developed. HENT:      Head: Normocephalic and atraumatic. Right Ear: External ear normal.      Left Ear: External ear normal.      Nose: Nose normal. No congestion or rhinorrhea. Mouth/Throat:      Mouth: Mucous membranes are moist.      Pharynx: No oropharyngeal exudate or posterior oropharyngeal erythema. Eyes:      General: No scleral icterus. Right eye: No discharge. Left eye: No discharge. Conjunctiva/sclera: Conjunctivae normal.      Pupils: Pupils are equal, round, and reactive to light. Cardiovascular:      Rate and Rhythm: Normal rate and regular rhythm. Pulses: Normal pulses. Heart sounds: No murmur heard. No friction rub. Pulmonary:      Effort: Pulmonary effort is normal. No respiratory distress. Breath sounds: Normal breath sounds. No stridor. No wheezing, rhonchi or rales. Abdominal:      General: Bowel sounds are normal.      Palpations: Abdomen is soft. Tenderness: There is no abdominal tenderness. There is no right CVA tenderness, left CVA tenderness, guarding or rebound. Musculoskeletal:         General: Swelling and tenderness present. pertinent images and reports for the current visit were reviewed by me during this visit. XR CHEST PORTABLE   Final Result      XR FOOT LEFT (MIN 3 VIEWS)   Final Result   Osteomyelitis of the 2nd toe with associated displaced pathologic fracture,   as discussed         MRI FOOT RIGHT WO CONTRAST    (Results Pending)   VL Extremity Venous Left    (Results Pending)       Medications: All current and past medications were reviewed.      furosemide  40 mg Oral BID    metoprolol succinate  12.5 mg Oral Daily    dronedarone hcl  400 mg Oral BID WC    pantoprazole  40 mg Oral QAM AC    rosuvastatin  5 mg Oral Nightly    sodium chloride flush  5-40 mL IntraVENous 2 times per day    cefepime  2,000 mg IntraVENous Q12H    vancomycin  1,000 mg IntraVENous Q24H    lidocaine  1 patch Transdermal Daily    fentaNYL  1 patch Transdermal Q48H        sodium chloride 25 mL (08/25/21 2248)       sodium chloride flush, sodium chloride, polyethylene glycol, acetaminophen **OR** acetaminophen, potassium chloride **OR** potassium alternative oral replacement **OR** potassium chloride, magnesium sulfate, fluticasone, HYDROcodone 5 mg - acetaminophen      Problem list:       Patient Active Problem List   Diagnosis Code    Hyperlipidemia E78.5    Benign prostatic hyperplasia N40.0    Paroxysmal atrial fibrillation (HCC) I48.0    Coronary artery disease involving native coronary artery of native heart without angina pectoris I25.10    Cardiac pacemaker in situ Z95.0    Postsurgical percutaneous transluminal coronary angioplasty status Z98.61    History of nonmelanoma skin cancer Z85.828    Tinea manuum, pedis, and unguium B35.2, B35.3, B35.1    AK (actinic keratosis) L57.0    Chronic congestive heart failure (HCC) I50.9    Essential hypertension I10    Sick sinus syndrome (HCC) I49.5    Bradycardia R00.1    Gastrointestinal hemorrhage K92.2    Pure hyperglyceridemia E78.1    Localized edema R60.0    Venous (peripheral) insufficiency I87.2    PVD (peripheral vascular disease) ([facility]) I73.9    Solar purpura ([facility]) D69.2    Osteomyelitis of second toe of left foot ([facility]) M86.9    Bilateral cellulitis of lower leg L03.116, L03.115    Open fracture of second toe of left foot S92.502B    History of MRSA infection Z86.14    Failure of outpatient treatment Z78.9       Please note that this chart was generated using Dragon dictation software. Although every effort was made to ensure the accuracy of this automated transcription, some errors in transcription may have occurred inadvertently. If you may need any clarification, please do not hesitate to contact me through [employer] or at the phone number provided below with my electronic signature. Any pictures or media included in this note were obtained after taking informed verbal consent from the patient and with their approval to include those in the patient's medical record.     Adriel Ziegler MD, MPH  8/26/2021 , 9:59 AM   [facility] Infectious Disease   [address] [place] [address], [address] [place] [address]  Office: [phone]  Fax: [phone]  Clinic days:  Tuesday & Thursday

## 2021-08-26 NOTE — PLAN OF CARE
Problem: Falls - Risk of:  Goal: Will remain free from falls  Description: Will remain free from falls  8/26/2021 0631 by Clovis Vale RN  Outcome: Ongoing  8/26/2021 0631 by Clovis Vale RN  Outcome: Ongoing  Goal: Absence of physical injury  Description: Absence of physical injury  8/26/2021 0631 by Clovis Vale RN  Outcome: Ongoing  8/26/2021 0631 by Clovis Vale RN  Outcome: Ongoing     Problem: Musculor/Skeletal Functional Status  Goal: Highest potential functional level  8/26/2021 0631 by Clovis Vale RN  Outcome: Ongoing  8/26/2021 0631 by Clovis Vale RN  Outcome: Ongoing  Goal: Absence of falls  8/26/2021 0631 by Clovis Vale RN  Outcome: Ongoing  8/26/2021 0631 by Clovis Vale RN  Outcome: Ongoing     Problem: Pain:  Goal: Pain level will decrease  Description: Pain level will decrease  8/26/2021 0631 by Clovis Vale RN  Outcome: Ongoing  8/26/2021 0631 by Clovis Vale RN  Outcome: Ongoing  Goal: Control of acute pain  Description: Control of acute pain  8/26/2021 0631 by Clovis Vale RN  Outcome: Ongoing  8/26/2021 0631 by Clovis Vale RN  Outcome: Ongoing  Goal: Control of chronic pain  Description: Control of chronic pain  8/26/2021 0631 by Clovis Vale RN  Outcome: Ongoing  8/26/2021 0631 by Clovis Vale RN  Outcome: Ongoing     Pt introduction made. VSS. A+Ox4. No signs of SOB. POC reviewed w/ pt. Bed in lowest position. Wheels locked. Pt expresses no further needs at this time.

## 2021-08-26 NOTE — PROGRESS NOTES
Pt noted with irregular cardiac rhythm and HR up to 120s. Notified MD, n.o. to consult cardiology to get pacemaker checked.

## 2021-08-27 ENCOUNTER — APPOINTMENT (OUTPATIENT)
Dept: GENERAL RADIOLOGY | Age: 81
DRG: 504 | End: 2021-08-27
Payer: MEDICARE

## 2021-08-27 ENCOUNTER — ANESTHESIA (OUTPATIENT)
Dept: OPERATING ROOM | Age: 81
DRG: 504 | End: 2021-08-27
Payer: MEDICARE

## 2021-08-27 VITALS
OXYGEN SATURATION: 98 % | DIASTOLIC BLOOD PRESSURE: 63 MMHG | SYSTOLIC BLOOD PRESSURE: 133 MMHG | RESPIRATION RATE: 1 BRPM

## 2021-08-27 LAB
ANION GAP SERPL CALCULATED.3IONS-SCNC: 12 MMOL/L (ref 3–16)
BUN BLDV-MCNC: 20 MG/DL (ref 7–20)
CALCIUM SERPL-MCNC: 8.6 MG/DL (ref 8.3–10.6)
CHLORIDE BLD-SCNC: 99 MMOL/L (ref 99–110)
CO2: 29 MMOL/L (ref 21–32)
CREAT SERPL-MCNC: 1.2 MG/DL (ref 0.8–1.3)
GFR AFRICAN AMERICAN: >60
GFR NON-AFRICAN AMERICAN: 58
GLUCOSE BLD-MCNC: 92 MG/DL (ref 70–99)
GRAM STAIN RESULT: ABNORMAL
INR BLD: 1.85 (ref 0.88–1.12)
MAGNESIUM: 2.4 MG/DL (ref 1.8–2.4)
POTASSIUM REFLEX MAGNESIUM: 3.3 MMOL/L (ref 3.5–5.1)
PROTHROMBIN TIME: 21.4 SEC (ref 9.9–12.7)
SODIUM BLD-SCNC: 140 MMOL/L (ref 136–145)
VANCOMYCIN RANDOM: 21.5 UG/ML
WOUND/ABSCESS: ABNORMAL

## 2021-08-27 PROCEDURE — 1200000000 HC SEMI PRIVATE

## 2021-08-27 PROCEDURE — 6360000002 HC RX W HCPCS: Performed by: PODIATRIST

## 2021-08-27 PROCEDURE — 97116 GAIT TRAINING THERAPY: CPT

## 2021-08-27 PROCEDURE — 7100000001 HC PACU RECOVERY - ADDTL 15 MIN: Performed by: PODIATRIST

## 2021-08-27 PROCEDURE — 2580000003 HC RX 258: Performed by: FAMILY MEDICINE

## 2021-08-27 PROCEDURE — 2500000003 HC RX 250 WO HCPCS: Performed by: PODIATRIST

## 2021-08-27 PROCEDURE — 2700000000 HC OXYGEN THERAPY PER DAY

## 2021-08-27 PROCEDURE — 6370000000 HC RX 637 (ALT 250 FOR IP): Performed by: PODIATRIST

## 2021-08-27 PROCEDURE — 36415 COLL VENOUS BLD VENIPUNCTURE: CPT

## 2021-08-27 PROCEDURE — 94761 N-INVAS EAR/PLS OXIMETRY MLT: CPT

## 2021-08-27 PROCEDURE — 73620 X-RAY EXAM OF FOOT: CPT

## 2021-08-27 PROCEDURE — 3600000013 HC SURGERY LEVEL 3 ADDTL 15MIN: Performed by: PODIATRIST

## 2021-08-27 PROCEDURE — 87070 CULTURE OTHR SPECIMN AEROBIC: CPT

## 2021-08-27 PROCEDURE — 6360000002 HC RX W HCPCS: Performed by: NURSE ANESTHETIST, CERTIFIED REGISTERED

## 2021-08-27 PROCEDURE — 88311 DECALCIFY TISSUE: CPT

## 2021-08-27 PROCEDURE — 87205 SMEAR GRAM STAIN: CPT

## 2021-08-27 PROCEDURE — 3700000001 HC ADD 15 MINUTES (ANESTHESIA): Performed by: PODIATRIST

## 2021-08-27 PROCEDURE — 2580000003 HC RX 258: Performed by: PODIATRIST

## 2021-08-27 PROCEDURE — 6360000002 HC RX W HCPCS: Performed by: HOSPITALIST

## 2021-08-27 PROCEDURE — 80202 ASSAY OF VANCOMYCIN: CPT

## 2021-08-27 PROCEDURE — 7100000000 HC PACU RECOVERY - FIRST 15 MIN: Performed by: PODIATRIST

## 2021-08-27 PROCEDURE — 85610 PROTHROMBIN TIME: CPT

## 2021-08-27 PROCEDURE — 0Y6S0Z0 DETACHMENT AT LEFT 2ND TOE, COMPLETE, OPEN APPROACH: ICD-10-PCS | Performed by: PODIATRIST

## 2021-08-27 PROCEDURE — 3700000000 HC ANESTHESIA ATTENDED CARE: Performed by: PODIATRIST

## 2021-08-27 PROCEDURE — 6370000000 HC RX 637 (ALT 250 FOR IP): Performed by: PHYSICIAN ASSISTANT

## 2021-08-27 PROCEDURE — 3600000003 HC SURGERY LEVEL 3 BASE: Performed by: PODIATRIST

## 2021-08-27 PROCEDURE — 83735 ASSAY OF MAGNESIUM: CPT

## 2021-08-27 PROCEDURE — 80048 BASIC METABOLIC PNL TOTAL CA: CPT

## 2021-08-27 PROCEDURE — 6370000000 HC RX 637 (ALT 250 FOR IP): Performed by: HOSPITALIST

## 2021-08-27 PROCEDURE — 2580000003 HC RX 258: Performed by: HOSPITALIST

## 2021-08-27 PROCEDURE — 88305 TISSUE EXAM BY PATHOLOGIST: CPT

## 2021-08-27 PROCEDURE — 99233 SBSQ HOSP IP/OBS HIGH 50: CPT | Performed by: NURSE PRACTITIONER

## 2021-08-27 PROCEDURE — 99233 SBSQ HOSP IP/OBS HIGH 50: CPT | Performed by: INTERNAL MEDICINE

## 2021-08-27 PROCEDURE — 2709999900 HC NON-CHARGEABLE SUPPLY: Performed by: PODIATRIST

## 2021-08-27 PROCEDURE — 87075 CULTR BACTERIA EXCEPT BLOOD: CPT

## 2021-08-27 RX ORDER — DIPHENHYDRAMINE HYDROCHLORIDE 50 MG/ML
12.5 INJECTION INTRAMUSCULAR; INTRAVENOUS
Status: DISCONTINUED | OUTPATIENT
Start: 2021-08-27 | End: 2021-08-27

## 2021-08-27 RX ORDER — POVIDONE-IODINE 10 MG/G
OINTMENT TOPICAL
Status: COMPLETED | OUTPATIENT
Start: 2021-08-27 | End: 2021-08-27

## 2021-08-27 RX ORDER — LABETALOL HYDROCHLORIDE 5 MG/ML
5 INJECTION, SOLUTION INTRAVENOUS EVERY 10 MIN PRN
Status: DISCONTINUED | OUTPATIENT
Start: 2021-08-27 | End: 2021-08-27

## 2021-08-27 RX ORDER — PROPOFOL 10 MG/ML
INJECTION, EMULSION INTRAVENOUS CONTINUOUS PRN
Status: DISCONTINUED | OUTPATIENT
Start: 2021-08-27 | End: 2021-08-27 | Stop reason: SDUPTHER

## 2021-08-27 RX ORDER — HYDROMORPHONE HCL 110MG/55ML
0.25 PATIENT CONTROLLED ANALGESIA SYRINGE INTRAVENOUS EVERY 5 MIN PRN
Status: DISCONTINUED | OUTPATIENT
Start: 2021-08-27 | End: 2021-08-27

## 2021-08-27 RX ORDER — DOXYCYCLINE HYCLATE 100 MG
100 TABLET ORAL 2 TIMES DAILY
Qty: 20 TABLET | Refills: 0 | Status: SHIPPED | OUTPATIENT
Start: 2021-08-27 | End: 2021-09-06

## 2021-08-27 RX ORDER — MEPERIDINE HYDROCHLORIDE 25 MG/ML
12.5 INJECTION INTRAMUSCULAR; INTRAVENOUS; SUBCUTANEOUS EVERY 5 MIN PRN
Status: DISCONTINUED | OUTPATIENT
Start: 2021-08-27 | End: 2021-08-27

## 2021-08-27 RX ORDER — FENTANYL CITRATE 50 UG/ML
50 INJECTION, SOLUTION INTRAMUSCULAR; INTRAVENOUS EVERY 5 MIN PRN
Status: DISCONTINUED | OUTPATIENT
Start: 2021-08-27 | End: 2021-08-27

## 2021-08-27 RX ORDER — WARFARIN SODIUM 5 MG/1
5 TABLET ORAL
Status: COMPLETED | OUTPATIENT
Start: 2021-08-27 | End: 2021-08-27

## 2021-08-27 RX ORDER — HYDROMORPHONE HCL 110MG/55ML
0.5 PATIENT CONTROLLED ANALGESIA SYRINGE INTRAVENOUS EVERY 5 MIN PRN
Status: DISCONTINUED | OUTPATIENT
Start: 2021-08-27 | End: 2021-08-27

## 2021-08-27 RX ORDER — PROPOFOL 10 MG/ML
INJECTION, EMULSION INTRAVENOUS PRN
Status: DISCONTINUED | OUTPATIENT
Start: 2021-08-27 | End: 2021-08-27 | Stop reason: SDUPTHER

## 2021-08-27 RX ORDER — OXYCODONE HYDROCHLORIDE 5 MG/1
10 TABLET ORAL PRN
Status: DISCONTINUED | OUTPATIENT
Start: 2021-08-27 | End: 2021-08-27

## 2021-08-27 RX ORDER — PROMETHAZINE HYDROCHLORIDE 25 MG/ML
6.25 INJECTION, SOLUTION INTRAMUSCULAR; INTRAVENOUS PRN
Status: DISCONTINUED | OUTPATIENT
Start: 2021-08-27 | End: 2021-08-27

## 2021-08-27 RX ORDER — SODIUM CHLORIDE 9 MG/ML
INJECTION, SOLUTION INTRAVENOUS CONTINUOUS
Status: DISCONTINUED | OUTPATIENT
Start: 2021-08-27 | End: 2021-08-27

## 2021-08-27 RX ORDER — OXYCODONE HYDROCHLORIDE 5 MG/1
5 TABLET ORAL PRN
Status: DISCONTINUED | OUTPATIENT
Start: 2021-08-27 | End: 2021-08-27

## 2021-08-27 RX ADMIN — CEFEPIME HYDROCHLORIDE 2000 MG: 2 INJECTION, POWDER, FOR SOLUTION INTRAVENOUS at 20:23

## 2021-08-27 RX ADMIN — ROSUVASTATIN 5 MG: 10 TABLET, FILM COATED ORAL at 20:17

## 2021-08-27 RX ADMIN — PANTOPRAZOLE SODIUM 40 MG: 40 TABLET, DELAYED RELEASE ORAL at 06:43

## 2021-08-27 RX ADMIN — MORPHINE SULFATE 2 MG: 2 INJECTION, SOLUTION INTRAMUSCULAR; INTRAVENOUS at 22:55

## 2021-08-27 RX ADMIN — VANCOMYCIN HYDROCHLORIDE 1000 MG: 1 INJECTION, POWDER, LYOPHILIZED, FOR SOLUTION INTRAVENOUS at 22:59

## 2021-08-27 RX ADMIN — ACETAMINOPHEN 650 MG: 325 TABLET ORAL at 20:17

## 2021-08-27 RX ADMIN — METOPROLOL SUCCINATE 12.5 MG: 25 TABLET, EXTENDED RELEASE ORAL at 08:05

## 2021-08-27 RX ADMIN — DRONEDARONE 400 MG: 400 TABLET, FILM COATED ORAL at 19:03

## 2021-08-27 RX ADMIN — OXYCODONE 5 MG: 5 TABLET ORAL at 04:25

## 2021-08-27 RX ADMIN — PROPOFOL 30 MG: 10 INJECTION, EMULSION INTRAVENOUS at 14:26

## 2021-08-27 RX ADMIN — Medication 10 ML: at 08:10

## 2021-08-27 RX ADMIN — FUROSEMIDE 40 MG: 40 TABLET ORAL at 19:03

## 2021-08-27 RX ADMIN — CEFEPIME HYDROCHLORIDE 2000 MG: 2 INJECTION, POWDER, FOR SOLUTION INTRAVENOUS at 08:07

## 2021-08-27 RX ADMIN — OXYCODONE 5 MG: 5 TABLET ORAL at 11:55

## 2021-08-27 RX ADMIN — PROPOFOL 50 MCG/KG/MIN: 10 INJECTION, EMULSION INTRAVENOUS at 14:20

## 2021-08-27 RX ADMIN — POTASSIUM CHLORIDE 40 MEQ: 20 TABLET, EXTENDED RELEASE ORAL at 06:43

## 2021-08-27 RX ADMIN — OXYCODONE 5 MG: 5 TABLET ORAL at 20:18

## 2021-08-27 RX ADMIN — WARFARIN SODIUM 5 MG: 5 TABLET ORAL at 19:03

## 2021-08-27 RX ADMIN — Medication 10 ML: at 20:23

## 2021-08-27 RX ADMIN — SODIUM CHLORIDE: 9 INJECTION, SOLUTION INTRAVENOUS at 13:20

## 2021-08-27 ASSESSMENT — PULMONARY FUNCTION TESTS
PIF_VALUE: 1
PIF_VALUE: 0
PIF_VALUE: 1
PIF_VALUE: 2
PIF_VALUE: 1
PIF_VALUE: 0
PIF_VALUE: 1
PIF_VALUE: 0
PIF_VALUE: 1
PIF_VALUE: 3
PIF_VALUE: 1
PIF_VALUE: 0
PIF_VALUE: 1
PIF_VALUE: 0
PIF_VALUE: 0
PIF_VALUE: 1
PIF_VALUE: 0
PIF_VALUE: 1
PIF_VALUE: 0
PIF_VALUE: 0
PIF_VALUE: 1
PIF_VALUE: 0
PIF_VALUE: 0

## 2021-08-27 ASSESSMENT — PAIN DESCRIPTION - PAIN TYPE
TYPE: CHRONIC PAIN
TYPE: CHRONIC PAIN;SURGICAL PAIN
TYPE: CHRONIC PAIN
TYPE: CHRONIC PAIN

## 2021-08-27 ASSESSMENT — PAIN SCALES - GENERAL
PAINLEVEL_OUTOF10: 5
PAINLEVEL_OUTOF10: 7
PAINLEVEL_OUTOF10: 3
PAINLEVEL_OUTOF10: 2
PAINLEVEL_OUTOF10: 4
PAINLEVEL_OUTOF10: 5
PAINLEVEL_OUTOF10: 4
PAINLEVEL_OUTOF10: 3
PAINLEVEL_OUTOF10: 7

## 2021-08-27 ASSESSMENT — PAIN - FUNCTIONAL ASSESSMENT
PAIN_FUNCTIONAL_ASSESSMENT: 0-10
PAIN_FUNCTIONAL_ASSESSMENT: PREVENTS OR INTERFERES SOME ACTIVE ACTIVITIES AND ADLS

## 2021-08-27 ASSESSMENT — ENCOUNTER SYMPTOMS
BACK PAIN: 0
ABDOMINAL PAIN: 0
DIARRHEA: 0
SHORTNESS OF BREATH: 0
TROUBLE SWALLOWING: 0
RHINORRHEA: 0
SORE THROAT: 0
EYE REDNESS: 0
COUGH: 0
EYE DISCHARGE: 0
WHEEZING: 0
NAUSEA: 0
CONSTIPATION: 0

## 2021-08-27 ASSESSMENT — PAIN DESCRIPTION - ONSET
ONSET: ON-GOING

## 2021-08-27 ASSESSMENT — PAIN DESCRIPTION - PROGRESSION
CLINICAL_PROGRESSION: NOT CHANGED
CLINICAL_PROGRESSION: GRADUALLY WORSENING

## 2021-08-27 ASSESSMENT — PAIN DESCRIPTION - FREQUENCY
FREQUENCY: CONTINUOUS

## 2021-08-27 ASSESSMENT — PAIN DESCRIPTION - DESCRIPTORS
DESCRIPTORS: DULL;DISCOMFORT

## 2021-08-27 ASSESSMENT — PAIN DESCRIPTION - LOCATION
LOCATION: BACK
LOCATION: BACK;FOOT

## 2021-08-27 ASSESSMENT — PAIN DESCRIPTION - ORIENTATION
ORIENTATION: LOWER
ORIENTATION: LOWER;LEFT

## 2021-08-27 NOTE — PROGRESS NOTES
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  · Failure of outpatient clindamycin  · Peripheral polyneuropathy    Microbiology:        I have reviewed allavailable micro lab data and cultures    · Blood culture (2/2) - collected on 8/24/2021: *Negative so far  · Left foot wound culture  - collected on 8/25/2021: *In process      Antibiotics and immunizations:       Current antibiotics: All antibiotics and their doses were reviewed by me    Recent Abx Admin                   cefepime (MAXIPIME) 2000 mg IVPB minibag (mg) 2,000 mg New Bag 08/27/21 0807     2,000 mg New Bag 08/26/21 2120    vancomycin 1000 mg IVPB in 250 mL D5W addavial (mg) 1,000 mg New Bag 08/26/21 2244                  Immunization History: All immunization history was reviewed by me today. Immunization History   Administered Date(s) Administered    COVID-19, Pfizer, PF, 30mcg/0.3mL 01/21/2021, 02/11/2021    Influenza 11/04/2010, 09/21/2011    Influenza Virus Vaccine 10/21/2013, 09/29/2017    Influenza Whole 10/21/2013    Influenza, High Dose (Fluzone 65 yrs and older) 10/08/2015, 10/20/2016, 09/29/2017, 09/11/2018    Influenza, Quadv, adjuvanted, 65 yrs +, IM, PF (Fluad) 10/15/2020    Influenza, Triv, inactivated, subunit, adjuvanted, IM (Fluad 65 yrs and older) 10/21/2019    Pneumococcal Conjugate 13-valent ([id number]) 09/17/2015    Pneumococcal Polysaccharide ([id number]) 10/10/2007    Tdap (Boostrix, Adacel) 07/28/2011, 09/04/2014    Zoster Live (Zostavax) 10/01/2013       Known drug allergies:      All allergies were reviewed and updated    Allergies   Allergen Reactions    Avelox [Moxifloxacin Hcl In Nacl] Anaphylaxis    Epinephrine Base     Other [place] per PT - swelling size of silver dollar at site per PT     Keflex [Cephalexin] Nausea And Vomiting    Polysporin [Bacitracin-Polymyxin B] Rash       Social history:     Social History:  All social andepidemiologic history was reviewed and updated by me today as needed. · Tobacco use:   reports that he quit smoking about 17 years ago. He has a 30.00 pack-year smoking history. He has never used smokeless tobacco.  · Alcohol use:   reports no history of alcohol use. · Currently lives in: [address] [place] [address]  ·  reports no history of drug use. COVID VACCINATION AND LAB RESULT RECORDS:     Internal Administration   First Dose COVID-19, Pfizer, PF, 30mcg/0.3mL  01/21/2021   Second Dose COVID-19, Kashif Lemus, PF, 30mcg/0.3mL   02/11/2021       Last COVID Lab No results found for: SARS-COV-2, SARS-COV-2 RNA, SARS-COV-2, SARS-COV-2, SARS-COV-2 BY PCR, SARS-COV-2, SARS-COV-2, SARS-COV-2         Assessment:     The patient is a 80 y.o. old male who  has a past medical history of Allergic rhinitis, Atrial fibrillation ([address] Benign prostatic hypertrophy, CAD (coronary artery disease), Cancer ([address] CHF (congestive heart failure) ([address] (08/17/2017), Coronary artery disease involving native coronary artery of native heart without angina pectoris (6/17/2011), DDD (degenerative disc disease), lumbar, Falls (08/17/2017), GI bleeding, Hyperlipidemia, Hypertension, MI (myocardial infarction) ([address] MRSA (methicillin resistant staph aureus) culture positive, Osteomyelitis ([address] Pneumonia, S/P PTCA (percutaneous transluminal coronary angioplasty) (stents x 8), SSS (sick sinus syndrome) ([address] Unspecified sleep apnea, and Venous (peripheral) insufficiency (12/4/2018).  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 He is tolerating antibiotics okay. Left second toe wound culture showed gram-positive cocci on Gram stain, cultures only had growth of mixed skin cira so far. Serum creatinine is 1.2. There is plan for left second toe amputation today. Plan:     Diagnostic Workup:    · Follow-up on left second toe surgical cultures and path report  · Continue to follow  fever curve, WBC count and blood cultures. · Continue to monitor blood counts, liver and renal function. Antimicrobials:    · Will continue empiric IV vancomycin for now  · Continue IV cefepime 2 g every 12 hours  · Target vancomycintrough level of 15-20  · Podiatry plans for left second toe amputation reviewed  · If clear surgical wound margin obtained and cultures remain negative, can plan to switch him to oral doxycycline 100 mg every 12 hours for 10 days at discharge  · We will follow up on the culture results and clinical progress and will make further recommendations accordingly. · Continue close vitals monitoring. · Maintain good glycemic control. · Fall precautions. Aspiration precautions. · Continue to watch for new fever or diarrhea. · DVT prophylaxis. · Discussed all above with patient and RN. · Discussed with patient stated granddaughter at bedside      Drug Monitoring:    · Continue monitoring for antibiotic toxicity as follows: CBC, CMP, vancomycin trough  · Continue to watch for following: new or worsening fever, new hypotension, hives, lip swelling and redness or purulence at vascular access sites.      I/v access Management:    · Continue to rhinorrhea, sore throat and trouble swallowing. Eyes: Negative for discharge and redness. Respiratory: Negative for cough, shortness of breath and wheezing. Cardiovascular: Negative for chest pain and leg swelling. Gastrointestinal: Negative for abdominal pain, constipation, diarrhea and nausea. Endocrine: Negative for polyuria. Genitourinary: Negative for dysuria, flank pain, frequency, hematuria and urgency. Musculoskeletal: Positive for arthralgias. Negative for back pain and myalgias. Ongoing swelling of left second toe   Skin: Negative for rash. Neurological: Negative for dizziness, seizures and headaches. Hematological: Does not bruise/bleed easily. Psychiatric/Behavioral: Negative for hallucinations and suicidal ideas. All other systems reviewed and are negative. Past Medical History: All past medical history reviewed today. Past Medical History:   Diagnosis Date    Allergic rhinitis     Atrial fibrillation ([facility])     Benign prostatic hypertrophy     CAD (coronary artery disease)     Cancer ([facility])     skin    CHF (congestive heart failure) ([facility]) 08/17/2017    Coronary artery disease involving native coronary artery of native heart without angina pectoris 6/17/2011    DDD (degenerative disc disease), lumbar     Falls 08/17/2017    GI bleeding     Hyperlipidemia     Hypertension     MI (myocardial infarction) ([address]     MRSA (methicillin resistant staph aureus) culture positive     h/o infection in the blood    Osteomyelitis ([facility])     left foot    Pneumonia     S/P PTCA (percutaneous transluminal coronary angioplasty) stents x 8    SSS (sick sinus syndrome) ([address]     Unspecified sleep apnea     Venous (peripheral) insufficiency 12/4/2018       Past Surgical History: All past surgical history was reviewed today.     Past Surgical History:   Procedure Laterality Date    APPENDECTOMY      CARDIAC PACEMAKER PLACEMENT      CARPAL TUNNEL RELEASE      CORONARY ANGIOPLASTY      CORONARY ANGIOPLASTY WITH STENT PLACEMENT      DIAGNOSTIC CARDIAC CATH LAB PROCEDURE      EYE SURGERY      PACEMAKER CHANGE  10/2016    PACEMAKER PLACEMENT         Family History: All family history was reviewed today. Problem Relation Age of Onset    Cancer Sister     Coronary Art Dis Brother        Objective:       PHYSICAL EXAM:      Vitals:   Vitals:    08/27/21 0243 08/27/21 0424 08/27/21 0643 08/27/21 0800   BP:  114/67  (!) 127/57   Pulse: 62 71 79 66   Resp:  16  16   Temp:  98.1 °F (36.7 °C)  98.3 °F (36.8 °C)   TempSrc:  Temporal  Oral   SpO2:  98%  99%   Weight:  166 lb 3.6 oz (75.4 kg)     Height:           Physical Exam  Vitals and nursing note reviewed. Constitutional:       Appearance: Normal appearance. He is well-developed. HENT:      Head: Normocephalic and atraumatic. Right Ear: External ear normal.      Left Ear: External ear normal.      Nose: Nose normal. No congestion or rhinorrhea. Mouth/Throat:      Mouth: Mucous membranes are moist.      Pharynx: No oropharyngeal exudate or posterior oropharyngeal erythema. Eyes:      General: No scleral icterus. Right eye: No discharge. Left eye: No discharge. Conjunctiva/sclera: Conjunctivae normal.      Pupils: Pupils are equal, round, and reactive to light. Cardiovascular:      Rate and Rhythm: Normal rate and regular rhythm. Pulses: Normal pulses. Heart sounds: No murmur heard. No friction rub. Pulmonary:      Effort: Pulmonary effort is normal. No respiratory distress. Breath sounds: Normal breath sounds. No stridor. No wheezing, rhonchi or rales. Abdominal:      General: Bowel sounds are normal.      Palpations: Abdomen is soft. Tenderness: There is no abdominal tenderness. There is no right CVA tenderness, left CVA tenderness, guarding or rebound. Musculoskeletal:         General: Swelling and tenderness present. Normal range of motion. Cervical back: Normal range of motion and neck supple. No rigidity. No muscular tenderness. Comments: Left second toe. Lymphadenopathy:      Cervical: No cervical adenopathy. Skin:     General: Skin is warm and dry. Coloration: Skin is not jaundiced. Findings: No erythema or rash. Neurological:      General: No focal deficit present. Mental Status: He is alert and oriented to person, place, and time. Mental status is at baseline. Motor: No abnormal muscle tone. Psychiatric:         Mood and Affect: Mood normal.         Behavior: Behavior normal.         Thought Content: Thought content normal.           Lines and drains: All vascular access sites are healthy with no local erythema, discharge or tenderness. Intake and output:    I/O last 3 completed shifts: In: 250 [P.O.:240; I.V.:10]  Out: -     Lab Data:   All available labs and old records have been reviewed by me. CBC:  Recent Labs     08/24/21  1650 08/25/21  0613   WBC 6.5 6.7   RBC 3.91* 3.85*   HGB 11.2* 10.8*   HCT 33.1* 32.4*    274   MCV 84.5 84.1   MCH 28.6 28.0   MCHC 33.8 33.3   RDW 16.1* 16.3*        BMP:  Recent Labs     08/25/21  0613 08/26/21  0610 08/27/21  0524    139 140   K 3.8 3.8 3.3*   CL 99 100 99   CO2 27 27 29   BUN 18 22* 20   CREATININE 1.1 1.4* 1.2   CALCIUM 8.8 8.5 8.6   GLUCOSE 92 93 92        Hepatic Function Panel:   Lab Results   Component Value Date    ALKPHOS 90 08/25/2021    ALT 13 08/25/2021    AST 25 08/25/2021    PROT 7.9 08/25/2021    PROT 7.3 02/21/2013    BILITOT 0.5 08/25/2021    BILIDIR <0.2 03/17/2017    IBILI see below 03/17/2017    LABALBU 3.3 08/25/2021       CPK:   Lab Results   Component Value Date    CKTOTAL 268 (H) 03/06/2012     ESR:   Lab Results   Component Value Date    SEDRATE 85 (H) 08/25/2021     CRP:   Lab Results   Component Value Date    CRP 27.6 (H) 08/25/2021           Imaging:     All pertinent images and reports for the current visit were reviewed by me during this visit. MRI FOOT LEFT WO CONTRAST   Final Result   Findings compatible with osteomyelitis involving the 2nd toe middle phalanx   and distal phalanx. There is subcortical edema noted within the proximal phalanx of the 2nd toe,   but without decreased T1 signal.  With this phalanx, both osteomyelitis and   reactive osteitis are considerations. VL Extremity Venous Left   Final Result      XR CHEST PORTABLE   Final Result      XR FOOT LEFT (MIN 3 VIEWS)   Final Result   Osteomyelitis of the 2nd toe with associated displaced pathologic fracture,   as discussed             Medications: All current and past medications were reviewed.      furosemide  40 mg Oral BID    metoprolol succinate  12.5 mg Oral Daily    dronedarone hcl  400 mg Oral BID WC    pantoprazole  40 mg Oral QAM AC    rosuvastatin  5 mg Oral Nightly    sodium chloride flush  5-40 mL IntraVENous 2 times per day    cefepime  2,000 mg IntraVENous Q12H    vancomycin  1,000 mg IntraVENous Q24H    lidocaine  1 patch Transdermal Daily    fentaNYL  1 patch Transdermal Q48H        sodium chloride 25 mL (08/25/21 2248)       morphine, oxyCODONE, sodium chloride flush, sodium chloride, polyethylene glycol, acetaminophen **OR** acetaminophen, potassium chloride **OR** potassium alternative oral replacement **OR** potassium chloride, magnesium sulfate, fluticasone      Problem list:       Patient Active Problem List   Diagnosis Code    Hyperlipidemia E78.5    Benign prostatic hyperplasia N40.0    Paroxysmal atrial fibrillation (HCC) I48.0    Coronary artery disease involving native coronary artery of native heart without angina pectoris I25.10    Cardiac pacemaker in situ Z95.0    Postsurgical percutaneous transluminal coronary angioplasty status Z98.61    History of nonmelanoma skin cancer Z85.828    Tinea manuum, pedis, and unguium B35.2, B35.3, B35.1    AK (actinic keratosis) L57.0    Chronic congestive heart failure (HCC) I50.9    Essential hypertension I10    Sick sinus syndrome (HCC) I49.5    Bradycardia R00.1    Gastrointestinal hemorrhage K92.2    Pure hyperglyceridemia E78.1    Localized edema R60.0    Venous (peripheral) insufficiency I87.2    PVD (peripheral vascular disease) (HCC) I73.9    Solar purpura (HCC) D69.2    Osteomyelitis of second toe of left foot (HCC) M86.9    Bilateral cellulitis of lower leg L03.116, L03.115    Open fracture of second toe of left foot S92.502B    History of MRSA infection Z86.14    Failure of outpatient treatment Z78.9       Please note that this chart was generated using Dragon dictation software. Although every effort was made to ensure the accuracy of this automated transcription, some errors in transcription may have occurred inadvertently. If you may need any clarification, please do not hesitate to contact me through EP[employer] or at the phone number provided below with my electronic signature. Any pictures or media included in this note were obtained after taking informed verbal consent from the patient and with their approval to include those in the patient's medical record.     Fausto Woods MD, MPH  8/27/2021 , 9:53 AM   [facility]   [address] [place] [address], [address] [place] [address]  Office: [phone]  Fax: [phone]  Clinic days:  Tuesday & Thursday

## 2021-08-27 NOTE — ANESTHESIA POSTPROCEDURE EVALUATION
Department of Anesthesiology  Postprocedure Note    Patient: Kirt Jimenez  MRN: [id number]  YOB: 1940  Date of evaluation: 8/27/2021  Time:  3:24 PM     Procedure Summary     Date: 08/27/21 Room / Location: [address]    Anesthesia Start: 2769 Anesthesia Stop: 4761    Procedures:       INCISION AND DRAINAGE LEFT FOOT (Left )      AMPUTATION OF LEFT SECOND TOE (Left ) Diagnosis: (OSTEOMYELITIS)    Surgeons: Modesto Nuñez DPM Responsible Provider: Gail Ellis MD    Anesthesia Type: MAC ASA Status: 3          Anesthesia Type: MAC    Corrie Phase I:      Corrie Phase II:      Last vitals: Reviewed and per EMR flowsheets.        Anesthesia Post Evaluation    Patient location during evaluation: PACU  Patient participation: complete - patient participated  Level of consciousness: awake and alert  Pain score: 2  Airway patency: patent  Nausea & Vomiting: no vomiting  Complications: no  Cardiovascular status: blood pressure returned to baseline  Respiratory status: acceptable  Hydration status: euvolemic

## 2021-08-27 NOTE — ANESTHESIA PRE PROCEDURE
succinate (TOPROL XL) 25 MG extended release tablet TAKE ONE-HALF (1/2) TABLET TWICE A DAY 2/22/21   Timothydario Ware, APRN - CNP   MULTAQ 400 MG TABS TAKE 1 TABLET TWICE A DAY WITH MEALS 2/22/21   Tra MayesSCOTT CNP   vitamin B-12 (CYANOCOBALAMIN) 500 MCG tablet Take 500 mcg by mouth daily    Historical Provider, MD   fentaNYL (DURAGESIC) 50 MCG/HR Place 1 patch onto the skin every 48 hours 7/13/15   Janet Molina MD       Current medications:    No current facility-administered medications for this visit. No current outpatient medications on file.      Facility-Administered Medications Ordered in Other Visits   Medication Dose Route Frequency Provider Last Rate Last Admin    meperidine (DEMEROL) injection 12.5 mg  12.5 mg IntraVENous Q5 Min PRN Mathews Landau, MD        HYDROmorphone (DILAUDID) injection 0.25 mg  0.25 mg IntraVENous Q5 Min PRN Mathews Landau, MD        fentaNYL (SUBLIMAZE) injection 50 mcg  50 mcg IntraVENous Q5 Min PRN Mathews Landau, MD        HYDROmorphone (DILAUDID) injection 0.25 mg  0.25 mg IntraVENous Q5 Min PRN Mathews Landau, MD        HYDROmorphone (DILAUDID) injection 0.5 mg  0.5 mg IntraVENous Q5 Min PRN Mathews Landau, MD        oxyCODONE (ROXICODONE) immediate release tablet 5 mg  5 mg Oral PRN Mathews Landau, MD        Or    oxyCODONE (ROXICODONE) immediate release tablet 10 mg  10 mg Oral PRN Mathews Landau, MD        promethazine (PHENERGAN) injection 6.25 mg  6.25 mg IntraVENous PRN Mathews Landau, MD        diphenhydrAMINE (BENADRYL) injection 12.5 mg  12.5 mg IntraVENous Once PRN Mathews Landau, MD        labetalol (NORMODYNE;TRANDATE) injection 5 mg  5 mg IntraVENous Q10 Min PRN Mathews Landau, MD        morphine (PF) injection 2 mg  2 mg IntraVENous Q4H PRN Nelda Cueva PA-C   2 mg at 08/26/21 1333    oxyCODONE (ROXICODONE) immediate release tablet 5 mg  5 mg Oral Q4H PRN Nelda Cueva PA-C   5 mg at 08/27/21 1155    furosemide (LASIX) tablet 40 mg  40 mg Oral BID Amy Acuña MD   40 mg at 08/26/21 1747    metoprolol succinate (TOPROL XL) extended release tablet 12.5 mg  12.5 mg Oral Daily Janessa Anders MD   12.5 mg at 08/27/21 0805    dronedarone hcl (MULTAQ) tablet 400 mg  400 mg Oral BID WC Janessa Anders MD   400 mg at 08/26/21 1747    pantoprazole (PROTONIX) tablet 40 mg  40 mg Oral QAM AC Janessa Anders MD   40 mg at 08/27/21 0643    rosuvastatin (CRESTOR) tablet 5 mg  5 mg Oral Nightly Janessa Anders MD   5 mg at 08/26/21 2119    sodium chloride flush 0.9 % injection 5-40 mL  5-40 mL IntraVENous 2 times per day Amy Acuña MD   10 mL at 08/27/21 0810    sodium chloride flush 0.9 % injection 5-40 mL  5-40 mL IntraVENous PRN Janessa Anders MD        0.9 % sodium chloride infusion  25 mL IntraVENous PRN Amy Acuña  mL/hr at 08/25/21 2248 25 mL at 08/25/21 2248    polyethylene glycol (GLYCOLAX) packet 17 g  17 g Oral Daily PRN Amy Acuña MD        acetaminophen (TYLENOL) tablet 650 mg  650 mg Oral Q6H PRN Amy Acuña MD        Or    acetaminophen (TYLENOL) suppository 650 mg  650 mg Rectal Q6H PRN Amy Acuña MD        potassium chloride (KLOR-CON M) extended release tablet 40 mEq  40 mEq Oral PRN Amy Acuña MD   40 mEq at 08/27/21 7886    Or    potassium bicarb-citric acid (EFFER-K) effervescent tablet 40 mEq  40 mEq Oral PRN Amy Acuña MD        Or    potassium chloride 10 mEq/100 mL IVPB (Peripheral Line)  10 mEq IntraVENous PRN Amy Acuña MD        magnesium sulfate 2000 mg in 50 mL IVPB premix  2,000 mg IntraVENous PRN Janessa Anders MD        cefepime (MAXIPIME) 2000 mg IVPB minibag  2,000 mg IntraVENous Q12H Amy Acuña MD   Stopped at 08/27/21 0913    vancomycin 1000 mg IVPB in 250 mL D5W addavial  1,000 mg IntraVENous Q24H Amy Acuña MD   Stopped at 08/26/21 3816    fluticasone (FLONASE) 50 MCG/ACT nasal spray 2 spray  2 spray Each Nostril Daily PRN Kyleigh Toledo MD        lidocaine 4 % external patch 1 patch  1 patch Transdermal Daily Eli Reza PA-C   1 patch at 08/25/21 1602    fentaNYL (DURAGESIC) 50 MCG/HR 1 patch  1 patch Transdermal Q48H Toya Noble PA-C   1 patch at 08/26/21 0141       Allergies:     Allergies   Allergen Reactions    Avelox [Moxifloxacin Hcl In Nacl] Anaphylaxis    Epinephrine Base     Other      [place] per PT - swelling size of silver dollar at site per PT     Keflex [Cephalexin] Nausea And Vomiting    Polysporin [Bacitracin-Polymyxin B] Rash       Problem List:    Patient Active Problem List   Diagnosis Code    Hyperlipidemia E78.5    Benign prostatic hyperplasia N40.0    Paroxysmal atrial fibrillation (HCC) I48.0    Coronary artery disease involving native coronary artery of native heart without angina pectoris I25.10    Cardiac pacemaker in situ Z95.0    Postsurgical percutaneous transluminal coronary angioplasty status Z98.61    History of nonmelanoma skin cancer Z85.828    Tinea manuum, pedis, and unguium B35.2, B35.3, B35.1    AK (actinic keratosis) L57.0    Chronic congestive heart failure ([facility]) I50.9    Essential hypertension I10    Sick sinus syndrome ([facility]) I49.5    Bradycardia R00.1    Gastrointestinal hemorrhage K92.2    Pure hyperglyceridemia E78.1    Localized edema R60.0    Venous (peripheral) insufficiency I87.2    PVD (peripheral vascular disease) ([facility]) I73.9    Solar purpura ([facility]) D69.2    Osteomyelitis of second toe of left foot ([facility]) M86.9    Bilateral cellulitis of lower leg L03.116, L03.115    Open fracture of second toe of left foot S92.502B    History of MRSA infection Z86.14    Failure of outpatient treatment Z78.9    Subacute osteomyelitis of left foot ([address] M86.272    Encounter for medication counseling Z71.89       Past Medical History:        Diagnosis Date    Allergic rhinitis     Atrial fibrillation ([facility])     Benign prostatic hypertrophy     CAD (coronary artery disease)     Cancer (HCC)     skin    CHF [facility]estive heart failure) [facility] 2017    Coronary artery disease involving native coronary artery of native heart without angina pectoris 2011    DDD (degenerative disc disease), lumbar     Falls 2017    GI bleeding     Hyperlipidemia     Hypertension     MI (myocardial infarction) [facility]     MRSA (methicillin resistant staph aureus) culture positive     h/o infection in the blood    Osteomyelitis ([facility])     left foot    Pneumonia     S/P PTCA (percutaneous transluminal coronary angioplasty) stents x 8    SSS (sick sinus syndrome) ([facility])     Unspecified sleep apnea     Venous (peripheral) insufficiency 2018       Past Surgical History:        Procedure Laterality Date    APPENDECTOMY      CARDIAC PACEMAKER PLACEMENT      CARPAL TUNNEL RELEASE      CORONARY ANGIOPLASTY      CORONARY ANGIOPLASTY WITH STENT PLACEMENT      DIAGNOSTIC CARDIAC CATH LAB PROCEDURE      EYE SURGERY      PACEMAKER CHANGE  10/2016    PACEMAKER PLACEMENT         Social History:    Social History     Tobacco Use    Smoking status: Former Smoker     Packs/day: 1.00     Years: 30.00     Pack years: 30.00     Quit date: 2004     Years since quittin.3    Smokeless tobacco: Never Used   Substance Use Topics    Alcohol use: No     Alcohol/week: 0.0 standard drinks                                Counseling given: Not Answered      Vital Signs (Current): There were no vitals filed for this visit.                                            BP Readings from Last 3 Encounters:   21 127/74   21 124/76   21 128/62       NPO Status:                                                                                 BMI:   Wt Readings from Last 3 Encounters:   21 166 lb 3.6 oz (75.4 kg)   21 169 lb (76.7 kg)   21 163 lb 6.4 oz (74.1 kg)     There is no height or weight on file to calculate BMI.    CBC:   Lab Results   Component Value Date    WBC 6.7 08/25/2021    RBC 3.85 08/25/2021    HGB 10.8 08/25/2021    HCT 32.4 08/25/2021    MCV 84.1 08/25/2021    RDW 16.3 08/25/2021     08/25/2021       CMP:   Lab Results   Component Value Date     08/27/2021    K 3.3 08/27/2021    CL 99 08/27/2021    CO2 29 08/27/2021    BUN 20 08/27/2021    CREATININE 1.2 08/27/2021    GFRAA >60 08/27/2021    GFRAA >60 02/21/2013    AGRATIO 0.7 08/25/2021    LABGLOM 58 08/27/2021    GLUCOSE 92 08/27/2021    PROT 7.9 08/25/2021    PROT 7.3 02/21/2013    CALCIUM 8.6 08/27/2021    BILITOT 0.5 08/25/2021    ALKPHOS 90 08/25/2021    AST 25 08/25/2021    ALT 13 08/25/2021       POC Tests: No results for input(s): POCGLU, POCNA, POCK, POCCL, POCBUN, POCHEMO, POCHCT in the last 72 hours.     Coags:   Lab Results   Component Value Date    PROTIME 21.4 08/27/2021    INR 1.85 08/27/2021    APTT 28.5 03/17/2017       HCG (If Applicable): No results found for: PREGTESTUR, PREGSERUM, HCG, HCGQUANT     ABGs: No results found for: PHART, PO2ART, EZM8EQH, RBJ0MLX, BEART, B8EHNZKZ     Type & Screen (If Applicable):  No results found for: LABABO, LABRH    Drug/Infectious Status (If Applicable):  No results found for: HIV, HEPCAB    COVID-19 Screening (If Applicable): No results found for: COVID19        Anesthesia Evaluation  Patient summary reviewed and Nursing notes reviewed no history of anesthetic complications:   Airway: Mallampati: II  TM distance: >3 FB   Neck ROM: full  Mouth opening: > = 3 FB Dental:          Pulmonary:   (+) sleep apnea:                             Cardiovascular:    (+) hypertension:, pacemaker:, past MI:, CAD:, dysrhythmias: atrial fibrillation, CHF:,       ECG reviewed  Rhythm: regular  Rate: normal  Echocardiogram reviewed         Beta Blocker:  Dose within 24 Hrs      ROS comment: -Normal left ventricle size, wall thickness with low normal systolic   function with an estimated ejection fraction of 50%. -No diagnostic regional wall motion abnormalities noted.   -Abnormal (paradoxical) septal motion is present likely due to right   ventricular pacing.   -Grade II diastolic dysfunction with elevated LV filling   pressures. E/e\"=12.4.   -Trivial mitral regurgitation.   -The aortic valve is mildly thickened/calcified with normal gradients. -Moderate tricuspid regurgitation.   -Estimated pulmonary artery systolic pressure is mildly elevated at 40 mmHg   assuming a right atrial pressure of 8 mmHg. -The left atrium is mildly dilated. SSS- pacemaker     Neuro/Psych:               GI/Hepatic/Renal:   (+) GERD: well controlled,           Endo/Other:    (+) blood dyscrasia: anticoagulation therapy:., malignancy/cancer ( skin). Abdominal:             Vascular:   + PVD, aortic or cerebral, . Other Findings:               Anesthesia Plan      MAC     ASA 3       Induction: intravenous. Anesthetic plan and risks discussed with patient. Plan discussed with attending.                   Cosmo Goodpasture, MD   8/27/2021

## 2021-08-27 NOTE — PLAN OF CARE
Problem: Falls - Risk of:  Goal: Will remain free from falls  Description: Will remain free from falls  8/27/2021 1032 by Vicky Angeles RN  Outcome: Ongoing  Note: Pt walks throughout room independently, using cane. Call light within reach. Pt calls appropriately when assistance is needed. Problem: Musculor/Skeletal Functional Status  Goal: Absence of falls  Outcome: Ongoing     Problem: Pain:  Goal: Pain level will decrease  Description: Pain level will decrease  Outcome: Ongoing  Note: Per pt, pain has decreased throughout the night.  Cont IV and PO PRN pain medication     Problem: Nutrition  Goal: Optimal nutrition therapy  Outcome: Ongoing  Note: Pt NPO for surgery today

## 2021-08-27 NOTE — PROGRESS NOTES
Physical Therapy  Facility/Department: [facility] 3A NURSING  Daily Treatment Note  NAME: Eli Dhillon  : 1940  MRN: [id number]    Date of Service: 2021     Discharge Recommendations:  Eli Dhillon scored a 17/24 on the AM-PAC short mobility form. Current research shows that an AM-PAC score of 18 or greater is typically associated with a discharge to the patient's home setting. Based on the patient's AM-PAC score and their current functional mobility deficits, it is recommended that the patient have 2-3 sessions per week of Physical Therapy at d/c to increase the patient's independence. At this time, this patient demonstrates the endurance and safety to discharge home with HHPT (home vs OP services) and a follow up treatment frequency of 2-3x/wk. Please see assessment section for further patient specific details. If patient discharges prior to next session this note will serve as a discharge summary. Please see below for the latest assessment towards goals. HOME HEALTH CARE: LEVEL 2 SOCIAL     - Initial home health evaluation to occur within 24-48 hours, in patient home   - Therapy to evaluate with goal of regaining prior level of functioning   - Therapy to evaluate if patient has [id number] needs for personal care   -  evaluation within 24-48 hours, includes evaluation of resources and insurance to determine AL/IL/LTC/Medicaid options   - [place] on Aging Referral   - Dustin Nobles to discuss home support and care needs post discharge within two weeks of discharge. S Level 2, 2-3 sessions per week   PT Equipment Recommendations  Equipment Needed: No    Assessment   Body structures, Functions, Activity limitations: Decreased functional mobility ; Decreased ROM; Decreased ADL status; Decreased strength;Decreased balance;Decreased sensation;Decreased endurance; Increased pain;Decreased posture  Assessment: Pt continues to be limited by back/LE pain in completing increased distances this date. Pt requires CGA/SBA for ambulation with SPC demonstrating increased trunk sway compared to previous session, which is likely d/t dizziness d/t decreased intake of fluids/food d/t NPO diet. BP remains stable. 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 Treatment Diagnosis: decreased functional mobility and increased pain  Prognosis: Fair  Decision Making: Medium Complexity  PT Education: PT Role;Plan of Care;General Safety;Goals  Patient Education: benefits of mobility and stengthening for decreased back pain and improved mobility after upcoming surgery- pt and alvaugheter verbalize understanding  Barriers to Learning: none  REQUIRES PT FOLLOW UP: Yes  Activity Tolerance  Activity Tolerance: Patient limited by pain; Patient limited by endurance  Activity Tolerance: Pt limited by dizziness and pain in LE/back limiting him from completing increased distance. Pt experiences dizziness during ambulation however bP remains stable. Patient Diagnosis(es): The primary encounter diagnosis was Osteomyelitis of second toe of left foot ([address] Diagnoses of Bilateral cellulitis of lower leg, Chronic congestive heart failure, unspecified heart failure type ([address] and Open fracture of phalanx of left second toe, initial encounter were also pertinent to this visit.      has a past medical history of Allergic rhinitis, Atrial fibrillation ([address] Benign prostatic hypertrophy, CAD (coronary artery disease), Cancer ([address] CHF (congestive heart failure) ([address] Coronary artery disease involving native coronary artery of native heart without angina pectoris, DDD (degenerative disc disease), lumbar, Falls, GI bleeding, Hyperlipidemia, Hypertension, MI (myocardial infarction) ([address] MRSA (methicillin resistant staph aureus) culture positive, Osteomyelitis ([address] Pneumonia, S/P PTCA (percutaneous transluminal coronary angioplasty), SSS (sick sinus syndrome) ([address] Unspecified sleep apnea, and Venous (peripheral) insufficiency. has a past surgical history that includes Carpal tunnel release; Coronary angioplasty with stent; Diagnostic Cardiac Cath Lab Procedure; Coronary angioplasty; Cardiac pacemaker placement; Appendectomy; pacemaker placement; eye surgery; and Pacemaker Change (10/2016). Restrictions  Restrictions/Precautions  Restrictions/Precautions: Fall Risk (moderate fall risk)  Required Braces or Orthoses?: No  Position Activity Restriction  Other position/activity restrictions: Has been having issues w/ Cellulitis LLE X few weeks now . Has been on PO Clindamycin as OP and has had 2-3 courses of same w/ his PCP  For the cellulitis issue. Since 1-2 days , he has been having an Open wound on the left foot 2nd toe and also having inc redness and erythema and edema in LLE . LLE is always edematous @ Baseline as well per patient . Hence he came to ED for further evaluation  Subjective   General  Chart Reviewed: Yes  Response To Previous Treatment: Not applicable  Family / Caregiver Present: Yes (dino)  Subjective  Subjective: Pt seated in recliner upon approach with daughter and RN present and is agreeable to PT. Pt reporting 4/10 pain in L LE and back- RN aware and administers medication during session.           Orientation     Cognition      Objective      Transfers  Sit to Stand: Stand by assistance  Stand to sit: Stand by assistance  Comment: sit<>stad from recliner with SPC  Ambulation  Ambulation?: Yes  Ambulation 1  Surface: level tile  Device: Single point cane  Assistance: Stand by assistance;Contact guard assistance (fluctuating between SBA/ CGA due to periods of increased lateral trunk sway/instability, and pt reported dizziness)  Quality of Gait: extremely kyperkyphotic, with forward headposture, leaning anterior on cane placed out in front of his NICA- daughter present and reports this is the posture he normally ambulates in at baseline  Distance: 10', 50'  Comments: Pt reports dizziness in 10' ambulation and sitd at EOB to have BP taken and medicine administered. BP remains stable however mild dizziness remains throuhgout ambulaiton. Pt reports he thinks he is dizzy because of having no food/fluids this morning d/t NPO. Pt limited by increase in back pain (not rated) during 50' ambulation.      Balance  Posture: Fair  Sitting - Static: Fair (supervision with UE support while seated EOB)  Sitting - Dynamic: Fair (SBA without UE support at EOB)  Standing - Static: Fair;- (SBA with UL UE support on cane)  Standing - Dynamic: Fair;- (SBA/CGA during ambulation with SPC)                           G-Code     OutComes Score                                                     AM-PAC Score  AM-PAC Inpatient Mobility Raw Score : 17 (08/27/21 1223)  AM-PAC Inpatient T-Scale Score : 42.13 (08/27/21 1223)  Mobility Inpatient CMS 0-100% Score: 50.57 (08/27/21 1223)  Mobility Inpatient CMS G-Code Modifier : CK (08/27/21 1223)          Goals  Short term goals  Time Frame for Short term goals: prior to d/c  Short term goal 1: pt will perform supine to sit independently  Short term goal 2: pt will perform sit to stand with single point cane MOD I  Short term goal 3: pt will ambulate 100' with single point cane and SBA  Short term goal 4: pt will negotiate curb step with single point cane and SBA    Plan    Plan  Times per week: 3-5x  Times per day: Daily  Current Treatment Recommendations: Strengthening, Balance Training, Functional Mobility Training, Transfer Training, Endurance Training, Gait Training, Stair training, Safety Education & Training, Home Exercise Program, Positioning, Modalities, Equipment Evaluation, Education, & procurement, Patient/Caregiver Education & Training, Pain Management, Neuromuscular Re-education  Safety Devices  Type of devices: Left in chair, Call light within reach, Nurse notified, Gait belt, Patient at risk for falls (Pt cleared to be up at becky in room by RN, daughter present to provide supervision)  Restraints  Initially in place: No     Therapy Time   Individual Concurrent Group Co-treatment   Time In 1150         Time Out 1200         Minutes 10         Timed Code Treatment Minutes: [place] 12, [facility]

## 2021-08-27 NOTE — PROGRESS NOTES
Pt ready for transfer to room 474. This RN called report to KRISS Armenta. Pt being transported to floor by [facility]. Pt's belongings taken up with the pt.

## 2021-08-27 NOTE — PROGRESS NOTES
[facility] PROGRESS NOTE    8/27/2021 1:01 PM        Name: Excell Crigler . Admitted: 8/24/2021  Primary Care Provider: Kelly Morgan MD (Tel: [phone])      Subjective:  . Patient admitted with osteo of L 2nd toe. Has chronic back pain which is worse today-toradol, norco lidoderm patch did not help. Has neuropathy so he denies pain in foot. Going to OR today.  He is eager to go home     Reviewed interval ancillary notes    Current Medications  meperidine (DEMEROL) injection 12.5 mg, Q5 Min PRN  HYDROmorphone (DILAUDID) injection 0.25 mg, Q5 Min PRN  fentaNYL (SUBLIMAZE) injection 50 mcg, Q5 Min PRN  HYDROmorphone (DILAUDID) injection 0.25 mg, Q5 Min PRN  HYDROmorphone (DILAUDID) injection 0.5 mg, Q5 Min PRN  oxyCODONE (ROXICODONE) immediate release tablet 5 mg, PRN   Or  oxyCODONE (ROXICODONE) immediate release tablet 10 mg, PRN  promethazine (PHENERGAN) injection 6.25 mg, PRN  diphenhydrAMINE (BENADRYL) injection 12.5 mg, Once PRN  labetalol (NORMODYNE;TRANDATE) injection 5 mg, Q10 Min PRN  morphine (PF) injection 2 mg, Q4H PRN  oxyCODONE (ROXICODONE) immediate release tablet 5 mg, Q4H PRN  furosemide (LASIX) tablet 40 mg, BID  metoprolol succinate (TOPROL XL) extended release tablet 12.5 mg, Daily  dronedarone hcl (MULTAQ) tablet 400 mg, BID WC  pantoprazole (PROTONIX) tablet 40 mg, QAM AC  rosuvastatin (CRESTOR) tablet 5 mg, Nightly  sodium chloride flush 0.9 % injection 5-40 mL, 2 times per day  sodium chloride flush 0.9 % injection 5-40 mL, PRN  0.9 % sodium chloride infusion, PRN  polyethylene glycol (GLYCOLAX) packet 17 g, Daily PRN  acetaminophen (TYLENOL) tablet 650 mg, Q6H PRN   Or  acetaminophen (TYLENOL) suppository 650 mg, Q6H PRN  potassium chloride (KLOR-CON M) extended release tablet 40 mEq, PRN   Or  potassium bicarb-citric acid (EFFER-K) effervescent tablet 40 mEq, PRN   Or  potassium chloride 10 mEq/100 mL IVPB (Peripheral Line), PRN  magnesium sulfate 2000 mg in 50 mL IVPB premix, PRN  cefepime (MAXIPIME) 2000 mg IVPB minibag, Q12H  vancomycin 1000 mg IVPB in 250 mL D5W addavial, Q24H  fluticasone (FLONASE) 50 MCG/ACT nasal spray 2 spray, Daily PRN  lidocaine 4 % external patch 1 patch, Daily  fentaNYL (DURAGESIC) 50 MCG/HR 1 patch, Q48H        Objective:  BP (!) 154/69   Pulse 60   Temp 97.5 °F (36.4 °C) (Temporal)   Resp 16   Ht 5' 9\" (1.753 m)   Wt 166 lb 3.6 oz (75.4 kg)   SpO2 96%   BMI 24.55 kg/m²     Intake/Output Summary (Last 24 hours) at 8/27/2021 1301  Last data filed at 8/26/2021 2120  Gross per 24 hour   Intake 250 ml   Output    Net 250 ml      Wt Readings from Last 3 Encounters:   08/27/21 166 lb 3.6 oz (75.4 kg)   07/19/21 169 lb (76.7 kg)   07/07/21 163 lb 6.4 oz (74.1 kg)       General appearance:  Appears comfortable  Eyes: Sclera clear. Pupils equal.  ENT: Moist oral mucosa. Trachea midline, no adenopathy. Cardiovascular: Regular rhythm, normal S1, S2. No murmur. No edema in lower extremities  Respiratory: Not using accessory muscles. Good inspiratory effort. Clear to auscultation bilaterally, no wheeze or crackles. GI: Abdomen soft, no tenderness, not distended, normal bowel sounds  Musculoskeletal: No cyanosis in digits, neck supple  Neurology: CN 2-12 grossly intact. No speech or motor deficits  Psych: Normal affect.  Alert and oriented in time, place and person  Skin: Warm, dry, normal turgor    Labs and Tests:  CBC:   Recent Labs     08/24/21  1650 08/25/21  0613   WBC 6.5 6.7   HGB 11.2* 10.8*    274     BMP:    Recent Labs     08/25/21  0613 08/26/21  0610 08/27/21  0524    139 140   K 3.8 3.8 3.3*   CL 99 100 99   CO2 27 27 29   BUN 18 22* 20   CREATININE 1.1 1.4* 1.2   GLUCOSE 92 93 92     Hepatic:   Recent Labs     08/24/21  1650 08/25/21  0613   AST 27 25   ALT 13 13   BILITOT 0.5 0.5   ALKPHOS 87 90     Xray L foot  Osteomyelitis of the 2nd toe with associated displaced pathologic fracture,   as discussed     MRI L foot  Impression:     Findings compatible with osteomyelitis involving the 2nd toe middle phalanx   and distal phalanx. There is subcortical edema noted within the proximal phalanx of the 2nd toe,   but without decreased T1 signal.  With this phalanx, both osteomyelitis and   reactive osteitis are considerations. Venous Duplex L leg  No evidence of deep vein or superficial thrombosis involving the left lower    extremity and the contralateral proximal common femoral vein. Problem List  Active Problems:    Pure hyperglyceridemia    Hyperlipidemia    Benign prostatic hyperplasia    Paroxysmal atrial fibrillation ([facility])    Coronary artery disease involving native coronary artery of native heart without angina pectoris    Cardiac pacemaker in situ    Chronic congestive heart failure ([facility])    Essential hypertension    Sick sinus syndrome ([facility])    Venous (peripheral) insufficiency    PVD (peripheral vascular disease) ([facility])    Solar purpura ([address]    Osteomyelitis of second toe of left foot ([address]    Subacute osteomyelitis of left foot ([address]    Encounter for medication counseling  Resolved Problems:    * No resolved hospital problems. *       Assessment & Plan:   1. Osteomyelitis L 2nd toe-on IV abx. ID and podiatry on board. MRI confirms osteo in mid and distal 2nd toe. Cultures negative to date . Planning for amputation of toe today per Dr Norm Odonnell. 2. L leg swelling-likely from infection. Venous duplex negative. 3. Chronic anticoagulation-on coumadin for afib. INR 1.85 today. Resume coumadin. 4. Chronic back pain-exacerbated by laying on gurney all night. On morphine, oxycodone. Myron fentanyl patch-has worn this for years.          Diet: Diet NPO  Code:Full Code  DVT PPX: coumadin      Jami Marques PA-C   8/27/2021 1:01 PM

## 2021-08-27 NOTE — PROGRESS NOTES
Report given to [address] [facility] nurse. All belongings taken to [phone]. Pt daughter escorted to patient's new room.

## 2021-08-27 NOTE — PROGRESS NOTES
Pt arrived from OR to PACU bay 4. Report received from OR staff. Pt arouses easily to voice. Surgical dressing in place to left foot/left toe amp. Pt on 1.5 L NC, atrial paced, VSS. Will continue to monitor.

## 2021-08-27 NOTE — CONSULTS
Pharmacy to Dose Warfarin    Pharmacy consulted to dose warfarin for afib. INR Goal: 2-3    INR today: 1.85    Assessment/Plan:  - INR subtherapeutic today due to holding last 2 days for surgery   - Pt has I&D of L foot and amputation of L 2nd toe completed today, will resume warfarin tonight   - Give 5 mg tonight     Pharmacy will continue to follow.     Regina White, PharmD  Pharmacy Resident   [id number]

## 2021-08-27 NOTE — CONSULTS
Cardiac Electrophysiology Consultation   Date: 8/27/2021  Admit Date:  8/24/2021  Reason for Consultation: Consulted to see if pacemaker functioning well. Consult Requesting Physician: Cara Ramirez MD     Chief Complaint   Patient presents with    Cellulitis     chf pt that presents with edema in left leg. Pt has cellulitus for 8 weeks in both feet that has not cleared up. Pt has been treated with clindimycin without any improvement. HPI: Joanne Márquez is a Joanne Márquez is a 80 y.o. male with a past medical history of HTN, HLD, PAF, dCHF, CAD, SSS s/p dual chamber [employer] PPM (10/16, Dr. Hipolito Mckeon). Hx of diverticular GIB on Xarelto. Pt presented to hospital with left leg swelling. He was found to have cellulitis and osteomyelitis. EP consulted due to atrial fibrillation and possible pacemaker problem on telemetry. Interval Hx: Today, he is being seen for follow up. He is doing pretty well, eager for his surgery this afternoon. He remains in sinus rhythm with stable BP. No recurrent AF. No cardiac complaints. His main complaint is his chronic back pain     Patient seen and examined. Clinical notes reviewed. Telemetry reviewed. No new complaints today. No major events overnight. Denies having chest pain, palpitations, shortness of breath, orthopnea/PND, cough, or dizziness at the time of this visit.       Past Medical History:   Diagnosis Date    Allergic rhinitis     Atrial fibrillation (HCC)     Benign prostatic hypertrophy     CAD (coronary artery disease)     Cancer (HCC)     skin    [facility]estive [facility]) [facility] 08/17/2017    Coronary artery disease involving native coronary artery of native heart without angina pectoris 6/17/2011    DDD (degenerative disc disease), lumbar     Falls 08/17/2017    GI bleeding     Hyperlipidemia     Hypertension     MI (myocardial infarction) ([address]     MRSA (methicillin resistant staph aureus) culture positive     h/o infection in the blood    Osteomyelitis (HCC)     left foot    Pneumonia     S/P PTCA (percutaneous transluminal coronary angioplasty) stents x 8    SSS (sick sinus syndrome) (HCC)     Unspecified sleep apnea     Venous (peripheral) insufficiency 2018        Past Surgical History:   Procedure Laterality Date    APPENDECTOMY      CARDIAC PACEMAKER PLACEMENT      CARPAL TUNNEL RELEASE      CORONARY ANGIOPLASTY      CORONARY ANGIOPLASTY WITH STENT PLACEMENT      DIAGNOSTIC CARDIAC CATH LAB PROCEDURE      EYE SURGERY      PACEMAKER CHANGE  10/2016    PACEMAKER PLACEMENT         Allergies   Allergen Reactions    Avelox [Moxifloxacin Hcl In Nacl] Anaphylaxis    Epinephrine Base     Other      [place] per PT - swelling size of silver dollar at site per PT     Keflex [Cephalexin] Nausea And Vomiting    Polysporin [Bacitracin-Polymyxin B] Rash       Social History:  Reviewed. reports that he quit smoking about 17 years ago. He has a 30.00 pack-year smoking history. He has never used smokeless tobacco. He reports that he does not drink alcohol and does not use drugs. Family History:  Reviewed. family history includes Cancer in his sister; Coronary Art Dis in his brother. No premature CAD. Review of System:  Pertinent positives and negatives are mentioned in the HPI. The rest of the systems are negative. Physical Examination:  Vitals:    21 0800   BP: (!) 127/57   Pulse: 66   Resp: 16   Temp: 98.3 °F (36.8 °C)   SpO2: 99%        Intake/Output Summary (Last 24 hours) at 2021 1125  Last data filed at 2021 2120  Gross per 24 hour   Intake 250 ml   Output    Net 250 ml     In: 250 [P.O.:240;  I.V.:10]  Out: -    Wt Readings from Last 3 Encounters:   21 166 lb 3.6 oz (75.4 kg)   21 169 lb (76.7 kg)   21 163 lb 6.4 oz (74.1 kg)     Temp  Av.2 °F (36.8 °C)  Min: 97.5 °F (36.4 °C)  Max: 98.8 °F (37.1 °C)  Pulse  Av.3  Min: 62  Max: 111  BP  Min: 90/58  Max: 138/71  SpO2  Av.5 %  Min: 96 %  Max: 99 %    · Telemetry: Atrial paced rhythm  · Constitutional: Alert. Oriented to person, place, and time. No distress. · Head: Normocephalic and atraumatic. · Mouth/Throat: Lips appear moist. Oropharynx is clear and moist.  · Eyes: Conjunctivae normal. EOM are normal.   · Neck: Neck supple. Supple, no JVD  · Cardiovascular: Normal rate, regular rhythm. · Pulmonary/Chest: Bilateral respiratory sounds present. No wheeze or crackles  · Abdominal: Soft. Normal bowel sounds present. No distension, No tenderness. · Musculoskeletal:No pitting edema on right. · Neurological: Alert and oriented. Grossly normal with no focal neurological deficit. · Skin: wound in the left leg  · Psychiatric: No anxiety nor agitation. ·   Labs:  Reviewed. Recent Labs     21  0613 21  0610 21  0524    139 140   K 3.8 3.8 3.3*   CL 99 100 99   CO2 27 27 29   BUN 18 22* 20   CREATININE 1.1 1.4* 1.2     Recent Labs     21  1650 21  0613   WBC 6.5 6.7   HGB 11.2* 10.8*   HCT 33.1* 32.4*   MCV 84.5 84.1    274     Lab Results   Component Value Date    CKTOTAL 268 2012    CKMB 2.1 2012    TROPONINI <0.01 2021     Lab Results   Component Value Date    .0 2012     Lab Results   Component Value Date    PROTIME 21.4 2021    PROTIME 28.5 2021    PROTIME 42.8 2021    INR 1.85 2021    INR 2.43 2021    INR 3.59 2021     Lab Results   Component Value Date    CHOL 112 2015    HDL 42 2015    HDL 30 2012    TRIG 88 2015       Diagnostic and imaging results reviewed. ECG: Atrial paced rhythm. Echocardiogram which was done 2021 shows LVEF of 50%. Grade 2 diastolic dysfunction with elevated LV filling pressures. I independently reviewed and interpreted the ECG, telemetry, serology, echocardiographic results.     Scheduled Meds:   furosemide  40 mg Oral BID    metoprolol succinate  12.5 mg Oral Daily    dronedarone hcl  400 mg Oral BID WC    pantoprazole  40 mg Oral QAM AC    rosuvastatin  5 mg Oral Nightly    sodium chloride flush  5-40 mL IntraVENous 2 times per day    cefepime  2,000 mg IntraVENous Q12H    vancomycin  1,000 mg IntraVENous Q24H    lidocaine  1 patch Transdermal Daily    fentaNYL  1 patch Transdermal Q48H     Continuous Infusions:   sodium chloride 25 mL (08/25/21 4197)     PRN Meds:.morphine, oxyCODONE, sodium chloride flush, sodium chloride, polyethylene glycol, acetaminophen **OR** acetaminophen, potassium chloride **OR** potassium alternative oral replacement **OR** potassium chloride, magnesium sulfate, fluticasone     Problem List:   Patient Active Problem List    Diagnosis Date Noted    Pure hyperglyceridemia     Gastrointestinal hemorrhage     [facility]acute osteomyelitis [facility] left foot ([facility]     Encounter for medication counseling     Bilateral cellulitis of lower leg     Open fracture of second toe of left foot     History of MRSA infection     Failure of outpatient treatment     [facility]omyel[facility] second toe of [facility] foot [facility] 08/24/2021    Solar purpura [facility] 06/30/2021    PVD (peripheral vascular disease) ([facility] 11/09/2020    Localized edema 12/04/2018    Venous (peripheral) insufficiency 12/04/2018    Bradycardia     Sick sinus syndrome ([facility] 08/29/2016    Chronic congestive heart failure ([facility]     Essential hypertension     History of nonmelanoma skin cancer 10/01/2013    Tinea manuum, pedis, and unguium 10/01/2013    AK (actinic keratosis) 10/01/2013    Coronary artery disease involving native coronary artery of native heart without angina pectoris 06/17/2011    Cardiac pacemaker in situ 06/17/2011    Postsurgical percutaneous transluminal coronary angioplasty status 06/17/2011    Hyperlipidemia     Benign prostatic hyperplasia     Paroxysmal atrial fibrillation [facility])       Active Hospital Problems    Diagnosis Date Noted    Pure hyperglyceridemia [E78.1]      Priority: High    Subacute osteomyelitis of left foot ([facility] [M86.272]     Encounter for medication counseling [Z71.89]     Osteomyelitis of second toe of left foot ([facility] [M86.9] 08/24/2021    Solar purpura ([facility] [D69.2] 06/30/2021    PVD (peripheral vascular disease) ([facility] [I73.9] 11/09/2020    Venous (peripheral) insufficiency [I87.2] 12/04/2018    Sick sinus syndrome ([facility] [I49.5] 08/29/2016    Essential hypertension [I10]     Chronic congestive heart failure (HCC) [I50.9]     Coronary artery disease involving native coronary artery of native heart without angina pectoris [I25.10] 06/17/2011    Cardiac pacemaker in situ [Z95.0] 06/17/2011    Hyperlipidemia [E78.5]     Benign prostatic hyperplasia [N40.0]     Paroxysmal atrial fibrillation (HCC) [I48.0]          Consultation Assessment and Recommendation(s):  1. Sick sinus syndrome    - evaluation of pacemaker - it is functioning fine.      - He is mostly paced atrial paced 90.8% and  0.4%. LV function is preserved but needs to be monitored if ventricular pacing increases because this may induced pacemaker induced cardiomyopathy that would require upgrade to an LV lead. .    2. Has paroxysmal atrial fibrillation   -Device interrogation shows 1.8% atrial fibrillation burden. - Currently in NSR (will likely have recurrent AF, but is generally self limiting and will resolve on its own)  - Continue Toprol XL 12.5 mg QD, Multaq 400 mg BID     - [id number] score: high. High risk for stroke and thromboembolism. Anticoagulation is recommended.   ~ Coumadin on hold given high INR and plans for surgery; resume at d/c                 - Pt has declined PANCHITO closure with [facility] in previous discussion with his primary cardiologists      3. Osteomyelitis, Cellulitis              - On ABX              - Plans for amputation today              - Management per ID/podiatry.        Thank you for allowing me to participate in the care of Fadia Fernandez . If you have any questions/comments, please do not hesitate to contact us.       Electronically signed by Rafa Lin MD on 8/27/2021 at 11:25 AM  Cardiac Electrophysiology  [address]

## 2021-08-27 NOTE — BRIEF OP NOTE
Brief Postoperative Note      Patient: Misael Pringle  YOB: 1940  MRN: [id number]    Date of Procedure: 8/27/2021    Pre-Op Diagnosis: OSTEOMYELITIS    Post-Op Diagnosis: Same       Procedure(s):  INCISION AND DRAINAGE LEFT FOOT  AMPUTATION OF LEFT SECOND TOE    Surgeon(s):  Jose Vila DPM    Assistant:  First Assistant: Terrence Muhammad    Anesthesia: Monitor Anesthesia Care    Estimated Blood Loss (mL): less than 50     Complications: None    Specimens:   ID Type Source Tests Collected by Time Destination   1 :  Tissue Tissue CULTURE, TISSUE Jose Vila DPM 8/27/2021 1434    2 :  Tissue Tissue CULTURE, TISSUE Jose Vila DPM 8/27/2021 1435    A :  Tissue Tissue SURGICAL PATHOLOGY Jose Vila DPM 8/27/2021 1436        Implants:  * No implants in log *      Drains: * No LDAs found *    Findings: Clean margin after resection partial second toe at the midshaft of the proximal phalanx    Electronically signed by Jose Vila DPM on 8/27/2021 at 2:58 PM

## 2021-08-27 NOTE — CARE COORDINATION
Home IV benefits were ran. Pt's cost will be $60/day. Tonie diggs/Kelly spoke with pt about this cost and he is agreeable. Wyatt Lester reported she is still working to find a [email or web address].     Electronically signed by LUZ Abbott, LSW on 8/27/2021 at 2:06 PM

## 2021-08-27 NOTE — PLAN OF CARE
Problem: Falls - Risk of:  Goal: Will remain free from falls  Description: Will remain free from falls  8/27/2021 0121 by Abbie Babinski, RN  Outcome: Met This Shift  Note: Pt is wearing the fall bracelet and yellow nonskid socks. Bed is in lowest position, locked, side rails up 2/4, and call light is within reach. Pt informed of fall risks, verbalizes understanding. Will monitor. Goal: Absence of physical injury  Description: Absence of physical injury  Outcome: Met This Shift     Problem: OXYGENATION/RESPIRATORY FUNCTION  Goal: Patient will maintain patent airway  Outcome: Met This Shift  Goal: Patient will achieve/maintain normal respiratory rate/effort  Description: Respiratory rate and effort will be within normal limits for the patient  Outcome: Met This Shift     Problem: HEMODYNAMIC STATUS  Goal: Patient has stable vital signs and fluid balance  Outcome: Met This Shift     Problem: FLUID AND ELECTROLYTE IMBALANCE  Goal: Fluid and electrolyte balance are achieved/maintained  Outcome: Met This Shift  Note: Absence of edema in BLE. I&O recorded. Pt denies SOB. Problem: Pain:  Goal: Control of chronic pain  Description: Control of chronic pain  Outcome: Ongoing  Note:    Pt c/o pain. Pt assessed for breathing and BP. Pt educated on pain scale. Medication administered, see MAR. Pt repositioned. Stimuli reduced. Rest promoted. Pain reassessed. Will continue to monitor. Problem: Falls - Risk of: Intervention: Assess risk factors for falls  Note: Roberto Fall Risk Assessment completed. Problem: Falls - Risk of: Intervention: Assess fall prevention measures  Note: Pt wearing yellow nonskid socks. Problem: Falls - Risk of: Intervention: Implement fall precaution identifier  Note: Pt wearing fall bracelet and fall sign is posted.

## 2021-08-27 NOTE — OP NOTE
Operative Note      Brief Postoperative Note        Patient: Michael Garcia  YOB: 1940  MRN: [id number]     Date of Procedure: 8/27/2021     Pre-Op Diagnosis: OSTEOMYELITIS     Post-Op Diagnosis: Same       Procedure(s):  INCISION AND DRAINAGE LEFT FOOT  AMPUTATION OF LEFT SECOND TOE     Surgeon(s):  Arsenio Romero DPM     Assistant:  First Assistant: Jass Muhammad     Anesthesia: Monitor Anesthesia Care     Estimated Blood Loss (mL): less than 50      Complications: None     Specimens:   ID Type Source Tests Collected by Time Destination   1 :  Tissue Tissue CULTURE, TISSUE Arsenio Romero DPM 8/27/2021 1434     2 :  Tissue Tissue CULTURE, TISSUE Arsenio Romero DPM 8/27/2021 1435     A :  Tissue Tissue SURGICAL PATHOLOGY Arsenio Romero DPM 8/27/2021 1436           Implants:  * No implants in log *      Drains: * No LDAs found *     Findings: Margin after resection partial second toe at the midshaft of the proximal phalanx     Brief Postoperative Note        Patient: Michael Garcia  YOB: 1940  MRN: [id number]     Date of Procedure: 8/27/2021     Pre-Op Diagnosis: OSTEOMYELITIS     Post-Op Diagnosis: Same       Procedure(s):  INCISION AND DRAINAGE LEFT FOOT  AMPUTATION OF LEFT SECOND TOE     Surgeon(s):  Arsenio Romero DPM     Assistant:  First Assistant: Jass Muhammad     Anesthesia: Monitor Anesthesia Care     Estimated Blood Loss (mL): less than 50      Complications: None     Specimens:   ID Type Source Tests Collected by Time Destination   1 :  Tissue Tissue CULTURE, TISSUE Arsenio Romero DPM 8/27/2021 1434     2 :  Tissue Tissue CULTURE, TISSUE Arsenio Romero DPM 8/27/2021 1435     A :  Tissue Tissue SURGICAL PATHOLOGY Arsenio Romero DPM 8/27/2021 1436           Implants:  * No implants in log *      Drains: * No LDAs found *     Findings:  Clean margin after resection partial second toe at the midshaft of the proximal phalanx     Indication for Procedure: This is a 80year old Male here for an non-elective procedure due to a foot infection of the left foot. The anticipated procedure, expected post-operative course and all benefits, risks, and complications were explained to the patient in detail. The patient's questions were answered to their satisfaction. Informed and written consent were obtained and placed in the chart. Procedure:  Under mild sedation the patient was brought back to the OR and placed on the operating table in the supine position. Following the induction of IV anesthesia a local anesthetic block was then infiltrated proximal to and circumferentially around the planned operative area. The right extremity was then scrubbed prepped and draped. Details of procedure #1: Incision and drainage left foot  At this time, attention was directed to the dorsal aspect of the patients left foot second toe. Using a #15 blade, circumferential incision was made around the toe. Next the structures were dissected back including infected tissue proximally and noninfected tissues distally. It was noted that there was no tracking or purulent pockets proximal to the incision site. At this time the incision was deepened through the tendons. It was decided to proceed with the mid proximal phalanx shaft amputation of the second toe    Details of procedure #2: Amputation second toe  At this time using a bone saw the proximal phalanx was cut proximally leaving the metatarsal base. Next the toe was removed from the foot. The site was then pulse lavaged with 3000 cc of normal saline. The site was then cultured. The toe was sent to pathology for evaluation and a culture sent for aerobic, anaerobic, acid-fast and fungal evaluation. A rasp was utilized to smooth all bony prominences. The wound was then flushed with copious amounts of sterile normal saline and pulse lavaged.   The surgical site was closed in a simple and double simple fashion using 3-0 nylon suture. The incision was then dressed with sterile Adaptic, gauze, Tevin, Kerlix, and Ace bandage. The patient tolerated the procedure and anesthesia well and was transported from the operating room to the PACU with vital signs stable and vascular status intact to all remaining digits  of the patient's left lower extremity. Following a short period of post-operative monitoring, the patient will be readmitted to the hospital. The patient is to follow-up with  Dr. Sulema Yates in their private office within 5-7 days after discharge. The patient is to keep dressing clean, dry and intact at all times. Should the patient develop any post-op complications or have any questions prior to the first post-operative visit they can contact  at the number provided in the chart.       Electronically signed by Carlo Joshi DPM on 8/27/2021 at 3:00 PM

## 2021-08-27 NOTE — DISCHARGE INSTR - COC
Continuity of Care Form    Patient Name: Yanna Zarate   :  1940  MRN:  [id number]    Admit date:  2021  Discharge date:  ***    Code Status Order: Full Code   Advance Directives:     Admitting Physician:  Keegan Ashford MD  PCP: Angelic Bloom MD    Discharging Nurse: [facility] Unit/Room#: 5BO-9179/6315-17  Discharging Unit Phone Number: ***    Emergency Contact:   Extended Emergency Contact Information  Primary Emergency Contact: Bertatopher Mckeonyoselin  Address: [address] Phone: [phone]  Relation: Child  Secondary Emergency Contact: [address] [place] [address] Phone: [phone]  Relation: Child    Past Surgical History:  Past Surgical History:   Procedure Laterality Date    APPENDECTOMY      CARDIAC PACEMAKER PLACEMENT     [facility] WITH STENT PLACEMENT      DIAGNOSTIC CARDIAC CATH LAB PROCEDURE      EYE SURGERY      PACEMAKER CHANGE  10/2016    PACEMAKER PLACEMENT         Immunization History:   Immunization History   Administered Date(s) Administered    COVID-19, Rowland Peter, PF, 30mcg/0.3mL 2021, 2021    Influenza 2010, 2011    Influenza Virus Vaccine 10/21/2013, 2017    Influenza Whole 10/21/2013    Influenza, High Dose (Fluzone 65 yrs and older) 10/08/2015, 10/20/2016, 2017, 2018    Influenza, Quadv, adjuvanted, 65 yrs +, IM, PF (Fluad) 10/15/2020    Influenza, Triv, inactivated, subunit, adjuvanted, IM (Fluad 65 yrs and older) 10/21/2019    Pneumococcal Conjugate 13-valent ([id number]) 2015    Pneumococcal Polysaccharide ([id number]) 10/10/2007    Tdap (Boostrix, Adacel) 2011, 2014    Zoster Live (Zostavax) 10/01/2013       Active Problems:  Patient Active Problem List   Diagnosis Code    Hyperlipidemia E78.5    Benign prostatic hyperplasia N40.0    Paroxysmal atrial fibrillation ([facility] I48.0    Coronary artery disease involving native coronary artery of native heart without angina pectoris I25.10    Cardiac pacemaker in situ Z95.0    Postsurgical percutaneous transluminal coronary angioplasty status Z98.61    History of nonmelanoma skin cancer Z85.828    Tinea manuum, pedis, and unguium B35.2, B35.3, B35.1    AK (actinic keratosis) L57.0    Chronic congestive heart failure ([facility]) I50.9    Essential hypertension I10    Sick sinus syndrome ([facility]) I49.5    Bradycardia R00.1    Gastrointestinal hemorrhage K92.2    Pure hyperglyceridemia E78.1    Localized edema R60.0    Venous (peripheral) insufficiency I87.2    PVD (peripheral vascular disease) ([facility]) I73.9    Solar purpura ([facility]) D69.2    Osteomyelitis of second toe of left foot ([facility]) M86.9    Bilateral cellulitis of lower leg L03.116, L03.115    Open fracture of second toe of left foot S92.502B    History of MRSA infection Z86.14    Failure of outpatient treatment Z78.9    Subacute osteomyelitis of left foot ([facility] M86.272    Encounter for medication counseling Z71.89       Isolation/Infection:   Isolation          No Isolation        Patient Infection Status     None to display          Nurse Assessment:  Last Vital Signs: BP (!) 127/57   Pulse 66   Temp 98.3 °F (36.8 °C) (Oral)   Resp 16   Ht 5' 9\" (1.753 m)   Wt 166 lb 3.6 oz (75.4 kg)   SpO2 99%   BMI 24.55 kg/m²     Last documented pain score (0-10 scale): Pain Level: 4  Last Weight:   Wt Readings from Last 1 Encounters:   08/27/21 166 lb 3.6 oz (75.4 kg)     Mental Status:  oriented    IV Access:  - None    Nursing Mobility/ADLs:  Walking   Assisted  Transfer  Assisted  Bathing  Assisted  Dressing  Assisted  Toileting  Assisted  Feeding  Independent  Med Admin  Assisted  Med Delivery   Whole    Wound Care Documentation and Therapy:  Wound 08/25/21 Toe (Comment  which one) Anterior; Left Red, drainage (Active)   Wound Etiology Venous 08/27/21 7934   Dressing Status Clean;Dry; Intact 08/27/21 0424   Wound Cleansed Not Cleansed 08/27/21 0424   Dressing/Treatment Ace wrap;Roll gauze;Dry dressing 08/27/21 0424   Wound Assessment Pink/red 08/27/21 0424   Drainage Amount Scant 08/27/21 0424   Drainage Description Serosanguinous 08/27/21 0424   Odor None 08/27/21 0424   Lilia-wound Assessment Intact;Fragile 08/27/21 0424   Number of days: 1        Elimination:  Continence:   · Bowel: Yes  · Bladder: Yes  Urinary Catheter: None   Colostomy/Ileostomy/Ileal Conduit: No       Date of Last BM: ***    Intake/Output Summary (Last 24 hours) at 8/27/2021 1049  Last data filed at 8/26/2021 2120  Gross per 24 hour   Intake 250 ml   Output    Net 250 ml     I/O last 3 completed shifts: In: 250 [P.O.:240; I.V.:10]  Out: -     Safety Concerns: At Risk for Falls    Impairments/Disabilities:      None    Nutrition Therapy:  Current Nutrition Therapy:   - Oral Diet:  General    Routes of Feeding: Oral  Liquids: Thin Liquids  Daily Fluid Restriction: no  Last Modified Barium Swallow with Video (Video Swallowing Test): not done    Treatments at the Time of Hospital Discharge:   Respiratory Treatments: n/a  Oxygen Therapy:  is not on home oxygen therapy.   Ventilator:    - No ventilator support    Rehab Therapies: SN,PT,OT  Weight Bearing Status/Restrictions: No weight bearing restirctions  Other Medical Equipment (for information only, NOT a DME order):  cane  Other Treatments:   HOME HEALTH CARE: LEVEL 2 SOCIAL      -Initial home health evaluation to occur within 24-48 hours, in patient home    -Home health agency to establish plan of care for patient over 60 day period    -Medication Reconciliation    -PCP Visit scheduled within seven days of discharge    -PT/OT to evaluate with goal of regaining prior level of functioning    -OT to evaluate if patient has [address] needs for personal care    - evaluation within 24-48 hours, includes evaluation of resources     and insurance to determine AL/IL/LTC/Medicaid options    -[place] [facility] Referral    -May Have Physical Therapy Start Of Care      Patient's personal belongings (please select all that are sent with patient):  Sathya    RN SIGNATURE:  Electronically signed by Alexis Alvarado RN on 8/28/21 at 11:36 AM EDT    CASE MANAGEMENT/SOCIAL WORK SECTION    Inpatient Status Date: ***    Readmission Risk Assessment Score:  Readmission Risk              Risk of Unplanned Readmission:  12           Discharging to Facility/ Agency   Name: Gerard Noble will call for Appointment  Phone: [phone]  Fax: [phone]    Dialysis Facility (if applicable)   · Name:  · Address:  · Dialysis Schedule:  · Phone:  · Fax:    / signature: {Esignature:[id number]}    PHYSICIAN SECTION    Prognosis: GOOD    Condition at Discharge: STABLE    Rehab Potential (if transferring to Rehab): FAIR    Recommended Labs or Other Treatments After Discharge: HOME CARE NURSE TO DRAW PT/ INR PER COAG WEEKLY. RESULTS TO CARDIOLOGIST DR. Arsalan Bermeo [id number]. Physician Certification: I certify the above information and transfer of Joanne Márquez  is necessary for the continuing treatment of the diagnosis listed and that he requires home carefor 30 days.      Update Admission H&P: UPDATED    PHYSICIAN SIGNATURE: Eli Reza PA-C / Cara Ramirez MD on 08/28/21

## 2021-08-27 NOTE — PROGRESS NOTES
Shift assessment completed. Pt is a/o X 4. VSS. Medications administered, see MAR. POC discussed and all questions answered. Pt provided with fresh ice water. Pt has belongings and call light in reach. Bed is locked and in lowest position, bed alarm is refused by pt and pt ambulates constantly d/t chronic back pain. Pt denies further needs, will continue to monitor.

## 2021-08-27 NOTE — PROGRESS NOTES
Greer from [facility] and [facility] will provide home care for this patient. Discharge planner notified.

## 2021-08-27 NOTE — PROGRESS NOTES
Clinical Pharmacy Note: Pharmacy to Dose Vancomcyin    Vancomycin Day: 4  Current Dosing Regimen: 1000 mg Q24  Dosing Method: Bayesian Modeling    Random: 21.5    Recent Labs     08/26/21  0610 08/27/21  0524   BUN 22* 20       Recent Labs     08/26/21  0610 08/27/21  0524   CREATININE 1.4* 1.2       Recent Labs     08/24/21  1650 08/25/21  0613   WBC 6.5 6.7         Intake/Output Summary (Last 24 hours) at 8/27/2021 5815  Last data filed at 8/26/2021 2120  Gross per 24 hour   Intake 250 ml   Output    Net 250 ml         Ht Readings from Last 1 Encounters:   08/26/21 5' 9\" (1.753 m)        Wt Readings from Last 1 Encounters:   08/27/21 166 lb 3.6 oz (75.4 kg)         Body mass index is 24.55 kg/m². Estimated Creatinine Clearance: 48 mL/min (based on SCr of 1.2 mg/dL). Assessment/Plan:  Vancomycin level is therapeutic. Level was drawn appropriately in respect to last dose given. Continue vancomycin regimen to 1000 mg every 24 hours. Bayesian Modeling predicts an AUC of 512 mg/L*hr and trough of 14.8 mg/L. Will not order any additional labs at this time. Patient is stable on this dose. Will check random lab as see fit weekly and/or if scr changes drastically. Changes in regimen will be determined based on culture results, renal function, and clinical response. Pharmacy will continue to monitor and adjust regimen as necessary.     Thank you for the consult,    Nikkie Yang, PharmD  Pharmacy Resident   [id number]

## 2021-08-28 VITALS
DIASTOLIC BLOOD PRESSURE: 59 MMHG | WEIGHT: 166.23 LBS | TEMPERATURE: 97.8 F | HEART RATE: 61 BPM | OXYGEN SATURATION: 95 % | RESPIRATION RATE: 16 BRPM | SYSTOLIC BLOOD PRESSURE: 117 MMHG | BODY MASS INDEX: 24.62 KG/M2 | HEIGHT: 69 IN

## 2021-08-28 LAB
ANION GAP SERPL CALCULATED.3IONS-SCNC: 11 MMOL/L (ref 3–16)
BASOPHILS ABSOLUTE: 0.2 K/UL (ref 0–0.2)
BASOPHILS RELATIVE PERCENT: 2.2 %
BLOOD CULTURE, ROUTINE: NORMAL
BUN BLDV-MCNC: 16 MG/DL (ref 7–20)
CALCIUM SERPL-MCNC: 8.2 MG/DL (ref 8.3–10.6)
CHLORIDE BLD-SCNC: 102 MMOL/L (ref 99–110)
CO2: 27 MMOL/L (ref 21–32)
CREAT SERPL-MCNC: 0.9 MG/DL (ref 0.8–1.3)
CULTURE, BLOOD 2: NORMAL
EOSINOPHILS ABSOLUTE: 0.3 K/UL (ref 0–0.6)
EOSINOPHILS RELATIVE PERCENT: 3.7 %
GFR AFRICAN AMERICAN: >60
GFR NON-AFRICAN AMERICAN: >60
GLUCOSE BLD-MCNC: 91 MG/DL (ref 70–99)
HCT VFR BLD CALC: 31 % (ref 40.5–52.5)
HEMOGLOBIN: 10.4 G/DL (ref 13.5–17.5)
INR BLD: 1.58 (ref 0.88–1.12)
LYMPHOCYTES ABSOLUTE: 1.8 K/UL (ref 1–5.1)
LYMPHOCYTES RELATIVE PERCENT: 24.3 %
MAGNESIUM: 2.4 MG/DL (ref 1.8–2.4)
MCH RBC QN AUTO: 28.1 PG (ref 26–34)
MCHC RBC AUTO-ENTMCNC: 33.4 G/DL (ref 31–36)
MCV RBC AUTO: 84.1 FL (ref 80–100)
MONOCYTES ABSOLUTE: 0.8 K/UL (ref 0–1.3)
MONOCYTES RELATIVE PERCENT: 10.5 %
NEUTROPHILS ABSOLUTE: 4.5 K/UL (ref 1.7–7.7)
NEUTROPHILS RELATIVE PERCENT: 59.3 %
PDW BLD-RTO: 16.5 % (ref 12.4–15.4)
PLATELET # BLD: 282 K/UL (ref 135–450)
PMV BLD AUTO: 7.6 FL (ref 5–10.5)
POTASSIUM REFLEX MAGNESIUM: 3.5 MMOL/L (ref 3.5–5.1)
PROTHROMBIN TIME: 18.2 SEC (ref 9.9–12.7)
RBC # BLD: 3.68 M/UL (ref 4.2–5.9)
SODIUM BLD-SCNC: 140 MMOL/L (ref 136–145)
WBC # BLD: 7.6 K/UL (ref 4–11)

## 2021-08-28 PROCEDURE — 85610 PROTHROMBIN TIME: CPT

## 2021-08-28 PROCEDURE — 83735 ASSAY OF MAGNESIUM: CPT

## 2021-08-28 PROCEDURE — 6370000000 HC RX 637 (ALT 250 FOR IP): Performed by: PHYSICIAN ASSISTANT

## 2021-08-28 PROCEDURE — 6370000000 HC RX 637 (ALT 250 FOR IP): Performed by: PODIATRIST

## 2021-08-28 PROCEDURE — 36415 COLL VENOUS BLD VENIPUNCTURE: CPT

## 2021-08-28 PROCEDURE — 80048 BASIC METABOLIC PNL TOTAL CA: CPT

## 2021-08-28 PROCEDURE — 85025 COMPLETE CBC W/AUTO DIFF WBC: CPT

## 2021-08-28 RX ORDER — OXYCODONE HYDROCHLORIDE 5 MG/1
5 TABLET ORAL EVERY 6 HOURS PRN
Qty: 12 TABLET | Refills: 0 | Status: SHIPPED | OUTPATIENT
Start: 2021-08-28 | End: 2021-08-31

## 2021-08-28 RX ORDER — WARFARIN SODIUM 2.5 MG/1
2.5 TABLET ORAL DAILY
Status: DISCONTINUED | OUTPATIENT
Start: 2021-08-28 | End: 2021-08-28 | Stop reason: HOSPADM

## 2021-08-28 RX ORDER — DOXYCYCLINE HYCLATE 100 MG
100 TABLET ORAL EVERY 12 HOURS SCHEDULED
Status: DISCONTINUED | OUTPATIENT
Start: 2021-08-28 | End: 2021-08-28 | Stop reason: HOSPADM

## 2021-08-28 RX ADMIN — PANTOPRAZOLE SODIUM 40 MG: 40 TABLET, DELAYED RELEASE ORAL at 05:42

## 2021-08-28 RX ADMIN — ACETAMINOPHEN 650 MG: 325 TABLET ORAL at 05:42

## 2021-08-28 RX ADMIN — FUROSEMIDE 40 MG: 40 TABLET ORAL at 09:45

## 2021-08-28 RX ADMIN — DRONEDARONE 400 MG: 400 TABLET, FILM COATED ORAL at 09:47

## 2021-08-28 RX ADMIN — OXYCODONE 5 MG: 5 TABLET ORAL at 05:42

## 2021-08-28 RX ADMIN — DOXYCYCLINE HYCLATE 100 MG: 100 TABLET, COATED ORAL at 11:42

## 2021-08-28 RX ADMIN — OXYCODONE 5 MG: 5 TABLET ORAL at 02:11

## 2021-08-28 ASSESSMENT — PAIN DESCRIPTION - PAIN TYPE
TYPE: CHRONIC PAIN
TYPE: CHRONIC PAIN

## 2021-08-28 ASSESSMENT — PAIN DESCRIPTION - LOCATION
LOCATION: BACK
LOCATION: BACK

## 2021-08-28 ASSESSMENT — PAIN DESCRIPTION - FREQUENCY
FREQUENCY: CONTINUOUS
FREQUENCY: CONTINUOUS

## 2021-08-28 ASSESSMENT — PAIN DESCRIPTION - ONSET
ONSET: ON-GOING
ONSET: ON-GOING

## 2021-08-28 ASSESSMENT — PAIN DESCRIPTION - PROGRESSION
CLINICAL_PROGRESSION: NOT CHANGED
CLINICAL_PROGRESSION: NOT CHANGED

## 2021-08-28 ASSESSMENT — PAIN DESCRIPTION - ORIENTATION
ORIENTATION: LOWER
ORIENTATION: LOWER

## 2021-08-28 ASSESSMENT — PAIN SCALES - GENERAL
PAINLEVEL_OUTOF10: 10
PAINLEVEL_OUTOF10: 8

## 2021-08-28 ASSESSMENT — PAIN DESCRIPTION - DESCRIPTORS
DESCRIPTORS: DULL;DISCOMFORT
DESCRIPTORS: DULL;DISCOMFORT

## 2021-08-28 NOTE — PLAN OF CARE
Problem: Falls - Risk of:  Goal: Will remain free from falls  Description: Will remain free from falls  8/28/2021 1118 by Nicole Selby RN  Outcome: Ongoing  8/28/2021 0105 by Jared Rush RN  Outcome: Ongoing  Goal: Absence of physical injury  Description: Absence of physical injury  8/28/2021 1118 by Nicole Selby RN  Outcome: Ongoing  8/28/2021 0105 by Jared Rush RN  Outcome: Ongoing     Problem: Musculor/Skeletal Functional Status  Goal: Highest potential functional level  8/28/2021 1118 by Nicole Selby RN  Outcome: Ongoing  8/28/2021 0105 by Jared Rush RN  Outcome: Ongoing  Goal: Absence of falls  8/28/2021 1118 by Nicole Selby RN  Outcome: Ongoing  8/28/2021 0105 by Jared Rush RN  Outcome: Ongoing     Problem: Pain:  Goal: Pain level will decrease  Description: Pain level will decrease  8/28/2021 1118 by Nicole Selby RN  Outcome: Ongoing  8/28/2021 0105 by Jared Rush RN  Outcome: Not Met This Shift  Goal: Control of acute pain  Description: Control of acute pain  8/28/2021 1118 by Nicole Selby RN  Outcome: Ongoing  8/28/2021 0105 by Jared Rush RN  Outcome: Met This Shift  Goal: Control of chronic pain  Description: Control of chronic pain  8/28/2021 1118 by Nicole Selby RN  Outcome: Ongoing  8/28/2021 0105 by Jared Rush RN  Outcome: Not Met This Shift     Problem: Nutrition  Goal: Optimal nutrition therapy  8/28/2021 1118 by Nicole Selby RN  Outcome: Ongoing  8/28/2021 0105 by Jared Rush RN  Outcome: Met This Shift     Problem: OXYGENATION/RESPIRATORY FUNCTION  Goal: Patient will maintain patent airway  Outcome: Ongoing  Goal: Patient will achieve/maintain normal respiratory rate/effort  Description: Respiratory rate and effort will be within normal limits for the patient  Outcome: Ongoing     Problem: HEMODYNAMIC STATUS  Goal: Patient has stable vital signs and fluid balance  Outcome: Ongoing     Problem: FLUID AND ELECTROLYTE IMBALANCE  Goal: Fluid and electrolyte balance are achieved/maintained  Outcome: Ongoing     Problem: ACTIVITY INTOLERANCE/IMPAIRED MOBILITY  Goal: Mobility/activity is maintained at optimum level for patient  Outcome: Ongoing

## 2021-08-28 NOTE — DISCHARGE SUMMARY
[facility]    Patient Demographics    Patient. [address]  Date of Birth. 1940  MRN. [id number]     Primary care provider. Guillermo Garcia MD  (Tel: [phone])    Admit date: 8/24/2021    Discharge date (blank if same as Note Date): Note Date: 8/28/2021     Reason for Hospitalization. Chief Complaint   Patient presents with    Cellulitis     chf pt that presents with edema in left leg. Pt has cellulitus for 8 weeks in both feet that has not cleared up. Pt has been treated with clindimycin without any improvement. Significant Findings. Active Problems:    Pure hyperglyceridemia    Hyperlipidemia    Benign prostatic hyperplasia    Paroxysmal atrial fibrillation (HCC)    Coronary artery disease involving native coronary artery of native heart without angina pectoris    Cardiac pacemaker in situ    Chronic congestive heart failure (HCC)    Essential hypertension    Sick sinus syndrome (HCC)    Venous (peripheral) insufficiency    PVD (peripheral vascular disease) (HCC)    Solar purpura ([address]    Osteomyelitis of second toe of left foot ([address]    Subacute osteomyelitis of left foot ([address]    Encounter for medication counseling  Resolved Problems:    * No resolved hospital problems. *       Problems and results from this hospitalization that need follow up. 1. Post op follow up   2. Chronic anticoagulation    Significant test results and incidental findings. 1.   Xray L foot  Osteomyelitis of the 2nd toe with associated displaced pathologic fracture,   as discussed      MRI L foot  Impression:     Findings compatible with osteomyelitis involving the 2nd toe middle phalanx   and distal phalanx.      There is subcortical edema noted within the proximal phalanx of the 2nd toe,   but without decreased T1 signal.  With this phalanx, both osteomyelitis and   reactive osteitis are considerations.       Venous Duplex L leg  No evidence of deep vein or superficial thrombosis involving the left lower    extremity and the contralateral proximal common femoral vein. Invasive procedures and treatments. 1. Amputation of L 2nd toe by Dr Selin Hagan on 8/27    [facility] Course. Patient is a pleasant 72-year-old male who presented to hospital with increasing pain involving his left lower extremity. He has been treated outpatient with oral antibiotics for cellulitis of the left foot. He developed a left foot wound in the past few days. Emergency room x-ray was suggestive of osteomyelitis involving the left second toe. Patient was admitted and started on broad-spectrum antibiotics. He was seen by podiatry as well as infectious disease. He had an MRI confirming mid and distal phalanx osteomyelitis second toe. He was taken operating room by Dr. Selin Hagan on August for a toe amputation. Margins were clean per podiatry. He was discharged home on doxycycline. Follow-up with podiatry outpatient. Consults. IP CONSULT TO INFECTIOUS DISEASES  IP CONSULT TO PHARMACY  IP CONSULT TO PHARMACY  IP CONSULT TO PODIATRY  IP CONSULT TO SPIRITUAL SERVICES  IP CONSULT TO CARDIOLOGY  IP CONSULT TO HOME CARE NEEDS  IP CONSULT TO HOME CARE NEEDS    Physical examination on discharge day. BP (!) 117/59   Pulse 61   Temp 97.8 °F (36.6 °C) (Oral)   Resp 16   Ht 5' 9\" (1.753 m)   Wt 166 lb 3.6 oz (75.4 kg)   SpO2 95%   BMI 24.55 kg/m²   General appearance. Alert. Looks comfortable. HEENT. Sclera clear. Moist mucus membranes. Cardiovascular. Regular rate and rhythm, normal S1, S2. No murmur. Respiratory. Not using accessory muscles. Clear to auscultation bilaterally, no wheeze. Gastrointestinal. Abdomen soft, non-tender, not distended, normal bowel sounds  Neurology. Facial symmetry. No speech deficits. Moving all extremities equally. Extremities. No edema in lower extremities.  Ace wrap on left leg  Skin. Warm, dry, normal turgor    Condition at time of discharge stable    Medication instructions provided to patient at discharge. Medication List      START taking these medications    doxycycline hyclate 100 MG tablet  Commonly known as: VIBRA-TABS  Take 1 tablet by mouth 2 times daily for 10 days     oxyCODONE 5 MG immediate release tablet  Commonly known as: ROXICODONE  Take 1 tablet by mouth every 6 hours as needed for Pain for up to 3 days. CHANGE how you take these medications    coenzyme Q10 200 MG Caps capsule  What changed: Another medication with the same name was removed. Continue taking this medication, and follow the directions you see here. furosemide 40 MG tablet  Commonly known as: LASIX  TAKE 1 TABLET BY MOUTH TWICE DAILY  What changed: See the new instructions. metOLazone 5 MG tablet  Commonly known as: ZAROXOLYN  Take 1 tablet by mouth Twice a Week  What changed: additional instructions     potassium chloride 10 MEQ extended release tablet  Commonly known as: KLOR-CON M  Take 1 tablet by mouth 4 times daily  What changed:   · how much to take  · when to take this     ProFe 391.3 (180 Fe) MG Caps  Generic drug: Polysaccharide Iron Complex  What changed: Another medication with the same name was removed. Continue taking this medication, and follow the directions you see here. warfarin 5 MG tablet  Commonly known as: COUMADIN  Take as directed. If you are unsure how to take this medication, talk to your nurse or doctor. Original instructions: TAKE 1 TABLET BY MOUTH DAILY AT 6 PM  What changed: See the new instructions.         CONTINUE taking these medications    fentaNYL 50 MCG/HR  Commonly known as: Piilostent[address] 53 1 patch onto the skin every 48 hours     metoprolol succinate 25 MG extended release tablet  Commonly known as: TOPROL XL  TAKE ONE-HALF (1/2) TABLET TWICE A DAY     Multaq 400 MG Tabs  Generic drug: dronedarone hcl  TAKE 1 TABLET TWICE A DAY WITH MEALS     nitroGLYCERIN 0.2 MG/HR  Commonly known as: NITRODUR  PLACE 1 PATCH ON THE SKIN DAILY     ondansetron 4 MG disintegrating tablet  Commonly known as: ZOFRAN-ODT  DISSOLVE 1 TABLET ON THE TONGUE EVERY 8 HOURS AS NEEDED FOR NAUSEA OR VOMITING     pantoprazole 40 MG tablet  Commonly known as: PROTONIX  TAKE 1 TABLET BY MOUTH EVERY MORNING BEFORE BREAKFAST     rosuvastatin 5 MG tablet  Commonly known as: CRESTOR  TAKE 1 TABLET DAILY     vitamin B-12 500 MCG tablet  Commonly known as: CYANOCOBALAMIN        STOP taking these medications    Compression Stockings Misc     nystatin 545198 UNIT/GM cream  Commonly known as: MYCOSTATIN     polyethylene glycol 17 GM/SCOOP Powd powder  Commonly known as: MIRALAX           Where to Get Your Medications      These medications were sent to [address], [facility] [phone] Maciej Chapa [phone]  [address] [place] [id number]    Phone: [phone]   · doxycycline hyclate 100 MG tablet  · oxyCODONE 5 MG immediate release tablet         Discharge recommendations given to patient. Follow Up. Dr Janice Pascual this week   Disposition. home  Activity. activity as tolerated  Diet: ADULT DIET; Regular      Spent 39 minutes in discharge process. Signed:   Cathy Pritchard PA-C     8/28/2021 12:22 PM

## 2021-08-28 NOTE — PLAN OF CARE
Problem: Falls - Risk of:  Goal: Will remain free from falls  Description: Will remain free from falls  Outcome: Ongoing  Goal: Absence of physical injury  Description: Absence of physical injury  Outcome: Ongoing     Problem: Musculor/Skeletal Functional Status  Goal: Highest potential functional level  Outcome: Ongoing  Goal: Absence of falls  Outcome: Ongoing     Problem: Nutrition  Goal: Optimal nutrition therapy  Outcome: Met This Shift     Problem: Pain:  Goal: Pain level will decrease  Description: Pain level will decrease  Outcome: Not Met This Shift  Goal: Control of acute pain  Description: Control of acute pain  Outcome: Met This Shift  Goal: Control of chronic pain  Description: Control of chronic pain  Outcome: Not Met This Shift

## 2021-08-28 NOTE — PLAN OF CARE
0105 by Tiffany Jara RN  Outcome: Met This Shift     Problem: OXYGENATION/RESPIRATORY FUNCTION  Goal: Patient will maintain patent airway  8/28/2021 1311 by Mimi Hwang RN  Outcome: Completed  8/28/2021 1118 by Mimi Hwang RN  Outcome: Ongoing  Goal: Patient will achieve/maintain normal respiratory rate/effort  Description: Respiratory rate and effort will be within normal limits for the patient  8/28/2021 1311 by Mimi Hwang RN  Outcome: Completed  8/28/2021 1118 by Mimi Hwang RN  Outcome: Ongoing     Problem: HEMODYNAMIC STATUS  Goal: Patient has stable vital signs and fluid balance  8/28/2021 1311 by Mimi Hwang RN  Outcome: Completed  8/28/2021 1118 by Mimi Hwang RN  Outcome: Ongoing     Problem: FLUID AND ELECTROLYTE IMBALANCE  Goal: Fluid and electrolyte balance are achieved/maintained  8/28/2021 1311 by Mimi Hwang RN  Outcome: Completed  8/28/2021 1118 by Mimi Hwang RN  Outcome: Ongoing     Problem: ACTIVITY INTOLERANCE/IMPAIRED MOBILITY  Goal: Mobility/activity is maintained at optimum level for patient  8/28/2021 1311 by Mimi Hwang RN  Outcome: Completed  8/28/2021 1118 by Mimi Hwang RN  Outcome: Ongoing

## 2021-08-28 NOTE — CARE COORDINATION
Per Patient's RN, patient no longer requiring IV-Antibiotics. SW contacted [facility] [phone] and informed provider that patient no longer needs IV follow-up. SW spoke with patient and his family. PT/OT recommending that patient discharge to home with Home PT/OT verse OP Therapies. Family requesting that patient have home health care. Patient requesting to be followed by [facility] ([phone]). SW referred patient to [facility].     Electronically signed by LUZ Ordaz on 8/28/2021 at 12:06 PM

## 2021-08-28 NOTE — PROGRESS NOTES
Shift assessment complete. Vital signs stable. Afebrile. Neuro WNL. Dressing to left foot remains clean, dry, and intact. Foot elevated off bed on pillows. Medicated for pain. Scheduled night medications administered. Patient high fall risk. Refusing bed alarm. Patient's daughter at bedside & state's she has been assisting him to ambulate in the room throughout the day. Encouraged pt to call if needing ambulate. Pt & daughter verbalize understanding. The care plan and education has been reviewed and mutually agreed upon with the patient. Call light within reach.

## 2021-08-28 NOTE — PROGRESS NOTES
Patient attempting to ambulate without calling for assistance. Peripheral IV dislodged. Per pt he has had multiple IV's placed this admission, and is unwilling to have a new PIV inserted at this time. Medicated with PO oxycodone & tylenol.

## 2021-08-30 ENCOUNTER — TELEPHONE (OUTPATIENT)
Dept: FAMILY MEDICINE CLINIC | Age: 81
End: 2021-08-30

## 2021-08-30 ENCOUNTER — CARE COORDINATION (OUTPATIENT)
Dept: CASE MANAGEMENT | Age: 81
End: 2021-08-30

## 2021-08-30 DIAGNOSIS — M86.272 SUBACUTE OSTEOMYELITIS OF LEFT FOOT (HCC): Primary | ICD-10-CM

## 2021-08-30 PROCEDURE — 1111F DSCHRG MED/CURRENT MED MERGE: CPT | Performed by: FAMILY MEDICINE

## 2021-08-30 NOTE — TELEPHONE ENCOUNTER
Patient will have his daughter call to schedule TCM appointment. TCM questions already completed by patient.

## 2021-08-30 NOTE — TELEPHONE ENCOUNTER
John Craig from [employer] called:  Patient was discharged from hospital  Patient is declining [facility]. services until the end of this week

## 2021-08-30 NOTE — TELEPHONE ENCOUNTER
Devon 45 Transitions Initial Follow Up Call    Outreach made within 2 business days of discharge: Yes    Patient: Sue Neff Patient : 1940   MRN: [id number]  Reason for Admission: There are no discharge diagnoses documented for the most recent discharge. Discharge Date: 21       Spoke with: Agapito Habermann    Discharge department/facility: [facility] Interactive Patient Contact:  Was patient able to fill all prescriptions: Yes  Was patient instructed to bring all medications to the follow-up visit: Yes  Is patient taking all medications as directed in the discharge summary?  Yes  Does patient understand their discharge instructions: Yes  Does patient have questions or concerns that need addressed prior to 7-14 day follow up office visit: no    Scheduled appointment with PCP within 7-14 days    Follow Up  Future Appointments   Date Time Provider Nicolas Mejia   2021  3:00 PM Nuno Lugo   9/10/2021  2:15 PM Prnaav Hussein MD [address] [facility]   9/15/2021 12:30 PM MD Sulema Denton Derm [facility]   2021  8:00 AM SCHEDULE, Indy Narayan REMOTE TRANSMISSION [place] Card [facility]   2022  2:00 PM Maria Luz Alvarado8 Card [facility]   2022  2:30 PM SCOTT Gold - CNP [place] Card [facility]       Giselle Garcia RN

## 2021-08-30 NOTE — CARE COORDINATION
Devon 45 Transitions Initial Follow Up Call:  Patient reports that he is doing \"well\", toe dressing is clean, dry, and intact, and patient reports that he is \"not having much pain\", he is afebrile. Discussed discharge instructions and reviewed medications, 1111F completed. He reports that he has a follow up with surgeon on Wednesday and that he has not heard from [facility]. CTN reached out to \"Cee\" with [facility] and confirmed that orders for [facility] have been received. They reached out to him on 21 and patient declined start of care for Monday and wanted it on Tuesday. CTN will continue wit outreach follow up calls. Call within 2 business days of discharge: Yes    Patient: Excell Crigler Patient : 1940   MRN: [id number]  Reason for Admission:   Amputation of L 2nd toe; osteomyelitis in that toe    FOODEBID SURGERY       Discharge Date: 21 RARS: Readmission Risk Score: 16      Last Discharge [address] [address]       Complaint Diagnosis Description Type Department Provider    21 Cellulitis Osteomyelitis of second toe of left foot ([facility] [address] . .. ED to Hosp-Admission (Discharged) (ADMITTED) Bryant Bertrand MD; Yasmin Raya. .. Spoke with: Excell Crigler      Transitions of Care Initial Call    Was this an external facility discharge? No Discharge Facility:     Challenges to be reviewed by the provider   Additional needs identified to be addressed with provider: No  none             Method of communication with provider : none      Advance Care Planning:   Does patient have an Advance Directive: reviewed and current, reviewed and needs to be updated, not on file; education provided, not on file, patient declined education, decision maker updated and referral to internal ACP facilitator. Was this a readmission?  No  Patient stated reason for admission: toe amputation  Patients top risk factors for readmission: medical condition-.    Care Transition Nurse (CTN) contacted the patient by telephone to perform post hospital discharge assessment. Verified name and  with patient as identifiers. Provided introduction to self, and explanation of the CTN role. CTN reviewed discharge instructions, medical action plan and red flags with parent who verbalized understanding. Parent given an opportunity to ask questions and does not have any further questions or concerns at this time. Were discharge instructions available to patient? No. Reviewed appropriate site of care based on symptoms and resources available to patient including: PCP, Urgent care clinics, Home health and When to call 911. The patient agrees to contact the PCP office for questions related to their healthcare. Medication reconciliation was performed with patient, who verbalizes understanding of administration of home medications. Advised obtaining a 90-day supply of all daily and as-needed medications. Reviewed and educated patient on any new and changed medications related to discharge diagnosis. Was patient discharged with a pulse oximeter? No     CTN provided contact information. Plan for follow-up call in 5-7 days based on severity of symptoms and risk factors. Plan for next call: symptom management-., self management-. and follow up appointment-.           Non-face-to-face services provided:  Obtained and reviewed discharge summary and/or continuity of care documents    Care Transitions 24 Hour Call    Do you have any ongoing symptoms?: No  Do you have a copy of your discharge instructions?: Yes  Do you have all of your prescriptions and are they filled?: Yes  Have you been contacted by a [facility] Pharmacist?: No  Have you scheduled your follow up appointment?: Yes  How are you going to get to your appointment?: Car - family or friend to transport  Were you discharged with any Home Care or Post Acute Services: Yes  Post Acute Services: [address] [place] [address] Johan Garcias  Do you feel like you have everything you need to keep you well at home?: Yes  Care Transitions Interventions         Follow Up  Future Appointments   Date Time Provider Nicolas Mejia   9/1/2021  3:00 PM Nuno Griffiths   9/10/2021  2:15 PM MD Jackelyn Wisdom Card [facility]   9/15/2021 12:30 PM MD Sulema Mireles Derm [facility]   11/30/2021  8:00 AM SCHEDULE, Theo Griffiths REMOTE TRANSMISSION Jackelyn De Jesus [facility]   1/12/2022  2:00 PM Rashawn Ware Card [facility]   1/12/2022  2:30 PM Harriet Hurt APRN - CNP [place] Card [facility]       Miles De La Torre RN

## 2021-09-01 ENCOUNTER — TELEPHONE (OUTPATIENT)
Dept: FAMILY MEDICINE CLINIC | Age: 81
End: 2021-09-01

## 2021-09-01 ENCOUNTER — TELEPHONE (OUTPATIENT)
Dept: CARDIOLOGY CLINIC | Age: 81
End: 2021-09-01

## 2021-09-01 ENCOUNTER — HOSPITAL ENCOUNTER (OUTPATIENT)
Dept: WOUND CARE | Age: 81
Discharge: HOME | End: 2021-09-01
Payer: MEDICARE

## 2021-09-01 ENCOUNTER — ANTI-COAG VISIT (OUTPATIENT)
Dept: CARDIOLOGY CLINIC | Age: 81
End: 2021-09-01
Payer: MEDICARE

## 2021-09-01 VITALS
SYSTOLIC BLOOD PRESSURE: 110 MMHG | RESPIRATION RATE: 16 BRPM | HEART RATE: 71 BPM | DIASTOLIC BLOOD PRESSURE: 65 MMHG | TEMPERATURE: 97.5 F

## 2021-09-01 DIAGNOSIS — I48.0 PAROXYSMAL ATRIAL FIBRILLATION (HCC): Primary | ICD-10-CM

## 2021-09-01 LAB
ANAEROBIC CULTURE: NORMAL
CULTURE SURGICAL: NORMAL
INR BLD: 1.9

## 2021-09-01 PROCEDURE — 99213 OFFICE O/P EST LOW 20 MIN: CPT

## 2021-09-01 PROCEDURE — 93793 ANTICOAG MGMT PT WARFARIN: CPT | Performed by: NURSE PRACTITIONER

## 2021-09-01 RX ORDER — GENTAMICIN SULFATE 1 MG/G
OINTMENT TOPICAL ONCE
Status: CANCELLED | OUTPATIENT
Start: 2021-09-01 | End: 2021-09-01

## 2021-09-01 RX ORDER — LIDOCAINE HYDROCHLORIDE 20 MG/ML
JELLY TOPICAL ONCE
Status: CANCELLED | OUTPATIENT
Start: 2021-09-01 | End: 2021-09-01

## 2021-09-01 RX ORDER — CLOBETASOL PROPIONATE 0.5 MG/G
OINTMENT TOPICAL ONCE
Status: CANCELLED | OUTPATIENT
Start: 2021-09-01 | End: 2021-09-01

## 2021-09-01 RX ORDER — LIDOCAINE 50 MG/G
OINTMENT TOPICAL ONCE
Status: CANCELLED | OUTPATIENT
Start: 2021-09-01 | End: 2021-09-01

## 2021-09-01 RX ORDER — BETAMETHASONE DIPROPIONATE 0.05 %
OINTMENT (GRAM) TOPICAL ONCE
Status: CANCELLED | OUTPATIENT
Start: 2021-09-01 | End: 2021-09-01

## 2021-09-01 RX ORDER — LIDOCAINE 40 MG/G
CREAM TOPICAL ONCE
Status: CANCELLED | OUTPATIENT
Start: 2021-09-01 | End: 2021-09-01

## 2021-09-01 RX ORDER — LIDOCAINE HYDROCHLORIDE 40 MG/ML
SOLUTION TOPICAL ONCE
Status: CANCELLED | OUTPATIENT
Start: 2021-09-01 | End: 2021-09-01

## 2021-09-01 NOTE — TELEPHONE ENCOUNTER
Nisreen Palomino rn/  From [facility] calling to report a PT INR . PT 23.0 inr 1.9 Coumadin 5mg oraly daily.  For any concerns contact him at [phone]

## 2021-09-01 NOTE — TELEPHONE ENCOUNTER
Devon 45 Transitions Initial Follow Up Call    Outreach made within 2 business days of discharge: Yes    Patient: Loreta Veronica Patient : 1940   MRN: [id number]  Reason for Admission: There are no discharge diagnoses documented for the most recent discharge. Discharge Date: 21       Spoke with: leti Collier department/facility: [facility] Interactive Patient Contact:  Was patient able to fill all prescriptions: Yes  Was patient instructed to bring all medications to the follow-up visit: Yes  Is patient taking all medications as directed in the discharge summary?  Yes  Does patient understand their discharge instructions: Yes  Does patient have questions or concerns that need addressed prior to 7-14 day follow up office visit: no    Scheduled appointment with PCP within 7-14 days    Follow Up  Future Appointments   Date Time Provider Nicolas Mejia   2021  3:00 PM Angelica Mejia, Nuno TOWNSEND   2021  1:00 PM MD CRISTI FrancoALE FP [place] - [facility]   9/10/2021  2:15 PM MD Jackelyn Stokes Card [facility]   9/15/2021 12:30 PM MD Daljit Rangelw Derm [facility]   2021  8:00 AM SCHEDULE, Skippy Restorationist REMOTE TRANSMISSION [place] Card [facility]   2022  2:00 PM Gil Francois [address] Card [facility]   2022  2:30 PM SCOTT [place] - ZAINAB De Jesus [facility]

## 2021-09-01 NOTE — TELEPHONE ENCOUNTER
Spoke with home care RN. Continue same dose and repeat in one week.   Updated pt's coumadin dosing schedule

## 2021-09-01 NOTE — PROGRESS NOTES
Ashish Dunlap  Progress Note and Procedure Note      Sue Neff  AGE: 80 y.o. GENDER: male  : 1940  TODAY'S DATE:  2021    Subjective:     Chief Complaint   Patient presents with    Wound Check     Left foot         HISTORY of PRESENT ILLNESS HPI     Sue Neff is a 80 y.o. male who presents today for wound evaluation. History of Wound: Patient is here for his first postoperative visit he has had no problems since his discharge.   Wound Pain:  none  Severity:  0 / 10   Wound Type:  non-healing surgical  Modifying Factors:  edema  Associated Signs/Symptoms:  edema        PAST MEDICAL HISTORY        Diagnosis Date    Allergic rhinitis     Atrial fibrillation (HCC)     Benign prostatic hypertrophy     CAD (coronary artery disease)     Cancer (HCC)     skin    CH[facility]estive [facility] failure[facility]    Coronary artery disease involving native coronary artery of native heart without angina pectoris 2011    DDD (degenerative disc disease), lumbar     Falls 2017    GI bleeding     Hyperlipidemia     Hypertension     MI (myocardial infarction) ([address]     MRSA (methicillin resistant staph aureus) culture positive     h/o infection in the blood    Osteomyelitis (HCC)     left foot    Pneumonia     S/P PTCA (percutaneous transluminal coronary angioplasty) stents x 8    SSS (sick sinus syndrome) ([address]     Unspecified sleep apnea     Venous (peripheral) insufficiency 2018       PAST SURGICAL HISTORY    Past Surgical History:   Procedure Laterality Date    APPENDECTOMY      CARDIAC PACEMAKER PLACEMENT      CARPAL TUNNEL RELEASE      CORONARY ANGIOPLASTY      CORONARY ANGIOPLASTY WITH STENT PLACEMENT      DIAGNOSTIC CARDIAC CATH LAB PROCEDURE      EYE SURGERY      FOOT DEBRIDEMENT Left 2021    INCISION AND DRAINAGE LEFT FOOT performed by Rola Thacker DPM at [address]  10/2016   Navneet Merida TOE AMPUTATION Left 2021    AMPUTATION OF LEFT SECOND TOE performed by Ana Rock DPM at [address] HISTORY    Family History   Problem Relation Age of Onset    Cancer Sister     Coronary Art Dis Brother        SOCIAL HISTORY    Social History     Tobacco Use    Smoking status: Former Smoker     Packs/day: 1.00     Years: 30.00     Pack years: 30.00     Quit date: 2004     Years since quittin.4    Smokeless tobacco: Never Used   Vaping Use    Vaping Use: Never used   Substance Use Topics    Alcohol use: No     Alcohol/week: 0.0 standard drinks    Drug use: No       ALLERGIES    Allergies   Allergen Reactions    Avelox [Moxifloxacin Hcl In Nacl] Anaphylaxis    Epinephrine Base     Other      [place] per PT - swelling size of silver dollar at site per PT     Keflex [Cephalexin] Nausea And Vomiting    Polysporin [Bacitracin-Polymyxin B] Rash       MEDICATIONS    Current Outpatient Medications on File Prior to Encounter   Medication Sig Dispense Refill    doxycycline hyclate (VIBRA-TABS) 100 MG tablet Take 1 tablet by mouth 2 times daily for 10 days 20 tablet 0    Polysaccharide Iron Complex (PROFE) 391.3 (180 Fe) MG CAPS Take 1 capsule by mouth 2 times daily      coenzyme Q10 200 MG CAPS capsule Take 200 mg by mouth daily      furosemide (LASIX) 40 MG tablet TAKE 1 TABLET BY MOUTH TWICE DAILY (Patient taking differently: 40 mg Cardiology increased lasix to TID d/t leg swelling) 180 tablet 3    warfarin (COUMADIN) 5 MG tablet TAKE 1 TABLET BY MOUTH DAILY AT 6 PM (Patient taking differently: Taking 5mg 4 days per week  Taking 2.5mg 3 days per week) 90 tablet 2    metOLazone (ZAROXOLYN) 5 MG tablet Take 1 tablet by mouth Twice a Week (Patient taking differently: Take 5 mg by mouth Twice a Week Cardiology d/c'd at this time) 90 tablet 1    pantoprazole (PROTONIX) 40 MG tablet TAKE 1 TABLET BY MOUTH EVERY MORNING BEFORE BREAKFAST 90 tablet 1    potassium chloride (KLOR-CON M) 10 MEQ extended release tablet Take 1 tablet by mouth 4 times daily (Patient taking differently: Take 20 mEq by mouth 2 times daily ) 360 tablet 3    ondansetron (ZOFRAN-ODT) 4 MG disintegrating tablet DISSOLVE 1 TABLET ON THE TONGUE EVERY 8 HOURS AS NEEDED FOR NAUSEA OR VOMITING 20 tablet 3    rosuvastatin (CRESTOR) 5 MG tablet TAKE 1 TABLET DAILY 90 tablet 3    nitroGLYCERIN (NITRODUR) 0.2 MG/HR PLACE 1 PATCH ON THE SKIN DAILY 90 patch 3    metoprolol succinate (TOPROL XL) 25 MG extended release tablet TAKE ONE-HALF (1/2) TABLET TWICE A DAY 90 tablet 2    MULTAQ 400 MG TABS TAKE 1 TABLET TWICE A DAY WITH MEALS 180 tablet 3    vitamin B-12 (CYANOCOBALAMIN) 500 MCG tablet Take 500 mcg by mouth daily      fentaNYL (DURAGESIC) 50 MCG/HR Place 1 patch onto the skin every 48 hours 15 patch 0     No current facility-administered medications on file prior to encounter. REVIEW OF SYSTEMS    Pertinent items are noted in HPI. Objective:      /65   Pulse 71   Temp 97.5 °F (36.4 °C) (Infrared)   Resp 16     PHYSICAL EXAM    Pleural site well coapted no local purulence or drainage edema decreased    Assessment:     Problem List Items Addressed This Visit     None          Post  Debridement Measurements:  Wound 09/01/21 Foot Left;Distal 2nd toe amp #1 (Active)   Wound Image   09/01/21 1500   Wound Etiology Surgical 09/01/21 1500   Wound Cleansed Cleansed with saline 09/01/21 1500   Wound Length (cm) 0 cm 09/01/21 1500   Wound Width (cm) 0 cm 09/01/21 1500   Wound Depth (cm) 0 cm 09/01/21 1500   Wound Surface Area (cm^2) 0 cm^2 09/01/21 1500   Wound Volume (cm^3) 0 cm^3 09/01/21 1500   Wound Assessment Dry; Other (Comment) 09/01/21 1500   Drainage Amount Moderate 09/01/21 1500   Drainage Description Serosanguinous 09/01/21 1500   Odor None 09/01/21 1500   Lilia-wound Assessment Intact 09/01/21 1500   Number of days: 0               Plan:   Surgical site well coapted  Dressing change with Betadine ointment and compression  Follow-up 1 week  Leave dressing intact  Keep leg elevated at all times when not active  The nature of the patient's condition was explained in depth.  The patient was informed that their compliance to the treatment plan is paramount to successful healing and prevention of further ulceration and/or infection     Discharge Treatment  Leave dressing intact change if soiled    Written Patient Discharge Instructions Given            Electronically signed by Rosalio Holloway DPM on 9/1/2021 at 4:09 PM

## 2021-09-01 NOTE — PROGRESS NOTES
[address] [facility], 1940      Anticoagulation Indication(s):  Afib  pAF     Referring Physician:   Dr. Prachi Stewart  Goal INR Range:  2.0-3.0  Duration of Anticoagulation Therapy:  Indefinite  Home or Lab Draw: Lab  Product patient has at home: warfarin 5 mg    Recent INR Results:  Lab Results   Component Value Date    INR 1.90 09/01/2021    INR 1.58 (H) 08/28/2021    INR 1.85 (H) 08/27/2021    INR 2.43 (H) 08/26/2021       INR Summary                          Warfarin regimen (mg)  Date INR A/P Sun Mon Tue Wed Thu Fri Sat Mg/wk   9/1.21 1.9 At goal, no change 5 5 5 5 5 5 5 25   8/17/21 2.40 At goal, no change 2.5 5 2.5 2.5 5 5 2.5 25   7/29/21 1.7 At goal, no change 2.5 5 2.5 2.5 5 5 2.5 25 6/30221 2.38 At goal, no  change 2.5 5 2.5 2.5 7.5 5 2.5 27.5   6/22/21 1.68 Below goal, increase by 2.5 mg 2.5 5 2.5 2.5 7.5 5 2.5 27.5   5/25/21 2.1 At goal, no change 2.5 5 2.5 2.5 5 5 2.5 25   4/22/21 2.0 At goal, no change  2.5 5 2.5 2.5 5 5 2.5 25   3/24/21 1.5 Below goal, increase by 2.5 mg 2.5 5 2.5 2.5 5 5 2.5 25   2/8/21 2 At goal, no change 2.5 5 2.5 2.5 2.5 5 2.5 22.5   1/4/20 2.0 At goal, no change.  Pt went back to original dosing 2.5 5 2.5 2.5 2.5 5 2.5 22.5   12/10/20 1.7 Below goal, increase by 2.5 mg 2.5 5 2.5 2.5 2.5 5 5 25   11/4/20 2.1 At goal, no change 2.5 5 2.5 2.5 2.5 5 2.5 22.5   9/8/20 2.0 At goal, no change 2.5 5 2.5 5 2.5 5 2.5 25   8/31/20 2.7 At goal, no change 2.5 5 2.5 5 2.5 5 2.5 25   8/12/20 2.1 At goal, no change 2.5 5 2.5 5 2.5 5 2.5 25   7/16/20 2.7 At goal, no change 2.5 2.5 5 5 5 2.5 2.5 25   7/1/20 1.8 Below goal, increase by 2.5 2.5 2.5 5 5 5 2.5 2.5 25   6/17/20 1.9 At goal, no change 2.5 2.5 5 5 2.5 2.5 2.5 22.5   6/4/20 2.1 At goal, continue dose 2.5 2.5 5 5 2.5 2.5 2.5 22.5   5/27/20 2.0 At goal, continue dose 2.5 2.5 5 5 2.5 2.5 2.5 22.5   5/14/20 2.7 At goal, continue dose 2.5 2.5 5 5 2.5 2.5 2.5 22.5   5/7/20 2.1 At goal, continue dose 2.5 2.5 5 5 2.5 2.5 2. 5 22.5   4/30/20 1.8 Below goal, increase dose by 2.5 mg 2.5 2.5 5 5 2.5 2.5 2.5 22.5   4/15/20 1.9 At goal, no change 2.5 2.5 2.5 5 2.5 2.5 2.5 20   4/7/20 1.7 Below goal, increase by 2.5 2.5 2.5 2.5 5 2.5 2.5 2.5 20   3/30/20 3.70 Above goal, hold todays  And decrease by 5mg  2.5 2.5 hold 2.5 2.5 2.5 2.5 15   3/5/20 3.07 Above goal, hold todays dose 2.5 5 2.5 5 2.5 hold 2.5 20   2/26/20 2.78 At goal, no change 2.5 5 2.5 5 2.5 2.5 2.5 22.5   2/14/20 2.27 At goal, no change 2.5 5 2.5 5 2.5 2.5 2.5 22.5   2/5/20 3.93 Above goal,hold todays dose and decrease by 2.5 2.5 5 2.5 5 hold 2.5 2.5 20   1/27/20 2.64 At goal, no change 2.5 5 2.5 5 2.5 5 2.5 25   1/13/20 2.8 At goal, no change 2.5 5 2.5 5 2.5 5 2.5 25   12/27/19 1.44 Below goal, increased by 6.5 mg See note 2.5 5 2.5 5 2.5 5 2.5 25   12/20/19 1.27 Below goal, increase by 5 2.5 2 2.5 2 2.5 10(Pt did not increase as instructed) he took 5 mg 2 23.5    (he actually took 18.5 mg)   12/13/19 1.44 Below goal, increase by 3 2.5 2 2.5 2 2.5 5 2 18.5   12/6/19 1.59 Below goal, increase by. 2.5 1 2.5 1 2.5 5 1 15.5   12/2/19 4.10 Above goal, hold tonight  hold 2.5 2.5 2.5 Recheck     11/29/19 2.47 At goal, no change 5 2.5 2.5 2.5 2.5 5 5 25   11/25/16 5.86 Above goal 5 hold hold 2.5 5 recheck  12.5   11/19/19 2.38 At goal, continue 5 5 5 5 5 5 5 35         Assessment/Plan:    Recent hospitalizations/HC visits None reported   Recent medication changes None reported   Medications taken regularly that may interact with warfarin or alter INR No significant drug interactions identified   Warfarin dose taken as prescribed Patient uses pillbox? no   Signs/symptoms of bleeding History of bleeding?  Yes, GIB   Vitamin K intake Normally has ~1-2 servings of green, leafy vegetables per week   Recent vomiting/diarrhea/fever None reported   Changes in weight, activity, stress None reported   Tobacco or alcohol use Patient reports smoking 0 PPD  Patient reports having 0 drinks per day

## 2021-09-01 NOTE — PLAN OF CARE
knee. Wrap can stay on for the week    [address]: [address] [facility] wound-care supplies will be provided by:   Please note, depending on your insurance coverage, you may have out-of-pocket expenses for these supplies. Someone from the company should call you to confirm your order and discuss those potential costs before they ship your products -- please anticipate that call. If your out-of-pocket cost could be substantial, Many companies have financial hardship programs for patients who qualify, so please ask about that if you might need a hand. If you have any questions about your supplies or your potential out-of-pocket costs, or if you need to place an order for a refill of supplies (typically monthly), please call the company directly. Your  is Nelda Bang    Follow up with Dr Aracely Harp In 1 week(s) in the wound care center. Wound Care Center Information: Should you experience any significant changes in your wound(s) or have questions about your wound care, please contact the [employer] at [phone] Monday  - Thursday 8:00 am - 4:00 pm and Friday 8:00 am - 1:00pm. If you need help with your wound outside these hours and cannot wait until we are again available, contact your PCP or go to the hospital emergency room. PLEASE NOTE: IF YOU ARE UNABLE TO OBTAIN WOUND SUPPLIES, CONTINUE TO USE THE SUPPLIES YOU HAVE AVAILABLE UNTIL YOU ARE ABLE TO REACH US. IT IS MOST IMPORTANT TO KEEP THE WOUND COVERED AT ALL TIMES. Skilled nurse to evaluate and treat for wound care. Change dressing as ordered  once a day on No need to change dressing using clean technique. Patient/Family/caregiver may change dressings as needed as instructed when Home Care unavailable.     WOUNDS REQUIRING DRESSING Changes:     Wound 09/01/21 Foot Left;Distal 2nd toe amp #1 (Active)   Wound Image   09/01/21 1500   Wound Etiology Surgical 09/01/21 1500   Wound Cleansed Cleansed with saline 09/01/21 1500   Wound Length (cm) 0 cm 09/01/21 1500   Wound Width (cm) 0 cm 09/01/21 1500   Wound Depth (cm) 0 cm 09/01/21 1500   Wound Surface Area (cm^2) 0 cm^2 09/01/21 1500   Wound Volume (cm^3) 0 cm^3 09/01/21 1500   Wound Assessment Dry; Other (Comment) 09/01/21 1500   Drainage Amount Moderate 09/01/21 1500   Drainage Description Serosanguinous 09/01/21 1500   Odor None 09/01/21 1500   Lilia-wound Assessment Intact 09/01/21 1500   Number of days: 0          Patient seen and treated on 9/1/2021    By Tanner Stephens DPM  [id number]   (provider/NPI)

## 2021-09-02 ENCOUNTER — TELEPHONE (OUTPATIENT)
Dept: FAMILY MEDICINE CLINIC | Age: 81
End: 2021-09-02

## 2021-09-02 NOTE — TELEPHONE ENCOUNTER
Sukhdeep Mcneil was out to see pt today to set up [address]. Pt is declining Home Care.     Pt will do the PT/INR - outpatient

## 2021-09-07 ENCOUNTER — OFFICE VISIT (OUTPATIENT)
Dept: FAMILY MEDICINE CLINIC | Age: 81
End: 2021-09-07
Payer: MEDICARE

## 2021-09-07 VITALS
OXYGEN SATURATION: 97 % | BODY MASS INDEX: 24.14 KG/M2 | WEIGHT: 163 LBS | SYSTOLIC BLOOD PRESSURE: 136 MMHG | HEART RATE: 75 BPM | HEIGHT: 69 IN | DIASTOLIC BLOOD PRESSURE: 75 MMHG

## 2021-09-07 DIAGNOSIS — L03.116 CELLULITIS OF LEFT LOWER EXTREMITY: Primary | ICD-10-CM

## 2021-09-07 DIAGNOSIS — I10 ESSENTIAL HYPERTENSION: ICD-10-CM

## 2021-09-07 DIAGNOSIS — I25.10 CORONARY ARTERY DISEASE INVOLVING NATIVE CORONARY ARTERY OF NATIVE HEART WITHOUT ANGINA PECTORIS: ICD-10-CM

## 2021-09-07 DIAGNOSIS — Z95.0 CARDIAC PACEMAKER IN SITU: ICD-10-CM

## 2021-09-07 DIAGNOSIS — I48.0 PAROXYSMAL ATRIAL FIBRILLATION (HCC): ICD-10-CM

## 2021-09-07 DIAGNOSIS — I50.32 CHRONIC DIASTOLIC CONGESTIVE HEART FAILURE (HCC): ICD-10-CM

## 2021-09-07 PROCEDURE — 1111F DSCHRG MED/CURRENT MED MERGE: CPT | Performed by: FAMILY MEDICINE

## 2021-09-07 PROCEDURE — 99495 TRANSJ CARE MGMT MOD F2F 14D: CPT | Performed by: FAMILY MEDICINE

## 2021-09-07 NOTE — PROGRESS NOTES
discharge from hospital   Anna DEVINE   Home Medication Instructions MARIETTA:    Printed on:09/07/21 0932   Medication Information                      coenzyme Q10 200 MG CAPS capsule  Take 200 mg by mouth daily             fentaNYL (DURAGESIC) 50 MCG/HR  Place 1 patch onto the skin every 48 hours             furosemide (LASIX) 40 MG tablet  TAKE 1 TABLET BY MOUTH TWICE DAILY             metOLazone (ZAROXOLYN) 5 MG tablet  Take 1 tablet by mouth Twice a Week             metoprolol succinate (TOPROL XL) 25 MG extended release tablet  TAKE ONE-HALF (1/2) TABLET TWICE A DAY             MULTAQ 400 MG TABS  TAKE 1 TABLET TWICE A DAY WITH MEALS             nitroGLYCERIN (NITRODUR) 0.2 MG/HR  PLACE 1 PATCH ON THE SKIN DAILY             ondansetron (ZOFRAN-ODT) 4 MG disintegrating tablet  DISSOLVE 1 TABLET ON THE TONGUE EVERY 8 HOURS AS NEEDED FOR NAUSEA OR VOMITING             pantoprazole (PROTONIX) 40 MG tablet  TAKE 1 TABLET BY MOUTH EVERY MORNING BEFORE BREAKFAST             Polysaccharide Iron Complex (PROFE) 391.3 (180 Fe) MG CAPS  Take 1 capsule by mouth 2 times daily             potassium chloride (KLOR-CON M) 10 MEQ extended release tablet  Take 1 tablet by mouth 4 times daily             rosuvastatin (CRESTOR) 5 MG tablet  TAKE 1 TABLET DAILY             vitamin B-12 (CYANOCOBALAMIN) 500 MCG tablet  Take 500 mcg by mouth daily             warfarin (COUMADIN) 5 MG tablet  TAKE 1 TABLET BY MOUTH DAILY AT 6 PM                   Medications marked \"taking\" at this time  Outpatient Medications Marked as Taking for the 9/7/21 encounter (Office Visit) with Sivakumar Faye MD   Medication Sig Dispense Refill    Polysaccharide Iron Complex (PROFE) 391.3 (180 Fe) MG CAPS Take 1 capsule by mouth 2 times daily      coenzyme Q10 200 MG CAPS capsule Take 200 mg by mouth daily      furosemide (LASIX) 40 MG tablet TAKE 1 TABLET BY MOUTH TWICE DAILY (Patient taking differently: 40 mg Cardiology increased lasix to TID d/t leg swelling) 180 tablet 3    warfarin (COUMADIN) 5 MG tablet TAKE 1 TABLET BY MOUTH DAILY AT 6 PM (Patient taking differently: Taking 5mg 4 days per week  Taking 2.5mg 3 days per week) 90 tablet 2    metOLazone (ZAROXOLYN) 5 MG tablet Take 1 tablet by mouth Twice a Week (Patient taking differently: Take 5 mg by mouth Twice a Week Cardiology d/c'd at this time) 90 tablet 1    pantoprazole (PROTONIX) 40 MG tablet TAKE 1 TABLET BY MOUTH EVERY MORNING BEFORE BREAKFAST 90 tablet 1    potassium chloride (KLOR-CON M) 10 MEQ extended release tablet Take 1 tablet by mouth 4 times daily (Patient taking differently: Take 20 mEq by mouth 2 times daily ) 360 tablet 3    ondansetron (ZOFRAN-ODT) 4 MG disintegrating tablet DISSOLVE 1 TABLET ON THE TONGUE EVERY 8 HOURS AS NEEDED FOR NAUSEA OR VOMITING 20 tablet 3    rosuvastatin (CRESTOR) 5 MG tablet TAKE 1 TABLET DAILY 90 tablet 3    nitroGLYCERIN (NITRODUR) 0.2 MG/HR PLACE 1 PATCH ON THE SKIN DAILY 90 patch 3    metoprolol succinate (TOPROL XL) 25 MG extended release tablet TAKE ONE-HALF (1/2) TABLET TWICE A DAY 90 tablet 2    MULTAQ 400 MG TABS TAKE 1 TABLET TWICE A DAY WITH MEALS 180 tablet 3    vitamin B-12 (CYANOCOBALAMIN) 500 MCG tablet Take 500 mcg by mouth daily      fentaNYL (DURAGESIC) 50 MCG/HR Place 1 patch onto the skin every 48 hours 15 patch 0        Medications patient taking as of now reconciled against medications ordered at time of hospital discharge: Yes    Chief Complaint   Patient presents with    Follow-Up from Hospital       History of Present illness - Follow up of Hospital diagnosis(es): Had recurrent cellulitis of the left lower extremity that was not responding to outpatient clindamycin  A callus fell off his left second toe revealing an ulceration so he sent to the hospital    Inpatient course: Discharge summary reviewed-plain x-rays and MRI both confirmed osteomyelitis of the left second toe with a displaced pathologic fracture  Doppler 08/28/2021    HCT 31.0 (L) 08/28/2021    MCV 84.1 08/28/2021     08/28/2021     Assessment/Plan:  1. Cellulitis of left lower extremity  Finish doxycycline  Return to wound clinic tomorrow  - CO DISCHARGE MEDS RECONCILED W/ CURRENT OUTPATIENT MED LIST     Encounter Diagnoses   Name Primary?     Cellulitis of left lower extremity Yes    Cardiac pacemaker in situ     Chronic diastolic congestive heart failure (HCC)  recent EF was 50%    Coronary artery disease involving native coronary artery of native heart without angina pectoris  no chest pain    Essential hypertension  blood pressure low slightly up today but normal    Paroxysmal atrial fibrillation (HCC)  in sinus rhythm today continue Coumadin   Check BMP CBC INR today  Follow-up with cardiology in 3 days    Medical Decision Making: moderate complexity

## 2021-09-08 ENCOUNTER — CARE COORDINATION (OUTPATIENT)
Dept: CASE MANAGEMENT | Age: 81
End: 2021-09-08

## 2021-09-08 ENCOUNTER — ANTI-COAG VISIT (OUTPATIENT)
Dept: CARDIOLOGY CLINIC | Age: 81
End: 2021-09-08
Payer: MEDICARE

## 2021-09-08 ENCOUNTER — HOSPITAL ENCOUNTER (OUTPATIENT)
Dept: WOUND CARE | Age: 81
Discharge: HOME | End: 2021-09-08
Payer: MEDICARE

## 2021-09-08 VITALS — TEMPERATURE: 96.8 F

## 2021-09-08 DIAGNOSIS — M86.9 OSTEOMYELITIS OF SECOND TOE OF LEFT FOOT (HCC): Primary | ICD-10-CM

## 2021-09-08 DIAGNOSIS — I48.0 PAROXYSMAL ATRIAL FIBRILLATION (HCC): Primary | ICD-10-CM

## 2021-09-08 PROCEDURE — 93793 ANTICOAG MGMT PT WARFARIN: CPT | Performed by: NURSE PRACTITIONER

## 2021-09-08 PROCEDURE — 97597 DBRDMT OPN WND 1ST 20 CM/<: CPT

## 2021-09-08 RX ORDER — GENTAMICIN SULFATE 1 MG/G
OINTMENT TOPICAL ONCE
Status: CANCELLED | OUTPATIENT
Start: 2021-09-08 | End: 2021-09-08

## 2021-09-08 RX ORDER — LIDOCAINE 40 MG/G
CREAM TOPICAL ONCE
Status: DISCONTINUED | OUTPATIENT
Start: 2021-09-08 | End: 2021-09-09 | Stop reason: HOSPADM

## 2021-09-08 RX ORDER — LIDOCAINE 40 MG/G
CREAM TOPICAL ONCE
Status: CANCELLED | OUTPATIENT
Start: 2021-09-08 | End: 2021-09-08

## 2021-09-08 RX ORDER — LIDOCAINE HYDROCHLORIDE 20 MG/ML
JELLY TOPICAL ONCE
Status: CANCELLED | OUTPATIENT
Start: 2021-09-08 | End: 2021-09-08

## 2021-09-08 RX ORDER — CLOBETASOL PROPIONATE 0.5 MG/G
OINTMENT TOPICAL ONCE
Status: CANCELLED | OUTPATIENT
Start: 2021-09-08 | End: 2021-09-08

## 2021-09-08 RX ORDER — LIDOCAINE HYDROCHLORIDE 40 MG/ML
SOLUTION TOPICAL ONCE
Status: CANCELLED | OUTPATIENT
Start: 2021-09-08 | End: 2021-09-08

## 2021-09-08 RX ORDER — BETAMETHASONE DIPROPIONATE 0.05 %
OINTMENT (GRAM) TOPICAL ONCE
Status: CANCELLED | OUTPATIENT
Start: 2021-09-08 | End: 2021-09-08

## 2021-09-08 RX ORDER — LIDOCAINE 50 MG/G
OINTMENT TOPICAL ONCE
Status: CANCELLED | OUTPATIENT
Start: 2021-09-08 | End: 2021-09-08

## 2021-09-08 NOTE — PROGRESS NOTES
[address] [facility], 1940      Anticoagulation Indication(s):  Afib  pAF     Referring Physician:   Dr. Solange Dewey  Goal INR Range:  2.0-3.0  Duration of Anticoagulation Therapy:  Indefinite  Home or Lab Draw: Lab  Product patient has at home: warfarin 5 mg    Recent INR Results:  Lab Results   Component Value Date    INR 2.47 (H) 09/07/2021    INR 1.90 09/01/2021    INR 1.58 (H) 08/28/2021    INR 1.85 (H) 08/27/2021       INR Summary                          Warfarin regimen (mg)  Date INR A/P Sun Mon Tue Wed Thu Fri Sat Mg/wk   9/29/21 2.47 At goal, no change  5 5 5 5 5 5 5 25   9/1.21 1.9 At goal, no change 5 5 5 5 5 5 5 25   8/17/21 2.40 At goal, no change 2.5 5 2.5 2.5 5 5 2.5 25   7/29/21 1.7 At goal, no change 2.5 5 2.5 2.5 5 5 2.5 25   6/30221 2.38 At goal, no  change 2.5 5 2.5 2.5 7.5 5 2.5 27.5   6/22/21 1.68 Below goal, increase by 2.5 mg 2.5 5 2.5 2.5 7.5 5 2.5 27.5   5/25/21 2.1 At goal, no change 2.5 5 2.5 2.5 5 5 2.5 25   4/22/21 2.0 At goal, no change  2.5 5 2.5 2.5 5 5 2.5 25   3/24/21 1.5 Below goal, increase by 2.5 mg 2.5 5 2.5 2.5 5 5 2.5 25   2/8/21 2 At goal, no change 2.5 5 2.5 2.5 2.5 5 2.5 22.5   1/4/20 2.0 At goal, no change.  Pt went back to original dosing 2.5 5 2.5 2.5 2.5 5 2.5 22.5   12/10/20 1.7 Below goal, increase by 2.5 mg 2.5 5 2.5 2.5 2.5 5 5 25   11/4/20 2.1 At goal, no change 2.5 5 2.5 2.5 2.5 5 2.5 22.5   9/8/20 2.0 At goal, no change 2.5 5 2.5 5 2.5 5 2.5 25   8/31/20 2.7 At goal, no change 2.5 5 2.5 5 2.5 5 2.5 25   8/12/20 2.1 At goal, no change 2.5 5 2.5 5 2.5 5 2.5 25   7/16/20 2.7 At goal, no change 2.5 2.5 5 5 5 2.5 2.5 25   7/1/20 1.8 Below goal, increase by 2.5 2.5 2.5 5 5 5 2.5 2.5 25   6/17/20 1.9 At goal, no change 2.5 2.5 5 5 2.5 2.5 2.5 22.5   6/4/20 2.1 At goal, continue dose 2.5 2.5 5 5 2.5 2.5 2.5 22.5   5/27/20 2.0 At goal, continue dose 2.5 2.5 5 5 2.5 2.5 2.5 22.5   5/14/20 2.7 At goal, continue dose 2.5 2.5 5 5 2.5 2.5 2.5 22.5 5/7/20 2.1 At goal, continue dose 2.5 2.5 5 5 2.5 2.5 2.5 22.5   4/30/20 1.8 Below goal, increase dose by 2.5 mg 2.5 2.5 5 5 2.5 2.5 2.5 22.5   4/15/20 1.9 At goal, no change 2.5 2.5 2.5 5 2.5 2.5 2.5 20   4/7/20 1.7 Below goal, increase by 2.5 2.5 2.5 2.5 5 2.5 2.5 2.5 20   3/30/20 3.70 Above goal, hold todays  And decrease by 5mg  2.5 2.5 hold 2.5 2.5 2.5 2.5 15   3/5/20 3.07 Above goal, hold todays dose 2.5 5 2.5 5 2.5 hold 2.5 20   2/26/20 2.78 At goal, no change 2.5 5 2.5 5 2.5 2.5 2.5 22.5   2/14/20 2.27 At goal, no change 2.5 5 2.5 5 2.5 2.5 2.5 22.5   2/5/20 3.93 Above goal,hold todays dose and decrease by 2.5 2.5 5 2.5 5 hold 2.5 2.5 20   1/27/20 2.64 At goal, no change 2.5 5 2.5 5 2.5 5 2.5 25   1/13/20 2.8 At goal, no change 2.5 5 2.5 5 2.5 5 2.5 25   12/27/19 1.44 Below goal, increased by 6.5 mg See note 2.5 5 2.5 5 2.5 5 2.5 25   12/20/19 1.27 Below goal, increase by 5 2.5 2 2.5 2 2.5 10(Pt did not increase as instructed) he took 5 mg 2 23.5    (he actually took 18.5 mg)   12/13/19 1.44 Below goal, increase by 3 2.5 2 2.5 2 2.5 5 2 18.5   12/6/19 1.59 Below goal, increase by. 2.5 1 2.5 1 2.5 5 1 15.5   12/2/19 4.10 Above goal, hold tonight  hold 2.5 2.5 2.5 Recheck     11/29/19 2.47 At goal, no change 5 2.5 2.5 2.5 2.5 5 5 25   11/25/16 5.86 Above goal 5 hold hold 2.5 5 recheck  12.5   11/19/19 2.38 At goal, continue 5 5 5 5 5 5 5 35         Assessment/Plan:    Recent hospitalizations/HC visits None reported   Recent medication changes None reported   Medications taken regularly that may interact with warfarin or alter INR No significant drug interactions identified   Warfarin dose taken as prescribed Patient uses pillbox? no   Signs/symptoms of bleeding History of bleeding?  Yes, GIB   Vitamin K intake Normally has ~1-2 servings of green, leafy vegetables per week   Recent vomiting/diarrhea/fever None reported   Changes in weight, activity, stress None reported   Tobacco or alcohol use Patient reports smoking 0 PPD  Patient reports having 0 drinks per day   Upcoming surgeries or procedures None reported     Patient was also reminded to maintain consistent vitamin K intake and call with any bleeding, medication changes, or fever/vomiting/diarrhea. Next INR check:9/29/21  Spoke with health care RN.   Continue same dose    Addendum:  Reviewed documentation and plan of care from RN  Agree with above  Domenico Haji NP

## 2021-09-08 NOTE — PLAN OF CARE
[facility]  [address] [facility] [address], [address]  Telephone: [phone]        [facility] Fax # [phone]        Discharge Instructions       [facility]  [address], [address]  Telephone: [phone]    Discharge Instructions    Important reminders:    1. Increase Protein intake for optimal wound healing  2. No added salt to reduce any swelling  3. If diabetic, maintain good glucose control  4. If you smoke, smoking prohibits wound healing, we ask that you refrain from smoking. 5. When taking antibiotics take the entire prescription as ordered. Do not stop taking until medication is all gone unless otherwise instructed. 6. Exercise as tolerated. 7. Keep weight off wounds and reposition every 2 hours if applicable. 8. If wound(s) is on your lower extremity, elevate legs to the level of the heart or above for 30 minutes 4-5 times a day and/or when sitting. Avoid standing for long periods of time. 9. Do not get wounds wet in bath or shower unless otherwise instructed by your physician. If your wound is on your foot or leg, you may purchase a cast bag. Please ask at the pharmacy. If Vascular testing is ordered, please call [address] [place] [address] [phone]) to schedule. Vascular tests ordered by Wound Care Physicians may take up to 2 hours to complete. Please keep that in mind when scheduling. If Vascular testing is scheduled, please bring supplies to replace your dressing after testing is done. The vascular department does not stock supplies. Wound: *Left foot    With each dressing change, rinse wounds with 0.9% Saline. (May use wound wash or soft contact solution. Both can be purchased at a local drug store). If unable to obtain saline, may use a gentle soap and water. Dressing care: betadine ointment, 4x4, Foam to anterior ankle Kerlix, 2 tubigrips.  Change every 2 days or 3 times a week    Home Care Agency: [facility]    Your wound-care supplies will be provided by:   Please note, depending on your insurance coverage, you may have out-of-pocket expenses for these supplies. Someone from the company should call you to confirm your order and discuss those potential costs before they ship your products -- please anticipate that call. If your out-of-pocket cost could be substantial, Many companies have financial hardship programs for patients who qualify, so please ask about that if you might need a hand. If you have any questions about your supplies or your potential out-of-pocket costs, or if you need to place an order for a refill of supplies (typically monthly), please call the company directly. Your  is Eron Fernandez up with Dr Dylan Wolfe In 2 week(s) in the wound care center. Wound Care Center Information: Should you experience any significant changes in your wound(s) or have questions about your wound care, please contact the [employer] at [phone] Monday  - Thursday 8:00 am - 4:00 pm and Friday 8:00 am - 1:00pm. If you need help with your wound outside these hours and cannot wait until we are again available, contact your PCP or go to the hospital emergency room. PLEASE NOTE: IF YOU ARE UNABLE TO OBTAIN WOUND SUPPLIES, CONTINUE TO USE THE SUPPLIES YOU HAVE AVAILABLE UNTIL YOU ARE ABLE TO REACH US. IT IS MOST IMPORTANT TO KEEP THE WOUND COVERED AT ALL TIMES. Please resume care    Skilled nurse to evaluate and treat for wound care. Change dressing as ordered  once a day on Monday, Wednesday and Friday using clean technique. Patient/Family/caregiver may change dressings as needed as instructed when Home Care unavailable.     WOUNDS REQUIRING DRESSING Changes:     Wound 09/01/21 Foot Left;Distal 2nd toe amp #1 (Active)   Wound Image   09/01/21 1500   Wound Etiology Surgical 09/08/21 1450   Wound Cleansed Cleansed with saline 09/08/21 1450 Wound Length (cm) 0 cm 09/08/21 1450   Wound Width (cm) 0 cm 09/08/21 1450   Wound Depth (cm) 0 cm 09/08/21 1450   Wound Surface Area (cm^2) 0 cm^2 09/08/21 1450   Wound Volume (cm^3) 0 cm^3 09/08/21 1450   Post-Procedure Length (cm) 0.2 cm 09/08/21 1521   Post-Procedure Width (cm) 0.2 cm 09/08/21 1521   Post-Procedure Depth (cm) 0.1 cm 09/08/21 1521   Post-Procedure Surface Area (cm^2) 0.04 cm^2 09/08/21 1521   Post-Procedure Volume (cm^3) 0.004 cm^3 09/08/21 1521   Wound Assessment Bleeding 09/08/21 1521   Drainage Amount None 09/08/21 1450   Drainage Description Serosanguinous 09/01/21 1500   Odor None 09/08/21 1450   Lilia-wound Assessment Dry/flaky 09/08/21 1450   Number of days: 7          Patient seen and treated on 9/8/2021    By Angela Fritz DPM  [id number]   (provider/NPI)

## 2021-09-08 NOTE — PLAN OF CARE
[address] [facility]:      [address] f: [phone] [phone] [email or web address]     Ordering Center: [facility] [address] [place], [place] [address] [place] [address]  [phone]  Dept: [phone]   Fax# [phone]    Patient Information:      Brii Zavala  [address] [facility] [address]   [phone]   : 1940  AGE: 80 y.o. GENDER: male   TODAYS DATE:  2021    Insurance:      PRIMARY INSURANCE:  Plan: MEDICARE PART A AND B  Coverage: MEDICARE  Effective Date: 2015  Group Number: [unfilled]  Subscriber Number: [id number] - (Medicare)    Payor/Plan Subscr  Sex Relation Sub. Ins. ID Effective Group Num   1. [address] P 1940 Male Self [id number] 1/1/15                                    [address] [address]   2.  [address] [facility] P 1940 Male Self [id number]                                    [address]         Patient Wound Information:     Additional ICD-10 Codes:     Patient Active Problem List   Diagnosis Code    Hyperlipidemia E78.5    Benign prostatic hyperplasia N40.0    Paroxysmal atrial fibrillation ([facility] I48.0    Coronary artery disease involving native coronary artery of native heart without angina pectoris I25.10    Cardiac pacemaker in situ Z95.0    Postsurgical percutaneous transluminal coronary angioplasty status Z98.61    History of nonmelanoma skin cancer Z85.828    Tinea manuum, pedis, and unguium B35.2, B35.3, B35.1    AK (actinic keratosis) L57.0    Chronic congestive heart failure (HCC) I50.9    Essential hypertension I10    Sick sinus syndrome (HCC) I49.5    Bradycardia R00.1    Gastrointestinal hemorrhage K92.2    Pure hyperglyceridemia E78.1    Localized edema R60.0    Venous (peripheral) insufficiency I87.2    PVD (peripheral vascular disease) (HCC) I73.9    Solar purpura (HCC) D69.2    Osteomyelitis of second toe of left foot ([facility] M86.9    Bilateral cellulitis of lower leg L03.116, L03.115    Open fracture of second toe of left foot S92.502B    History of MRSA infection Z86.14    Failure of outpatient treatment Z78.9    Subacute osteomyelitis of left foot ([facility] M86.272    Encounter for medication counseling Z71.89       WOUNDS REQUIRING DRESSING SUPPLIES:     Wound 09/01/21 Foot Left;Distal 2nd toe amp #1 (Active)   Wound Image   09/01/21 1500   Wound Etiology Surgical 09/08/21 1450   Wound Cleansed Cleansed with saline 09/08/21 1450   Wound Length (cm) 0 cm 09/08/21 1450   Wound Width (cm) 0 cm 09/08/21 1450   Wound Depth (cm) 0 cm 09/08/21 1450   Wound Surface Area (cm^2) 0 cm^2 09/08/21 1450   Wound Volume (cm^3) 0 cm^3 09/08/21 1450   Post-Procedure Length (cm) 0.2 cm 09/08/21 1521   Post-Procedure Width (cm) 0.2 cm 09/08/21 1521   Post-Procedure Depth (cm) 0.1 cm 09/08/21 1521   Post-Procedure Surface Area (cm^2) 0.04 cm^2 09/08/21 1521   Post-Procedure Volume (cm^3) 0.004 cm^3 09/08/21 1521   Wound Assessment Bleeding 09/08/21 1521   Drainage Amount None 09/08/21 1450   Drainage Description Serosanguinous 09/01/21 1500   Odor None 09/08/21 1450   Lilia-wound Assessment Dry/flaky 09/08/21 1450   Number of days: 7          Supplies Requested :      WOUND #: 1   PRIMARY DRESSING:    None   Cover and Secure with: 4X4 gauze pad  Conforming roll gauze     FREQUENCY OF DRESSING CHANGES:  Three times per week    Wound Thickness [x] Full   []Partial                                     Patient Wound(s) Debrided: [x] Yes   [] No    Debridement Date: 9/8/2021    Debribement Type: Excisional/Sharp    ADDITIONAL ITEMS:  [] Gloves Small  [x] Gloves Medium [] Gloves Large [] Gloves Mykel Francesca  [] Paper Tape 1\" [x] Paper Tape 2\" [] Paper Tape 3\"  [] Medipore Tape 3\"  [] Saline  [] Skin Prep   [] Adhesive Remover   [] Cotton Tip Applicators  [] Tubular Stocking   [] Size E  [] Size G  [] Other:    Patient currently being seen by Home

## 2021-09-08 NOTE — PROGRESS NOTES
Ashish   Progress Note and Procedure Note      Ezekiel Doty  AGE: 80 y.o. GENDER: male  : 1940  TODAY'S DATE:  2021    Subjective:     Chief Complaint   Patient presents with    Wound Check     left leg F/U         HISTORY of PRESENT ILLNESS HPI     Ezekiel Doty is a 80 y.o. male who presents today for wound evaluation. History of Wound: Is here for second postoperative visit.   Wound Pain:  none  Severity:  0 / 10   Wound Type:  non-healing surgical  Modifying Factors:  edema  Associated Signs/Symptoms:  edema        PAST MEDICAL HISTORY        Diagnosis Date    Allergic rhinitis     Atrial fibrillation (HCC)     Benign prostatic hypertrophy     CAD (coronary artery disease)     Cancer ([facility]     skin    CHF (congestive heart failure) ([facility]) 2017    Coronary artery disease involving native coronary artery of native heart without angina pectoris 2011    DDD (degenerative disc disease), lumbar     Falls 2017    GI bleeding     Hyperlipidemia     Hypertension     MI (myocardial infarction) ([address]     MRSA (methicillin resistant staph aureus) culture positive     h/o infection in the blood    Osteomyelitis ([facility])     left foot    Pneumonia     S/P PTCA (percutaneous transluminal coronary angioplasty) stents x 8    SSS (sick sinus syndrome) ([address]     Unspecified sleep apnea     Venous (peripheral) insufficiency 2018       PAST SURGICAL HISTORY    Past Surgical History:   Procedure Laterality Date    APPENDECTOMY      CARDIAC PACEMAKER PLACEMENT      CARPAL TUNNEL RELEASE      CORONARY ANGIOPLASTY      CORONARY ANGIOPLASTY WITH STENT PLACEMENT      DIAGNOSTIC CARDIAC CATH LAB PROCEDURE      EYE SURGERY      FOOT DEBRIDEMENT Left 2021    INCISION AND DRAINAGE LEFT FOOT performed by Zeus Stone DPM at [address]  10/2016    PACEMAKER PLACEMENT      TOE AMPUTATION Left 2021    AMPUTATION OF LEFT SECOND TOE performed by Arsenio Romero DPM at [address] HISTORY    Family History   Problem Relation Age of Onset    Cancer Sister     Coronary Art Dis Brother        SOCIAL HISTORY    Social History     Tobacco Use    Smoking status: Former Smoker     Packs/day: 1.00     Years: 30.00     Pack years: 30.00     Quit date: 2004     Years since quittin.4    Smokeless tobacco: Never Used   Vaping Use    Vaping Use: Never used   Substance Use Topics    Alcohol use: No     Alcohol/week: 0.0 standard drinks    Drug use: No       ALLERGIES    Allergies   Allergen Reactions    Avelox [Moxifloxacin Hcl In Nacl] Anaphylaxis    Epinephrine Base     Other      [place] per PT - swelling size of silver dollar at site per PT     Keflex [Cephalexin] Nausea And Vomiting    Polysporin [Bacitracin-Polymyxin B] Rash       MEDICATIONS    Current Outpatient Medications on File Prior to Encounter   Medication Sig Dispense Refill    Polysaccharide Iron Complex (PROFE) 391.3 (180 Fe) MG CAPS Take 1 capsule by mouth 2 times daily      coenzyme Q10 200 MG CAPS capsule Take 200 mg by mouth daily      furosemide (LASIX) 40 MG tablet TAKE 1 TABLET BY MOUTH TWICE DAILY (Patient taking differently: 40 mg Cardiology increased lasix to TID d/t leg swelling) 180 tablet 3    warfarin (COUMADIN) 5 MG tablet TAKE 1 TABLET BY MOUTH DAILY AT 6 PM (Patient taking differently: Taking 5mg 4 days per week  Taking 2.5mg 3 days per week) 90 tablet 2    metOLazone (ZAROXOLYN) 5 MG tablet Take 1 tablet by mouth Twice a Week (Patient taking differently: Take 5 mg by mouth Twice a Week Cardiology d/c'd at this time) 90 tablet 1    pantoprazole (PROTONIX) 40 MG tablet TAKE 1 TABLET BY MOUTH EVERY MORNING BEFORE BREAKFAST 90 tablet 1    potassium chloride (KLOR-CON M) 10 MEQ extended release tablet Take 1 tablet by mouth 4 times daily (Patient taking differently: Take 20 mEq by mouth 2 times daily ) 360 tablet 3    ondansetron (ZOFRAN-ODT) 4 MG disintegrating tablet DISSOLVE 1 TABLET ON THE TONGUE EVERY 8 HOURS AS NEEDED FOR NAUSEA OR VOMITING 20 tablet 3    rosuvastatin (CRESTOR) 5 MG tablet TAKE 1 TABLET DAILY 90 tablet 3    nitroGLYCERIN (NITRODUR) 0.2 MG/HR PLACE 1 PATCH ON THE SKIN DAILY 90 patch 3    metoprolol succinate (TOPROL XL) 25 MG extended release tablet TAKE ONE-HALF (1/2) TABLET TWICE A DAY 90 tablet 2    MULTAQ 400 MG TABS TAKE 1 TABLET TWICE A DAY WITH MEALS 180 tablet 3    vitamin B-12 (CYANOCOBALAMIN) 500 MCG tablet Take 500 mcg by mouth daily      fentaNYL (DURAGESIC) 50 MCG/HR Place 1 patch onto the skin every 48 hours 15 patch 0     No current facility-administered medications on file prior to encounter. REVIEW OF SYSTEMS    Pertinent items are noted in HPI. Objective:      Temp 96.8 °F (36 °C) (Tympanic)     PHYSICAL EXAM    Surgical site appears well coapted with some nonviable tissue at the skin edges. Assessment:     Problem List Items Addressed This Visit     Osteomyelitis of second toe of left foot (HCC) - Primary    Relevant Medications    lidocaine (LMX) 4 % cream    Other Relevant Orders    Initiate Outpatient Wound Care Protocol          Procedure Note    Performed by: Mallika Paredes DPM    Consent obtained: Yes    Time out taken:  Yes    Pain Control: Anesthetic  Anesthetic: 4% Topical Xylocaine     Debridement:Excisional Debridement    Using curette the wound was sharply debrided    down through and including the removal of epidermis and dermis.         Devitalized Tissue Debrided:  fibrin, slough, necrotic/eschar, exudate and callus    Pre Debridement Measurements:  Are located in the Wound Documentation Flow Sheet    Wound #: 1     Wound Care Documentation:  Wound 09/01/21 Foot Left;Distal 2nd toe amp #1 (Active)   Wound Image   09/01/21 1500   Wound Etiology Surgical 09/08/21 1450   Wound Cleansed Cleansed with saline 09/08/21 1450   Wound Length (cm) 0 cm 09/08/21 1450   Wound Width (cm) 0 cm 09/08/21 1450   Wound Depth (cm) 0 cm 09/08/21 1450   Wound Surface Area (cm^2) 0 cm^2 09/08/21 1450   Wound Volume (cm^3) 0 cm^3 09/08/21 1450   Post-Procedure Length (cm) 0.2 cm 09/08/21 1521   Post-Procedure Width (cm) 0.2 cm 09/08/21 1521   Post-Procedure Depth (cm) 0.1 cm 09/08/21 1521   Post-Procedure Surface Area (cm^2) 0.04 cm^2 09/08/21 1521   Post-Procedure Volume (cm^3) 0.004 cm^3 09/08/21 1521   Wound Assessment Bleeding 09/08/21 1521   Drainage Amount None 09/08/21 1450   Drainage Description Serosanguinous 09/01/21 1500   Odor None 09/08/21 1450   Lilia-wound Assessment Dry/flaky 09/08/21 1450   Number of days: 7       Total Surface Area Debrided:  1 sq cm     Percentage of wound debrided 100%    Bleeding:  Minimal    Hemostasis Achieved:  by silver nitrate stick    Procedural Pain:  0  / 10     Post Procedural Pain:  0 / 10     Response to treatment:  Well tolerated by patient. Plan:   Surgical site debrided of nonviable tissue  Dressing change with Betadine ointment and compression  Follow-up 1 week  Leave dressing intact  Keep leg elevated at all times when not active  The nature of the patient's condition was explained in depth.  The patient was informed that their compliance to the treatment plan is paramount to successful healing and prevention of further ulceration and/or infection     Discharge Treatment  Changes every other day with Betadine ointment    Written Patient Discharge Instructions Given            Electronically signed by Yolanda Villar DPM on 9/8/2021 at 3:32 PM

## 2021-09-10 ENCOUNTER — OFFICE VISIT (OUTPATIENT)
Dept: CARDIOLOGY CLINIC | Age: 81
End: 2021-09-10
Payer: MEDICARE

## 2021-09-10 VITALS
WEIGHT: 161.8 LBS | DIASTOLIC BLOOD PRESSURE: 85 MMHG | SYSTOLIC BLOOD PRESSURE: 131 MMHG | BODY MASS INDEX: 23.89 KG/M2 | HEART RATE: 84 BPM

## 2021-09-10 DIAGNOSIS — I48.0 PAROXYSMAL ATRIAL FIBRILLATION (HCC): ICD-10-CM

## 2021-09-10 DIAGNOSIS — E78.5 HYPERLIPIDEMIA, UNSPECIFIED HYPERLIPIDEMIA TYPE: ICD-10-CM

## 2021-09-10 DIAGNOSIS — I25.10 CORONARY ARTERY DISEASE INVOLVING NATIVE CORONARY ARTERY OF NATIVE HEART WITHOUT ANGINA PECTORIS: Primary | ICD-10-CM

## 2021-09-10 DIAGNOSIS — I10 HTN (HYPERTENSION), BENIGN: ICD-10-CM

## 2021-09-10 PROCEDURE — 99213 OFFICE O/P EST LOW 20 MIN: CPT | Performed by: INTERNAL MEDICINE

## 2021-09-10 PROCEDURE — 1123F ACP DISCUSS/DSCN MKR DOCD: CPT | Performed by: INTERNAL MEDICINE

## 2021-09-10 PROCEDURE — 1111F DSCHRG MED/CURRENT MED MERGE: CPT | Performed by: INTERNAL MEDICINE

## 2021-09-10 PROCEDURE — 1036F TOBACCO NON-USER: CPT | Performed by: INTERNAL MEDICINE

## 2021-09-10 PROCEDURE — 4040F PNEUMOC VAC/ADMIN/RCVD: CPT | Performed by: INTERNAL MEDICINE

## 2021-09-10 PROCEDURE — G8427 DOCREV CUR MEDS BY ELIG CLIN: HCPCS | Performed by: INTERNAL MEDICINE

## 2021-09-10 PROCEDURE — G8420 CALC BMI NORM PARAMETERS: HCPCS | Performed by: INTERNAL MEDICINE

## 2021-09-10 NOTE — PROGRESS NOTES
Subjective:      Patient ID: Ayanna Maguire is a 80 y.o. male. CC:  S/p GI bleed. F/u HTN  CAD. S/p pacer. Fatigue      HPI: Mr Leslie Humphrey is here for a 6 moth f/u. Marcio Arreguin He denies chest pain today but has chronic swelling. He c/o of fatigue and low b/p. No LOC but fell and hit his hip and has bad leg pain. Back is bad and is using a 4 pt walker. Worried about bleeding and back surgeons shy away from surgery on back d/t bleeding. Has paroxysmal brief episodes of AF, less than 1%. He had life threatening GI bleeding on NOAC. He doesn't want watchman. Has more AF on pacer interrogation. GI said ok for [facility]. Has edema. Has dizziness and will decrease nitrodur patch 0.2mg/hr. Less edema after hospitalization for osteo.           Allergies   Allergen Reactions    Avelox [Moxifloxacin Hcl In Nacl] Anaphylaxis    Epinephrine Base     Other      [place] per PT - swelling size of silver dollar at site per PT     Keflex [Cephalexin] Nausea And Vomiting    Polysporin [Bacitracin-Polymyxin B] Rash        Social History     Socioeconomic History    Marital status:      Spouse name: Not on file    Number of children: 2    Years of education: Not on file    Highest education level: Not on file   Occupational History    Not on file   Tobacco Use    Smoking status: Former Smoker     Packs/day: 1.00     Years: 30.00     Pack years: 30.00     Quit date: 2004     Years since quittin.4    Smokeless tobacco: Never Used   Vaping Use    Vaping Use: Never used   Substance and Sexual Activity    Alcohol use: No     Alcohol/week: 0.0 standard drinks    Drug use: No    Sexual activity: Yes     Comment:  living with spouse   Other Topics Concern    Not on file   Social History Narrative    Not on file     Social Determinants of Health     Financial Resource Strain: Low Risk     Difficulty of Paying Living Expenses: Not hard at all   Food Insecurity: No Food Insecurity    Worried About Running Out of Food in the Last Year: Never true    Ran Out of Food in the Last Year: Never true   Transportation Needs:     Lack of Transportation (Medical):  Lack of Transportation (Non-Medical):    Physical Activity:     Days of Exercise per Week:     Minutes of Exercise per Session:    Stress:     Feeling of Stress :    Social Connections:     Frequency of Communication with Friends and Family:     Frequency of Social Gatherings with Friends and Family:     Attends Nondenominational Services:     Active Member of Clubs or Organizations:     Attends Club or Organization Meetings:     Marital Status:    Intimate Partner Violence:     Fear of Current or Ex-Partner:     Emotionally Abused:     Physically Abused:     Sexually Abused:         Patient has a family history includes Cancer in his sister; Coronary Art Dis in his brother. Patient  has a past medical history of Allergic rhinitis, Atrial fibrillation ([facility] Benign prostatic hypertrophy, CAD (coronary artery disease), Cancer ([facility] CHF (congestive heart failure) ([facility] Coronary artery disease involving native coronary artery of native heart without angina pectoris, DDD (degenerative disc disease), lumbar, Falls, GI bleeding, Hyperlipidemia, Hypertension, MI (myocardial infarction) ([facility] MRSA (methicillin resistant staph aureus) culture positive, Osteomyelitis ([facility] Pneumonia, S/P PTCA (percutaneous transluminal coronary angioplasty), SSS (sick sinus syndrome) ([facility] Unspecified sleep apnea, and Venous (peripheral) insufficiency.      Current Outpatient Medications   Medication Sig Dispense Refill    Polysaccharide Iron Complex (PROFE) 391.3 (180 Fe) MG CAPS Take 1 capsule by mouth 2 times daily      coenzyme Q10 200 MG CAPS capsule Take 200 mg by mouth daily      furosemide (LASIX) 40 MG tablet TAKE 1 TABLET BY MOUTH TWICE DAILY (Patient taking differently: 40 mg Cardiology increased lasix to TID d/t leg swelling) 180 tablet 3    warfarin (COUMADIN) 5 MG tablet TAKE 1 TABLET BY MOUTH DAILY AT 6 PM (Patient taking differently: Taking 5mg 4 days per week  Taking 2.5mg 3 days per week) 90 tablet 2    metOLazone (ZAROXOLYN) 5 MG tablet Take 1 tablet by mouth Twice a Week (Patient taking differently: Take 5 mg by mouth Twice a Week Cardiology d/c'd at this time) 90 tablet 1    pantoprazole (PROTONIX) 40 MG tablet TAKE 1 TABLET BY MOUTH EVERY MORNING BEFORE BREAKFAST 90 tablet 1    potassium chloride (KLOR-CON M) 10 MEQ extended release tablet Take 1 tablet by mouth 4 times daily (Patient taking differently: Take 20 mEq by mouth 2 times daily ) 360 tablet 3    ondansetron (ZOFRAN-ODT) 4 MG disintegrating tablet DISSOLVE 1 TABLET ON THE TONGUE EVERY 8 HOURS AS NEEDED FOR NAUSEA OR VOMITING 20 tablet 3    rosuvastatin (CRESTOR) 5 MG tablet TAKE 1 TABLET DAILY 90 tablet 3    nitroGLYCERIN (NITRODUR) 0.2 MG/HR PLACE 1 PATCH ON THE SKIN DAILY 90 patch 3    metoprolol succinate (TOPROL XL) 25 MG extended release tablet TAKE ONE-HALF (1/2) TABLET TWICE A DAY 90 tablet 2    MULTAQ 400 MG TABS TAKE 1 TABLET TWICE A DAY WITH MEALS 180 tablet 3    vitamin B-12 (CYANOCOBALAMIN) 500 MCG tablet Take 500 mcg by mouth daily      fentaNYL (DURAGESIC) 50 MCG/HR Place 1 patch onto the skin every 48 hours 15 patch 0     No current facility-administered medications for this visit. Vitals  Weight: 161 lb 12.8 oz (73.4 kg)  Blood Pressure:  116/68  Pulse: 84 70      Review of Systems   Constitutional: Negative. HENT: Negative. Eyes: Negative. Respiratory: Negative. Cardiovascular: Negative. Gastrointestinal: Negative. Genitourinary: Negative. Objective:   Physical Exam   Nursing note and vitals reviewed. Constitutional: He is oriented to person, place, and time. He appears well-developed and well-nourished. No distress. HENT:   Head: Normocephalic and atraumatic. Mouth/Throat: No oropharyngeal exudate.    Eyes: Conjunctivae and EOM are normal. Pupils are equal, round, and reactive to light. No scleral icterus. Neck: Normal range of motion. No JVD present. No tracheal deviation present. No thyromegaly present. Cardiovascular: Normal rate, regular rhythm, normal heart sounds and intact distal pulses. Exam reveals no gallop and no friction rub. No murmur heard. Pulmonary/Chest: Effort normal. No respiratory distress. He has no wheezes. He has no rales. He exhibits no tenderness. Abdominal: Soft. Bowel sounds are normal. He exhibits no distension and no mass. No tenderness. He has no rebound and no guarding. Musculoskeletal: tender left leg and swelling. Lymphadenopathy:     He has no cervical adenopathy. Neurological: He is alert and oriented to person, place, and time. No cranial nerve deficit. Coordination normal.   Skin: Skin is warm and dry. No rash noted. He is not diaphoretic. No erythema. No pallor. Psychiatric: He has a normal mood and affect. His behavior is normal. Judgment and thought content normal.       Assessment:        Diagnosis Orders   1. Coronary artery disease involving native coronary artery of native heart without angina pectoris     2. HTN (hypertension), benign     3. Hyperlipidemia, unspecified hyperlipidemia type     4. Paroxysmal atrial fibrillation (HCC)             Plan:        CAD:  Stable. No chest pains but had GI bleed - still off A/C for paroxysmal a fib. Off ranexa. Rhythm: MRI compatible pacer   HTN:  Episodic and low today. Had LLE skin infection and wears compression stockings. Now taking lasix 40 mg am and pm.  AT/AF:  Has episodes and feels tired. Taking multaq to maintain SR. Reviewed chads vasc. Check labs. He has tremendous fatigue. Edema: Add zaroxolyn 5mg q am.  80  Lasix am and 40 pm.  Increase k from 10 tid to qid. Weeping:  Had amp 2nd toe left foot for osteo. Finished doxycycline.

## 2021-09-14 ENCOUNTER — CARE COORDINATION (OUTPATIENT)
Dept: CASE MANAGEMENT | Age: 81
End: 2021-09-14

## 2021-09-14 NOTE — CARE COORDINATION
[facility] Transitions Follow Up Call    2021    Patient: Magalis Albarran  Patient : 1940   MRN: <[id number]>  Reason for Admission: Amputation of L 2nd toe; osteomyelitis in that toe  Discharge Date: 21 RARS: Readmission Risk Score: 16    Spoke with: Magalis Albarran and daughter Cande Fontenot (HIPAA verified) who states patient is doing ok. Patient continues with mild to medium swelling in LLE. Patient reports that he does not weigh himself daily. Patient weighed self during call  lbs. Reviewed CHF Zone Mgt:    Daily weights in a.m. before breakfast, after voiding   Keep written log of weights for review  Ravi Ayala MD immediately of weight gain/loss 3# or more in 1-7days   Take all rx as prescribed, keep scheduled MD appts   Low sodium diet- read labels; avoid/limit high sodium foods such as fast food, canned/boxed/processed foods, frozen meals, soups, cheeses, breads, chips, soda.  Do not add salt to food   Adhere to MD recommended fluid restriction  Notify MD immediately if experiencing increased sob, orthopnea, edema, weight gain, feeling of uneasiness, chest pain, increased cough, confusion, wheezing or chest tightness. Call 911 if noting acute distress. Patient verbalized understanding. Daughter has been doing dressing changes and reports no s/s of infection noted. Daughter states that [facility] will be in tomorrow to do dressing change. Patient denies cp, sob, cough, dizziness, headache, n/v, diarrhea, abdominal pains, fever, or chills. Patient report that appetite and fluid intake is good and denies any problems with bowel or bladder. Denies any acute needs at present time. Patient instructed to continue to monitor for any of the above s/s, reporting any that may present to MD immediately. Reminded of COVID 19 precautions. Agreeable to f/u calls. Educated on the use of urgent care or physicians 24 hr access line if assistance is needed after hours.          Care Transitions Subsequent and Final Call    Subsequent and Final Calls  Care Transitions Interventions  Other Interventions:            Follow Up  Future Appointments   Date Time Provider Nicolas Mejia   9/22/2021  3:00 PM Valeri Metzger DPM [facility] W[facility] [place]   11/30/2021  8:00 AM SCHEDULE, Jared Hair REMOTE TRANSMISSION Jackelyn De Jesus [facility]   12/2/2021  3:45 PM MD Jackelyn Sky [facility]   12/9/2021  1:00 PM Dasha Suárez MD [address] [place] [address]   1/12/2022  2:00 PM Clary Francois [address] [facility]   1/12/2022  2:30 PM SCOTT Slaughter - ZAINAB De Jesus [facility]       Adriel Lozano LPN

## 2021-09-15 ENCOUNTER — TELEPHONE (OUTPATIENT)
Dept: CARDIOLOGY CLINIC | Age: 81
End: 2021-09-15

## 2021-09-15 ENCOUNTER — ANTI-COAG VISIT (OUTPATIENT)
Dept: CARDIOLOGY CLINIC | Age: 81
End: 2021-09-15
Payer: MEDICARE

## 2021-09-15 DIAGNOSIS — I48.0 PAROXYSMAL ATRIAL FIBRILLATION (HCC): Primary | ICD-10-CM

## 2021-09-15 LAB — INR BLD: 4.3

## 2021-09-15 PROCEDURE — 93793 ANTICOAG MGMT PT WARFARIN: CPT | Performed by: NURSE PRACTITIONER

## 2021-09-15 NOTE — TELEPHONE ENCOUNTER
[facility] RN from [facility] called to report the patient's     PT: 51.5    INR: 4.3    Drawn: 09/15/21    Dose: 5 mg Mon. Wed. Fri. & 2.5 mg all other days     Please call the Brandon back at [phone] to advise.

## 2021-09-15 NOTE — PROGRESS NOTES
[address] [facility], 1940      Anticoagulation Indication(s):  Afib  pAF     Referring Physician:   Dr. Jai Abdul  Goal INR Range:  2.0-3.0  Duration of Anticoagulation Therapy:  Indefinite  Home or Lab Draw: Lab  Product patient has at home: warfarin 5 mg    Recent INR Results:  Lab Results   Component Value Date    INR 4.30 09/15/2021    INR 2.47 (H) 09/07/2021    INR 1.90 09/01/2021    INR 1.58 (H) 08/28/2021       INR Summary                          Warfarin regimen (mg)  Date INR A/P Sun Mon Tue Wed Thu Fri Sat Mg/wk   9/15/21 4.30 Above goal, hold tonite and decrease by 2.5 mg 2.5 5 2.5 5 hold 2.5 2.5 20   9/29/21 2.47 At goal, no change  5 5 5 5 5 5 5 35   9/1.21 1.9 At goal, no change 5 5 5 5 5 5 5 35   8/17/21 2.40 At goal, no change 2.5 5 2.5 2.5 5 5 2.5 25   7/29/21 1.7 At goal, no change 2.5 5 2.5 2.5 5 5 2.5 25   6/30221 2.38 At goal, no  change 2.5 5 2.5 2.5 7.5 5 2.5 27.5   6/22/21 1.68 Below goal, increase by 2.5 mg 2.5 5 2.5 2.5 7.5 5 2.5 27.5   5/25/21 2.1 At goal, no change 2.5 5 2.5 2.5 5 5 2.5 25   4/22/21 2.0 At goal, no change  2.5 5 2.5 2.5 5 5 2.5 25   3/24/21 1.5 Below goal, increase by 2.5 mg 2.5 5 2.5 2.5 5 5 2.5 25   2/8/21 2 At goal, no change 2.5 5 2.5 2.5 2.5 5 2.5 22.5   1/4/20 2.0 At goal, no change.  Pt went back to original dosing 2.5 5 2.5 2.5 2.5 5 2.5 22.5   12/10/20 1.7 Below goal, increase by 2.5 mg 2.5 5 2.5 2.5 2.5 5 5 25   11/4/20 2.1 At goal, no change 2.5 5 2.5 2.5 2.5 5 2.5 22.5   9/8/20 2.0 At goal, no change 2.5 5 2.5 5 2.5 5 2.5 25   8/31/20 2.7 At goal, no change 2.5 5 2.5 5 2.5 5 2.5 25   8/12/20 2.1 At goal, no change 2.5 5 2.5 5 2.5 5 2.5 25   7/16/20 2.7 At goal, no change 2.5 2.5 5 5 5 2.5 2.5 25   7/1/20 1.8 Below goal, increase by 2.5 2.5 2.5 5 5 5 2.5 2.5 25   6/17/20 1.9 At goal, no change 2.5 2.5 5 5 2.5 2.5 2.5 22.5   6/4/20 2.1 At goal, continue dose 2.5 2.5 5 5 2.5 2.5 2.5 22.5   5/27/20 2.0 At goal, continue dose 2.5 2.5 5 5 2.5 2.5 2.5 22.5   5/14/20 2.7 At goal, continue dose 2.5 2.5 5 5 2.5 2.5 2.5 22.5   5/7/20 2.1 At goal, continue dose 2.5 2.5 5 5 2.5 2.5 2.5 22.5   4/30/20 1.8 Below goal, increase dose by 2.5 mg 2.5 2.5 5 5 2.5 2.5 2.5 22.5   4/15/20 1.9 At goal, no change 2.5 2.5 2.5 5 2.5 2.5 2.5 20   4/7/20 1.7 Below goal, increase by 2.5 2.5 2.5 2.5 5 2.5 2.5 2.5 20   3/30/20 3.70 Above goal, hold todays  And decrease by 5mg  2.5 2.5 hold 2.5 2.5 2.5 2.5 15   3/5/20 3.07 Above goal, hold todays dose 2.5 5 2.5 5 2.5 hold 2.5 20   2/26/20 2.78 At goal, no change 2.5 5 2.5 5 2.5 2.5 2.5 22.5   2/14/20 2.27 At goal, no change 2.5 5 2.5 5 2.5 2.5 2.5 22.5   2/5/20 3.93 Above goal,hold todays dose and decrease by 2.5 2.5 5 2.5 5 hold 2.5 2.5 20   1/27/20 2.64 At goal, no change 2.5 5 2.5 5 2.5 5 2.5 25   1/13/20 2.8 At goal, no change 2.5 5 2.5 5 2.5 5 2.5 25   12/27/19 1.44 Below goal, increased by 6.5 mg See note 2.5 5 2.5 5 2.5 5 2.5 25   12/20/19 1.27 Below goal, increase by 5 2.5 2 2.5 2 2.5 10(Pt did not increase as instructed) he took 5 mg 2 23.5    (he actually took 18.5 mg)   12/13/19 1.44 Below goal, increase by 3 2.5 2 2.5 2 2.5 5 2 18.5   12/6/19 1.59 Below goal, increase by. 2.5 1 2.5 1 2.5 5 1 15.5   12/2/19 4.10 Above goal, hold tonight  hold 2.5 2.5 2.5 Recheck     11/29/19 2.47 At goal, no change 5 2.5 2.5 2.5 2.5 5 5 25   11/25/16 5.86 Above goal 5 hold hold 2.5 5 recheck  12.5   11/19/19 2.38 At goal, continue 5 5 5 5 5 5 5 35         Assessment/Plan:    Recent hospitalizations/HC visits None reported   Recent medication changes None reported   Medications taken regularly that may interact with warfarin or alter INR No significant drug interactions identified   Warfarin dose taken as prescribed Patient uses pillbox? no   Signs/symptoms of bleeding History of bleeding?  Yes, GIB   Vitamin K intake Normally has ~1-2 servings of green, leafy vegetables per week   Recent vomiting/diarrhea/fever None reported Changes in weight, activity, stress None reported   Tobacco or alcohol use Patient reports smoking 0 PPD  Patient reports having 0 drinks per day   Upcoming surgeries or procedures None reported     Patient was also reminded to maintain consistent vitamin K intake and call with any bleeding, medication changes, or fever/vomiting/diarrhea. Next INR check:9/29/21  Spoke with health care RN. Spoke with Home care RN updated dosing.   Hold tomorrows dose and decrease by 2.5 mg    Addendum:  Reviewed documentation and plan of care from RN  Agree with above  Yareli Canchola NP '

## 2021-09-20 ENCOUNTER — TELEPHONE (OUTPATIENT)
Dept: CARDIOLOGY CLINIC | Age: 81
End: 2021-09-20

## 2021-09-20 ENCOUNTER — ANTI-COAG VISIT (OUTPATIENT)
Dept: CARDIOLOGY CLINIC | Age: 81
End: 2021-09-20
Payer: MEDICARE

## 2021-09-20 DIAGNOSIS — I48.0 PAROXYSMAL ATRIAL FIBRILLATION (HCC): Primary | ICD-10-CM

## 2021-09-20 LAB — INR BLD: 2.7

## 2021-09-20 PROCEDURE — 93793 ANTICOAG MGMT PT WARFARIN: CPT | Performed by: NURSE PRACTITIONER

## 2021-09-20 NOTE — PROGRESS NOTES
[address] [facility], 1940      Anticoagulation Indication(s):  Afib  pAF     Referring Physician:   Dr. My Rocha  Goal INR Range:  2.0-3.0  Duration of Anticoagulation Therapy:  Indefinite  Home or Lab Draw: Lab  Product patient has at home: warfarin 5 mg    Recent INR Results:  Lab Results   Component Value Date    INR 2.70 09/20/2021    INR 4.30 09/15/2021    INR 2.47 (H) 09/07/2021    INR 1.90 09/01/2021       INR Summary                          Warfarin regimen (mg)  Date INR A/P Sun Mon Tue Wed Thu Fri Sat Mg/wk   9/20/21 2.7 At goal, no change 2.5 5 2.5 2.5 5 2.5 2.5 22.5   9/15/21 4.30 Above goal, hold tonite and decrease by 2.5 mg 2.5 5 2.5 5 hold 2.5 2.5 20   9/29/21 2.47 At goal, no change  5 5 5 5 5 5 5 35   9/1.21 1.9 At goal, no change 5 5 5 5 5 5 5 35   8/17/21 2.40 At goal, no change 2.5 5 2.5 2.5 5 5 2.5 25   7/29/21 1.7 At goal, no change 2.5 5 2.5 2.5 5 5 2.5 25   6/30221 2.38 At goal, no  change 2.5 5 2.5 2.5 7.5 5 2.5 27.5   6/22/21 1.68 Below goal, increase by 2.5 mg 2.5 5 2.5 2.5 7.5 5 2.5 27.5   5/25/21 2.1 At goal, no change 2.5 5 2.5 2.5 5 5 2.5 25   4/22/21 2.0 At goal, no change  2.5 5 2.5 2.5 5 5 2.5 25   3/24/21 1.5 Below goal, increase by 2.5 mg 2.5 5 2.5 2.5 5 5 2.5 25   2/8/21 2 At goal, no change 2.5 5 2.5 2.5 2.5 5 2.5 22.5   1/4/20 2.0 At goal, no change.  Pt went back to original dosing 2.5 5 2.5 2.5 2.5 5 2.5 22.5   12/10/20 1.7 Below goal, increase by 2.5 mg 2.5 5 2.5 2.5 2.5 5 5 25   11/4/20 2.1 At goal, no change 2.5 5 2.5 2.5 2.5 5 2.5 22.5   9/8/20 2.0 At goal, no change 2.5 5 2.5 5 2.5 5 2.5 25   8/31/20 2.7 At goal, no change 2.5 5 2.5 5 2.5 5 2.5 25   8/12/20 2.1 At goal, no change 2.5 5 2.5 5 2.5 5 2.5 25   7/16/20 2.7 At goal, no change 2.5 2.5 5 5 5 2.5 2.5 25   7/1/20 1.8 Below goal, increase by 2.5 2.5 2.5 5 5 5 2.5 2.5 25   6/17/20 1.9 At goal, no change 2.5 2.5 5 5 2.5 2.5 2.5 22.5   6/4/20 2.1 At goal, continue dose 2.5 2.5 5 5 2.5 2.5 2. 5 22.5   5/27/20 2.0 At goal, continue dose 2.5 2.5 5 5 2.5 2.5 2.5 22.5   5/14/20 2.7 At goal, continue dose 2.5 2.5 5 5 2.5 2.5 2.5 22.5   5/7/20 2.1 At goal, continue dose 2.5 2.5 5 5 2.5 2.5 2.5 22.5   4/30/20 1.8 Below goal, increase dose by 2.5 mg 2.5 2.5 5 5 2.5 2.5 2.5 22.5   4/15/20 1.9 At goal, no change 2.5 2.5 2.5 5 2.5 2.5 2.5 20   4/7/20 1.7 Below goal, increase by 2.5 2.5 2.5 2.5 5 2.5 2.5 2.5 20   3/30/20 3.70 Above goal, hold todays  And decrease by 5mg  2.5 2.5 hold 2.5 2.5 2.5 2.5 15   3/5/20 3.07 Above goal, hold todays dose 2.5 5 2.5 5 2.5 hold 2.5 20   2/26/20 2.78 At goal, no change 2.5 5 2.5 5 2.5 2.5 2.5 22.5   2/14/20 2.27 At goal, no change 2.5 5 2.5 5 2.5 2.5 2.5 22.5   2/5/20 3.93 Above goal,hold todays dose and decrease by 2.5 2.5 5 2.5 5 hold 2.5 2.5 20   1/27/20 2.64 At goal, no change 2.5 5 2.5 5 2.5 5 2.5 25   1/13/20 2.8 At goal, no change 2.5 5 2.5 5 2.5 5 2.5 25   12/27/19 1.44 Below goal, increased by 6.5 mg See note 2.5 5 2.5 5 2.5 5 2.5 25   12/20/19 1.27 Below goal, increase by 5 2.5 2 2.5 2 2.5 10(Pt did not increase as instructed) he took 5 mg 2 23.5    (he actually took 18.5 mg)   12/13/19 1.44 Below goal, increase by 3 2.5 2 2.5 2 2.5 5 2 18.5   12/6/19 1.59 Below goal, increase by. 2.5 1 2.5 1 2.5 5 1 15.5   12/2/19 4.10 Above goal, hold tonight  hold 2.5 2.5 2.5 Recheck     11/29/19 2.47 At goal, no change 5 2.5 2.5 2.5 2.5 5 5 25   11/25/16 5.86 Above goal 5 hold hold 2.5 5 recheck  12.5   11/19/19 2.38 At goal, continue 5 5 5 5 5 5 5 35         Assessment/Plan:    Recent hospitalizations/HC visits None reported   Recent medication changes None reported   Medications taken regularly that may interact with warfarin or alter INR No significant drug interactions identified   Warfarin dose taken as prescribed Patient uses pillbox? no   Signs/symptoms of bleeding History of bleeding?  Yes, GIB   Vitamin K intake Normally has ~1-2 servings of green, leafy vegetables per week   Recent vomiting/diarrhea/fever None reported   Changes in weight, activity, stress None reported   Tobacco or alcohol use Patient reports smoking 0 PPD  Patient reports having 0 drinks per day   Upcoming surgeries or procedures None reported     Patient was also reminded to maintain consistent vitamin K intake and call with any bleeding, medication changes, or fever/vomiting/diarrhea. Next INR check:9/29/21  Spoke with health care RN. Spoke with Home care RN updated dosing.      Addendum:  Reviewed documentation and plan of care from RN  Agree with above  Kendra Ayala NP

## 2021-09-20 NOTE — TELEPHONE ENCOUNTER
LVM that pt is within goal and to continue with 5 mg on Mon and Thurs and 2.5 mg the rest of the week

## 2021-09-20 NOTE — TELEPHONE ENCOUNTER
Barbi CROWELL with [facility] called to report the patient's    PT: 32.0    INR: 2.7    Drawn: 09/20/21    B/P 102/60    The patient also complains of dizziness today     Please call Barbi at [phone] to advise.

## 2021-09-22 ENCOUNTER — HOSPITAL ENCOUNTER (OUTPATIENT)
Dept: WOUND CARE | Age: 81
Discharge: HOME | End: 2021-09-22
Payer: MEDICARE

## 2021-09-22 VITALS
DIASTOLIC BLOOD PRESSURE: 66 MMHG | SYSTOLIC BLOOD PRESSURE: 116 MMHG | HEART RATE: 61 BPM | RESPIRATION RATE: 16 BRPM | TEMPERATURE: 96.9 F

## 2021-09-22 DIAGNOSIS — M86.9 OSTEOMYELITIS OF SECOND TOE OF LEFT FOOT (HCC): Primary | ICD-10-CM

## 2021-09-22 PROCEDURE — 99212 OFFICE O/P EST SF 10 MIN: CPT

## 2021-09-22 RX ORDER — AMMONIUM LACTATE 12 G/100G
CREAM TOPICAL
Qty: 140 G | Refills: 5 | Status: SHIPPED | OUTPATIENT
Start: 2021-09-22

## 2021-09-22 RX ORDER — CLOBETASOL PROPIONATE 0.5 MG/G
OINTMENT TOPICAL ONCE
Status: CANCELLED | OUTPATIENT
Start: 2021-09-22 | End: 2021-09-22

## 2021-09-22 RX ORDER — LIDOCAINE 40 MG/G
CREAM TOPICAL ONCE
Status: DISCONTINUED | OUTPATIENT
Start: 2021-09-22 | End: 2021-09-23 | Stop reason: HOSPADM

## 2021-09-22 RX ORDER — LIDOCAINE 40 MG/G
CREAM TOPICAL ONCE
Status: CANCELLED | OUTPATIENT
Start: 2021-09-22 | End: 2021-09-22

## 2021-09-22 RX ORDER — GENTAMICIN SULFATE 1 MG/G
OINTMENT TOPICAL ONCE
Status: CANCELLED | OUTPATIENT
Start: 2021-09-22 | End: 2021-09-22

## 2021-09-22 RX ORDER — BETAMETHASONE DIPROPIONATE 0.05 %
OINTMENT (GRAM) TOPICAL ONCE
Status: CANCELLED | OUTPATIENT
Start: 2021-09-22 | End: 2021-09-22

## 2021-09-22 RX ORDER — LIDOCAINE HYDROCHLORIDE 20 MG/ML
JELLY TOPICAL ONCE
Status: CANCELLED | OUTPATIENT
Start: 2021-09-22 | End: 2021-09-22

## 2021-09-22 RX ORDER — LIDOCAINE HYDROCHLORIDE 40 MG/ML
SOLUTION TOPICAL ONCE
Status: CANCELLED | OUTPATIENT
Start: 2021-09-22 | End: 2021-09-22

## 2021-09-22 RX ORDER — LIDOCAINE 50 MG/G
OINTMENT TOPICAL ONCE
Status: CANCELLED | OUTPATIENT
Start: 2021-09-22 | End: 2021-09-22

## 2021-09-22 NOTE — PROGRESS NOTES
Ashish Dunlap  Progress Note     Isabella Ramirez  AGE: 80 y.o. GENDER: male  : 1940  TODAY'S DATE:  2021    Subjective:     Chief Complaint   Patient presents with    Wound Check     Left 2nd toe amp site         HISTORY of PRESENT ILLNESS HPI     Isabella Ramirez is a 80 y.o. male who presents today for wound evaluation. History of Wound: Is here forthird postoperative visit. Set complaints of skin changes bilateral legs along with swelling. He has not been keeping his legs elevated as directed.   Wound Pain:  none  Severity:  0 / 10   Wound Type:  non-healing surgical  Modifying Factors:  edema  Associated Signs/Symptoms:  edema        PAST MEDICAL HISTORY        Diagnosis Date    Allergic rhinitis     Atrial fibrillation (HCC)     Benign prostatic hypertrophy     CAD (coronary artery disease)     Cancer (HCC)     skin    CHF (congestive heart failure) ([address] 2017    Coronary artery disease involving native coronary artery of native heart without angina pectoris 2011    DDD (degenerative disc disease), lumbar     Falls 2017    GI bleeding     Hyperlipidemia     Hypertension     MI (myocardial infarction) ([address]     MRSA (methicillin resistant staph aureus) culture positive     h/o infection in the blood    Osteomyelitis (HCC)     left foot    Pneumonia     S/P PTCA (percutaneous transluminal coronary angioplasty) stents x 8    SSS (sick sinus syndrome) ([address]     Unspecified sleep apnea     Venous (peripheral) insufficiency 2018       PAST SURGICAL HISTORY    Past Surgical History:   Procedure Laterality Date    APPENDECTOMY      CARDIAC PACEMAKER PLACEMENT      CARPAL TUNNEL RELEASE      CORONARY ANGIOPLASTY      CORONARY ANGIOPLASTY WITH STENT PLACEMENT      DIAGNOSTIC CARDIAC CATH LAB PROCEDURE      EYE SURGERY      FOOT DEBRIDEMENT Left 2021    INCISION AND DRAINAGE LEFT FOOT performed by Hiram Hinds DPM at [facility]  PACEMAKER CHANGE  10/2016    PACEMAKER PLACEMENT      TOE AMPUTATION Left 2021    AMPUTATION OF LEFT SECOND TOE performed by Jose Vila DPM at [address] HISTORY    Family History   Problem Relation Age of Onset    Cancer Sister     Coronary Art Dis Brother        SOCIAL HISTORY    Social History     Tobacco Use    Smoking status: Former Smoker     Packs/day: 1.00     Years: 30.00     Pack years: 30.00     Quit date: 2004     Years since quittin.4    Smokeless tobacco: Never Used   Vaping Use    Vaping Use: Never used   Substance Use Topics    Alcohol use: No     Alcohol/week: 0.0 standard drinks    Drug use: No       ALLERGIES    Allergies   Allergen Reactions    Avelox [Moxifloxacin Hcl In Nacl] Anaphylaxis    Epinephrine Base     Other      [place] per PT - swelling size of silver dollar at site per PT     Keflex [Cephalexin] Nausea And Vomiting    Polysporin [Bacitracin-Polymyxin B] Rash       MEDICATIONS    Current Outpatient Medications on File Prior to Encounter   Medication Sig Dispense Refill    Polysaccharide Iron Complex (PROFE) 391.3 (180 Fe) MG CAPS Take 1 capsule by mouth 2 times daily      coenzyme Q10 200 MG CAPS capsule Take 200 mg by mouth daily      furosemide (LASIX) 40 MG tablet TAKE 1 TABLET BY MOUTH TWICE DAILY (Patient taking differently: 40 mg Cardiology increased lasix to TID d/t leg swelling) 180 tablet 3    warfarin (COUMADIN) 5 MG tablet TAKE 1 TABLET BY MOUTH DAILY AT 6 PM (Patient taking differently: Taking 5mg 4 days per week  Taking 2.5mg 3 days per week) 90 tablet 2    metOLazone (ZAROXOLYN) 5 MG tablet Take 1 tablet by mouth Twice a Week (Patient taking differently: Take 5 mg by mouth Twice a Week Cardiology d/c'd at this time) 90 tablet 1    pantoprazole (PROTONIX) 40 MG tablet TAKE 1 TABLET BY MOUTH EVERY MORNING BEFORE BREAKFAST 90 tablet 1    potassium chloride (KLOR-CON M) 10 MEQ extended release tablet Take 1 tablet by mouth 4 times daily (Patient taking differently: Take 20 mEq by mouth 2 times daily ) 360 tablet 3    ondansetron (ZOFRAN-ODT) 4 MG disintegrating tablet DISSOLVE 1 TABLET ON THE TONGUE EVERY 8 HOURS AS NEEDED FOR NAUSEA OR VOMITING 20 tablet 3    rosuvastatin (CRESTOR) 5 MG tablet TAKE 1 TABLET DAILY 90 tablet 3    nitroGLYCERIN (NITRODUR) 0.2 MG/HR PLACE 1 PATCH ON THE SKIN DAILY 90 patch 3    metoprolol succinate (TOPROL XL) 25 MG extended release tablet TAKE ONE-HALF (1/2) TABLET TWICE A DAY 90 tablet 2    MULTAQ 400 MG TABS TAKE 1 TABLET TWICE A DAY WITH MEALS 180 tablet 3    vitamin B-12 (CYANOCOBALAMIN) 500 MCG tablet Take 500 mcg by mouth daily      fentaNYL (DURAGESIC) 50 MCG/HR Place 1 patch onto the skin every 48 hours 15 patch 0     No current facility-administered medications on file prior to encounter. REVIEW OF SYSTEMS    Pertinent items are noted in HPI. Objective:      /66   Pulse 61   Temp 96.9 °F (36.1 °C) (Infrared)   Resp 16     PHYSICAL EXAM    Surgical site appears well coapted with some nonviable tissue at the skin edges. Skin changes consistent with dermatosis. Skin plaques with hyperkeratosis bilateral legs left worse than right. Edema noted bilaterally from the popliteal fossa to the distal toes. Assessment:     Problem List Items Addressed This Visit     Osteomyelitis of second toe of left foot (HCC) - Primary    Relevant Medications    lidocaine (LMX) 4 % cream    Other Relevant Orders    Initiate Outpatient Wound Care Protocol              Plan:   Surgical site debrided of nonviable tissue  Dressing change with Betadine ointment and compression  Sutures removed without incident  May shower after 24 hours  Leave dressing intact  Keep leg elevated at all times when not active  Prescription for ammonium lactate the nature of the patient's condition was explained in depth.  The patient was informed that their compliance to the treatment plan is paramount to successful healing and prevention of further ulceration and/or infection     Discharge Treatment  Changes every other day with Betadine ointment    Written Patient Discharge Instructions Given            Electronically signed by Gabriele Rutledge DPM on 9/22/2021 at 4:40 PM

## 2021-09-24 ENCOUNTER — CARE COORDINATION (OUTPATIENT)
Dept: CASE MANAGEMENT | Age: 81
End: 2021-09-24

## 2021-09-24 NOTE — CARE COORDINATION
[facility] Transitions Follow Up Call    2021    Patient: Shaheed Patiño  Patient : 1940   MRN: [id number]  Reason for Admission: Amputation of L 2nd toe; osteomyelitis in that toe  Discharge Date: 21 RARS: Readmission Risk Score: 12         Spoke with: Andriy Palomino Président [facility] Transitions Subsequent and Final Call    Subsequent and Final Calls  Do you have any ongoing symptoms?: No  Have your medications changed?: No  Do you have any questions related to your medications?: No  Do you currently have any active services?: Yes  Are you currently active with any services?: Home Health  Do you have any needs or concerns that I can assist you with?: No  Identified Barriers: None  Care Transitions Interventions  Other Interventions: Follow Up: Patient reports that he is doing well, dressing on left 2nd toe is clean, dry, and intact and he is afebrile. He did have his sutures removed on Wednesday. He denies any CP, SOB, cough, fever, does report mild swelling in lower extremities, weight is 161# today. CTN will continue with outreach follow up calls.       Future Appointments   Date Time Provider Nicolas Mejia   2021  8:00 AM SCHEDULE, Dorothy Sultana REMOTE TRANSMISSION [place] [facility]   2021  3:45 PM [facility]   [facility]21  1:00 PM Reina Sarabia MD [facility]   2022  2:00 PM Luis F Francois [id number] [facility]   2022  2:30 PM Amaya Duque APRN - CNP [facility]       Mayi Tinajero RN

## 2021-09-27 ENCOUNTER — TELEPHONE (OUTPATIENT)
Dept: CARDIOLOGY CLINIC | Age: 81
End: 2021-09-27

## 2021-09-27 ENCOUNTER — ANTI-COAG VISIT (OUTPATIENT)
Dept: CARDIOLOGY CLINIC | Age: 81
End: 2021-09-27
Payer: MEDICARE

## 2021-09-27 DIAGNOSIS — I48.0 PAROXYSMAL ATRIAL FIBRILLATION (HCC): Primary | ICD-10-CM

## 2021-09-27 LAB — INR BLD: 3

## 2021-09-27 PROCEDURE — 93793 ANTICOAG MGMT PT WARFARIN: CPT | Performed by: NURSE PRACTITIONER

## 2021-09-27 NOTE — TELEPHONE ENCOUNTER
Barbi CROWELL with [facility] called to report the patient's     PT: 35.7     INR: 3.0     Drawn: 09/27/21    Dose: 5 mg on Mon. & Tues 2.5 mg all other days. The patient still complains of dizziness today      Please call Barbi at [phone] to advise.

## 2021-09-27 NOTE — PROGRESS NOTES
[address] [facility], 1940      Anticoagulation Indication(s):  Afib  pAF     Referring Physician:   Dr. Angélica Balderrama  Goal INR Range:  2.0-3.0  Duration of Anticoagulation Therapy:  Indefinite  Home or Lab Draw: Lab  Product patient has at home: warfarin 5 mg    Recent INR Results:  Lab Results   Component Value Date    INR 3.00 09/27/2021    INR 2.70 09/20/2021    INR 4.30 09/15/2021    INR 2.47 (H) 09/07/2021       INR Summary                          Warfarin regimen (mg)  Date INR A/P Sun Mon Tue Wed Thu Fri Sat Mg/wk   9/27/21 3.0 At goal, no change 2.5 5 5 2.5 2.5 2.5 2.5 20   9/20/21 2.7 At goal, no change 2.5 5 2.5 2.5 5 2.5 2.5 22.5   9/15/21 4.30 Above goal, hold tonite and decrease by 2.5 mg 2.5 5 2.5 5 hold 2.5 2.5 20   9/29/21 2.47 At goal, no change  5 5 5 5 5 5 5 35   9/1.21 1.9 At goal, no change 5 5 5 5 5 5 5 35   8/17/21 2.40 At goal, no change 2.5 5 2.5 2.5 5 5 2.5 25   7/29/21 1.7 At goal, no change 2.5 5 2.5 2.5 5 5 2.5 25   6/30221 2.38 At goal, no  change 2.5 5 2.5 2.5 7.5 5 2.5 27.5   6/22/21 1.68 Below goal, increase by 2.5 mg 2.5 5 2.5 2.5 7.5 5 2.5 27.5   5/25/21 2.1 At goal, no change 2.5 5 2.5 2.5 5 5 2.5 25   4/22/21 2.0 At goal, no change  2.5 5 2.5 2.5 5 5 2.5 25   3/24/21 1.5 Below goal, increase by 2.5 mg 2.5 5 2.5 2.5 5 5 2.5 25   2/8/21 2 At goal, no change 2.5 5 2.5 2.5 2.5 5 2.5 22.5   1/4/20 2.0 At goal, no change.  Pt went back to original dosing 2.5 5 2.5 2.5 2.5 5 2.5 22.5   12/10/20 1.7 Below goal, increase by 2.5 mg 2.5 5 2.5 2.5 2.5 5 5 25   11/4/20 2.1 At goal, no change 2.5 5 2.5 2.5 2.5 5 2.5 22.5   9/8/20 2.0 At goal, no change 2.5 5 2.5 5 2.5 5 2.5 25   8/31/20 2.7 At goal, no change 2.5 5 2.5 5 2.5 5 2.5 25   8/12/20 2.1 At goal, no change 2.5 5 2.5 5 2.5 5 2.5 25   7/16/20 2.7 At goal, no change 2.5 2.5 5 5 5 2.5 2.5 25   7/1/20 1.8 Below goal, increase by 2.5 2.5 2.5 5 5 5 2.5 2.5 25   6/17/20 1.9 At goal, no change 2.5 2.5 5 5 2.5 2.5 2.5 22.5   6/4/20 2.1 At goal, continue dose 2.5 2.5 5 5 2.5 2.5 2.5 22.5   5/27/20 2.0 At goal, continue dose 2.5 2.5 5 5 2.5 2.5 2.5 22.5   5/14/20 2.7 At goal, continue dose 2.5 2.5 5 5 2.5 2.5 2.5 22.5   5/7/20 2.1 At goal, continue dose 2.5 2.5 5 5 2.5 2.5 2.5 22.5   4/30/20 1.8 Below goal, increase dose by 2.5 mg 2.5 2.5 5 5 2.5 2.5 2.5 22.5   4/15/20 1.9 At goal, no change 2.5 2.5 2.5 5 2.5 2.5 2.5 20   4/7/20 1.7 Below goal, increase by 2.5 2.5 2.5 2.5 5 2.5 2.5 2.5 20   3/30/20 3.70 Above goal, hold todays  And decrease by 5mg  2.5 2.5 hold 2.5 2.5 2.5 2.5 15   3/5/20 3.07 Above goal, hold todays dose 2.5 5 2.5 5 2.5 hold 2.5 20   2/26/20 2.78 At goal, no change 2.5 5 2.5 5 2.5 2.5 2.5 22.5   2/14/20 2.27 At goal, no change 2.5 5 2.5 5 2.5 2.5 2.5 22.5   2/5/20 3.93 Above goal,hold todays dose and decrease by 2.5 2.5 5 2.5 5 hold 2.5 2.5 20   1/27/20 2.64 At goal, no change 2.5 5 2.5 5 2.5 5 2.5 25   1/13/20 2.8 At goal, no change 2.5 5 2.5 5 2.5 5 2.5 25   12/27/19 1.44 Below goal, increased by 6.5 mg See note 2.5 5 2.5 5 2.5 5 2.5 25   12/20/19 1.27 Below goal, increase by 5 2.5 2 2.5 2 2.5 10(Pt did not increase as instructed) he took 5 mg 2 23.5    (he actually took 18.5 mg)   12/13/19 1.44 Below goal, increase by 3 2.5 2 2.5 2 2.5 5 2 18.5   12/6/19 1.59 Below goal, increase by. 2.5 1 2.5 1 2.5 5 1 15.5   12/2/19 4.10 Above goal, hold tonight  hold 2.5 2.5 2.5 Recheck     11/29/19 2.47 At goal, no change 5 2.5 2.5 2.5 2.5 5 5 25   11/25/16 5.86 Above goal 5 hold hold 2.5 5 recheck  12.5   11/19/19 2.38 At goal, continue 5 5 5 5 5 5 5 35         Assessment/Plan:    Recent hospitalizations/HC visits None reported   Recent medication changes None reported   Medications taken regularly that may interact with warfarin or alter INR No significant drug interactions identified   Warfarin dose taken as prescribed Patient uses pillbox? no   Signs/symptoms of bleeding History of bleeding?  Yes, GIB   Vitamin K intake Normally has ~1-2 servings of green, leafy vegetables per week   Recent vomiting/diarrhea/fever None reported   Changes in weight, activity, stress None reported   Tobacco or alcohol use Patient reports smoking 0 PPD  Patient reports having 0 drinks per day   Upcoming surgeries or procedures None reported     Patient was also reminded to maintain consistent vitamin K intake and call with any bleeding, medication changes, or fever/vomiting/diarrhea. Next INR check:10/7/21  Spoke with health care RN. Spoke with Home care RN updated dosing.      Addendum:  Reviewed documentation and plan of care from RN  Agree with above  Yady Scale NP

## 2021-10-07 ENCOUNTER — ANTI-COAG VISIT (OUTPATIENT)
Dept: CARDIOLOGY CLINIC | Age: 81
End: 2021-10-07
Payer: MEDICARE

## 2021-10-07 ENCOUNTER — TELEPHONE (OUTPATIENT)
Dept: CARDIOLOGY CLINIC | Age: 81
End: 2021-10-07

## 2021-10-07 DIAGNOSIS — I48.0 PAROXYSMAL ATRIAL FIBRILLATION (HCC): Primary | ICD-10-CM

## 2021-10-07 LAB — INR BLD: 2.2

## 2021-10-07 PROCEDURE — 93793 ANTICOAG MGMT PT WARFARIN: CPT | Performed by: NURSE PRACTITIONER

## 2021-10-07 NOTE — PROGRESS NOTES
[address] [facility], 1940      Anticoagulation Indication(s):  Afib  pAF     Referring Physician:   Dr. Rahel Mcadams  Goal INR Range:  2.0-3.0  Duration of Anticoagulation Therapy:  Indefinite  Home or Lab Draw: Lab  Product patient has at home: warfarin 5 mg    Recent INR Results:  Lab Results   Component Value Date    INR 2.20 10/07/2021    INR 3.00 09/27/2021    INR 2.70 09/20/2021    INR 4.30 09/15/2021       INR Summary                          Warfarin regimen (mg)  Date INR A/P Sun Mon Tue Wed Thu Fri Sat Mg/wk   10/7/21 2.2 At goal, no  change 2.5 5 5 2.5 2.5 2.5 2.5 20   9/27/21 3.0 At goal, no change 2.5 5 5 2.5 2.5 2.5 2.5 20   9/20/21 2.7 At goal, no change 2.5 5 2.5 2.5 5 2.5 2.5 22.5   9/15/21 4.30 Above goal, hold tonite and decrease by 2.5 mg 2.5 5 2.5 5 hold 2.5 2.5 20   9/29/21 2.47 At goal, no change  5 5 5 5 5 5 5 35   9/1.21 1.9 At goal, no change 5 5 5 5 5 5 5 35   8/17/21 2.40 At goal, no change 2.5 5 2.5 2.5 5 5 2.5 25   7/29/21 1.7 At goal, no change 2.5 5 2.5 2.5 5 5 2.5 25   6/30221 2.38 At goal, no  change 2.5 5 2.5 2.5 7.5 5 2.5 27.5   6/22/21 1.68 Below goal, increase by 2.5 mg 2.5 5 2.5 2.5 7.5 5 2.5 27.5   5/25/21 2.1 At goal, no change 2.5 5 2.5 2.5 5 5 2.5 25   4/22/21 2.0 At goal, no change  2.5 5 2.5 2.5 5 5 2.5 25   3/24/21 1.5 Below goal, increase by 2.5 mg 2.5 5 2.5 2.5 5 5 2.5 25   2/8/21 2 At goal, no change 2.5 5 2.5 2.5 2.5 5 2.5 22.5   1/4/20 2.0 At goal, no change.  Pt went back to original dosing 2.5 5 2.5 2.5 2.5 5 2.5 22.5   12/10/20 1.7 Below goal, increase by 2.5 mg 2.5 5 2.5 2.5 2.5 5 5 25   11/4/20 2.1 At goal, no change 2.5 5 2.5 2.5 2.5 5 2.5 22.5   9/8/20 2.0 At goal, no change 2.5 5 2.5 5 2.5 5 2.5 25   8/31/20 2.7 At goal, no change 2.5 5 2.5 5 2.5 5 2.5 25   8/12/20 2.1 At goal, no change 2.5 5 2.5 5 2.5 5 2.5 25   7/16/20 2.7 At goal, no change 2.5 2.5 5 5 5 2.5 2.5 25   7/1/20 1.8 Below goal, increase by 2.5 2.5 2.5 5 5 5 2.5 2.5 25 6/17/20 1.9 At goal, no change 2.5 2.5 5 5 2.5 2.5 2.5 22.5   6/4/20 2.1 At goal, continue dose 2.5 2.5 5 5 2.5 2.5 2.5 22.5   5/27/20 2.0 At goal, continue dose 2.5 2.5 5 5 2.5 2.5 2.5 22.5   5/14/20 2.7 At goal, continue dose 2.5 2.5 5 5 2.5 2.5 2.5 22.5   5/7/20 2.1 At goal, continue dose 2.5 2.5 5 5 2.5 2.5 2.5 22.5   4/30/20 1.8 Below goal, increase dose by 2.5 mg 2.5 2.5 5 5 2.5 2.5 2.5 22.5   4/15/20 1.9 At goal, no change 2.5 2.5 2.5 5 2.5 2.5 2.5 20   4/7/20 1.7 Below goal, increase by 2.5 2.5 2.5 2.5 5 2.5 2.5 2.5 20   3/30/20 3.70 Above goal, hold todays  And decrease by 5mg  2.5 2.5 hold 2.5 2.5 2.5 2.5 15   3/5/20 3.07 Above goal, hold todays dose 2.5 5 2.5 5 2.5 hold 2.5 20   2/26/20 2.78 At goal, no change 2.5 5 2.5 5 2.5 2.5 2.5 22.5   2/14/20 2.27 At goal, no change 2.5 5 2.5 5 2.5 2.5 2.5 22.5   2/5/20 3.93 Above goal,hold todays dose and decrease by 2.5 2.5 5 2.5 5 hold 2.5 2.5 20   1/27/20 2.64 At goal, no change 2.5 5 2.5 5 2.5 5 2.5 25   1/13/20 2.8 At goal, no change 2.5 5 2.5 5 2.5 5 2.5 25   12/27/19 1.44 Below goal, increased by 6.5 mg See note 2.5 5 2.5 5 2.5 5 2.5 25   12/20/19 1.27 Below goal, increase by 5 2.5 2 2.5 2 2.5 10(Pt did not increase as instructed) he took 5 mg 2 23.5    (he actually took 18.5 mg)   12/13/19 1.44 Below goal, increase by 3 2.5 2 2.5 2 2.5 5 2 18.5   12/6/19 1.59 Below goal, increase by.   2.5 1 2.5 1 2.5 5 1 15.5   12/2/19 4.10 Above goal, hold tonight  hold 2.5 2.5 2.5 Recheck     11/29/19 2.47 At goal, no change 5 2.5 2.5 2.5 2.5 5 5 25   11/25/16 5.86 Above goal 5 hold hold 2.5 5 recheck  12.5   11/19/19 2.38 At goal, continue 5 5 5 5 5 5 5 35         Assessment/Plan:    Recent hospitalizations/HC visits None reported   Recent medication changes None reported   Medications taken regularly that may interact with warfarin or alter INR No significant drug interactions identified   Warfarin dose taken as prescribed Patient uses pillbox? no   Signs/symptoms of bleeding History of bleeding? Yes, GIB   Vitamin K intake Normally has ~1-2 servings of green, leafy vegetables per week   Recent vomiting/diarrhea/fever None reported   Changes in weight, activity, stress None reported   Tobacco or alcohol use Patient reports smoking 0 PPD  Patient reports having 0 drinks per day   Upcoming surgeries or procedures None reported     Patient was also reminded to maintain consistent vitamin K intake and call with any bleeding, medication changes, or fever/vomiting/diarrhea. Next INR check:10/21/21  Spoke with health care RN. Spoke with Home care RN updated dosing.       Addendum:  Reviewed documentation and plan of care from RN  Agree with above  Shelley Ortiz NP

## 2021-10-27 ENCOUNTER — HOSPITAL ENCOUNTER (OUTPATIENT)
Dept: WOUND CARE | Age: 81
Discharge: HOME | End: 2021-10-27
Payer: MEDICARE

## 2021-10-27 VITALS — DIASTOLIC BLOOD PRESSURE: 72 MMHG | SYSTOLIC BLOOD PRESSURE: 128 MMHG | HEART RATE: 66 BPM | TEMPERATURE: 97.1 F

## 2021-10-27 DIAGNOSIS — L03.116 CELLULITIS OF LEFT LOWER EXTREMITY: ICD-10-CM

## 2021-10-27 DIAGNOSIS — M86.9 OSTEOMYELITIS OF SECOND TOE OF LEFT FOOT (HCC): Primary | ICD-10-CM

## 2021-10-27 DIAGNOSIS — R60.1 GENERALIZED EDEMA: ICD-10-CM

## 2021-10-27 LAB
A/G RATIO: 1 (ref 1.1–2.2)
ALBUMIN SERPL-MCNC: 4 G/DL (ref 3.4–5)
ALP BLD-CCNC: 84 U/L (ref 40–129)
ALT SERPL-CCNC: 7 U/L (ref 10–40)
ANION GAP SERPL CALCULATED.3IONS-SCNC: 15 MMOL/L (ref 3–16)
AST SERPL-CCNC: 24 U/L (ref 15–37)
BILIRUB SERPL-MCNC: 0.6 MG/DL (ref 0–1)
BUN BLDV-MCNC: 21 MG/DL (ref 7–20)
CALCIUM SERPL-MCNC: 8.9 MG/DL (ref 8.3–10.6)
CHLORIDE BLD-SCNC: 98 MMOL/L (ref 99–110)
CO2: 24 MMOL/L (ref 21–32)
CREAT SERPL-MCNC: 1.3 MG/DL (ref 0.8–1.3)
GFR AFRICAN AMERICAN: >60
GFR NON-AFRICAN AMERICAN: 53
GLOBULIN: 3.9 G/DL
GLUCOSE BLD-MCNC: 75 MG/DL (ref 70–99)
INR BLD: 1.74 (ref 0.88–1.12)
POTASSIUM SERPL-SCNC: 4 MMOL/L (ref 3.5–5.1)
PROTHROMBIN TIME: 20.1 SEC (ref 9.9–12.7)
SODIUM BLD-SCNC: 137 MMOL/L (ref 136–145)
TOTAL PROTEIN: 7.9 G/DL (ref 6.4–8.2)

## 2021-10-27 PROCEDURE — 11042 DBRDMT SUBQ TIS 1ST 20SQCM/<: CPT

## 2021-10-27 RX ORDER — CLOBETASOL PROPIONATE 0.5 MG/G
OINTMENT TOPICAL ONCE
OUTPATIENT
Start: 2021-10-27 | End: 2021-10-27

## 2021-10-27 RX ORDER — LIDOCAINE HYDROCHLORIDE 40 MG/ML
SOLUTION TOPICAL ONCE
OUTPATIENT
Start: 2021-10-27 | End: 2021-10-27

## 2021-10-27 RX ORDER — BETAMETHASONE DIPROPIONATE 0.05 %
OINTMENT (GRAM) TOPICAL ONCE
OUTPATIENT
Start: 2021-10-27 | End: 2021-10-27

## 2021-10-27 RX ORDER — LIDOCAINE HYDROCHLORIDE 20 MG/ML
JELLY TOPICAL ONCE
OUTPATIENT
Start: 2021-10-27 | End: 2021-10-27

## 2021-10-27 RX ORDER — LIDOCAINE 40 MG/G
CREAM TOPICAL ONCE
Status: DISCONTINUED | OUTPATIENT
Start: 2021-10-27 | End: 2021-10-28 | Stop reason: HOSPADM

## 2021-10-27 RX ORDER — GENTAMICIN SULFATE 1 MG/G
OINTMENT TOPICAL ONCE
OUTPATIENT
Start: 2021-10-27 | End: 2021-10-27

## 2021-10-27 RX ORDER — LIDOCAINE 50 MG/G
OINTMENT TOPICAL ONCE
OUTPATIENT
Start: 2021-10-27 | End: 2021-10-27

## 2021-10-27 RX ORDER — DOXYCYCLINE HYCLATE 100 MG
100 TABLET ORAL 2 TIMES DAILY
Qty: 28 TABLET | Refills: 0 | Status: SHIPPED | OUTPATIENT
Start: 2021-10-27 | End: 2021-11-10

## 2021-10-27 RX ORDER — LIDOCAINE 40 MG/G
CREAM TOPICAL ONCE
Status: CANCELLED | OUTPATIENT
Start: 2021-10-27 | End: 2021-10-27

## 2021-10-27 NOTE — PLAN OF CARE
Discharge instructions given. Patient verbalized understanding. Return to [facility] in 1 week(s).   Called/faxed orders to  Rx to pharmacy

## 2021-10-27 NOTE — PROGRESS NOTES
Ashish Dunlap  Progress Note     Mayra Grewal  AGE: 80 y.o. GENDER: male  : 1940  TODAY'S DATE:  10/27/2021    Subjective:     Chief Complaint   Patient presents with    Wound Check     right foot Returning          HISTORY of PRESENT ILLNESS HPI     Mayra Grewal is a 80 y.o. male who presents today for wound evaluation. History of Wound:He states he had a corn that was getting larger on his toe. He states that it was too painful to remove last time was at the podiatrist office. He noted some swelling in the toe and some redness around it. He did not want to have an issue like he had with his other toe. He states that his podiatrist was out of town and so he made an appointment in the wound care center. Previous amputation of left second toe after admission to the hospital due to wet gangrene.     Wound Pain:  none  Severity:  0 / 10   Wound Type:  non-healing surgical  Modifying Factors:  edema  Associated Signs/Symptoms:  edema        PAST MEDICAL HISTORY        Diagnosis Date    Allergic rhinitis     Atrial fibrillation (HCC)     Benign prostatic hypertrophy     CAD (coronary artery disease)     Cancer (HCC)     skin    CHF (congestive heart failure) ([address] 2017    Coronary artery disease involving native coronary artery of native heart without angina pectoris 2011    DDD (degenerative disc disease), lumbar     Falls 2017    GI bleeding     Hyperlipidemia     Hypertension     MI (myocardial infarction) ([address]     MRSA (methicillin resistant staph aureus) culture positive     h/o infection in the blood    Osteomyelitis (HCC)     left foot    Pneumonia     S/P PTCA (percutaneous transluminal coronary angioplasty) stents x 8    SSS (sick sinus syndrome) ([address]     Unspecified sleep apnea     Venous (peripheral) insufficiency 2018       PAST SURGICAL HISTORY    Past Surgical History:   Procedure Laterality Date    APPENDECTOMY      CARDIAC PACEMAKER PLACEMENT      CARPAL TUNNEL RELEASE      CORONARY ANGIOPLASTY      CORONARY ANGIOPLASTY WITH STENT PLACEMENT      DIAGNOSTIC CARDIAC CATH LAB PROCEDURE      EYE SURGERY      FOOT DEBRIDEMENT Left 2021    INCISION AND DRAINAGE LEFT FOOT performed by Michael Lucas DPM at [address] [facility]  10/2016    PACEMAKER PLACEMENT      TOE AMPUTATION Left 2021    AMPUTATION OF LEFT SECOND TOE performed by Michael Lucas DPM at [address] HISTORY    Family History   Problem Relation Age of Onset    Cancer Sister     Coronary Art Dis Brother        SOCIAL HISTORY    Social History     Tobacco Use    Smoking status: Former Smoker     Packs/day: 1.00     Years: 30.00     Pack years: 30.00     Quit date: 2004     Years since quittin.5    Smokeless tobacco: Never Used   Vaping Use    Vaping Use: Never used   Substance Use Topics    Alcohol use: No     Alcohol/week: 0.0 standard drinks    Drug use: No       ALLERGIES    Allergies   Allergen Reactions    Avelox [Moxifloxacin Hcl In Nacl] Anaphylaxis    Epinephrine Base     Other      [place] per PT - swelling size of silver dollar at site per PT     Keflex [Cephalexin] Nausea And Vomiting    Polysporin [Bacitracin-Polymyxin B] Rash       MEDICATIONS    Current Outpatient Medications on File Prior to Encounter   Medication Sig Dispense Refill    ammonium lactate (LAC-HYDRIN) 12 % cream Apply topically to dry skin on feet daily 140 g 5    Polysaccharide Iron Complex (PROFE) 391.3 (180 Fe) MG CAPS Take 1 capsule by mouth 2 times daily      coenzyme Q10 200 MG CAPS capsule Take 200 mg by mouth daily      furosemide (LASIX) 40 MG tablet TAKE 1 TABLET BY MOUTH TWICE DAILY (Patient taking differently: 40 mg Cardiology increased lasix to TID d/t leg swelling) 180 tablet 3    warfarin (COUMADIN) 5 MG tablet TAKE 1 TABLET BY MOUTH DAILY AT 6 PM (Patient taking differently: Taking 5mg 4 days per week  Taking 2.5mg 3 days per week) 90 tablet 2    metOLazone (ZAROXOLYN) 5 MG tablet Take 1 tablet by mouth Twice a Week (Patient taking differently: Take 5 mg by mouth Twice a Week Cardiology d/c'd at this time) 90 tablet 1    pantoprazole (PROTONIX) 40 MG tablet TAKE 1 TABLET BY MOUTH EVERY MORNING BEFORE BREAKFAST 90 tablet 1    potassium chloride (KLOR-CON M) 10 MEQ extended release tablet Take 1 tablet by mouth 4 times daily (Patient taking differently: Take 20 mEq by mouth 2 times daily ) 360 tablet 3    ondansetron (ZOFRAN-ODT) 4 MG disintegrating tablet DISSOLVE 1 TABLET ON THE TONGUE EVERY 8 HOURS AS NEEDED FOR NAUSEA OR VOMITING 20 tablet 3    rosuvastatin (CRESTOR) 5 MG tablet TAKE 1 TABLET DAILY 90 tablet 3    nitroGLYCERIN (NITRODUR) 0.2 MG/HR PLACE 1 PATCH ON THE SKIN DAILY 90 patch 3    metoprolol succinate (TOPROL XL) 25 MG extended release tablet TAKE ONE-HALF (1/2) TABLET TWICE A DAY 90 tablet 2    MULTAQ 400 MG TABS TAKE 1 TABLET TWICE A DAY WITH MEALS 180 tablet 3    vitamin B-12 (CYANOCOBALAMIN) 500 MCG tablet Take 500 mcg by mouth daily      fentaNYL (DURAGESIC) 50 MCG/HR Place 1 patch onto the skin every 48 hours 15 patch 0     No current facility-administered medications on file prior to encounter. REVIEW OF SYSTEMS    Pertinent items are noted in HPI. Objective:      /72   Pulse 66   Temp 97.1 °F (36.2 °C) (Tympanic)     PHYSICAL EXAM    Surgical site appears well coapted with some nonviable tissue at the skin edges. Skin changes consistent with dermatosis. Skin plaques with hyperkeratosis bilateral legs left worse than right. Edema noted bilaterally from the popliteal fossa to the distal toes.     Assessment:     Problem List Items Addressed This Visit     Osteomyelitis of second toe of left foot (HCC) - Primary    Relevant Medications    lidocaine (LMX) 4 % cream    Other Relevant Orders    Initiate Outpatient Wound Care Protocol        Procedure Note    Performed by: Didier Patiño DPM    Consent obtained: Yes    Time out taken:  Yes    Pain Control: Anesthetic  Anesthetic: 4% Topical Xylocaine     Debridement:Excisional Debridement    Using curette the wound was sharply debrided    down through and including the removal of epidermis, dermis and subcutaneous tissue. Devitalized Tissue Debrided:  slough, necrotic/eschar, exudate and callus    Pre Debridement Measurements:  Are located in the Wound Documentation Flow Sheet    Wound #: 1     Wound Care Documentation:  Wound 10/27/21 Toe (Comment  which one) #1  Second (Active)   Wound Etiology Other 10/27/21 1505   Wound Cleansed Cleansed with saline 10/27/21 1505   Wound Length (cm) 0 cm 10/27/21 1505   Wound Width (cm) 0 cm 10/27/21 1505   Wound Depth (cm) 0 cm 10/27/21 1505   Wound Surface Area (cm^2) 0 cm^2 10/27/21 1505   Wound Volume (cm^3) 0 cm^3 10/27/21 1505   Post-Procedure Length (cm) 0.2 cm 10/27/21 1532   Post-Procedure Width (cm) 0.2 cm 10/27/21 1532   Post-Procedure Depth (cm) 0.1 cm 10/27/21 1532   Post-Procedure Surface Area (cm^2) 0.04 cm^2 10/27/21 1532   Post-Procedure Volume (cm^3) 0.004 cm^3 10/27/21 1532   Wound Assessment Pink/red 10/27/21 1532   Drainage Amount None 10/27/21 1505   Odor None 10/27/21 1505   Lilia-wound Assessment Fragile 10/27/21 1505   Margins Attached edges 10/27/21 1505   Number of days: 0       Total Surface Area Debrided:  <1 sq cm     Percentage of wound debrided 100%    Bleeding:  Minimal    Hemostasis Achieved:  by pressure    Procedural Pain:  0  / 10     Post Procedural Pain:  0 / 10     Response to treatment:  Well tolerated by patient. Plan:   New wound right second toe. Wound sharply debridement without incident  Daily dressing changes with Betadine ointment and Band-Aid  Prescription for doxycycline for 2 weeks for erythema and local infection  Follow-up in 1 week  Call with any complications.     Discharge Treatment  Changes every other day with Betadine ointment    Written Patient Discharge Instructions Given            Electronically signed by Jaycee Madrigal DPM on 10/27/2021 at 4:33 PM

## 2021-10-28 ENCOUNTER — ANTI-COAG VISIT (OUTPATIENT)
Dept: CARDIOLOGY CLINIC | Age: 81
End: 2021-10-28
Payer: MEDICARE

## 2021-10-28 DIAGNOSIS — I48.0 PAROXYSMAL ATRIAL FIBRILLATION (HCC): Primary | ICD-10-CM

## 2021-10-28 PROCEDURE — 93793 ANTICOAG MGMT PT WARFARIN: CPT | Performed by: NURSE PRACTITIONER

## 2021-10-28 NOTE — PROGRESS NOTES
[address] [facility], 1940      Anticoagulation Indication(s):  Afib  pAF     Referring Physician:   Dr. Rahel Mcadams  Goal INR Range:  2.0-3.0  Duration of Anticoagulation Therapy:  Indefinite  Home or Lab Draw: Lab  Product patient has at home: warfarin 5 mg    Recent INR Results:  Lab Results   Component Value Date    INR 1.74 (H) 10/27/2021    INR 2.20 10/07/2021    INR 3.00 09/27/2021    INR 2.70 09/20/2021       INR Summary                          Warfarin regimen (mg)  Date INR A/P Sun Mon Tue Wed Thu Fri Sat Mg/wk   10/28/21 1.7 Below goal, increase by 2.5 mg 2.5 5 5 2.5 5 2.5 2.5 22.5   10/7/21 2.2 At goal, no  change 2.5 5 5 2.5 2.5 2.5 2.5 20   9/27/21 3.0 At goal, no change 2.5 5 5 2.5 2.5 2.5 2.5 20   9/20/21 2.7 At goal, no change 2.5 5 2.5 2.5 5 2.5 2.5 22.5   9/15/21 4.30 Above goal, hold tonite and decrease by 2.5 mg 2.5 5 2.5 5 hold 2.5 2.5 20   9/29/21 2.47 At goal, no change  5 5 5 5 5 5 5 35   9/1.21 1.9 At goal, no change 5 5 5 5 5 5 5 35   8/17/21 2.40 At goal, no change 2.5 5 2.5 2.5 5 5 2.5 25   7/29/21 1.7 At goal, no change 2.5 5 2.5 2.5 5 5 2.5 25   6/30221 2.38 At goal, no  change 2.5 5 2.5 2.5 7.5 5 2.5 27.5   6/22/21 1.68 Below goal, increase by 2.5 mg 2.5 5 2.5 2.5 7.5 5 2.5 27.5   5/25/21 2.1 At goal, no change 2.5 5 2.5 2.5 5 5 2.5 25   4/22/21 2.0 At goal, no change  2.5 5 2.5 2.5 5 5 2.5 25   3/24/21 1.5 Below goal, increase by 2.5 mg 2.5 5 2.5 2.5 5 5 2.5 25   2/8/21 2 At goal, no change 2.5 5 2.5 2.5 2.5 5 2.5 22.5   1/4/20 2.0 At goal, no change.  Pt went back to original dosing 2.5 5 2.5 2.5 2.5 5 2.5 22.5   12/10/20 1.7 Below goal, increase by 2.5 mg 2.5 5 2.5 2.5 2.5 5 5 25   11/4/20 2.1 At goal, no change 2.5 5 2.5 2.5 2.5 5 2.5 22.5   9/8/20 2.0 At goal, no change 2.5 5 2.5 5 2.5 5 2.5 25   8/31/20 2.7 At goal, no change 2.5 5 2.5 5 2.5 5 2.5 25   8/12/20 2.1 At goal, no change 2.5 5 2.5 5 2.5 5 2.5 25   7/16/20 2.7 At goal, no change 2.5 2.5 5 5 5 2.5 2.5 25   7/1/20 1.8 Below goal, increase by 2.5 2.5 2.5 5 5 5 2.5 2.5 25   6/17/20 1.9 At goal, no change 2.5 2.5 5 5 2.5 2.5 2.5 22.5   6/4/20 2.1 At goal, continue dose 2.5 2.5 5 5 2.5 2.5 2.5 22.5   5/27/20 2.0 At goal, continue dose 2.5 2.5 5 5 2.5 2.5 2.5 22.5   5/14/20 2.7 At goal, continue dose 2.5 2.5 5 5 2.5 2.5 2.5 22.5   5/7/20 2.1 At goal, continue dose 2.5 2.5 5 5 2.5 2.5 2.5 22.5   4/30/20 1.8 Below goal, increase dose by 2.5 mg 2.5 2.5 5 5 2.5 2.5 2.5 22.5   4/15/20 1.9 At goal, no change 2.5 2.5 2.5 5 2.5 2.5 2.5 20   4/7/20 1.7 Below goal, increase by 2.5 2.5 2.5 2.5 5 2.5 2.5 2.5 20   3/30/20 3.70 Above goal, hold todays  And decrease by 5mg  2.5 2.5 hold 2.5 2.5 2.5 2.5 15   3/5/20 3.07 Above goal, hold todays dose 2.5 5 2.5 5 2.5 hold 2.5 20   2/26/20 2.78 At goal, no change 2.5 5 2.5 5 2.5 2.5 2.5 22.5   2/14/20 2.27 At goal, no change 2.5 5 2.5 5 2.5 2.5 2.5 22.5   2/5/20 3.93 Above goal,hold todays dose and decrease by 2.5 2.5 5 2.5 5 hold 2.5 2.5 20   1/27/20 2.64 At goal, no change 2.5 5 2.5 5 2.5 5 2.5 25   1/13/20 2.8 At goal, no change 2.5 5 2.5 5 2.5 5 2.5 25   12/27/19 1.44 Below goal, increased by 6.5 mg See note 2.5 5 2.5 5 2.5 5 2.5 25   12/20/19 1.27 Below goal, increase by 5 2.5 2 2.5 2 2.5 10(Pt did not increase as instructed) he took 5 mg 2 23.5    (he actually took 18.5 mg)   12/13/19 1.44 Below goal, increase by 3 2.5 2 2.5 2 2.5 5 2 18.5   12/6/19 1.59 Below goal, increase by.   2.5 1 2.5 1 2.5 5 1 15.5   12/2/19 4.10 Above goal, hold tonight  hold 2.5 2.5 2.5 Recheck     11/29/19 2.47 At goal, no change 5 2.5 2.5 2.5 2.5 5 5 25   11/25/16 5.86 Above goal 5 hold hold 2.5 5 recheck  12.5   11/19/19 2.38 At goal, continue 5 5 5 5 5 5 5 35         Assessment/Plan:    Recent hospitalizations/HC visits None reported   Recent medication changes None reported   Medications taken regularly that may interact with warfarin or alter INR No significant drug interactions identified Warfarin dose taken as prescribed Patient uses pillbox? no   Signs/symptoms of bleeding History of bleeding? Yes, GIB   Vitamin K intake Normally has ~1-2 servings of green, leafy vegetables per week   Recent vomiting/diarrhea/fever None reported   Changes in weight, activity, stress None reported   Tobacco or alcohol use Patient reports smoking 0 PPD  Patient reports having 0 drinks per day   Upcoming surgeries or procedures None reported     Patient was also reminded to maintain consistent vitamin K intake and call with any bleeding, medication changes, or fever/vomiting/diarrhea.     Next INR check:11/21/21    Spoke with pt and updated dose  Reviewed INR     Kisha CHAVEZ, CVNP FNP

## 2021-11-03 ENCOUNTER — HOSPITAL ENCOUNTER (OUTPATIENT)
Dept: WOUND CARE | Age: 81
Discharge: HOME | End: 2021-11-03
Payer: MEDICARE

## 2021-11-03 VITALS
RESPIRATION RATE: 16 BRPM | DIASTOLIC BLOOD PRESSURE: 54 MMHG | HEART RATE: 67 BPM | SYSTOLIC BLOOD PRESSURE: 119 MMHG | TEMPERATURE: 97.1 F

## 2021-11-03 DIAGNOSIS — M86.9 OSTEOMYELITIS OF SECOND TOE OF LEFT FOOT (HCC): Primary | ICD-10-CM

## 2021-11-03 PROCEDURE — 99212 OFFICE O/P EST SF 10 MIN: CPT

## 2021-11-03 RX ORDER — LIDOCAINE 40 MG/G
CREAM TOPICAL ONCE
Status: DISCONTINUED | OUTPATIENT
Start: 2021-11-03 | End: 2021-11-04 | Stop reason: HOSPADM

## 2021-11-03 RX ORDER — LIDOCAINE 50 MG/G
OINTMENT TOPICAL ONCE
OUTPATIENT
Start: 2021-11-03 | End: 2021-11-03

## 2021-11-03 RX ORDER — LIDOCAINE 40 MG/G
CREAM TOPICAL ONCE
OUTPATIENT
Start: 2021-11-03 | End: 2021-11-03

## 2021-11-03 RX ORDER — GENTAMICIN SULFATE 1 MG/G
OINTMENT TOPICAL ONCE
OUTPATIENT
Start: 2021-11-03 | End: 2021-11-03

## 2021-11-03 RX ORDER — LIDOCAINE HYDROCHLORIDE 20 MG/ML
JELLY TOPICAL ONCE
OUTPATIENT
Start: 2021-11-03 | End: 2021-11-03

## 2021-11-03 RX ORDER — LIDOCAINE HYDROCHLORIDE 40 MG/ML
SOLUTION TOPICAL ONCE
OUTPATIENT
Start: 2021-11-03 | End: 2021-11-03

## 2021-11-03 RX ORDER — CLOBETASOL PROPIONATE 0.5 MG/G
OINTMENT TOPICAL ONCE
OUTPATIENT
Start: 2021-11-03 | End: 2021-11-03

## 2021-11-03 RX ORDER — BETAMETHASONE DIPROPIONATE 0.05 %
OINTMENT (GRAM) TOPICAL ONCE
OUTPATIENT
Start: 2021-11-03 | End: 2021-11-03

## 2021-11-03 NOTE — PROGRESS NOTES
Ashish Dunlap  Progress Note     Leo Doherty  AGE: 80 y.o. GENDER: male  : 1940  TODAY'S DATE:  11/3/2021    Subjective:     Chief Complaint   Patient presents with    Wound Check     right lower extremity         HISTORY of PRESENT ILLNESS HPI     Leo Doherty is a 80 y.o. male who presents today for wound evaluation. History of Wound:He states he had a corn that was getting larger on his toe. He states that it was too painful to remove last time was at the podiatrist office. He noted some swelling in the toe and some redness around it. He did not want to have an issue like he had with his other toe. He states that his podiatrist was out of town and so he made an appointment in the wound care center. Previous amputation of left second toe after admission to the hospital due to wet gangrene. He has been changing the bandage as directed. There is been no drainage the past few days. He does admit to some pain on the top of the toe. He has not used the ammonium lactate because he was afraid his cat would like his leg. He has no other complaints at this time.       Wound Pain:  none  Severity:  0 / 10   Wound Type:  non-healing surgical  Modifying Factors:  edema  Associated Signs/Symptoms:  edema        PAST MEDICAL HISTORY        Diagnosis Date    Allergic rhinitis     Atrial fibrillation (HCC)     Benign prostatic hypertrophy     CAD (coronary artery disease)     Cancer (HCC)     skin    CHF (congestive heart failure) ([address] 2017    Coronary artery disease involving native coronary artery of native heart without angina pectoris 2011    DDD (degenerative disc disease), lumbar     Falls 2017    GI bleeding     Hyperlipidemia     Hypertension     MI (myocardial infarction) ([address]     MRSA (methicillin resistant staph aureus) culture positive     h/o infection in the blood    Osteomyelitis (HCC)     left foot    Pneumonia     S/P PTCA (percutaneous transluminal coronary angioplasty) stents x 8    SSS (sick sinus syndrome) ([address]     Unspecified sleep apnea     Venous (peripheral) insufficiency 2018       PAST SURGICAL HISTORY    Past Surgical History:   Procedure Laterality Date    APPENDECTOMY      CARDIAC PACEMAKER PLACEMENT      CARPAL TUNNEL RELEASE      CORONARY ANGIOPLASTY      CORONARY ANGIOPLASTY WITH STENT PLACEMENT      DIAGNOSTIC CARDIAC CATH LAB PROCEDURE      EYE SURGERY      FOOT DEBRIDEMENT Left 2021    INCISION AND DRAINAGE LEFT FOOT performed by Maggie Barron DPM at [address]  10/2016    PACEMAKER PLACEMENT      TOE AMPUTATION Left 2021    AMPUTATION OF LEFT SECOND TOE performed by Maggie Barron DPM at [address] HISTORY    Family History   Problem Relation Age of Onset    Cancer Sister     Coronary Art Dis Brother        SOCIAL HISTORY    Social History     Tobacco Use    Smoking status: Former Smoker     Packs/day: 1.00     Years: 30.00     Pack years: 30.00     Quit date: 2004     Years since quittin.5    Smokeless tobacco: Never Used   Vaping Use    Vaping Use: Never used   Substance Use Topics    Alcohol use: No     Alcohol/week: 0.0 standard drinks    Drug use: No       ALLERGIES    Allergies   Allergen Reactions    Avelox [Moxifloxacin Hcl In Nacl] Anaphylaxis    Epinephrine Base     Other      [place] per PT - swelling size of silver dollar at site per PT     Keflex [Cephalexin] Nausea And Vomiting    Polysporin [Bacitracin-Polymyxin B] Rash       MEDICATIONS    Current Outpatient Medications on File Prior to Encounter   Medication Sig Dispense Refill    doxycycline hyclate (VIBRA-TABS) 100 MG tablet Take 1 tablet by mouth 2 times daily for 14 days 28 tablet 0    ammonium lactate (LAC-HYDRIN) 12 % cream Apply topically to dry skin on feet daily 140 g 5    Polysaccharide Iron Complex (PROFE) 391.3 (180 Fe) MG CAPS Take 1 capsule by mouth 2 times daily      coenzyme Q10 200 MG CAPS capsule Take 200 mg by mouth daily      furosemide (LASIX) 40 MG tablet TAKE 1 TABLET BY MOUTH TWICE DAILY (Patient taking differently: 40 mg Cardiology increased lasix to TID d/t leg swelling) 180 tablet 3    warfarin (COUMADIN) 5 MG tablet TAKE 1 TABLET BY MOUTH DAILY AT 6 PM (Patient taking differently: Taking 5mg 4 days per week  Taking 2.5mg 3 days per week) 90 tablet 2    metOLazone (ZAROXOLYN) 5 MG tablet Take 1 tablet by mouth Twice a Week (Patient taking differently: Take 5 mg by mouth Twice a Week Cardiology d/c'd at this time) 90 tablet 1    pantoprazole (PROTONIX) 40 MG tablet TAKE 1 TABLET BY MOUTH EVERY MORNING BEFORE BREAKFAST 90 tablet 1    potassium chloride (KLOR-CON M) 10 MEQ extended release tablet Take 1 tablet by mouth 4 times daily (Patient taking differently: Take 20 mEq by mouth 2 times daily ) 360 tablet 3    ondansetron (ZOFRAN-ODT) 4 MG disintegrating tablet DISSOLVE 1 TABLET ON THE TONGUE EVERY 8 HOURS AS NEEDED FOR NAUSEA OR VOMITING 20 tablet 3    rosuvastatin (CRESTOR) 5 MG tablet TAKE 1 TABLET DAILY 90 tablet 3    nitroGLYCERIN (NITRODUR) 0.2 MG/HR PLACE 1 PATCH ON THE SKIN DAILY 90 patch 3    metoprolol succinate (TOPROL XL) 25 MG extended release tablet TAKE ONE-HALF (1/2) TABLET TWICE A DAY 90 tablet 2    MULTAQ 400 MG TABS TAKE 1 TABLET TWICE A DAY WITH MEALS 180 tablet 3    vitamin B-12 (CYANOCOBALAMIN) 500 MCG tablet Take 500 mcg by mouth daily      fentaNYL (DURAGESIC) 50 MCG/HR Place 1 patch onto the skin every 48 hours 15 patch 0     No current facility-administered medications on file prior to encounter. REVIEW OF SYSTEMS    Pertinent items are noted in HPI. Objective:      BP (!) 119/54   Pulse 67   Temp 97.1 °F (36.2 °C) (Infrared)   Resp 16     PHYSICAL EXAM    Surgical site appears well coapted with some nonviable tissue at the skin edges. Skin changes consistent with dermatosis. Skin plaques with hyperkeratosis bilateral legs left worse than right. Edema noted bilaterally from the popliteal fossa to the distal toes. Assessment:     Problem List Items Addressed This Visit     Osteomyelitis of second toe of left foot (HCC) - Primary    Relevant Medications    lidocaine (LMX) 4 % cream    Other Relevant Orders    Initiate Outpatient Wound Care Protocol        Plan:   Wound healed second toe, skin fissuring dorsal medial second toe  Wound sharply debridement without incident  Daily dressing changes by bandage   Ammonium lactate to thickened skin plaques bilateral feet and ankles. Finish antibiotics  Follow-up as needed  Call with any complications.     Discharge Treatment  Changes every other day with Betadine ointment    Written Patient Discharge Instructions Given            Electronically signed by Rayne Rodriguez DPM on 11/3/2021 at 3:47 PM

## 2021-11-11 DIAGNOSIS — R11.0 NAUSEA: ICD-10-CM

## 2021-11-11 RX ORDER — ONDANSETRON 4 MG/1
TABLET, ORALLY DISINTEGRATING ORAL
Qty: 20 TABLET | Refills: 3 | Status: SHIPPED | OUTPATIENT
Start: 2021-11-11

## 2021-11-11 NOTE — TELEPHONE ENCOUNTER
Medication:   Requested Prescriptions     Pending Prescriptions Disp Refills    ondansetron (ZOFRAN-ODT) 4 MG disintegrating tablet [Pharmacy Med Name: ONDANSETRON ODT 4MG TABLETS] 20 tablet 3     Sig: DISSOLVE 1 TABLET ON THE TONGUE EVERY 8 HOURS AS NEEDED FOR NAUSEA OR VOMITING        Last Filled:      Patient Phone Number: [phone] (home)     Last appt: 9/7/2021   Next appt: 12/9/2021    Last OARRS:   RX Monitoring 7/10/2018   Attestation The Prescription Monitoring Report for this patient was reviewed today. Periodic Controlled Substance Monitoring No signs of potential drug abuse or diversion identified.

## 2021-11-12 NOTE — TELEPHONE ENCOUNTER
Requested Prescriptions     Pending Prescriptions Disp Refills    metoprolol succinate (TOPROL XL) 25 MG extended release tablet [Pharmacy Med Name: METOPROLOL SUCCINATE ER TABS 25MG] 90 tablet 3     Sig: TAKE ONE-HALF (1/2) TABLET TWICE A DAY          Number: 90    Refills: 3    Last Office Visit: 10/28/2021    Next Office Visit: 12/02/2021

## 2021-11-16 ENCOUNTER — IMMUNIZATION (OUTPATIENT)
Dept: FAMILY MEDICINE CLINIC | Age: 81
End: 2021-11-16
Payer: MEDICARE

## 2021-11-16 DIAGNOSIS — Z23 NEED FOR VACCINATION: Primary | ICD-10-CM

## 2021-11-16 DIAGNOSIS — L03.116 CELLULITIS OF LEFT LOWER EXTREMITY: ICD-10-CM

## 2021-11-16 DIAGNOSIS — R60.1 GENERALIZED EDEMA: ICD-10-CM

## 2021-11-16 LAB
A/G RATIO: 1 (ref 1.1–2.2)
ALBUMIN SERPL-MCNC: 3.7 G/DL (ref 3.4–5)
ALP BLD-CCNC: 88 U/L (ref 40–129)
ALT SERPL-CCNC: 13 U/L (ref 10–40)
ANION GAP SERPL CALCULATED.3IONS-SCNC: 15 MMOL/L (ref 3–16)
AST SERPL-CCNC: 27 U/L (ref 15–37)
BILIRUB SERPL-MCNC: 0.5 MG/DL (ref 0–1)
BUN BLDV-MCNC: 21 MG/DL (ref 7–20)
CALCIUM SERPL-MCNC: 8.9 MG/DL (ref 8.3–10.6)
CHLORIDE BLD-SCNC: 102 MMOL/L (ref 99–110)
CO2: 25 MMOL/L (ref 21–32)
CREAT SERPL-MCNC: 1.1 MG/DL (ref 0.8–1.3)
GFR AFRICAN AMERICAN: >60
GFR NON-AFRICAN AMERICAN: >60
GLUCOSE BLD-MCNC: 70 MG/DL (ref 70–99)
INR BLD: 2.36 (ref 0.88–1.12)
POTASSIUM SERPL-SCNC: 4.3 MMOL/L (ref 3.5–5.1)
PROTHROMBIN TIME: 27.7 SEC (ref 9.9–12.7)
SODIUM BLD-SCNC: 142 MMOL/L (ref 136–145)
TOTAL PROTEIN: 7.5 G/DL (ref 6.4–8.2)

## 2021-11-16 PROCEDURE — G0008 ADMIN INFLUENZA VIRUS VAC: HCPCS | Performed by: FAMILY MEDICINE

## 2021-11-16 PROCEDURE — 90694 VACC AIIV4 NO PRSRV 0.5ML IM: CPT | Performed by: FAMILY MEDICINE

## 2021-11-16 RX ORDER — METOPROLOL SUCCINATE 25 MG/1
TABLET, EXTENDED RELEASE ORAL
Qty: 90 TABLET | Refills: 3 | Status: SHIPPED | OUTPATIENT
Start: 2021-11-16

## 2021-11-16 NOTE — PROGRESS NOTES
Vaccine Information Sheet, \"Influenza - Inactivated\"  given to Ton Weems, or parent/legal guardian of  Ton Weems and verbalized understanding. Patient responses:    Have you ever had a reaction to a flu vaccine? No  Do you have any current illness? No  Have you ever had Guillian [place] Syndrome? No  Do you have a serious allergy to any of the follow: Neomycin, Polymyxin, Thimerosal, eggs or egg products? No    Flu vaccine given per order. Please see immunization tab. Risks and benefits explained. Current VIS given.

## 2021-11-17 ENCOUNTER — ANTI-COAG VISIT (OUTPATIENT)
Dept: CARDIOLOGY CLINIC | Age: 81
End: 2021-11-17
Payer: MEDICARE

## 2021-11-17 DIAGNOSIS — I48.0 PAROXYSMAL ATRIAL FIBRILLATION (HCC): Primary | ICD-10-CM

## 2021-11-17 PROCEDURE — 93793 ANTICOAG MGMT PT WARFARIN: CPT | Performed by: NURSE PRACTITIONER

## 2021-11-17 NOTE — PROGRESS NOTES
[address] [facility], 1940      Anticoagulation Indication(s):  Afib  pAF     Referring Physician:   Dr. Rahel Mcadams  Goal INR Range:  2.0-3.0  Duration of Anticoagulation Therapy:  Indefinite  Home or Lab Draw: Lab  Product patient has at home: warfarin 5 mg    Recent INR Results:  Lab Results   Component Value Date    INR 2.36 (H) 11/16/2021    INR 1.74 (H) 10/27/2021    INR 2.20 10/07/2021    INR 3.00 09/27/2021       INR Summary                          Warfarin regimen (mg)  Date INR A/P Sun Mon Tue Wed Thu Fri Sat Mg/wk   11/16/21 2.36 At goal, no change 2.5 5 5 2.5 5 2.5 2.5 22.5   10/28/21 1.7 Below goal, increase by 2.5 mg 2.5 5 5 2.5 5 2.5 2.5 22.5   10/7/21 2.2 At goal, no  change 2.5 5 5 2.5 2.5 2.5 2.5 20   9/27/21 3.0 At goal, no change 2.5 5 5 2.5 2.5 2.5 2.5 20   9/20/21 2.7 At goal, no change 2.5 5 2.5 2.5 5 2.5 2.5 22.5   9/15/21 4.30 Above goal, hold tonite and decrease by 2.5 mg 2.5 5 2.5 5 hold 2.5 2.5 20   9/29/21 2.47 At goal, no change  5 5 5 5 5 5 5 35   9/1.21 1.9 At goal, no change 5 5 5 5 5 5 5 35   8/17/21 2.40 At goal, no change 2.5 5 2.5 2.5 5 5 2.5 25   7/29/21 1.7 At goal, no change 2.5 5 2.5 2.5 5 5 2.5 25   6/30221 2.38 At goal, no  change 2.5 5 2.5 2.5 7.5 5 2.5 27.5   6/22/21 1.68 Below goal, increase by 2.5 mg 2.5 5 2.5 2.5 7.5 5 2.5 27.5   5/25/21 2.1 At goal, no change 2.5 5 2.5 2.5 5 5 2.5 25   4/22/21 2.0 At goal, no change  2.5 5 2.5 2.5 5 5 2.5 25   3/24/21 1.5 Below goal, increase by 2.5 mg 2.5 5 2.5 2.5 5 5 2.5 25   2/8/21 2 At goal, no change 2.5 5 2.5 2.5 2.5 5 2.5 22.5   1/4/20 2.0 At goal, no change.  Pt went back to original dosing 2.5 5 2.5 2.5 2.5 5 2.5 22.5   12/10/20 1.7 Below goal, increase by 2.5 mg 2.5 5 2.5 2.5 2.5 5 5 25   11/4/20 2.1 At goal, no change 2.5 5 2.5 2.5 2.5 5 2.5 22.5   9/8/20 2.0 At goal, no change 2.5 5 2.5 5 2.5 5 2.5 25   8/31/20 2.7 At goal, no change 2.5 5 2.5 5 2.5 5 2.5 25   8/12/20 2.1 At goal, no change 2.5 5 2.5 5 2.5 5 2.5 25   7/16/20 2.7 At goal, no change 2.5 2.5 5 5 5 2.5 2.5 25   7/1/20 1.8 Below goal, increase by 2.5 2.5 2.5 5 5 5 2.5 2.5 25   6/17/20 1.9 At goal, no change 2.5 2.5 5 5 2.5 2.5 2.5 22.5   6/4/20 2.1 At goal, continue dose 2.5 2.5 5 5 2.5 2.5 2.5 22.5   5/27/20 2.0 At goal, continue dose 2.5 2.5 5 5 2.5 2.5 2.5 22.5   5/14/20 2.7 At goal, continue dose 2.5 2.5 5 5 2.5 2.5 2.5 22.5   5/7/20 2.1 At goal, continue dose 2.5 2.5 5 5 2.5 2.5 2.5 22.5   4/30/20 1.8 Below goal, increase dose by 2.5 mg 2.5 2.5 5 5 2.5 2.5 2.5 22.5   4/15/20 1.9 At goal, no change 2.5 2.5 2.5 5 2.5 2.5 2.5 20   4/7/20 1.7 Below goal, increase by 2.5 2.5 2.5 2.5 5 2.5 2.5 2.5 20   3/30/20 3.70 Above goal, hold todays  And decrease by 5mg  2.5 2.5 hold 2.5 2.5 2.5 2.5 15   3/5/20 3.07 Above goal, hold todays dose 2.5 5 2.5 5 2.5 hold 2.5 20   2/26/20 2.78 At goal, no change 2.5 5 2.5 5 2.5 2.5 2.5 22.5   2/14/20 2.27 At goal, no change 2.5 5 2.5 5 2.5 2.5 2.5 22.5   2/5/20 3.93 Above goal,hold todays dose and decrease by 2.5 2.5 5 2.5 5 hold 2.5 2.5 20   1/27/20 2.64 At goal, no change 2.5 5 2.5 5 2.5 5 2.5 25   1/13/20 2.8 At goal, no change 2.5 5 2.5 5 2.5 5 2.5 25   12/27/19 1.44 Below goal, increased by 6.5 mg See note 2.5 5 2.5 5 2.5 5 2.5 25   12/20/19 1.27 Below goal, increase by 5 2.5 2 2.5 2 2.5 10(Pt did not increase as instructed) he took 5 mg 2 23.5    (he actually took 18.5 mg)   12/13/19 1.44 Below goal, increase by 3 2.5 2 2.5 2 2.5 5 2 18.5   12/6/19 1.59 Below goal, increase by.   2.5 1 2.5 1 2.5 5 1 15.5   12/2/19 4.10 Above goal, hold tonight  hold 2.5 2.5 2.5 Recheck     11/29/19 2.47 At goal, no change 5 2.5 2.5 2.5 2.5 5 5 25   11/25/16 5.86 Above goal 5 hold hold 2.5 5 recheck  12.5   11/19/19 2.38 At goal, continue 5 5 5 5 5 5 5 35         Assessment/Plan:    Recent hospitalizations/HC visits None reported   Recent medication changes None reported   Medications taken regularly that may interact with warfarin or alter INR No significant drug interactions identified   Warfarin dose taken as prescribed Patient uses pillbox? no   Signs/symptoms of bleeding History of bleeding? Yes, GIB   Vitamin K intake Normally has ~1-2 servings of green, leafy vegetables per week   Recent vomiting/diarrhea/fever None reported   Changes in weight, activity, stress None reported   Tobacco or alcohol use Patient reports smoking 0 PPD  Patient reports having 0 drinks per day   Upcoming surgeries or procedures None reported     Patient was also reminded to maintain consistent vitamin K intake and call with any bleeding, medication changes, or fever/vomiting/diarrhea.     Next INR check:12/15/21    Spoke with pt regarding normal results    Addendum:  Reviewed documentation and plan of care from RN  Agree with above  Francheska Manner NP

## 2021-12-02 ENCOUNTER — OFFICE VISIT (OUTPATIENT)
Dept: CARDIOLOGY CLINIC | Age: 81
End: 2021-12-02
Payer: MEDICARE

## 2021-12-02 VITALS
DIASTOLIC BLOOD PRESSURE: 70 MMHG | SYSTOLIC BLOOD PRESSURE: 130 MMHG | WEIGHT: 166 LBS | BODY MASS INDEX: 24.51 KG/M2 | HEART RATE: 68 BPM

## 2021-12-02 DIAGNOSIS — I25.10 CORONARY ARTERY DISEASE INVOLVING NATIVE CORONARY ARTERY OF NATIVE HEART WITHOUT ANGINA PECTORIS: Primary | ICD-10-CM

## 2021-12-02 DIAGNOSIS — E78.5 HYPERLIPIDEMIA, UNSPECIFIED HYPERLIPIDEMIA TYPE: ICD-10-CM

## 2021-12-02 DIAGNOSIS — I10 HTN (HYPERTENSION), BENIGN: ICD-10-CM

## 2021-12-02 PROCEDURE — 1036F TOBACCO NON-USER: CPT | Performed by: INTERNAL MEDICINE

## 2021-12-02 PROCEDURE — G8427 DOCREV CUR MEDS BY ELIG CLIN: HCPCS | Performed by: INTERNAL MEDICINE

## 2021-12-02 PROCEDURE — 99214 OFFICE O/P EST MOD 30 MIN: CPT | Performed by: INTERNAL MEDICINE

## 2021-12-02 PROCEDURE — 4040F PNEUMOC VAC/ADMIN/RCVD: CPT | Performed by: INTERNAL MEDICINE

## 2021-12-02 PROCEDURE — 1123F ACP DISCUSS/DSCN MKR DOCD: CPT | Performed by: INTERNAL MEDICINE

## 2021-12-02 PROCEDURE — G8484 FLU IMMUNIZE NO ADMIN: HCPCS | Performed by: INTERNAL MEDICINE

## 2021-12-02 PROCEDURE — G8420 CALC BMI NORM PARAMETERS: HCPCS | Performed by: INTERNAL MEDICINE

## 2021-12-02 NOTE — PROGRESS NOTES
Subjective:      Patient ID: Tarun Loyola is a 80 y.o. male. CC:  S/p GI bleed. F/u HTN  CAD. S/p pacer. Fatigue      HPI: Mr Naik is here for a 6 moth f/u. Erleen Goode He denies chest pain today but has chronic swelling. He c/o of fatigue and low b/p. No LOC but fell and hit his hip and has bad leg pain. Back is bad and is using a 4 pt walker. Worried about bleeding and back surgeons shy away from surgery on back d/t bleeding. Has paroxysmal brief episodes of AF, less than 1%. He had life threatening GI bleeding on NOAC. He doesn't want watchman. Has more AF on pacer interrogation. GI said ok for [facility]. Has edema. Has dizziness and will decrease nitrodur patch 0.2mg/hr. Less edema after hospitalization for osteo.           Allergies   Allergen Reactions    Avelox [Moxifloxacin Hcl In Nacl] Anaphylaxis    Epinephrine Base     Other      [place] per PT - swelling size of silver dollar at site per PT     Keflex [Cephalexin] Nausea And Vomiting    Polysporin [Bacitracin-Polymyxin B] Rash        Social History     Socioeconomic History    Marital status:      Spouse name: Not on file    Number of children: 2    Years of education: Not on file    Highest education level: Not on file   Occupational History    Not on file   Tobacco Use    Smoking status: Former Smoker     Packs/day: 1.00     Years: 30.00     Pack years: 30.00     Quit date: 2004     Years since quittin.6    Smokeless tobacco: Never Used   Vaping Use    Vaping Use: Never used   Substance and Sexual Activity    Alcohol use: No     Alcohol/week: 0.0 standard drinks    Drug use: No    Sexual activity: Yes     Comment:  living with spouse   Other Topics Concern    Not on file   Social History Narrative    Not on file     Social Determinants of Health     Financial Resource Strain: Low Risk     Difficulty of Paying Living Expenses: Not hard at all   Food Insecurity: No Food Insecurity    Worried About Running Out of Food in the Last Year: Never true    Ran Out of Food in the Last Year: Never true   Transportation Needs:     Lack of Transportation (Medical): Not on file    Lack of Transportation (Non-Medical): Not on file   Physical Activity:     Days of Exercise per Week: Not on file    Minutes of Exercise per Session: Not on file   Stress:     Feeling of Stress : Not on file   Social Connections:     Frequency of Communication with Friends and Family: Not on file    Frequency of Social Gatherings with Friends and Family: Not on file    Attends Sikh Services: Not on file    Active Member of [address] [place] [address] [employer] or Organizations: Not on file    Attends Club or Organization Meetings: Not on file    Marital Status: Not on file   Intimate Partner Violence:     Fear of Current or Ex-Partner: Not on file    Emotionally Abused: Not on file    Physically Abused: Not on file    Sexually Abused: Not on file   Housing Stability:     Unable to Pay for Housing in the Last Year: Not on file    Number of [place] in the Last Year: Not on file    Unstable Housing in the Last Year: Not on file        Patient has a family history includes Cancer in his sister; Coronary Art Dis in his brother. Patient  has a past medical history of Allergic rhinitis, Atrial fibrillation ([address] Benign prostatic hypertrophy, CAD (coronary artery disease), Cancer ([address] CHF (congestive heart failure) ([address] Coronary artery disease involving native coronary artery of native heart without angina pectoris, DDD (degenerative disc disease), lumbar, Falls, GI bleeding, Hyperlipidemia, Hypertension, MI (myocardial infarction) ([address] MRSA (methicillin resistant staph aureus) culture positive, Osteomyelitis ([address] Pneumonia, S/P PTCA (percutaneous transluminal coronary angioplasty), SSS (sick sinus syndrome) ([address] Unspecified sleep apnea, and Venous (peripheral) insufficiency.      Current Outpatient Medications   Medication Sig Dispense Refill    Eyes: Negative. Respiratory: Negative. Cardiovascular: Negative. Gastrointestinal: Negative. Genitourinary: Negative. Exam unchanged from 9/10/21. Objective:   Physical Exam   Nursing note and vitals reviewed. Constitutional: He is oriented to person, place, and time. He appears well-developed and well-nourished. No distress. HENT:   Head: Normocephalic and atraumatic. Mouth/Throat: No oropharyngeal exudate. Eyes: Conjunctivae and EOM are normal. Pupils are equal, round, and reactive to light. No scleral icterus. Neck: Normal range of motion. No JVD present. No tracheal deviation present. No thyromegaly present. Cardiovascular: Normal rate, regular rhythm, normal heart sounds and intact distal pulses. Exam reveals no gallop and no friction rub. No murmur heard. Pulmonary/Chest: Effort normal. No respiratory distress. He has no wheezes. He has no rales. He exhibits no tenderness. Abdominal: Soft. Bowel sounds are normal. He exhibits no distension and no mass. No tenderness. He has no rebound and no guarding. Musculoskeletal: tender left leg and swelling. Lymphadenopathy:     He has no cervical adenopathy. Neurological: He is alert and oriented to person, place, and time. No cranial nerve deficit. Coordination normal.   Skin: Skin is warm and dry. No rash noted. He is not diaphoretic. No erythema. No pallor. Psychiatric: He has a normal mood and affect. His behavior is normal. Judgment and thought content normal.       Assessment:        Diagnosis Orders   1. Coronary artery disease involving native coronary artery of native heart without angina pectoris     2. HTN (hypertension), benign     3. Hyperlipidemia, unspecified hyperlipidemia type             Plan:        CAD:  Stable. No chest pains but had GI bleed - still off A/C for paroxysmal a fib. Off ranexa. Rhythm: MRI compatible pacer   HTN:  Episodic and low today.   Had LLE skin infection and wears compression stockings. Now taking lasix 40 mg am and pm.  AT/AF:  Has episodes and feels tired. Taking multaq to maintain SR. Reviewed chads vasc. Check labs. He has tremendous fatigue. Edema: Add zaroxolyn 5mg q am.  Lasix 40 tid. Weeping:  Had amp 2nd toe left foot for osteo. Finished doxycycline.

## 2021-12-09 ENCOUNTER — OFFICE VISIT (OUTPATIENT)
Dept: FAMILY MEDICINE CLINIC | Age: 81
End: 2021-12-09
Payer: MEDICARE

## 2021-12-09 VITALS
HEART RATE: 62 BPM | HEIGHT: 69 IN | OXYGEN SATURATION: 96 % | BODY MASS INDEX: 25.92 KG/M2 | SYSTOLIC BLOOD PRESSURE: 134 MMHG | WEIGHT: 175 LBS | DIASTOLIC BLOOD PRESSURE: 72 MMHG

## 2021-12-09 DIAGNOSIS — I48.0 PAROXYSMAL ATRIAL FIBRILLATION (HCC): ICD-10-CM

## 2021-12-09 DIAGNOSIS — I10 ESSENTIAL HYPERTENSION: ICD-10-CM

## 2021-12-09 DIAGNOSIS — R60.1 GENERALIZED EDEMA: ICD-10-CM

## 2021-12-09 DIAGNOSIS — I50.32 CHRONIC DIASTOLIC CONGESTIVE HEART FAILURE (HCC): ICD-10-CM

## 2021-12-09 DIAGNOSIS — L03.116 CELLULITIS OF LEFT LOWER EXTREMITY: ICD-10-CM

## 2021-12-09 DIAGNOSIS — I50.32 CHRONIC DIASTOLIC CONGESTIVE HEART FAILURE (HCC): Primary | ICD-10-CM

## 2021-12-09 DIAGNOSIS — D64.9 ANEMIA, UNSPECIFIED TYPE: ICD-10-CM

## 2021-12-09 DIAGNOSIS — I87.2 VENOUS (PERIPHERAL) INSUFFICIENCY: ICD-10-CM

## 2021-12-09 DIAGNOSIS — R53.83 FATIGUE, UNSPECIFIED TYPE: ICD-10-CM

## 2021-12-09 DIAGNOSIS — Z01.00 EYE EXAM NORMAL: ICD-10-CM

## 2021-12-09 LAB
ANION GAP SERPL CALCULATED.3IONS-SCNC: 13 MMOL/L (ref 3–16)
BASOPHILS ABSOLUTE: 0 K/UL (ref 0–0.2)
BASOPHILS RELATIVE PERCENT: 0.4 %
BUN BLDV-MCNC: 25 MG/DL (ref 7–20)
CALCIUM SERPL-MCNC: 8.6 MG/DL (ref 8.3–10.6)
CHLORIDE BLD-SCNC: 102 MMOL/L (ref 99–110)
CHOLESTEROL, TOTAL: 107 MG/DL (ref 0–199)
CO2: 24 MMOL/L (ref 21–32)
CREAT SERPL-MCNC: 1.1 MG/DL (ref 0.8–1.3)
EOSINOPHILS ABSOLUTE: 0.1 K/UL (ref 0–0.6)
EOSINOPHILS RELATIVE PERCENT: 2.4 %
FOLATE: 7.43 NG/ML (ref 4.78–24.2)
GFR AFRICAN AMERICAN: >60
GFR NON-AFRICAN AMERICAN: >60
GLUCOSE BLD-MCNC: 83 MG/DL (ref 70–99)
HCT VFR BLD CALC: 33.9 % (ref 40.5–52.5)
HDLC SERPL-MCNC: 44 MG/DL (ref 40–60)
HEMOGLOBIN: 11 G/DL (ref 13.5–17.5)
INR BLD: 1.85 (ref 0.88–1.12)
IRON: 45 UG/DL (ref 59–158)
LDL CHOLESTEROL CALCULATED: 47 MG/DL
LYMPHOCYTES ABSOLUTE: 1.7 K/UL (ref 1–5.1)
LYMPHOCYTES RELATIVE PERCENT: 31 %
MCH RBC QN AUTO: 28.2 PG (ref 26–34)
MCHC RBC AUTO-ENTMCNC: 32.6 G/DL (ref 31–36)
MCV RBC AUTO: 86.4 FL (ref 80–100)
MONOCYTES ABSOLUTE: 0.5 K/UL (ref 0–1.3)
MONOCYTES RELATIVE PERCENT: 8.2 %
NEUTROPHILS ABSOLUTE: 3.3 K/UL (ref 1.7–7.7)
NEUTROPHILS RELATIVE PERCENT: 58 %
PDW BLD-RTO: 15.3 % (ref 12.4–15.4)
PLATELET # BLD: 218 K/UL (ref 135–450)
PMV BLD AUTO: 8.4 FL (ref 5–10.5)
POTASSIUM SERPL-SCNC: 4.6 MMOL/L (ref 3.5–5.1)
PRO-BNP: 726 PG/ML (ref 0–449)
PROTHROMBIN TIME: 21.5 SEC (ref 9.9–12.7)
RBC # BLD: 3.92 M/UL (ref 4.2–5.9)
SEDIMENTATION RATE, ERYTHROCYTE: 39 MM/HR (ref 0–20)
SODIUM BLD-SCNC: 139 MMOL/L (ref 136–145)
TRIGL SERPL-MCNC: 81 MG/DL (ref 0–150)
TSH REFLEX: 3.97 UIU/ML (ref 0.27–4.2)
VITAMIN B-12: 354 PG/ML (ref 211–911)
VLDLC SERPL CALC-MCNC: 16 MG/DL
WBC # BLD: 5.6 K/UL (ref 4–11)

## 2021-12-09 PROCEDURE — G8427 DOCREV CUR MEDS BY ELIG CLIN: HCPCS | Performed by: FAMILY MEDICINE

## 2021-12-09 PROCEDURE — 4040F PNEUMOC VAC/ADMIN/RCVD: CPT | Performed by: FAMILY MEDICINE

## 2021-12-09 PROCEDURE — G8417 CALC BMI ABV UP PARAM F/U: HCPCS | Performed by: FAMILY MEDICINE

## 2021-12-09 PROCEDURE — 1123F ACP DISCUSS/DSCN MKR DOCD: CPT | Performed by: FAMILY MEDICINE

## 2021-12-09 PROCEDURE — 99214 OFFICE O/P EST MOD 30 MIN: CPT | Performed by: FAMILY MEDICINE

## 2021-12-09 PROCEDURE — 1036F TOBACCO NON-USER: CPT | Performed by: FAMILY MEDICINE

## 2021-12-09 PROCEDURE — G8484 FLU IMMUNIZE NO ADMIN: HCPCS | Performed by: FAMILY MEDICINE

## 2021-12-09 NOTE — PROGRESS NOTES
Portia Le is a 80 y.o. male. HPI:here with dght,   Has ongoing fatigue  Has \"spot \" in OS after foreign body, saw optometrist, bothersome  Sees Rashel Bermudez his cardiologist,- no changes pain  Sees podiatrist for foot issues, right second toe is troublesome  On coumandin  Meds, vitamins and allergies reviewed with pt    ROS: No TIA's or unusual headaches, no dysphagia. No prolonged cough. No dyspnea or chest pain on exertion. No abdominal pain, change in bowel habits, black or bloody stools. No urinary tract symptoms. No new or unusual musculoskeletal symptoms. Prior to Visit Medications    Medication Sig Taking?  Authorizing Provider   metoprolol succinate (TOPROL XL) 25 MG extended release tablet TAKE ONE-HALF (1/2) TABLET TWICE A DAY Yes Shannon New MD   ondansetron (ZOFRAN-ODT) 4 MG disintegrating tablet DISSOLVE 1 TABLET ON THE TONGUE EVERY 8 HOURS AS NEEDED FOR NAUSEA OR VOMITING Yes Jerardo Eastman MD   ammonium lactate (LAC-HYDRIN) 12 % cream Apply topically to dry skin on feet daily Yes Jose Borrero DPM   Polysaccharide Iron Complex (PROFE) 391.3 (180 Fe) MG CAPS Take 1 capsule by mouth 2 times daily Yes Historical Provider, MD   coenzyme Q10 200 MG CAPS capsule Take 200 mg by mouth daily Yes Historical Provider, MD   furosemide (LASIX) 40 MG tablet TAKE 1 TABLET BY MOUTH TWICE DAILY  Patient taking differently: 40 mg Cardiology increased lasix to TID d/t leg swelling Yes Jerardo Eastman MD   warfarin (COUMADIN) 5 MG tablet TAKE 1 TABLET BY MOUTH DAILY AT 6 PM  Patient taking differently: Taking 5mg 4 days per week  Taking 2.5mg 3 days per week Yes Malu Hyde APRN - CNP   metOLazone (ZAROXOLYN) 5 MG tablet Take 1 tablet by mouth Twice a Week  Patient taking differently: Take 5 mg by mouth Twice a Week Cardiology d/c'd at this time Yes Shannon New MD   pantoprazole (PROTONIX) 40 MG tablet TAKE 1 TABLET BY MOUTH EVERY MORNING BEFORE BREAKFAST Yes Jerardo Eastman MD potassium chloride (KLOR-CON M) 10 MEQ extended release tablet Take 1 tablet by mouth 4 times daily  Patient taking differently: Take 20 mEq by mouth 2 times daily  Yes Moise Light MD   rosuvastatin (CRESTOR) 5 MG tablet TAKE 1 TABLET DAILY Yes Moise Light MD   nitroGLYCERIN (NITRODUR) 0.2 MG/HR PLACE 1 PATCH ON THE SKIN DAILY Yes Moise Light MD   MULTAQ 400 MG TABS TAKE 1 TABLET TWICE A DAY WITH MEALS Yes SCOTT Maciel CNP   vitamin B-12 (CYANOCOBALAMIN) 500 MCG tablet Take 500 mcg by mouth daily Yes Historical Provider, MD   fentaNYL ([address]) 50 MCG/HR Place 1 patch onto the skin every 48 hours Yes Lina Leigh MD       Past Medical History:   Diagnosis Date    Allergic rhinitis     [facility]rial fibrillation ([email or web address]     Benign prostatic hypertrophy     CAD (coronary artery disease)     Cancer ([email or web address]     skin    CHF (congestive heart failure) ([email or web address] 2017    Coronary artery disease involving native coronary artery of native heart without angina pectoris 2011    DDD (degenerative disc disease), lumbar     Falls 2017    GI bleeding     Hyperlipidemia     Hypertension     MI (myocardial infarction) ([email or web address]     MRSA (methicillin resistant staph aureus) culture positive     h/o infection in the blood    Osteomyelitis ([email or web address]     left foot    Pneumonia     S/P PTCA (percutaneous transluminal coronary angioplasty) stents x 8    SSS (sick sinus syndrome) ([email or web address]     Unspecified sleep apnea     Venous (peripheral) insufficiency 2018       Social History     Tobacco Use    Smoking status: Former Smoker     Packs/day: 1.00     Years: 30.00     Pack years: 30.00     Quit date: 2004     Years since quittin.6    Smokeless tobacco: Never Used   Vaping Use    Vaping Use: Never used   Substance Use Topics    Alcohol use: No     Alcohol/week: 0.0 standard drinks    Drug use: No       Family History   Problem Relation Age of Onset    Cancer Sister    Rafa Santos Coronary Art Dis Brother        Allergies   Allergen Reactions    Avelox [Moxifloxacin Hcl In Nacl] Anaphylaxis    Epinephrine Base     Other      [place] per PT - swelling size of silver dollar at site per PT     Keflex [Cephalexin] Nausea And Vomiting    Polysporin [Bacitracin-Polymyxin B] Rash       OBJECTIVE:  /72   Pulse 62   Ht 5' 9\" (1.753 m)   Wt 175 lb (79.4 kg)   SpO2 96%   BMI 25.84 kg/m²   GEN:  in NAD, generally weak, marked thoracic kyphosis  NECK:  Supple without adenopathy. No bruits  CV:  Regular rate and rhythm, S1 and S2 normal, no murmurs, clicks  PULM:  Chest is clear, no wheezing ,  symmetric air entry throughout both lung fields. EXT: Moderate spider varicosities on feet and ankles  Left partial second toe amputation noted  Right second toe large callus and swollen with deformity but no pus or redness  NEURO: nonfocal  Lab Results   Component Value Date    INR 2.36 (H) 11/16/2021    INR 1.74 (H) 10/27/2021    INR 2.20 10/07/2021    PROTIME 27.7 (H) 11/16/2021    PROTIME 20.1 (H) 10/27/2021    PROTIME 29.0 (H) 09/07/2021     ASSESSMENT/PLAN:  1. Chronic diastolic congestive heart failure ([address]  Appears well compensated continue current medication  Check B NP    2. Essential hypertension  Stable, continue current medication  Labs today    3. Paroxysmal atrial fibrillation (HCC)  Currently in normal sinus rhythm continue anticoagulation    4.  Venous (peripheral) insufficiency/previous osteomyelitis and left second toe amputation with current right second toe callus and swelling, no cellulitis  Consider compression stockings at some point  Follow-up with podiatry and wound care center    5 fatigue suspect this is chronic  Labs today    6 immunizations  Up-to-date except for Shingrix

## 2021-12-10 ENCOUNTER — ANTI-COAG VISIT (OUTPATIENT)
Dept: CARDIOLOGY CLINIC | Age: 81
End: 2021-12-10
Payer: MEDICARE

## 2021-12-10 DIAGNOSIS — I48.0 PAROXYSMAL ATRIAL FIBRILLATION (HCC): Primary | ICD-10-CM

## 2021-12-10 PROCEDURE — 93793 ANTICOAG MGMT PT WARFARIN: CPT | Performed by: NURSE PRACTITIONER

## 2021-12-10 NOTE — PROGRESS NOTES
[address] [facility], 1940      Anticoagulation Indication(s):  Afib  pAF     Referring Physician:   Dr. Fer Trujillo  Goal INR Range:  2.0-3.0  Duration of Anticoagulation Therapy:  Indefinite  Home or Lab Draw: Lab  Product patient has at home: warfarin 5 mg    Recent INR Results:  Lab Results   Component Value Date    INR 1.85 (H) 12/09/2021    INR 2.36 (H) 11/16/2021    INR 1.74 (H) 10/27/2021    INR 2.20 10/07/2021       INR Summary                          Warfarin regimen (mg)  Date INR A/P Sun Mon Tue Wed Thu Fri Sat Mg/wk   12/9/21 1.85 Just below goal, no change 2.5 5 5 2.5 5 2.5 2.5 22.5   11/16/21 2.36 At goal, no change 2.5 5 5 2.5 5 2.5 2.5 22.5   10/28/21 1.7 Below goal, increase by 2.5 mg 2.5 5 5 2.5 5 2.5 2.5 22.5   10/7/21 2.2 At goal, no  change 2.5 5 5 2.5 2.5 2.5 2.5 20   9/27/21 3.0 At goal, no change 2.5 5 5 2.5 2.5 2.5 2.5 20   9/20/21 2.7 At goal, no change 2.5 5 2.5 2.5 5 2.5 2.5 22.5   9/15/21 4.30 Above goal, hold tonite and decrease by 2.5 mg 2.5 5 2.5 5 hold 2.5 2.5 20   9/29/21 2.47 At goal, no change  5 5 5 5 5 5 5 35   9/1.21 1.9 At goal, no change 5 5 5 5 5 5 5 35   8/17/21 2.40 At goal, no change 2.5 5 2.5 2.5 5 5 2.5 25   7/29/21 1.7 At goal, no change 2.5 5 2.5 2.5 5 5 2.5 25   6/30221 2.38 At goal, no  change 2.5 5 2.5 2.5 7.5 5 2.5 27.5   6/22/21 1.68 Below goal, increase by 2.5 mg 2.5 5 2.5 2.5 7.5 5 2.5 27.5   5/25/21 2.1 At goal, no change 2.5 5 2.5 2.5 5 5 2.5 25   4/22/21 2.0 At goal, no change  2.5 5 2.5 2.5 5 5 2.5 25   3/24/21 1.5 Below goal, increase by 2.5 mg 2.5 5 2.5 2.5 5 5 2.5 25   2/8/21 2 At goal, no change 2.5 5 2.5 2.5 2.5 5 2.5 22.5   1/4/20 2.0 At goal, no change.  Pt went back to original dosing 2.5 5 2.5 2.5 2.5 5 2.5 22.5   12/10/20 1.7 Below goal, increase by 2.5 mg 2.5 5 2.5 2.5 2.5 5 5 25   11/4/20 2.1 At goal, no change 2.5 5 2.5 2.5 2.5 5 2.5 22.5   9/8/20 2.0 At goal, no change 2.5 5 2.5 5 2.5 5 2.5 25   8/31/20 2.7 At goal, no change 2.5 5 2.5 5 2.5 5 2.5 25   8/12/20 2.1 At goal, no change 2.5 5 2.5 5 2.5 5 2.5 25   7/16/20 2.7 At goal, no change 2.5 2.5 5 5 5 2.5 2.5 25   7/1/20 1.8 Below goal, increase by 2.5 2.5 2.5 5 5 5 2.5 2.5 25   6/17/20 1.9 At goal, no change 2.5 2.5 5 5 2.5 2.5 2.5 22.5   6/4/20 2.1 At goal, continue dose 2.5 2.5 5 5 2.5 2.5 2.5 22.5   5/27/20 2.0 At goal, continue dose 2.5 2.5 5 5 2.5 2.5 2.5 22.5   5/14/20 2.7 At goal, continue dose 2.5 2.5 5 5 2.5 2.5 2.5 22.5   5/7/20 2.1 At goal, continue dose 2.5 2.5 5 5 2.5 2.5 2.5 22.5   4/30/20 1.8 Below goal, increase dose by 2.5 mg 2.5 2.5 5 5 2.5 2.5 2.5 22.5   4/15/20 1.9 At goal, no change 2.5 2.5 2.5 5 2.5 2.5 2.5 20   4/7/20 1.7 Below goal, increase by 2.5 2.5 2.5 2.5 5 2.5 2.5 2.5 20   3/30/20 3.70 Above goal, hold todays  And decrease by 5mg  2.5 2.5 hold 2.5 2.5 2.5 2.5 15   3/5/20 3.07 Above goal, hold todays dose 2.5 5 2.5 5 2.5 hold 2.5 20   2/26/20 2.78 At goal, no change 2.5 5 2.5 5 2.5 2.5 2.5 22.5   2/14/20 2.27 At goal, no change 2.5 5 2.5 5 2.5 2.5 2.5 22.5   2/5/20 3.93 Above goal,hold todays dose and decrease by 2.5 2.5 5 2.5 5 hold 2.5 2.5 20   1/27/20 2.64 At goal, no change 2.5 5 2.5 5 2.5 5 2.5 25   1/13/20 2.8 At goal, no change 2.5 5 2.5 5 2.5 5 2.5 25   12/27/19 1.44 Below goal, increased by 6.5 mg See note 2.5 5 2.5 5 2.5 5 2.5 25   12/20/19 1.27 Below goal, increase by 5 2.5 2 2.5 2 2.5 10(Pt did not increase as instructed) he took 5 mg 2 23.5    (he actually took 18.5 mg)   12/13/19 1.44 Below goal, increase by 3 2.5 2 2.5 2 2.5 5 2 18.5   12/6/19 1.59 Below goal, increase by.   2.5 1 2.5 1 2.5 5 1 15.5   12/2/19 4.10 Above goal, hold tonight  hold 2.5 2.5 2.5 Recheck     11/29/19 2.47 At goal, no change 5 2.5 2.5 2.5 2.5 5 5 25   11/25/16 5.86 Above goal 5 hold hold 2.5 5 recheck  12.5   11/19/19 2.38 At goal, continue 5 5 5 5 5 5 5 35         Assessment/Plan:    Recent hospitalizations/HC visits None reported   Recent medication changes None reported   Medications taken regularly that may interact with warfarin or alter INR No significant drug interactions identified   Warfarin dose taken as prescribed Patient uses pillbox? no   Signs/symptoms of bleeding History of bleeding? Yes, GIB   Vitamin K intake Normally has ~1-2 servings of green, leafy vegetables per week   Recent vomiting/diarrhea/fever None reported   Changes in weight, activity, stress None reported   Tobacco or alcohol use Patient reports smoking 0 PPD  Patient reports having 0 drinks per day   Upcoming surgeries or procedures None reported     Patient was also reminded to maintain consistent vitamin K intake and call with any bleeding, medication changes, or fever/vomiting/diarrhea.     Next INR check:1/7/21    Spoke with pt regarding results    Addendum:  Reviewed documentation and plan of care from RN  Agree with above  Destinee Patricia NP

## 2021-12-23 ENCOUNTER — NURSE ONLY (OUTPATIENT)
Dept: CARDIOLOGY CLINIC | Age: 81
End: 2021-12-23
Payer: MEDICARE

## 2021-12-23 DIAGNOSIS — R00.1 BRADYCARDIA: ICD-10-CM

## 2021-12-23 DIAGNOSIS — I48.0 PAROXYSMAL ATRIAL FIBRILLATION (HCC): ICD-10-CM

## 2021-12-23 DIAGNOSIS — I49.5 SICK SINUS SYNDROME (HCC): ICD-10-CM

## 2021-12-23 DIAGNOSIS — Z95.0 CARDIAC PACEMAKER IN SITU: ICD-10-CM

## 2021-12-23 NOTE — PROGRESS NOTES
Remote transmission received from patient's dual chamber pacemaker monitor at home. Transmission shows normal sensing and pacing function. Known AT/AF noted, 1.0%, longest 3hrs (Toprol XL, warfarin, Multaq). EP physician will review. See interrogation under cardiology tab in the [address] [place] [address] [address] field for more details. Will continue to monitor remotely.

## 2021-12-24 PROCEDURE — 93294 REM INTERROG EVL PM/LDLS PM: CPT | Performed by: INTERNAL MEDICINE

## 2021-12-24 PROCEDURE — 93296 REM INTERROG EVL PM/IDS: CPT | Performed by: INTERNAL MEDICINE

## 2022-01-20 RX ORDER — PANTOPRAZOLE SODIUM 40 MG/1
TABLET, DELAYED RELEASE ORAL
Qty: 90 TABLET | Refills: 1 | Status: SHIPPED | OUTPATIENT
Start: 2022-01-20 | End: 2022-07-25

## 2022-01-20 NOTE — TELEPHONE ENCOUNTER
Requested Prescriptions     Pending Prescriptions Disp Refills    metOLazone (ZAROXOLYN) 5 MG tablet [Pharmacy Med Name: METOLAZONE 5MG TABLETS] 90 tablet 3     Sig: Take 1 tablet by mouth Twice a Week Cardiology d/c'd at this time                Last Office Visit: 12/2/2021     Next Office Visit: 02/02/2022  Last Labs: 12/09/2021

## 2022-01-21 RX ORDER — METOLAZONE 5 MG/1
5 TABLET ORAL
Qty: 90 TABLET | Refills: 3 | Status: SHIPPED | OUTPATIENT
Start: 2022-01-24

## 2022-01-25 NOTE — PROGRESS NOTES
[facility]   Office Visit  TELEHEALTH EVALUATION -- Audio/Visual (During GQGRD-[id number] public health emergency)    Services were provided through an audio/video synchronous discussion virtually to substitute for in-person clinic visit. Date: 2/2/2022    Chief Complaint   Patient presents with    Atrial Fibrillation    Bradycardia    Congestive Heart Failure     Cardiac HX: Dax Rahman is a 80 y.o. man with a h/o of HLD, HTN, BARRY, CAD, s/p MI, s/p PCI x 8, follows with Dr. Kat Sneed,  pAF - on Multaq and Xarelto, SSS, s/p dual chamber MDT PPM (1/18/2008, Dr. Julisa Mathur), s/p gen change (10/10/2016, Dr. Sera Bloom), chronic dCHF, s/p GIB (1/2018) on Xarelto requiring blood transfusion, Watchman discussed - patient not interested, now on warfarin. Today: Patient is a virtual visit today for follow-up for SSS, PPM management and paroxysmal AF. Patient was originally implanted with a dual-chamber MDT in 2008 by Dr. Julisa Mathur. He then had a generator change in 2016 by Dr. Sera Bloom. Review of his remote device check from yesterday shows 90.6% atrially paced, 0.4% ventricularly paced, 53 AT/AF episodes with the longest lasting 3 hours in length with a total burden of 1.6%. He remains on Multaq 400 mg twice a day and his prescription was refilled today. He does not feel heart racing or palpitations and is asymptomatic when he is out of rhythm. Patient with a longstanding history of paroxysmal atrial fibrillation. He had originally been on Xarelto however in 2018 he developed a GI bleed and was changed to warfarin. He was not interested in discussing a left atrial appendage occlusion device at that time. His last INR in the chart was 1.85 in December. He has not had any issues with bleeding or dark tarry stools. He does have chronic diastolic CHF with chronic lower extremity edema. He currently is taking Lasix 40 mg twice a day. He states his edema is about the same.   He denies PND orthopnea. He does have shortness of breath with exertion and denies chest discomfort. He does complain of fatigue and states that this is an ongoing issue.     Home medications:   Current Outpatient Medications on File Prior to Visit   Medication Sig Dispense Refill    nitroGLYCERIN (NITRODUR) 0.2 MG/HR PLACE 1 PATCH ON THE SKIN DAILY 90 patch 3    rosuvastatin (CRESTOR) 5 MG tablet TAKE 1 TABLET DAILY 90 tablet 3    pantoprazole (PROTONIX) 40 MG tablet TAKE 1 TABLET BY MOUTH EVERY MORNING BEFORE BREAKFAST 90 tablet 1    metoprolol succinate (TOPROL XL) 25 MG extended release tablet TAKE ONE-HALF (1/2) TABLET TWICE A DAY 90 tablet 3    ondansetron (ZOFRAN-ODT) 4 MG disintegrating tablet DISSOLVE 1 TABLET ON THE TONGUE EVERY 8 HOURS AS NEEDED FOR NAUSEA OR VOMITING 20 tablet 3    ammonium lactate (LAC-HYDRIN) 12 % cream Apply topically to dry skin on feet daily 140 g 5    Polysaccharide Iron Complex (PROFE) 391.3 (180 Fe) MG CAPS Take 1 capsule by mouth 2 times daily      coenzyme Q10 200 MG CAPS capsule Take 200 mg by mouth daily      furosemide (LASIX) 40 MG tablet TAKE 1 TABLET BY MOUTH TWICE DAILY (Patient taking differently: 40 mg Cardiology increased lasix to TID d/t leg swelling) 180 tablet 3    warfarin (COUMADIN) 5 MG tablet TAKE 1 TABLET BY MOUTH DAILY AT 6 PM (Patient taking differently: Taking 5mg 4 days per week  Taking 2.5mg 3 days per week) 90 tablet 2    potassium chloride (KLOR-CON M) 10 MEQ extended release tablet Take 1 tablet by mouth 4 times daily (Patient taking differently: Take 20 mEq by mouth 2 times daily ) 360 tablet 3    vitamin B-12 (CYANOCOBALAMIN) 500 MCG tablet Take 500 mcg by mouth daily      fentaNYL (DURAGESIC) 50 MCG/HR Place 1 patch onto the skin every 48 hours 15 patch 0    metOLazone (ZAROXOLYN) 5 MG tablet Take 1 tablet by mouth Twice a Week Cardiology d/c'd at this time (Patient not taking: Reported on 2/2/2022) 90 tablet 3     No current facility-administered medications on file prior to visit. Past Medical History:   Diagnosis Date    Allergic rhinitis     Atrial fibrillation (HCC)     Benign prostatic hypertrophy     CAD (coronary artery disease)     Cancer ([facility])     skin    CHF (congestive heart failure) ([facility]) 08/17/2017    Coronary artery disease involving native coronary artery of native heart without angina pectoris 6/17/2011    DDD (degenerative disc disease), lumbar     Falls 08/17/2017    GI bleeding     Hyperlipidemia     Hypertension     [facility]) [facility]     MRSA (methicillin resistant staph aureus) culture positive     h/o infection in the blood    Osteomyelitis ([facility])     left foot    Pneumonia     S/P PTCA (percutaneous transluminal coronary angioplasty) stents x 8    SSS (sick sinus syndrome) ([facility]     Unspecified sleep apnea     Venous (peripheral) insufficiency 12/4/2018        Past Surgical History:   Procedure Laterality Date    APPENDECTOMY      CARDIAC PACEMAKER PLACEMENT      CARPAL TUNNEL RELEASE      CORONARY ANGIOPLASTY      CORONARY ANGIOPLASTY WITH STENT PLACEMENT      DIAGNOSTIC CARDIAC CATH LAB PROCEDURE      EYE SURGERY      FOOT DEBRIDEMENT Left 8/27/2021    INCISION AND DRAINAGE LEFT FOOT performed by Bárbara Brasher DPM at [address] [place] [address]  10/2016    PACEMAKER PLACEMENT      TOE AMPUTATION Left 8/27/2021    AMPUTATION OF LEFT SECOND TOE performed by Bárbara Brasher DPM at [facility] OR       Allergies   Allergen Reactions    Avelox [Moxifloxacin Hcl In Nacl] Anaphylaxis    Epinephrine Base     Other      [place] per PT - swelling size of silver dollar at site per PT     Keflex [Cephalexin] Nausea And Vomiting    Polysporin [Bacitracin-Polymyxin B] Rash       Social History:  Reviewed. reports that he quit smoking about 17 years ago. He has a 30.00 pack-year smoking history.  He has never used smokeless tobacco. He reports that he does not drink alcohol and does not use drugs. Family History:  Reviewed. family history includes Cancer in his sister; Coronary Art Dis in his brother. Review of System:    · Constitutional: No fevers, chills. · Eyes: No visual changes or diplopia. No scleral icterus. · ENT: No Headaches. No mouth sores or sore throat. · Cardiovascular: Yes for chronic chest pain, Yes for dyspnea on exertion, Yes for palpitations or No for loss of consciousness. No cough, hemoptysis, No for pleuritic pain, or phlebitis. · Respiratory: No for cough or wheezing. No hematemesis. · Gastrointestinal: No abdominal pain, blood in stools. · Genitourinary: No dysuria, or hematuria. · Musculoskeletal: No gait disturbance,    · Integumentary: No rash or pruritis. · Neurological: No headache, change in muscle strength, numbness or tingling. · Psychiatric: No anxiety, or depression. · Endocrine: No temperature intolerance. No excessive thirst, fluid intake, or urination. · Hem/Lymph: No abnormal bruising or bleeding, blood clots or swollen lymph nodes. · Allergic/Immunologic: No nasal congestion or hives. Physical Examination:  There were no vitals filed for this visit. Wt Readings from Last 3 Encounters:   12/09/21 175 lb (79.4 kg)   12/02/21 166 lb (75.3 kg)   09/10/21 161 lb 12.8 oz (73.4 kg)     · Constitutional: Oriented. No distress. Psychiatric: Has a normal mood, affect and behavior     No physical exam due to a virtual visit today. Labs:  Reviewed.      Lab Results   Component Value Date    CKTOTAL 268 03/06/2012    CKMB 2.1 03/06/2012    TROPONINI <0.01 08/24/2021     Lab Results   Component Value Date    .0 03/06/2012     Lab Results   Component Value Date    PROTIME 21.5 12/09/2021    PROTIME 27.7 11/16/2021    PROTIME 20.1 10/27/2021    INR 1.85 12/09/2021    INR 2.36 11/16/2021    INR 1.74 10/27/2021     Lab Results   Component Value Date    CHOL 107 12/09/2021    HDL 44 12/09/2021    HDL 30 03/07/2012    TRIG 81 12/09/2021       ECG: Personally reviewed: n/a    ECHO: 12/2015 -Normal left ventricle size, wall thickness and systolic function with an   estimated ejection fraction of 55%.  -No regional wall motion abnormalities are seen.   -Pacer / ICD wire is visualized in the right heart.   -There is mild-moderate tricuspid regurgitation with RVSP estimated at 38   mmHg. Cardiac Angiography: Cath- 6/18/12- Successful Angio-Seal of right femoral arteriotomy. NARENDRA- 6/23/15-   Small sized inferior fixed defect consistent with infarction in the   territory of the mid and distal LCx and/or RCA . No ischemia   Normal LVEF. Carotid- 10/20/15-   - The right internal carotid artery appears to have a lower limits of 16-49%   diameter reducing stenosis based on velocity criteria. - The left internal carotid artery appears to have a 1-15% diameter reducing   stenosis based on velocity criteria. - Normal antegrade flow noted in vertebral arteries bilaterally.        Patient Active Problem List    Diagnosis Date Noted    Pure hyperglyceridemia     Gastrointestinal hemorrhage     Bradycardia     Sick sinus syndrome ([address] 08/29/2016    Chronic congestive heart failure ([address]     Essential hypertension     History of nonmelanoma skin cancer 10/01/2013    Tinea manuum, pedis, and unguium 10/01/2013    AK (actinic keratosis) 10/01/2013    Coronary artery disease involving native coronary artery of native heart without angina pectoris 06/17/2011    Cardiac pacemaker in situ 06/17/2011    Postsurgical percutaneous transluminal coronary angioplasty status 06/17/2011    Hyperlipidemia     Benign prostatic hyperplasia     Paroxysmal atrial fibrillation (HCC)     Subacute osteomyelitis of left foot ([address]     Encounter for medication counseling     Bilateral cellulitis of lower leg     Open fracture of second toe of left foot     History of MRSA infection     Failure of outpatient treatment  Osteomyelitis of second toe of left foot [facility] 08/24/2021    Solar purpura ([facility] 06/30/2021    PVD (peripheral vascular disease) [facility] 11/09/2020    Localized edema 12/04/2018    Venous (peripheral) insufficiency 12/04/2018       Assessment:   1. Paroxysmal atrial fibrillation (HCC)    2. SSS (sick sinus syndrome) ([facility]    3. Pacemaker    4. Chronic diastolic CHF (congestive heart failure) ([facility]    5. Coronary artery disease involving native coronary artery of native heart without angina pectoris         Cardiac HX: Boby Mora is a 78 y.o.  man with a h/o of HLD, HTN, BARRY, CAD, s/p MI, s/p PCI x 8, follows with Dr. Galen Zaragoza,  pAF - on Multaq and Xarelto, SSS, s/p dual chamber MDT PPM (1/18/2008, Dr. Hubert Jasso), s/p gen change (10/10/2016, Dr. Michelle Figueroa), chronic dCHF, s/p GIB (1/2018) on Xarelto requiring blood transfusion, [address] [place] [address] d/c'd, Watchman discussed - patient not interested, now on Warfarin. CHADSVASC 4. TSH 3.41 (3/2018). Bradycardia/SSS  - S/p dual chamber MDT PPM - (implant 2008, gen change 2016)   -Remote device check from yesterday shows 90.6% AP, 0.4% , 53 AT/AF episodes of the longest lasting 3 hours in length for a total burden of 1.6%, other parameters stable.   - On Toprol XL 25 mg daily   - F/u in 6 months  - ECG ordered and results personally reviewed     pAF  - In NSR today  - Low AF burden on device - 1.6%, longest 3 hours  - On warfarin - last INR 1.85 - managed by our office , need to recheck  - On Multaq 400 mg BID - continue  - Heart rates appear to be fairly well controlled in atrial fibrillation    Chronic dCHF  - Edema unchanged per patient  - On lasix 40 mg BID - minimal improvement of edema - sometimes takes 3 tabs daily  - Lifestyle modification -weight legs while sitting, daily weights, low-sodium diet    CP/CAD  - Follows with Dr. Galen Zaragoza  - On isosorbide - no c/o CP, only fatigue  - On ASA, statin, BB     EF of 14%  No systolic HF  Statin for CAD, on Xarelto for AF  No tobacco use. All questions and concerns were addressed to the patient/family. Alternatives to my treatment were discussed. The note was completed using EMR. Every effort was made to ensure accuracy; however, inadvertent computerized transcription errors may be present. Thank you for allowing me to participate in the care of [address] [place] [address] Jd Johnson, was evaluated through a synchronous (real-time) audio-video encounter. The patient (or guardian if applicable) is aware that this is a billable service, which includes applicable co-pays. This Virtual Visit was conducted with patient's (and/or legal guardian's) consent. The visit was conducted pursuant to the emergency declaration under the [address] [place] [address] authority and the [employer] Act. Patient identification was verified, and a caregiver was present when appropriate. The patient was located in a state where the provider was licensed to provide care. Due to this being a TeleHealth encounter, evaluation of the following organ systems was limited: Vitals/Constitutional/EENT/Resp/CV/GI//MS/Neuro/Skin/Heme-Lymph-Imm. The patient (and/or legal guardian) has also been advised to contact this office for worsening conditions or problems, and seek emergency medical treatment and/or call 911 if deemed necessary. Patient identification was verified at the start of the visit: Yes    Total time spent on this encounter: 35 min    Services were provided through an audio/ video synchronous discussion virtually to substitute for in-person clinic visit. --SCOTT Graf CNP on 9/1/2020 at 5:21 PM    An electronic signature was used to authenticate this note.

## 2022-01-31 RX ORDER — NITROGLYCERIN 40 MG/1
PATCH TRANSDERMAL
Qty: 90 PATCH | Refills: 3 | Status: SHIPPED | OUTPATIENT
Start: 2022-01-31

## 2022-01-31 RX ORDER — ROSUVASTATIN CALCIUM 5 MG/1
TABLET, COATED ORAL
Qty: 90 TABLET | Refills: 3 | Status: SHIPPED | OUTPATIENT
Start: 2022-01-31

## 2022-01-31 NOTE — TELEPHONE ENCOUNTER
Requested Prescriptions     Pending Prescriptions Disp Refills    nitroGLYCERIN (NITRODUR) 0.2 MG/HR [Pharmacy Med Name: NITROGLYCERIN TD PATCH 0.2MG/H] 90 patch 3     Sig: PLACE 1 PATCH ON THE SKIN DAILY    rosuvastatin (CRESTOR) 5 MG tablet [Pharmacy Med Name: ROSUVASTATIN TABS 5MG] 90 tablet 3     Sig: TAKE 1 TABLET DAILY                  Last Office Visit: 12/2/2021     Next Office Visit: 2/2/2022    Last labs : 12/9/2021 Lipid panel, bmp, tsh

## 2022-02-01 ENCOUNTER — TELEPHONE (OUTPATIENT)
Dept: CARDIOLOGY CLINIC | Age: 82
End: 2022-02-01

## 2022-02-01 ENCOUNTER — NURSE ONLY (OUTPATIENT)
Dept: CARDIOLOGY CLINIC | Age: 82
End: 2022-02-01

## 2022-02-01 DIAGNOSIS — Z95.0 CARDIAC PACEMAKER IN SITU: ICD-10-CM

## 2022-02-01 DIAGNOSIS — I49.5 SICK SINUS SYNDROME (HCC): ICD-10-CM

## 2022-02-01 DIAGNOSIS — R00.1 BRADYCARDIA: ICD-10-CM

## 2022-02-01 DIAGNOSIS — I48.0 PAROXYSMAL ATRIAL FIBRILLATION (HCC): ICD-10-CM

## 2022-02-01 NOTE — TELEPHONE ENCOUNTER
Patient asking to speak to [facility] to find out if his transmission is working.   Please call pt at [phone]

## 2022-02-02 ENCOUNTER — VIRTUAL VISIT (OUTPATIENT)
Dept: CARDIOLOGY CLINIC | Age: 82
End: 2022-02-02
Payer: MEDICARE

## 2022-02-02 DIAGNOSIS — I25.10 CORONARY ARTERY DISEASE INVOLVING NATIVE CORONARY ARTERY OF NATIVE HEART WITHOUT ANGINA PECTORIS: ICD-10-CM

## 2022-02-02 DIAGNOSIS — Z95.0 PACEMAKER: ICD-10-CM

## 2022-02-02 DIAGNOSIS — I48.0 PAROXYSMAL ATRIAL FIBRILLATION (HCC): Primary | ICD-10-CM

## 2022-02-02 DIAGNOSIS — I49.5 SSS (SICK SINUS SYNDROME) (HCC): ICD-10-CM

## 2022-02-02 DIAGNOSIS — I50.32 CHRONIC DIASTOLIC CHF (CONGESTIVE HEART FAILURE) (HCC): ICD-10-CM

## 2022-02-02 PROCEDURE — 1036F TOBACCO NON-USER: CPT | Performed by: NURSE PRACTITIONER

## 2022-02-02 PROCEDURE — 4040F PNEUMOC VAC/ADMIN/RCVD: CPT | Performed by: NURSE PRACTITIONER

## 2022-02-02 PROCEDURE — G8427 DOCREV CUR MEDS BY ELIG CLIN: HCPCS | Performed by: NURSE PRACTITIONER

## 2022-02-02 PROCEDURE — G8417 CALC BMI ABV UP PARAM F/U: HCPCS | Performed by: NURSE PRACTITIONER

## 2022-02-02 PROCEDURE — 1123F ACP DISCUSS/DSCN MKR DOCD: CPT | Performed by: NURSE PRACTITIONER

## 2022-02-02 PROCEDURE — 99214 OFFICE O/P EST MOD 30 MIN: CPT | Performed by: NURSE PRACTITIONER

## 2022-02-02 PROCEDURE — G8484 FLU IMMUNIZE NO ADMIN: HCPCS | Performed by: NURSE PRACTITIONER

## 2022-02-02 NOTE — LETTER
[address] [place] [address] Cardiology [address]  Phone: [phone]  Fax: [phone]    SCOTT Tijernia CNP    February 2, 2022     Mark Cardona MD  [address] [place] [address]    Patient: Ivan Salazar   MR Number: [id number]   YOB: 1940   Date of Visit: 2/2/2022       Dear Mark Cardona: Thank you for referring Gerardo Humphreys to me for evaluation/treatment. Below are the relevant portions of my assessment and plan of care. If you have questions, please do not hesitate to call me. I look forward to following Jd Mari along with you.     Sincerely,      SCOTT Tijerina CNP

## 2022-03-23 NOTE — TELEPHONE ENCOUNTER
Requested Prescriptions     Pending Prescriptions Disp Refills    potassium chloride (KLOR-CON M) 10 MEQ extended release tablet [Pharmacy Med Name: POTASSIUM CHLORIDE ER (DISP) TABS 10MEQ] 270 tablet 3     Sig: TAKE 3 TABLETS DAILY                  Last Office Visit: 12/2/2021     Next Office Visit: 4/8/2022  Last Labs: 12/9/2021 bnp,bmp,tsh,cbc

## 2022-03-25 RX ORDER — POTASSIUM CHLORIDE 750 MG/1
TABLET, EXTENDED RELEASE ORAL
Qty: 270 TABLET | Refills: 3 | Status: SHIPPED | OUTPATIENT
Start: 2022-03-25

## 2022-05-10 ENCOUNTER — NURSE ONLY (OUTPATIENT)
Dept: CARDIOLOGY CLINIC | Age: 82
End: 2022-05-10
Payer: MEDICARE

## 2022-05-10 DIAGNOSIS — Z95.0 CARDIAC PACEMAKER IN SITU: ICD-10-CM

## 2022-05-10 DIAGNOSIS — R00.1 BRADYCARDIA: ICD-10-CM

## 2022-05-10 PROCEDURE — 93296 REM INTERROG EVL PM/IDS: CPT | Performed by: INTERNAL MEDICINE

## 2022-05-10 PROCEDURE — 93294 REM INTERROG EVL PM/LDLS PM: CPT | Performed by: INTERNAL MEDICINE

## 2022-05-11 NOTE — PROGRESS NOTES
Remote transmission received from patient's dual chamber pacemaker monitor at home. Transmission shows normal sensing and pacing function. Known AT/AF noted, 0.8%, longest 2hrs (Toprol XL, warfarin, Multaq). EP physician will review. See interrogation under cardiology tab in the [address] [place] [address] [address] field for more details. Will continue to monitor remotely.

## 2022-06-27 ENCOUNTER — TELEPHONE (OUTPATIENT)
Dept: CARDIOLOGY CLINIC | Age: 82
End: 2022-06-27

## 2022-06-30 ENCOUNTER — OFFICE VISIT (OUTPATIENT)
Dept: FAMILY MEDICINE CLINIC | Age: 82
End: 2022-06-30
Payer: MEDICARE

## 2022-06-30 VITALS
HEART RATE: 87 BPM | SYSTOLIC BLOOD PRESSURE: 118 MMHG | OXYGEN SATURATION: 95 % | DIASTOLIC BLOOD PRESSURE: 70 MMHG | WEIGHT: 156 LBS | TEMPERATURE: 98.4 F | BODY MASS INDEX: 23.04 KG/M2
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
## 2022-06-30 PROCEDURE — 1036F TOBACCO NON-USER: CPT | Performed by: FAMILY MEDICINE

## 2022-06-30 PROCEDURE — G8427 DOCREV CUR MEDS BY ELIG CLIN: HCPCS | Performed by: FAMILY MEDICINE

## 2022-06-30 PROCEDURE — 1124F ACP DISCUSS-NO DSCNMKR DOCD: CPT | Performed by: FAMILY MEDICINE

## 2022-06-30 PROCEDURE — G8420 CALC BMI NORM PARAMETERS: HCPCS | Performed by: FAMILY MEDICINE

## 2022-06-30 PROCEDURE — 3288F FALL RISK ASSESSMENT DOCD: CPT | Performed by: FAMILY MEDICINE

## 2022-06-30 PROCEDURE — 99213 OFFICE O/P EST LOW 20 MIN: CPT | Performed by: FAMILY MEDICINE

## 2022-06-30 RX ORDER — AMOXICILLIN AND CLAVULANATE POTASSIUM 875; 125 MG/1; MG/1
1 TABLET, FILM COATED ORAL 2 TIMES DAILY
Qty: 20 TABLET | Refills: 0 | Status: SHIPPED | OUTPATIENT
Start: 2022-06-30 | End: 2022-07-10

## 2022-06-30 SDOH — ECONOMIC STABILITY: FOOD INSECURITY: WITHIN THE PAST 12 MONTHS, YOU WORRIED THAT YOUR FOOD WOULD RUN OUT BEFORE YOU GOT MONEY TO BUY MORE.: NEVER TRUE

## 2022-06-30 SDOH — ECONOMIC STABILITY: FOOD INSECURITY: WITHIN THE PAST 12 MONTHS, THE FOOD YOU BOUGHT JUST DIDN'T LAST AND YOU DIDN'T HAVE MONEY TO GET MORE.: NEVER TRUE

## 2022-06-30 ASSESSMENT — PATIENT HEALTH QUESTIONNAIRE - PHQ9
SUM OF ALL RESPONSES TO PHQ QUESTIONS 1-9: 0
2. FEELING DOWN, DEPRESSED OR HOPELESS: 0
DEPRESSION UNABLE TO ASSESS: FUNCTIONAL CAPACITY MOTIVATION LIMITS ACCURACY
SUM OF ALL RESPONSES TO PHQ QUESTIONS 1-9: 0
SUM OF ALL RESPONSES TO PHQ9 QUESTIONS 1 & 2: 0
SUM OF ALL RESPONSES TO PHQ QUESTIONS 1-9: 0
1. LITTLE INTEREST OR PLEASURE IN DOING THINGS: 0
SUM OF ALL RESPONSES TO PHQ QUESTIONS 1-9: 0

## 2022-06-30 ASSESSMENT — ENCOUNTER SYMPTOMS
RESPIRATORY NEGATIVE: 1
COLOR CHANGE: 1
GASTROINTESTINAL NEGATIVE: 1

## 2022-06-30 ASSESSMENT — SOCIAL DETERMINANTS OF HEALTH (SDOH): HOW HARD IS IT FOR YOU TO PAY FOR THE VERY BASICS LIKE FOOD, HOUSING, MEDICAL CARE, AND HEATING?: NOT HARD AT ALL

## 2022-06-30 NOTE — PROGRESS NOTES
Reid Catalan (:  1940) is a 80 y.o. male,Established patient, here for evaluation of the following chief complaint(s):  Animal Bite (left forearm )         ASSESSMENT/PLAN:  1. Cat bite, initial encounter  -     amoxicillin-clavulanate (AUGMENTIN) 875-125 MG per tablet; Take 1 tablet by mouth 2 times daily for 10 days, Disp-20 tablet, R-0Normal  Discussed with patient and daughter that sometimes cat bites require IV antibiotics. They will keep a close eye on this. We have marked the edges of the cellulitis and they will follow the progress. Return if symptoms worsen or fail to improve. Subjective   SUBJECTIVE/OBJECTIVE:  HPI  Patient has an indoor cat which bit him a few days ago. He states the area is getting progressively redder. He has had no systemic symptoms such as fever, chills, diaphoresis. He states his activity and appetite are unchanged. He states he has noted some warmth with this  Review of Systems   Constitutional: Negative. Negative for activity change, appetite change, chills, diaphoresis and fever. Respiratory: Negative. Cardiovascular: Negative. Gastrointestinal: Negative. Endocrine: Negative. Genitourinary: Negative. Musculoskeletal: Negative for myalgias. Skin: Positive for color change (Per HPI). Objective   Physical Exam  Constitutional:       General: He is not in acute distress. Appearance: He is not ill-appearing, toxic-appearing or diaphoretic. HENT:      Head: Normocephalic and atraumatic. Eyes:      Conjunctiva/sclera: Conjunctivae normal.   Musculoskeletal:        Arms:    Skin:     General: Skin is warm and dry. Neurological:      Mental Status: He is alert and oriented to person, place, and time. Psychiatric:         Behavior: Behavior normal.         Thought Content: Thought content normal.         Judgment: Judgment normal.                  An electronic signature was used to authenticate this note.     --Rosalia Donahue Colette Parrish MD

## 2022-07-25 RX ORDER — PANTOPRAZOLE SODIUM 40 MG/1
TABLET, DELAYED RELEASE ORAL
Qty: 90 TABLET | Refills: 3 | Status: SHIPPED | OUTPATIENT
Start: 2022-07-25

## 2022-07-29 ENCOUNTER — OFFICE VISIT (OUTPATIENT)
Dept: CARDIOLOGY CLINIC | Age: 82
End: 2022-07-29
Payer: MEDICARE

## 2022-07-29 VITALS
HEART RATE: 69 BPM | DIASTOLIC BLOOD PRESSURE: 62 MMHG | BODY MASS INDEX: 24.96 KG/M2 | SYSTOLIC BLOOD PRESSURE: 124 MMHG | WEIGHT: 169 LBS

## 2022-07-29 DIAGNOSIS — I10 ESSENTIAL HYPERTENSION: Primary | ICD-10-CM

## 2022-07-29 DIAGNOSIS — I25.10 CORONARY ARTERY DISEASE INVOLVING NATIVE CORONARY ARTERY OF NATIVE HEART WITHOUT ANGINA PECTORIS: ICD-10-CM

## 2022-07-29 DIAGNOSIS — I10 HTN (HYPERTENSION), BENIGN: ICD-10-CM

## 2022-07-29 DIAGNOSIS — E78.5 HYPERLIPIDEMIA, UNSPECIFIED HYPERLIPIDEMIA TYPE: ICD-10-CM

## 2022-07-29 DIAGNOSIS — I49.5 SICK SINUS SYNDROME (HCC): ICD-10-CM

## 2022-07-29 PROCEDURE — G8420 CALC BMI NORM PARAMETERS: HCPCS | Performed by: INTERNAL MEDICINE

## 2022-07-29 PROCEDURE — 99214 OFFICE O/P EST MOD 30 MIN: CPT | Performed by: INTERNAL MEDICINE

## 2022-07-29 PROCEDURE — G8427 DOCREV CUR MEDS BY ELIG CLIN: HCPCS | Performed by: INTERNAL MEDICINE

## 2022-07-29 PROCEDURE — 1036F TOBACCO NON-USER: CPT | Performed by: INTERNAL MEDICINE

## 2022-07-29 PROCEDURE — 1124F ACP DISCUSS-NO DSCNMKR DOCD: CPT | Performed by: INTERNAL MEDICINE

## 2022-07-29 NOTE — PROGRESS NOTES
Subjective:      Patient ID: Aric Birmingham is a 80 y.o. male. CC:  S/p GI bleed. F/u HTN  CAD. S/p pacer. Fatigue      HPI: Mr Joey Campos is here for a 6 moth f/u. Jane Mckinney He denies chest pain today but has chronic swelling. He c/o of fatigue and low b/p. No LOC but fell and hit his hip and has bad leg pain. Back is bad and is using a 4 pt walker. Worried about bleeding and back surgeons shy away from surgery on back d/t bleeding. Has paroxysmal brief episodes of AF, less than 1%. He had life threatening GI bleeding on NOAC. He doesn't want watchman. Has more AF on pacer interrogation. GI said ok for [facility]. Has edema. Has dizziness and will decrease nitrodur patch 0.2mg/hr. Less edema after hospitalization for osteo.           Allergies   Allergen Reactions    Avelox [Moxifloxacin Hcl In Nacl] Anaphylaxis    Epinephrine Base     Other      [place] per PT - swelling size of silver dollar at site per PT     Keflex [Cephalexin] Nausea And Vomiting    Polysporin [Bacitracin-Polymyxin B] Rash        Social History     Socioeconomic History    Marital status:      Spouse name: Not on file    Number of children: 2    Years of education: Not on file    Highest education level: Not on file   Occupational History    Not on file   Tobacco Use    Smoking status: Former     Packs/day: 1.00     Years: 30.00     Pack years: 30.00     Types: Cigarettes     Quit date: 2004     Years since quittin.3    Smokeless tobacco: Never   Vaping Use    Vaping Use: Never used   Substance and Sexual Activity    Alcohol use: No     Alcohol/week: 0.0 standard drinks    Drug use: No    Sexual activity: Yes     Comment:  living with spouse   Other Topics Concern    Not on file   Social History Narrative    Not on file     Social Determinants of Health     Financial Resource Strain: Low Risk     Difficulty of Paying Living Expenses: Not hard at all   Food Insecurity: No Food Insecurity    Worried About Running Out of Food in the Last Year: Never true    Ran Out of Food in the Last Year: Never true   Transportation Needs: Not on file   Physical Activity: Not on file   Stress: Not on file   Social Connections: Not on file   Intimate Partner Violence: Not on file   Housing Stability: Not on file        Patient has a family history includes Cancer in his sister; Coronary Art Dis in his brother. Patient  has a past medical history of Allergic rhinitis, Atrial fibrillation ([facility] Benign prostatic hypertrophy, CAD (coronary artery disease), [facility] CHF (congestive heart failure) ([facility] Coronary artery disease involving native coronary artery of native heart without angina pectoris, DDD (degenerative disc disease), lumbar, Falls, GI bleeding, Hyperlipidemia, Hypertension, MI (myocardial infarction) ([facility] MRSA (methicillin resistant staph aureus) culture positive, Osteomyelitis ([facility] Pneumonia, S/P PTCA (percutaneous transluminal coronary angioplasty), SSS (sick sinus syndrome) ([facility] Unspecified sleep apnea, and Venous (peripheral) insufficiency.      Current Outpatient Medications   Medication Sig Dispense Refill    pantoprazole (PROTONIX) 40 MG tablet TAKE 1 TABLET BY MOUTH EVERY MORNING BEFORE BREAKFAST 90 tablet 3    potassium chloride (KLOR-CON M) 10 MEQ extended release tablet TAKE 3 TABLETS DAILY 270 tablet 3    dronedarone hcl (MULTAQ) 400 MG TABS Take 1 tablet by mouth 2 times daily (with meals) 180 tablet 3    nitroGLYCERIN (NITRODUR) 0.2 MG/HR PLACE 1 PATCH ON THE SKIN DAILY 90 patch 3    rosuvastatin (CRESTOR) 5 MG tablet TAKE 1 TABLET DAILY 90 tablet 3    metOLazone (ZAROXOLYN) 5 MG tablet Take 1 tablet by mouth Twice a Week Cardiology d/c'd at this time 90 tablet 3    metoprolol succinate (TOPROL XL) 25 MG extended release tablet TAKE ONE-HALF (1/2) TABLET TWICE A DAY 90 tablet 3    ondansetron (ZOFRAN-ODT) 4 MG disintegrating tablet DISSOLVE 1 TABLET ON THE TONGUE EVERY 8 HOURS AS NEEDED FOR NAUSEA OR VOMITING 20 tablet 3    ammonium lactate (LAC-HYDRIN) 12 % cream Apply topically to dry skin on feet daily 140 g 5    Polysaccharide Iron Complex (PROFE) 391.3 (180 Fe) MG CAPS Take 1 capsule by mouth 2 times daily      coenzyme Q10 200 MG CAPS capsule Take 200 mg by mouth daily      furosemide (LASIX) 40 MG tablet TAKE 1 TABLET BY MOUTH TWICE DAILY (Patient taking differently: 40 mg Cardiology increased lasix to TID d/t leg swelling) 180 tablet 3    warfarin (COUMADIN) 5 MG tablet TAKE 1 TABLET BY MOUTH DAILY AT 6 PM (Patient taking differently: Taking 5mg 4 days per week  Taking 2.5mg 3 days per week) 90 tablet 2    vitamin B-12 (CYANOCOBALAMIN) 500 MCG tablet Take 500 mcg by mouth daily      fentaNYL (DURAGESIC) 50 MCG/HR Place 1 patch onto the skin every 48 hours 15 patch 0     No current facility-administered medications for this visit. Vitals  Weight: 169 lb (76.7 kg)  Blood Pressure:  116/68  Pulse: 69 70      Review of Systems   Constitutional: Negative. HENT: Negative. Eyes: Negative. Respiratory: Negative. Cardiovascular: Negative. Gastrointestinal: Negative. Genitourinary: Negative. Exam unchanged from 9/10/21. Objective:   Physical Exam   Nursing note and vitals reviewed. Constitutional: He is oriented to person, place, and time. He appears well-developed and well-nourished. No distress. HENT:   Head: Normocephalic and atraumatic. Mouth/Throat: No oropharyngeal exudate. Eyes: Conjunctivae and EOM are normal. Pupils are equal, round, and reactive to light. No scleral icterus. Neck: Normal range of motion. No JVD present. No tracheal deviation present. No thyromegaly present. Cardiovascular: Normal rate, regular rhythm, normal heart sounds and intact distal pulses. Exam reveals no gallop and no friction rub. No murmur heard. Pulmonary/Chest: Effort normal. No respiratory distress. He has no wheezes. He has no rales. He exhibits no tenderness. Abdominal: Soft.  Bowel

## 2022-08-02 DIAGNOSIS — L03.116 CELLULITIS OF LEFT LOWER EXTREMITY: ICD-10-CM

## 2022-08-02 DIAGNOSIS — I25.10 CORONARY ARTERY DISEASE INVOLVING NATIVE CORONARY ARTERY OF NATIVE HEART WITHOUT ANGINA PECTORIS: ICD-10-CM

## 2022-08-02 DIAGNOSIS — I10 HTN (HYPERTENSION), BENIGN: ICD-10-CM

## 2022-08-02 DIAGNOSIS — I49.5 SICK SINUS SYNDROME (HCC): ICD-10-CM

## 2022-08-02 DIAGNOSIS — I10 ESSENTIAL HYPERTENSION: ICD-10-CM

## 2022-08-02 DIAGNOSIS — E78.5 HYPERLIPIDEMIA, UNSPECIFIED HYPERLIPIDEMIA TYPE: ICD-10-CM

## 2022-08-02 LAB
BUN BLDV-MCNC: 16 MG/DL (ref 7–20)
INR BLD: 3.53 (ref 0.87–1.14)
PROTHROMBIN TIME: 35.6 SEC (ref 11.7–14.5)

## 2022-08-03 ENCOUNTER — ANTI-COAG VISIT (OUTPATIENT)
Dept: CARDIOLOGY CLINIC | Age: 82
End: 2022-08-03
Payer: MEDICARE

## 2022-08-03 DIAGNOSIS — I48.0 PAROXYSMAL ATRIAL FIBRILLATION (HCC): Primary | ICD-10-CM

## 2022-08-03 PROCEDURE — 93793 ANTICOAG MGMT PT WARFARIN: CPT | Performed by: NURSE PRACTITIONER

## 2022-08-03 NOTE — PROGRESS NOTES
ANTICOAGULATION MONITORING    Alan Bustillo, 1940      Anticoagulation Indication(s):  Afib  pAF     Referring Physician:   Dr. Ana M Garcia  Goal INR Range:  2.0-3.0  Duration of Anticoagulation Therapy:  Indefinite  Home or Lab Draw: Lab  Product patient has at home: warfarin 5 mg    Recent INR Results:  Lab Results   Component Value Date    INR 3.53 (H) 08/02/2022    INR 1.85 (H) 12/09/2021    INR 2.36 (H) 11/16/2021    INR 1.74 (H) 10/27/2021       INR Summary                          Warfarin regimen (mg)  Date INR A/P Sun Mon Tue Wed Thu Fri Sat Mg/wk   8/2/22 8/2/22 See below 2.5 5 5 hold 2.5 2.5 2.5    12/9/21 1.85 Just below goal, no change 2.5 5 5 2.5 5 2.5 2.5 22.5   11/16/21 2.36 At goal, no change 2.5 5 5 2.5 5 2.5 2.5 22.5   10/28/21 1.7 Below goal, increase by 2.5 mg 2.5 5 5 2.5 5 2.5 2.5 22.5   10/7/21 2.2 At goal, no  change 2.5 5 5 2.5 2.5 2.5 2.5 20   9/27/21 3.0 At goal, no change 2.5 5 5 2.5 2.5 2.5 2.5 20   9/20/21 2.7 At goal, no change 2.5 5 2.5 2.5 5 2.5 2.5 22.5   9/15/21 4.30 Above goal, hold tonite and decrease by 2.5 mg 2.5 5 2.5 5 hold 2.5 2.5 20   9/29/21 2.47 At goal, no change  5 5 5 5 5 5 5 35   9/1.21 1.9 At goal, no change 5 5 5 5 5 5 5 35   8/17/21 2.40 At goal, no change 2.5 5 2.5 2.5 5 5 2.5 25   7/29/21 1.7 At goal, no change 2.5 5 2.5 2.5 5 5 2.5 25   6/17335 2.38 At goal, no  change 2.5 5 2.5 2.5 7.5 5 2.5 27.5   6/22/21 1.68 Below goal, increase by 2.5 mg 2.5 5 2.5 2.5 7.5 5 2.5 27.5   5/25/21 2.1 At goal, no change 2.5 5 2.5 2.5 5 5 2.5 25   4/22/21 2.0 At goal, no change  2.5 5 2.5 2.5 5 5 2.5 25   3/24/21 1.5 Below goal, increase by 2.5 mg 2.5 5 2.5 2.5 5 5 2.5 25   2/8/21 2 At goal, no change 2.5 5 2.5 2.5 2.5 5 2.5 22.5   1/4/20 2.0 At goal, no change.  Pt went back to original dosing 2.5 5 2.5 2.5 2.5 5 2.5 22.5   12/10/20 1.7 Below goal, increase by 2.5 mg 2.5 5 2.5 2.5 2.5 5 5 25   11/4/20 2.1 At goal, no change 2.5 5 2.5 2.5 2.5 5 2.5 22.5   9/8/20 2.0 At goal, no change 2.5 5 2.5 5 2.5 5 2.5 25   8/31/20 2.7 At goal, no change 2.5 5 2.5 5 2.5 5 2.5 25   8/12/20 2.1 At goal, no change 2.5 5 2.5 5 2.5 5 2.5 25   7/16/20 2.7 At goal, no change 2.5 2.5 5 5 5 2.5 2.5 25   7/1/20 1.8 Below goal, increase by 2.5 2.5 2.5 5 5 5 2.5 2.5 25   6/17/20 1.9 At goal, no change 2.5 2.5 5 5 2.5 2.5 2.5 22.5   6/4/20 2.1 At goal, continue dose 2.5 2.5 5 5 2.5 2.5 2.5 22.5   5/27/20 2.0 At goal, continue dose 2.5 2.5 5 5 2.5 2.5 2.5 22.5   5/14/20 2.7 At goal, continue dose 2.5 2.5 5 5 2.5 2.5 2.5 22.5   5/7/20 2.1 At goal, continue dose 2.5 2.5 5 5 2.5 2.5 2.5 22.5   4/30/20 1.8 Below goal, increase dose by 2.5 mg 2.5 2.5 5 5 2.5 2.5 2.5 22.5   4/15/20 1.9 At goal, no change 2.5 2.5 2.5 5 2.5 2.5 2.5 20   4/7/20 1.7 Below goal, increase by 2.5 2.5 2.5 2.5 5 2.5 2.5 2.5 20   3/30/20 3.70 Above goal, hold todays  And decrease by 5mg  2.5 2.5 hold 2.5 2.5 2.5 2.5 15   3/5/20 3.07 Above goal, hold todays dose 2.5 5 2.5 5 2.5 hold 2.5 20   2/26/20 2.78 At goal, no change 2.5 5 2.5 5 2.5 2.5 2.5 22.5   2/14/20 2.27 At goal, no change 2.5 5 2.5 5 2.5 2.5 2.5 22.5   2/5/20 3.93 Above goal,hold todays dose and decrease by 2.5 2.5 5 2.5 5 hold 2.5 2.5 20   1/27/20 2.64 At goal, no change 2.5 5 2.5 5 2.5 5 2.5 25   1/13/20 2.8 At goal, no change 2.5 5 2.5 5 2.5 5 2.5 25   12/27/19 1.44 Below goal, increased by 6.5 mg See note 2.5 5 2.5 5 2.5 5 2.5 25   12/20/19 1.27 Below goal, increase by 5 2.5 2 2.5 2 2.5 10(Pt did not increase as instructed) he took 5 mg 2 23.5    (he actually took 18.5 mg)   12/13/19 1.44 Below goal, increase by 3 2.5 2 2.5 2 2.5 5 2 18.5   12/6/19 1.59 Below goal, increase by.   2.5 1 2.5 1 2.5 5 1 15.5   12/2/19 4.10 Above goal, hold tonight  hold 2.5 2.5 2.5 Recheck     11/29/19 2.47 At goal, no change 5 2.5 2.5 2.5 2.5 5 5 25   11/25/16 5.86 Above goal 5 hold hold 2.5 5 recheck  12.5   11/19/19 2.38 At goal, continue 5 5 5 5 5 5 5 35         Assessment/Plan:    Recent hospitalizations/HC visits None reported   Recent medication changes None reported   Medications taken regularly that may interact with warfarin or alter INR No significant drug interactions identified   Warfarin dose taken as prescribed Patient uses pillbox? no   Signs/symptoms of bleeding History of bleeding? Yes, GIB   Vitamin K intake Normally has ~1-2 servings of green, leafy vegetables per week   Recent vomiting/diarrhea/fever None reported   Changes in weight, activity, stress None reported   Tobacco or alcohol use Patient reports smoking 0 PPD  Patient reports having 0 drinks per day   Upcoming surgeries or procedures None reported     Patient was also reminded to maintain consistent vitamin K intake and call with any bleeding, medication changes, or fever/vomiting/diarrhea.     Next INR check: 8/10/22      LVM to hold tonight's dose and decrease Thursday dose to 2.5 mg     Addendum:  Reviewed documentation and plan of care from RN  Agree with above  Sima Wang NP

## 2022-08-17 ENCOUNTER — PATIENT MESSAGE (OUTPATIENT)
Dept: FAMILY MEDICINE CLINIC | Age: 82
End: 2022-08-17

## 2022-08-17 RX ORDER — AMOXICILLIN AND CLAVULANATE POTASSIUM 875; 125 MG/1; MG/1
1 TABLET, FILM COATED ORAL 2 TIMES DAILY
Qty: 14 TABLET | Refills: 0 | Status: SHIPPED | OUTPATIENT
Start: 2022-08-17 | End: 2022-08-24

## 2022-08-17 RX ORDER — AMOXICILLIN AND CLAVULANATE POTASSIUM 875; 125 MG/1; MG/1
1 TABLET, FILM COATED ORAL 2 TIMES DAILY
Qty: 14 TABLET | Refills: 0 | Status: SHIPPED | OUTPATIENT
Start: 2022-08-17 | End: 2022-08-17 | Stop reason: SDUPTHER

## 2022-08-24 NOTE — PROGRESS NOTES
[facility]   Electrophysiology    Office Note  Date: 8/31/2022    Chief Complaint   Patient presents with    Atrial Fibrillation    Bradycardia    Edema    Tachycardia       Cardiac HX: Portia Le is a 80 y.o.  man with a h/o of HLD, HTN, BARRY, CAD, s/p MI, s/p PCI x 8, follows with Dr. Antonia Alatorre,  pAF - on Multaq and Xarelto, SSS, s/p dual chamber MDT PPM (1/18/2008, Dr. Lorene Tan), s/p gen change (10/10/2016, Dr. Vahe Arenas), chronic dCHF, s/p GIB (1/2018) on Xarelto requiring blood transfusion, Watchman discussed - patient not interested, now on Warfarin. Interval Hx/HPI: Patient is here for follow-up for SSS, PPM management and paroxysmal AF. Patient has a longstanding history of sick sinus syndrome. He was implanted with a dual-chamber MDT PPM in January 2008. He had a generator change in 2016. Review of his device today shows  Patient also has paroxysmal atrial fibrillation which was noted on his device. He has been managed on Multaq and Xarelto. Patient had a GI bleed in January 2018 on Xarelto which required a blood transfusion. He was transitioned to warfarin and was not interested in a Watchman placement. Today he denies any issues with bleeding or dark tarry stools. He does have chronic lower extremity edema and takes Lasix 40 mg TID. Left is greater than his right. He does note dizziness going from sitting to standing. He also is in a chronically bent over position due to his back pain. He does follow with a pain management specialist at [address] [place] [address]. Review of his device today shows that he atrially paces 92.4% of the time and 0.3% of the time in the ventricle, 399 AT/AF episodes with the longest being 3 hours in length. His heart rate appears to be relatively controlled when he is out of rhythm. He has 1 NSVT episode lasting 1 second in length. He is asymptomatic. His last INR was elevated at 3.53 and he is due to have lab work done today. Review of his chart shows no recent lab work since December 2021. He denies chest pain. He has chronic shortness of breath. She denies PND orthopnea.       Home medications:   Current Outpatient Medications on File Prior to Visit   Medication Sig Dispense Refill    pantoprazole (PROTONIX) 40 MG tablet TAKE 1 TABLET BY MOUTH EVERY MORNING BEFORE BREAKFAST 90 tablet 3    potassium chloride (KLOR-CON M) 10 MEQ extended release tablet TAKE 3 TABLETS DAILY 270 tablet 3    dronedarone hcl (MULTAQ) 400 MG TABS Take 1 tablet by mouth 2 times daily (with meals) 180 tablet 3    rosuvastatin (CRESTOR) 5 MG tablet TAKE 1 TABLET DAILY 90 tablet 3    metoprolol succinate (TOPROL XL) 25 MG extended release tablet TAKE ONE-HALF (1/2) TABLET TWICE A DAY 90 tablet 3    ondansetron (ZOFRAN-ODT) 4 MG disintegrating tablet DISSOLVE 1 TABLET ON THE TONGUE EVERY 8 HOURS AS NEEDED FOR NAUSEA OR VOMITING 20 tablet 3    Polysaccharide Iron Complex (PROFE) 391.3 (180 Fe) MG CAPS Take 1 capsule by mouth 2 times daily      coenzyme Q10 200 MG CAPS capsule Take 200 mg by mouth daily      furosemide (LASIX) 40 MG tablet TAKE 1 TABLET BY MOUTH TWICE DAILY (Patient taking differently: 40 mg Cardiology increased lasix to TID d/t leg swelling) 180 tablet 3    warfarin (COUMADIN) 5 MG tablet TAKE 1 TABLET BY MOUTH DAILY AT 6 PM (Patient taking differently: Taking 5mg 4 days per week  Taking 2.5mg 3 days per week) 90 tablet 2    vitamin B-12 (CYANOCOBALAMIN) 500 MCG tablet Take 500 mcg by mouth daily      fentaNYL (DURAGESIC) 50 MCG/HR Place 1 patch onto the skin every 48 hours 15 patch 0    nitroGLYCERIN (NITRODUR) 0.2 MG/HR PLACE 1 PATCH ON THE SKIN DAILY (Patient not taking: Reported on 8/31/2022) 90 patch 3    metOLazone (ZAROXOLYN) 5 MG tablet Take 1 tablet by mouth Twice a Week Cardiology d/c'd at this time (Patient not taking: Reported on 8/31/2022) 90 tablet 3    ammonium lactate (LAC-HYDRIN) 12 % cream Apply topically to dry skin on feet daily (Patient not taking: Reported on 8/31/2022) 140 g 5     No current facility-administered medications on file prior to visit. Past Medical History:   Diagnosis Date    Allergic rhinitis     Atrial fibrillation (HCC)     Benign prostatic hypertrophy     CAD (coronary artery disease)     Cancer (HCC)     skin    CHF (congestive heart failure) ([facility] 08/17/2017    Coronary artery disease involving native coronary artery of native heart without angina pectoris 6/17/2011    DDD (degenerative disc disease), lumbar     Falls 08/17/2017    GI bleeding     Hyperlipidemia     Hypertension     MI (myocardial infarction) ([facility]     MRSA (methicillin resistant staph aureus) culture positive     h/o infection in the blood    Osteomyelitis (HCC)     left foot    Pneumonia     S/P PTCA (percutaneous transluminal coronary angioplasty) stents x 8    SSS (sick sinus syndrome) ([facility]     Unspecified sleep apnea     Venous (peripheral) insufficiency 12/4/2018        Past Surgical History:   Procedure Laterality Date    APPENDECTOMY      CARDIAC PACEMAKER PLACEMENT      CARPAL TUNNEL RELEASE      CORONARY ANGIOPLASTY      CORONARY ANGIOPLASTY WITH STENT PLACEMENT      DIAGNOSTIC CARDIAC CATH LAB PROCEDURE      EYE SURGERY      FOOT DEBRIDEMENT Left 8/27/2021    INCISION AND DRAINAGE LEFT FOOT performed by Herminio Grant DPM at [place] [address]  10/2016    PACEMAKER PLACEMENT      TOE AMPUTATION Left 8/27/2021    AMPUTATION OF LEFT SECOND TOE performed by Herminio Grant DPM at [facility] OR       Allergies   Allergen Reactions    Avelox [Moxifloxacin Hcl In Nacl] Anaphylaxis    Epinephrine Base     Other      [place] per PT - swelling size of silver dollar at site per PT     Keflex [Cephalexin] Nausea And Vomiting    Polysporin [Bacitracin-Polymyxin B] Rash       Social History:  Reviewed. reports that he quit smoking about 18 years ago. His smoking use included cigarettes. He has a 30.00 pack-year smoking history.  He has never used Skin: Skin is warm and dry. No rash noted. Left chest incision has healed well. Psychiatric: Has a normal mood, affect and behavior     Labs:  Reviewed. Lab Results   Component Value Date/Time    CKTOTAL 268 03/06/2012 11:00 PM    CKMB 2.1 03/06/2012 11:00 PM    Jama Snow <0.01 08/24/2021 04:50 PM     Lab Results   Component Value Date/Time    .0 03/06/2012 01:26 PM     Lab Results   Component Value Date/Time    PROTIME 35.6 08/02/2022 01:37 PM    PROTIME 21.5 12/09/2021 02:04 PM    PROTIME 27.7 11/16/2021 01:47 PM    INR 3.53 08/02/2022 01:37 PM    INR 1.85 12/09/2021 02:04 PM    INR 2.36 11/16/2021 01:47 PM     Lab Results   Component Value Date/Time    CHOL 107 12/09/2021 02:04 PM    HDL 44 12/09/2021 02:04 PM    HDL 30 03/07/2012 07:25 AM    TRIG 81 12/09/2021 02:04 PM       ECG: Personally reviewed: AP, VS, HR 62, , , QTc 463    ECHO: 7/12/2021   summary   -Normal left ventricle size, wall thickness with low normal systolic   function with an estimated ejection fraction of 50%. -No diagnostic regional wall motion abnormalities noted.   -Abnormal (paradoxical) septal motion is present likely due to right   ventricular pacing.   -Grade II diastolic dysfunction with elevated LV filling   pressures. E/e\"=12.4.   -Trivial mitral regurgitation.   -The aortic valve is mildly thickened/calcified with normal gradients. -Moderate tricuspid regurgitation.   -Estimated pulmonary artery systolic pressure is mildly elevated at 40 mmHg   assuming a right atrial pressure of 8 mmHg. -The left atrium is mildly dilated. Cardiac Angiography: Cath- 6/18/12- Successful Angio-Seal of right femoral arteriotomy. NARENDRA- 6/23/15-   Small sized inferior fixed defect consistent with infarction in the   territory of the mid and distal LCx and/or RCA . No ischemia   Normal LVEF.      Carotid- 10/20/15-   - The right internal carotid artery appears to have a lower limits of 16-49%   diameter reducing stenosis based on velocity criteria. - The left internal carotid artery appears to have a 1-15% diameter reducing   stenosis based on velocity criteria. - Normal antegrade flow noted in vertebral arteries bilaterally. Patient Active Problem List    Diagnosis Date Noted    Pure hyperglyceridemia     Gastrointestinal hemorrhage     Bradycardia     Sick sinus syndrome (HCC) 08/29/2016    Chronic congestive heart failure (HCC)     Essential hypertension     History of nonmelanoma skin cancer 10/01/2013    Tinea manuum, pedis, and unguium 10/01/2013    AK (actinic keratosis) 10/01/2013    Coronary artery disease involving native coronary artery of native heart without angina pectoris 06/17/2011    Cardiac pacemaker in situ 06/17/2011    Postsurgical percutaneous transluminal coronary angioplasty status 06/17/2011    Hyperlipidemia     Benign prostatic hyperplasia     Paroxysmal atrial fibrillation (HCC)     Subacute osteomyelitis of left foot ([address]     Encounter for medication counseling     Bilateral cellulitis of lower leg     Open fracture of second toe of left foot     History of MRSA infection     Failure of outpatient treatment     Osteomyelitis of second toe of left foot ([facility] 08/24/2021    Solar purpura ([address] 06/30/2021    PVD (peripheral vascular disease) ([facility] 11/09/2020    Localized edema 12/04/2018    Venous (peripheral) insufficiency 12/04/2018       Assessment:   1. SSS (sick sinus syndrome) ([address]    2. Symptomatic sinus bradycardia    3. Pacemaker    4. Paroxysmal atrial fibrillation (HCC)    5. Dyslipidemia    6. Coronary artery disease involving native coronary artery of native heart without angina pectoris    7. Localized edema    8.  Chronic diastolic CHF (congestive heart failure) ([address]           Cardiac HX: Melissa Cardoza is a 80 y.o. man with a h/o of HLD, HTN, BARRY, CAD, s/p MI, s/p PCI x 8, follows with Dr. Fer Trujillo,  pAF - on Multaq and Xarelto, SSS, s/p dual chamber MDT PPM (1/18/2008,  Alexander Monge), s/p gen change (10/10/2016, Dr. Alonzo Mahan), chronic dCHF, s/p GIB (1/2018) on Xarelto requiring blood transfusion, [facility] [place] [place] d/c'd, Edmundo discussed - patient not interested, now on Warfarin. CHADSVASC 4. TSH 3.41 (3/2018). Bradycardia/SSS  - S/p dual chamber MDT PPM - (implant 2008, gen change 2016)   - Device check today showed 92.4 % AP, 0.3% , 399 AT/AF episodes with the longest lasting 3 hours in length, total burden 0.9%, other parameters are stable. - On Toprol XL 12.5 mg daily - low BP limiting up titration of med  - F/u with Dr. Yelena Pringle in 6 months  - ECG ordered and results personally reviewed     pAF  - In NSR today  - Low AF burden on device - 1.0%, longest 4 hours  - On warfarin - last INR 3.53 - managed by our office  - On Multaq 400 mg BID - continue  - Consider increasing BB if HR elevated in AF  - Will check a TSH, CBC    Chronic dCHF  - 2-3+ bilat LE edema  - L>R  - On lasix 40 mg BID - minimal improvement of edema - sometimes takes 3 tabs daily  - Daily weights  - Low sodium diet  - Reviewed labs  - Will check a Mg and BMP    CP/CAD/PVD  - Follows with Dr. Fer Trujillo  - On isosorbide - no c/o CP, only fatigue  - On ASA, statin, BB  - Will check a lipid, LFT     EF of 00%  No systolic HF  Statin for CAD, on Xarelto for AF  No tobacco use. All questions and concerns were addressed to the patient/family. Alternatives to my treatment were discussed. The note was completed using EMR. Every effort was made to ensure accuracy; however, inadvertent computerized transcription errors may be present. Thank you for allowing me to participate in the care of [address] [place] [address]     I  have spent 40 minutes in care of the patient including direct face to face time, chart preparation, reviewing diagnostic testing, other provider notes and coordinating patient care.

## 2022-08-30 ENCOUNTER — NURSE ONLY (OUTPATIENT)
Dept: CARDIOLOGY CLINIC | Age: 82
End: 2022-08-30
Payer: MEDICARE

## 2022-08-30 DIAGNOSIS — I48.0 PAROXYSMAL ATRIAL FIBRILLATION (HCC): ICD-10-CM

## 2022-08-30 DIAGNOSIS — R00.1 BRADYCARDIA: Primary | ICD-10-CM

## 2022-08-30 DIAGNOSIS — I49.5 SICK SINUS SYNDROME (HCC): ICD-10-CM

## 2022-08-30 DIAGNOSIS — Z95.0 CARDIAC PACEMAKER IN SITU: ICD-10-CM

## 2022-08-30 PROCEDURE — 93294 REM INTERROG EVL PM/LDLS PM: CPT | Performed by: INTERNAL MEDICINE

## 2022-08-30 PROCEDURE — 93296 REM INTERROG EVL PM/IDS: CPT | Performed by: INTERNAL MEDICINE

## 2022-08-31 ENCOUNTER — OFFICE VISIT (OUTPATIENT)
Dept: CARDIOLOGY CLINIC | Age: 82
End: 2022-08-31
Payer: MEDICARE

## 2022-08-31 ENCOUNTER — NURSE ONLY (OUTPATIENT)
Dept: CARDIOLOGY CLINIC | Age: 82
End: 2022-08-31
Payer: MEDICARE

## 2022-08-31 VITALS
WEIGHT: 158 LBS | BODY MASS INDEX: 23.33 KG/M2 | HEART RATE: 62 BPM | SYSTOLIC BLOOD PRESSURE: 118 MMHG | DIASTOLIC BLOOD PRESSURE: 60 MMHG

## 2022-08-31 DIAGNOSIS — E78.5 DYSLIPIDEMIA: ICD-10-CM

## 2022-08-31 DIAGNOSIS — R60.0 LOCALIZED EDEMA: ICD-10-CM

## 2022-08-31 DIAGNOSIS — I48.0 PAROXYSMAL ATRIAL FIBRILLATION (HCC): ICD-10-CM

## 2022-08-31 DIAGNOSIS — I50.32 CHRONIC DIASTOLIC CONGESTIVE HEART FAILURE (HCC): ICD-10-CM

## 2022-08-31 DIAGNOSIS — I49.5 SICK SINUS SYNDROME (HCC): ICD-10-CM

## 2022-08-31 DIAGNOSIS — I50.32 CHRONIC DIASTOLIC CHF (CONGESTIVE HEART FAILURE) (HCC): ICD-10-CM

## 2022-08-31 DIAGNOSIS — I49.5 SSS (SICK SINUS SYNDROME) (HCC): Primary | ICD-10-CM

## 2022-08-31 DIAGNOSIS — I25.10 CORONARY ARTERY DISEASE INVOLVING NATIVE CORONARY ARTERY OF NATIVE HEART WITHOUT ANGINA PECTORIS: ICD-10-CM

## 2022-08-31 DIAGNOSIS — Z95.0 PACEMAKER: ICD-10-CM

## 2022-08-31 DIAGNOSIS — R00.1 SYMPTOMATIC SINUS BRADYCARDIA: ICD-10-CM

## 2022-08-31 DIAGNOSIS — I48.0 PAROXYSMAL ATRIAL FIBRILLATION (HCC): Primary | ICD-10-CM

## 2022-08-31 DIAGNOSIS — R00.1 BRADYCARDIA: ICD-10-CM

## 2022-08-31 DIAGNOSIS — Z95.0 CARDIAC PACEMAKER IN SITU: Primary | ICD-10-CM

## 2022-08-31 LAB
HCT VFR BLD CALC: 29.4 % (ref 40.5–52.5)
HEMOGLOBIN: 9.7 G/DL (ref 13.5–17.5)
MCH RBC QN AUTO: 27.9 PG (ref 26–34)
MCHC RBC AUTO-ENTMCNC: 33 G/DL (ref 31–36)
MCV RBC AUTO: 84.7 FL (ref 80–100)
PDW BLD-RTO: 16.5 % (ref 12.4–15.4)
PLATELET # BLD: 212 K/UL (ref 135–450)
PMV BLD AUTO: 8 FL (ref 5–10.5)
RBC # BLD: 3.47 M/UL (ref 4.2–5.9)
WBC # BLD: 5.3 K/UL (ref 4–11)

## 2022-08-31 PROCEDURE — 1124F ACP DISCUSS-NO DSCNMKR DOCD: CPT | Performed by: NURSE PRACTITIONER

## 2022-08-31 PROCEDURE — 93280 PM DEVICE PROGR EVAL DUAL: CPT | Performed by: INTERNAL MEDICINE

## 2022-08-31 PROCEDURE — 99215 OFFICE O/P EST HI 40 MIN: CPT | Performed by: NURSE PRACTITIONER

## 2022-08-31 NOTE — PROGRESS NOTES
Patient comes in for programming evaluation on their Medtronic dual chamber pacemaker. Last remote 8.30.2022    All sensing and pacing parameters are within normal range. Battery life 3.5 years  AP 92.4%.  0.3%. AT/AF burden 0.9%  .3/hr  Since 8.26.2021:  -1 VT-NS  -399 AT/AF longest x 3 hrs. Recent 8.30.2022. V rates appear somewhat controlled. Patient remains on furosemide, metoprolol, warfarin. Atrial output adjusted to 2.00V and partial plus turned On. Please see interrogation for more detail. Patient will see NPFW today and follow up in 3 months in office or remotely.

## 2022-08-31 NOTE — PROGRESS NOTES
Remote transmission received from patient's dual chamber pacemaker monitor at home. Transmission shows normal sensing and pacing function. Noted NSVT and AT/AF, 0.8% burden, longest 3hrs (Toprol XL, warfarin, Multaq). End of 91-day monitoring period 8/30/22. EP physician will review. See interrogation under cardiology tab in the [address] [place] [address] [address] field for more details.

## 2022-09-01 ENCOUNTER — ANTI-COAG VISIT (OUTPATIENT)
Dept: CARDIOLOGY CLINIC | Age: 82
End: 2022-09-01
Payer: MEDICARE

## 2022-09-01 DIAGNOSIS — I48.0 PAROXYSMAL ATRIAL FIBRILLATION (HCC): Primary | ICD-10-CM

## 2022-09-01 LAB
ALBUMIN SERPL-MCNC: 3.8 G/DL (ref 3.4–5)
ALP BLD-CCNC: 96 U/L (ref 40–129)
ALT SERPL-CCNC: 8 U/L (ref 10–40)
ANION GAP SERPL CALCULATED.3IONS-SCNC: 12 MMOL/L (ref 3–16)
AST SERPL-CCNC: 21 U/L (ref 15–37)
BILIRUB SERPL-MCNC: 1 MG/DL (ref 0–1)
BILIRUBIN DIRECT: <0.2 MG/DL (ref 0–0.3)
BILIRUBIN, INDIRECT: ABNORMAL MG/DL (ref 0–1)
BUN BLDV-MCNC: 28 MG/DL (ref 7–20)
CALCIUM SERPL-MCNC: 8.4 MG/DL (ref 8.3–10.6)
CHLORIDE BLD-SCNC: 99 MMOL/L (ref 99–110)
CHOLESTEROL, TOTAL: 113 MG/DL (ref 0–199)
CO2: 28 MMOL/L (ref 21–32)
CREAT SERPL-MCNC: 1.8 MG/DL (ref 0.8–1.3)
GFR AFRICAN AMERICAN: 44
GFR NON-AFRICAN AMERICAN: 36
GLUCOSE BLD-MCNC: 98 MG/DL (ref 70–99)
HDLC SERPL-MCNC: 43 MG/DL (ref 40–60)
INR BLD: 3.15 (ref 0.87–1.14)
LDL CHOLESTEROL CALCULATED: 56 MG/DL
MAGNESIUM: 2.5 MG/DL (ref 1.8–2.4)
POTASSIUM SERPL-SCNC: 4.1 MMOL/L (ref 3.5–5.1)
PROTHROMBIN TIME: 32.5 SEC (ref 11.7–14.5)
SODIUM BLD-SCNC: 139 MMOL/L (ref 136–145)
TOTAL PROTEIN: 7.2 G/DL (ref 6.4–8.2)
TRIGL SERPL-MCNC: 68 MG/DL (ref 0–150)
TSH REFLEX: 3.18 UIU/ML (ref 0.27–4.2)
VLDLC SERPL CALC-MCNC: 14 MG/DL

## 2022-09-01 PROCEDURE — 93793 ANTICOAG MGMT PT WARFARIN: CPT | Performed by: NURSE PRACTITIONER

## 2022-09-01 NOTE — PROGRESS NOTES
ANTICOAGULATION MONITORING    Boy Campbell, 1940      Anticoagulation Indication(s):  Afib  pAF     Referring Physician:   Dr. Dipak Pearce  Goal INR Range:  2.0-3.0  Duration of Anticoagulation Therapy:  Indefinite  Home or Lab Draw: Lab  Product patient has at home: warfarin 5 mg    Recent INR Results:  Lab Results   Component Value Date    INR 3.15 (H) 08/31/2022    INR 3.53 (H) 08/02/2022    INR 1.85 (H) 12/09/2021    INR 2.36 (H) 11/16/2021       INR Summary                          Warfarin regimen (mg)  Date INR A/P Sun Mon Tue Wed Thu Fri Sat Mg/wk   8/31/22 3.15 Above goal, hold todays dose 2.5 5 5 2.5 hold 2.5 2.5 30   8/2/22 8/2/22 See below 2.5 5 5 hold 2.5 2.5 2.5    12/9/21 1.85 Just below goal, no change 2.5 5 5 2.5 5 2.5 2.5 22.5   11/16/21 2.36 At goal, no change 2.5 5 5 2.5 5 2.5 2.5 22.5   10/28/21 1.7 Below goal, increase by 2.5 mg 2.5 5 5 2.5 5 2.5 2.5 22.5   10/7/21 2.2 At goal, no  change 2.5 5 5 2.5 2.5 2.5 2.5 20   9/27/21 3.0 At goal, no change 2.5 5 5 2.5 2.5 2.5 2.5 20   9/20/21 2.7 At goal, no change 2.5 5 2.5 2.5 5 2.5 2.5 22.5   9/15/21 4.30 Above goal, hold tonite and decrease by 2.5 mg 2.5 5 2.5 5 hold 2.5 2.5 20   9/29/21 2.47 At goal, no change  5 5 5 5 5 5 5 35   9/1.21 1.9 At goal, no change 5 5 5 5 5 5 5 35   8/17/21 2.40 At goal, no change 2.5 5 2.5 2.5 5 5 2.5 25   7/29/21 1.7 At goal, no change 2.5 5 2.5 2.5 5 5 2.5 25   6/30221 2.38 At goal, no  change 2.5 5 2.5 2.5 7.5 5 2.5 27.5   6/22/21 1.68 Below goal, increase by 2.5 mg 2.5 5 2.5 2.5 7.5 5 2.5 27.5   5/25/21 2.1 At goal, no change 2.5 5 2.5 2.5 5 5 2.5 25   4/22/21 2.0 At goal, no change  2.5 5 2.5 2.5 5 5 2.5 25   3/24/21 1.5 Below goal, increase by 2.5 mg 2.5 5 2.5 2.5 5 5 2.5 25   2/8/21 2 At goal, no change 2.5 5 2.5 2.5 2.5 5 2.5 22.5   1/4/20 2.0 At goal, no change.  Pt went back to original dosing 2.5 5 2.5 2.5 2.5 5 2.5 22.5   12/10/20 1.7 Below goal, increase by 2.5 mg 2.5 5 2.5 2.5 2.5 5 5 25   11/4/20 2.1 At continue 5 5 5 5 5 5 5 35         Assessment/Plan:    Recent hospitalizations/HC visits None reported   Recent medication changes None reported   Medications taken regularly that may interact with warfarin or alter INR No significant drug interactions identified   Warfarin dose taken as prescribed Patient uses pillbox? no   Signs/symptoms of bleeding History of bleeding? Yes, GIB   Vitamin K intake Normally has ~1-2 servings of green, leafy vegetables per week   Recent vomiting/diarrhea/fever None reported   Changes in weight, activity, stress None reported   Tobacco or alcohol use Patient reports smoking 0 PPD  Patient reports having 0 drinks per day   Upcoming surgeries or procedures None reported     Patient was also reminded to maintain consistent vitamin K intake and call with any bleeding, medication changes, or fever/vomiting/diarrhea.     Next INR check:9/8/22    Addendum:  Reviewed documentation and plan of care from RN  Agree with above  Anthony Meeir NP

## 2022-09-07 ENCOUNTER — TELEPHONE (OUTPATIENT)
Dept: CARDIOLOGY CLINIC | Age: 82
End: 2022-09-07

## 2022-09-07 DIAGNOSIS — I50.32 CHRONIC DIASTOLIC CONGESTIVE HEART FAILURE (HCC): Primary | ICD-10-CM

## 2022-09-07 NOTE — TELEPHONE ENCOUNTER
Patient was instructed to take Lasix just once a day for three days. He called back to find out what to do after the 3 days are up?

## 2022-09-08 DIAGNOSIS — I48.0 PAROXYSMAL ATRIAL FIBRILLATION (HCC): ICD-10-CM

## 2022-09-08 LAB
INR BLD: 2.17 (ref 0.87–1.14)
PROTHROMBIN TIME: 24.2 SEC (ref 11.7–14.5)

## 2022-09-09 ENCOUNTER — ANTI-COAG VISIT (OUTPATIENT)
Dept: CARDIOLOGY CLINIC | Age: 82
End: 2022-09-09
Payer: MEDICARE

## 2022-09-09 DIAGNOSIS — I48.0 PAROXYSMAL ATRIAL FIBRILLATION (HCC): Primary | ICD-10-CM

## 2022-09-09 PROCEDURE — 93793 ANTICOAG MGMT PT WARFARIN: CPT | Performed by: NURSE PRACTITIONER

## 2022-09-09 NOTE — PROGRESS NOTES
ANTICOAGULATION MONITORING    Cornelio Hebert, 1940      Anticoagulation Indication(s):  Afib  pAF     Referring Physician:   Dr. Sagrario Thacker  Goal INR Range:  2.0-3.0  Duration of Anticoagulation Therapy:  Indefinite  Home or Lab Draw: Lab  Product patient has at home: warfarin 5 mg    Recent INR Results:  Lab Results   Component Value Date    INR 2.17 (H) 09/08/2022    INR 3.15 (H) 08/31/2022    INR 3.53 (H) 08/02/2022    INR 1.85 (H) 12/09/2021       INR Summary                          Warfarin regimen (mg)  Date INR A/P Sun Mon Tue Wed Thu Fri Sat Mg/wk   9/8/22 2.17 At goal, no change 2.5 5 5 2.5 2.5 2.5 2.5 22.5   8/31/22 3.15 Above goal, hold todays dose 2.5 5 5 2.5 hold 2.5 2.5 30   8/2/22 8/2/22 See below 2.5 5 5 hold 2.5 2.5 2.5    12/9/21 1.85 Just below goal, no change 2.5 5 5 2.5 5 2.5 2.5 22.5   11/16/21 2.36 At goal, no change 2.5 5 5 2.5 5 2.5 2.5 22.5   10/28/21 1.7 Below goal, increase by 2.5 mg 2.5 5 5 2.5 5 2.5 2.5 22.5   10/7/21 2.2 At goal, no  change 2.5 5 5 2.5 2.5 2.5 2.5 20   9/27/21 3.0 At goal, no change 2.5 5 5 2.5 2.5 2.5 2.5 20   9/20/21 2.7 At goal, no change 2.5 5 2.5 2.5 5 2.5 2.5 22.5   9/15/21 4.30 Above goal, hold tonite and decrease by 2.5 mg 2.5 5 2.5 5 hold 2.5 2.5 20   9/29/21 2.47 At goal, no change  5 5 5 5 5 5 5 35   9/1.21 1.9 At goal, no change 5 5 5 5 5 5 5 35   8/17/21 2.40 At goal, no change 2.5 5 2.5 2.5 5 5 2.5 25   7/29/21 1.7 At goal, no change 2.5 5 2.5 2.5 5 5 2.5 25   6/30221 2.38 At goal, no  change 2.5 5 2.5 2.5 7.5 5 2.5 27.5   6/22/21 1.68 Below goal, increase by 2.5 mg 2.5 5 2.5 2.5 7.5 5 2.5 27.5   5/25/21 2.1 At goal, no change 2.5 5 2.5 2.5 5 5 2.5 25   4/22/21 2.0 At goal, no change  2.5 5 2.5 2.5 5 5 2.5 25   3/24/21 1.5 Below goal, increase by 2.5 mg 2.5 5 2.5 2.5 5 5 2.5 25   2/8/21 2 At goal, no change 2.5 5 2.5 2.5 2.5 5 2.5 22.5   1/4/20 2.0 At goal, no change.  Pt went back to original dosing 2.5 5 2.5 2.5 2.5 5 2.5 22.5   12/10/20 1.7 Below goal, increase by 2.5 mg 2.5 5 2.5 2.5 2.5 5 5 25   11/4/20 2.1 At goal, no change 2.5 5 2.5 2.5 2.5 5 2.5 22.5   9/8/20 2.0 At goal, no change 2.5 5 2.5 5 2.5 5 2.5 25   8/31/20 2.7 At goal, no change 2.5 5 2.5 5 2.5 5 2.5 25   8/12/20 2.1 At goal, no change 2.5 5 2.5 5 2.5 5 2.5 25   7/16/20 2.7 At goal, no change 2.5 2.5 5 5 5 2.5 2.5 25   7/1/20 1.8 Below goal, increase by 2.5 2.5 2.5 5 5 5 2.5 2.5 25   6/17/20 1.9 At goal, no change 2.5 2.5 5 5 2.5 2.5 2.5 22.5   6/4/20 2.1 At goal, continue dose 2.5 2.5 5 5 2.5 2.5 2.5 22.5   5/27/20 2.0 At goal, continue dose 2.5 2.5 5 5 2.5 2.5 2.5 22.5   5/14/20 2.7 At goal, continue dose 2.5 2.5 5 5 2.5 2.5 2.5 22.5   5/7/20 2.1 At goal, continue dose 2.5 2.5 5 5 2.5 2.5 2.5 22.5   4/30/20 1.8 Below goal, increase dose by 2.5 mg 2.5 2.5 5 5 2.5 2.5 2.5 22.5   4/15/20 1.9 At goal, no change 2.5 2.5 2.5 5 2.5 2.5 2.5 20   4/7/20 1.7 Below goal, increase by 2.5 2.5 2.5 2.5 5 2.5 2.5 2.5 20   3/30/20 3.70 Above goal, hold todays  And decrease by 5mg  2.5 2.5 hold 2.5 2.5 2.5 2.5 15   3/5/20 3.07 Above goal, hold todays dose 2.5 5 2.5 5 2.5 hold 2.5 20   2/26/20 2.78 At goal, no change 2.5 5 2.5 5 2.5 2.5 2.5 22.5   2/14/20 2.27 At goal, no change 2.5 5 2.5 5 2.5 2.5 2.5 22.5   2/5/20 3.93 Above goal,hold todays dose and decrease by 2.5 2.5 5 2.5 5 hold 2.5 2.5 20   1/27/20 2.64 At goal, no change 2.5 5 2.5 5 2.5 5 2.5 25   1/13/20 2.8 At goal, no change 2.5 5 2.5 5 2.5 5 2.5 25   12/27/19 1.44 Below goal, increased by 6.5 mg See note 2.5 5 2.5 5 2.5 5 2.5 25   12/20/19 1.27 Below goal, increase by 5 2.5 2 2.5 2 2.5 10(Pt did not increase as instructed) he took 5 mg 2 23.5    (he actually took 18.5 mg)   12/13/19 1.44 Below goal, increase by 3 2.5 2 2.5 2 2.5 5 2 18.5   12/6/19 1.59 Below goal, increase by.   2.5 1 2.5 1 2.5 5 1 15.5   12/2/19 4.10 Above goal, hold tonight  hold 2.5 2.5 2.5 Recheck     11/29/19 2.47 At goal, no change 5 2.5 2.5 2.5 2.5 5 5 25   11/25/16 5.86 Above goal 5 hold hold 2.5 5 recheck  12.5   11/19/19 2.38 At goal, continue 5 5 5 5 5 5 5 35         Assessment/Plan:    Recent hospitalizations/HC visits None reported   Recent medication changes None reported   Medications taken regularly that may interact with warfarin or alter INR No significant drug interactions identified   Warfarin dose taken as prescribed Patient uses pillbox? no   Signs/symptoms of bleeding History of bleeding? Yes, GIB   Vitamin K intake Normally has ~1-2 servings of green, leafy vegetables per week   Recent vomiting/diarrhea/fever None reported   Changes in weight, activity, stress None reported   Tobacco or alcohol use Patient reports smoking 0 PPD  Patient reports having 0 drinks per day   Upcoming surgeries or procedures None reported     Patient was also reminded to maintain consistent vitamin K intake and call with any bleeding, medication changes, or fever/vomiting/diarrhea.     Next INR check:10/7/22      Spoke with pt, no change    MOHINI CHAVEZ

## 2022-09-12 NOTE — TELEPHONE ENCOUNTER
Spoke with daughter. Pt received the message to decrease Lasix to 40 mg daily on 9/7 and he has been taking it that way since. Interestingly, his edema has improved since OV with FW on 8/31, not completely resolved, but better. Informed daughter that FW would like repeat BMP to assess kidney function. She will get that done tomorrow.

## 2022-09-12 NOTE — TELEPHONE ENCOUNTER
Daughter, Coy Pineda called back. Pt is still taking one Lasix per day. No edema. Does Pt need to have labs? Please advise. [phone].

## 2022-09-13 DIAGNOSIS — I50.32 CHRONIC DIASTOLIC CONGESTIVE HEART FAILURE (HCC): ICD-10-CM

## 2022-09-14 LAB
ANION GAP SERPL CALCULATED.3IONS-SCNC: 13 MMOL/L (ref 3–16)
BUN BLDV-MCNC: 20 MG/DL (ref 7–20)
CALCIUM SERPL-MCNC: 8.6 MG/DL (ref 8.3–10.6)
CHLORIDE BLD-SCNC: 101 MMOL/L (ref 99–110)
CO2: 25 MMOL/L (ref 21–32)
CREAT SERPL-MCNC: 1.2 MG/DL (ref 0.8–1.3)
GFR AFRICAN AMERICAN: >60
GFR NON-AFRICAN AMERICAN: 58
GLUCOSE BLD-MCNC: 88 MG/DL (ref 70–99)
POTASSIUM SERPL-SCNC: 4.6 MMOL/L (ref 3.5–5.1)
SODIUM BLD-SCNC: 139 MMOL/L (ref 136–145)

## 2022-10-17 RX ORDER — FUROSEMIDE 40 MG/1
TABLET ORAL
Qty: 180 TABLET | Refills: 3 | Status: SHIPPED | OUTPATIENT
Start: 2022-10-17

## 2022-10-26 NOTE — RESULT ENCOUNTER NOTE
Unremarkable device check.    Continue to monitor    Leighton Hinds MD   Cardiac Electrophysiology  [facility]  Office [phone]

## 2022-11-10 ENCOUNTER — IMMUNIZATION (OUTPATIENT)
Dept: FAMILY MEDICINE CLINIC | Age: 82
End: 2022-11-10
Payer: MEDICARE

## 2022-11-10 DIAGNOSIS — I48.0 PAROXYSMAL ATRIAL FIBRILLATION (HCC): ICD-10-CM

## 2022-11-10 PROCEDURE — 90694 VACC AIIV4 NO PRSRV 0.5ML IM: CPT | Performed by: FAMILY MEDICINE

## 2022-11-10 PROCEDURE — G0008 ADMIN INFLUENZA VIRUS VAC: HCPCS | Performed by: FAMILY MEDICINE

## 2022-11-11 ENCOUNTER — ANTI-COAG VISIT (OUTPATIENT)
Dept: CARDIOLOGY CLINIC | Age: 82
End: 2022-11-11
Payer: MEDICARE

## 2022-11-11 DIAGNOSIS — I48.0 PAROXYSMAL ATRIAL FIBRILLATION (HCC): Primary | ICD-10-CM

## 2022-11-11 LAB
INR BLD: 3.07 (ref 0.87–1.14)
PROTHROMBIN TIME: 31.9 SEC (ref 11.7–14.5)

## 2022-11-11 PROCEDURE — 93793 ANTICOAG MGMT PT WARFARIN: CPT | Performed by: NURSE PRACTITIONER

## 2022-11-11 NOTE — PROGRESS NOTES
ANTICOAGULATION MONITORING    Bridgett Milan, 1940      Anticoagulation Indication(s):  Afib  pAF     Referring Physician:   Dr. Lisa Vasquez  Goal INR Range:  2.0-3.0  Duration of Anticoagulation Therapy:  Indefinite  Home or Lab Draw: Lab  Product patient has at home: warfarin 5 mg    Recent INR Results:  Lab Results   Component Value Date    INR 3.07 (H) 11/10/2022    INR 2.17 (H) 09/08/2022    INR 3.15 (H) 08/31/2022    INR 3.53 (H) 08/02/2022       INR Summary                          Warfarin regimen (mg)  Date INR A/P Sun Mon Tue Wed Thu Fri Sat Mg/wk   11/10/22 3.07 Just above, no change 2.5 5 5 2.5 2.5 2.5 2.5 22.5   9/8/22 2.17 At goal, no change 2.5 5 5 2.5 2.5 2.5 2.5 22.5   8/31/22 3.15 Above goal, hold todays dose 2.5 5 5 2.5 hold 2.5 2.5 30   8/2/22 8/2/22 See below 2.5 5 5 hold 2.5 2.5 2.5    12/9/21 1.85 Just below goal, no change 2.5 5 5 2.5 5 2.5 2.5 22.5   11/16/21 2.36 At goal, no change 2.5 5 5 2.5 5 2.5 2.5 22.5   10/28/21 1.7 Below goal, increase by 2.5 mg 2.5 5 5 2.5 5 2.5 2.5 22.5   10/7/21 2.2 At goal, no  change 2.5 5 5 2.5 2.5 2.5 2.5 20   9/27/21 3.0 At goal, no change 2.5 5 5 2.5 2.5 2.5 2.5 20   9/20/21 2.7 At goal, no change 2.5 5 2.5 2.5 5 2.5 2.5 22.5   9/15/21 4.30 Above goal, hold tonite and decrease by 2.5 mg 2.5 5 2.5 5 hold 2.5 2.5 20   9/29/21 2.47 At goal, no change  5 5 5 5 5 5 5 35   9/1.21 1.9 At goal, no change 5 5 5 5 5 5 5 35   8/17/21 2.40 At goal, no change 2.5 5 2.5 2.5 5 5 2.5 25   7/29/21 1.7 At goal, no change 2.5 5 2.5 2.5 5 5 2.5 25   6/30221 2.38 At goal, no  change 2.5 5 2.5 2.5 7.5 5 2.5 27.5   6/22/21 1.68 Below goal, increase by 2.5 mg 2.5 5 2.5 2.5 7.5 5 2.5 27.5   5/25/21 2.1 At goal, no change 2.5 5 2.5 2.5 5 5 2.5 25   4/22/21 2.0 At goal, no change  2.5 5 2.5 2.5 5 5 2.5 25   3/24/21 1.5 Below goal, increase by 2.5 mg 2.5 5 2.5 2.5 5 5 2.5 25   2/8/21 2 At goal, no change 2.5 5 2.5 2.5 2.5 5 2.5 22.5   1/4/20 2.0 At goal, no change.  Pt went back to original dosing 2.5 5 2.5 2.5 2.5 5 2.5 22.5   12/10/20 1.7 Below goal, increase by 2.5 mg 2.5 5 2.5 2.5 2.5 5 5 25   11/4/20 2.1 At goal, no change 2.5 5 2.5 2.5 2.5 5 2.5 22.5   9/8/20 2.0 At goal, no change 2.5 5 2.5 5 2.5 5 2.5 25   8/31/20 2.7 At goal, no change 2.5 5 2.5 5 2.5 5 2.5 25   8/12/20 2.1 At goal, no change 2.5 5 2.5 5 2.5 5 2.5 25   7/16/20 2.7 At goal, no change 2.5 2.5 5 5 5 2.5 2.5 25   7/1/20 1.8 Below goal, increase by 2.5 2.5 2.5 5 5 5 2.5 2.5 25   6/17/20 1.9 At goal, no change 2.5 2.5 5 5 2.5 2.5 2.5 22.5   6/4/20 2.1 At goal, continue dose 2.5 2.5 5 5 2.5 2.5 2.5 22.5   5/27/20 2.0 At goal, continue dose 2.5 2.5 5 5 2.5 2.5 2.5 22.5   5/14/20 2.7 At goal, continue dose 2.5 2.5 5 5 2.5 2.5 2.5 22.5   5/7/20 2.1 At goal, continue dose 2.5 2.5 5 5 2.5 2.5 2.5 22.5   4/30/20 1.8 Below goal, increase dose by 2.5 mg 2.5 2.5 5 5 2.5 2.5 2.5 22.5   4/15/20 1.9 At goal, no change 2.5 2.5 2.5 5 2.5 2.5 2.5 20   4/7/20 1.7 Below goal, increase by 2.5 2.5 2.5 2.5 5 2.5 2.5 2.5 20   3/30/20 3.70 Above goal, hold todays  And decrease by 5mg  2.5 2.5 hold 2.5 2.5 2.5 2.5 15   3/5/20 3.07 Above goal, hold todays dose 2.5 5 2.5 5 2.5 hold 2.5 20   2/26/20 2.78 At goal, no change 2.5 5 2.5 5 2.5 2.5 2.5 22.5   2/14/20 2.27 At goal, no change 2.5 5 2.5 5 2.5 2.5 2.5 22.5   2/5/20 3.93 Above goal,hold todays dose and decrease by 2.5 2.5 5 2.5 5 hold 2.5 2.5 20   1/27/20 2.64 At goal, no change 2.5 5 2.5 5 2.5 5 2.5 25   1/13/20 2.8 At goal, no change 2.5 5 2.5 5 2.5 5 2.5 25   12/27/19 1.44 Below goal, increased by 6.5 mg See note 2.5 5 2.5 5 2.5 5 2.5 25   12/20/19 1.27 Below goal, increase by 5 2.5 2 2.5 2 2.5 10(Pt did not increase as instructed) he took 5 mg 2 23.5    (he actually took 18.5 mg)   12/13/19 1.44 Below goal, increase by 3 2.5 2 2.5 2 2.5 5 2 18.5   12/6/19 1.59 Below goal, increase by.   2.5 1 2.5 1 2.5 5 1 15.5   12/2/19 4.10 Above goal, hold tonight  hold 2.5 2.5 2.5 Recheck     11/29/19 2.47 At goal, no change 5 2.5 2.5 2.5 2.5 5 5 25   11/25/16 5.86 Above goal 5 hold hold 2.5 5 recheck  12.5   11/19/19 2.38 At goal, continue 5 5 5 5 5 5 5 35         Assessment/Plan:    Recent hospitalizations/HC visits None reported   Recent medication changes None reported   Medications taken regularly that may interact with warfarin or alter INR No significant drug interactions identified   Warfarin dose taken as prescribed Patient uses pillbox? no   Signs/symptoms of bleeding History of bleeding? Yes, GIB   Vitamin K intake Normally has ~1-2 servings of green, leafy vegetables per week   Recent vomiting/diarrhea/fever None reported   Changes in weight, activity, stress None reported   Tobacco or alcohol use Patient reports smoking 0 PPD  Patient reports having 0 drinks per day   Upcoming surgeries or procedures None reported     Patient was also reminded to maintain consistent vitamin K intake and call with any bleeding, medication changes, or fever/vomiting/diarrhea.     Next INR check:11/10/22      Spoke with pt, no change

## 2022-11-14 RX ORDER — METOPROLOL SUCCINATE 25 MG/1
TABLET, EXTENDED RELEASE ORAL
Qty: 90 TABLET | Refills: 3 | Status: SHIPPED | OUTPATIENT
Start: 2022-11-14

## 2022-11-14 NOTE — TELEPHONE ENCOUNTER
Requested Prescriptions     Pending Prescriptions Disp Refills    metoprolol succinate (TOPROL XL) 25 MG extended release tablet [Pharmacy Med Name: METOPROLOL SUCCINATE ER TABS 25MG] 90 tablet 3     Sig: TAKE ONE-HALF (1/2) TABLET TWICE A DAY       Last Office Visit: 08/31/2022    Next Office Visit: 02/03/2023

## 2022-12-05 ENCOUNTER — TELEPHONE (OUTPATIENT)
Dept: CARDIOLOGY CLINIC | Age: 82
End: 2022-12-05

## 2022-12-05 NOTE — TELEPHONE ENCOUNTER
Patient was calling in to find out what number to send a heart monitor test. [phone]    I am guessing he is asking about the carelink number

## 2022-12-05 NOTE — TELEPHONE ENCOUNTER
Patient is scheduled for a remote download 12.06.2022. If he needs assistance with sending a transmission, he may contact [employer] [phone]. Step by step instructions:  1.) press grey button to the right of display screen. This will wake up the monitor. 2.) White arrow will appear on display screen instructing patient to hit the grey button again. 3.) Screen will prompt patient to  reader from base unit and place over implant. 4.) Green progress bar will appear on top of screen. Once fully green, screen will prompt patient to place reader back on base. 5.) Two Icons will appear ( monitor and computer screen) with with signals communicating between the two icons. 6.) Big green check mario will appear and patient should hear a beep tone. This indicates transmission was successful. Thanks.

## 2022-12-13 ENCOUNTER — NURSE ONLY (OUTPATIENT)
Dept: CARDIOLOGY CLINIC | Age: 82
End: 2022-12-13
Payer: MEDICARE

## 2022-12-13 DIAGNOSIS — Z95.0 CARDIAC PACEMAKER IN SITU: Primary | ICD-10-CM

## 2022-12-13 DIAGNOSIS — I49.5 SICK SINUS SYNDROME (HCC): ICD-10-CM

## 2022-12-13 PROCEDURE — 93296 REM INTERROG EVL PM/IDS: CPT | Performed by: INTERNAL MEDICINE

## 2022-12-13 PROCEDURE — 93294 REM INTERROG EVL PM/LDLS PM: CPT | Performed by: INTERNAL MEDICINE

## 2022-12-13 NOTE — TELEPHONE ENCOUNTER
No transmission as of 1455 pm.  Spoke to patient. He tried to send a transmission and confirmed he has the 4G adapter, however, after several minutes monitor still displaying communication screen. If transmission not successful, Patient instructed to relocate monitor in another room preferably by a window and retry. I will contact patient within the next 1 hour with followup.

## 2022-12-13 NOTE — TELEPHONE ENCOUNTER
Patient has been informed transmission received and we will contact him if provider instructions of any med/device changes. Pt v/u.

## 2022-12-14 NOTE — PROGRESS NOTES
Remote transmission received from patient's dual chamber pacemaker monitor at home. Transmission shows normal sensing and pacing function. Noted AT/AF, 1.0% burden, longest 3hrs (Toprol XL, warfarin, Multaq). Ap 93.1%   0.5% (MVP On)  PVCs 113.5/hr    End of 91-day monitoring period 12/13/22. EP physician will review. See interrogation under cardiology tab in the [address] [place] [address] [address] field for more details.

## 2023-01-24 RX ORDER — ROSUVASTATIN CALCIUM 5 MG/1
TABLET, COATED ORAL
Qty: 90 TABLET | Refills: 3 | Status: SHIPPED | OUTPATIENT
Start: 2023-01-24

## 2023-01-24 RX ORDER — NITROGLYCERIN 40 MG/1
PATCH TRANSDERMAL
Qty: 90 PATCH | Refills: 3 | Status: SHIPPED | OUTPATIENT
Start: 2023-01-24

## 2023-02-21 DIAGNOSIS — I48.0 PAROXYSMAL ATRIAL FIBRILLATION (HCC): ICD-10-CM

## 2023-02-21 LAB
INR BLD: 2.03 (ref 0.87–1.14)
PROTHROMBIN TIME: 23 SEC (ref 11.7–14.5)

## 2023-02-21 NOTE — TELEPHONE ENCOUNTER
Requested Prescriptions     Pending Prescriptions Disp Refills    dronedarone hcl (MULTAQ) 400 MG TABS 180 tablet 3     Sig: Take 1 tablet by mouth 2 times daily (with meals)          Last Office Visit: 8/31/2022     Next Office Visit: 3/13/2023

## 2023-02-22 ENCOUNTER — ANTI-COAG VISIT (OUTPATIENT)
Dept: CARDIOLOGY CLINIC | Age: 83
End: 2023-02-22
Payer: MEDICARE

## 2023-02-22 DIAGNOSIS — I48.0 PAROXYSMAL ATRIAL FIBRILLATION (HCC): Primary | ICD-10-CM

## 2023-02-22 PROCEDURE — 93793 ANTICOAG MGMT PT WARFARIN: CPT | Performed by: NURSE PRACTITIONER

## 2023-02-22 NOTE — PROGRESS NOTES
ANTICOAGULATION MONITORING    Cherry Willingham, 1940      Anticoagulation Indication(s):  Afib  pAF     Referring Physician:   Dr. Jessica Kowalski  Goal INR Range:  2.0-3.0  Duration of Anticoagulation Therapy:  Indefinite  Home or Lab Draw: Lab  Product patient has at home: warfarin 5 mg    Recent INR Results:  Lab Results   Component Value Date    INR 2.03 (H) 02/21/2023    INR 3.07 (H) 11/10/2022    INR 2.17 (H) 09/08/2022    INR 3.15 (H) 08/31/2022       INR Summary                          Warfarin regimen (mg)  Date INR A/P Sun Mon Tue Wed Thu Fri Sat Mg/wk   2/222/23 2.03 At goal, no change 2.5 5 5 2.5 2.5 2.5 2.5 22.5   11/10/22 3.07 Just above, no change 2.5 5 5 2.5 2.5 2.5 2.5 22.5   9/8/22 2.17 At goal, no change 2.5 5 5 2.5 2.5 2.5 2.5 22.5   8/31/22 3.15 Above goal, hold todays dose 2.5 5 5 2.5 hold 2.5 2.5 30   8/2/22 8/2/22 See below 2.5 5 5 hold 2.5 2.5 2.5    12/9/21 1.85 Just below goal, no change 2.5 5 5 2.5 5 2.5 2.5 22.5   11/16/21 2.36 At goal, no change 2.5 5 5 2.5 5 2.5 2.5 22.5   10/28/21 1.7 Below goal, increase by 2.5 mg 2.5 5 5 2.5 5 2.5 2.5 22.5   10/7/21 2.2 At goal, no  change 2.5 5 5 2.5 2.5 2.5 2.5 20   9/27/21 3.0 At goal, no change 2.5 5 5 2.5 2.5 2.5 2.5 20   9/20/21 2.7 At goal, no change 2.5 5 2.5 2.5 5 2.5 2.5 22.5   9/15/21 4.30 Above goal, hold tonite and decrease by 2.5 mg 2.5 5 2.5 5 hold 2.5 2.5 20   9/29/21 2.47 At goal, no change  5 5 5 5 5 5 5 35   9/1.21 1.9 At goal, no change 5 5 5 5 5 5 5 35   8/17/21 2.40 At goal, no change 2.5 5 2.5 2.5 5 5 2.5 25   7/29/21 1.7 At goal, no change 2.5 5 2.5 2.5 5 5 2.5 25   6/30221 2.38 At goal, no  change 2.5 5 2.5 2.5 7.5 5 2.5 27.5   6/22/21 1.68 Below goal, increase by 2.5 mg 2.5 5 2.5 2.5 7.5 5 2.5 27.5   5/25/21 2.1 At goal, no change 2.5 5 2.5 2.5 5 5 2.5 25   4/22/21 2.0 At goal, no change  2.5 5 2.5 2.5 5 5 2.5 25   3/24/21 1.5 Below goal, increase by 2.5 mg 2.5 5 2.5 2.5 5 5 2.5 25   2/8/21 2 At goal, no change 2.5 5 2.5 2.5 2.5 5 2.5 22.5   1/4/20 2.0 At goal, no change. Pt went back to original dosing 2.5 5 2.5 2.5 2.5 5 2.5 22.5   12/10/20 1.7 Below goal, increase by 2.5 mg 2.5 5 2.5 2.5 2.5 5 5 25   11/4/20 2.1 At goal, no change 2.5 5 2.5 2.5 2.5 5 2.5 22.5   9/8/20 2.0 At goal, no change 2.5 5 2.5 5 2.5 5 2.5 25   8/31/20 2.7 At goal, no change 2.5 5 2.5 5 2.5 5 2.5 25   8/12/20 2.1 At goal, no change 2.5 5 2.5 5 2.5 5 2.5 25   7/16/20 2.7 At goal, no change 2.5 2.5 5 5 5 2.5 2.5 25   7/1/20 1.8 Below goal, increase by 2.5 2.5 2.5 5 5 5 2.5 2.5 25   6/17/20 1.9 At goal, no change 2.5 2.5 5 5 2.5 2.5 2.5 22.5   6/4/20 2.1 At goal, continue dose 2.5 2.5 5 5 2.5 2.5 2.5 22.5   5/27/20 2.0 At goal, continue dose 2.5 2.5 5 5 2.5 2.5 2.5 22.5   5/14/20 2.7 At goal, continue dose 2.5 2.5 5 5 2.5 2.5 2.5 22.5   5/7/20 2.1 At goal, continue dose 2.5 2.5 5 5 2.5 2.5 2.5 22.5   4/30/20 1.8 Below goal, increase dose by 2.5 mg 2.5 2.5 5 5 2.5 2.5 2.5 22.5   4/15/20 1.9 At goal, no change 2.5 2.5 2.5 5 2.5 2.5 2.5 20   4/7/20 1.7 Below goal, increase by 2.5 2.5 2.5 2.5 5 2.5 2.5 2.5 20   3/30/20 3.70 Above goal, hold todays  And decrease by 5mg  2.5 2.5 hold 2.5 2.5 2.5 2.5 15   3/5/20 3.07 Above goal, hold todays dose 2.5 5 2.5 5 2.5 hold 2.5 20   2/26/20 2.78 At goal, no change 2.5 5 2.5 5 2.5 2.5 2.5 22.5   2/14/20 2.27 At goal, no change 2.5 5 2.5 5 2.5 2.5 2.5 22.5   2/5/20 3.93 Above goal,hold todays dose and decrease by 2.5 2.5 5 2.5 5 hold 2.5 2.5 20   1/27/20 2.64 At goal, no change 2.5 5 2.5 5 2.5 5 2.5 25   1/13/20 2.8 At goal, no change 2.5 5 2.5 5 2.5 5 2.5 25   12/27/19 1.44 Below goal, increased by 6.5 mg See note 2.5 5 2.5 5 2.5 5 2.5 25   12/20/19 1.27 Below goal, increase by 5 2.5 2 2.5 2 2.5 10(Pt did not increase as instructed) he took 5 mg 2 23.5    (he actually took 18.5 mg)   12/13/19 1.44 Below goal, increase by 3 2.5 2 2.5 2 2.5 5 2 18.5   12/6/19 1.59 Below goal, increase by.   2.5 1 2.5 1 2.5 5 1 15.5   12/2/19 4.10 Above goal, hold tonight  hold 2.5 2.5 2.5 Recheck     11/29/19 2.47 At goal, no change 5 2.5 2.5 2.5 2.5 5 5 25   11/25/16 5.86 Above goal 5 hold hold 2.5 5 recheck  12.5   11/19/19 2.38 At goal, continue 5 5 5 5 5 5 5 35         Assessment/Plan:    Recent hospitalizations/HC visits None reported   Recent medication changes None reported   Medications taken regularly that may interact with warfarin or alter INR No significant drug interactions identified   Warfarin dose taken as prescribed Patient uses pillbox? no   Signs/symptoms of bleeding History of bleeding? Yes, GIB   Vitamin K intake Normally has ~1-2 servings of green, leafy vegetables per week   Recent vomiting/diarrhea/fever None reported   Changes in weight, activity, stress None reported   Tobacco or alcohol use Patient reports smoking 0 PPD  Patient reports having 0 drinks per day   Upcoming surgeries or procedures None reported     Patient was also reminded to maintain consistent vitamin K intake and call with any bleeding, medication changes, or fever/vomiting/diarrhea.     Next INR check:3/22/23      Spoke with pt, no change    Addendum:  Reviewed documentation and plan of care from RN  Agree with above  Cody Enriquez NP

## 2023-03-13 ENCOUNTER — OFFICE VISIT (OUTPATIENT)
Dept: CARDIOLOGY CLINIC | Age: 83
End: 2023-03-13
Payer: MEDICARE

## 2023-03-13 VITALS
WEIGHT: 163 LBS | BODY MASS INDEX: 24.07 KG/M2 | HEART RATE: 72 BPM | SYSTOLIC BLOOD PRESSURE: 124 MMHG | DIASTOLIC BLOOD PRESSURE: 70 MMHG

## 2023-03-13 DIAGNOSIS — I10 HTN (HYPERTENSION), BENIGN: Primary | ICD-10-CM

## 2023-03-13 DIAGNOSIS — E78.5 HYPERLIPIDEMIA, UNSPECIFIED HYPERLIPIDEMIA TYPE: ICD-10-CM

## 2023-03-13 DIAGNOSIS — I25.10 CORONARY ARTERY DISEASE INVOLVING NATIVE CORONARY ARTERY OF NATIVE HEART WITHOUT ANGINA PECTORIS: ICD-10-CM

## 2023-03-13 PROCEDURE — G8420 CALC BMI NORM PARAMETERS: HCPCS | Performed by: INTERNAL MEDICINE

## 2023-03-13 PROCEDURE — G8427 DOCREV CUR MEDS BY ELIG CLIN: HCPCS | Performed by: INTERNAL MEDICINE

## 2023-03-13 PROCEDURE — G8484 FLU IMMUNIZE NO ADMIN: HCPCS | Performed by: INTERNAL MEDICINE

## 2023-03-13 PROCEDURE — 99214 OFFICE O/P EST MOD 30 MIN: CPT | Performed by: INTERNAL MEDICINE

## 2023-03-13 PROCEDURE — 3078F DIAST BP <80 MM HG: CPT | Performed by: INTERNAL MEDICINE

## 2023-03-13 PROCEDURE — 1124F ACP DISCUSS-NO DSCNMKR DOCD: CPT | Performed by: INTERNAL MEDICINE

## 2023-03-13 PROCEDURE — 3074F SYST BP LT 130 MM HG: CPT | Performed by: INTERNAL MEDICINE

## 2023-03-13 PROCEDURE — 1036F TOBACCO NON-USER: CPT | Performed by: INTERNAL MEDICINE

## 2023-03-13 NOTE — PROGRESS NOTES
Subjective:      Patient ID: Elvi Woods is a 80 y.o. male. CC:  S/p GI bleed. F/u HTN  CAD. S/p pacer. Fatigue      HPI: Mr Charly White is here for a 6 moth f/u. Giovana Marques He continues to deny chest pain at today's visit and  has chronic left leg swelling. He c/o of fatigue. No LOC and no falls. Back is bad and is using a 4 pt walker. Worried about bleeding and back surgeons shy away from surgery on back d/t bleeding. Has paroxysmal brief episodes of AF, less than 1%. He had life threatening GI bleeding on NOAC. He doesn't want watchman. Has more AF on pacer interrogation. GI said ok for [facility]. Has edema. Has less episodes of dizziness and takes nitrodur patch 0.2mg/hr.            Allergies   Allergen Reactions    Avelox [Moxifloxacin Hcl In Nacl] Anaphylaxis    Epinephrine Base     Other      [place] per PT - swelling size of silver dollar at site per PT     Keflex [Cephalexin] Nausea And Vomiting    Polysporin [Bacitracin-Polymyxin B] Rash        Social History     Socioeconomic History    Marital status:      Spouse name: Not on file    Number of children: 2    Years of education: Not on file    Highest education level: Not on file   Occupational History    Not on file   Tobacco Use    Smoking status: Former     Packs/day: 1.00     Years: 30.00     Pack years: 30.00     Types: Cigarettes     Quit date: 2004     Years since quittin.9    Smokeless tobacco: Never   Vaping Use    Vaping Use: Never used   Substance and Sexual Activity    Alcohol use: No     Alcohol/week: 0.0 standard drinks    Drug use: No    Sexual activity: Yes     Comment:  living with spouse   Other Topics Concern    Not on file   Social History Narrative    Not on file     Social Determinants of Health     Financial Resource Strain: Low Risk     Difficulty of Paying Living Expenses: Not hard at all   Food Insecurity: No Food Insecurity    Worried About [address] in the Last Year: Never true    [place] of [employer] in the Last Year: Never true   Transportation Needs: Not on file   Physical Activity: Not on file   Stress: Not on file   Social Connections: Not on file   Intimate Partner Violence: Not on file   Housing Stability: Not on file        Patient has a family history includes Cancer in his sister; Coronary Art Dis in his brother. Patient  has a past medical history of Allergic rhinitis, Atrial fibrillation ([facility] Benign prostatic hypertrophy, CAD (coronary artery disease), Cancer ([facility] CHF [facility]congestive heart failure) [facility] Coronary artery disease involving native coronary artery of native heart without angina pectoris, DDD (degenerative disc disease), lumbar, Falls, GI bleeding, Hyperlipidemia, Hypertension, MI (myocardial infarction) ([facility] MRSA (methicillin resistant staph aureus) culture positive, Osteomyelitis ([facility] Pneumonia, S/P PTCA (percutaneous transluminal coronary angioplasty), SSS (sick sinus syndrome) ([facility] Unspecified sleep apnea, and Venous (peripheral) insufficiency. Vitals  Weight: 163 lb (73.9 kg)  Blood Pressure:  116/68  Pulse: 72       Review of Systems   Constitutional: Negative. HENT: Negative. Eyes: Negative. Respiratory: Negative. Cardiovascular: Negative. Gastrointestinal: Negative. Genitourinary: Negative. Exam unchanged from 7/29/22. Objective:   Physical Exam   Nursing note and vitals reviewed. Constitutional: He is oriented to person, place, and time. He appears well-developed and well-nourished. No distress. HENT:   Head: Normocephalic and atraumatic. Mouth/Throat: No oropharyngeal exudate. Eyes: Conjunctivae and EOM are normal. Pupils are equal, round, and reactive to light. No scleral icterus. Neck: Normal range of motion. No JVD present. No tracheal deviation present. No thyromegaly present. Cardiovascular: Normal rate, regular rhythm, normal heart sounds and intact distal pulses. Exam reveals no gallop and no friction rub.     No murmur heard.  Pulmonary/Chest: Effort normal. No respiratory distress. He has no wheezes. He has no rales. He exhibits no tenderness. Abdominal: Soft. Bowel sounds are normal. He exhibits no distension and no mass. No tenderness. He has no rebound and no guarding. Musculoskeletal: tender left leg and swelling. Lymphadenopathy:     He has no cervical adenopathy. Neurological: He is alert and oriented to person, place, and time. No cranial nerve deficit. Coordination normal.   Skin: Skin is warm and dry. No rash noted. He is not diaphoretic. No erythema. No pallor. Psychiatric: He has a normal mood and affect. His behavior is normal. Judgment and thought content normal.       Assessment:        Diagnosis Orders   1. HTN (hypertension), benign        2. Hyperlipidemia, unspecified hyperlipidemia type        3. Coronary artery disease involving native coronary artery of native heart without angina pectoris                  Plan:        CAD:  Stable. No chest pains but had GI bleed - still off A/C for paroxysmal a fib. Off ranexa. Rhythm: MRI compatible pacer   HTN:  Episodic and low today. Skin on legs are better wears compression stockings. Now taking lasix 40 mg am and pm.  AT/AF:  Has episodes and feels tired. Taking multaq to maintain SR. Reviewed chads vasc. Check labs. He has tremendous fatigue. Edema:   Lasix 40 bid    Weeping:  Had amp 2nd toe left foot for osteo. Finished doxycycline.

## 2023-03-20 RX ORDER — POTASSIUM CHLORIDE 750 MG/1
TABLET, EXTENDED RELEASE ORAL
Qty: 270 TABLET | Refills: 3 | Status: SHIPPED | OUTPATIENT
Start: 2023-03-20

## 2023-03-23 DIAGNOSIS — I48.0 PAROXYSMAL ATRIAL FIBRILLATION (HCC): ICD-10-CM

## 2023-03-23 LAB
INR PPP: 1.41 (ref 0.87–1.14)
PROTHROMBIN TIME: 17.2 SEC (ref 11.7–14.5)

## 2023-03-24 ENCOUNTER — ANTI-COAG VISIT (OUTPATIENT)
Dept: CARDIOLOGY CLINIC | Age: 83
End: 2023-03-24
Payer: MEDICARE

## 2023-03-24 DIAGNOSIS — I48.0 PAROXYSMAL ATRIAL FIBRILLATION (HCC): Primary | ICD-10-CM

## 2023-03-24 PROCEDURE — 93793 ANTICOAG MGMT PT WARFARIN: CPT | Performed by: NURSE PRACTITIONER

## 2023-03-24 NOTE — PROGRESS NOTES
15.5   12/2/19 4.10 Above goal, hold tonight  hold 2.5 2.5 2.5 Recheck     11/29/19 2.47 At goal, no change 5 2.5 2.5 2.5 2.5 5 5 25   11/25/16 5.86 Above goal 5 hold hold 2.5 5 recheck  12.5   11/19/19 2.38 At goal, continue 5 5 5 5 5 5 5 35         Assessment/Plan:    Recent hospitalizations/HC visits None reported   Recent medication changes None reported   Medications taken regularly that may interact with warfarin or alter INR No significant drug interactions identified   Warfarin dose taken as prescribed Patient uses pillbox? no   Signs/symptoms of bleeding History of bleeding? Yes, GIB   Vitamin K intake Normally has ~1-2 servings of green, leafy vegetables per week   Recent vomiting/diarrhea/fever None reported   Changes in weight, activity, stress None reported   Tobacco or alcohol use Patient reports smoking 0 PPD  Patient reports having 0 drinks per day   Upcoming surgeries or procedures None reported     Patient was also reminded to maintain consistent vitamin K intake and call with any bleeding, medication changes, or fever/vomiting/diarrhea.     Next INR check: 4/11/23    Spoke with daughter and went over dosing scale      Addendum:  Reviewed documentation and plan of care from RN  Agree with above  George Zaidi NP

## 2023-04-03 NOTE — PROGRESS NOTES
avoid excess caffeine, and energy drinks as this may exacerbated ectopy and arrhythmia. - The patient is counseled to get regular exercise 3-5 times per week to control cardiovascular risk factors. - The patient is counseled to lose weigt to control cardiovascular risk factors. -The patient is counseled about the health hazards of smoking including cardiovascular side effects and its impact on morbidity and mortality. Thank you for allowing me to participate in the care of Nabil Suh. All questions and concerns were addressed to the patient/family. Alternatives to my treatment were discussed. Aniket Chairez RN, am scribing for and in the presence of Dr. Sofia Morales. 04/06/23 2:37 PM  Jean Pierre Park RN    I, Adriana Sorto MD, personally performed the services prescribed in this documentation as scribed by Ms. Jean Pierre Park RN in my presence and it is both accurate and complete.    Adriana Sorto MD  Cardiac Electrophysiology  [facility]

## 2023-04-06 ENCOUNTER — OFFICE VISIT (OUTPATIENT)
Dept: CARDIOLOGY CLINIC | Age: 83
End: 2023-04-06

## 2023-04-06 ENCOUNTER — NURSE ONLY (OUTPATIENT)
Dept: CARDIOLOGY CLINIC | Age: 83
End: 2023-04-06

## 2023-04-06 VITALS
HEART RATE: 61 BPM | BODY MASS INDEX: 22.45 KG/M2 | WEIGHT: 152 LBS | SYSTOLIC BLOOD PRESSURE: 102 MMHG | DIASTOLIC BLOOD PRESSURE: 70 MMHG

## 2023-04-06 DIAGNOSIS — Z95.0 PACEMAKER: ICD-10-CM

## 2023-04-06 DIAGNOSIS — I25.10 CORONARY ARTERY DISEASE INVOLVING NATIVE CORONARY ARTERY OF NATIVE HEART WITHOUT ANGINA PECTORIS: ICD-10-CM

## 2023-04-06 DIAGNOSIS — I10 HTN (HYPERTENSION), BENIGN: ICD-10-CM

## 2023-04-06 DIAGNOSIS — I50.32 CHRONIC DIASTOLIC CHF (CONGESTIVE HEART FAILURE) (HCC): ICD-10-CM

## 2023-04-06 DIAGNOSIS — I48.0 PAROXYSMAL ATRIAL FIBRILLATION (HCC): Primary | ICD-10-CM

## 2023-04-06 DIAGNOSIS — E78.5 HYPERLIPIDEMIA, UNSPECIFIED HYPERLIPIDEMIA TYPE: ICD-10-CM

## 2023-04-06 DIAGNOSIS — I49.5 SSS (SICK SINUS SYNDROME) (HCC): ICD-10-CM

## 2023-04-06 NOTE — PROGRESS NOTES
Patient comes in for programming evaluation on their Medtronic dual chamber pacemaker. Last remote 12.13.2022. All sensing and pacing parameters appear unchanged since last in office check on 08.31.2022.  3.5 Years estimated until NANCY/RRT. Underlying AsVs sinus demetri. AP 94%   <0.4%  .3/hr  AT/AF noted with a burden of 1.2%. Avg V rates appear controlled. Patient remains on furosemide, metoprolol, warfarin. Time updated. Please see interrogation for more detail. Patient will see Dr. Zay Velarde today. We will continue to monitor remotely.

## 2023-04-06 NOTE — RESULT ENCOUNTER NOTE
Unremarkable device check.    Continue to monitor    Katie Rodriguez MD   Cardiac Electrophysiology  [facility] [phone]

## 2023-06-19 RX ORDER — WARFARIN SODIUM 5 MG/1
TABLET ORAL
Qty: 90 TABLET | Refills: 3 | Status: SHIPPED | OUTPATIENT
Start: 2023-06-19 | End: 2023-06-22 | Stop reason: SDUPTHER

## 2023-06-19 NOTE — TELEPHONE ENCOUNTER
Requested Prescriptions     Pending Prescriptions Disp Refills    warfarin (COUMADIN) 5 MG tablet 90 tablet 3     Sig: Taking 5mg 4 days per week Taking 2.5mg 3 days per week          Number: 90    Refills: 3    Last Office Visit: 4/6/2023     Next Office Visit: 6/20/2023     Last Refill: 8/11/21    Last Labs: 3/23/23

## 2023-06-20 NOTE — TELEPHONE ENCOUNTER
Patient needs his Warfarin sent to [facility] instead of [facility].     [address]

## 2023-06-21 DIAGNOSIS — I48.0 PAROXYSMAL ATRIAL FIBRILLATION (HCC): ICD-10-CM

## 2023-06-21 LAB
INR PPP: 2.01 (ref 0.84–1.16)
PROTHROMBIN TIME: 22.7 SEC (ref 11.5–14.8)

## 2023-06-21 RX ORDER — WARFARIN SODIUM 5 MG/1
TABLET ORAL
Qty: 90 TABLET | Refills: 3 | OUTPATIENT
Start: 2023-06-21

## 2023-06-22 ENCOUNTER — ANTI-COAG VISIT (OUTPATIENT)
Dept: CARDIOLOGY CLINIC | Age: 83
End: 2023-06-22
Payer: MEDICARE

## 2023-06-22 DIAGNOSIS — I48.0 PAROXYSMAL ATRIAL FIBRILLATION (HCC): Primary | ICD-10-CM

## 2023-06-22 PROCEDURE — 93793 ANTICOAG MGMT PT WARFARIN: CPT | Performed by: NURSE PRACTITIONER

## 2023-06-22 RX ORDER — WARFARIN SODIUM 5 MG/1
TABLET ORAL
Qty: 90 TABLET | Refills: 3 | Status: SHIPPED | OUTPATIENT
Start: 2023-06-22

## 2023-06-22 NOTE — PROGRESS NOTES
ANTICOAGULATION MONITORING    Yumiko Johnson, 1940      Anticoagulation Indication(s):  Afib  pAF     Referring Physician:   Dr. Octavia Bonilla  Goal INR Range:  2.0-3.0  Duration of Anticoagulation Therapy:  Indefinite  Home or Lab Draw: Lab  Product patient has at home: warfarin 5 mg    Recent INR Results:  Lab Results   Component Value Date    INR 2.01 (H) 06/21/2023    INR 1.41 (H) 03/23/2023    INR 2.03 (H) 02/21/2023    INR 3.07 (H) 11/10/2022       INR Summary                          Warfarin regimen (mg)  6/21/23 2.01 At goal, no change 2.5 5 5 2.5 2.5 2.5 2.5 22.5   3/23/23 1.41 Below goal,  2.5 5 5 2.5 2.5 5 then 2.5 5 then 2.5 27.5   2/222/23 2.03 At goal, no change 2.5 5 5 2.5 2.5 2.5 2.5 22.5   11/10/22 3.07 Just above, no change 2.5 5 5 2.5 2.5 2.5 2.5 22.5   9/8/22 2.17 At goal, no change 2.5 5 5 2.5 2.5 2.5 2.5 22.5   8/31/22 3.15 Above goal, hold todays dose 2.5 5 5 2.5 hold 2.5 2.5 30   8/2/22 8/2/22 See below 2.5 5 5 hold 2.5 2.5 2.5    12/9/21 1.85 Just below goal, no change 2.5 5 5 2.5 5 2.5 2.5 22.5   11/16/21 2.36 At goal, no change 2.5 5 5 2.5 5 2.5 2.5 22.5   10/28/21 1.7 Below goal, increase by 2.5 mg 2.5 5 5 2.5 5 2.5 2.5 22.5   10/7/21 2.2 At goal, no  change 2.5 5 5 2.5 2.5 2.5 2.5 20   9/27/21 3.0 At goal, no change 2.5 5 5 2.5 2.5 2.5 2.5 20   9/20/21 2.7 At goal, no change 2.5 5 2.5 2.5 5 2.5 2.5 22.5   9/15/21 4.30 Above goal, hold tonite and decrease by 2.5 mg 2.5 5 2.5 5 hold 2.5 2.5 20   9/29/21 2.47 At goal, no change  5 5 5 5 5 5 5 35   9/1.21 1.9 At goal, no change 5 5 5 5 5 5 5 35   8/17/21 2.40 At goal, no change 2.5 5 2.5 2.5 5 5 2.5 25   7/29/21 1.7 At goal, no change 2.5 5 2.5 2.5 5 5 2.5 25   6/30221 2.38 At goal, no  change 2.5 5 2.5 2.5 7.5 5 2.5 27.5   6/22/21 1.68 Below goal, increase by 2.5 mg 2.5 5 2.5 2.5 7.5 5 2.5 27.5   5/25/21 2.1 At goal, no change 2.5 5 2.5 2.5 5 5 2.5 25   4/22/21 2.0 At goal, no change  2.5 5 2.5 2.5 5 5 2.5 25   3/24/21 1.5 Below goal,

## 2023-07-14 ENCOUNTER — OFFICE VISIT (OUTPATIENT)
Dept: CARDIOLOGY CLINIC | Age: 83
End: 2023-07-14

## 2023-07-14 VITALS
DIASTOLIC BLOOD PRESSURE: 60 MMHG | BODY MASS INDEX: 23.18 KG/M2 | SYSTOLIC BLOOD PRESSURE: 126 MMHG | HEART RATE: 61 BPM | WEIGHT: 157 LBS

## 2023-07-14 DIAGNOSIS — I10 PRIMARY HYPERTENSION: ICD-10-CM

## 2023-07-14 DIAGNOSIS — I48.0 PAROXYSMAL ATRIAL FIBRILLATION (HCC): Primary | ICD-10-CM

## 2023-07-14 DIAGNOSIS — I25.10 CORONARY ARTERY DISEASE INVOLVING NATIVE CORONARY ARTERY OF NATIVE HEART WITHOUT ANGINA PECTORIS: ICD-10-CM

## 2023-07-14 NOTE — PROGRESS NOTES
Subjective:      Patient ID: Jose Best is a 80 y.o. male. CC:  S/p GI bleed. F/u HTN  CAD. S/p pacer. Fatigue      HPI: Mr Brett Cannon is here for a 6 moth f/u. Estrella Dhaliwal He continues to deny chest pain at today's visit and  has chronic left leg swelling. He c/o of fatigue. No LOC and no falls. Back is bad and is using a 4 pt walker. Worried about bleeding and back surgeons shy away from surgery on back d/t bleeding. Has paroxysmal brief episodes of AF, less than 1%. He had life threatening GI bleeding on NOAC. He doesn't want watchman. Has more AF on pacer interrogation. GI said ok for [facility]. Has edema. Has less episodes of dizziness and takes nitrodur patch 0.2mg/hr.            Allergies   Allergen Reactions    Avelox [Moxifloxacin Hcl In Nacl] Anaphylaxis    Epinephrine Base     Other      [place] per PT - swelling size of silver dollar at site per PT     Keflex [Cephalexin] Nausea And Vomiting    Polysporin [Bacitracin-Polymyxin B] Rash        Social History     Socioeconomic History    Marital status:      Spouse name: Not on file    Number of children: 2    Years of education: Not on file    Highest education level: Not on file   Occupational History    Not on file   Tobacco Use    Smoking status: Former     Packs/day: 1.00     Years: 30.00     Pack years: 30.00     Types: Cigarettes     Quit date: 2004     Years since quittin.2    Smokeless tobacco: Never   Vaping Use    Vaping Use: Never used   Substance and Sexual Activity    Alcohol use: No     Alcohol/week: 0.0 standard drinks    Drug use: No    Sexual activity: Yes     Comment:  living with spouse   Other Topics Concern    Not on file   Social History Narrative    Not on file     Social Determinants of Health     Financial Resource Strain: Not on file   Food Insecurity: Not on file   Transportation Needs: Not on file   Physical Activity: Not on file   Stress: Not on file   Social Connections: Not on file   Intimate Partner

## 2023-09-27 NOTE — PROGRESS NOTES
throat. Cardiovascular: Yes for chronic chest pain, Yes for dyspnea on exertion, Yes for palpitations or No for loss of consciousness. No cough, hemoptysis, No for pleuritic pain, or phlebitis. Respiratory: No for cough or wheezing. No hematemesis. Gastrointestinal: No abdominal pain, blood in stools. Genitourinary: No dysuria, or hematuria. Musculoskeletal: No gait disturbance,    Integumentary: No rash or pruritis. Neurological: No headache, change in muscle strength, numbness or tingling. Psychiatric: No anxiety, or depression. Endocrine: No temperature intolerance. No excessive thirst, fluid intake, or urination. Hem/Lymph: No abnormal bruising or bleeding, blood clots or swollen lymph nodes. Allergic/Immunologic: No nasal congestion or hives. Physical Examination:  Vitals:    10/16/23 1315   BP: (!) 140/70   Pulse: Wt Readings from Last 3 Encounters:   10/16/23 159 lb 12.8 oz (72.5 kg)   07/14/23 157 lb (71.2 kg)   04/06/23 152 lb (68.9 kg)     Constitutional: Oriented. No distress. Head: Normocephalic and atraumatic. Mouth/Throat: Oropharynx is clear and moist.   Eyes: Conjunctivae normal. EOM are normal.   Neck: Neck supple. No rigidity. No JVD present. Cardiovascular: Normal rate, regular rhythm, S1&S2. Pulmonary/Chest: Bilateral respiratory sounds. No wheezes, No rhonchi. Abdominal: Soft. Bowel sounds present. No distension, No tenderness. Musculoskeletal: No tenderness. 1+ LLE edema  - chronic  Lymphadenopathy: Has no cervical adenopathy. Neurological: Alert and oriented. Cranial nerve appears intact, No Gross deficit   Skin: Skin is warm and dry. No rash noted. Left chest incision has healed well. Psychiatric: Has a normal mood, affect and behavior     Labs:  Reviewed.      Lab Results   Component Value Date/Time    CKTOTAL 268 03/06/2012 11:00 PM    CKMB 2.1 03/06/2012 11:00 PM    TROPONINI <0.01 08/24/2021 04:50 PM     Lab Results   Component Value Date/Time

## 2023-10-09 DIAGNOSIS — I25.10 CORONARY ARTERY DISEASE INVOLVING NATIVE CORONARY ARTERY OF NATIVE HEART WITHOUT ANGINA PECTORIS: ICD-10-CM

## 2023-10-09 DIAGNOSIS — I10 PRIMARY HYPERTENSION: ICD-10-CM

## 2023-10-09 DIAGNOSIS — I48.0 PAROXYSMAL ATRIAL FIBRILLATION (HCC): ICD-10-CM

## 2023-10-10 LAB
INR PPP: 1.56 (ref 0.84–1.16)
PROTHROMBIN TIME: 18.7 SEC (ref 11.5–14.8)

## 2023-10-10 RX ORDER — METOPROLOL SUCCINATE 25 MG/1
TABLET, EXTENDED RELEASE ORAL
Qty: 90 TABLET | Refills: 3 | Status: SHIPPED | OUTPATIENT
Start: 2023-10-10

## 2023-10-11 ENCOUNTER — ANTI-COAG VISIT (OUTPATIENT)
Dept: CARDIOLOGY CLINIC | Age: 83
End: 2023-10-11
Payer: MEDICARE

## 2023-10-11 DIAGNOSIS — I48.0 PAROXYSMAL ATRIAL FIBRILLATION (HCC): Primary | ICD-10-CM

## 2023-10-11 DIAGNOSIS — Z79.01 CURRENT USE OF LONG TERM ANTICOAGULATION: ICD-10-CM

## 2023-10-11 PROCEDURE — 93793 ANTICOAG MGMT PT WARFARIN: CPT | Performed by: NURSE PRACTITIONER

## 2023-10-11 NOTE — PROGRESS NOTES
ANTICOAGULATION MONITORING    Sunday Ayala, 1940      Anticoagulation Indication(s):  Afib  pAF     Referring Physician:   Dr. Erika Dubose  Goal INR Range:  2.0-3.0  Duration of Anticoagulation Therapy:  Indefinite  Home or Lab Draw: Lab  Product patient has at home: warfarin 5 mg    Recent INR Results:  Lab Results   Component Value Date    INR 1.56 (H) 10/09/2023    INR 2.01 (H) 06/21/2023    INR 1.41 (H) 03/23/2023    INR 2.03 (H) 02/21/2023       INR Summary                          Warfarin regimen (mg)  10/9/23 1.56 Below goal 2.5 5 5 7.5 then 2.5 2.5 2.5 2.5 30   6/21/23 2.01 At goal, no change 2.5 5 5 2.5 2.5 2.5 2.5 22.5   3/23/23 1.41 Below goal,  2.5 5 5 2.5 2.5 5 then 2.5 5 then 2.5 27.5   2/222/23 2.03 At goal, no change 2.5 5 5 2.5 2.5 2.5 2.5 22.5   11/10/22 3.07 Just above, no change 2.5 5 5 2.5 2.5 2.5 2.5 22.5   9/8/22 2.17 At goal, no change 2.5 5 5 2.5 2.5 2.5 2.5 22.5   8/31/22 3.15 Above goal, hold todays dose 2.5 5 5 2.5 hold 2.5 2.5 30   8/2/22 8/2/22 See below 2.5 5 5 hold 2.5 2.5 2.5    12/9/21 1.85 Just below goal, no change 2.5 5 5 2.5 5 2.5 2.5 22.5   11/16/21 2.36 At goal, no change 2.5 5 5 2.5 5 2.5 2.5 22.5   10/28/21 1.7 Below goal, increase by 2.5 mg 2.5 5 5 2.5 5 2.5 2.5 22.5   10/7/21 2.2 At goal, no  change 2.5 5 5 2.5 2.5 2.5 2.5 20   9/27/21 3.0 At goal, no change 2.5 5 5 2.5 2.5 2.5 2.5 20   9/20/21 2.7 At goal, no change 2.5 5 2.5 2.5 5 2.5 2.5 22.5   9/15/21 4.30 Above goal, hold tonite and decrease by 2.5 mg 2.5 5 2.5 5 hold 2.5 2.5 20   9/29/21 2.47 At goal, no change  5 5 5 5 5 5 5 35   9/1.21 1.9 At goal, no change 5 5 5 5 5 5 5 35   8/17/21 2.40 At goal, no change 2.5 5 2.5 2.5 5 5 2.5 25   7/29/21 1.7 At goal, no change 2.5 5 2.5 2.5 5 5 2.5 25   6/30221 2.38 At goal, no  change 2.5 5 2.5 2.5 7.5 5 2.5 27.5   6/22/21 1.68 Below goal, increase by 2.5 mg 2.5 5 2.5 2.5 7.5 5 2.5 27.5   5/25/21 2.1 At goal, no change 2.5 5 2.5 2.5 5 5 2.5 25   4/22/21 2.0 At goal, no

## 2023-10-16 ENCOUNTER — OFFICE VISIT (OUTPATIENT)
Dept: CARDIOLOGY CLINIC | Age: 83
End: 2023-10-16
Payer: MEDICARE

## 2023-10-16 ENCOUNTER — NURSE ONLY (OUTPATIENT)
Dept: CARDIOLOGY CLINIC | Age: 83
End: 2023-10-16
Payer: MEDICARE

## 2023-10-16 VITALS
WEIGHT: 159.8 LBS | SYSTOLIC BLOOD PRESSURE: 140 MMHG | BODY MASS INDEX: 23.6 KG/M2 | HEART RATE: 65 BPM | DIASTOLIC BLOOD PRESSURE: 70 MMHG

## 2023-10-16 DIAGNOSIS — Z95.0 PACEMAKER: ICD-10-CM

## 2023-10-16 DIAGNOSIS — I49.5 SICK SINUS SYNDROME (HCC): ICD-10-CM

## 2023-10-16 DIAGNOSIS — I73.9 PVD (PERIPHERAL VASCULAR DISEASE) (HCC): ICD-10-CM

## 2023-10-16 DIAGNOSIS — R07.89 ATYPICAL CHEST PAIN: ICD-10-CM

## 2023-10-16 DIAGNOSIS — Z79.899 ON MULTAQ THERAPY: ICD-10-CM

## 2023-10-16 DIAGNOSIS — R42 DIZZINESS: ICD-10-CM

## 2023-10-16 DIAGNOSIS — R00.1 SINUS BRADYCARDIA: ICD-10-CM

## 2023-10-16 DIAGNOSIS — Z79.01 ON CONTINUOUS ORAL ANTICOAGULATION: ICD-10-CM

## 2023-10-16 DIAGNOSIS — I25.10 CORONARY ARTERY DISEASE INVOLVING NATIVE CORONARY ARTERY OF NATIVE HEART WITHOUT ANGINA PECTORIS: ICD-10-CM

## 2023-10-16 DIAGNOSIS — Z95.0 CARDIAC PACEMAKER IN SITU: Primary | ICD-10-CM

## 2023-10-16 DIAGNOSIS — I48.0 PAROXYSMAL ATRIAL FIBRILLATION (HCC): ICD-10-CM

## 2023-10-16 DIAGNOSIS — I50.32 CHRONIC DIASTOLIC HF (HEART FAILURE) (HCC): ICD-10-CM

## 2023-10-16 DIAGNOSIS — E78.5 DYSLIPIDEMIA: ICD-10-CM

## 2023-10-16 DIAGNOSIS — I50.32 CHRONIC DIASTOLIC CONGESTIVE HEART FAILURE (HCC): ICD-10-CM

## 2023-10-16 DIAGNOSIS — I49.5 SSS (SICK SINUS SYNDROME) (HCC): Primary | ICD-10-CM

## 2023-10-16 PROCEDURE — 1124F ACP DISCUSS-NO DSCNMKR DOCD: CPT | Performed by: NURSE PRACTITIONER

## 2023-10-16 PROCEDURE — G8484 FLU IMMUNIZE NO ADMIN: HCPCS | Performed by: NURSE PRACTITIONER

## 2023-10-16 PROCEDURE — 1036F TOBACCO NON-USER: CPT | Performed by: NURSE PRACTITIONER

## 2023-10-16 PROCEDURE — 3077F SYST BP >= 140 MM HG: CPT | Performed by: NURSE PRACTITIONER

## 2023-10-16 PROCEDURE — 93280 PM DEVICE PROGR EVAL DUAL: CPT | Performed by: INTERNAL MEDICINE

## 2023-10-16 PROCEDURE — G8420 CALC BMI NORM PARAMETERS: HCPCS | Performed by: NURSE PRACTITIONER

## 2023-10-16 PROCEDURE — 3078F DIAST BP <80 MM HG: CPT | Performed by: NURSE PRACTITIONER

## 2023-10-16 PROCEDURE — 93000 ELECTROCARDIOGRAM COMPLETE: CPT | Performed by: NURSE PRACTITIONER

## 2023-10-16 PROCEDURE — G8427 DOCREV CUR MEDS BY ELIG CLIN: HCPCS | Performed by: NURSE PRACTITIONER

## 2023-10-23 RX ORDER — FUROSEMIDE 40 MG/1
TABLET ORAL
Qty: 180 TABLET | Refills: 0 | Status: SHIPPED | OUTPATIENT
Start: 2023-10-23

## 2023-11-27 DIAGNOSIS — I25.10 CORONARY ARTERY DISEASE INVOLVING NATIVE CORONARY ARTERY OF NATIVE HEART WITHOUT ANGINA PECTORIS: ICD-10-CM

## 2023-11-27 DIAGNOSIS — E78.5 DYSLIPIDEMIA: ICD-10-CM

## 2023-11-27 DIAGNOSIS — I48.0 PAROXYSMAL ATRIAL FIBRILLATION (HCC): ICD-10-CM

## 2023-11-27 DIAGNOSIS — I10 PRIMARY HYPERTENSION: ICD-10-CM

## 2023-11-27 LAB
DEPRECATED RDW RBC AUTO: 16.1 % (ref 12.4–15.4)
HCT VFR BLD AUTO: 33.7 % (ref 40.5–52.5)
HGB BLD-MCNC: 11.1 G/DL (ref 13.5–17.5)
INR PPP: 1.44 (ref 0.84–1.16)
MCH RBC QN AUTO: 27.7 PG (ref 26–34)
MCHC RBC AUTO-ENTMCNC: 32.9 G/DL (ref 31–36)
MCV RBC AUTO: 84.2 FL (ref 80–100)
PLATELET # BLD AUTO: 251 K/UL (ref 135–450)
PMV BLD AUTO: 8.6 FL (ref 5–10.5)
PROTHROMBIN TIME: 17.5 SEC (ref 11.5–14.8)
RBC # BLD AUTO: 4 M/UL (ref 4.2–5.9)
TSH SERPL DL<=0.005 MIU/L-ACNC: 1.37 UIU/ML (ref 0.27–4.2)
WBC # BLD AUTO: 6.1 K/UL (ref 4–11)

## 2023-11-28 LAB
ALBUMIN SERPL-MCNC: 3.9 G/DL (ref 3.4–5)
ALP SERPL-CCNC: 108 U/L (ref 40–129)
ALT SERPL-CCNC: 8 U/L (ref 10–40)
ANION GAP SERPL CALCULATED.3IONS-SCNC: 12 MMOL/L (ref 3–16)
AST SERPL-CCNC: 16 U/L (ref 15–37)
BILIRUB DIRECT SERPL-MCNC: <0.2 MG/DL (ref 0–0.3)
BILIRUB INDIRECT SERPL-MCNC: ABNORMAL MG/DL (ref 0–1)
BILIRUB SERPL-MCNC: 0.3 MG/DL (ref 0–1)
BUN SERPL-MCNC: 18 MG/DL (ref 7–20)
CALCIUM SERPL-MCNC: 8.7 MG/DL (ref 8.3–10.6)
CHLORIDE SERPL-SCNC: 100 MMOL/L (ref 99–110)
CHOLEST SERPL-MCNC: 108 MG/DL (ref 0–199)
CO2 SERPL-SCNC: 27 MMOL/L (ref 21–32)
CREAT SERPL-MCNC: 0.9 MG/DL (ref 0.8–1.3)
GFR SERPLBLD CREATININE-BSD FMLA CKD-EPI: >60 ML/MIN/{1.73_M2}
GLUCOSE SERPL-MCNC: 90 MG/DL (ref 70–99)
HDLC SERPL-MCNC: 42 MG/DL (ref 40–60)
LDL CHOLESTEROL CALCULATED: 44 MG/DL
MAGNESIUM SERPL-MCNC: 2.2 MG/DL (ref 1.8–2.4)
POTASSIUM SERPL-SCNC: 4.1 MMOL/L (ref 3.5–5.1)
PROT SERPL-MCNC: 7.5 G/DL (ref 6.4–8.2)
SODIUM SERPL-SCNC: 139 MMOL/L (ref 136–145)
TRIGL SERPL-MCNC: 108 MG/DL (ref 0–150)
VLDLC SERPL CALC-MCNC: 22 MG/DL

## 2023-11-30 ENCOUNTER — ANTI-COAG VISIT (OUTPATIENT)
Dept: CARDIOLOGY CLINIC | Age: 83
End: 2023-11-30
Payer: MEDICARE

## 2023-11-30 DIAGNOSIS — Z79.01 ON CONTINUOUS ORAL ANTICOAGULATION: ICD-10-CM

## 2023-11-30 DIAGNOSIS — I48.0 PAROXYSMAL ATRIAL FIBRILLATION (HCC): Primary | ICD-10-CM

## 2023-11-30 PROCEDURE — 93793 ANTICOAG MGMT PT WARFARIN: CPT | Performed by: NURSE PRACTITIONER

## 2023-11-30 NOTE — PROGRESS NOTES
ANTICOAGULATION MONITORING    Sunday Ayala, 1940      Anticoagulation Indication(s):  Afib  pAF     Referring Physician:   Dr. Erika Dubose  Goal INR Range:  2.0-3.0  Duration of Anticoagulation Therapy:  Indefinite  Home or Lab Draw: Lab  Product patient has at home: warfarin 5 mg    Recent INR Results:  Lab Results   Component Value Date    INR 1.44 (H) 11/27/2023    INR 1.56 (H) 10/09/2023    INR 2.01 (H) 06/21/2023    INR 1.41 (H) 03/23/2023       INR Summary                          Warfarin regimen (mg)  11/27/23 1.44 Below goal 2.5 5 5 2.5 5 5 2.5 35   10/9/23 1.56 Below goal 2.5 5 5 7.5 then 2.5 2.5 2.5 2.5 30   6/21/23 2.01 At goal, no change 2.5 5 5 2.5 2.5 2.5 2.5 22.5   3/23/23 1.41 Below goal,  2.5 5 5 2.5 2.5 5 then 2.5 5 then 2.5 27.5   2/222/23 2.03 At goal, no change 2.5 5 5 2.5 2.5 2.5 2.5 22.5   11/10/22 3.07 Just above, no change 2.5 5 5 2.5 2.5 2.5 2.5 22.5   9/8/22 2.17 At goal, no change 2.5 5 5 2.5 2.5 2.5 2.5 22.5   8/31/22 3.15 Above goal, hold todays dose 2.5 5 5 2.5 hold 2.5 2.5 30   8/2/22 8/2/22 See below 2.5 5 5 hold 2.5 2.5 2.5    12/9/21 1.85 Just below goal, no change 2.5 5 5 2.5 5 2.5 2.5 22.5   11/16/21 2.36 At goal, no change 2.5 5 5 2.5 5 2.5 2.5 22.5   10/28/21 1.7 Below goal, increase by 2.5 mg 2.5 5 5 2.5 5 2.5 2.5 22.5   10/7/21 2.2 At goal, no  change 2.5 5 5 2.5 2.5 2.5 2.5 20   9/27/21 3.0 At goal, no change 2.5 5 5 2.5 2.5 2.5 2.5 20   9/20/21 2.7 At goal, no change 2.5 5 2.5 2.5 5 2.5 2.5 22.5   9/15/21 4.30 Above goal, hold tonite and decrease by 2.5 mg 2.5 5 2.5 5 hold 2.5 2.5 20   9/29/21 2.47 At goal, no change  5 5 5 5 5 5 5 35   9/1.21 1.9 At goal, no change 5 5 5 5 5 5 5 35   8/17/21 2.40 At goal, no change 2.5 5 2.5 2.5 5 5 2.5 25   7/29/21 1.7 At goal, no change 2.5 5 2.5 2.5 5 5 2.5 25   6/30221 2.38 At goal, no  change 2.5 5 2.5 2.5 7.5 5 2.5 27.5   6/22/21 1.68 Below goal, increase by 2.5 mg 2.5 5 2.5 2.5 7.5 5 2.5 27.5   5/25/21 2.1 At goal, no change 2.5

## 2023-12-27 DIAGNOSIS — I10 PRIMARY HYPERTENSION: ICD-10-CM

## 2023-12-27 DIAGNOSIS — I48.0 PAROXYSMAL ATRIAL FIBRILLATION (HCC): ICD-10-CM

## 2023-12-27 DIAGNOSIS — I25.10 CORONARY ARTERY DISEASE INVOLVING NATIVE CORONARY ARTERY OF NATIVE HEART WITHOUT ANGINA PECTORIS: ICD-10-CM

## 2023-12-27 LAB
INR PPP: 2.07 (ref 0.84–1.16)
PROTHROMBIN TIME: 23.2 SEC (ref 11.5–14.8)

## 2024-01-18 ENCOUNTER — OFFICE VISIT (OUTPATIENT)
Dept: CARDIOLOGY CLINIC | Age: 84
End: 2024-01-18
Payer: MEDICARE

## 2024-01-18 VITALS
BODY MASS INDEX: 25.13 KG/M2 | DIASTOLIC BLOOD PRESSURE: 82 MMHG | HEART RATE: 80 BPM | WEIGHT: 170.2 LBS | SYSTOLIC BLOOD PRESSURE: 140 MMHG

## 2024-01-18 DIAGNOSIS — E78.5 HYPERLIPIDEMIA, UNSPECIFIED HYPERLIPIDEMIA TYPE: Primary | ICD-10-CM

## 2024-01-18 DIAGNOSIS — I10 HTN (HYPERTENSION), BENIGN: ICD-10-CM

## 2024-01-18 DIAGNOSIS — I25.10 CORONARY ARTERY DISEASE INVOLVING NATIVE CORONARY ARTERY OF NATIVE HEART WITHOUT ANGINA PECTORIS: ICD-10-CM

## 2024-01-18 PROCEDURE — G8427 DOCREV CUR MEDS BY ELIG CLIN: HCPCS | Performed by: INTERNAL MEDICINE

## 2024-01-18 PROCEDURE — 3077F SYST BP >= 140 MM HG: CPT | Performed by: INTERNAL MEDICINE

## 2024-01-18 PROCEDURE — 3079F DIAST BP 80-89 MM HG: CPT | Performed by: INTERNAL MEDICINE

## 2024-01-18 PROCEDURE — G8419 CALC BMI OUT NRM PARAM NOF/U: HCPCS | Performed by: INTERNAL MEDICINE

## 2024-01-18 PROCEDURE — G8484 FLU IMMUNIZE NO ADMIN: HCPCS | Performed by: INTERNAL MEDICINE

## 2024-01-18 PROCEDURE — 1036F TOBACCO NON-USER: CPT | Performed by: INTERNAL MEDICINE

## 2024-01-18 PROCEDURE — 99214 OFFICE O/P EST MOD 30 MIN: CPT | Performed by: INTERNAL MEDICINE

## 2024-01-18 PROCEDURE — 1124F ACP DISCUSS-NO DSCNMKR DOCD: CPT | Performed by: INTERNAL MEDICINE

## 2024-01-18 NOTE — PROGRESS NOTES
of Paying Living Expenses: Not hard at all   Food Insecurity: No Food Insecurity (6/30/2022)    Hunger Vital Sign     Worried About Running Out of Food in the Last Year: Never true     Ran Out of Food in the Last Year: Never true   Transportation Needs: Not on file   Physical Activity: Not on file   Stress: Not on file   Social Connections: Not on file   Intimate Partner Violence: Not on file   Housing Stability: Not on file        Patient has a family history includes Cancer in his sister; Coronary Art Dis in his brother.    Patient  has a past medical history of Allergic rhinitis, Atrial fibrillation ([facility]), Benign prostatic hypertrophy, CAD (coronary artery disease), Cancer ([facility]), CHF (congestive heart failure) ([facility]), Coronary artery disease involving native coronary artery of native heart without angina pectoris, DDD (degenerative disc disease), lumbar, Falls, GI bleeding, Hyperlipidemia, Hypertension, MI (myocardial infarction) ([facility]), MRSA (methicillin resistant staph aureus) culture positive, Osteomyelitis ([facility]), Pneumonia, S/P PTCA (percutaneous transluminal coronary angioplasty), SSS (sick sinus syndrome) ([facility]), Unspecified sleep apnea, and Venous (peripheral) insufficiency.         Vitals  Weight: 77.2 kg (170 lb 3.2 oz)  Blood Pressure:  116/68  Pulse: 80       Review of Systems   Constitutional: Negative.    HENT: Negative.    Eyes: Negative.    Respiratory: Negative.    Cardiovascular: Negative.    Gastrointestinal: Negative.    Genitourinary: Negative.      Exam unchanged from 7/29/22.  Objective:   Physical Exam   Nursing note and vitals reviewed.  Constitutional: He is oriented to person, place, and time. He appears well-developed and well-nourished. No distress.   HENT:   Head: Normocephalic and atraumatic.   Mouth/Throat: No oropharyngeal exudate.   Eyes: Conjunctivae and EOM are normal. Pupils are equal, round, and reactive to light. No scleral icterus.   Neck: Normal range of motion. No JVD

## 2024-01-23 RX ORDER — FUROSEMIDE 40 MG/1
TABLET ORAL
Qty: 180 TABLET | Refills: 0 | Status: SHIPPED | OUTPATIENT
Start: 2024-01-23

## 2024-02-12 NOTE — PROGRESS NOTES
Assessment completed. Patient up in chair, alert and oriented and able to make all his needs known. Dressing to left foot intact, no drainage noted. Medications administered as ordered. B/P medications held d/t B/P 117/59. POC and education reviewed and mutually agreed upon. Pt stated he does not want IV access at this time. Will update MD. Family at bedside. All needs met at this time. Call light in reach. Will continue to monitor. 1250: Discharge instructions reviewed with patient and daughter. 1305: Patient discharged via w/c with all personal belongings, cane, and safety boot. Patient informed of negative urine culture results. Verbally voiced understanding.

## 2024-02-15 DIAGNOSIS — I10 PRIMARY HYPERTENSION: ICD-10-CM

## 2024-02-15 DIAGNOSIS — I25.10 CORONARY ARTERY DISEASE INVOLVING NATIVE CORONARY ARTERY OF NATIVE HEART WITHOUT ANGINA PECTORIS: ICD-10-CM

## 2024-02-15 DIAGNOSIS — E78.5 HYPERLIPIDEMIA, UNSPECIFIED HYPERLIPIDEMIA TYPE: ICD-10-CM

## 2024-02-15 DIAGNOSIS — I48.0 PAROXYSMAL ATRIAL FIBRILLATION (HCC): ICD-10-CM

## 2024-02-15 DIAGNOSIS — I10 HTN (HYPERTENSION), BENIGN: ICD-10-CM

## 2024-02-16 ENCOUNTER — ANTI-COAG VISIT (OUTPATIENT)
Dept: CARDIOLOGY CLINIC | Age: 84
End: 2024-02-16
Payer: MEDICARE

## 2024-02-16 ENCOUNTER — ANTI-COAG VISIT (OUTPATIENT)
Dept: CARDIOLOGY CLINIC | Age: 84
End: 2024-02-16

## 2024-02-16 DIAGNOSIS — Z79.01 ON CONTINUOUS ORAL ANTICOAGULATION: ICD-10-CM

## 2024-02-16 DIAGNOSIS — I48.0 PAROXYSMAL ATRIAL FIBRILLATION (HCC): Primary | ICD-10-CM

## 2024-02-16 LAB
BASOPHILS # BLD: 0 K/UL (ref 0–0.2)
BASOPHILS NFR BLD: 0.2 %
DEPRECATED RDW RBC AUTO: 17.2 % (ref 12.4–15.4)
EOSINOPHIL # BLD: 0.1 K/UL (ref 0–0.6)
EOSINOPHIL NFR BLD: 2.3 %
HCT VFR BLD AUTO: 32.4 % (ref 40.5–52.5)
HGB BLD-MCNC: 10.9 G/DL (ref 13.5–17.5)
INR BLD: 1.08
INR PPP: 1.08 (ref 0.84–1.16)
LYMPHOCYTES # BLD: 1.7 K/UL (ref 1–5.1)
LYMPHOCYTES NFR BLD: 31.7 %
MCH RBC QN AUTO: 27.5 PG (ref 26–34)
MCHC RBC AUTO-ENTMCNC: 33.5 G/DL (ref 31–36)
MCV RBC AUTO: 82.1 FL (ref 80–100)
MONOCYTES # BLD: 0.4 K/UL (ref 0–1.3)
MONOCYTES NFR BLD: 8.5 %
NEUTROPHILS # BLD: 3 K/UL (ref 1.7–7.7)
NEUTROPHILS NFR BLD: 57.3 %
PLATELET # BLD AUTO: 238 K/UL (ref 135–450)
PMV BLD AUTO: 8.7 FL (ref 5–10.5)
PROTHROMBIN TIME: 14 SEC (ref 11.5–14.8)
RBC # BLD AUTO: 3.94 M/UL (ref 4.2–5.9)
WBC # BLD AUTO: 5.3 K/UL (ref 4–11)

## 2024-02-16 PROCEDURE — 93793 ANTICOAG MGMT PT WARFARIN: CPT | Performed by: NURSE PRACTITIONER

## 2024-02-16 NOTE — PROGRESS NOTES
ANTICOAGULATION MONITORING    Juan Grant, 1940      Anticoagulation Indication(s):  Afib  pAF     Referring Physician:   Dr. Love  Goal INR Range:  2.0-3.0  Duration of Anticoagulation Therapy:  Indefinite  Home or Lab Draw: Lab  Product patient has at home: warfarin 5 mg    Recent INR Results:  Lab Results   Component Value Date    INR 1.08 02/16/2024    INR 1.08 02/15/2024    INR 2.07 (H) 12/27/2023    INR 1.44 (H) 11/27/2023       INR Summary                          Warfarin regimen (mg)  2/16/24 1.08 Below goal,     2.5 5 5 2.5 5 10 then 5 2.5 40   11/27/23 1.44 Below goal 2.5 5 5 2.5 5 5 2.5 35   10/9/23 1.56 Below goal 2.5 5 5 7.5 then 2.5 2.5 2.5 2.5 30   6/21/23 2.01 At goal, no change 2.5 5 5 2.5 2.5 2.5 2.5 22.5   3/23/23 1.41 Below goal,  2.5 5 5 2.5 2.5 5 then 2.5 5 then 2.5 27.5   2/222/23 2.03 At goal, no change 2.5 5 5 2.5 2.5 2.5 2.5 22.5   11/10/22 3.07 Just above, no change 2.5 5 5 2.5 2.5 2.5 2.5 22.5   9/8/22 2.17 At goal, no change 2.5 5 5 2.5 2.5 2.5 2.5 22.5   8/31/22 3.15 Above goal, hold todays dose 2.5 5 5 2.5 hold 2.5 2.5 30   8/2/22 8/2/22 See below 2.5 5 5 hold 2.5 2.5 2.5        Next INR check:2/23/24    Spoke with daughter and went over dosing scale    Addendum:  Reviewed documentation and plan of care from RN  Agree with above  Rochelle Berkowitz NP

## 2024-02-19 NOTE — TELEPHONE ENCOUNTER
Identity verified. Patient is advised results and recommendations. Spoke with daughter and per MELODIE have pt drop back down to 300 mg of Clindamycin TID. Also suggest that pt start taking a probiotic also.

## 2024-03-05 DIAGNOSIS — I25.10 CORONARY ARTERY DISEASE INVOLVING NATIVE CORONARY ARTERY OF NATIVE HEART WITHOUT ANGINA PECTORIS: ICD-10-CM

## 2024-03-05 DIAGNOSIS — I10 PRIMARY HYPERTENSION: ICD-10-CM

## 2024-03-05 DIAGNOSIS — I48.0 PAROXYSMAL ATRIAL FIBRILLATION (HCC): ICD-10-CM

## 2024-03-05 LAB
INR PPP: 1.05 (ref 0.84–1.16)
PROTHROMBIN TIME: 13.7 SEC (ref 11.5–14.8)

## 2024-03-06 ENCOUNTER — ANTI-COAG VISIT (OUTPATIENT)
Dept: CARDIOLOGY CLINIC | Age: 84
End: 2024-03-06
Payer: MEDICARE

## 2024-03-06 DIAGNOSIS — I48.0 PAROXYSMAL ATRIAL FIBRILLATION (HCC): Primary | ICD-10-CM

## 2024-03-06 DIAGNOSIS — Z79.01 ON CONTINUOUS ORAL ANTICOAGULATION: ICD-10-CM

## 2024-03-06 PROCEDURE — 93793 ANTICOAG MGMT PT WARFARIN: CPT | Performed by: NURSE PRACTITIONER

## 2024-03-06 NOTE — PROGRESS NOTES
ANTICOAGULATION MONITORING    Juan Grant, 1940      Anticoagulation Indication(s):  Afib  pAF     Referring Physician:   Dr. Love  Goal INR Range:  2.0-3.0  Duration of Anticoagulation Therapy:  Indefinite  Home or Lab Draw: Lab  Product patient has at home: warfarin 5 mg    Recent INR Results:  Lab Results   Component Value Date    INR 1.05 03/05/2024    INR 1.08 02/16/2024    INR 1.08 02/15/2024    INR 2.07 (H) 12/27/2023       INR Summary                          Warfarin regimen (mg)  3/5/24 1.05 Below goal, see note below 2.5 5 5 2.5 5 5 2.5 40   2/16/24 1.08 Below goal,     2.5 5 5 2.5 5 10 then 5 2.5 40   11/27/23 1.44 Below goal 2.5 5 5 2.5 5 5 2.5 35   10/9/23 1.56 Below goal 2.5 5 5 7.5 then 2.5 2.5 2.5 2.5 30   6/21/23 2.01 At goal, no change 2.5 5 5 2.5 2.5 2.5 2.5 22.5   3/23/23 1.41 Below goal,  2.5 5 5 2.5 2.5 5 then 2.5 5 then 2.5 27.5   2/222/23 2.03 At goal, no change 2.5 5 5 2.5 2.5 2.5 2.5 22.5   11/10/22 3.07 Just above, no change 2.5 5 5 2.5 2.5 2.5 2.5 22.5   9/8/22 2.17 At goal, no change 2.5 5 5 2.5 2.5 2.5 2.5 22.5   8/31/22 3.15 Above goal, hold todays dose 2.5 5 5 2.5 hold 2.5 2.5 30   8/2/22 8/2/22 See below 2.5 5 5 hold 2.5 2.5 2.5        Next INR check:3/12/24    Daughter not sure if pt is taking his coumadin.   She is going to be there this week to make sure he is taking his daily dose, so that we can get an accurate INR.  Will recheck next week    Addendum:  Reviewed documentation and plan of care from RN  Agree with above  Rochelle Berkowitz NP

## 2024-03-14 DIAGNOSIS — I25.10 CORONARY ARTERY DISEASE INVOLVING NATIVE CORONARY ARTERY OF NATIVE HEART WITHOUT ANGINA PECTORIS: ICD-10-CM

## 2024-03-14 DIAGNOSIS — I10 PRIMARY HYPERTENSION: ICD-10-CM

## 2024-03-14 DIAGNOSIS — I48.0 PAROXYSMAL ATRIAL FIBRILLATION (HCC): ICD-10-CM

## 2024-03-14 LAB
INR PPP: 1.08 (ref 0.84–1.16)
PROTHROMBIN TIME: 14 SEC (ref 11.5–14.8)

## 2024-03-15 ENCOUNTER — ANTI-COAG VISIT (OUTPATIENT)
Dept: CARDIOLOGY CLINIC | Age: 84
End: 2024-03-15
Payer: MEDICARE

## 2024-03-15 DIAGNOSIS — Z79.01 ON CONTINUOUS ORAL ANTICOAGULATION: ICD-10-CM

## 2024-03-15 DIAGNOSIS — I48.0 PAROXYSMAL ATRIAL FIBRILLATION (HCC): Primary | ICD-10-CM

## 2024-03-15 PROCEDURE — 93793 ANTICOAG MGMT PT WARFARIN: CPT | Performed by: NURSE PRACTITIONER

## 2024-03-15 NOTE — PROGRESS NOTES
"Omkar Monroy is 2 week old, here for a preventive care visit.    Assessment & Plan     (Z00.111) [facility] (well child check),  8-28 days old  (primary encounter diagnosis)  Comment: doing great  Plan: continue current care    Growth      Weight change since birth: 13%  Wt Readings from Last 4 Encounters:   22 7 lb 10 oz (3.459 kg) (20 %, Z= -0.84)*   22 6 lb 11.5 oz (3.048 kg) (16 %, Z= -1.00)*   22 6 lb 10.5 oz (3.019 kg) (18 %, Z= -0.91)*     * Growth percentiles are based on WHO (Boys, 0-2 years) data.       Normal OFC, length and weight    Immunizations     Vaccines up to date.      Anticipatory Guidance    Reviewed age appropriate anticipatory guidance.   The following topics were discussed:  SOCIAL/FAMILY    sibling rivalry    calming techniques    advice from others  NUTRITION:    delay solid food    always hold to feed/ never prop bottle  HEALTH/ SAFETY:    sleep habits    temperature taking    car seat    safe crib environment        Referrals/Ongoing Specialty Care  Verbal referral for routine dental care    Follow Up      Return in about 3 weeks (around 2022) for Preventive Care visit.    Subjective     Additional Questions 2022   Do you have any questions today that you would like to discuss? No   Has your child had a surgery, major illness or injury since the last physical exam? No           Birth History  Birth History     Birth     Length: 1' 7.5\" (49.5 cm)     Weight: 6 lb 12.3 oz (3.07 kg)     HC 13\" (33 cm)     Apgar     One: 8     Five: 9     Gestation Age: 38 6/7 wks     Immunization History   Administered Date(s) Administered     Hep B, Peds or Adolescent 2022     Hepatitis B # 1 given in nursery: yes   metabolic screening: All components normal   hearing screen: Passed--parent report      Hearing Screen:   Hearing Screen, Right Ear: passed        Hearing Screen, Left Ear: passed             CCHD Screen:   Right upper extremity -  Right " ANTICOAGULATION MONITORING    Juan Grant, 1940      Anticoagulation Indication(s):  Afib  pAF     Referring Physician:   Dr. Love  Goal INR Range:  2.0-3.0  Duration of Anticoagulation Therapy:  Indefinite  Home or Lab Draw: Lab  Product patient has at home: warfarin 5 mg    Recent INR Results:  Lab Results   Component Value Date    INR 1.08 03/14/2024    INR 1.05 03/05/2024    INR 1.08 02/16/2024    INR 1.08 02/15/2024       INR Summary                          Warfarin regimen (mg)     Sun Mon Tues W Thurs F Sat    3/14/24 1.08 Below goal,  5 5 5 2.5 5 5 5 45   3/5/24 1.05 Below goal, see note below 2.5 5 5 2.5 5 5 2.5 40   2/16/24 1.08 Below goal,     2.5 5 5 2.5 5 10 then 5 2.5 40   11/27/23 1.44 Below goal 2.5 5 5 2.5 5 5 2.5 35   10/9/23 1.56 Below goal 2.5 5 5 7.5 then 2.5 2.5 2.5 2.5 30   6/21/23 2.01 At goal, no change 2.5 5 5 2.5 2.5 2.5 2.5 22.5   3/23/23 1.41 Below goal,  2.5 5 5 2.5 2.5 5 then 2.5 5 then 2.5 27.5   2/222/23 2.03 At goal, no change 2.5 5 5 2.5 2.5 2.5 2.5 22.5   11/10/22 3.07 Just above, no change 2.5 5 5 2.5 2.5 2.5 2.5 22.5   9/8/22 2.17 At goal, no change 2.5 5 5 2.5 2.5 2.5 2.5 22.5   8/31/22 3.15 Above goal, hold todays dose 2.5 5 5 2.5 hold 2.5 2.5 30   8/2/22 8/2/22 See below 2.5 5 5 hold 2.5 2.5 2.5        Next INR check:3/22/24    Spoke with Nisreen.  Went over dosing scale. Will repeat next week    Addendum:  Reviewed documentation and plan of care from RN  Agree with above  Rochelle Berkowitz NP        Hand (%): 99 %     Lower extremity -  Foot (%): 100 %     CCHD Interpretation - Critical Congenital Heart Screen Result: pass         Social 2022   Who does your child live with? Parent(s), Sibling(s)   Who takes care of your child? Parent(s)   Has your child experienced any stressful family events recently? None   In the past 12 months, has lack of transportation kept you from medical appointments or from getting medications? No   In the last 12 months, was there a time when you were not able to pay the mortgage or rent on time? No   In the last 12 months, was there a time when you did not have a steady place to sleep or slept in a shelter (including now)? No       Health Risks/Safety 2022   What type of car seat does your child use?  Infant car seat   Is your child's car seat forward or rear facing? Rear facing   Where does your child sit in the car?  Back seat       TB Screening 2022   Was your child born outside of the [place]? No     TB Screening 2022   Since your last Well Child visit, have any of your child's family members or close contacts had tuberculosis or a positive tuberculosis test? No            Diet 2022   Do you have questions about feeding your baby? No   What does your baby eat?  Breast milk, Formula   Which type of formula? Enfamil   How does your baby eat? Breast feeding / Nursing, Bottle   How often does your baby eat? (From the start of one feed to start of the next feed) Every 2-3hrs   Do you give your child vitamins or supplements? None   Within the past 12 months, you worried that your food would run out before you got money to buy more. Never true   Within the past 12 months, the food you bought just didn't last and you didn't have money to get more. Never true     Elimination 2022   How many times per day does your baby have a wet diaper?  5 or more times per 24 hours   How many times per day does your baby poop?  4 or more times per 24 hours  "            Sleep 2022   Where does your baby sleep? Bassinet   In what position does your baby sleep? Back   How many times does your child wake in the night?  2-3     Vision/Hearing 2022   Do you have any concerns about your child's hearing or vision?  No concerns         Development/ Social-Emotional Screen 2022   Does your child receive any special services? No     Development  Milestones (by observation/ exam/ report) 75-90% ile  PERSONAL/ SOCIAL/COGNITIVE:    Sustains periods of wakefulness for feeding    Makes brief eye contact with adult when held  LANGUAGE:    Cries with discomfort    Calms to adult's voice  GROSS MOTOR:    Lifts head briefly when prone    Kicks / equal movements  FINE MOTOR/ ADAPTIVE:    Keeps hands in a fist               Objective     Exam  Pulse 149   Temp 98.8  F (37.1  C) (Axillary)   Ht 1' 8.08\" (0.51 m)   Wt 7 lb 10 oz (3.459 kg)   HC 13.78\" (35 cm)   SpO2 98%   BMI 13.30 kg/m    24 %ile (Z= -0.69) based on WHO (Boys, 0-2 years) head circumference-for-age based on Head Circumference recorded on 2022.  20 %ile (Z= -0.84) based on WHO (Boys, 0-2 years) weight-for-age data using vitals from 2022.  25 %ile (Z= -0.66) based on WHO (Boys, 0-2 years) Length-for-age data based on Length recorded on 2022.  40 %ile (Z= -0.26) based on WHO (Boys, 0-2 years) weight-for-recumbent length data based on body measurements available as of 2022.  Physical Exam  GENERAL: Active, alert, in no acute distress.  SKIN: Clear. No significant rash, abnormal pigmentation or lesions  HEAD: Normocephalic. Normal fontanels and sutures.  EYES: Conjunctivae and cornea normal. Red reflexes present bilaterally.  EARS: Normal canals. Tympanic membranes are normal; gray and translucent.  NOSE: Normal without discharge.  MOUTH/THROAT: Clear. No oral lesions.  NECK: Supple, no masses.  LYMPH NODES: No adenopathy  LUNGS: Clear. No rales, rhonchi, wheezing or retractions  HEART: " Regular rhythm. Normal S1/S2. No murmurs. Normal femoral pulses.  ABDOMEN: Soft, non-tender, not distended, no masses or hepatosplenomegaly. Normal umbilicus and bowel sounds.   GENITALIA: Normal male external genitalia. Cj stage I,  Testes descended bilaterally, no hernia or hydrocele.    EXTREMITIES: Hips normal with negative Ortolani and Abernathy. Symmetric creases and  no deformities  NEUROLOGIC: Normal tone throughout. Normal reflexes for age          Mary Hernandez MD  [facility]

## 2024-03-21 DIAGNOSIS — I10 PRIMARY HYPERTENSION: ICD-10-CM

## 2024-03-21 DIAGNOSIS — I48.0 PAROXYSMAL ATRIAL FIBRILLATION (HCC): ICD-10-CM

## 2024-03-21 DIAGNOSIS — I25.10 CORONARY ARTERY DISEASE INVOLVING NATIVE CORONARY ARTERY OF NATIVE HEART WITHOUT ANGINA PECTORIS: ICD-10-CM

## 2024-03-21 LAB
INR PPP: 2.3 (ref 0.84–1.16)
PROTHROMBIN TIME: 25.2 SEC (ref 11.5–14.8)

## 2024-03-22 ENCOUNTER — ANTI-COAG VISIT (OUTPATIENT)
Dept: CARDIOLOGY CLINIC | Age: 84
End: 2024-03-22
Payer: MEDICARE

## 2024-03-22 DIAGNOSIS — Z79.01 ON CONTINUOUS ORAL ANTICOAGULATION: ICD-10-CM

## 2024-03-22 DIAGNOSIS — I48.0 PAROXYSMAL ATRIAL FIBRILLATION (HCC): Primary | ICD-10-CM

## 2024-03-22 PROCEDURE — 93793 ANTICOAG MGMT PT WARFARIN: CPT | Performed by: NURSE PRACTITIONER

## 2024-03-22 NOTE — PROGRESS NOTES
ANTICOAGULATION MONITORING    Juan Grant, 1940      Anticoagulation Indication(s):  Afib  pAF     Referring Physician:   Dr. Love  Goal INR Range:  2.0-3.0  Duration of Anticoagulation Therapy:  Indefinite  Home or Lab Draw: Lab  Product patient has at home: warfarin 5 mg    Recent INR Results:  Lab Results   Component Value Date    INR 2.30 (H) 03/21/2024    INR 1.08 03/14/2024    INR 1.05 03/05/2024    INR 1.08 02/16/2024       INR Summary                          Warfarin regimen (mg)     Sun Mon Tufabrice W Thmaribel F Sat    3/21/24 2.30 At goal, no change 5 5 5 2.5 5 5 5 32.5   3/14/24 1.08 Below goal,  5 5 5 2.5 5 5 5 32.5   3/5/24 1.05 Below goal, see note below 2.5 5 5 2.5 5 5 2.5 27.5   2/16/24 1.08 Below goal,     2.5 5 5 2.5 5 10 then 5 2.5 32.5   11/27/23 1.44 Below goal 2.5 5 5 2.5 5 5 2.5 27.5   10/9/23 1.56 Below goal 2.5 5 5 7.5 then 2.5 2.5 2.5 2.5 22.5   6/21/23 2.01 At goal, no change 2.5 5 5 2.5 2.5 2.5 2.5 22.5   3/23/23 1.41 Below goal,  2.5 5 5 2.5 2.5 5 then 2.5 5 then 2.5 22.5   2/222/23 2.03 At goal, no change 2.5 5 5 2.5 2.5 2.5 2.5 22.5   11/10/22 3.07 Just above, no change 2.5 5 5 2.5 2.5 2.5 2.5 22.5   9/8/22 2.17 At goal, no change 2.5 5 5 2.5 2.5 2.5 2.5 22.5   8/31/22 3.15 Above goal, hold todays dose 2.5 5 5 2.5 hold 2.5 2.5 20   8/2/22 8/2/22 See below 2.5 5 5 hold 2.5 2.5 2.5 20       Next INR check:4/12/24    Spoke with Nisreen.  Went over dosing scale.     Addendum:  Reviewed documentation and plan of care from RN  Agree with above  Rochelle Berkowitz NP       
Patient/Caregiver provided printed discharge information.

## 2024-04-18 NOTE — PROGRESS NOTES
pressure is mildly elevated at 40 mmHg   assuming a right atrial pressure of 8 mmHg.   -The left atrium is mildly dilated.     2. Stress Test 6/23/15:     Summary    Small sized inferior fixed defect consistent with infarction in the    territory of the mid and distal LCx and/or RCA .    No ischemia    Normal LVEF.     3. Cath 3/2012 (Dr. Love):   CONCLUSION:  Minor irregularities in the coronary circulation, patent stents  in the right coronary artery, patent stent in the circumflex coronary artery,  LAD with luminal irregularities but no obstructive disease of significance.   Left main normal.  Preserved left ventricular systolic function.  Successful  Angio-Seal of right femoral arteriotomy.      The MCOT, echocardiogram, stress test, and coronary angiography/PCI were reviewed by myself and used for my plan of care.    The CIED was interrogated and programmed and I supervised and reviewed all the data. All findings and changes are in device interrogation sheat and reflect my personal interpretation and changes and is scanned to Epic.     - The patient is counseled to follow a low salt diet to assure blood pressure remains controlled for cardiovascular risk factor modification.   - The patient is counseled to avoid excess caffeine, and energy drinks as this may exacerbated ectopy and arrhythmia.   - The patient is counseled to get regular exercise 3-5 times per week to control cardiovascular risk factors.   - The patient is counseled to lose weigt to control cardiovascular risk factors.  -The patient is counseled about the health hazards of smoking including cardiovascular side effects and its impact on morbidity and mortality.      Thank you for allowing me to participate in the care of Juan Grant. All questions and concerns were addressed to the patient/family. Alternatives to my treatment were discussed.     I, Emily Duran RN, am scribing for and in the presence of Dr. Sanjiv Patino.

## 2024-04-22 RX ORDER — FUROSEMIDE 40 MG/1
TABLET ORAL
Qty: 180 TABLET | Refills: 0 | OUTPATIENT
Start: 2024-04-22

## 2024-04-23 ENCOUNTER — HOSPITAL ENCOUNTER (INPATIENT)
Age: 84
LOS: 1 days | Discharge: HOME | DRG: 313 | End: 2024-04-25
Attending: STUDENT IN AN ORGANIZED HEALTH CARE EDUCATION/TRAINING PROGRAM | Admitting: INTERNAL MEDICINE
Payer: MEDICARE

## 2024-04-23 ENCOUNTER — APPOINTMENT (OUTPATIENT)
Dept: CT IMAGING | Age: 84
DRG: 313 | End: 2024-04-23
Payer: MEDICARE

## 2024-04-23 DIAGNOSIS — R07.9 CHEST PAIN, UNSPECIFIED TYPE: Primary | ICD-10-CM

## 2024-04-23 LAB
ALBUMIN SERPL-MCNC: 4.1 G/DL (ref 3.4–5)
ALP SERPL-CCNC: 88 U/L (ref 40–129)
ALT SERPL-CCNC: 6 U/L (ref 10–40)
ANION GAP SERPL CALCULATED.3IONS-SCNC: 12 MMOL/L (ref 3–16)
APTT BLD: 28.7 SEC (ref 22.1–36.4)
AST SERPL-CCNC: 21 U/L (ref 15–37)
BACTERIA URNS QL MICRO: ABNORMAL /HPF
BASOPHILS # BLD: 0.1 K/UL (ref 0–0.2)
BASOPHILS NFR BLD: 1 %
BILIRUB DIRECT SERPL-MCNC: <0.2 MG/DL (ref 0–0.3)
BILIRUB INDIRECT SERPL-MCNC: ABNORMAL MG/DL (ref 0–1)
BILIRUB SERPL-MCNC: 0.5 MG/DL (ref 0–1)
BILIRUB UR QL STRIP.AUTO: NEGATIVE
BUN SERPL-MCNC: 26 MG/DL (ref 7–20)
CALCIUM SERPL-MCNC: 9.3 MG/DL (ref 8.3–10.6)
CHLORIDE SERPL-SCNC: 99 MMOL/L (ref 99–110)
CLARITY UR: CLEAR
CO2 SERPL-SCNC: 26 MMOL/L (ref 21–32)
COLOR UR: YELLOW
CREAT SERPL-MCNC: 1.1 MG/DL (ref 0.8–1.3)
DEPRECATED RDW RBC AUTO: 17.3 % (ref 12.4–15.4)
EKG ATRIAL RATE: 68 BPM
EKG DIAGNOSIS: NORMAL
EKG Q-T INTERVAL: 438 MS
EKG QRS DURATION: 148 MS
EKG QTC CALCULATION (BAZETT): 469 MS
EKG R AXIS: -58 DEGREES
EKG T AXIS: 92 DEGREES
EKG VENTRICULAR RATE: 69 BPM
EOSINOPHIL # BLD: 0.1 K/UL (ref 0–0.6)
EOSINOPHIL NFR BLD: 2.3 %
EPI CELLS #/AREA URNS AUTO: 2 /HPF (ref 0–5)
GFR SERPLBLD CREATININE-BSD FMLA CKD-EPI: 66 ML/MIN/{1.73_M2}
GLUCOSE SERPL-MCNC: 81 MG/DL (ref 70–99)
GLUCOSE UR STRIP.AUTO-MCNC: NEGATIVE MG/DL
HCT VFR BLD AUTO: 32.3 % (ref 40.5–52.5)
HGB BLD-MCNC: 10.7 G/DL (ref 13.5–17.5)
HGB UR QL STRIP.AUTO: NEGATIVE
HYALINE CASTS #/AREA URNS AUTO: 1 /LPF (ref 0–8)
INR PPP: 1.39 (ref 0.85–1.15)
KETONES UR STRIP.AUTO-MCNC: NEGATIVE MG/DL
LEUKOCYTE ESTERASE UR QL STRIP.AUTO: ABNORMAL
LIPASE SERPL-CCNC: 20 U/L (ref 13–60)
LYMPHOCYTES # BLD: 1.5 K/UL (ref 1–5.1)
LYMPHOCYTES NFR BLD: 26.8 %
MAGNESIUM SERPL-MCNC: 2.5 MG/DL (ref 1.8–2.4)
MCH RBC QN AUTO: 27.1 PG (ref 26–34)
MCHC RBC AUTO-ENTMCNC: 33.2 G/DL (ref 31–36)
MCV RBC AUTO: 81.7 FL (ref 80–100)
MONOCYTES # BLD: 0.5 K/UL (ref 0–1.3)
MONOCYTES NFR BLD: 8.7 %
NEUTROPHILS # BLD: 3.3 K/UL (ref 1.7–7.7)
NEUTROPHILS NFR BLD: 61.2 %
NITRITE UR QL STRIP.AUTO: NEGATIVE
PH UR STRIP.AUTO: 6 [PH] (ref 5–8)
PLATELET # BLD AUTO: 249 K/UL (ref 135–450)
PMV BLD AUTO: 8 FL (ref 5–10.5)
POTASSIUM SERPL-SCNC: 4.4 MMOL/L (ref 3.5–5.1)
PROT SERPL-MCNC: 8.1 G/DL (ref 6.4–8.2)
PROT UR STRIP.AUTO-MCNC: NEGATIVE MG/DL
PROTHROMBIN TIME: 17.2 SEC (ref 11.9–14.9)
RBC # BLD AUTO: 3.95 M/UL (ref 4.2–5.9)
RBC CLUMPS #/AREA URNS AUTO: 1 /HPF (ref 0–4)
SODIUM SERPL-SCNC: 137 MMOL/L (ref 136–145)
SP GR UR STRIP.AUTO: 1.01 (ref 1–1.03)
TROPONIN, HIGH SENSITIVITY: 34 NG/L (ref 0–22)
TROPONIN, HIGH SENSITIVITY: 39 NG/L (ref 0–22)
TROPONIN, HIGH SENSITIVITY: 41 NG/L (ref 0–22)
UA COMPLETE W REFLEX CULTURE PNL UR: ABNORMAL
UA DIPSTICK W REFLEX MICRO PNL UR: YES
URN SPEC COLLECT METH UR: ABNORMAL
UROBILINOGEN UR STRIP-ACNC: 1 E.U./DL
WBC # BLD AUTO: 5.5 K/UL (ref 4–11)
WBC #/AREA URNS AUTO: 8 /HPF (ref 0–5)

## 2024-04-23 PROCEDURE — 99285 EMERGENCY DEPT VISIT HI MDM: CPT

## 2024-04-23 PROCEDURE — 93005 ELECTROCARDIOGRAM TRACING: CPT | Performed by: STUDENT IN AN ORGANIZED HEALTH CARE EDUCATION/TRAINING PROGRAM

## 2024-04-23 PROCEDURE — 81001 URINALYSIS AUTO W/SCOPE: CPT

## 2024-04-23 PROCEDURE — 71275 CT ANGIOGRAPHY CHEST: CPT

## 2024-04-23 PROCEDURE — 93971 EXTREMITY STUDY: CPT

## 2024-04-23 PROCEDURE — 6370000000 HC RX 637 (ALT 250 FOR IP): Performed by: STUDENT IN AN ORGANIZED HEALTH CARE EDUCATION/TRAINING PROGRAM

## 2024-04-23 PROCEDURE — 80076 HEPATIC FUNCTION PANEL: CPT

## 2024-04-23 PROCEDURE — 36415 COLL VENOUS BLD VENIPUNCTURE: CPT

## 2024-04-23 PROCEDURE — G0378 HOSPITAL OBSERVATION PER HR: HCPCS

## 2024-04-23 PROCEDURE — 83735 ASSAY OF MAGNESIUM: CPT

## 2024-04-23 PROCEDURE — 93010 ELECTROCARDIOGRAM REPORT: CPT | Performed by: INTERNAL MEDICINE

## 2024-04-23 PROCEDURE — 83690 ASSAY OF LIPASE: CPT

## 2024-04-23 PROCEDURE — 2580000003 HC RX 258: Performed by: HOSPITALIST

## 2024-04-23 PROCEDURE — 85025 COMPLETE CBC W/AUTO DIFF WBC: CPT

## 2024-04-23 PROCEDURE — 80048 BASIC METABOLIC PNL TOTAL CA: CPT

## 2024-04-23 PROCEDURE — 71250 CT THORAX DX C-: CPT

## 2024-04-23 PROCEDURE — 84484 ASSAY OF TROPONIN QUANT: CPT

## 2024-04-23 PROCEDURE — 6370000000 HC RX 637 (ALT 250 FOR IP): Performed by: HOSPITALIST

## 2024-04-23 PROCEDURE — 6360000004 HC RX CONTRAST MEDICATION: Performed by: STUDENT IN AN ORGANIZED HEALTH CARE EDUCATION/TRAINING PROGRAM

## 2024-04-23 PROCEDURE — 85610 PROTHROMBIN TIME: CPT

## 2024-04-23 PROCEDURE — 85730 THROMBOPLASTIN TIME PARTIAL: CPT

## 2024-04-23 RX ORDER — ONDANSETRON 4 MG/1
4 TABLET, ORALLY DISINTEGRATING ORAL EVERY 8 HOURS PRN
Status: DISCONTINUED | OUTPATIENT
Start: 2024-04-23 | End: 2024-04-23 | Stop reason: SDUPTHER

## 2024-04-23 RX ORDER — SODIUM CHLORIDE 0.9 % (FLUSH) 0.9 %
5-40 SYRINGE (ML) INJECTION EVERY 12 HOURS SCHEDULED
Status: DISCONTINUED | OUTPATIENT
Start: 2024-04-23 | End: 2024-04-25 | Stop reason: HOSPADM

## 2024-04-23 RX ORDER — MAGNESIUM SULFATE IN WATER 40 MG/ML
2000 INJECTION, SOLUTION INTRAVENOUS PRN
Status: DISCONTINUED | OUTPATIENT
Start: 2024-04-23 | End: 2024-04-25 | Stop reason: HOSPADM

## 2024-04-23 RX ORDER — UBIDECARENONE 200 MG
200 CAPSULE ORAL DAILY
Status: DISCONTINUED | OUTPATIENT
Start: 2024-04-23 | End: 2024-04-23 | Stop reason: RX

## 2024-04-23 RX ORDER — FUROSEMIDE 40 MG/1
40 TABLET ORAL 2 TIMES DAILY
Status: DISCONTINUED | OUTPATIENT
Start: 2024-04-23 | End: 2024-04-25 | Stop reason: HOSPADM

## 2024-04-23 RX ORDER — POLYETHYLENE GLYCOL 3350 17 G/17G
17 POWDER, FOR SOLUTION ORAL DAILY PRN
Status: DISCONTINUED | OUTPATIENT
Start: 2024-04-23 | End: 2024-04-25 | Stop reason: HOSPADM

## 2024-04-23 RX ORDER — POTASSIUM CHLORIDE 7.45 MG/ML
10 INJECTION INTRAVENOUS PRN
Status: DISCONTINUED | OUTPATIENT
Start: 2024-04-23 | End: 2024-04-25 | Stop reason: HOSPADM

## 2024-04-23 RX ORDER — LIDOCAINE 4 G/G
1 PATCH TOPICAL DAILY
Status: DISCONTINUED | OUTPATIENT
Start: 2024-04-23 | End: 2024-04-25 | Stop reason: HOSPADM

## 2024-04-23 RX ORDER — MORPHINE SULFATE 2 MG/ML
2 INJECTION, SOLUTION INTRAMUSCULAR; INTRAVENOUS EVERY 4 HOURS PRN
Status: DISCONTINUED | OUTPATIENT
Start: 2024-04-23 | End: 2024-04-25 | Stop reason: HOSPADM

## 2024-04-23 RX ORDER — PANTOPRAZOLE SODIUM 40 MG/1
40 TABLET, DELAYED RELEASE ORAL
Status: DISCONTINUED | OUTPATIENT
Start: 2024-04-24 | End: 2024-04-25 | Stop reason: HOSPADM

## 2024-04-23 RX ORDER — FLUTICASONE PROPIONATE 50 MCG
1 SPRAY, SUSPENSION (ML) NASAL DAILY PRN
Status: DISCONTINUED | OUTPATIENT
Start: 2024-04-23 | End: 2024-04-25 | Stop reason: HOSPADM

## 2024-04-23 RX ORDER — LANOLIN ALCOHOL/MO/W.PET/CERES
500 CREAM (GRAM) TOPICAL DAILY
Status: DISCONTINUED | OUTPATIENT
Start: 2024-04-24 | End: 2024-04-25 | Stop reason: HOSPADM

## 2024-04-23 RX ORDER — IRON POLYSACCHARIDE COMPLEX 150 MG
1 CAPSULE ORAL 2 TIMES DAILY
Status: DISCONTINUED | OUTPATIENT
Start: 2024-04-23 | End: 2024-04-25 | Stop reason: HOSPADM

## 2024-04-23 RX ORDER — LIDOCAINE 50 MG/G
1 PATCH TOPICAL DAILY PRN
COMMUNITY

## 2024-04-23 RX ORDER — WARFARIN SODIUM 5 MG/1
5 TABLET ORAL
Status: DISCONTINUED | OUTPATIENT
Start: 2024-04-23 | End: 2024-04-25 | Stop reason: HOSPADM

## 2024-04-23 RX ORDER — M-VIT,TX,IRON,MINS/CALC/FOLIC 27MG-0.4MG
1 TABLET ORAL DAILY
COMMUNITY

## 2024-04-23 RX ORDER — SODIUM CHLORIDE 9 MG/ML
INJECTION, SOLUTION INTRAVENOUS PRN
Status: DISCONTINUED | OUTPATIENT
Start: 2024-04-23 | End: 2024-04-25 | Stop reason: HOSPADM

## 2024-04-23 RX ORDER — ENOXAPARIN SODIUM 100 MG/ML
40 INJECTION SUBCUTANEOUS DAILY
Status: DISCONTINUED | OUTPATIENT
Start: 2024-04-24 | End: 2024-04-24

## 2024-04-23 RX ORDER — ONDANSETRON 4 MG/1
4 TABLET, ORALLY DISINTEGRATING ORAL EVERY 8 HOURS PRN
Status: DISCONTINUED | OUTPATIENT
Start: 2024-04-23 | End: 2024-04-25 | Stop reason: HOSPADM

## 2024-04-23 RX ORDER — ONDANSETRON 2 MG/ML
4 INJECTION INTRAMUSCULAR; INTRAVENOUS EVERY 6 HOURS PRN
Status: DISCONTINUED | OUTPATIENT
Start: 2024-04-23 | End: 2024-04-25 | Stop reason: HOSPADM

## 2024-04-23 RX ORDER — ACETAMINOPHEN 650 MG/1
650 SUPPOSITORY RECTAL EVERY 6 HOURS PRN
Status: DISCONTINUED | OUTPATIENT
Start: 2024-04-23 | End: 2024-04-25 | Stop reason: HOSPADM

## 2024-04-23 RX ORDER — SODIUM CHLORIDE 0.9 % (FLUSH) 0.9 %
5-40 SYRINGE (ML) INJECTION PRN
Status: DISCONTINUED | OUTPATIENT
Start: 2024-04-23 | End: 2024-04-25 | Stop reason: HOSPADM

## 2024-04-23 RX ORDER — ACETAMINOPHEN 325 MG/1
650 TABLET ORAL EVERY 6 HOURS PRN
Status: DISCONTINUED | OUTPATIENT
Start: 2024-04-23 | End: 2024-04-25 | Stop reason: HOSPADM

## 2024-04-23 RX ORDER — METOPROLOL SUCCINATE 25 MG/1
12.5 TABLET, EXTENDED RELEASE ORAL 2 TIMES DAILY
Status: DISCONTINUED | OUTPATIENT
Start: 2024-04-23 | End: 2024-04-25 | Stop reason: HOSPADM

## 2024-04-23 RX ORDER — ASPIRIN 81 MG/1
324 TABLET, CHEWABLE ORAL ONCE
Status: COMPLETED | OUTPATIENT
Start: 2024-04-23 | End: 2024-04-23

## 2024-04-23 RX ORDER — FLUTICASONE PROPIONATE 50 MCG
1 SPRAY, SUSPENSION (ML) NASAL DAILY PRN
COMMUNITY

## 2024-04-23 RX ORDER — WARFARIN SODIUM 2.5 MG/1
2.5 TABLET ORAL
Status: DISCONTINUED | OUTPATIENT
Start: 2024-04-24 | End: 2024-04-24

## 2024-04-23 RX ORDER — POTASSIUM CHLORIDE 20 MEQ/1
40 TABLET, EXTENDED RELEASE ORAL PRN
Status: DISCONTINUED | OUTPATIENT
Start: 2024-04-23 | End: 2024-04-25 | Stop reason: HOSPADM

## 2024-04-23 RX ORDER — FENTANYL 50 UG/1
1 PATCH TRANSDERMAL
Status: DISCONTINUED | OUTPATIENT
Start: 2024-04-23 | End: 2024-04-25 | Stop reason: HOSPADM

## 2024-04-23 RX ORDER — NALOXONE HYDROCHLORIDE 4 MG/.1ML
1 SPRAY NASAL PRN
COMMUNITY

## 2024-04-23 RX ADMIN — METOPROLOL SUCCINATE 12.5 MG: 25 TABLET, EXTENDED RELEASE ORAL at 21:37

## 2024-04-23 RX ADMIN — ASPIRIN 81 MG 324 MG: 81 TABLET ORAL at 17:41

## 2024-04-23 RX ADMIN — IOPAMIDOL 75 ML: 755 INJECTION, SOLUTION INTRAVENOUS at 16:09

## 2024-04-23 RX ADMIN — Medication 10 ML: at 21:39

## 2024-04-23 RX ADMIN — Medication 150 MG: at 21:37

## 2024-04-23 RX ADMIN — WARFARIN SODIUM 5 MG: 5 TABLET ORAL at 21:37

## 2024-04-23 ASSESSMENT — PAIN SCALES - GENERAL
PAINLEVEL_OUTOF10: 5
PAINLEVEL_OUTOF10: 5

## 2024-04-23 ASSESSMENT — HEART SCORE: ECG: NON-SPECIFC REPOLARIZATION DISTURBANCE/LBTB/PM

## 2024-04-23 ASSESSMENT — LIFESTYLE VARIABLES
HOW MANY STANDARD DRINKS CONTAINING ALCOHOL DO YOU HAVE ON A TYPICAL DAY: PATIENT DOES NOT DRINK
HOW OFTEN DO YOU HAVE A DRINK CONTAINING ALCOHOL: NEVER

## 2024-04-23 ASSESSMENT — PAIN DESCRIPTION - LOCATION: LOCATION: BACK

## 2024-04-23 ASSESSMENT — PAIN - FUNCTIONAL ASSESSMENT: PAIN_FUNCTIONAL_ASSESSMENT: NONE - DENIES PAIN

## 2024-04-23 NOTE — PROGRESS NOTES
Called to get report from ED nurse. Nurse unavailable requested she bring the patient up and will get report at the bedside.

## 2024-04-23 NOTE — ED NOTES
[facility]    [address]    [facility] [address]    Phone:  [phone]    Fax:  [phone]       Thank You for choosing us for your health care visit. We are glad to serve you and happy to provide you with this summary of your visit. Please help us to ensure we have accurate records. If you find anything that needs to be changed, please let our staff know as soon as possible.          Your Demographic Information     Patient Name Sex     Moisés Carmona Male 1956       Ethnic Group Patient Race    Not of  or  Origin White      Your Visit Details     Date & Time Provider Department    2017 12:40 PM [facility] MD [facility]      Your Upcoming Appointment*(Max 10)     2017 12:40 PM CDT   Follow-up Visit with Aleena Nair MD   [facility])    [address]  [facility] [address]   [phone]              Your Vitals Were     BP Pulse Temp Resp Height Weight    112/66 76 98.3 °F (36.8 °C) (Oral) 16 5' 8\" (1.727 m) 234 lb 12.8 oz (106.5 kg)    BMI Smoking Status                35.7 kg/m2 Current Every Day Smoker          Medications Prescribed or Re-Ordered Today     None      Your Current Medications Are        Disp Refills Start End    pantoprazole (PROTONIX) 40 MG tablet 60 tablet 1 2017     Sig - Route: Take 1 tablet by mouth every 12 hours. Indications: Stomach Ulcer - Oral    Class: Eprescribe    Acidophilus Lactobacillus Cap 60 capsule 1 2017     Sig - Route: Take 1 capsule by mouth daily. - Oral    Class: Eprescribe    levofloxacin (LEVAQUIN) 750 MG tablet 10 tablet 0 2017    Sig - Route: Take 1 tablet by mouth daily for 10 days. - Oral    Class: Eprescribe    hydroCORTisone-pramoxine (ANALPRAM-HC) 2.5-1 % rectal cream 30 g 0 2017     Sig: Apply to hemmorhoids twice daily as  Food tray ordered   needed for itching and discomfort.    Class: Eprescribe    guaiFENesin (MUCINEX) 600 MG 12 hr tablet        Sig - Route: Take 1,200 mg by mouth 2 times daily as needed for Congestion. Dose: 2 tablets (=1,200 mg) - Oral    Class: Historical Med    albuterol 108 (90 Base) MCG/ACT inhaler 1 Inhaler 0 6/7/2017     Sig - Route: Inhale 2 puffs into the lungs every 4 hours as needed for Shortness of Breath or Wheezing. - Inhalation    Class: Eprescribe      Allergies     Peanut HIVES      Immunizations History as of 6/23/2017     Name Date    Pneumococcal Polysaccharide Adult 6/13/2017 10:53 AM    Tdap 4/11/1990      Problem List as of 6/23/2017     Tobacco dependency              Patient Instructions    We are committed to providing our patients with their test results in a timely manner. If you have access to [employer], you will receive your results within 5 to 7 days. If your results are normal, a member of our office may call you or you may receive a letter in the mail within 10 to 15 days of your testing. If your results have something additional to discuss, a member of our office will contact you by phone. If at any time you have questions related your results, please feel free to call our office at [phone]          Gastroesophageal Re?ux Diet (GERD)    This guide has been prepared for your use by registered dietitians. If you have questions or concerns, please call the nearest [place] facility to contact a dietitian. Diet counseling is available to discuss your specific needs.    Why follow a diet for gastroesophageal reflux?  This diet, along with prescribed medication, should help prevent uncomfortable side effects, such as heartburn.     Important points to keep in mind  • Stop smoking.  • Wear loose-fitting clothes.  • Achieve and maintain a healthy weight.  • Eat small frequent meals.  •  Sit or  an upright position during and for 45 to 60 minutes after eating.  •  Try problem foods in small amounts  as part of a meal.  • Avoid eating within 2 to 3 hours before bedtime.  • Raise the head of the bed 6 to 8 inches when sleeping.    Foods to limit or avoid  • High-fat foods  • Alcohol  • Carbonated beverages  • Chocolate  • Citrus juices  • Coffee and caffeinated beverages  • Tomato products    Food groups Usually well    tolerated May cause  discomfort Tips   Breads, cereals, rice and pasta      5 to 8 servings per day  1 serving =  1 slice of bread  1 cup ready-to-eat cereal  1/2 cup cooked cereal,  rice, pasta  1/2 bagel, bun,  English muffin Plain (with or without   whole grain flour) bread,   rolls, crackers, cereals,   rice, barley and plain   pastas; pasta with low-fat   cream sauce  Angolan toast, muffins,   biscuits, pancakes and   waffles made with lowfat ingredients; bagels;   corn tortillas  Fat-free crackers Breads and cereals made   with high fat ingredients   such as croissants,   doughnuts, sweet rolls,   muffins, biscuits and   granola-type cereals  Pasta served with cream   sauces and tomato-based   sauces  High fat snack crackers Spread jellies and jams   on breads instead of   butter or margarine  Sprinkle low-fat cheese,   such as part-skim ricotta   or mozzarella, on pasta  in place of cream or   tomato sauce   Food groups Usually well    tolerated May cause  discomfort Tips   Vegetables      2 to 3 cups per day Fresh, frozen or canned   vegetables  Baked, boiled and   mashed potatoes without   added fat Fried or creamed   vegetables, tomatoes and   tomato products, onions,   vegetable juices  Angolan-fried potatoes,   potato chips Cook vegetables in broth   or sprinkle with herbs to   add flavor   Fruits      1 1/2 to 2 cups per day Fresh, frozen and   canned fruits as tolerated    Fruit juices as tolerated Christiano, grapefruit,   oranges, pineapples  and tangerines    Citrus juices Include other sources of vitamin C, such as   cantaloupe, potatoes  and strawberries   Milk, yogurt and cheese       3 cups per day  1 cup =  1 cup milk or yogurt  1 1/2 oz. natural cheese  2 oz. processed cheese Fat-free, low-fat and   reduced fat milk,   low-fat buttermilk    Low-fat and  nonfat yogurt    Low-fat cheeses,  cottage cheese Whole milk, buttermilk   made with whole milk,   chocolate milk, chocolate   shakes or drinks    Evaporated whole milk   and cream    Regular cheeses In recipes that call for   higher fat items, such as whole milk or cream, replaced with skim milk   or low-fat cottage cheese   Meats, fish, poultry, dry beans and peas, eggs and nuts      5 to 6 ounces per day  1 ounce =   1 oz. cooked meat,       poultry or fish  1 egg    1/4 cup cooked dried   beans  1 Tbsp. peanut butter  1/2 oz. nuts or seeds Lean beef, pork, lamb,   veal, and poultry (without   the skin): All fresh,   frozen or canned fish   packed in water; shellfish    Low-fat luncheon meats  Eggs (limit to 3 to 4 egg   yolks weekly)  Dry beans and peas   prepared without fat   (includes fat-free  refried beans)  Reduced fat peanut butter    Tofu All fried, fatty, or heavily   marbled meat, poultry  or fish    Regular luncheon meats,   including bologna, salami   and pimento loaf;   sausages, wieners    Dry beans and peas   prepared with fat or highfat meat; refried beans    Nuts and peanut butter Broil, roast, grill or boil   meats, poultry and fish   instead of frying  Select or prepare meats in   their natural juice instead   of sauces or gravies   Food groups Usually well    tolerated May cause  discomfort Tips   Fats, snacks, sweets, condiments and beverages      Use sparingly Nonfat or low-fat   dressings and   mayonnaise; nonfat liquid   or powdered cream   substitutes; nonfat or   reduced fat sour cream    Soups made with a   vegetable or broth base,   lean meat, vegetables   (except tomatoes) and   low fat milk    Sherbet, fruit ice, gelatin,   vamsi food cake, alvaro   crackers, low-fat cookies,   frozen yogurt, reduced fat    ice cream, pudding or   baked custard made with   low-fat milk and other   low-fat or nonfat desserts    Sugar, honey, jams,   jellies, molasses,  syrups, hard candy  and marshmallows    Decaffeinated coffee,   non-mint tea    Salt, pepper, garlic,   oregano, jarred, other   spices and herbs  (as tolerated) Regular salad dressings,    butter, margarine, oil,   somers, gravy, regular sour   cream, cream cheese    Regular cream and   tomato-based soups    High-fat snacks, such as   chips, buttered popcorn    All other cakes, cookies,   pies, ice cream, pastries,   and doughnuts     Any desserts containing   Chocolate    Coconut, chocolate or   cream-filled candy     Candy with nuts  Carbonated beverages,   regular coffee, mint tea,   and alcoholic beverages    Tomato-based sauces    Spearmint, peppermint;   chili and jalapeño   peppers; vinegar Sprinkle seasonings, such   as garlic, onion powder,   or oregano on cooked   foods in place of butter  or margarine    Snack on fresh fruit   instead of chips  or cookies     A registered dietitian can help.    For a list of [place] facilities with a dietitian, please call [facility] toll free at [phone]

## 2024-04-23 NOTE — ED PROVIDER NOTES
[facility] EMERGENCY DEPARTMENT      EMERGENCY MEDICINE     Pt Name: Juan Grant  MRN: [id number]  Birthdate 1940  Date of evaluation: 4/23/2024  Provider: Chay Benton MD    CHIEF COMPLAINT       Chief Complaint   Patient presents with    Abdominal Pain    Dizziness     Pt states that he has had nausea and dizziness for the last 2 days. Also has a red right eye. Family called for EMS to bring to ED     HISTORY OF PRESENT ILLNESS   Juan Grant is a 84 y.o. male who presents to the emergency department for left-sided chest wall pain/left upper quadrant Jarek pain since last night that has been persistent worsening today.  No nausea no vomiting no associate shortness of breath.  Intermittently worse when laying flat at times.  Denies any nausea vomiting denies any fevers or chills denies any vision changes denies any cough.  Denies any recent sick contacts.    Does mention having some redness to his eye over the last few days.  He also complains of having some lightheadedness since yesterday and today.  Patient when standing.        PASTMEDICAL HISTORY     Past Medical History:   Diagnosis Date    Allergic rhinitis     Atrial fibrillation ([facility])     Benign prostatic hypertrophy     CAD (coronary artery disease)     Cancer ([facility])     skin    CHF (congestive heart failure) ([facility]) 08/17/2017    Coronary artery disease involving native coronary artery of native heart without angina pectoris 6/17/2011    DDD (degenerative disc disease), lumbar     Falls 08/17/2017    GI bleeding     Hyperlipidemia     Hypertension     MI (myocardial infarction) ([facility])     MRSA (methicillin resistant staph aureus) culture positive     h/o infection in the blood    Osteomyelitis ([facility])     left foot    Pneumonia     S/P PTCA (percutaneous transluminal coronary angioplasty) stents x 8    SSS (sick sinus syndrome) ([facility])     Unspecified sleep apnea     Venous (peripheral) insufficiency 12/4/2018

## 2024-04-23 NOTE — ED NOTES
How does patient ambulate?   []Low Fall Risk (ambulates by themselves without support)  [x]Stand by assist. Pt does independently moves independently with his cane  []Contact Guard   []Front wheel walker  []Wheelchair   []Steady  []Bed bound  []History of Lower Extremity Amputation  []Unknown, did not assess in the emergency department   How does patient take pills?  [x]Whole with Water  []Crushed in applesauce  []Crushed in pudding  []Other  []Unknown no oral medications were given in the ED  Is patient alert?   [x]Alert  []Drowsy but responds to voice  []Doesn't respond to voice but responds to painful stimuli  []Unresponsive  Is patient oriented?   [x]To person  [x]To place  [x]To time  [x]To situation  []Confused  []Agitated  [x]Follows commands  If patient is disoriented or from a Skill Nursing Facility has family been notified of admission?   []Yes   [x]No  Patient belongings?   []Cell phone  [x]Wallet   []Dentures  [x]Clothing  Any specific patient or family belongings/needs/dynamics?   No. Children have been at bedside and have been appropriate   Miscellaneous comments/pending orders?  All ED orders complete     If there are any additional questions please reach out to the Emergency Department.

## 2024-04-23 NOTE — H&P
vitamin B-12 (CYANOCOBALAMIN) 500 MCG tablet Take 1 tablet by mouth daily      fentaNYL (DURAGESIC) 50 MCG/HR Place 1 patch onto the skin every 48 hours 15 patch 0       Allergies:  Allergies   Allergen Reactions    Avelox [Moxifloxacin Hcl In Nacl] Anaphylaxis    Epinephrine Base     Other      [place] per PT - swelling size of silver dollar at site per PT     Keflex [Cephalexin] Nausea And Vomiting    Polysporin [Bacitracin-Polymyxin B] Rash        Social History:   reports that he quit smoking about 20 years ago. His smoking use included cigarettes. He started smoking about 50 years ago. He has a 30.0 pack-year smoking history. He has never used smokeless tobacco. He reports that he does not drink alcohol and does not use drugs.     Family History:  family history includes Cancer in his sister; Coronary Art Dis in his brother. ,     Physical Exam:  BP (!) 149/66   Pulse 60   Temp 98 °F (36.7 °C) (Oral)   Resp 11   Ht 1.753 m (5' 9\")   Wt 72.6 kg (160 lb)   SpO2 99%   BMI 23.63 kg/m²     General appearance:  Appears comfortable. Well nourished  Eyes: Sclera clear, pupils equal  ENT: Moist mucus membranes, no thrush. Trachea midline.  Cardiovascular: Regular rhythm, normal S1, S2. No murmur, gallop, rub. No edema in lower extremities  Respiratory: Clear to auscultation bilaterally, no wheeze, good inspiratory effort  Gastrointestinal: Abdomen soft, non-tender, not distended, normal bowel sounds  Musculoskeletal: No cyanosis in digits, neck supple  Neurology: Cranial nerves grossly intact. Alert and oriented in time, place and person. No speech or motor deficits  Psychiatry: Appropriate affect. Not agitated  Skin: Warm, dry, normal turgor, no rash    Labs:  CBC:   Lab Results   Component Value Date/Time    WBC 5.5 04/23/2024 02:16 PM    RBC 3.95 04/23/2024 02:16 PM    HGB 10.7 04/23/2024 02:16 PM    HCT 32.3 04/23/2024 02:16 PM    MCV 81.7 04/23/2024 02:16 PM    MCH 27.1 04/23/2024 02:16 PM    MCHC 33.2  04/23/2024 02:16 PM    RDW 17.3 04/23/2024 02:16 PM     04/23/2024 02:16 PM    MPV 8.0 04/23/2024 02:16 PM     BMP:    Lab Results   Component Value Date/Time     04/23/2024 02:16 PM    K 4.4 04/23/2024 02:16 PM    CL 99 04/23/2024 02:16 PM    CO2 26 04/23/2024 02:16 PM    BUN 26 04/23/2024 02:16 PM    CREATININE 1.1 04/23/2024 02:16 PM    CALCIUM 9.3 04/23/2024 02:16 PM    GFRAA >60 09/13/2022 01:54 PM    GFRAA >60 02/21/2013 09:57 AM    LABGLOM 66 04/23/2024 02:16 PM    GLUCOSE 81 04/23/2024 02:16 PM       Chest Xray:   EKG:    I visualized CXR images and EKG strips     Discussed  with     Problem List  Principal Problem:    Chest pain  Resolved Problems:    * No resolved hospital problems. *        Assessment/Plan:   Chest pain  -Patient's symptoms are vague atypical typical features  - patient initial troponin elevated.  Repeat troponin trending down but no EKG  Does have history of A-fib on Coumadin  Will admit for further evaluation telemonitoring trend troponin cardiology consult    Bladder stone  Measuring 2.7 cm  Irregular.  Could also potentially be causing pain as well given patient has ongoing abdominal  Mild bladder distention with prostamegaly no evidence of  Will consult urology for input      Chronic A-fib rate is currently  Continue anticoagulation on Coumadin.  Daily INR pharmacy to    CAD    Has pacemaker    Peripheral vascular disease      Kyleigh Toledo MD    4/23/2024 5:34 PM

## 2024-04-23 NOTE — PROGRESS NOTES
Pharmacy Home Medication Reconciliation Note    A medication reconciliation has been completed for Juan Grant 1940    Pharmacy: [address]  Information provided by: patient's daughter w/ med list    The patient's home medication list is as follows:  No current facility-administered medications on file prior to encounter.     Current Outpatient Medications on File Prior to Encounter   Medication Sig Dispense Refill    diclofenac sodium (VOLTAREN) 1 % GEL Apply 2 g topically 4 times daily as needed for Pain      lidocaine (LIDODERM) 5 % Place 1 patch onto the skin daily as needed for Pain 12 hours on, 12 hours off as needed.      Multiple Vitamins-Minerals (THERAPEUTIC MULTIVITAMIN-MINERALS) tablet Take 1 tablet by mouth daily      fluticasone (FLONASE) 50 MCG/ACT nasal spray 1 spray by Each Nostril route daily as needed for Rhinitis      naloxone 4 MG/0.1ML LIQD nasal spray 1 spray by Nasal route as needed for Opioid Reversal      furosemide (LASIX) 40 MG tablet TAKE 1 TABLET BY MOUTH TWICE DAILY (Patient taking differently: Take 1 tablet by mouth 2 times daily) 180 tablet 0    metoprolol succinate (TOPROL XL) 25 MG extended release tablet TAKE ONE-HALF (1/2) TABLET TWICE A DAY (Patient taking differently: Take 0.5 tablets by mouth 2 times daily TAKE ONE-HALF (1/2) TABLET TWICE A DAY) 90 tablet 3    warfarin (COUMADIN) 5 MG tablet Taking 5mg 4 days per week Taking 2.5mg 3 days per week (Patient taking differently: Take 0.5-1 tablets by mouth See Admin Instructions Take 2.5 mg (1/2 tablet) by mouth every Wednesday and 5 mg (1/2 tab BID) all other days in the evening.) 90 tablet 3    potassium chloride (KLOR-CON M) 10 MEQ extended release tablet TAKE 3 TABLETS DAILY (Patient taking differently: Take 1 tablet by mouth 3 times daily) 270 tablet 3    dronedarone hcl (MULTAQ) 400 MG TABS Take 1 tablet by mouth 2 times daily (with meals) 180 tablet 3    rosuvastatin (CRESTOR) 5 MG  tablet TAKE 1 TABLET DAILY (Patient not taking: Reported on 4/23/2024) 90 tablet 3    nitroGLYCERIN (NITRODUR) 0.2 MG/HR PLACE 1 PATCH ON THE SKIN DAILY (Patient taking differently: Place 1 patch onto the skin daily as needed (Chest pain.)) 90 patch 3    pantoprazole (PROTONIX) 40 MG tablet TAKE 1 TABLET BY MOUTH EVERY MORNING BEFORE BREAKFAST (Patient taking differently: Take 1 tablet by mouth every morning (before breakfast) TAKE 1 TABLET BY MOUTH EVERY MORNING BEFORE BREAKFAST) 90 tablet 3    ondansetron (ZOFRAN-ODT) 4 MG disintegrating tablet DISSOLVE 1 TABLET ON THE TONGUE EVERY 8 HOURS AS NEEDED FOR NAUSEA OR VOMITING (Patient taking differently: Place 1 tablet under the tongue every 8 hours as needed for Nausea or Vomiting) 20 tablet 3    ammonium lactate (LAC-HYDRIN) 12 % cream Apply topically to dry skin on feet daily (Patient not taking: Reported on 4/23/2024) 140 g 5    Polysaccharide Iron Complex (PROFE) 391.3 (180 Fe) MG CAPS Take 1 capsule by mouth 2 times daily      coenzyme Q10 200 MG CAPS capsule Take 1 capsule by mouth daily      vitamin B-12 (CYANOCOBALAMIN) 500 MCG tablet Take 1 tablet by mouth daily      fentaNYL (DURAGESIC) 50 MCG/HR Place 1 patch onto the skin every 48 hours 15 patch 0       Patient is no longer taking ammonium lactate cream, pantoprazole, or rosuvastatin.    Of note, patient takes Warfarin 2.5 mg on Wednesday evening and 2.5 mg BID all other days.    Timing of last doses updated.    Thank you,  Anali Pat, STACEYhT

## 2024-04-24 LAB
ANION GAP SERPL CALCULATED.3IONS-SCNC: 10 MMOL/L (ref 3–16)
BASOPHILS # BLD: 0 K/UL (ref 0–0.2)
BASOPHILS NFR BLD: 0.7 %
BUN SERPL-MCNC: 26 MG/DL (ref 7–20)
CALCIUM SERPL-MCNC: 8.8 MG/DL (ref 8.3–10.6)
CHLORIDE SERPL-SCNC: 100 MMOL/L (ref 99–110)
CO2 SERPL-SCNC: 26 MMOL/L (ref 21–32)
CREAT SERPL-MCNC: 1 MG/DL (ref 0.8–1.3)
DEPRECATED RDW RBC AUTO: 17 % (ref 12.4–15.4)
EOSINOPHIL # BLD: 0.2 K/UL (ref 0–0.6)
EOSINOPHIL NFR BLD: 3.5 %
GFR SERPLBLD CREATININE-BSD FMLA CKD-EPI: 74 ML/MIN/{1.73_M2}
GLUCOSE SERPL-MCNC: 93 MG/DL (ref 70–99)
HCT VFR BLD AUTO: 30.8 % (ref 40.5–52.5)
HGB BLD-MCNC: 10.1 G/DL (ref 13.5–17.5)
INR PPP: 1.48 (ref 0.85–1.15)
LYMPHOCYTES # BLD: 1.8 K/UL (ref 1–5.1)
LYMPHOCYTES NFR BLD: 33.6 %
MCH RBC QN AUTO: 26.7 PG (ref 26–34)
MCHC RBC AUTO-ENTMCNC: 32.8 G/DL (ref 31–36)
MCV RBC AUTO: 81.5 FL (ref 80–100)
MONOCYTES # BLD: 0.6 K/UL (ref 0–1.3)
MONOCYTES NFR BLD: 10.7 %
NEUTROPHILS # BLD: 2.8 K/UL (ref 1.7–7.7)
NEUTROPHILS NFR BLD: 51.5 %
PLATELET # BLD AUTO: 246 K/UL (ref 135–450)
PMV BLD AUTO: 7.8 FL (ref 5–10.5)
POTASSIUM SERPL-SCNC: 4.5 MMOL/L (ref 3.5–5.1)
PROTHROMBIN TIME: 18.1 SEC (ref 11.9–14.9)
RBC # BLD AUTO: 3.78 M/UL (ref 4.2–5.9)
SODIUM SERPL-SCNC: 136 MMOL/L (ref 136–145)
TROPONIN, HIGH SENSITIVITY: 45 NG/L (ref 0–22)
WBC # BLD AUTO: 5.4 K/UL (ref 4–11)

## 2024-04-24 PROCEDURE — 6370000000 HC RX 637 (ALT 250 FOR IP): Performed by: INTERNAL MEDICINE

## 2024-04-24 PROCEDURE — 6370000000 HC RX 637 (ALT 250 FOR IP): Performed by: HOSPITALIST

## 2024-04-24 PROCEDURE — 97530 THERAPEUTIC ACTIVITIES: CPT

## 2024-04-24 PROCEDURE — 85025 COMPLETE CBC W/AUTO DIFF WBC: CPT

## 2024-04-24 PROCEDURE — 97161 PT EVAL LOW COMPLEX 20 MIN: CPT

## 2024-04-24 PROCEDURE — G0378 HOSPITAL OBSERVATION PER HR: HCPCS

## 2024-04-24 PROCEDURE — 85610 PROTHROMBIN TIME: CPT

## 2024-04-24 PROCEDURE — 93306 TTE W/DOPPLER COMPLETE: CPT

## 2024-04-24 PROCEDURE — 80048 BASIC METABOLIC PNL TOTAL CA: CPT

## 2024-04-24 PROCEDURE — 97165 OT EVAL LOW COMPLEX 30 MIN: CPT

## 2024-04-24 PROCEDURE — 99222 1ST HOSP IP/OBS MODERATE 55: CPT | Performed by: INTERNAL MEDICINE

## 2024-04-24 PROCEDURE — 6370000000 HC RX 637 (ALT 250 FOR IP): Performed by: NURSE PRACTITIONER

## 2024-04-24 PROCEDURE — 36415 COLL VENOUS BLD VENIPUNCTURE: CPT

## 2024-04-24 PROCEDURE — 2580000003 HC RX 258: Performed by: HOSPITALIST

## 2024-04-24 PROCEDURE — 51798 US URINE CAPACITY MEASURE: CPT

## 2024-04-24 RX ORDER — TAMSULOSIN HYDROCHLORIDE 0.4 MG/1
0.4 CAPSULE ORAL DAILY
Status: DISCONTINUED | OUTPATIENT
Start: 2024-04-24 | End: 2024-04-25 | Stop reason: HOSPADM

## 2024-04-24 RX ORDER — WARFARIN SODIUM 5 MG/1
5 TABLET ORAL
Status: COMPLETED | OUTPATIENT
Start: 2024-04-24 | End: 2024-04-24

## 2024-04-24 RX ADMIN — FUROSEMIDE 40 MG: 40 TABLET ORAL at 17:28

## 2024-04-24 RX ADMIN — WARFARIN SODIUM 5 MG: 5 TABLET ORAL at 17:28

## 2024-04-24 RX ADMIN — Medication 150 MG: at 22:36

## 2024-04-24 RX ADMIN — FUROSEMIDE 40 MG: 40 TABLET ORAL at 09:04

## 2024-04-24 RX ADMIN — PANTOPRAZOLE SODIUM 40 MG: 40 TABLET, DELAYED RELEASE ORAL at 06:30

## 2024-04-24 RX ADMIN — Medication 10 ML: at 09:05

## 2024-04-24 RX ADMIN — Medication 10 ML: at 22:35

## 2024-04-24 RX ADMIN — TAMSULOSIN HYDROCHLORIDE 0.4 MG: 0.4 CAPSULE ORAL at 11:27

## 2024-04-24 RX ADMIN — METOPROLOL SUCCINATE 12.5 MG: 25 TABLET, EXTENDED RELEASE ORAL at 21:27

## 2024-04-24 RX ADMIN — CYANOCOBALAMIN TAB 1000 MCG 500 MCG: 1000 TAB at 09:04

## 2024-04-24 RX ADMIN — Medication 150 MG: at 09:05

## 2024-04-24 ASSESSMENT — PAIN SCALES - GENERAL
PAINLEVEL_OUTOF10: 0
PAINLEVEL_OUTOF10: 3
PAINLEVEL_OUTOF10: 0

## 2024-04-24 NOTE — PROGRESS NOTES
PRN   Or  potassium chloride, 10 mEq, PRN  magnesium sulfate, 2,000 mg, PRN  ondansetron, 4 mg, Q8H PRN   Or  ondansetron, 4 mg, Q6H PRN  polyethylene glycol, 17 g, Daily PRN  acetaminophen, 650 mg, Q6H PRN   Or  acetaminophen, 650 mg, Q6H PRN      Labs and Imaging   Results this Admission:  CTA chest/abd/pelvis: 2024  IMPRESSION:  1. No acute aortic abnormality, aneurysm or dissection.   2. No evidence for central pulmonary embolism.   3. No acute airspace disease identified.  Emphysema.   4. No acute abnormality identified in the abdomen or pelvis.   5.  Cholelithiasis.   6.  Irregular 2.7 cm bladder stone.   7.  Diverticulosis.  VL venous ultrasound: 2024  Left  No evidence of deep or superficial venous thrombosis involving the left lower  extremity and the right common femoral vein.  U/A: 2024: Moderate LE, WBC 8, neg nitrite    EC2024: Electronic atrial pacemakerLeft axis deviationLeft bundle branch block     Relevant Prior Studies:   Echo: 2021  Summary   -Normal left ventricle size, wall thickness with low normal systolic   function with an estimated ejection fraction of 50%.   -No diagnostic regional wall motion abnormalities noted.   -Abnormal (paradoxical) septal motion is present likely due to right   ventricular pacing.   -Grade II diastolic dysfunction with elevated LV filling   pressures.E/e\"=12.4.   -Trivial mitral regurgitation.   -The aortic valve is mildly thickened/calcified with normal gradients.   -Moderate tricuspid regurgitation.   -Estimated pulmonary artery systolic pressure is mildly elevated at 40 mmHg   assuming a right atrial pressure of 8 mmHg.   -The left atrium is mildly dilated.    CBC:   Recent Labs     24  1416 24  0447   WBC 5.5 5.4   HGB 10.7* 10.1*    246     BMP:    Recent Labs     24  1416 24  0447    136   K 4.4 4.5   CL 99 100   CO2 26 26   BUN 26* 26*   CREATININE 1.1 1.0   GLUCOSE 81 93     Hepatic:   Recent  Labs     04/23/24  1416   AST 21   ALT 6*   BILITOT 0.5   ALKPHOS 88     Lipids:   Lab Results   Component Value Date/Time    CHOL 113 08/31/2022 01:22 PM    HDL 42 11/27/2023 12:29 PM    HDL 30 03/07/2012 07:25 AM    TRIG 68 08/31/2022 01:22 PM     Hemoglobin A1C:   Lab Results   Component Value Date/Time    LABA1C 6.0 08/25/2021 06:13 AM     TSH:   Lab Results   Component Value Date/Time    TSH 3.41 03/09/2018 03:55 PM     Troponin: No results found for: \"TROPONINT\"  Lactic Acid: No results for input(s): \"LACTA\" in the last 72 hours.  BNP: No results for input(s): \"PROBNP\" in the last 72 hours.  UA:  Lab Results   Component Value Date/Time    NITRU Negative 04/23/2024 04:23 PM    COLORU Yellow 04/23/2024 04:23 PM    PHUR 6.0 04/23/2024 04:23 PM    WBCUA 8 04/23/2024 04:23 PM    RBCUA 1 04/23/2024 04:23 PM    BACTERIA None Seen 04/23/2024 04:23 PM    CLARITYU Clear 04/23/2024 04:23 PM    SPECGRAV 1.015 04/23/2024 04:23 PM    LEUKOCYTESUR MODERATE 04/23/2024 04:23 PM    UROBILINOGEN 1.0 04/23/2024 04:23 PM    BILIRUBINUR Negative 04/23/2024 04:23 PM    BILIRUBINUR 0 07/30/2015 12:17 PM    BLOODU Negative 04/23/2024 04:23 PM    GLUCOSEU Negative 04/23/2024 04:23 PM    KETUA Negative 04/23/2024 04:23 PM     Urine Cultures: No results found for: \"LABURIN\"  Blood Cultures:   Lab Results   Component Value Date/Time    BC No Growth after 4 days of incubation. 08/24/2021 04:50 PM     Lab Results   Component Value Date/Time    BLOODCULT2 No Growth after 4 days of incubation. 08/24/2021 10:14 PM     Organism: No results found for: \"ORG\"    Personally reviewed labs, diagnostic, device, and imaging results reviewed as a part of this visit    Electronically signed by SCOTT Zepeda CNP on 4/24/2024 at 7:11 AM

## 2024-04-24 NOTE — CONSULTS
04/24/2024 04:47 AM    MCV 81.5 04/24/2024 04:47 AM    MCH 26.7 04/24/2024 04:47 AM    MCHC 32.8 04/24/2024 04:47 AM    RDW 17.0 04/24/2024 04:47 AM     04/24/2024 04:47 AM    MPV 7.8 04/24/2024 04:47 AM     BMP   Lab Results   Component Value Date/Time     04/24/2024 04:47 AM    K 4.5 04/24/2024 04:47 AM     04/24/2024 04:47 AM    CO2 26 04/24/2024 04:47 AM    BUN 26 04/24/2024 04:47 AM    CREATININE 1.0 04/24/2024 04:47 AM    GLUCOSE 93 04/24/2024 04:47 AM    CALCIUM 8.8 04/24/2024 04:47 AM     Urinalysis:   Lab Results   Component Value Date/Time    COLORU Yellow 04/23/2024 04:23 PM    GLUCOSEU Negative 04/23/2024 04:23 PM    BLOODU Negative 04/23/2024 04:23 PM    NITRU Negative 04/23/2024 04:23 PM    LEUKOCYTESUR MODERATE 04/23/2024 04:23 PM     Urine culture: No results for input(s): \"LABURIN\" in the last 72 hours.  PSA:   Lab Results   Component Value Date    PSA 0.04 03/09/2018    PSA 0.35 02/21/2013         IMAGING     CTA CHEST ABDOMEN PELVIS W CONTRAST    Result Date: 4/23/2024  EXAMINATION: CTA OF THE CHEST, ABDOMEN AND PELVIS WITH CONTRAST; CT OF THE CHEST WITHOUT CONTRAST 4/23/2024 3:06 pm; 4/23/2024 3:08 pm: TECHNIQUE: CTA of the chest, abdomen and pelvis was performed after the administration of intravenous contrast.  Multiplanar reformatted images are provided for review.  MIP images are provided for review. Automated exposure control, iterative reconstruction, and/or weight based adjustment of the mA/kV was utilized to reduce the radiation dose to as low as reasonably achievable.; CT of the chest was performed without the administration of intravenous contrast. Multiplanar reformatted images are provided for review. Automated exposure control, iterative reconstruction, and/or weight based adjustment of the mA/kV was utilized to reduce the radiation dose to as low as reasonably achievable. COMPARISON: None. HISTORY: ORDERING SYSTEM PROVIDED HISTORY: aortic dissection concerns along  Emphysema. 4. No acute abnormality identified in the abdomen or pelvis. 5.  Cholelithiasis. 6.  Irregular 2.7 cm bladder stone. 7.  Diverticulosis.     CT CHEST WO CONTRAST    Result Date: 4/23/2024  EXAMINATION: CTA OF THE CHEST, ABDOMEN AND PELVIS WITH CONTRAST; CT OF THE CHEST WITHOUT CONTRAST 4/23/2024 3:06 pm; 4/23/2024 3:08 pm: TECHNIQUE: CTA of the chest, abdomen and pelvis was performed after the administration of intravenous contrast.  Multiplanar reformatted images are provided for review.  MIP images are provided for review. Automated exposure control, iterative reconstruction, and/or weight based adjustment of the mA/kV was utilized to reduce the radiation dose to as low as reasonably achievable.; CT of the chest was performed without the administration of intravenous contrast. Multiplanar reformatted images are provided for review. Automated exposure control, iterative reconstruction, and/or weight based adjustment of the mA/kV was utilized to reduce the radiation dose to as low as reasonably achievable. COMPARISON: None. HISTORY: ORDERING SYSTEM PROVIDED HISTORY: aortic dissection concerns along with left sided upper abd pain TECHNOLOGIST PROVIDED HISTORY: Reason for exam:->aortic dissection concerns along with left sided upper abd pain Additional Contrast?->1 Reason for Exam: aortic dissection concerns along with left sided upper abd pain; ORDERING SYSTEM PROVIDED HISTORY: aortic dissection concerns along with left sided upper abd pain TECHNOLOGIST PROVIDED HISTORY: Reason for exam:->aortic dissection concerns along with left sided upper abd pain Decision Support Exception - unselect if not a suspected or confirmed emergency medical condition->Emergency Medical Condition (MA) Reason for Exam: aortic dissection concerns along with left sided upper abd pain FINDINGS: VASCULAR: The thoracic aorta is normal in caliber.  No dissection or evidence for hematoma. The 3 vessel arch is patent. The abdominal

## 2024-04-24 NOTE — PROGRESS NOTES
Unable to insert mcnally catheter x 2 by 2 different nurses. Pt tolerated it poorly due to pain. Pt refused to have mcnally catheter at this time.

## 2024-04-24 NOTE — PROGRESS NOTES
[facility] - Inpatient Rehabilitation Department   Phone: [phone]    Occupational Therapy    [x] Initial Evaluation            [] Daily Treatment Note         [x] Discharge Summary      Patient: Juan Grant   : 1940   MRN: [id number]   Date of Service:  2024    Admitting Diagnosis:  Chest pain  Current Admission Summary: per H&P \"  Past Medical History:  has a past medical history of Allergic rhinitis, Atrial fibrillation ([facility]), Benign prostatic hypertrophy, CAD (coronary artery disease), Cancer ([facility]), CHF (congestive heart failure) ([facility]), Coronary artery disease involving native coronary artery of native heart without angina pectoris, DDD (degenerative disc disease), lumbar, Falls, GI bleeding, Hyperlipidemia, Hypertension, MI (myocardial infarction) ([facility]), MRSA (methicillin resistant staph aureus) culture positive, Osteomyelitis ([facility]), Pneumonia, S/P PTCA (percutaneous transluminal coronary angioplasty), SSS (sick sinus syndrome) ([facility]), Unspecified sleep apnea, and Venous (peripheral) insufficiency.  Past Surgical History:  has a past surgical history that includes Carpal tunnel release; Coronary angioplasty with stent; Diagnostic Cardiac Cath Lab Procedure; Coronary angioplasty; Cardiac pacemaker placement; Appendectomy; pacemaker placement; eye surgery; Pacemaker Change (10/2016); Foot Debridement (Left, 2021); and Toe amputation (Left, 2021).    Discharge Recommendations: Juan Grant scored a  on the [facility] ADL Inpatient form.  At this time, no further OT is recommended upon discharge due to patient at independent level.  Recommend patient returns to prior setting with prior services.      DME Required For Discharge: No DME required    Precautions/Restrictions: medium fall risk  Positional Restrictions:no positional restrictions    Pre-Admission Information   Lives With: João olmstead                      Type of Home: house  Home Layout: one level  Home Access:  1  evaluation completed with same day discharge.    Goals  Patient eval with same day discharge.  No goals set as patient is at baseline self care status.      Therapy Session Time     Individual Group Co-treatment   Time In 1115      Time Out 1145      Minutes 30           Timed Code Treatment Minutes:  15 minutes   Total Treatment Minutes:  30 minutes total       Electronically Signed By: Eunice Jameson OT, Eunice Jameson OTR/VIANNEY [id number]

## 2024-04-24 NOTE — FLOWSHEET NOTE
04/24/24 0845   Vital Signs   Blood Pressure Lying   (refused)   Pulse Lying   (refused)   Blood Pressure Sitting 138/68   Pulse Sitting 84 PER MINUTE   Blood Pressure Standing 122/74   Pulse Standing 88 PER MINUTE

## 2024-04-24 NOTE — CARE COORDINATION
Discharge Planning Assessment:     Patient admitted as Observation/OPIB with an anticipated short hospitalization length of stay. Chart reviewed and it appears that patient has minimal needs for discharge at this time. Discussed with patient’s nurse and requested that case management be notified if discharge needs are identified.     *Case management will continue to follow progress and update discharge plan as needed.    Marce Cho RN Case Manager  [phone]

## 2024-04-24 NOTE — PROGRESS NOTES
Pharmacy to Dose Warfarin    Pharmacy consulted to dose warfarin for afib.    INR Goal: 2-3    Prior to admission warfarin dosing regimen: 2.5 mg on Wed; 5 mg all other days    INR today: 1.48    Assessment/Plan:  - INR is currently subtherapeutic   - Will increase home regimen and give 5 mg tonight   - Possible concomitant drug-drug interactions include: acetaminophen and dronedarone     Pharmacy will continue to follow.    Yumiko MoreauD [facility]  PGY-1 Resident  [id number]

## 2024-04-24 NOTE — PLAN OF CARE
Problem: Discharge Planning  Goal: Discharge to home or other facility with appropriate resources  4/24/2024 0038 by Consuelo Soto RN  Outcome: Progressing  4/24/2024 0037 by Consuelo Soto RN  Outcome: Progressing     Problem: Safety - Adult  Goal: Free from fall injury  4/24/2024 0038 by Consuelo Soto RN  Outcome: Progressing  4/24/2024 0037 by Consuelo Soto RN  Outcome: Progressing     Problem: Pain  Goal: Verbalizes/displays adequate comfort level or baseline comfort level  4/24/2024 0038 by Consuelo Soto RN  Outcome: Progressing  4/24/2024 0037 by Consuelo Soto RN  Outcome: Progressing     Problem: Neurosensory - Adult  Goal: Achieves stable or improved neurological status  Outcome: Progressing  Goal: Achieves maximal functionality and self care  Outcome: Progressing     Problem: Respiratory - Adult  Goal: Achieves optimal ventilation and oxygenation  Outcome: Progressing     Problem: Cardiovascular - Adult  Goal: Maintains optimal cardiac output and hemodynamic stability  Outcome: Progressing  Goal: Absence of cardiac dysrhythmias or at baseline  Outcome: Progressing     Problem: Skin/Tissue Integrity - Adult  Goal: Skin integrity remains intact  Outcome: Progressing  Goal: Oral mucous membranes remain intact  Outcome: Progressing     Problem: Musculoskeletal - Adult  Goal: Return mobility to safest level of function  Outcome: Progressing  Goal: Return ADL status to a safe level of function  Outcome: Progressing     Problem: Gastrointestinal - Adult  Goal: Minimal or absence of nausea and vomiting  Outcome: Progressing     Problem: Genitourinary - Adult  Goal: Absence of urinary retention  Outcome: Progressing     Problem: Infection - Adult  Goal: Absence of infection at discharge  Outcome: Progressing     Problem: Metabolic/Fluid and Electrolytes - Adult  Goal: Electrolytes maintained within normal limits  Outcome: Progressing  Goal: Hemodynamic stability and optimal renal  function maintained  Outcome: Progressing  Goal: Glucose maintained within prescribed range  Outcome: Progressing     Problem: Hematologic - Adult  Goal: Maintains hematologic stability  Outcome: Progressing

## 2024-04-24 NOTE — CONSULTS
[facility]   CONSULTATION  [phone]      Chief Complaint   Patient presents with    Abdominal Pain    Dizziness     Pt states that he has had nausea and dizziness for the last 2 days. Also has a red right eye. Family called for EMS to bring to ED         History of Present Illness:  Juan Grant is a 84 y.o. male who presented with presents to ER with complaints of multiple complaints.  Patient apparently has not been feeling well for past few days.  Is having some dizziness episode at the time patient was having some intermittent abdominal pain.  Also complains of some shortness of breath intermittently worse with laying flat.  Associated with some lower abdominal pain denies any sick contacts.  He has very weak symptoms.  Hard to follow.  Patient denies any fevers chills.  No dysuria.  Does have history of A-fib on Coumadin.  Patient then started having some left-sided chest wall with left upper quadrant pain as well.  Since last night persistent worse today.  Rates pain 7 out of 10.     Mr Grant follows with Dr Love for management of his atrial fibrillation. He does have a history of GI bleed but has been ok'd for AC with GI since. Chart reviewed which mentions he is not interested in watchman. Cardiology has been consulted for elevated troponin.     He denies chest pain or sob since admission. He is up in his chair this morning, just has complaints of being tired from not getting much sleep last night. Pain on admission was underneath his ribcage. No chest pain.     Past Medical History:   has a past medical history of Allergic rhinitis, Atrial fibrillation (HCC), Benign prostatic hypertrophy, CAD (coronary artery disease), Cancer (HCC), CHF (congestive heart failure) (HCC), Coronary artery disease involving native coronary artery of native heart without angina pectoris, DDD (degenerative disc disease), lumbar, Falls, GI bleeding, Hyperlipidemia, Hypertension, MI (myocardial infarction)    Polysaccharide Iron Complex (PROFE) 391.3 (180 Fe) MG CAPS Take 1 capsule by mouth 2 times daily    Lc Madsen MD   coenzyme Q10 200 MG CAPS capsule Take 1 capsule by mouth daily    Lc Madsen MD   vitamin B-12 (CYANOCOBALAMIN) 500 MCG tablet Take 1 tablet by mouth daily    Lc Madsen MD   fentaNYL (DURAGESIC) 50 MCG/HR Place 1 patch onto the skin every 48 hours 7/13/15   Ki Cross MD        Allergies:  Avelox [moxifloxacin hcl in nacl], Epinephrine base, Other, Keflex [cephalexin], and Polysporin [bacitracin-polymyxin b]     Review of Systems:   A complete review of systems has been reviewed and updated today and is negative except as noted in the history of present illness.      Physical Examination:    Vitals:    04/24/24 1615   BP: 97/60   Pulse: 65   Resp: 17   Temp: 97.1 °F (36.2 °C)   SpO2: 96%    Weight - Scale: 72.2 kg (159 lb 1.8 oz)         General Appearance:  Alert, cooperative, no distress, appears stated age   Head:  Normocephalic, without obvious abnormality, atraumatic   Eyes:  EOMI, conjunctiva/corneas clear       Nose: Nares normal   Throat: Lips normal   Neck: Supple, symmetrical, trachea midline,  no carotid bruit or JVD       Lungs:   Clear to auscultation bilaterally, respirations unlabored   Chest Wall:  No tenderness or deformity   Heart:  Regular rate and rhythm, S1, S2 normal, 2/6 systolic murmur LUSB, rub or gallop   Abdomen:   Soft, non-tender, bowel sounds active all four quadrants,  no masses, no organomegaly           Extremities: Extremities normal, atraumatic, no cyanosis or edema   Pulses: 2+ and symmetric   Skin: Skin color, texture, turgor normal, no rashes or lesions   Pysch: Normal mood and affect   Neurologic: Normal gross motor and sensory exam.         EKG:  I have reviewed EKG with the following interpretation:  Impression:    23-APR-2024 13:54:54 [facility] ROUTINE RECORD  Poor data quality, interpretation may be adversely

## 2024-04-25 VITALS
OXYGEN SATURATION: 97 % | BODY MASS INDEX: 23.57 KG/M2 | HEART RATE: 64 BPM | HEIGHT: 69 IN | RESPIRATION RATE: 16 BRPM | SYSTOLIC BLOOD PRESSURE: 118 MMHG | DIASTOLIC BLOOD PRESSURE: 56 MMHG | TEMPERATURE: 97.3 F | WEIGHT: 159.1 LBS

## 2024-04-25 LAB
ANION GAP SERPL CALCULATED.3IONS-SCNC: 12 MMOL/L (ref 3–16)
BASOPHILS # BLD: 0.2 K/UL (ref 0–0.2)
BASOPHILS NFR BLD: 3.4 %
BUN SERPL-MCNC: 23 MG/DL (ref 7–20)
CALCIUM SERPL-MCNC: 8.8 MG/DL (ref 8.3–10.6)
CHLORIDE SERPL-SCNC: 101 MMOL/L (ref 99–110)
CO2 SERPL-SCNC: 25 MMOL/L (ref 21–32)
CREAT SERPL-MCNC: 1.1 MG/DL (ref 0.8–1.3)
DEPRECATED RDW RBC AUTO: 17.3 % (ref 12.4–15.4)
EOSINOPHIL # BLD: 0.2 K/UL (ref 0–0.6)
EOSINOPHIL NFR BLD: 3 %
GFR SERPLBLD CREATININE-BSD FMLA CKD-EPI: 66 ML/MIN/{1.73_M2}
GLUCOSE SERPL-MCNC: 97 MG/DL (ref 70–99)
HCT VFR BLD AUTO: 34 % (ref 40.5–52.5)
HGB BLD-MCNC: 10.9 G/DL (ref 13.5–17.5)
INR PPP: 1.69 (ref 0.85–1.15)
LYMPHOCYTES # BLD: 1.8 K/UL (ref 1–5.1)
LYMPHOCYTES NFR BLD: 28.7 %
MCH RBC QN AUTO: 26.3 PG (ref 26–34)
MCHC RBC AUTO-ENTMCNC: 32.1 G/DL (ref 31–36)
MCV RBC AUTO: 81.9 FL (ref 80–100)
MONOCYTES # BLD: 0.5 K/UL (ref 0–1.3)
MONOCYTES NFR BLD: 7.8 %
NEUTROPHILS # BLD: 3.5 K/UL (ref 1.7–7.7)
NEUTROPHILS NFR BLD: 57.1 %
PLATELET # BLD AUTO: 273 K/UL (ref 135–450)
PMV BLD AUTO: 7.7 FL (ref 5–10.5)
POTASSIUM SERPL-SCNC: 4.4 MMOL/L (ref 3.5–5.1)
PROTHROMBIN TIME: 20 SEC (ref 11.9–14.9)
RBC # BLD AUTO: 4.16 M/UL (ref 4.2–5.9)
SODIUM SERPL-SCNC: 138 MMOL/L (ref 136–145)
WBC # BLD AUTO: 6.1 K/UL (ref 4–11)

## 2024-04-25 PROCEDURE — 6370000000 HC RX 637 (ALT 250 FOR IP): Performed by: NURSE PRACTITIONER

## 2024-04-25 PROCEDURE — 2580000003 HC RX 258: Performed by: HOSPITALIST

## 2024-04-25 PROCEDURE — 1200000000 HC SEMI PRIVATE

## 2024-04-25 PROCEDURE — 6370000000 HC RX 637 (ALT 250 FOR IP): Performed by: HOSPITALIST

## 2024-04-25 PROCEDURE — 36415 COLL VENOUS BLD VENIPUNCTURE: CPT

## 2024-04-25 PROCEDURE — 51798 US URINE CAPACITY MEASURE: CPT

## 2024-04-25 PROCEDURE — 85025 COMPLETE CBC W/AUTO DIFF WBC: CPT

## 2024-04-25 PROCEDURE — 85610 PROTHROMBIN TIME: CPT

## 2024-04-25 PROCEDURE — 99232 SBSQ HOSP IP/OBS MODERATE 35: CPT | Performed by: INTERNAL MEDICINE

## 2024-04-25 PROCEDURE — 80048 BASIC METABOLIC PNL TOTAL CA: CPT

## 2024-04-25 RX ORDER — TAMSULOSIN HYDROCHLORIDE 0.4 MG/1
0.4 CAPSULE ORAL DAILY
Qty: 30 CAPSULE | Refills: 0 | Status: SHIPPED | OUTPATIENT
Start: 2024-04-26

## 2024-04-25 RX ORDER — WARFARIN SODIUM 2.5 MG/1
2.5 TABLET ORAL
Status: DISCONTINUED | OUTPATIENT
Start: 2024-05-01 | End: 2024-04-25 | Stop reason: HOSPADM

## 2024-04-25 RX ORDER — POTASSIUM CHLORIDE 750 MG/1
10 TABLET, EXTENDED RELEASE ORAL 2 TIMES DAILY
Qty: 270 TABLET | Refills: 3 | Status: SHIPPED
Start: 2024-04-25

## 2024-04-25 RX ADMIN — METOPROLOL SUCCINATE 12.5 MG: 25 TABLET, EXTENDED RELEASE ORAL at 08:11

## 2024-04-25 RX ADMIN — Medication 150 MG: at 08:12

## 2024-04-25 RX ADMIN — FUROSEMIDE 40 MG: 40 TABLET ORAL at 08:12

## 2024-04-25 RX ADMIN — PANTOPRAZOLE SODIUM 40 MG: 40 TABLET, DELAYED RELEASE ORAL at 06:53

## 2024-04-25 RX ADMIN — CYANOCOBALAMIN TAB 1000 MCG 500 MCG: 1000 TAB at 08:12

## 2024-04-25 RX ADMIN — Medication 10 ML: at 08:11

## 2024-04-25 RX ADMIN — TAMSULOSIN HYDROCHLORIDE 0.4 MG: 0.4 CAPSULE ORAL at 08:11

## 2024-04-25 ASSESSMENT — PAIN SCALES - GENERAL
PAINLEVEL_OUTOF10: 0

## 2024-04-25 NOTE — DISCHARGE SUMMARY
[facility]    [facility] Demographics    Patient. Juan Grant  Date of Birth. 1940  MRN. [id number]     Primary care provider. Ki Cross MD  (Tel: [phone])    Admit date: 4/23/2024    Discharge date (blank if same as Note Date):   Note Date: 4/25/2024     Reason for Hospitalization.   Chief Complaint   Patient presents with    Abdominal Pain    Dizziness     Pt states that he has had nausea and dizziness for the last 2 days. Also has a red right eye. Family called for EMS to bring to ED       Significant Findings.   Principal Problem:    Chest pain  Resolved Problems:    * No resolved hospital problems. *     Problem-based Hospital Course.  Juan Grant is a 84 y.o. male with a past medical history of hypertension, hyperlipidemia, BPH, CAD, CHF, GIB 1/2018, atrial fibrillation, MRSA, SSS s/p PPM and chronic back pain who presented with dizziness, abd pain, and left sided chest pain. CT chest abd and pelvis showed bladder stone and otherwise unremarkable.  Cardiology consultation and recommended echocardiogram which was competed and reviewed by cardiology.  Unremarkable from prior with normal EF.  Cardiology has signed off and recommend OP follow up with Dr. Love. Noted to have some urinary retention, ws unable to place mcnally, had repeat PVR after Flomax and he has been able to urinate without any issues.      He is ambulating in the room independently with cane without symptoms of SOB or chest pain.  He had no complains of dizziness. Reports he normal bowel and bladder patterns.  He feels at his baseline.  He is eating well.      Assessment and Plan:  Chest pain  - Reports no recurrent chest pain, pain was not exertional and present in the left lower chest , likely due to musculoskeletal pain and kyphosis, symptoms resolved  - Trop elevated and flat 39->45, likely due to type II MI, no ACS per cards  - On warfarin, subtherapeutic, wells score 0  medications      ammonium lactate 12 % cream  Commonly known as: Lac-Hydrin            ASK your doctor about these medications      rosuvastatin 5 MG tablet  Commonly known as: CRESTOR  TAKE 1 TABLET DAILY               Where to Get Your Medications        These medications were sent to [id number] - [place], [place] - [address] [place] [address] - P [phone] - F [phone]  [address] [facility] [address]      Phone: [phone]   tamsulosin 0.4 MG capsule       Information about where to get these medications is not yet available    Ask your nurse or doctor about these medications  potassium chloride 10 MEQ extended release tablet       Spent 35 minutes in discharge process.    Signed:  SCOTT Zepeda CNP     4/25/2024 3:06 PM

## 2024-04-25 NOTE — PLAN OF CARE
Problem: Discharge Planning  Goal: Discharge to home or other facility with appropriate resources  Outcome: Progressing     Problem: Safety - Adult  Goal: Free from fall injury  Outcome: Progressing     Problem: Pain  Goal: Verbalizes/displays adequate comfort level or baseline comfort level  Outcome: Progressing     Problem: Neurosensory - Adult  Goal: Achieves stable or improved neurological status  Outcome: Progressing  Goal: Achieves maximal functionality and self care  Outcome: Progressing     Problem: Respiratory - Adult  Goal: Achieves optimal ventilation and oxygenation  Outcome: Progressing     Problem: Cardiovascular - Adult  Goal: Maintains optimal cardiac output and hemodynamic stability  Outcome: Progressing  Goal: Absence of cardiac dysrhythmias or at baseline  Outcome: Progressing     Problem: Skin/Tissue Integrity - Adult  Goal: Skin integrity remains intact  Outcome: Progressing  Goal: Oral mucous membranes remain intact  Outcome: Progressing     Problem: Musculoskeletal - Adult  Goal: Return mobility to safest level of function  Outcome: Progressing  Goal: Return ADL status to a safe level of function  Outcome: Progressing     Problem: Gastrointestinal - Adult  Goal: Minimal or absence of nausea and vomiting  Outcome: Progressing     Problem: Genitourinary - Adult  Goal: Absence of urinary retention  Outcome: Progressing     Problem: Infection - Adult  Goal: Absence of infection at discharge  Outcome: Progressing     Problem: Metabolic/Fluid and Electrolytes - Adult  Goal: Electrolytes maintained within normal limits  Outcome: Progressing  Goal: Hemodynamic stability and optimal renal function maintained  Outcome: Progressing  Goal: Glucose maintained within prescribed range  Outcome: Progressing     Problem: Hematologic - Adult  Goal: Maintains hematologic stability  Outcome: Progressing

## 2024-04-25 NOTE — PROGRESS NOTES
Data- discharge order received, pt verbalized agreement to discharge, disposition to previous residence, no needs for HHC/DME.     Action- discharge instructions prepared and given to pt and son, pt verbalized understanding. Medication information packet given r/t NEW and/or CHANGED prescriptions emphasizing name/purpose/side effects, pt verbalized understanding. Discharge instruction summary: Diet- regular, Activity- as tolerated, Primary Care Physician as follows: Ki Cross -[phone] f/u appointment to be made by patient, immunizations reviewed, prescription medications filled in pt pharmacy.     1. WEIGHT: Admit Weight - Scale: 72.6 kg (160 lb) (04/23/24 1353)        Today  Weight - Scale: 72.2 kg (159 lb 1.6 oz) (04/25/24 0645)       2. O2 SAT.: SpO2: 97 % (04/25/24 1115)    Response- Pt belongings gathered, IV removed. Disposition is home (no HHC/DME needs), transported with belongings, taken to lobby via w/c w/ family, no complications.

## 2024-04-25 NOTE — PROGRESS NOTES
[facility] Daily Progress Note      Admit Date:  4/23/2024      Cardiology consult: elevated troponin     Subjective:  Mr. Grant is feeling good today, up in the chair, no complaints.       History of present illness:   Juan Grant is a 84 y.o. male who presented with presents to ER with complaints of multiple complaints.  Patient apparently has not been feeling well for past few days.  Is having some dizziness episode at the time patient was having some intermittent abdominal pain.  Also complains of some shortness of breath intermittently worse with laying flat.  Associated with some lower abdominal pain denies any sick contacts.  He has very weak symptoms.  Hard to follow.  Patient denies any fevers chills.  No dysuria.  Does have history of A-fib on Coumadin.  Patient then started having some left-sided chest wall with left upper quadrant pain as well.  Since last night persistent worse today.  Rates pain 7 out of 10.      Mr Grant follows with Dr Love for management of his atrial fibrillation. He does have a history of GI bleed but has been ok'd for AC with GI since. Chart reviewed which mentions he is not interested in watchman. Cardiology has been consulted for elevated troponin.     Objective:   BP (!) 118/56   Pulse 64   Temp 97.3 °F (36.3 °C) (Temporal)   Resp 16   Ht 1.753 m (5' 9\")   Wt 72.2 kg (159 lb 1.6 oz)   SpO2 97%   BMI 23.49 kg/m²     Intake/Output Summary (Last 24 hours) at 4/25/2024 1750  Last data filed at 4/25/2024 1534  Gross per 24 hour   Intake 250 ml   Output 205 ml   Net 45 ml       Physical Exam:  General:  Awake, alert, oriented x 3, NAD  Skin:  Warm and dry  Neck:  JVD normal  Chest:  normal air entry  Cardiovascular:  RRR S1S2, no S3,   Abdomen:  Soft, ND, NT, No HSM  Extremities:  No edema    Medications:    [START ON 5/1/2024] warfarin  2.5 mg Oral Once per day on Wed    tamsulosin  0.4 mg Oral Daily    dronedarone hcl  400 mg Oral BID WC    fentaNYL  1  patch TransDERmal Q48H    furosemide  40 mg Oral BID    lidocaine  1 patch Topical Daily    metoprolol succinate  12.5 mg Oral BID    iron polysaccharides  1 capsule Oral BID    pantoprazole  40 mg Oral QAM AC    vitamin B-12  500 mcg Oral Daily    sodium chloride flush  5-40 mL IntraVENous 2 times per day    warfarin  5 mg Oral Once per day on Sun Mon Tue Thu Fri Sat      sodium chloride       fluticasone, morphine, sodium chloride flush, sodium chloride, potassium chloride **OR** potassium alternative oral replacement **OR** potassium chloride, magnesium sulfate, ondansetron **OR** ondansetron, polyethylene glycol, acetaminophen **OR** acetaminophen    TELEMETRY (Personally reviewed by me): Sinus  A-paced      Lab Data:  CBC:   Recent Labs     04/23/24  1416 04/24/24 0447 04/25/24  0511   WBC 5.5 5.4 6.1   HGB 10.7* 10.1* 10.9*   HCT 32.3* 30.8* 34.0*   MCV 81.7 81.5 81.9    246 273     BMP:   Recent Labs     04/23/24  1416 04/24/24  0447 04/25/24  0511    136 138   K 4.4 4.5 4.4   CL 99 100 101   CO2 26 26 25   BUN 26* 26* 23*   CREATININE 1.1 1.0 1.1     LIVER PROFILE:   Recent Labs     04/23/24  1416   AST 21   ALT 6*   LIPASE 20.0   BILIDIR <0.2   BILITOT 0.5   ALKPHOS 88     PT/INR:   Recent Labs     04/23/24  1416 04/24/24 0447 04/25/24  0511   PROTIME 17.2* 18.1* 20.0*   INR 1.39* 1.48* 1.69*     APTT:   Recent Labs     04/23/24  1416   APTT 28.7     BNP:  No results for input(s): \"BNP\" in the last 72 hours.  Imaging/Procedures:     Echo 4/24/24  Summary   Normal left ventricle size and systolic function with an estimated ejection   fraction of 50-55%. Septal paradox is present. Mild concentric LVH.   Indeterminate diastolic function.   The right ventricle is normal in size and function.   Pacer / ICD wire is visualized in the right ventricle.   TAPSE=2.48cm. RVs=19.4cm/s.   The tricuspid valve is normal in structure. There is no significant   tricuspid valve stenosis.   PASP not

## 2024-04-25 NOTE — CARE COORDINATION
04/25/24 1414   IMM Letter   IMM Letter given to Patient/Family/Significant other/Guardian/POA/by: Marce Cho RN   IMM Letter date given: 04/25/24   IMM Letter time given: 1414       Discharge Planning Note:    Chart reviewed and it appears that patient has minimal needs for discharge at this time. Risk Score 12 %     Primary Care Physician is Ki Cross MD    Primary insurance is Medicare    Please notify case management if any discharge needs are identified.      Case management will continue to follow progress and update discharge plan as needed.  Marce Cho RN Case Manager  [phone]

## 2024-04-25 NOTE — PROGRESS NOTES
Pharmacy to Dose Warfarin    Pharmacy consulted to dose warfarin for afib.    INR Goal: 2-3    Prior to admission warfarin dosing regimen: 2.5 mg on Wed; 5 mg all other days    INR today: 1.69    Assessment/Plan:  - INR is currently subtherapeutic   - Will continue home regimen and give 5 mg tonight   - Possible concomitant drug-drug interactions include: acetaminophen and dronedarone     Pharmacy will continue to follow.    Yumiko MoreauD [facility]  PGY-1 Resident  [id number]

## 2024-04-29 ENCOUNTER — NURSE ONLY (OUTPATIENT)
Dept: CARDIOLOGY CLINIC | Age: 84
End: 2024-04-29
Payer: MEDICARE

## 2024-04-29 ENCOUNTER — OFFICE VISIT (OUTPATIENT)
Dept: CARDIOLOGY CLINIC | Age: 84
End: 2024-04-29
Payer: MEDICARE

## 2024-04-29 ENCOUNTER — TELEPHONE (OUTPATIENT)
Dept: FAMILY MEDICINE CLINIC | Age: 84
End: 2024-04-29

## 2024-04-29 VITALS
SYSTOLIC BLOOD PRESSURE: 120 MMHG | DIASTOLIC BLOOD PRESSURE: 70 MMHG | WEIGHT: 160.2 LBS | BODY MASS INDEX: 23.66 KG/M2 | HEART RATE: 80 BPM

## 2024-04-29 VITALS
WEIGHT: 160 LBS | HEART RATE: 76 BPM | DIASTOLIC BLOOD PRESSURE: 74 MMHG | SYSTOLIC BLOOD PRESSURE: 126 MMHG | BODY MASS INDEX: 23.63 KG/M2

## 2024-04-29 DIAGNOSIS — Z95.0 PACEMAKER: ICD-10-CM

## 2024-04-29 DIAGNOSIS — Z95.0 CARDIAC PACEMAKER IN SITU: Primary | ICD-10-CM

## 2024-04-29 DIAGNOSIS — I10 HTN (HYPERTENSION), BENIGN: ICD-10-CM

## 2024-04-29 DIAGNOSIS — I50.32 CHRONIC DIASTOLIC HF (HEART FAILURE) (HCC): ICD-10-CM

## 2024-04-29 DIAGNOSIS — I48.0 PAROXYSMAL ATRIAL FIBRILLATION (HCC): Primary | ICD-10-CM

## 2024-04-29 DIAGNOSIS — I49.5 SICK SINUS SYNDROME (HCC): ICD-10-CM

## 2024-04-29 DIAGNOSIS — R00.1 BRADYCARDIA: ICD-10-CM

## 2024-04-29 DIAGNOSIS — I25.10 CORONARY ARTERY DISEASE INVOLVING NATIVE CORONARY ARTERY OF NATIVE HEART WITHOUT ANGINA PECTORIS: ICD-10-CM

## 2024-04-29 DIAGNOSIS — I48.0 PAROXYSMAL ATRIAL FIBRILLATION (HCC): ICD-10-CM

## 2024-04-29 DIAGNOSIS — E78.5 DYSLIPIDEMIA: ICD-10-CM

## 2024-04-29 DIAGNOSIS — G47.33 OSA (OBSTRUCTIVE SLEEP APNEA): ICD-10-CM

## 2024-04-29 DIAGNOSIS — Z95.0 CARDIAC PACEMAKER IN SITU: ICD-10-CM

## 2024-04-29 DIAGNOSIS — E78.5 HYPERLIPIDEMIA, UNSPECIFIED HYPERLIPIDEMIA TYPE: Primary | ICD-10-CM

## 2024-04-29 DIAGNOSIS — I49.5 SSS (SICK SINUS SYNDROME) (HCC): ICD-10-CM

## 2024-04-29 PROCEDURE — 3078F DIAST BP <80 MM HG: CPT | Performed by: INTERNAL MEDICINE

## 2024-04-29 PROCEDURE — 93000 ELECTROCARDIOGRAM COMPLETE: CPT | Performed by: INTERNAL MEDICINE

## 2024-04-29 PROCEDURE — 1036F TOBACCO NON-USER: CPT | Performed by: INTERNAL MEDICINE

## 2024-04-29 PROCEDURE — G8427 DOCREV CUR MEDS BY ELIG CLIN: HCPCS | Performed by: INTERNAL MEDICINE

## 2024-04-29 PROCEDURE — G8420 CALC BMI NORM PARAMETERS: HCPCS | Performed by: INTERNAL MEDICINE

## 2024-04-29 PROCEDURE — 99214 OFFICE O/P EST MOD 30 MIN: CPT | Performed by: INTERNAL MEDICINE

## 2024-04-29 PROCEDURE — 1111F DSCHRG MED/CURRENT MED MERGE: CPT | Performed by: INTERNAL MEDICINE

## 2024-04-29 PROCEDURE — 1124F ACP DISCUSS-NO DSCNMKR DOCD: CPT | Performed by: INTERNAL MEDICINE

## 2024-04-29 PROCEDURE — 3074F SYST BP LT 130 MM HG: CPT | Performed by: INTERNAL MEDICINE

## 2024-04-29 RX ORDER — FUROSEMIDE 40 MG/1
40 TABLET ORAL 2 TIMES DAILY
Qty: 180 TABLET | Refills: 3 | Status: SHIPPED | OUTPATIENT
Start: 2024-04-29

## 2024-04-29 RX ORDER — ROSUVASTATIN CALCIUM 5 MG/1
5 TABLET, COATED ORAL DAILY
Qty: 90 TABLET | Refills: 3 | Status: SHIPPED | OUTPATIENT
Start: 2024-04-29

## 2024-04-29 NOTE — PROGRESS NOTES
Subjective:      Patient ID: Juan Grant is a 84 y.o. male.    CC:  S/p GI bleed.  F/u HTN  CAD. S/p pacer. Fatigue      HPI: Mr Grant is here for a 6 moth f/u.. He continues to deny chest pain at today's visit and  has chronic left leg swelling. He c/o of fatigue.  No LOC and no falls.  Back is bad and is using a 4 pt walker.  Worried about bleeding and back surgeons shy away from surgery on back d/t bleeding.  Has paroxysmal brief episodes of AF, less than 1%.    He had life threatening GI bleeding on NOAC.  He doesn't want watchman.  Has more AF on pacer interrogation.  GI said ok for AC.  Has edema.  Has less episodes of dizziness and takes nitrodur patch 0.2mg/hr.     24:  stable no chest pain.        Allergies   Allergen Reactions    Avelox [Moxifloxacin Hcl In Nacl] Anaphylaxis    Epinephrine Base     Other      [place] per PT - swelling size of silver dollar at site per PT     Keflex [Cephalexin] Nausea And Vomiting    Polysporin [Bacitracin-Polymyxin B] Rash        Social History     Socioeconomic History    Marital status:      Spouse name: Not on file    Number of children: 2    Years of education: Not on file    Highest education level: Not on file   Occupational History    Not on file   Tobacco Use    Smoking status: Former     Current packs/day: 0.00     Average packs/day: 1 pack/day for 30.0 years (30.0 ttl pk-yrs)     Types: Cigarettes     Start date: 1974     Quit date: 2004     Years since quittin.0    Smokeless tobacco: Never   Vaping Use    Vaping Use: Never used   Substance and Sexual Activity    Alcohol use: No     Alcohol/week: 0.0 standard drinks of alcohol    Drug use: No    Sexual activity: Yes     Comment:  living with spouse   Other Topics Concern    Not on file   Social History Narrative    Not on file     Social Determinants of Health     Financial Resource Strain: Low Risk  (2022)    Overall Financial Resource Strain (CARDIA)     Difficulty

## 2024-04-29 NOTE — TELEPHONE ENCOUNTER
Care Transitions Initial Follow Up Call    Outreach made within 2 business days of discharge: Yes    Patient: Juan Grant Patient : 1940   MRN: [id number]  Reason for Admission: There are no discharge diagnoses documented for the most recent discharge.  Discharge Date: 24       Spoke with: PATIENT       Discharge department/facility: [facility] Patient Contact:  Was patient able to fill all prescriptions: Yes  Was patient instructed to bring all medications to the follow-up visit: Yes  Is patient taking all medications as directed in the discharge summary? Yes  Does patient understand their discharge instructions: Yes  Does patient have questions or concerns that need addressed prior to 7-14 day follow up office visit: no    Scheduled appointment with PCP within 7-14 days    Follow Up  Future Appointments   Date Time Provider Department Center   2024  1:15 PM SCHEDULE, JACKELYN DEVICE CHECK Jackelyn De Jesus [facility]   2024  1:45 PM Sanjiv Patino MD [place] [facility]   2024  3:00 PM Jaime Love MD [facility]       Mamta Paz MA

## 2024-04-30 PROCEDURE — 93280 PM DEVICE PROGR EVAL DUAL: CPT | Performed by: INTERNAL MEDICINE

## 2024-05-02 ENCOUNTER — OFFICE VISIT (OUTPATIENT)
Dept: FAMILY MEDICINE CLINIC | Age: 84
End: 2024-05-02

## 2024-05-02 VITALS
BODY MASS INDEX: 23.78 KG/M2 | SYSTOLIC BLOOD PRESSURE: 160 MMHG | DIASTOLIC BLOOD PRESSURE: 72 MMHG | WEIGHT: 161 LBS | HEART RATE: 87 BPM | OXYGEN SATURATION: 95 %

## 2024-05-02 DIAGNOSIS — N21.0 BLADDER STONE: ICD-10-CM

## 2024-05-02 DIAGNOSIS — Z09 HOSPITAL DISCHARGE FOLLOW-UP: ICD-10-CM

## 2024-05-02 DIAGNOSIS — K80.20 CALCULUS OF GALLBLADDER WITHOUT CHOLECYSTITIS WITHOUT OBSTRUCTION: Primary | ICD-10-CM

## 2024-05-02 DIAGNOSIS — R11.0 NAUSEA: ICD-10-CM

## 2024-05-02 RX ORDER — DOXEPIN HYDROCHLORIDE 10 MG/1
10 CAPSULE ORAL NIGHTLY
Qty: 30 CAPSULE | Refills: 3 | Status: SHIPPED | OUTPATIENT
Start: 2024-05-02

## 2024-05-02 RX ORDER — ONDANSETRON 4 MG/1
4 TABLET, ORALLY DISINTEGRATING ORAL EVERY 8 HOURS PRN
Qty: 20 TABLET | Refills: 3 | Status: SHIPPED | OUTPATIENT
Start: 2024-05-02

## 2024-05-02 RX ORDER — ONDANSETRON 4 MG/1
4 TABLET, ORALLY DISINTEGRATING ORAL EVERY 8 HOURS PRN
Qty: 20 TABLET | Refills: 3 | Status: SHIPPED | OUTPATIENT
Start: 2024-05-02 | End: 2024-05-02 | Stop reason: SDUPTHER

## 2024-05-02 SDOH — ECONOMIC STABILITY: FOOD INSECURITY: WITHIN THE PAST 12 MONTHS, THE FOOD YOU BOUGHT JUST DIDN'T LAST AND YOU DIDN'T HAVE MONEY TO GET MORE.: NEVER TRUE

## 2024-05-02 SDOH — ECONOMIC STABILITY: FOOD INSECURITY: WITHIN THE PAST 12 MONTHS, YOU WORRIED THAT YOUR FOOD WOULD RUN OUT BEFORE YOU GOT MONEY TO BUY MORE.: NEVER TRUE

## 2024-05-02 SDOH — ECONOMIC STABILITY: HOUSING INSECURITY
IN THE LAST 12 MONTHS, WAS THERE A TIME WHEN YOU DID NOT HAVE A STEADY PLACE TO SLEEP OR SLEPT IN A SHELTER (INCLUDING NOW)?: NO

## 2024-05-02 SDOH — ECONOMIC STABILITY: INCOME INSECURITY
HOW HARD IS IT FOR YOU TO PAY FOR THE VERY BASICS LIKE FOOD, HOUSING, MEDICAL CARE, AND HEATING?: PATIENT UNABLE TO ANSWER

## 2024-05-02 ASSESSMENT — PATIENT HEALTH QUESTIONNAIRE - PHQ9
1. LITTLE INTEREST OR PLEASURE IN DOING THINGS: NOT AT ALL
SUM OF ALL RESPONSES TO PHQ QUESTIONS 1-9: 0
2. FEELING DOWN, DEPRESSED OR HOPELESS: NOT AT ALL
SUM OF ALL RESPONSES TO PHQ QUESTIONS 1-9: 0
SUM OF ALL RESPONSES TO PHQ9 QUESTIONS 1 & 2: 0
SUM OF ALL RESPONSES TO PHQ QUESTIONS 1-9: 0
SUM OF ALL RESPONSES TO PHQ QUESTIONS 1-9: 0

## 2024-05-02 NOTE — PROGRESS NOTES
his warfarin but his INR was normal he is here in follow-up today with his daughter.  He lives with his son who is not there very often but his daughter looks in on him frequently.  He ambulates with a cane he no longer drives  He did see his cardiologist Dr. Love since has been discharged and he felt he was stable and made no medication changes, he also saw his electro cardiologist and pacemaker check was good  Patient Active Problem List   Diagnosis    Hyperlipidemia    Benign prostatic hyperplasia    Paroxysmal atrial fibrillation (HCC)    Coronary artery disease involving native coronary artery of native heart without angina pectoris    Cardiac pacemaker in situ    Postsurgical percutaneous transluminal coronary angioplasty status    History of nonmelanoma skin cancer    Tinea manuum, pedis, and unguium    AK (actinic keratosis)    Chronic congestive heart failure ([facility])    Essential hypertension    Sick sinus syndrome ([facility])    Bradycardia    Gastrointestinal hemorrhage    Pure hyperglyceridemia    Localized edema    Venous (peripheral) insufficiency    PVD (peripheral vascular disease) ([facility])    Solar purpura ([facility])    Osteomyelitis of second toe of left foot ([facility])    Bilateral cellulitis of lower leg    Open fracture of second toe of left foot    History of MRSA infection    Failure of outpatient treatment    Subacute osteomyelitis of left foot ([facility])    Encounter for medication counseling    Chest pain    Calculus of gallbladder    Bladder stone       Medications listed as ordered at the time of discharge from hospital     Medication List            Accurate as of May 2, 2024  5:18 PM. If you have any questions, ask your nurse or doctor.                CHANGE how you take these medications      metoprolol succinate 25 MG extended release tablet  Commonly known as: TOPROL XL  TAKE ONE-HALF (1/2) TABLET TWICE A DAY  What changed: additional instructions     ondansetron 4 MG disintegrating tablet  Commonly known

## 2024-05-08 NOTE — PROGRESS NOTES
Physician Progress Note      PATIENT:               JUANCARLOS JOHNSON  [facility] #:                  [id number]  :                       1940  ADMIT DATE:       2024 1:48 PM  DISCH DATE:        2024 5:29 PM  RESPONDING  PROVIDER #:        LUIS Horton CNP          QUERY TEXT:    Patient admitted with chest pain/abd pain noted to have atrial fibrillation   and is maintained on Coumadin.If possible, please document in progress notes   and discharge summary if you are evaluating and/or treating any of the   following:    The medical record reflects the following:  Risk Factors: chadsvasc score is 4-INR today: 1.69  Clinical Indicators: INR today: 1.69- Afib Rate controlled  Managed with   coumadin / Torprol Xl  Treatment: Pharmacy consult, Will continue home regimen and give 5 mg    Coumadin tonight, monitor serial labs    Thank-You, Dayan Ovalles RN, BSN, CCDS  Options provided:  -- Secondary hypercoagulable state in a patient with atrial fibrillation  -- Other - I will add my own diagnosis  -- Disagree - Not applicable / Not valid  -- Disagree - Clinically unable to determine / Unknown  -- Refer to Clinical Documentation Reviewer    PROVIDER RESPONSE TEXT:    This patient has secondary hypercoagulable state in a patient with atrial   fibrillation.    Query created by: Fransisca Ovalles on 2024 2:27 PM      QUERY TEXT:    Pt admitted with dizziness, abd pain, and left sided chest pain. and has CHF   documented. If possible, please document in progress notes and discharge   summary further specificity regarding the type and acuity of CHF:      The medical record reflects the following:  Risk Factors: hx of CAD, CHF hx of diastolic dysfunction  Clinical Indicators: Cardiology consult- CHF  EF 50% Lasix / Torporl  Treatment: update echo.  If no significant change on echo, from prior echo,   would medically manage CAD, furosemide 40 mg Oral BID/ Torporl    Thank-You, Dayan Ovalles RN, BSN, CCDS  Options

## 2024-05-09 NOTE — PROGRESS NOTES
Physician Progress Note      PATIENT:               JUANCARLOS JOHNSON  [facility] #:                  [id number]  :                       1940  ADMIT DATE:       2024 1:48 PM  DISCH DATE:        2024 5:29 PM  RESPONDING  PROVIDER #:        LUIS CHAVEZ - CNP          QUERY TEXT:    Pt admitted with chest pain/abd pain.  Cardiology PN- \"CT with 2.7 cm bladder   stone, cause for pain\"   If possible, please document in progress notes and   discharge summary if you are evaluating and/or treating any of the following:    The medical record reflects the following:  Risk Factors: CADH/o MI stents 2009, patent by cath 3/7/12-  CHF  Clinical Indicators: Discharge notes- Check PVR- 691, unable to place mcnally,   Flomax started, he was able to void after, needs OP urology follow up- repeat   PVR on Flomax 200  CT- Irregular bladder stone measures up to 2.7 cm.  Mild   bladder distension. Prostatomegaly.  Cardiology- CT with 2.7 cm bladder stone,   cause for pain-Reports no recurrent chest pain, pain was not exertional and   present in the left lower chest\"-  Trop elevated and flat 39->45  Treatment:  Cardiology consultation hx of CAD- reviewed with cardiology, OK   for discharge, no changes recommended.  Consult urology with mild bladder   distention and prostatomegaly, Check PVR- 691, unable to place mcnally, Flomax   started    Thank-You, Dayan Ovalles RN, BSN, CCDS  Options provided:  -- Chest pain/abd pain suspected due to Bladder stone/Urinary   retention/Prostatomegaly  -- Chest pain/abd pain due to suspected other etiology, please specify other   etiology.  -- Other - I will add my own diagnosis  -- Disagree - Not applicable / Not valid  -- Disagree - Clinically unable to determine / Unknown  -- Refer to Clinical Documentation Reviewer    PROVIDER RESPONSE TEXT:    This patient has chest pain/abd pain due to musculoskeletal pain/kyphosis    Query created by: Fransisca Ovalles on 2024 10:40

## 2024-05-12 NOTE — PROGRESS NOTES
Physician Progress Note      PATIENT:               JUANCARLOS JOHNSON  CSN #:                  [id number]  :                       1940  ADMIT DATE:       2024 1:48 PM  DISCH DATE:        2024 5:29 PM  RESPONDING  PROVIDER #:        LUIS Horton CNP          QUERY TEXT:    Patient admitted with chest pain.  If possible, please document in the   progress notes and discharge summary if you are evaluating and/or treating any   of the following:    The medical record reflects the following:  Risk Factors: chronic atrial fibrillation, CAD  Clinical Indicators: Troponin was elevated 39, 34, 41, 45.  Cards-?Troponin   elevated but flat.   If no significant change on echo, from prior echo, would   medically manage CAD\"  Treatment: Cardiology consult, ECHO,  aspirin, Lasix, Toprol, and coumadin.   echo yesterday is remarkable.  Recommend medical management and follow up with   Dr Love as outpatient.    Thanks, Dayan Ovalles RN, BSN, CCDS  Options provided:  -- NSTEMI  -- Type 2 MI  -- Demand Ischemia due to heart failure  -- Other - I will add my own diagnosis  -- Disagree - Not applicable / Not valid  -- Disagree - Clinically unable to determine / Unknown  -- Refer to Clinical Documentation Reviewer    PROVIDER RESPONSE TEXT:    This patient has a Type 2 MI.    Query created by: Fransisca Ovalles on 5/10/2024 3:04 PM      Electronically signed by:  LUIS Horton CNP 2024 4:12 PM

## 2024-05-14 RX ORDER — DRONEDARONE 400 MG/1
400 TABLET, FILM COATED ORAL 2 TIMES DAILY WITH MEALS
Qty: 180 TABLET | Refills: 2 | Status: SHIPPED | OUTPATIENT
Start: 2024-05-14 | End: 2024-05-15 | Stop reason: SDUPTHER

## 2024-05-22 RX ORDER — TAMSULOSIN HYDROCHLORIDE 0.4 MG/1
0.4 CAPSULE ORAL DAILY
Qty: 30 CAPSULE | Refills: 0 | Status: SHIPPED | OUTPATIENT
Start: 2024-05-22

## 2024-06-17 RX ORDER — DRONEDARONE 400 MG/1
400 TABLET, FILM COATED ORAL 2 TIMES DAILY WITH MEALS
Qty: 60 TABLET | Refills: 0 | Status: SHIPPED | OUTPATIENT
Start: 2024-06-17

## 2024-06-17 NOTE — TELEPHONE ENCOUNTER
Requested Prescriptions     Pending Prescriptions Disp Refills    MULTAQ 400 MG TABS [Pharmacy Med Name: MULTAQ 400MG TABLETS] 60 tablet 0     Sig: TAKE 1 TABLET BY MOUTH TWICE DAILY WITH MEALS      Last Office Visit: 4/29/2024     Next Office Visit: 6/18/2024

## 2024-06-20 RX ORDER — POTASSIUM CHLORIDE 750 MG/1
TABLET, EXTENDED RELEASE ORAL
Qty: 270 TABLET | Refills: 3 | Status: SHIPPED | OUTPATIENT
Start: 2024-06-20

## 2024-06-21 RX ORDER — TAMSULOSIN HYDROCHLORIDE 0.4 MG/1
0.4 CAPSULE ORAL DAILY
Qty: 90 CAPSULE | Refills: 3 | Status: SHIPPED | OUTPATIENT
Start: 2024-06-21

## 2024-06-21 NOTE — TELEPHONE ENCOUNTER
Medication:   Requested Prescriptions     Pending Prescriptions Disp Refills    tamsulosin (FLOMAX) 0.4 MG capsule [Pharmacy Med Name: TAMSULOSIN 0.4MG CAPSULES] 30 capsule 0     Sig: TAKE 1 CAPSULE BY MOUTH DAILY     Last Filled:  05/22/2024    Patient Phone Number: [phone] (home)     Last appt: 05/02/2024  Next appt: Visit date not found    Last Thyroid:   Lab Results   Component Value Date/Time    TSH 1.37 11/27/2023 12:29 PM    T4FREE 0.9 01/27/2017 01:30 PM

## 2024-07-29 RX ORDER — AMOXICILLIN AND CLAVULANATE POTASSIUM 875; 125 MG/1; MG/1
1 TABLET, FILM COATED ORAL 2 TIMES DAILY
Qty: 14 TABLET | Refills: 0 | Status: SHIPPED | OUTPATIENT
Start: 2024-07-29 | End: 2024-08-05

## 2024-08-05 ENCOUNTER — OFFICE VISIT (OUTPATIENT)
Dept: CARDIOLOGY CLINIC | Age: 84
End: 2024-08-05
Payer: MEDICARE

## 2024-08-05 VITALS
DIASTOLIC BLOOD PRESSURE: 64 MMHG | WEIGHT: 151.4 LBS | SYSTOLIC BLOOD PRESSURE: 118 MMHG | BODY MASS INDEX: 22.36 KG/M2 | HEART RATE: 74 BPM

## 2024-08-05 DIAGNOSIS — E78.5 HYPERLIPIDEMIA, UNSPECIFIED HYPERLIPIDEMIA TYPE: Primary | ICD-10-CM

## 2024-08-05 DIAGNOSIS — I25.83 CORONARY ARTERY DISEASE DUE TO LIPID RICH PLAQUE: ICD-10-CM

## 2024-08-05 DIAGNOSIS — I25.10 CORONARY ARTERY DISEASE DUE TO LIPID RICH PLAQUE: ICD-10-CM

## 2024-08-05 DIAGNOSIS — I73.9 PVD (PERIPHERAL VASCULAR DISEASE) (HCC): ICD-10-CM

## 2024-08-05 DIAGNOSIS — I10 HTN (HYPERTENSION), BENIGN: ICD-10-CM

## 2024-08-05 PROBLEM — F33.0 MAJOR DEPRESSIVE DISORDER, RECURRENT, MILD (HCC): Status: ACTIVE | Noted: 2024-08-05

## 2024-08-05 PROBLEM — F33.1 MAJOR DEPRESSIVE DISORDER, RECURRENT, MODERATE (HCC): Status: ACTIVE | Noted: 2024-08-05

## 2024-08-05 PROBLEM — F33.9 MAJOR DEPRESSIVE DISORDER, RECURRENT, UNSPECIFIED (HCC): Status: ACTIVE | Noted: 2024-08-05

## 2024-08-05 PROCEDURE — G8420 CALC BMI NORM PARAMETERS: HCPCS | Performed by: INTERNAL MEDICINE

## 2024-08-05 PROCEDURE — 3074F SYST BP LT 130 MM HG: CPT | Performed by: INTERNAL MEDICINE

## 2024-08-05 PROCEDURE — 3078F DIAST BP <80 MM HG: CPT | Performed by: INTERNAL MEDICINE

## 2024-08-05 PROCEDURE — 99214 OFFICE O/P EST MOD 30 MIN: CPT | Performed by: INTERNAL MEDICINE

## 2024-08-05 PROCEDURE — G8427 DOCREV CUR MEDS BY ELIG CLIN: HCPCS | Performed by: INTERNAL MEDICINE

## 2024-08-05 PROCEDURE — 1124F ACP DISCUSS-NO DSCNMKR DOCD: CPT | Performed by: INTERNAL MEDICINE

## 2024-08-05 PROCEDURE — 1036F TOBACCO NON-USER: CPT | Performed by: INTERNAL MEDICINE

## 2024-08-05 NOTE — PROGRESS NOTES
Subjective:      Patient ID: Juan Grant is a 84 y.o. male.    CC:  S/p GI bleed.  F/u HTN  CAD. S/p pacer. Fatigue      HPI: Mr Grant is here for a 6 moth f/u.. He continues to deny chest pain at today's visit and  has chronic left leg swelling. He c/o of fatigue.  No LOC and no falls.  Back is bad and is using a 4 pt walker.  Worried about bleeding and back surgeons shy away from surgery on back d/t bleeding.  Has paroxysmal brief episodes of AF, less than 1%.    He had life threatening GI bleeding on NOAC.  He doesn't want watchman.  Has more AF on pacer interrogation.  GI said ok for AC.  Has edema.  Has less episodes of dizziness and takes nitrodur patch 0.2mg/hr.     24:  stable no chest pain.        Allergies   Allergen Reactions    Avelox [Moxifloxacin Hcl In Nacl] Anaphylaxis    Epinephrine Base     Other      [place] per PT - swelling size of silver dollar at site per PT     Keflex [Cephalexin] Nausea And Vomiting    Polysporin [Bacitracin-Polymyxin B] Rash        Social History     Socioeconomic History    Marital status:      Spouse name: Not on file    Number of children: 2    Years of education: Not on file    Highest education level: Not on file   Occupational History    Not on file   Tobacco Use    Smoking status: Former     Current packs/day: 0.00     Average packs/day: 1 pack/day for 30.0 years (30.0 ttl pk-yrs)     Types: Cigarettes     Start date: 1974     Quit date: 2004     Years since quittin.3    Smokeless tobacco: Never   Vaping Use    Vaping Use: Never used   Substance and Sexual Activity    Alcohol use: No     Alcohol/week: 0.0 standard drinks of alcohol    Drug use: No    Sexual activity: Yes     Comment:  living with spouse   Other Topics Concern    Not on file   Social History Narrative    Not on file     Social Determinants of Health     Financial Resource Strain: Patient Unable To Answer (2024)    Overall Financial Resource Strain (CARDIA)  fatigue.  Edema:   Lasix 40 bid    Weeping:  Had amp 2nd toe left foot for osteo.  Finished doxycycline.    Was in [place] but no acute pathology and was discharged with preserved LVEF on echo on 4/25/24.   Continue current medications.  Stable cardiovascular status.

## 2024-08-12 DIAGNOSIS — I25.83 CORONARY ARTERY DISEASE DUE TO LIPID RICH PLAQUE: ICD-10-CM

## 2024-08-12 DIAGNOSIS — I73.9 PVD (PERIPHERAL VASCULAR DISEASE) (HCC): ICD-10-CM

## 2024-08-12 DIAGNOSIS — I25.10 CORONARY ARTERY DISEASE DUE TO LIPID RICH PLAQUE: ICD-10-CM

## 2024-08-12 DIAGNOSIS — I25.10 CORONARY ARTERY DISEASE INVOLVING NATIVE CORONARY ARTERY OF NATIVE HEART WITHOUT ANGINA PECTORIS: ICD-10-CM

## 2024-08-12 DIAGNOSIS — I48.0 PAROXYSMAL ATRIAL FIBRILLATION (HCC): ICD-10-CM

## 2024-08-12 DIAGNOSIS — I10 PRIMARY HYPERTENSION: ICD-10-CM

## 2024-08-12 DIAGNOSIS — I10 HTN (HYPERTENSION), BENIGN: ICD-10-CM

## 2024-08-12 DIAGNOSIS — E78.5 HYPERLIPIDEMIA, UNSPECIFIED HYPERLIPIDEMIA TYPE: ICD-10-CM

## 2024-08-12 LAB
BASOPHILS # BLD: 0 K/UL (ref 0–0.2)
BASOPHILS NFR BLD: 0.5 %
DEPRECATED RDW RBC AUTO: 17.4 % (ref 12.4–15.4)
EOSINOPHIL # BLD: 0.1 K/UL (ref 0–0.6)
EOSINOPHIL NFR BLD: 2.4 %
HCT VFR BLD AUTO: 29.2 % (ref 40.5–52.5)
HGB BLD-MCNC: 9.8 G/DL (ref 13.5–17.5)
INR PPP: 4.01 (ref 0.85–1.15)
LYMPHOCYTES # BLD: 1.5 K/UL (ref 1–5.1)
LYMPHOCYTES NFR BLD: 28.5 %
MCH RBC QN AUTO: 28 PG (ref 26–34)
MCHC RBC AUTO-ENTMCNC: 33.4 G/DL (ref 31–36)
MCV RBC AUTO: 83.9 FL (ref 80–100)
MONOCYTES # BLD: 0.3 K/UL (ref 0–1.3)
MONOCYTES NFR BLD: 5.5 %
NEUTROPHILS # BLD: 3.4 K/UL (ref 1.7–7.7)
NEUTROPHILS NFR BLD: 63.1 %
PLATELET # BLD AUTO: 197 K/UL (ref 135–450)
PMV BLD AUTO: 7.9 FL (ref 5–10.5)
PROTHROMBIN TIME: 38.7 SEC (ref 11.9–14.9)
RBC # BLD AUTO: 3.48 M/UL (ref 4.2–5.9)
WBC # BLD AUTO: 5.4 K/UL (ref 4–11)

## 2024-08-13 ENCOUNTER — ANTI-COAG VISIT (OUTPATIENT)
Dept: CARDIOLOGY CLINIC | Age: 84
End: 2024-08-13

## 2024-08-13 DIAGNOSIS — I48.0 PAROXYSMAL ATRIAL FIBRILLATION (HCC): Primary | ICD-10-CM

## 2024-08-13 NOTE — PROGRESS NOTES
ANTICOAGULATION MONITORING    Juan Grant, 1940      Anticoagulation Indication(s):  Afib  pAF     Referring Physician:   Dr. Love  Goal INR Range:  2.0-3.0  Duration of Anticoagulation Therapy:  Indefinite  Home or Lab Draw: Lab  Product patient has at home: warfarin 5 mg    Recent INR Results:  Lab Results   Component Value Date    INR 4.01 (H) 08/12/2024    INR 1.69 (H) 04/25/2024    INR 1.48 (H) 04/24/2024    INR 1.39 (H) 04/23/2024       INR Summary                          Warfarin regimen (mg)     Sun Mon Tues W Thurs F Sat    8/13/24 4.01 Above goal, hold dose 5 2.5 hold 2.5 2.5 5 5 17.5   8/13/24  Dose he's been taking 5 2.5 5 2.5 2.5 5 5    3/21/24 2.30 At goal, no change 5 5 5 2.5 5 5 5 32.5   3/14/24 1.08 Below goal,  5 5 5 2.5 5 5 5 32.5   3/5/24 1.05 Below goal, see note below 2.5 5 5 2.5 5 5 2.5 27.5   2/16/24 1.08 Below goal,     2.5 5 5 2.5 5 10 then 5 2.5 32.5   11/27/23 1.44 Below goal 2.5 5 5 2.5 5 5 2.5 27.5   10/9/23 1.56 Below goal 2.5 5 5 7.5 then 2.5 2.5 2.5 2.5 22.5   6/21/23 2.01 At goal, no change 2.5 5 5 2.5 2.5 2.5 2.5 22.5   3/23/23 1.41 Below goal,  2.5 5 5 2.5 2.5 5 then 2.5 5 then 2.5 22.5   2/222/23 2.03 At goal, no change 2.5 5 5 2.5 2.5 2.5 2.5 22.5   11/10/22 3.07 Just above, no change 2.5 5 5 2.5 2.5 2.5 2.5 22.5   9/8/22 2.17 At goal, no change 2.5 5 5 2.5 2.5 2.5 2.5 22.5   8/31/22 3.15 Above goal, hold todays dose 2.5 5 5 2.5 hold 2.5 2.5 20   8/2/22 8/2/22 See below 2.5 5 5 hold 2.5 2.5 2.5 20       Next INR check: 8/19/24    Spoke with daughter Nisreen. Advised to hold today and resume current dose. Recheck 1 week  Emphasized to check INR at least every month, sooner if he is out of range. Denies any s/s of bleeding.  Reviewed INR   Above goal INR     Lina CHAVEZ, CVNP FNP

## 2024-08-20 DIAGNOSIS — I10 PRIMARY HYPERTENSION: ICD-10-CM

## 2024-08-20 DIAGNOSIS — I48.0 PAROXYSMAL ATRIAL FIBRILLATION (HCC): ICD-10-CM

## 2024-08-20 DIAGNOSIS — I25.10 CORONARY ARTERY DISEASE INVOLVING NATIVE CORONARY ARTERY OF NATIVE HEART WITHOUT ANGINA PECTORIS: ICD-10-CM

## 2024-08-20 LAB
INR PPP: 3 (ref 0.85–1.15)
PROTHROMBIN TIME: 31 SEC (ref 11.9–14.9)

## 2024-08-28 PROCEDURE — 93296 REM INTERROG EVL PM/IDS: CPT | Performed by: INTERNAL MEDICINE

## 2024-08-28 PROCEDURE — 93294 REM INTERROG EVL PM/LDLS PM: CPT | Performed by: INTERNAL MEDICINE

## 2024-09-23 DIAGNOSIS — I10 PRIMARY HYPERTENSION: ICD-10-CM

## 2024-09-23 DIAGNOSIS — I48.0 PAROXYSMAL ATRIAL FIBRILLATION (HCC): ICD-10-CM

## 2024-09-23 DIAGNOSIS — I25.10 CORONARY ARTERY DISEASE INVOLVING NATIVE CORONARY ARTERY OF NATIVE HEART WITHOUT ANGINA PECTORIS: ICD-10-CM

## 2024-09-24 ENCOUNTER — ANTI-COAG VISIT (OUTPATIENT)
Dept: CARDIOLOGY CLINIC | Age: 84
End: 2024-09-24

## 2024-09-24 DIAGNOSIS — I48.0 PAROXYSMAL ATRIAL FIBRILLATION (HCC): Primary | ICD-10-CM

## 2024-09-24 LAB
INR PPP: 6.02 (ref 0.85–1.15)
PROTHROMBIN TIME: 52.8 SEC (ref 11.9–14.9)

## 2024-09-25 ENCOUNTER — TELEPHONE (OUTPATIENT)
Dept: PHARMACY | Age: 84
End: 2024-09-25

## 2024-09-27 ENCOUNTER — ANTI-COAG VISIT (OUTPATIENT)
Dept: PHARMACY | Age: 84
End: 2024-09-27
Payer: MEDICARE

## 2024-09-27 DIAGNOSIS — I48.0 PAROXYSMAL ATRIAL FIBRILLATION (HCC): Primary | ICD-10-CM

## 2024-09-27 LAB
INTERNATIONAL NORMALIZATION RATIO, POC: 3.7
PROTHROMBIN TIME, POC: 0

## 2024-09-27 PROCEDURE — 99213 OFFICE O/P EST LOW 20 MIN: CPT | Performed by: PHARMACIST

## 2024-09-27 PROCEDURE — 85610 PROTHROMBIN TIME: CPT | Performed by: PHARMACIST

## 2024-10-02 ENCOUNTER — ANTI-COAG VISIT (OUTPATIENT)
Dept: PHARMACY | Age: 84
End: 2024-10-02
Payer: MEDICARE

## 2024-10-02 DIAGNOSIS — I48.0 PAROXYSMAL ATRIAL FIBRILLATION (HCC): Primary | ICD-10-CM

## 2024-10-02 LAB
INTERNATIONAL NORMALIZATION RATIO, POC: 2
PROTHROMBIN TIME, POC: 0

## 2024-10-02 PROCEDURE — 99211 OFF/OP EST MAY X REQ PHY/QHP: CPT | Performed by: PHARMACIST

## 2024-10-02 PROCEDURE — 85610 PROTHROMBIN TIME: CPT | Performed by: PHARMACIST

## 2024-10-02 NOTE — PROGRESS NOTES
ANTICOAGULATION SERVICE    Juan Grant is a 84 y.o. male with PMHx significant for HLD, PVD, CAD, HRN, Afib who presents to clinic 10/2/2024 for anticoagulation monitoring and adjustment. DOAC not an option due to hx of life threatening bleed while on previously. Patient not interested in watchman device. Patient is brought to appointments by his daughter, Nisreen. He lives with his son, João.     Anticoagulation Indication(s):  Afib    Referring Physician:   Dr. Lepe  Goal INR Range:  2-3  Duration of Anticoagulation Therapy:  Indefinite   Time of day dose taken: AM  Product patient has at home: warfarin 5 mg (peach color)    Pertinent labs:  Lab Results   Component Value Date    INR 3.7 09/27/2024    INR 6.02 (HH) 09/23/2024    INR 3.00 (H) 08/20/2024    INR 4.01 (H) 08/12/2024       INR Summary                           Warfarin regimen (mg)  Date INR   A/P   Sun Mon Tue Wed Thu Fri Sat Mg/wk  10/2 2.0 At goal, continue  2.5 5 2.5 2.5 2.5 5 2.5 22.5  9/27 3.7 Above goal, hold 2.5 5 2.5 2.5 2.5 0/5 2.5 22.5  9/23 6.0 Per cardiology  5 2.5 0 2.5 2.5 2.5 2.5   8/13 4.01 Per cardiology  5 2.5 0 2.5 2.5 5 5   3/21 2.3 Per cardiology  5 5 5 2.5 5 5 5 32.5  3/14 1.08 Per cardiology  5 5 5 2.5 5 5 5 32.5      Last CBC:  Lab Results   Component Value Date    RBC 3.48 (L) 08/12/2024    HGB 9.8 (L) 08/12/2024    HCT 29.2 (L) 08/12/2024    MCV 83.9 08/12/2024    MCH 28.0 08/12/2024    MPV 7.9 08/12/2024    RDW 17.4 (H) 08/12/2024     08/12/2024       Patient History:  Recent hospitalizations/HC visits None    Recent medication changes None    Medications taken regularly that may interact with warfarin or alter INR Co-Q 10    Warfarin dose taken as prescribed Uses pillbox that is filled by his daughter, Nisreen    Signs/symptoms of bleeding Life threatening GI bleed on DOAC (1/2017)    Vitamin K intake Normally has ~0 servings of green, leafy vegetables per week.    Recent vomiting/diarrhea/fever, changes in

## 2024-10-08 RX ORDER — DOXEPIN HYDROCHLORIDE 10 MG/1
10 CAPSULE ORAL NIGHTLY
Qty: 30 CAPSULE | Refills: 3 | Status: SHIPPED | OUTPATIENT
Start: 2024-10-08

## 2024-10-14 ENCOUNTER — ANTI-COAG VISIT (OUTPATIENT)
Dept: PHARMACY | Age: 84
End: 2024-10-14
Payer: MEDICARE

## 2024-10-14 DIAGNOSIS — I48.0 PAROXYSMAL ATRIAL FIBRILLATION (HCC): Primary | ICD-10-CM

## 2024-10-14 LAB
INTERNATIONAL NORMALIZATION RATIO, POC: 2.1
PROTHROMBIN TIME, POC: 0

## 2024-10-14 PROCEDURE — 85610 PROTHROMBIN TIME: CPT | Performed by: INTERNAL MEDICINE

## 2024-10-14 PROCEDURE — 99211 OFF/OP EST MAY X REQ PHY/QHP: CPT | Performed by: INTERNAL MEDICINE

## 2024-10-14 NOTE — PROGRESS NOTES
week.    Recent vomiting/diarrhea/fever, changes in weight or activity level None reported   Tobacco or alcohol use Patient denies drinking or smoking    Upcoming surgeries or procedures None reported     Assessment/Plan:  Patient's INR was today. Patient denies any changes since last visit. He has started taking warfarin in the morning only, which has helped with compliance.     Pt was instructed to take 2.5 mg daily except 5 mg on Mondays and Fridays. Repeat INR 3 weeks.      Kya Huitron, Thelma 10/14/2024 2:57 PM     [facility]  [place]: [phone]  [place]: [phone]  10/14/2024 2:30 PM      For Pharmacy Admin Tracking Only  Time Spent (min): 15

## 2024-11-04 ENCOUNTER — ANTI-COAG VISIT (OUTPATIENT)
Dept: PHARMACY | Age: 84
End: 2024-11-04
Payer: MEDICARE

## 2024-11-04 DIAGNOSIS — I48.0 PAROXYSMAL ATRIAL FIBRILLATION (HCC): Primary | ICD-10-CM

## 2024-11-04 LAB
INTERNATIONAL NORMALIZATION RATIO, POC: 5.6
PROTHROMBIN TIME, POC: 0

## 2024-11-04 PROCEDURE — 85610 PROTHROMBIN TIME: CPT | Performed by: PHARMACIST

## 2024-11-04 PROCEDURE — 99212 OFFICE O/P EST SF 10 MIN: CPT | Performed by: PHARMACIST

## 2024-11-04 NOTE — PROGRESS NOTES
ANTICOAGULATION SERVICE    Juan Grant is a 84 y.o. male with PMHx significant for HLD, PVD, CAD, HRN, Afib who presents to clinic 11/4/2024 for anticoagulation monitoring and adjustment. DOAC not an option due to hx of life threatening bleed while on previously. Patient not interested in watchman device. Patient is brought to appointments by his daughter, Nisreen. He lives with his son, João.     Anticoagulation Indication(s):  Afib    Referring Physician:   Dr. Lepe  Goal INR Range:  2-3  Duration of Anticoagulation Therapy:  Indefinite   Time of day dose taken: AM  Product patient has at home: warfarin 5 mg (peach color)    Pertinent labs:  Lab Results   Component Value Date    INR 2.1 10/14/2024    INR 2.0 10/02/2024    INR 3.7 09/27/2024    INR 6.02 (HH) 09/23/2024       INR Summary                           Warfarin regimen (mg)  Date INR   A/P   Sun Mon Tue Wed Thu Fri Sat Mg/wk  11/4 5.6 Above goal, hold  2.5 2.5 0/2.5 0/2.5 2.5 2.5 2.5 17.5  10/2 2.1 At goal, continue  2.5 5 2.5 2.5 2.5 5 2.5 22.5  10/2 2.0 At goal, continue  2.5 5 2.5 2.5 2.5 5 2.5 22.5  9/27 3.7 Above goal, hold 2.5 5 2.5 2.5 2.5 0/5 2.5 22.5  9/23 6.0 Per cardiology  5 2.5 0 2.5 2.5 2.5 2.5   8/13 4.01 Per cardiology  5 2.5 0 2.5 2.5 5 5   3/21 2.3 Per cardiology  5 5 5 2.5 5 5 5 32.5  3/14 1.08 Per cardiology  5 5 5 2.5 5 5 5 32.5      Last CBC:  Lab Results   Component Value Date    RBC 3.48 (L) 08/12/2024    HGB 9.8 (L) 08/12/2024    HCT 29.2 (L) 08/12/2024    MCV 83.9 08/12/2024    MCH 28.0 08/12/2024    MPV 7.9 08/12/2024    RDW 17.4 (H) 08/12/2024     08/12/2024       Patient History:  Recent hospitalizations/HC visits None    Recent medication changes None    Medications taken regularly that may interact with warfarin or alter INR Co-Q 10    Warfarin dose taken as prescribed Uses pillbox that is filled by his daughterNisreen    Signs/symptoms of bleeding Life threatening GI bleed on DOAC (1/2017)    Vitamin K

## 2024-11-05 ENCOUNTER — TELEPHONE (OUTPATIENT)
Dept: CARDIOLOGY CLINIC | Age: 84
End: 2024-11-05

## 2024-11-05 NOTE — TELEPHONE ENCOUNTER
Transmission successfully received.  Remote team will be made aware and send transmission to [facility] [place] for review.   Thanks.

## 2024-11-08 ENCOUNTER — ANTI-COAG VISIT (OUTPATIENT)
Dept: PHARMACY | Age: 84
End: 2024-11-08
Payer: MEDICARE

## 2024-11-08 DIAGNOSIS — I48.0 PAROXYSMAL ATRIAL FIBRILLATION (HCC): Primary | ICD-10-CM

## 2024-11-08 LAB
INR BLD: 2.3
PROTIME: NORMAL

## 2024-11-08 PROCEDURE — 85610 PROTHROMBIN TIME: CPT | Performed by: PHARMACIST

## 2024-11-08 PROCEDURE — 99211 OFF/OP EST MAY X REQ PHY/QHP: CPT | Performed by: PHARMACIST

## 2024-11-08 NOTE — PROGRESS NOTES
ANTICOAGULATION SERVICE    Juan Grant is a 84 y.o. male with PMHx significant for HLD, PVD, CAD, HRN, Afib who presents to clinic 11/8/2024 for anticoagulation monitoring and adjustment. DOAC not an option due to hx of life threatening bleed while on previously. Patient not interested in watchman device. Patient is brought to appointments by his daughter, Nisreen. He lives with his son, João.     Anticoagulation Indication(s):  Afib    Referring Physician:   Dr. Lepe  Goal INR Range:  2-3  Duration of Anticoagulation Therapy:  Indefinite   Time of day dose taken: AM  Product patient has at home: warfarin 5 mg (peach color)    Pertinent labs:  Lab Results   Component Value Date    INR 5.6 11/04/2024    INR 2.1 10/14/2024    INR 2.0 10/02/2024    INR 3.7 09/27/2024       INR Summary                           Warfarin regimen (mg)  Date INR   A/P   Sun Mon Tue Wed Thu Fri Sat Mg/wk  11/8  Above goal, hold  2.5 2.5 2.5 2.5 2.5 2.5 2.5 17.5  11/4 5.6 Above goal, hold  2.5 2.5 0/2.5 0/2.5 2.5 2.5 2.5 17.5  10/2 2.1 At goal, continue  2.5 5 2.5 2.5 2.5 5 2.5 22.5  10/2 2.0 At goal, continue  2.5 5 2.5 2.5 2.5 5 2.5 22.5  9/27 3.7 Above goal, hold 2.5 5 2.5 2.5 2.5 0/5 2.5 22.5  9/23 6.0 Per cardiology  5 2.5 0 2.5 2.5 2.5 2.5   8/13 4.01 Per cardiology  5 2.5 0 2.5 2.5 5 5   3/21 2.3 Per cardiology  5 5 5 2.5 5 5 5 32.5  3/14 1.08 Per cardiology  5 5 5 2.5 5 5 5 32.5      Last CBC:  Lab Results   Component Value Date    RBC 3.48 (L) 08/12/2024    HGB 9.8 (L) 08/12/2024    HCT 29.2 (L) 08/12/2024    MCV 83.9 08/12/2024    MCH 28.0 08/12/2024    MPV 7.9 08/12/2024    RDW 17.4 (H) 08/12/2024     08/12/2024       Patient History:  Recent hospitalizations/HC visits None    Recent medication changes None    Medications taken regularly that may interact with warfarin or alter INR Co-Q 10    Warfarin dose taken as prescribed Uses pillbox that is filled by his daughter, Nisreen    Signs/symptoms of bleeding Life 
threatening GI bleed on DOAC (1/2017)    Vitamin K intake Normally has ~0 servings of green, leafy vegetables per week.    Recent vomiting/diarrhea/fever, changes in weight or activity level None reported   Tobacco or alcohol use Patient denies drinking or smoking    Upcoming surgeries or procedures None reported     Assessment/Plan:  Patient's INR was therapeutic today. Patient denies any changes since last visit. He has started taking warfarin in the morning only, which has helped with compliance.     Pt was instructed to continue with 2.5 mg (1/2 tablet) everyday. Will recheck INR in 1 week.     Maude Martinez, Pharmacy Student  [facility]  [place]: [phone]  [facility]: [phone]  11/8/2024 12:42 PM    For Pharmacy Admin Tracking Only  Time Spent (min): 15

## 2024-11-15 ENCOUNTER — ANTI-COAG VISIT (OUTPATIENT)
Dept: PHARMACY | Age: 84
End: 2024-11-15
Payer: MEDICARE

## 2024-11-15 ENCOUNTER — OFFICE VISIT (OUTPATIENT)
Dept: FAMILY MEDICINE CLINIC | Age: 84
End: 2024-11-15

## 2024-11-15 VITALS
HEIGHT: 69 IN | OXYGEN SATURATION: 97 % | BODY MASS INDEX: 22.54 KG/M2 | SYSTOLIC BLOOD PRESSURE: 108 MMHG | WEIGHT: 152.2 LBS | DIASTOLIC BLOOD PRESSURE: 59 MMHG | HEART RATE: 84 BPM

## 2024-11-15 DIAGNOSIS — A60.9 HSV (HERPES SIMPLEX VIRUS) ANOGENITAL INFECTION: Primary | ICD-10-CM

## 2024-11-15 DIAGNOSIS — I48.0 PAROXYSMAL ATRIAL FIBRILLATION (HCC): Primary | ICD-10-CM

## 2024-11-15 LAB
INR BLD: 2.4
PROTIME: NORMAL

## 2024-11-15 PROCEDURE — 99211 OFF/OP EST MAY X REQ PHY/QHP: CPT | Performed by: PHARMACIST

## 2024-11-15 PROCEDURE — 85610 PROTHROMBIN TIME: CPT | Performed by: PHARMACIST

## 2024-11-15 RX ORDER — ACYCLOVIR 400 MG/1
400 TABLET ORAL 3 TIMES DAILY
Qty: 30 TABLET | Refills: 0 | Status: SHIPPED | OUTPATIENT
Start: 2024-11-15 | End: 2024-11-25

## 2024-11-15 NOTE — PROGRESS NOTES
ANTICOAGULATION SERVICE    Juan Grant is a 84 y.o. male with PMHx significant for HLD, PVD, CAD, HRN, Afib who presents to clinic 11/15/2024 for anticoagulation monitoring and adjustment. DOAC not an option due to hx of life threatening bleed while on previously. Patient not interested in watchman device. Patient is brought to appointments by his daughter, Nisreen. He lives with his son, João.     Anticoagulation Indication(s):  Afib    Referring Physician:   Dr. Lepe  Goal INR Range:  2-3  Duration of Anticoagulation Therapy:  Indefinite   Time of day dose taken: AM  Product patient has at home: warfarin 5 mg (peach color)    Pertinent labs:  Lab Results   Component Value Date    INR 2.30 11/08/2024    INR 5.6 11/04/2024    INR 2.1 10/14/2024    INR 2.0 10/02/2024       INR Summary                           Warfarin regimen (mg)  Date INR   A/P   Sun Mon Tue Wed Thu Fri Sat Mg/wk  11/15 2.4 At goal, continue 2.5 2.5 2.5 2.5 2.5 2.5 2.5 17.5  11/8 2.3 At goal, continue 2.5 2.5 2.5 2.5 2.5 2.5 2.5 17.5  11/4 5.6 Above goal, hold  2.5 2.5 0/2.5 0/2.5 2.5 2.5 2.5 17.5  10/2 2.1 At goal, continue  2.5 5 2.5 2.5 2.5 5 2.5 22.5  10/2 2.0 At goal, continue  2.5 5 2.5 2.5 2.5 5 2.5 22.5  9/27 3.7 Above goal, hold 2.5 5 2.5 2.5 2.5 0/5 2.5 22.5  9/23 6.0 Per cardiology  5 2.5 0 2.5 2.5 2.5 2.5   8/13 4.01 Per cardiology  5 2.5 0 2.5 2.5 5 5   3/21 2.3 Per cardiology  5 5 5 2.5 5 5 5 32.5  3/14 1.08 Per cardiology  5 5 5 2.5 5 5 5 32.5      Last CBC:  Lab Results   Component Value Date    RBC 3.48 (L) 08/12/2024    HGB 9.8 (L) 08/12/2024    HCT 29.2 (L) 08/12/2024    MCV 83.9 08/12/2024    MCH 28.0 08/12/2024    MPV 7.9 08/12/2024    RDW 17.4 (H) 08/12/2024     08/12/2024       Patient History:  Recent hospitalizations/HC visits None    Recent medication changes None    Medications taken regularly that may interact with warfarin or alter INR Co-Q 10    Warfarin dose taken as prescribed Uses pillbox that is

## 2024-11-15 NOTE — PROGRESS NOTES
Juan Grant is a 84 y.o. year old male here for:    Chief Complaint:  Chief Complaint   Patient presents with    Rash     Groin area          ASSESSMENT & PLAN:  Juan Grant is a 84 y.o. year old male here for Rash (Groin area)  .The following is a summary of the plan made at this visit:    1. HSV (herpes simplex virus) anogenital infection  -     acyclovir (ZOVIRAX) 400 MG tablet; Take 1 tablet by mouth 3 times daily for 10 days 400 mg 3 times daily for 7 to 10 days Please be sure to drink lots of water when on this medication, Disp-30 tablet, R-0Normal     S/S /w with acute HSV. Will trial acyclovir and suspect symptoms should resolve. Instructed family to call office on Monday if there is no improvement. In that case would recommend swab/blood testing for full STI panel.     Return if symptoms worsen or fail to improve.      Reviewed all pertinent previous records, including lab work and imaging. Encouraged patient to call back with any question/concerns.  Return/ED precautions discussed, all questions/concerns answered and patient verbalized understanding/approval of treatment plan.   MD Paolo Driscoll MD     Subjective:  HPI:   Patient is a pleasant 84 y.o. year old male with history significant for chronic anticoagulation, chronic congestive heart failure and chronic-severe pain on opiate therapy presenting to [facility] for an acute visit to discuss rash in the groin area/genetal lesion.    Symptoms started abruptly approximately 2 ago. Symptoms have been relatively stable. Affected areas include glans of the penis. Symptoms Associated symptoms include none.     Patient denies inguinal lymphadenopathy, purulent drainage from the penis, pain in the area of the lesion, bleeding and/or drainage from the lesion, dysuria, difficulty with urination, fever, chills, body aches, vomiting, diarrhea, and constipation. Has had symptoms similar in the past that resolved on their own.     No other

## 2024-11-27 ENCOUNTER — ANTI-COAG VISIT (OUTPATIENT)
Dept: PHARMACY | Age: 84
End: 2024-11-27
Payer: MEDICARE

## 2024-11-27 DIAGNOSIS — I48.0 PAROXYSMAL ATRIAL FIBRILLATION (HCC): Primary | ICD-10-CM

## 2024-11-27 LAB
INTERNATIONAL NORMALIZATION RATIO, POC: 3.3
PROTHROMBIN TIME, POC: 0

## 2024-11-27 PROCEDURE — 85610 PROTHROMBIN TIME: CPT

## 2024-11-27 PROCEDURE — 99211 OFF/OP EST MAY X REQ PHY/QHP: CPT

## 2024-11-27 NOTE — PROGRESS NOTES
ANTICOAGULATION SERVICE    Juan Grant is a 84 y.o. male with PMHx significant for HLD, PVD, CAD, HRN, Afib who presents to clinic 11/27/2024 for anticoagulation monitoring and adjustment. DOAC not an option due to hx of life threatening bleed while on previously. Patient not interested in watchman device. Patient is brought to appointments by his daughter, Nisreen. He lives with his son, João.     Anticoagulation Indication(s):  Afib    Referring Physician:   Dr. Lepe  Goal INR Range:  2-3  Duration of Anticoagulation Therapy:  Indefinite   Time of day dose taken: AM  Product patient has at home: warfarin 5 mg (peach color)    Pertinent labs:  Lab Results   Component Value Date    INR 2.40 11/15/2024    INR 2.30 11/08/2024    INR 5.6 11/04/2024    INR 2.1 10/14/2024       INR Summary                           Warfarin regimen (mg)  Date INR   A/P   Sun Mon Tue Wed Thu Fri Sat Mg/wk  11/27 3.3 Above goal,  2.5 2.5 2.5 2.5 2.5 2.5 2.5 17.5  11/15 2.4 At goal, continue 2.5 2.5 2.5 2.5 2.5 2.5 2.5 17.5  11/8 2.3 At goal, continue 2.5 2.5 2.5 2.5 2.5 2.5 2.5 17.5  11/4 5.6 Above goal, hold  2.5 2.5 0/2.5 0/2.5 2.5 2.5 2.5 17.5  10/2 2.1 At goal, continue  2.5 5 2.5 2.5 2.5 5 2.5 22.5  10/2 2.0 At goal, continue  2.5 5 2.5 2.5 2.5 5 2.5 22.5  9/27 3.7 Above goal, hold 2.5 5 2.5 2.5 2.5 0/5 2.5 22.5  9/23 6.0 Per cardiology  5 2.5 0 2.5 2.5 2.5 2.5   8/13 4.01 Per cardiology  5 2.5 0 2.5 2.5 5 5   3/21 2.3 Per cardiology  5 5 5 2.5 5 5 5 32.5  3/14 1.08 Per cardiology  5 5 5 2.5 5 5 5 32.5      Last CBC:  Lab Results   Component Value Date    RBC 3.48 (L) 08/12/2024    HGB 9.8 (L) 08/12/2024    HCT 29.2 (L) 08/12/2024    MCV 83.9 08/12/2024    MCH 28.0 08/12/2024    MPV 7.9 08/12/2024    RDW 17.4 (H) 08/12/2024     08/12/2024       Patient History:  Recent hospitalizations/HC visits None    Recent medication changes None    Medications taken regularly that may interact with warfarin or alter INR Co-Q 10

## 2024-12-09 ENCOUNTER — ANTI-COAG VISIT (OUTPATIENT)
Dept: PHARMACY | Age: 84
End: 2024-12-09
Payer: MEDICARE

## 2024-12-09 DIAGNOSIS — I48.0 PAROXYSMAL ATRIAL FIBRILLATION (HCC): Primary | ICD-10-CM

## 2024-12-09 LAB
INR BLD: 2.5 (ref 2–3)
PROTIME: NORMAL

## 2024-12-09 PROCEDURE — 85610 PROTHROMBIN TIME: CPT | Performed by: PHARMACIST

## 2024-12-09 PROCEDURE — 99211 OFF/OP EST MAY X REQ PHY/QHP: CPT | Performed by: PHARMACIST

## 2024-12-09 NOTE — PROGRESS NOTES
ANTICOAGULATION SERVICE    Juan Grant is a 84 y.o. male with PMHx significant for HLD, PVD, CAD, HRN, Afib who presents to clinic 12/9/2024 for anticoagulation monitoring and adjustment. DOAC not an option due to hx of life threatening bleed while on previously. Patient not interested in watchman device. Patient is brought to appointments by his daughter, Nisreen. He lives with his son, João.     Anticoagulation Indication(s):  Afib    Referring Physician:   Dr. Lepe  Goal INR Range:  2-3  Duration of Anticoagulation Therapy:  Indefinite   Time of day dose taken: AM  Product patient has at home: warfarin 5 mg (peach color)    Pertinent labs:  Lab Results   Component Value Date    INR 2.50 12/09/2024    INR 3.3 11/27/2024    INR 2.40 11/15/2024    INR 2.30 11/08/2024       INR Summary                           Warfarin regimen (mg)  Date INR   A/P   Sun Mon Tue Wed Thu Fri Sat Mg/wk  12/9 2.5 At goal, continue 2.5 2.5 2.5 2.5 2.5 2.5 2.5 17.5  11/27 3.3 Above goal, hold x1 2.5 2.5 2.5 2.5 2.5 2.5 2.5 17.5  11/15 2.4 At goal, continue 2.5 2.5 2.5 2.5 2.5 2.5 2.5 17.5  11/8 2.3 At goal, continue 2.5 2.5 2.5 2.5 2.5 2.5 2.5 17.5  11/4 5.6 Above goal, hold  2.5 2.5 0/2.5 0/2.5 2.5 2.5 2.5 17.5  10/2 2.1 At goal, continue  2.5 5 2.5 2.5 2.5 5 2.5 22.5  10/2 2.0 At goal, continue  2.5 5 2.5 2.5 2.5 5 2.5 22.5  9/27 3.7 Above goal, hold 2.5 5 2.5 2.5 2.5 0/5 2.5 22.5 9/23 6.0 Per cardiology  5 2.5 0 2.5 2.5 2.5 2.5   8/13 4.01 Per cardiology  5 2.5 0 2.5 2.5 5 5   3/21 2.3 Per cardiology  5 5 5 2.5 5 5 5 32.5  3/14 1.08 Per cardiology  5 5 5 2.5 5 5 5 32.5      Last CBC:  Lab Results   Component Value Date    RBC 3.48 (L) 08/12/2024    HGB 9.8 (L) 08/12/2024    HCT 29.2 (L) 08/12/2024    MCV 83.9 08/12/2024    MCH 28.0 08/12/2024    MPV 7.9 08/12/2024    RDW 17.4 (H) 08/12/2024     08/12/2024       Patient History:  Recent hospitalizations/HC visits None    Recent medication changes None    Medications

## 2024-12-09 NOTE — PROGRESS NOTES
ANTICOAGULATION SERVICE    Juan Grant is a 84 y.o. male with PMHx significant for HLD, PVD, CAD, HRN, Afib who presents to clinic 12/9/2024 for anticoagulation monitoring and adjustment. DOAC not an option due to hx of life threatening bleed while on previously. Patient not interested in watchman device. Patient is brought to appointments by his daughter, Nisreen. He lives with his son, João.     Anticoagulation Indication(s):  Afib    Referring Physician:   Dr. Lepe  Goal INR Range:  2-3  Duration of Anticoagulation Therapy:  Indefinite   Time of day dose taken: AM  Product patient has at home: warfarin 5 mg (peach color)    Pertinent labs:  Lab Results   Component Value Date    INR 3.3 11/27/2024    INR 2.40 11/15/2024    INR 2.30 11/08/2024    INR 5.6 11/04/2024       INR Summary                           Warfarin regimen (mg)  Date INR   A/P   Sun Mon Tue Wed Thu Fri Sat Mg/wk  12/9 2.5 At goal, continue  2.5 2.5 2.5 2.5 2.5 2.5 2.5 17.5  11/27 3.3 Above goal, hold x1 2.5 2.5 2.5 2.5 2.5 2.5 2.5 17.5  11/15 2.4 At goal, continue 2.5 2.5 2.5 2.5 2.5 2.5 2.5 17.5  11/8 2.3 At goal, continue 2.5 2.5 2.5 2.5 2.5 2.5 2.5 17.5  11/4 5.6 Above goal, hold  2.5 2.5 0/2.5 0/2.5 2.5 2.5 2.5 17.5  10/2 2.1 At goal, continue  2.5 5 2.5 2.5 2.5 5 2.5 22.5  10/2 2.0 At goal, continue  2.5 5 2.5 2.5 2.5 5 2.5 22.5  9/27 3.7 Above goal, hold 2.5 5 2.5 2.5 2.5 0/5 2.5 22.5  9/23 6.0 Per cardiology  5 2.5 0 2.5 2.5 2.5 2.5   8/13 4.01 Per cardiology  5 2.5 0 2.5 2.5 5 5   3/21 2.3 Per cardiology  5 5 5 2.5 5 5 5 32.5  3/14 1.08 Per cardiology  5 5 5 2.5 5 5 5 32.5      Last CBC:  Lab Results   Component Value Date    RBC 3.48 (L) 08/12/2024    HGB 9.8 (L) 08/12/2024    HCT 29.2 (L) 08/12/2024    MCV 83.9 08/12/2024    MCH 28.0 08/12/2024    MPV 7.9 08/12/2024    RDW 17.4 (H) 08/12/2024     08/12/2024       Patient History:  Recent hospitalizations/HC visits None    Recent medication changes None    Medications

## 2024-12-11 ENCOUNTER — HOSPITAL ENCOUNTER (INPATIENT)
Age: 84
LOS: 4 days | Discharge: NURSING, REHAB OR LONG-TERM CARE | DRG: 682 | End: 2024-12-15
Attending: STUDENT IN AN ORGANIZED HEALTH CARE EDUCATION/TRAINING PROGRAM | Admitting: STUDENT IN AN ORGANIZED HEALTH CARE EDUCATION/TRAINING PROGRAM
Payer: MEDICARE

## 2024-12-11 ENCOUNTER — APPOINTMENT (OUTPATIENT)
Dept: CT IMAGING | Age: 84
DRG: 682 | End: 2024-12-11
Payer: MEDICARE

## 2024-12-11 ENCOUNTER — APPOINTMENT (OUTPATIENT)
Dept: MRI IMAGING | Age: 84
DRG: 682 | End: 2024-12-11
Payer: MEDICARE

## 2024-12-11 DIAGNOSIS — I63.9 CEREBROVASCULAR ACCIDENT (CVA), UNSPECIFIED MECHANISM (HCC): ICD-10-CM

## 2024-12-11 DIAGNOSIS — R41.82 ALTERED MENTAL STATUS, UNSPECIFIED ALTERED MENTAL STATUS TYPE: Primary | ICD-10-CM

## 2024-12-11 PROBLEM — E87.6 HYPOKALEMIA: Status: ACTIVE | Noted: 2024-12-11

## 2024-12-11 PROBLEM — G93.40 ACUTE ENCEPHALOPATHY: Status: ACTIVE | Noted: 2024-12-11

## 2024-12-11 PROBLEM — N17.9 AKI (ACUTE KIDNEY INJURY) (HCC): Status: ACTIVE | Noted: 2024-12-11

## 2024-12-11 LAB
ALBUMIN SERPL-MCNC: 3.6 G/DL (ref 3.4–5)
ALBUMIN/GLOB SERPL: 0.8 {RATIO} (ref 1.1–2.2)
ALP SERPL-CCNC: 100 U/L (ref 40–129)
ALT SERPL-CCNC: 8 U/L (ref 10–40)
ANION GAP SERPL CALCULATED.3IONS-SCNC: 19 MMOL/L (ref 3–16)
AST SERPL-CCNC: 29 U/L (ref 15–37)
BACTERIA URNS QL MICRO: ABNORMAL /HPF
BASOPHILS # BLD: 0 K/UL (ref 0–0.2)
BASOPHILS NFR BLD: 0.4 %
BILIRUB SERPL-MCNC: 1.1 MG/DL (ref 0–1)
BILIRUB UR QL STRIP.AUTO: NEGATIVE
BUN SERPL-MCNC: 21 MG/DL (ref 7–20)
CALCIUM SERPL-MCNC: 8.8 MG/DL (ref 8.3–10.6)
CHLORIDE SERPL-SCNC: 96 MMOL/L (ref 99–110)
CK SERPL-CCNC: 332 U/L (ref 39–308)
CLARITY UR: CLEAR
CO2 SERPL-SCNC: 23 MMOL/L (ref 21–32)
COLOR UR: YELLOW
CREAT SERPL-MCNC: 1.9 MG/DL (ref 0.8–1.3)
DEPRECATED RDW RBC AUTO: 15.9 % (ref 12.4–15.4)
EKG DIAGNOSIS: NORMAL
EKG Q-T INTERVAL: 382 MS
EKG QRS DURATION: 144 MS
EKG QTC CALCULATION (BAZETT): 565 MS
EKG R AXIS: 62 DEGREES
EKG T AXIS: 236 DEGREES
EKG VENTRICULAR RATE: 132 BPM
EOSINOPHIL # BLD: 0 K/UL (ref 0–0.6)
EOSINOPHIL NFR BLD: 0.4 %
EPI CELLS #/AREA URNS AUTO: 1 /HPF (ref 0–5)
GFR SERPLBLD CREATININE-BSD FMLA CKD-EPI: 34 ML/MIN/{1.73_M2}
GLUCOSE BLD-MCNC: 157 MG/DL (ref 70–99)
GLUCOSE SERPL-MCNC: 145 MG/DL (ref 70–99)
GLUCOSE UR STRIP.AUTO-MCNC: NEGATIVE MG/DL
HCT VFR BLD AUTO: 34.5 % (ref 40.5–52.5)
HGB BLD-MCNC: 11.3 G/DL (ref 13.5–17.5)
HGB UR QL STRIP.AUTO: ABNORMAL
HYALINE CASTS #/AREA URNS AUTO: 1 /LPF (ref 0–8)
INR PPP: 2.31 (ref 0.85–1.15)
KETONES UR STRIP.AUTO-MCNC: NEGATIVE MG/DL
LACTATE BLDV-SCNC: 1.6 MMOL/L (ref 0.4–2)
LEUKOCYTE ESTERASE UR QL STRIP.AUTO: ABNORMAL
LYMPHOCYTES # BLD: 2.5 K/UL (ref 1–5.1)
LYMPHOCYTES NFR BLD: 22.3 %
MAGNESIUM SERPL-MCNC: 2.4 MG/DL (ref 1.8–2.4)
MCH RBC QN AUTO: 28 PG (ref 26–34)
MCHC RBC AUTO-ENTMCNC: 32.8 G/DL (ref 31–36)
MCV RBC AUTO: 85.4 FL (ref 80–100)
MONOCYTES # BLD: 0.8 K/UL (ref 0–1.3)
MONOCYTES NFR BLD: 7 %
NEUTROPHILS # BLD: 7.9 K/UL (ref 1.7–7.7)
NEUTROPHILS NFR BLD: 69.9 %
NITRITE UR QL STRIP.AUTO: NEGATIVE
PERFORMED ON: ABNORMAL
PH UR STRIP.AUTO: 5.5 [PH] (ref 5–8)
PLATELET # BLD AUTO: 200 K/UL (ref 135–450)
PMV BLD AUTO: 8.7 FL (ref 5–10.5)
POTASSIUM SERPL-SCNC: 2.9 MMOL/L (ref 3.5–5.1)
POTASSIUM SERPL-SCNC: 3.4 MMOL/L (ref 3.5–5.1)
PROT SERPL-MCNC: 8 G/DL (ref 6.4–8.2)
PROT UR STRIP.AUTO-MCNC: ABNORMAL MG/DL
PROTHROMBIN TIME: 25.4 SEC (ref 11.9–14.9)
RBC # BLD AUTO: 4.04 M/UL (ref 4.2–5.9)
RBC CLUMPS #/AREA URNS AUTO: 1 /HPF (ref 0–4)
RENAL EPI CELLS #/AREA UR COMP ASSIST: ABNORMAL /HPF (ref 0–1)
SODIUM SERPL-SCNC: 138 MMOL/L (ref 136–145)
SP GR UR STRIP.AUTO: 1.02 (ref 1–1.03)
TROPONIN, HIGH SENSITIVITY: 60 NG/L (ref 0–22)
TROPONIN, HIGH SENSITIVITY: 64 NG/L (ref 0–22)
TSH SERPL DL<=0.005 MIU/L-ACNC: 1.66 UIU/ML (ref 0.27–4.2)
UA COMPLETE W REFLEX CULTURE PNL UR: YES
UA DIPSTICK W REFLEX MICRO PNL UR: YES
URN SPEC COLLECT METH UR: ABNORMAL
UROBILINOGEN UR STRIP-ACNC: 0.2 E.U./DL
WBC # BLD AUTO: 11.3 K/UL (ref 4–11)
WBC #/AREA URNS AUTO: 29 /HPF (ref 0–5)

## 2024-12-11 PROCEDURE — 6360000002 HC RX W HCPCS: Performed by: STUDENT IN AN ORGANIZED HEALTH CARE EDUCATION/TRAINING PROGRAM

## 2024-12-11 PROCEDURE — 81001 URINALYSIS AUTO W/SCOPE: CPT

## 2024-12-11 PROCEDURE — 6370000000 HC RX 637 (ALT 250 FOR IP): Performed by: STUDENT IN AN ORGANIZED HEALTH CARE EDUCATION/TRAINING PROGRAM

## 2024-12-11 PROCEDURE — 70498 CT ANGIOGRAPHY NECK: CPT

## 2024-12-11 PROCEDURE — 36415 COLL VENOUS BLD VENIPUNCTURE: CPT

## 2024-12-11 PROCEDURE — 2580000003 HC RX 258: Performed by: STUDENT IN AN ORGANIZED HEALTH CARE EDUCATION/TRAINING PROGRAM

## 2024-12-11 PROCEDURE — 2060000000 HC ICU INTERMEDIATE R&B

## 2024-12-11 PROCEDURE — 83735 ASSAY OF MAGNESIUM: CPT

## 2024-12-11 PROCEDURE — 97530 THERAPEUTIC ACTIVITIES: CPT

## 2024-12-11 PROCEDURE — 97116 GAIT TRAINING THERAPY: CPT

## 2024-12-11 PROCEDURE — 96365 THER/PROPH/DIAG IV INF INIT: CPT

## 2024-12-11 PROCEDURE — APPSS60 APP SPLIT SHARED TIME 46-60 MINUTES

## 2024-12-11 PROCEDURE — 80053 COMPREHEN METABOLIC PANEL: CPT

## 2024-12-11 PROCEDURE — 96376 TX/PRO/DX INJ SAME DRUG ADON: CPT

## 2024-12-11 PROCEDURE — 99223 1ST HOSP IP/OBS HIGH 75: CPT | Performed by: PSYCHIATRY & NEUROLOGY

## 2024-12-11 PROCEDURE — 93010 ELECTROCARDIOGRAM REPORT: CPT | Performed by: INTERNAL MEDICINE

## 2024-12-11 PROCEDURE — 99223 1ST HOSP IP/OBS HIGH 75: CPT | Performed by: HOSPITALIST

## 2024-12-11 PROCEDURE — 87086 URINE CULTURE/COLONY COUNT: CPT

## 2024-12-11 PROCEDURE — 84132 ASSAY OF SERUM POTASSIUM: CPT

## 2024-12-11 PROCEDURE — 85025 COMPLETE CBC W/AUTO DIFF WBC: CPT

## 2024-12-11 PROCEDURE — APPNB30 APP NON BILLABLE TIME 0-30 MINS

## 2024-12-11 PROCEDURE — 97166 OT EVAL MOD COMPLEX 45 MIN: CPT

## 2024-12-11 PROCEDURE — 71260 CT THORAX DX C+: CPT

## 2024-12-11 PROCEDURE — 70551 MRI BRAIN STEM W/O DYE: CPT

## 2024-12-11 PROCEDURE — 82550 ASSAY OF CK (CPK): CPT

## 2024-12-11 PROCEDURE — 70450 CT HEAD/BRAIN W/O DYE: CPT

## 2024-12-11 PROCEDURE — 84484 ASSAY OF TROPONIN QUANT: CPT

## 2024-12-11 PROCEDURE — 85610 PROTHROMBIN TIME: CPT

## 2024-12-11 PROCEDURE — 97162 PT EVAL MOD COMPLEX 30 MIN: CPT

## 2024-12-11 PROCEDURE — 99285 EMERGENCY DEPT VISIT HI MDM: CPT

## 2024-12-11 PROCEDURE — 93005 ELECTROCARDIOGRAM TRACING: CPT | Performed by: STUDENT IN AN ORGANIZED HEALTH CARE EDUCATION/TRAINING PROGRAM

## 2024-12-11 PROCEDURE — 83605 ASSAY OF LACTIC ACID: CPT

## 2024-12-11 PROCEDURE — 92610 EVALUATE SWALLOWING FUNCTION: CPT

## 2024-12-11 PROCEDURE — 6360000004 HC RX CONTRAST MEDICATION: Performed by: STUDENT IN AN ORGANIZED HEALTH CARE EDUCATION/TRAINING PROGRAM

## 2024-12-11 PROCEDURE — 92526 ORAL FUNCTION THERAPY: CPT

## 2024-12-11 PROCEDURE — 84443 ASSAY THYROID STIM HORMONE: CPT

## 2024-12-11 PROCEDURE — 96375 TX/PRO/DX INJ NEW DRUG ADDON: CPT

## 2024-12-11 PROCEDURE — 92523 SPEECH SOUND LANG COMPREHEN: CPT

## 2024-12-11 PROCEDURE — 2580000003 HC RX 258: Performed by: HOSPITALIST

## 2024-12-11 RX ORDER — POTASSIUM CHLORIDE 1500 MG/1
40 TABLET, EXTENDED RELEASE ORAL PRN
Status: ACTIVE | OUTPATIENT
Start: 2024-12-11 | End: 2024-12-14

## 2024-12-11 RX ORDER — WARFARIN SODIUM 2.5 MG/1
2.5 TABLET ORAL DAILY
Status: DISCONTINUED | OUTPATIENT
Start: 2024-12-11 | End: 2024-12-12

## 2024-12-11 RX ORDER — ACETAMINOPHEN 650 MG/1
650 SUPPOSITORY RECTAL EVERY 6 HOURS PRN
Status: DISCONTINUED | OUTPATIENT
Start: 2024-12-11 | End: 2024-12-15 | Stop reason: HOSPADM

## 2024-12-11 RX ORDER — DILTIAZEM HCL IN NACL,ISO-OSM 125 MG/125
2.5-15 PLASTIC BAG, INJECTION (ML) INTRAVENOUS CONTINUOUS
Status: DISCONTINUED | OUTPATIENT
Start: 2024-12-11 | End: 2024-12-11

## 2024-12-11 RX ORDER — IOPAMIDOL 755 MG/ML
75 INJECTION, SOLUTION INTRAVASCULAR
Status: COMPLETED | OUTPATIENT
Start: 2024-12-11 | End: 2024-12-11

## 2024-12-11 RX ORDER — MIDAZOLAM HYDROCHLORIDE 2 MG/2ML
2 INJECTION, SOLUTION INTRAMUSCULAR; INTRAVENOUS ONCE
Status: COMPLETED | OUTPATIENT
Start: 2024-12-11 | End: 2024-12-11

## 2024-12-11 RX ORDER — SODIUM CHLORIDE 9 MG/ML
INJECTION, SOLUTION INTRAVENOUS ONCE
Status: COMPLETED | OUTPATIENT
Start: 2024-12-11 | End: 2024-12-12

## 2024-12-11 RX ORDER — ONDANSETRON 2 MG/ML
4 INJECTION INTRAMUSCULAR; INTRAVENOUS EVERY 6 HOURS PRN
Status: DISCONTINUED | OUTPATIENT
Start: 2024-12-11 | End: 2024-12-15 | Stop reason: HOSPADM

## 2024-12-11 RX ORDER — DILTIAZEM HYDROCHLORIDE 5 MG/ML
5 INJECTION INTRAVENOUS ONCE
Status: DISCONTINUED | OUTPATIENT
Start: 2024-12-11 | End: 2024-12-11

## 2024-12-11 RX ORDER — LACTOBACILLUS RHAMNOSUS GG 10B CELL
1 CAPSULE ORAL 2 TIMES DAILY WITH MEALS
Status: DISCONTINUED | OUTPATIENT
Start: 2024-12-11 | End: 2024-12-15 | Stop reason: HOSPADM

## 2024-12-11 RX ORDER — SODIUM CHLORIDE 9 MG/ML
INJECTION, SOLUTION INTRAVENOUS ONCE
Status: COMPLETED | OUTPATIENT
Start: 2024-12-11 | End: 2024-12-11

## 2024-12-11 RX ORDER — POLYETHYLENE GLYCOL 3350 17 G/17G
17 POWDER, FOR SOLUTION ORAL DAILY PRN
Status: DISCONTINUED | OUTPATIENT
Start: 2024-12-11 | End: 2024-12-15 | Stop reason: HOSPADM

## 2024-12-11 RX ORDER — WARFARIN SODIUM 2.5 MG/1
2.5 TABLET ORAL DAILY
Status: DISCONTINUED | OUTPATIENT
Start: 2024-12-11 | End: 2024-12-11

## 2024-12-11 RX ORDER — POTASSIUM CHLORIDE 7.45 MG/ML
10 INJECTION INTRAVENOUS ONCE
Status: COMPLETED | OUTPATIENT
Start: 2024-12-11 | End: 2024-12-11

## 2024-12-11 RX ORDER — SODIUM CHLORIDE 9 MG/ML
INJECTION, SOLUTION INTRAVENOUS PRN
Status: DISCONTINUED | OUTPATIENT
Start: 2024-12-11 | End: 2024-12-15 | Stop reason: HOSPADM

## 2024-12-11 RX ORDER — SODIUM CHLORIDE 0.9 % (FLUSH) 0.9 %
5-40 SYRINGE (ML) INJECTION EVERY 12 HOURS SCHEDULED
Status: DISCONTINUED | OUTPATIENT
Start: 2024-12-11 | End: 2024-12-15 | Stop reason: HOSPADM

## 2024-12-11 RX ORDER — ACETAMINOPHEN 325 MG/1
650 TABLET ORAL EVERY 6 HOURS PRN
Status: DISCONTINUED | OUTPATIENT
Start: 2024-12-11 | End: 2024-12-15 | Stop reason: HOSPADM

## 2024-12-11 RX ORDER — SODIUM CHLORIDE 0.9 % (FLUSH) 0.9 %
5-40 SYRINGE (ML) INJECTION PRN
Status: DISCONTINUED | OUTPATIENT
Start: 2024-12-11 | End: 2024-12-15 | Stop reason: HOSPADM

## 2024-12-11 RX ORDER — ONDANSETRON 4 MG/1
4 TABLET, ORALLY DISINTEGRATING ORAL EVERY 8 HOURS PRN
Status: DISCONTINUED | OUTPATIENT
Start: 2024-12-11 | End: 2024-12-15 | Stop reason: HOSPADM

## 2024-12-11 RX ORDER — SODIUM CHLORIDE, SODIUM LACTATE, POTASSIUM CHLORIDE, AND CALCIUM CHLORIDE .6; .31; .03; .02 G/100ML; G/100ML; G/100ML; G/100ML
1000 INJECTION, SOLUTION INTRAVENOUS ONCE
Status: DISCONTINUED | OUTPATIENT
Start: 2024-12-11 | End: 2024-12-11

## 2024-12-11 RX ORDER — POTASSIUM CHLORIDE 7.45 MG/ML
10 INJECTION INTRAVENOUS PRN
Status: DISPENSED | OUTPATIENT
Start: 2024-12-11 | End: 2024-12-14

## 2024-12-11 RX ADMIN — POTASSIUM CHLORIDE 10 MEQ: 7.46 INJECTION, SOLUTION INTRAVENOUS at 09:28

## 2024-12-11 RX ADMIN — POTASSIUM CHLORIDE 10 MEQ: 7.46 INJECTION, SOLUTION INTRAVENOUS at 04:05

## 2024-12-11 RX ADMIN — MIDAZOLAM 2 MG: 1 INJECTION INTRAMUSCULAR; INTRAVENOUS at 02:30

## 2024-12-11 RX ADMIN — MIDAZOLAM 2 MG: 1 INJECTION INTRAMUSCULAR; INTRAVENOUS at 03:30

## 2024-12-11 RX ADMIN — POTASSIUM CHLORIDE 10 MEQ: 7.46 INJECTION, SOLUTION INTRAVENOUS at 08:28

## 2024-12-11 RX ADMIN — IOPAMIDOL 75 ML: 755 INJECTION, SOLUTION INTRAVENOUS at 03:38

## 2024-12-11 RX ADMIN — Medication 1 CAPSULE: at 18:13

## 2024-12-11 RX ADMIN — WATER 1000 MG: 1 INJECTION INTRAMUSCULAR; INTRAVENOUS; SUBCUTANEOUS at 05:19

## 2024-12-11 RX ADMIN — SODIUM CHLORIDE: 9 INJECTION, SOLUTION INTRAVENOUS at 04:06

## 2024-12-11 RX ADMIN — SODIUM CHLORIDE: 9 INJECTION, SOLUTION INTRAVENOUS at 19:52

## 2024-12-11 RX ADMIN — Medication 10 ML: at 20:54

## 2024-12-11 RX ADMIN — POTASSIUM CHLORIDE 10 MEQ: 7.46 INJECTION, SOLUTION INTRAVENOUS at 19:44

## 2024-12-11 RX ADMIN — IOPAMIDOL 75 ML: 755 INJECTION, SOLUTION INTRAVENOUS at 02:32

## 2024-12-11 RX ADMIN — POTASSIUM CHLORIDE 10 MEQ: 7.46 INJECTION, SOLUTION INTRAVENOUS at 19:57

## 2024-12-11 RX ADMIN — POTASSIUM BICARBONATE 40 MEQ: 782 TABLET, EFFERVESCENT ORAL at 19:56

## 2024-12-11 RX ADMIN — Medication 10 ML: at 10:55

## 2024-12-11 RX ADMIN — WARFARIN SODIUM 2.5 MG: 2.5 TABLET ORAL at 18:13

## 2024-12-11 ASSESSMENT — PAIN SCALES - GENERAL
PAINLEVEL_OUTOF10: 0
PAINLEVEL_OUTOF10: 0

## 2024-12-11 NOTE — ACP (ADVANCE CARE PLANNING)
Advanced Care Planning Note    Purpose of Encounter: Advanced care planning in light of fall   Parties In Attendance: Patient, Dmitriy Quarles MD, saad  Decisional Capacity: Yes  Subjective: Patient has UTI  Objective: Cr 1.9   Goals of Care Determination: Patient wants no CPR in the event of a cardiac arrest    Plan:  IV Abx, MRI Brain, Nephro and Neuro eval  Code Status: DNR-CCA   Time spent on Advanced care Plannin minutes  Advanced Care Planning Documents: Completed advanced directives on chart, Saad, is the Healthcare POA.    Dmitriy Quarles MD  2024 2:41 PM

## 2024-12-11 NOTE — CARE COORDINATION
Case Management Assessment  Initial Evaluation    Date/Time of Evaluation: 12/11/2024 3:06 PM  Assessment Completed by: Jean-Claude Bruce    If patient is discharged prior to next notation, then this note serves as note for discharge by case management.    Patient Name: Juan Grant                   YOB: 1940  Diagnosis: Altered mental status, unspecified altered mental status type [R41.82]  AMS (altered mental status) [R41.82]                   Date / Time: 12/11/2024  1:44 AM    Patient Admission Status: Inpatient   Readmission Risk (Low < 19, Mod (19-27), High > 27): Readmission Risk Score: 15.7    Current PCP: Ki Cross MD  PCP verified by CM? Yes    Chart Reviewed: Yes      History Provided by: Child/Family (CM spoke with pt's daughter Nisreen, pt is admitted for AMS)  Patient Orientation: Unable to Assess (CM spoke with pt's daughter Nisreen, pt is admitted for AMS)    Patient Cognition: Other (see comment) (CM spoke with pt's daughter Nisreen, pt is admitted for AMS)    Hospitalization in the last 30 days (Readmission):  No    If yes, Readmission Assessment in CM Navigator will be completed.    Advance Directives:      Code Status: DNR-CCA   Patient's Primary Decision Maker is: Legal Next of Kin (Children: João and Nisreen)    Primary Decision Maker: Nisreen Grant - Child - [phone]    Discharge Planning:    Patient lives with: Children Type of Home: House  Primary Care Giver: Self  Patient Support Systems include: Children (Pt lives with son João)   Current Financial resources: Medicare  Current community resources: None  Current services prior to admission: None            Current DME:              Type of Home Care services:  None    ADLS  Prior functional level: Independent in ADLs/IADLs  Current functional level: Assistance with the following:, Mobility, Housework, Shopping, Cooking    PT AM-PAC:   /24  OT AM-PAC:   /24    Family can provide assistance at DC: Yes  Would you like

## 2024-12-11 NOTE — ED PROVIDER NOTES
Prior to Admission medications    Medication Sig Start Date End Date Taking? Authorizing Provider   doxepin (SINEQUAN) 10 MG capsule TAKE 1 CAPSULE BY MOUTH EVERY NIGHT. 10/8/24   Ki Cross MD   tamsulosin (FLOMAX) 0.4 MG capsule TAKE 1 CAPSULE BY MOUTH DAILY 6/21/24   Ki Cross MD   potassium chloride (KLOR-CON M) 10 MEQ extended release tablet TAKE 3 TABLETS DAILY 6/20/24   Rochelle Berkowitz APRN - CNP   MULTAQ 400 MG TABS TAKE 1 TABLET BY MOUTH TWICE DAILY WITH MEALS 6/17/24   Lina Campbell APRN - CNP   ondansetron (ZOFRAN-ODT) 4 MG disintegrating tablet Take 1 tablet by mouth every 8 hours as needed for Nausea or Vomiting DISSOLVE 1 TABLET ON THE TONGUE EVERY 8 HOURS AS NEEDED FOR NAUSEA OR VOMITING  Strength: 4 mg 5/2/24   Ki Cross MD   furosemide (LASIX) 40 MG tablet Take 1 tablet by mouth 2 times daily 4/29/24   Jaime Love MD   rosuvastatin (CRESTOR) 5 MG tablet Take 1 tablet by mouth daily 4/29/24   Jaime Love MD   diclofenac sodium (VOLTAREN) 1 % GEL Apply 2 g topically 4 times daily as needed for Pain    Lc Madsen MD   lidocaine (LIDODERM) 5 % Place 1 patch onto the skin daily as needed for Pain 12 hours on, 12 hours off as needed.    ProviderLc MD   Multiple Vitamins-Minerals (THERAPEUTIC MULTIVITAMIN-MINERALS) tablet Take 1 tablet by mouth daily    Lc Madsen MD   fluticasone (FLONASE) 50 MCG/ACT nasal spray 1 spray by Each Nostril route daily as needed for Rhinitis    Lc Madsen MD   naloxone 4 MG/0.1ML LIQD nasal spray 1 spray by Nasal route as needed for Opioid Reversal    Lc Madsen MD   metoprolol succinate (TOPROL XL) 25 MG extended release tablet TAKE ONE-HALF (1/2) TABLET TWICE A DAY  Patient taking differently: Take 0.5 tablets by mouth 2 times daily TAKE ONE-HALF (1/2) TABLET TWICE A DAY 10/10/23   Lina Campbell APRN - CNP   warfarin (COUMADIN) 5 MG tablet Taking 5mg 4 days per week 
Comments:   Reason for Stopping:         fluticasone (FLONASE) 50 MCG/ACT nasal spray Comments:   Reason for Stopping:         naloxone 4 MG/0.1ML LIQD nasal spray Comments:   Reason for Stopping:         metoprolol succinate (TOPROL XL) 25 MG extended release tablet Comments:   Reason for Stopping:         nitroGLYCERIN (NITRODUR) 0.2 MG/HR Comments:   Reason for Stopping:         Polysaccharide Iron Complex (PROFE) 391.3 (180 Fe) MG CAPS Comments:   Reason for Stopping:         vitamin B-12 (CYANOCOBALAMIN) 500 MCG tablet Comments:   Reason for Stopping:                      (Please note that portions of this note were completed with a voice recognition program.  Efforts were made to edit the dictations but occasionally words are mis-transcribed.)    SCOTT Aparicio CNP (electronically signed)           Shayy Roblero APRN - CNP  12/11/24 [id number]       Shayy Roblero APRN - CNP  12/17/24 2695

## 2024-12-11 NOTE — ED NOTES
Patient Name: Juan Grant  : 1940 84 y.o.  MRN: [id number]  ED Room #: ED-0002/02     Chief complaint:   Chief Complaint   Patient presents with    Altered Mental Status     PT via [facility] after fall in the show and new onset altered mental status.      Hospital Problem/Diagnosis:   Hospital Problems             Last Modified POA    * (Principal) AMS (altered mental status) 2024 Yes         O2 Flow Rate:4 L NC    (if applicable)  Cardiac Rhythm:  Intermittent Afib - has pacemaker(if applicable)  Active LDA's: #22  ga angio in right forearm x2  Peripheral IV Right Forearm (Active)            How does patient ambulate? Unknown, did not assess in the Emergency Department- at home walks with cane, weak now    2. How does patient take pills? Whole with Water    3. Is patient alert? Alert    4. Is patient oriented? Agitated    5.   Patient arrived from:  home  Facility Name: ___________________________________________    6. If patient is disoriented or from a Skill Nursing Facility has family been notified of admission?  yes    7. Patient belongings? Belongings: Clothing, blanket    Disposition of belongings? Kept with Patient     8. Any specific patient or family belongings/needs/dynamics?   a. Family with pt    9. Miscellaneous comments/pending orders?  a. None      If there are any additional questions please reach out to the Emergency Department.

## 2024-12-11 NOTE — H&P
SSS (sick sinus syndrome) ([facility])     Unspecified sleep apnea     Venous (peripheral) insufficiency 12/04/2018       Past Surgical History:        Procedure Laterality Date    APPENDECTOMY      CARDIAC PACEMAKER PLACEMENT      CARPAL TUNNEL RELEASE      CORONARY ANGIOPLASTY      CORONARY ANGIOPLASTY WITH STENT PLACEMENT      DIAGNOSTIC CARDIAC CATH LAB PROCEDURE      EYE SURGERY      FOOT DEBRIDEMENT Left 8/27/2021    INCISION AND DRAINAGE LEFT FOOT performed by Randolph Mir DPM at [facility] OR    PACEMAKER CHANGE  10/2016    PACEMAKER PLACEMENT      TOE AMPUTATION Left 8/27/2021    AMPUTATION OF LEFT SECOND TOE performed by Randolph Mir DPM at [facility] OR       Medications Priorto Admission:    Medications Prior to Admission: tamsulosin (FLOMAX) 0.4 MG capsule, TAKE 1 CAPSULE BY MOUTH DAILY  potassium chloride (KLOR-CON M) 10 MEQ extended release tablet, TAKE 3 TABLETS DAILY  MULTAQ 400 MG TABS, TAKE 1 TABLET BY MOUTH TWICE DAILY WITH MEALS  ondansetron (ZOFRAN-ODT) 4 MG disintegrating tablet, Take 1 tablet by mouth every 8 hours as needed for Nausea or Vomiting DISSOLVE 1 TABLET ON THE TONGUE EVERY 8 HOURS AS NEEDED FOR NAUSEA OR VOMITING Strength: 4 mg  furosemide (LASIX) 40 MG tablet, Take 1 tablet by mouth 2 times daily  rosuvastatin (CRESTOR) 5 MG tablet, Take 1 tablet by mouth daily  metoprolol succinate (TOPROL XL) 25 MG extended release tablet, TAKE ONE-HALF (1/2) TABLET TWICE A DAY (Patient taking differently: Take 0.5 tablets by mouth 2 times daily TAKE ONE-HALF (1/2) TABLET TWICE A DAY)  warfarin (COUMADIN) 5 MG tablet, Taking 5mg 4 days per week Taking 2.5mg 3 days per week (Patient taking differently: Take 0.5-1 tablets by mouth See Admin Instructions Take 2.5 mg (1/2 tablet) by mouth every Wednesday and 5 mg (1/2 tab BID) all other days in the evening.)  fentaNYL (DURAGESIC) 50 MCG/HR, Place 1 patch onto the skin every 48 hours  doxepin (SINEQUAN) 10 MG capsule, TAKE 1 CAPSULE BY MOUTH EVERY

## 2024-12-12 ENCOUNTER — APPOINTMENT (OUTPATIENT)
Dept: GENERAL RADIOLOGY | Age: 84
DRG: 682 | End: 2024-12-12
Payer: MEDICARE

## 2024-12-12 ENCOUNTER — APPOINTMENT (OUTPATIENT)
Age: 84
DRG: 682 | End: 2024-12-12
Payer: MEDICARE

## 2024-12-12 LAB
ALBUMIN SERPL-MCNC: 3 G/DL (ref 3.4–5)
ALBUMIN/GLOB SERPL: 0.8 {RATIO} (ref 1.1–2.2)
ALP SERPL-CCNC: 76 U/L (ref 40–129)
ALT SERPL-CCNC: 7 U/L (ref 10–40)
ANION GAP SERPL CALCULATED.3IONS-SCNC: 10 MMOL/L (ref 3–16)
AST SERPL-CCNC: 32 U/L (ref 15–37)
BACTERIA UR CULT: NORMAL
BASOPHILS # BLD: 0 K/UL (ref 0–0.2)
BASOPHILS NFR BLD: 0.6 %
BILIRUB SERPL-MCNC: 0.5 MG/DL (ref 0–1)
BUN SERPL-MCNC: 18 MG/DL (ref 7–20)
CALCIUM SERPL-MCNC: 8.2 MG/DL (ref 8.3–10.6)
CHLORIDE SERPL-SCNC: 100 MMOL/L (ref 99–110)
CO2 SERPL-SCNC: 26 MMOL/L (ref 21–32)
CREAT SERPL-MCNC: 1.5 MG/DL (ref 0.8–1.3)
DEPRECATED RDW RBC AUTO: 16.2 % (ref 12.4–15.4)
ECHO AO ASC DIAM: 3.8 CM
ECHO AO ASCENDING AORTA INDEX: 2.13 CM/M2
ECHO AO ROOT DIAM: 2.3 CM
ECHO AO ROOT INDEX: 1.29 CM/M2
ECHO AV AREA PEAK VELOCITY: 1.6 CM2
ECHO AV AREA VTI: 1.8 CM2
ECHO AV AREA/BSA PEAK VELOCITY: 0.9 CM2/M2
ECHO AV AREA/BSA VTI: 1 CM2/M2
ECHO AV MEAN GRADIENT: 4 MMHG
ECHO AV MEAN GRADIENT: 4 MMHG
ECHO AV MEAN VELOCITY: 1 M/S
ECHO AV PEAK GRADIENT: 7 MMHG
ECHO AV PEAK VELOCITY: 1.3 M/S
ECHO AV VELOCITY RATIO: 0.54
ECHO AV VTI: 31.2 CM
ECHO BSA: 1.76 M2
ECHO EST RA PRESSURE: 8 MMHG
ECHO IVC PROX: 2.1 CM
ECHO LA AREA 2C: 18.6 CM2
ECHO LA AREA 4C: 14.4 CM2
ECHO LA DIAMETER INDEX: 1.63 CM/M2
ECHO LA DIAMETER: 2.9 CM
ECHO LA MAJOR AXIS: 4.9 CM
ECHO LA MINOR AXIS: 5.9 CM
ECHO LA TO AORTIC ROOT RATIO: 1.26
ECHO LA VOL BP: 43 ML (ref 18–58)
ECHO LA VOL MOD A2C: 46 ML (ref 18–58)
ECHO LA VOL MOD A4C: 33 ML (ref 18–58)
ECHO LA VOL/BSA BIPLANE: 24 ML/M2 (ref 16–34)
ECHO LA VOLUME INDEX MOD A2C: 26 ML/M2 (ref 16–34)
ECHO LA VOLUME INDEX MOD A4C: 19 ML/M2 (ref 16–34)
ECHO LV E' LATERAL VELOCITY: 8.49 CM/S
ECHO LV E' SEPTAL VELOCITY: 6.2 CM/S
ECHO LV EDV A2C: 56 ML
ECHO LV EDV A4C: 67 ML
ECHO LV EDV INDEX A4C: 38 ML/M2
ECHO LV EDV NDEX A2C: 31 ML/M2
ECHO LV EJECTION FRACTION A2C: 61 %
ECHO LV EJECTION FRACTION A4C: 57 %
ECHO LV EJECTION FRACTION BIPLANE: 58 % (ref 55–100)
ECHO LV ESV A2C: 22 ML
ECHO LV ESV A4C: 28 ML
ECHO LV ESV INDEX A2C: 12 ML/M2
ECHO LV ESV INDEX A4C: 16 ML/M2
ECHO LV FRACTIONAL SHORTENING: 28 % (ref 28–44)
ECHO LV INTERNAL DIMENSION DIASTOLE INDEX: 2.25 CM/M2
ECHO LV INTERNAL DIMENSION DIASTOLIC: 4 CM (ref 4.2–5.9)
ECHO LV INTERNAL DIMENSION SYSTOLIC INDEX: 1.63 CM/M2
ECHO LV INTERNAL DIMENSION SYSTOLIC: 2.9 CM
ECHO LV IVSD: 0.6 CM (ref 0.6–1)
ECHO LV MASS 2D: 78.4 G (ref 88–224)
ECHO LV MASS INDEX 2D: 44 G/M2 (ref 49–115)
ECHO LV POSTERIOR WALL DIASTOLIC: 0.8 CM (ref 0.6–1)
ECHO LV RELATIVE WALL THICKNESS RATIO: 0.4
ECHO LVOT AREA: 3.1 CM2
ECHO LVOT AV VTI INDEX: 0.58
ECHO LVOT DIAM: 2 CM
ECHO LVOT MEAN GRADIENT: 1 MMHG
ECHO LVOT PEAK GRADIENT: 2 MMHG
ECHO LVOT PEAK VELOCITY: 0.7 M/S
ECHO LVOT STROKE VOLUME INDEX: 31.9 ML/M2
ECHO LVOT SV: 56.8 ML
ECHO LVOT VTI: 18.1 CM
ECHO MV A VELOCITY: 0.89 M/S
ECHO MV AREA VTI: 1.9 CM2
ECHO MV E DECELERATION TIME (DT): 317 MS
ECHO MV E VELOCITY: 0.64 M/S
ECHO MV E/A RATIO: 0.72
ECHO MV E/E' LATERAL: 7.54
ECHO MV E/E' RATIO (AVERAGED): 8.93
ECHO MV E/E' SEPTAL: 10.32
ECHO MV LVOT VTI INDEX: 1.63
ECHO MV MAX VELOCITY: 0.9 M/S
ECHO MV MEAN GRADIENT: 1 MMHG
ECHO MV MEAN VELOCITY: 0.6 M/S
ECHO MV PEAK GRADIENT: 3 MMHG
ECHO MV VTI: 29.5 CM
ECHO PV MAX VELOCITY: 0.9 M/S
ECHO PV PEAK GRADIENT: 3 MMHG
ECHO RA AREA 4C: 22.3 CM2
ECHO RA END SYSTOLIC VOLUME APICAL 4 CHAMBER INDEX BSA: 39 ML/M2
ECHO RA VOLUME: 70 ML
ECHO RIGHT VENTRICULAR SYSTOLIC PRESSURE (RVSP): 35 MMHG
ECHO RV BASAL DIMENSION: 4 CM
ECHO RV FREE WALL PEAK S': 13.1 CM/S
ECHO RV LONGITUDINAL DIMENSION: 7.9 CM
ECHO RV MID DIMENSION: 2.4 CM
ECHO RV TAPSE: 2.5 CM (ref 1.7–?)
ECHO TV REGURGITANT MAX VELOCITY: 2.58 M/S
ECHO TV REGURGITANT PEAK GRADIENT: 27 MMHG
EOSINOPHIL # BLD: 0.1 K/UL (ref 0–0.6)
EOSINOPHIL NFR BLD: 1.4 %
GFR SERPLBLD CREATININE-BSD FMLA CKD-EPI: 45 ML/MIN/{1.73_M2}
GLUCOSE SERPL-MCNC: 99 MG/DL (ref 70–99)
HCT VFR BLD AUTO: 29.1 % (ref 40.5–52.5)
HGB BLD-MCNC: 9.9 G/DL (ref 13.5–17.5)
INR PPP: 3.01 (ref 0.85–1.15)
LYMPHOCYTES # BLD: 1.8 K/UL (ref 1–5.1)
LYMPHOCYTES NFR BLD: 22.7 %
MAGNESIUM SERPL-MCNC: 2.27 MG/DL (ref 1.8–2.4)
MCH RBC QN AUTO: 28.7 PG (ref 26–34)
MCHC RBC AUTO-ENTMCNC: 33.9 G/DL (ref 31–36)
MCV RBC AUTO: 84.6 FL (ref 80–100)
MONOCYTES # BLD: 0.7 K/UL (ref 0–1.3)
MONOCYTES NFR BLD: 8.5 %
NEUTROPHILS # BLD: 5.4 K/UL (ref 1.7–7.7)
NEUTROPHILS NFR BLD: 66.8 %
PLATELET # BLD AUTO: 171 K/UL (ref 135–450)
PMV BLD AUTO: 7.9 FL (ref 5–10.5)
POTASSIUM SERPL-SCNC: 3.5 MMOL/L (ref 3.5–5.1)
PROT SERPL-MCNC: 6.6 G/DL (ref 6.4–8.2)
PROTHROMBIN TIME: 31.1 SEC (ref 11.9–14.9)
RBC # BLD AUTO: 3.44 M/UL (ref 4.2–5.9)
SODIUM SERPL-SCNC: 136 MMOL/L (ref 136–145)
WBC # BLD AUTO: 8.1 K/UL (ref 4–11)

## 2024-12-12 PROCEDURE — 6360000002 HC RX W HCPCS: Performed by: STUDENT IN AN ORGANIZED HEALTH CARE EDUCATION/TRAINING PROGRAM

## 2024-12-12 PROCEDURE — 85610 PROTHROMBIN TIME: CPT

## 2024-12-12 PROCEDURE — 74230 X-RAY XM SWLNG FUNCJ C+: CPT

## 2024-12-12 PROCEDURE — 93306 TTE W/DOPPLER COMPLETE: CPT | Performed by: INTERNAL MEDICINE

## 2024-12-12 PROCEDURE — 2580000003 HC RX 258: Performed by: STUDENT IN AN ORGANIZED HEALTH CARE EDUCATION/TRAINING PROGRAM

## 2024-12-12 PROCEDURE — 80053 COMPREHEN METABOLIC PANEL: CPT

## 2024-12-12 PROCEDURE — 99233 SBSQ HOSP IP/OBS HIGH 50: CPT | Performed by: HOSPITALIST

## 2024-12-12 PROCEDURE — 93306 TTE W/DOPPLER COMPLETE: CPT

## 2024-12-12 PROCEDURE — 6370000000 HC RX 637 (ALT 250 FOR IP): Performed by: STUDENT IN AN ORGANIZED HEALTH CARE EDUCATION/TRAINING PROGRAM

## 2024-12-12 PROCEDURE — 36415 COLL VENOUS BLD VENIPUNCTURE: CPT

## 2024-12-12 PROCEDURE — 85025 COMPLETE CBC W/AUTO DIFF WBC: CPT

## 2024-12-12 PROCEDURE — 92526 ORAL FUNCTION THERAPY: CPT

## 2024-12-12 PROCEDURE — 92611 MOTION FLUOROSCOPY/SWALLOW: CPT

## 2024-12-12 PROCEDURE — 83735 ASSAY OF MAGNESIUM: CPT

## 2024-12-12 PROCEDURE — 99233 SBSQ HOSP IP/OBS HIGH 50: CPT | Performed by: PSYCHIATRY & NEUROLOGY

## 2024-12-12 PROCEDURE — APPSS30 APP SPLIT SHARED TIME 16-30 MINUTES

## 2024-12-12 PROCEDURE — 95816 EEG AWAKE AND DROWSY: CPT | Performed by: PSYCHIATRY & NEUROLOGY

## 2024-12-12 PROCEDURE — APPNB30 APP NON BILLABLE TIME 0-30 MINS

## 2024-12-12 PROCEDURE — 95819 EEG AWAKE AND ASLEEP: CPT

## 2024-12-12 PROCEDURE — 2060000000 HC ICU INTERMEDIATE R&B

## 2024-12-12 RX ORDER — WARFARIN SODIUM 2.5 MG/1
2.5 TABLET ORAL DAILY
Status: DISCONTINUED | OUTPATIENT
Start: 2024-12-13 | End: 2024-12-15

## 2024-12-12 RX ORDER — FENTANYL 50 UG/1
1 PATCH TRANSDERMAL
Status: DISCONTINUED | OUTPATIENT
Start: 2024-12-12 | End: 2024-12-15 | Stop reason: HOSPADM

## 2024-12-12 RX ADMIN — Medication 10 ML: at 20:47

## 2024-12-12 RX ADMIN — WATER 1000 MG: 1 INJECTION INTRAMUSCULAR; INTRAVENOUS; SUBCUTANEOUS at 04:08

## 2024-12-12 RX ADMIN — Medication 1 CAPSULE: at 10:21

## 2024-12-12 RX ADMIN — Medication 10 ML: at 10:21

## 2024-12-12 RX ADMIN — Medication 1 CAPSULE: at 18:54

## 2024-12-12 ASSESSMENT — PAIN SCALES - GENERAL
PAINLEVEL_OUTOF10: 0

## 2024-12-12 NOTE — PROCEDURES
Patient: Juan Grant    MR Number: [id number]  YOB: 1940  Date of Visit: 12/12/2024    Clinical History:  The patient is a 84 y.o. years old male with new onset syncope and possible seizure.      Method:  The EEG was performed utilizing the international 10/20 of electrode placements of both referential and bipolar montages. The patient was awake and drowsy through out the recording.  Photic stimulation was performed.    Findings:  The background of the EEG showed normal alpha posterior background of 8 HZ and amplitude of 20-40 UV. This background was symmetric, waxing and waning, and reactive with eye opening and closure. As the patient became drowsy, generalized diffuse slowing was seen through recording at 6-7 HZ.  This generalized slowing was symmetric, non rhythmical, and continuous. No spike or sharp waves were seen.  Photic stimulation did not activate EEG.     Impression:  This EEG  is within normal limits. There is no evidence of epileptiform discharges, focal, or lateralizing abnormalities.      Jaymie Hernandez MD      Board certified in clinical neurophysiology   
  Mildly (nectar) thick   1) Material does not enter the airway     Moderately (honey) thick   N/A - consistency not provided     Puree   1) Material does not enter the airway     Soft Solid   1) Material does not enter the airway     Regular Solid   1) Material does not enter the airway            Esophageal Phase  Unremarkable    Following Evaluation:  Provided education regarding role of SLP, results of assessment, recommendations and general speech pathology plan of care.   [] Pt verbalized understanding and agreement   [x] Pt requires ongoing learning   [] No evidence of comprehension     Timed Code Treatment:  0     Total Treatment Time: 40     Signature:  Audrey Griffiths MA CCC-SLP #[id number]  Speech Language Pathologist

## 2024-12-13 LAB
ALBUMIN SERPL-MCNC: 3 G/DL (ref 3.4–5)
ALBUMIN/GLOB SERPL: 0.9 {RATIO} (ref 1.1–2.2)
ALP SERPL-CCNC: 78 U/L (ref 40–129)
ALT SERPL-CCNC: 6 U/L (ref 10–40)
ANION GAP SERPL CALCULATED.3IONS-SCNC: 8 MMOL/L (ref 3–16)
AST SERPL-CCNC: 31 U/L (ref 15–37)
BASOPHILS # BLD: 0 K/UL (ref 0–0.2)
BASOPHILS NFR BLD: 0.6 %
BILIRUB SERPL-MCNC: 0.5 MG/DL (ref 0–1)
BUN SERPL-MCNC: 13 MG/DL (ref 7–20)
CALCIUM SERPL-MCNC: 8.4 MG/DL (ref 8.3–10.6)
CHLORIDE SERPL-SCNC: 100 MMOL/L (ref 99–110)
CO2 SERPL-SCNC: 26 MMOL/L (ref 21–32)
CREAT SERPL-MCNC: 1.2 MG/DL (ref 0.8–1.3)
DEPRECATED RDW RBC AUTO: 15.8 % (ref 12.4–15.4)
EOSINOPHIL # BLD: 0.2 K/UL (ref 0–0.6)
EOSINOPHIL NFR BLD: 3.4 %
GFR SERPLBLD CREATININE-BSD FMLA CKD-EPI: 59 ML/MIN/{1.73_M2}
GLUCOSE BLD-MCNC: 166 MG/DL (ref 70–99)
GLUCOSE SERPL-MCNC: 86 MG/DL (ref 70–99)
HCT VFR BLD AUTO: 29.3 % (ref 40.5–52.5)
HGB BLD-MCNC: 9.8 G/DL (ref 13.5–17.5)
INR PPP: 2.4 (ref 0.85–1.15)
LYMPHOCYTES # BLD: 1.8 K/UL (ref 1–5.1)
LYMPHOCYTES NFR BLD: 25.5 %
MAGNESIUM SERPL-MCNC: 2.26 MG/DL (ref 1.8–2.4)
MCH RBC QN AUTO: 28.8 PG (ref 26–34)
MCHC RBC AUTO-ENTMCNC: 33.6 G/DL (ref 31–36)
MCV RBC AUTO: 85.8 FL (ref 80–100)
MONOCYTES # BLD: 0.6 K/UL (ref 0–1.3)
MONOCYTES NFR BLD: 8.9 %
NEUTROPHILS # BLD: 4.4 K/UL (ref 1.7–7.7)
NEUTROPHILS NFR BLD: 61.6 %
PERFORMED ON: ABNORMAL
PLATELET # BLD AUTO: 172 K/UL (ref 135–450)
PMV BLD AUTO: 7.8 FL (ref 5–10.5)
POTASSIUM SERPL-SCNC: 3.7 MMOL/L (ref 3.5–5.1)
PROT SERPL-MCNC: 6.4 G/DL (ref 6.4–8.2)
PROTHROMBIN TIME: 26.2 SEC (ref 11.9–14.9)
RBC # BLD AUTO: 3.41 M/UL (ref 4.2–5.9)
SODIUM SERPL-SCNC: 134 MMOL/L (ref 136–145)
WBC # BLD AUTO: 7.2 K/UL (ref 4–11)

## 2024-12-13 PROCEDURE — 80053 COMPREHEN METABOLIC PANEL: CPT

## 2024-12-13 PROCEDURE — APPSS30 APP SPLIT SHARED TIME 16-30 MINUTES

## 2024-12-13 PROCEDURE — 99232 SBSQ HOSP IP/OBS MODERATE 35: CPT | Performed by: PSYCHIATRY & NEUROLOGY

## 2024-12-13 PROCEDURE — 97535 SELF CARE MNGMENT TRAINING: CPT

## 2024-12-13 PROCEDURE — 85610 PROTHROMBIN TIME: CPT

## 2024-12-13 PROCEDURE — 85025 COMPLETE CBC W/AUTO DIFF WBC: CPT

## 2024-12-13 PROCEDURE — 6370000000 HC RX 637 (ALT 250 FOR IP): Performed by: STUDENT IN AN ORGANIZED HEALTH CARE EDUCATION/TRAINING PROGRAM

## 2024-12-13 PROCEDURE — 97129 THER IVNTJ 1ST 15 MIN: CPT

## 2024-12-13 PROCEDURE — 97530 THERAPEUTIC ACTIVITIES: CPT

## 2024-12-13 PROCEDURE — 2580000003 HC RX 258: Performed by: STUDENT IN AN ORGANIZED HEALTH CARE EDUCATION/TRAINING PROGRAM

## 2024-12-13 PROCEDURE — 92526 ORAL FUNCTION THERAPY: CPT

## 2024-12-13 PROCEDURE — 2060000000 HC ICU INTERMEDIATE R&B

## 2024-12-13 PROCEDURE — 97116 GAIT TRAINING THERAPY: CPT

## 2024-12-13 PROCEDURE — 6360000002 HC RX W HCPCS: Performed by: STUDENT IN AN ORGANIZED HEALTH CARE EDUCATION/TRAINING PROGRAM

## 2024-12-13 PROCEDURE — APPNB30 APP NON BILLABLE TIME 0-30 MINS

## 2024-12-13 PROCEDURE — 99232 SBSQ HOSP IP/OBS MODERATE 35: CPT | Performed by: HOSPITALIST

## 2024-12-13 PROCEDURE — 36415 COLL VENOUS BLD VENIPUNCTURE: CPT

## 2024-12-13 PROCEDURE — 83735 ASSAY OF MAGNESIUM: CPT

## 2024-12-13 RX ORDER — 0.9 % SODIUM CHLORIDE 0.9 %
1000 INTRAVENOUS SOLUTION INTRAVENOUS ONCE
Status: COMPLETED | OUTPATIENT
Start: 2024-12-13 | End: 2024-12-13

## 2024-12-13 RX ADMIN — WARFARIN SODIUM 2.5 MG: 2.5 TABLET ORAL at 18:02

## 2024-12-13 RX ADMIN — Medication 10 ML: at 20:54

## 2024-12-13 RX ADMIN — WATER 1000 MG: 1 INJECTION INTRAMUSCULAR; INTRAVENOUS; SUBCUTANEOUS at 05:07

## 2024-12-13 RX ADMIN — SODIUM CHLORIDE 1000 ML: 9 INJECTION, SOLUTION INTRAVENOUS at 13:29

## 2024-12-13 RX ADMIN — Medication 10 ML: at 09:00

## 2024-12-13 RX ADMIN — Medication 1 CAPSULE: at 10:07

## 2024-12-13 RX ADMIN — Medication 1 CAPSULE: at 18:01

## 2024-12-13 NOTE — PLAN OF CARE
Problem: Discharge Planning  Goal: Discharge to home or other facility with appropriate resources  Outcome: Progressing     Problem: Pain  Goal: Verbalizes/displays adequate comfort level or baseline comfort level  Outcome: Progressing     Problem: Safety - Adult  Goal: Free from fall injury  Outcome: Progressing     Problem: Neurosensory - Adult  Goal: Achieves stable or improved neurological status  Outcome: Progressing     Problem: Skin/Tissue Integrity - Adult  Goal: Incisions, wounds, or drain sites healing without S/S of infection  Outcome: Progressing     Problem: Musculoskeletal - Adult  Goal: Return mobility to safest level of function  Outcome: Progressing     Problem: Genitourinary - Adult  Goal: Absence of urinary retention  Outcome: Progressing

## 2024-12-13 NOTE — CARE COORDINATION
Discharge Planning:    KESHAWN called pt's daughter Nisreen and she confirmed that Dr. Mcelroy came in and saw the pt and met with them and they are in agreeable to ARU with [facility] and if no beds are available once he is ready to DC then [facility].  Nisreen reports they would like to be here as this is closest to her and her brother.   Nisreen also mentioned pt is doing a lot better with the hospital bed and pure wick catheter and if that can be arranged for the pt at home then it will be beneficial for the pt during the night, so he doesn't fall when he gets up to go to use the restroom.    Update 1319: KESHAWN spoke with pt's daughter and she informed that they are watching pt's BP and think he can possibly get to the ARU at [facility] on Sunday.    Electronically signed by Jean-Claude Bruce on 12/13/2024 at 8:47 AM  Electronically signed by Jean-Claude Bruce on 12/13/2024 at 1:20 PM

## 2024-12-14 LAB
ALBUMIN SERPL-MCNC: 3.1 G/DL (ref 3.4–5)
ALBUMIN/GLOB SERPL: 0.9 {RATIO} (ref 1.1–2.2)
ALP SERPL-CCNC: 80 U/L (ref 40–129)
ALT SERPL-CCNC: <5 U/L (ref 10–40)
ANION GAP SERPL CALCULATED.3IONS-SCNC: 6 MMOL/L (ref 3–16)
AST SERPL-CCNC: 28 U/L (ref 15–37)
BASOPHILS # BLD: 0 K/UL (ref 0–0.2)
BASOPHILS NFR BLD: 0.6 %
BILIRUB SERPL-MCNC: 0.4 MG/DL (ref 0–1)
BUN SERPL-MCNC: 10 MG/DL (ref 7–20)
CALCIUM SERPL-MCNC: 8.5 MG/DL (ref 8.3–10.6)
CHLORIDE SERPL-SCNC: 105 MMOL/L (ref 99–110)
CO2 SERPL-SCNC: 26 MMOL/L (ref 21–32)
CREAT SERPL-MCNC: 1.1 MG/DL (ref 0.8–1.3)
DEPRECATED RDW RBC AUTO: 15.9 % (ref 12.4–15.4)
EKG ATRIAL RATE: 73 BPM
EKG DIAGNOSIS: NORMAL
EKG P AXIS: 102 DEGREES
EKG P-R INTERVAL: 174 MS
EKG Q-T INTERVAL: 438 MS
EKG QRS DURATION: 142 MS
EKG QTC CALCULATION (BAZETT): 482 MS
EKG R AXIS: -20 DEGREES
EKG T AXIS: 113 DEGREES
EKG VENTRICULAR RATE: 73 BPM
EOSINOPHIL # BLD: 0.3 K/UL (ref 0–0.6)
EOSINOPHIL NFR BLD: 3.8 %
GFR SERPLBLD CREATININE-BSD FMLA CKD-EPI: 66 ML/MIN/{1.73_M2}
GLUCOSE BLD-MCNC: 100 MG/DL (ref 70–99)
GLUCOSE BLD-MCNC: 111 MG/DL (ref 70–99)
GLUCOSE BLD-MCNC: 177 MG/DL (ref 70–99)
GLUCOSE BLD-MCNC: 93 MG/DL (ref 70–99)
GLUCOSE SERPL-MCNC: 87 MG/DL (ref 70–99)
HCT VFR BLD AUTO: 29.5 % (ref 40.5–52.5)
HGB BLD-MCNC: 10.2 G/DL (ref 13.5–17.5)
INR PPP: 1.76 (ref 0.85–1.15)
LYMPHOCYTES # BLD: 2 K/UL (ref 1–5.1)
LYMPHOCYTES NFR BLD: 27.9 %
MAGNESIUM SERPL-MCNC: 2.18 MG/DL (ref 1.8–2.4)
MCH RBC QN AUTO: 29.2 PG (ref 26–34)
MCHC RBC AUTO-ENTMCNC: 34.4 G/DL (ref 31–36)
MCV RBC AUTO: 84.9 FL (ref 80–100)
MONOCYTES # BLD: 0.6 K/UL (ref 0–1.3)
MONOCYTES NFR BLD: 8.1 %
NEUTROPHILS # BLD: 4.2 K/UL (ref 1.7–7.7)
NEUTROPHILS NFR BLD: 59.6 %
PERFORMED ON: ABNORMAL
PERFORMED ON: NORMAL
PLATELET # BLD AUTO: 183 K/UL (ref 135–450)
PMV BLD AUTO: 7.8 FL (ref 5–10.5)
POTASSIUM SERPL-SCNC: 3.6 MMOL/L (ref 3.5–5.1)
PROT SERPL-MCNC: 6.6 G/DL (ref 6.4–8.2)
PROTHROMBIN TIME: 20.6 SEC (ref 11.9–14.9)
RBC # BLD AUTO: 3.48 M/UL (ref 4.2–5.9)
SODIUM SERPL-SCNC: 137 MMOL/L (ref 136–145)
TROPONIN, HIGH SENSITIVITY: 50 NG/L (ref 0–22)
TROPONIN, HIGH SENSITIVITY: 53 NG/L (ref 0–22)
WBC # BLD AUTO: 7 K/UL (ref 4–11)

## 2024-12-14 PROCEDURE — 85610 PROTHROMBIN TIME: CPT

## 2024-12-14 PROCEDURE — 6370000000 HC RX 637 (ALT 250 FOR IP): Performed by: STUDENT IN AN ORGANIZED HEALTH CARE EDUCATION/TRAINING PROGRAM

## 2024-12-14 PROCEDURE — 36415 COLL VENOUS BLD VENIPUNCTURE: CPT

## 2024-12-14 PROCEDURE — 99232 SBSQ HOSP IP/OBS MODERATE 35: CPT | Performed by: INTERNAL MEDICINE

## 2024-12-14 PROCEDURE — 94760 N-INVAS EAR/PLS OXIMETRY 1: CPT

## 2024-12-14 PROCEDURE — 80053 COMPREHEN METABOLIC PANEL: CPT

## 2024-12-14 PROCEDURE — 6360000002 HC RX W HCPCS: Performed by: STUDENT IN AN ORGANIZED HEALTH CARE EDUCATION/TRAINING PROGRAM

## 2024-12-14 PROCEDURE — 84484 ASSAY OF TROPONIN QUANT: CPT

## 2024-12-14 PROCEDURE — 83735 ASSAY OF MAGNESIUM: CPT

## 2024-12-14 PROCEDURE — 6360000002 HC RX W HCPCS: Performed by: NURSE PRACTITIONER

## 2024-12-14 PROCEDURE — 93005 ELECTROCARDIOGRAM TRACING: CPT | Performed by: STUDENT IN AN ORGANIZED HEALTH CARE EDUCATION/TRAINING PROGRAM

## 2024-12-14 PROCEDURE — 2580000003 HC RX 258: Performed by: STUDENT IN AN ORGANIZED HEALTH CARE EDUCATION/TRAINING PROGRAM

## 2024-12-14 PROCEDURE — 93010 ELECTROCARDIOGRAM REPORT: CPT | Performed by: INTERNAL MEDICINE

## 2024-12-14 PROCEDURE — 2060000000 HC ICU INTERMEDIATE R&B

## 2024-12-14 PROCEDURE — 85025 COMPLETE CBC W/AUTO DIFF WBC: CPT

## 2024-12-14 RX ORDER — MORPHINE SULFATE 2 MG/ML
1 INJECTION, SOLUTION INTRAMUSCULAR; INTRAVENOUS
Status: DISCONTINUED | OUTPATIENT
Start: 2024-12-14 | End: 2024-12-15 | Stop reason: HOSPADM

## 2024-12-14 RX ORDER — PANTOPRAZOLE SODIUM 40 MG/10ML
40 INJECTION, POWDER, LYOPHILIZED, FOR SOLUTION INTRAVENOUS DAILY
Status: DISCONTINUED | OUTPATIENT
Start: 2024-12-14 | End: 2024-12-15 | Stop reason: HOSPADM

## 2024-12-14 RX ADMIN — WARFARIN SODIUM 2.5 MG: 2.5 TABLET ORAL at 17:36

## 2024-12-14 RX ADMIN — ONDANSETRON 4 MG: 2 INJECTION, SOLUTION INTRAMUSCULAR; INTRAVENOUS at 09:42

## 2024-12-14 RX ADMIN — Medication 1 CAPSULE: at 17:36

## 2024-12-14 RX ADMIN — Medication 1 CAPSULE: at 08:27

## 2024-12-14 RX ADMIN — Medication 10 ML: at 08:27

## 2024-12-14 RX ADMIN — MORPHINE SULFATE 1 MG: 2 INJECTION, SOLUTION INTRAMUSCULAR; INTRAVENOUS at 22:32

## 2024-12-14 RX ADMIN — Medication 10 ML: at 21:11

## 2024-12-14 RX ADMIN — WATER 1000 MG: 1 INJECTION INTRAMUSCULAR; INTRAVENOUS; SUBCUTANEOUS at 04:52

## 2024-12-14 RX ADMIN — ONDANSETRON 4 MG: 2 INJECTION, SOLUTION INTRAMUSCULAR; INTRAVENOUS at 11:00

## 2024-12-14 RX ADMIN — PANTOPRAZOLE SODIUM 40 MG: 40 INJECTION, POWDER, FOR SOLUTION INTRAVENOUS at 11:00

## 2024-12-14 ASSESSMENT — PAIN DESCRIPTION - ORIENTATION: ORIENTATION: MID

## 2024-12-14 ASSESSMENT — PAIN SCALES - GENERAL
PAINLEVEL_OUTOF10: 5
PAINLEVEL_OUTOF10: 8
PAINLEVEL_OUTOF10: 0

## 2024-12-14 ASSESSMENT — PAIN DESCRIPTION - LOCATION: LOCATION: BACK

## 2024-12-14 NOTE — DISCHARGE INSTR - COC
Test): {Done Not Done Date:454974279}    Treatments at the Time of Hospital Discharge:   Respiratory Treatments: ***  Oxygen Therapy:  {Therapy; copd oxygen:77447}  Ventilator:    {[facility] Vent List:475180777}    Rehab Therapies: {THERAPEUTIC INTERVENTION:4523378851}  Weight Bearing Status/Restrictions: {[facility] Weight Bearin}  Other Medical Equipment (for information only, NOT a DME order):  {EQUIPMENT:152530900}  Other Treatments: ***    Patient's personal belongings (please select all that are sent with patient):  {P DME Belongings:206804197}    RN SIGNATURE:  {Esignature:218637550}    CASE MANAGEMENT/SOCIAL WORK SECTION    Inpatient Status Date: ***    Readmission Risk Assessment Score:  [facility] RISK OF UNPLANNED READMISSION 2.0             16.5 Total Score        Discharging to Facility/ Agency   Name:   Address:  Phone:  Fax:    Dialysis Facility (if applicable)   Name:  Address:  Dialysis Schedule:  Phone:  Fax:    / signature: {Esignature:313733154}    PHYSICIAN SECTION    Prognosis: Good    Condition at Discharge: Stable    Rehab Potential (if transferring to Rehab): Good    Recommended Labs or Other Treatments After Discharge: PT/OT, medication compliance, CBC and BMP + Mg within 1 week, dietary compliance based on co morbidities, follow up with physicians    Physician Certification: I certify the above information and transfer of Juan Grant  is necessary for the continuing treatment of the diagnosis listed and that he requires Acute Rehab for less 30 days.     Update Admission H&P: No change in H&P    PHYSICIAN SIGNATURE:  Electronically signed by Dmitriy Quarles MD on 12/15/24 at 9:44 AM EST

## 2024-12-15 ENCOUNTER — HOSPITAL ENCOUNTER (INPATIENT)
Age: 84
LOS: 6 days | Discharge: HOME HEALTH | DRG: 071 | End: 2024-12-21
Attending: STUDENT IN AN ORGANIZED HEALTH CARE EDUCATION/TRAINING PROGRAM | Admitting: STUDENT IN AN ORGANIZED HEALTH CARE EDUCATION/TRAINING PROGRAM
Payer: MEDICARE

## 2024-12-15 VITALS
DIASTOLIC BLOOD PRESSURE: 68 MMHG | OXYGEN SATURATION: 94 % | TEMPERATURE: 98.6 F | BODY MASS INDEX: 22.56 KG/M2 | SYSTOLIC BLOOD PRESSURE: 134 MMHG | WEIGHT: 152.34 LBS | HEIGHT: 69 IN | RESPIRATION RATE: 16 BRPM | HEART RATE: 63 BPM

## 2024-12-15 DIAGNOSIS — I73.9 PVD (PERIPHERAL VASCULAR DISEASE) (HCC): Primary | ICD-10-CM

## 2024-12-15 PROBLEM — G93.40 ENCEPHALOPATHY: Status: ACTIVE | Noted: 2024-12-15

## 2024-12-15 LAB
ALBUMIN SERPL-MCNC: 3.2 G/DL (ref 3.4–5)
ALBUMIN/GLOB SERPL: 0.9 {RATIO} (ref 1.1–2.2)
ALP SERPL-CCNC: 95 U/L (ref 40–129)
ALT SERPL-CCNC: 9 U/L (ref 10–40)
ANION GAP SERPL CALCULATED.3IONS-SCNC: 9 MMOL/L (ref 3–16)
AST SERPL-CCNC: 30 U/L (ref 15–37)
BASOPHILS # BLD: 0 K/UL (ref 0–0.2)
BASOPHILS NFR BLD: 0.6 %
BILIRUB SERPL-MCNC: 0.5 MG/DL (ref 0–1)
BUN SERPL-MCNC: 11 MG/DL (ref 7–20)
CALCIUM SERPL-MCNC: 8.5 MG/DL (ref 8.3–10.6)
CHLORIDE SERPL-SCNC: 107 MMOL/L (ref 99–110)
CO2 SERPL-SCNC: 25 MMOL/L (ref 21–32)
CREAT SERPL-MCNC: 1.1 MG/DL (ref 0.8–1.3)
DEPRECATED RDW RBC AUTO: 16.4 % (ref 12.4–15.4)
EOSINOPHIL # BLD: 0.3 K/UL (ref 0–0.6)
EOSINOPHIL NFR BLD: 3.8 %
GFR SERPLBLD CREATININE-BSD FMLA CKD-EPI: 66 ML/MIN/{1.73_M2}
GLUCOSE BLD-MCNC: 119 MG/DL (ref 70–99)
GLUCOSE SERPL-MCNC: 96 MG/DL (ref 70–99)
HCT VFR BLD AUTO: 30.4 % (ref 40.5–52.5)
HGB BLD-MCNC: 10.1 G/DL (ref 13.5–17.5)
INR PPP: 1.63 (ref 0.85–1.15)
LYMPHOCYTES # BLD: 1.7 K/UL (ref 1–5.1)
LYMPHOCYTES NFR BLD: 23.5 %
MAGNESIUM SERPL-MCNC: 2.27 MG/DL (ref 1.8–2.4)
MCH RBC QN AUTO: 28.8 PG (ref 26–34)
MCHC RBC AUTO-ENTMCNC: 33.4 G/DL (ref 31–36)
MCV RBC AUTO: 86.2 FL (ref 80–100)
MONOCYTES # BLD: 0.6 K/UL (ref 0–1.3)
MONOCYTES NFR BLD: 8.2 %
NEUTROPHILS # BLD: 4.5 K/UL (ref 1.7–7.7)
NEUTROPHILS NFR BLD: 63.9 %
PERFORMED ON: ABNORMAL
PLATELET # BLD AUTO: 190 K/UL (ref 135–450)
PMV BLD AUTO: 7.5 FL (ref 5–10.5)
POTASSIUM SERPL-SCNC: 4.3 MMOL/L (ref 3.5–5.1)
PROT SERPL-MCNC: 6.7 G/DL (ref 6.4–8.2)
PROTHROMBIN TIME: 19.4 SEC (ref 11.9–14.9)
RBC # BLD AUTO: 3.52 M/UL (ref 4.2–5.9)
SODIUM SERPL-SCNC: 141 MMOL/L (ref 136–145)
TROPONIN, HIGH SENSITIVITY: 45 NG/L (ref 0–22)
TROPONIN, HIGH SENSITIVITY: 45 NG/L (ref 0–22)
WBC # BLD AUTO: 7.1 K/UL (ref 4–11)

## 2024-12-15 PROCEDURE — 2580000003 HC RX 258: Performed by: STUDENT IN AN ORGANIZED HEALTH CARE EDUCATION/TRAINING PROGRAM

## 2024-12-15 PROCEDURE — 6370000000 HC RX 637 (ALT 250 FOR IP): Performed by: STUDENT IN AN ORGANIZED HEALTH CARE EDUCATION/TRAINING PROGRAM

## 2024-12-15 PROCEDURE — 94760 N-INVAS EAR/PLS OXIMETRY 1: CPT

## 2024-12-15 PROCEDURE — 93005 ELECTROCARDIOGRAM TRACING: CPT | Performed by: STUDENT IN AN ORGANIZED HEALTH CARE EDUCATION/TRAINING PROGRAM

## 2024-12-15 PROCEDURE — 1280000000 HC REHAB R&B

## 2024-12-15 PROCEDURE — 84484 ASSAY OF TROPONIN QUANT: CPT

## 2024-12-15 PROCEDURE — 36415 COLL VENOUS BLD VENIPUNCTURE: CPT

## 2024-12-15 PROCEDURE — 83735 ASSAY OF MAGNESIUM: CPT

## 2024-12-15 PROCEDURE — 85025 COMPLETE CBC W/AUTO DIFF WBC: CPT

## 2024-12-15 PROCEDURE — 80053 COMPREHEN METABOLIC PANEL: CPT

## 2024-12-15 PROCEDURE — 99222 1ST HOSP IP/OBS MODERATE 55: CPT | Performed by: INTERNAL MEDICINE

## 2024-12-15 PROCEDURE — 6360000002 HC RX W HCPCS: Performed by: STUDENT IN AN ORGANIZED HEALTH CARE EDUCATION/TRAINING PROGRAM

## 2024-12-15 PROCEDURE — 85610 PROTHROMBIN TIME: CPT

## 2024-12-15 RX ORDER — SODIUM CHLORIDE 0.9 % (FLUSH) 0.9 %
5-40 SYRINGE (ML) INJECTION PRN
Status: DISCONTINUED | OUTPATIENT
Start: 2024-12-15 | End: 2024-12-21 | Stop reason: HOSPADM

## 2024-12-15 RX ORDER — ONDANSETRON 2 MG/ML
4 INJECTION INTRAMUSCULAR; INTRAVENOUS EVERY 6 HOURS PRN
Status: DISCONTINUED | OUTPATIENT
Start: 2024-12-15 | End: 2024-12-16

## 2024-12-15 RX ORDER — SODIUM CHLORIDE 9 MG/ML
INJECTION, SOLUTION INTRAVENOUS PRN
Status: CANCELLED | OUTPATIENT
Start: 2024-12-15

## 2024-12-15 RX ORDER — ONDANSETRON 4 MG/1
4 TABLET, ORALLY DISINTEGRATING ORAL EVERY 8 HOURS PRN
Status: CANCELLED | OUTPATIENT
Start: 2024-12-15

## 2024-12-15 RX ORDER — SODIUM CHLORIDE 9 MG/ML
INJECTION, SOLUTION INTRAVENOUS PRN
Status: DISCONTINUED | OUTPATIENT
Start: 2024-12-15 | End: 2024-12-21 | Stop reason: HOSPADM

## 2024-12-15 RX ORDER — POLYETHYLENE GLYCOL 3350 17 G/17G
17 POWDER, FOR SOLUTION ORAL DAILY PRN
Status: DISCONTINUED | OUTPATIENT
Start: 2024-12-15 | End: 2024-12-21 | Stop reason: HOSPADM

## 2024-12-15 RX ORDER — PANTOPRAZOLE SODIUM 40 MG/10ML
40 INJECTION, POWDER, LYOPHILIZED, FOR SOLUTION INTRAVENOUS DAILY
Status: DISCONTINUED | OUTPATIENT
Start: 2024-12-16 | End: 2024-12-15

## 2024-12-15 RX ORDER — WARFARIN SODIUM 2.5 MG/1
2.5 TABLET ORAL DAILY
Status: CANCELLED | OUTPATIENT
Start: 2024-12-16

## 2024-12-15 RX ORDER — FAMOTIDINE 20 MG/1
20 TABLET, FILM COATED ORAL ONCE
Status: COMPLETED | OUTPATIENT
Start: 2024-12-15 | End: 2024-12-15

## 2024-12-15 RX ORDER — LACTOBACILLUS RHAMNOSUS GG 10B CELL
1 CAPSULE ORAL 2 TIMES DAILY WITH MEALS
Status: DISCONTINUED | OUTPATIENT
Start: 2024-12-15 | End: 2024-12-21 | Stop reason: HOSPADM

## 2024-12-15 RX ORDER — FENTANYL 50 UG/1
1 PATCH TRANSDERMAL
Status: DISCONTINUED | OUTPATIENT
Start: 2024-12-18 | End: 2024-12-18

## 2024-12-15 RX ORDER — OXYCODONE HYDROCHLORIDE 5 MG/1
5 TABLET ORAL EVERY 4 HOURS PRN
Status: CANCELLED | OUTPATIENT
Start: 2024-12-15

## 2024-12-15 RX ORDER — FUROSEMIDE 40 MG/1
40 TABLET ORAL 2 TIMES DAILY
Status: ON HOLD | COMMUNITY
End: 2024-12-20 | Stop reason: HOSPADM

## 2024-12-15 RX ORDER — ACETAMINOPHEN 650 MG/1
650 SUPPOSITORY RECTAL EVERY 6 HOURS PRN
Status: DISCONTINUED | OUTPATIENT
Start: 2024-12-15 | End: 2024-12-21 | Stop reason: HOSPADM

## 2024-12-15 RX ORDER — ACETAMINOPHEN 325 MG/1
650 TABLET ORAL EVERY 6 HOURS PRN
Status: DISCONTINUED | OUTPATIENT
Start: 2024-12-15 | End: 2024-12-21 | Stop reason: HOSPADM

## 2024-12-15 RX ORDER — OXYCODONE HYDROCHLORIDE 5 MG/1
5 TABLET ORAL EVERY 4 HOURS PRN
Status: DISCONTINUED | OUTPATIENT
Start: 2024-12-15 | End: 2024-12-21 | Stop reason: HOSPADM

## 2024-12-15 RX ORDER — WARFARIN SODIUM 2.5 MG/1
2.5 TABLET ORAL DAILY
Status: DISCONTINUED | OUTPATIENT
Start: 2024-12-16 | End: 2024-12-16

## 2024-12-15 RX ORDER — ONDANSETRON 4 MG/1
4 TABLET, ORALLY DISINTEGRATING ORAL EVERY 8 HOURS PRN
Status: DISCONTINUED | OUTPATIENT
Start: 2024-12-15 | End: 2024-12-21 | Stop reason: HOSPADM

## 2024-12-15 RX ORDER — ACETAMINOPHEN 325 MG/1
650 TABLET ORAL EVERY 6 HOURS PRN
Status: CANCELLED | OUTPATIENT
Start: 2024-12-15

## 2024-12-15 RX ORDER — METOPROLOL SUCCINATE 25 MG/1
12.5 TABLET, EXTENDED RELEASE ORAL NIGHTLY
Status: ON HOLD | COMMUNITY
End: 2024-12-20 | Stop reason: HOSPADM

## 2024-12-15 RX ORDER — POLYETHYLENE GLYCOL 3350 17 G/17G
17 POWDER, FOR SOLUTION ORAL DAILY PRN
Status: CANCELLED | OUTPATIENT
Start: 2024-12-15

## 2024-12-15 RX ORDER — ACETAMINOPHEN 650 MG/1
650 SUPPOSITORY RECTAL EVERY 6 HOURS PRN
Status: CANCELLED | OUTPATIENT
Start: 2024-12-15

## 2024-12-15 RX ORDER — SODIUM CHLORIDE 0.9 % (FLUSH) 0.9 %
5-40 SYRINGE (ML) INJECTION EVERY 12 HOURS SCHEDULED
Status: DISCONTINUED | OUTPATIENT
Start: 2024-12-15 | End: 2024-12-18

## 2024-12-15 RX ORDER — WARFARIN SODIUM 2.5 MG/1
2.5 TABLET ORAL DAILY
Status: DISCONTINUED | OUTPATIENT
Start: 2024-12-16 | End: 2024-12-15 | Stop reason: HOSPADM

## 2024-12-15 RX ORDER — SODIUM CHLORIDE 0.9 % (FLUSH) 0.9 %
5-40 SYRINGE (ML) INJECTION PRN
Status: CANCELLED | OUTPATIENT
Start: 2024-12-15

## 2024-12-15 RX ORDER — LACTOBACILLUS RHAMNOSUS GG 10B CELL
1 CAPSULE ORAL 2 TIMES DAILY WITH MEALS
Status: CANCELLED | OUTPATIENT
Start: 2024-12-15

## 2024-12-15 RX ORDER — WARFARIN SODIUM 2 MG/1
4 TABLET ORAL
Status: CANCELLED | OUTPATIENT
Start: 2024-12-15 | End: 2024-12-16

## 2024-12-15 RX ORDER — FENTANYL 50 UG/1
1 PATCH TRANSDERMAL
Status: CANCELLED | OUTPATIENT
Start: 2024-12-15

## 2024-12-15 RX ORDER — WARFARIN SODIUM 2 MG/1
4 TABLET ORAL
Status: COMPLETED | OUTPATIENT
Start: 2024-12-15 | End: 2024-12-15

## 2024-12-15 RX ORDER — PANTOPRAZOLE SODIUM 40 MG/1
40 TABLET, DELAYED RELEASE ORAL DAILY
Qty: 90 TABLET | Refills: 0 | Status: SHIPPED | OUTPATIENT
Start: 2024-12-15 | End: 2025-03-15

## 2024-12-15 RX ORDER — WARFARIN SODIUM 2 MG/1
4 TABLET ORAL
Status: DISCONTINUED | OUTPATIENT
Start: 2024-12-15 | End: 2024-12-15 | Stop reason: HOSPADM

## 2024-12-15 RX ORDER — SODIUM CHLORIDE 0.9 % (FLUSH) 0.9 %
5-40 SYRINGE (ML) INJECTION EVERY 12 HOURS SCHEDULED
Status: CANCELLED | OUTPATIENT
Start: 2024-12-15

## 2024-12-15 RX ORDER — NITROGLYCERIN 0.4 MG/1
0.4 TABLET SUBLINGUAL EVERY 5 MIN PRN
Status: ON HOLD | COMMUNITY
End: 2024-12-20 | Stop reason: HOSPADM

## 2024-12-15 RX ORDER — PANTOPRAZOLE SODIUM 40 MG/1
40 TABLET, DELAYED RELEASE ORAL
Status: DISCONTINUED | OUTPATIENT
Start: 2024-12-16 | End: 2024-12-21 | Stop reason: HOSPADM

## 2024-12-15 RX ORDER — PANTOPRAZOLE SODIUM 40 MG/10ML
40 INJECTION, POWDER, LYOPHILIZED, FOR SOLUTION INTRAVENOUS DAILY
Status: CANCELLED | OUTPATIENT
Start: 2024-12-16

## 2024-12-15 RX ORDER — ONDANSETRON 2 MG/ML
4 INJECTION INTRAMUSCULAR; INTRAVENOUS EVERY 6 HOURS PRN
Status: CANCELLED | OUTPATIENT
Start: 2024-12-15

## 2024-12-15 RX ADMIN — PANTOPRAZOLE SODIUM 40 MG: 40 INJECTION, POWDER, FOR SOLUTION INTRAVENOUS at 08:40

## 2024-12-15 RX ADMIN — Medication 1 CAPSULE: at 08:41

## 2024-12-15 RX ADMIN — SODIUM CHLORIDE, PRESERVATIVE FREE 20 ML: 5 INJECTION INTRAVENOUS at 20:03

## 2024-12-15 RX ADMIN — Medication 1 CAPSULE: at 17:42

## 2024-12-15 RX ADMIN — WARFARIN SODIUM 4 MG: 2 TABLET ORAL at 17:42

## 2024-12-15 RX ADMIN — WATER 1000 MG: 1 INJECTION INTRAMUSCULAR; INTRAVENOUS; SUBCUTANEOUS at 05:30

## 2024-12-15 RX ADMIN — ONDANSETRON 4 MG: 4 TABLET, ORALLY DISINTEGRATING ORAL at 19:26

## 2024-12-15 RX ADMIN — FAMOTIDINE 20 MG: 20 TABLET, FILM COATED ORAL at 20:51

## 2024-12-15 RX ADMIN — Medication 10 ML: at 08:40

## 2024-12-15 ASSESSMENT — PAIN SCALES - GENERAL
PAINLEVEL_OUTOF10: 8
PAINLEVEL_OUTOF10: 8
PAINLEVEL_OUTOF10: 0
PAINLEVEL_OUTOF10: 0

## 2024-12-15 ASSESSMENT — PAIN DESCRIPTION - PAIN TYPE
TYPE: ACUTE PAIN
TYPE: ACUTE PAIN

## 2024-12-15 ASSESSMENT — PAIN DESCRIPTION - LOCATION
LOCATION: CHEST
LOCATION: CHEST

## 2024-12-15 ASSESSMENT — PAIN DESCRIPTION - FREQUENCY
FREQUENCY: CONTINUOUS
FREQUENCY: CONTINUOUS

## 2024-12-15 ASSESSMENT — PAIN DESCRIPTION - DIRECTION
RADIATING_TOWARDS: DOES NOT RADIATE
RADIATING_TOWARDS: DOES NOT RADIATE

## 2024-12-15 ASSESSMENT — PAIN DESCRIPTION - ONSET
ONSET: ON-GOING
ONSET: ON-GOING

## 2024-12-15 ASSESSMENT — PAIN DESCRIPTION - ORIENTATION
ORIENTATION: MID
ORIENTATION: MID

## 2024-12-15 ASSESSMENT — PAIN DESCRIPTION - DESCRIPTORS: DESCRIPTORS: SQUEEZING

## 2024-12-15 ASSESSMENT — PAIN SCALES - WONG BAKER: WONGBAKER_NUMERICALRESPONSE: NO HURT

## 2024-12-15 NOTE — PLAN OF CARE
Problem: Discharge Planning  Goal: Discharge to home or other facility with appropriate resources  Outcome: Progressing  Flowsheets (Taken 12/15/2024 0900)  Discharge to home or other facility with appropriate resources:   Identify barriers to discharge with patient and caregiver   Arrange for needed discharge resources and transportation as appropriate   Refer to discharge planning if patient needs post-hospital services based on physician order or complex needs related to functional status, cognitive ability or social support system   Discharge to ARU for rehabilitation today

## 2024-12-15 NOTE — DISCHARGE SUMMARY
V2.0  Discharge Summary    Name:  Juan Grant /Age/Sex: 1940 (84 y.o. male)   Admit Date: 2024  Discharging Provider: Dmitriy Quarles MD   MRN & CSN:  [id number] & [id number] Attending:  Dmitriy Quarles MD       Brief HPI: Juan Grant is a 84 y.o. male with PMHx of atrial fibrillation on Warfarin, CAD, CHF, MI, and sick sinus syndrome status post pacemaker who was admitted with Fall.  Patient was found to have a UTI which was treated with IV antibiotics.  Patient was also found to have elevated serum creatinine for which she was seen by nephrology with improvement.  During admission, patient was orthostatic but with IV fluid hydration he improved.  His pacemaker was under sensing but he was seen by cardiology and was found that he has 2.5 years of battery life left.  He was discharged to ARU in stable fashion.    Brief Problem Focused Hospital Course:     Orthostatic Hypotension  ?  Likely secondary to suspected fentanyl patch  This morning, patient complained of lightheadedness and dizziness.  Patient's orthostatics showed that he was very orthostatic with SBP lying of 146 and after ambulating he was 93.     Fall  Patient was found in the bathroom by his son after a fall.  Patient endorses he felt a little bit dizzy prior to the fall.     UTI  On admission, patient's UA showed he had 29 WBC     Elevated serum creatinine  Patient's baseline creatinine was 1.1.  On admission it was 1.9  -Nephrology consulted     A-fib  At home, patient is on warfarin     PPM undersensing  Patient had his pacemaker interrogated and the report read that his atrial lead is under sensing  -Electrophysiology consulted    The patient expressed appropriate understanding of, and agreement with the discharge recommendations, medications, and plan.     Consults this admission:  IP CONSULT TO HOSPITALIST  IP CONSULT TO NEUROLOGY  IP CONSULT TO NEPHROLOGY  IP CONSULT TO PHARMACY  IP CONSULT TO PHYSICAL MEDICINE REHAB  IP

## 2024-12-15 NOTE — DISCHARGE INSTRUCTIONS
Please call and make an appointment with your PCP within 1 week; If you do not have a PCP please make an appointment with the [facility] outpatient resident clinic by calling [phone]  Please call and make an appointment with Cardiology  Please take all your medications as prescribed including any new ones on discharge

## 2024-12-15 NOTE — PROGRESS NOTES
Admit Date: 12/11/2024  Diet: ADULT DIET; Dysphagia - Soft and Bite Sized; Mildly Thick (Nectar); Vegetarian (Lacto-Ovo)    CC: Fall    Interval history:   Overnight, there were no acute events. Patient's vitals remained stable    Patient was seen this morning. I discussed the plan of the day with him in detail. All questions were addressed and answered to patient's satisfaction.   Patient denies fevers, chills, nausea, vomiting, chest pain, shortness of breath, diarrhea, constipation, dysuria, urinary frequency or urgency.     Plan:     -Continue IV Rocephin  -Probiotics  -Wean off O2 as tolerated    Assessment:   Juan Grant is a 84 y.o. male with PMH of atrial fibrillation on Warfarin, CAD, CHF, MI, and sick sinus syndrome status post pacemaker who was admitted with Fall     Fall  Patient was found in the bathroom by his son after a fall.  Patient endorses he felt a little bit dizzy prior to the fall.     UTI  On admission, patient's UA showed he had 29 WBC     Elevated serum creatinine  Patient's baseline creatinine was 1.1.  On admission it was 1.9  -Nephrology consulted     A-fib  At home, patient is on warfarin    Code Status: DNR-CCA   FEN: ADULT DIET; Dysphagia - Soft and Bite Sized; Mildly Thick (Nectar); Vegetarian (Lacto-Ovo)   DVT PPX: []Lovenox, []Heparin, [x]Coumadin, []Eliquis, []Xarelto, []SCD  DISPO Pending: IV Abx, PT/OT    Dmitriy Quarles MD  12/12/2024,  11:29 AM    This note was likely completed using voice recognition technology and may contain unintended errors.     Objective:   Vitals:   T-max:  Patient Vitals for the past 8 hrs:   BP Temp Temp src Pulse Height Weight   12/12/24 1050 (!) 129/58 -- -- -- 1.753 m (5' 9\") 64 kg (141 lb)   12/12/24 1000 127/67 97.7 °F (36.5 °C) Axillary 58 -- --   12/12/24 0554 (!) 106/59 98.7 °F (37.1 °C) Oral 60 -- 64.3 kg (141 lb 12.1 oz)       Intake/Output Summary (Last 24 hours) at 12/12/2024 1129  Last data filed at 12/12/2024 1021  Gross per 24 
      Admit Date: 12/11/2024  Diet: ADULT DIET; Dysphagia - Soft and Bite Sized; Vegetarian (Lacto-Ovo), No Drinking Straws  ADULT ORAL NUTRITION SUPPLEMENT; Breakfast; Standard High Calorie/High Protein Oral Supplement  ADULT ORAL NUTRITION SUPPLEMENT; Lunch, Dinner; Frozen Oral Supplement    CC: Fall    Interval history:   Overnight, there were no acute events. Patient's vitals remained stable    Patient was seen this morning. I discussed the plan of the day with him in detail. All questions were addressed and answered to patient's satisfaction.   Patient denies fevers, chills, nausea, vomiting, chest pain, shortness of breath, diarrhea, constipation, dysuria, urinary frequency or urgency.     Plan:     -Continue IV Rocephin  -Probiotics  -Wean off O2 as tolerated  -Will await any further recs from cardiology regarding PPM undersensing    Assessment:   Juan Grant is a 84 y.o. male with PMH of atrial fibrillation on Warfarin, CAD, CHF, MI, and sick sinus syndrome status post pacemaker who was admitted with Fall    Orthostatic Hypotension  ?  Likely secondary to suspected fentanyl patch  This morning, patient complained of lightheadedness and dizziness.  Patient's orthostatics showed that he was very orthostatic with SBP lying of 146 and after ambulating he was 93.     Fall  Patient was found in the bathroom by his son after a fall.  Patient endorses he felt a little bit dizzy prior to the fall.     UTI  On admission, patient's UA showed he had 29 WBC     Elevated serum creatinine  Patient's baseline creatinine was 1.1.  On admission it was 1.9  -Nephrology consulted     A-fib  At home, patient is on warfarin    PPM undersensing  Patient had his pacemaker interrogated and the report read that his atrial lead is under sensing  -Electrophysiology consulted    Code Status: DNR-CCA   FEN: ADULT DIET; Dysphagia - Soft and Bite Sized; Vegetarian (Lacto-Ovo), No Drinking Straws  ADULT ORAL NUTRITION SUPPLEMENT; 
    Clinical Pharmacy Note: Pharmacy to Dose Warfarin    Pharmacy consulted by Dr. Quarles to dose warfarin.    Juan Grant is a 84 y.o. male  is receiving warfarin for indication: Afib.    INR Goal Range: 2-3  Prior to admission warfarin dosing regimen: 2.5mg daily  INR today:   Lab Results   Component Value Date/Time    INR 2.31 12/11/2024 02:34 AM       Assessment/Plan:  INR is therapeutic on prior to admission dosing regimen.   Possible concomitant drug-drug interactions include: APAP   2.5 mg today   Daily INR is ordered. Pharmacy will continue to monitor and make adjustments to regimen as necessary.     Thank you for the consult,     Dominic Doty [facility]  [id number]    
    Pharmacy to Dose Warfarin    Pharmacy consulted to dose warfarin for Afib.    INR Goal: 2-3    INR today: 2.4    Assessment/Plan:  - INR is therapeutic after being held yesterday  - 2.5mg today  - Possible concomitant drug-drug interactions include: APAP     Pharmacy will continue to follow.    Dominic Doty [facility]  [id number]      
    Pharmacy to Dose Warfarin    Pharmacy consulted to dose warfarin for Afib.    INR Goal: 2-3    INR today: 3.01    Assessment/Plan:  - INR is supratherapeutic  - Hold dose today  - Possible concomitant drug-drug interactions include: APAP     Pharmacy will continue to follow.    Dominic Doty [facility]  [id number]      
    Pharmacy to Dose Warfarin    Pharmacy consulted to dose warfarin for Afib.    INR Goal: 2-3    INR today: INR 1.63 (from 1.76 yesterday)    Assessment/Plan:  - INR is currently subtherapeutic after being held 3 days ago  - While 2.5 mg was given last night, the decrease in INR is likely from warfarin being held 3 days ago   - Will boost with 5 mg tonight   - Possible concomitant drug-drug interactions include: APAP     Pharmacy will continue to follow.    Janae Mccracken, PharmD   [id number]          
    Pharmacy to Dose Warfarin    Pharmacy consulted to dose warfarin for Afib.    INR Goal: 2-3    INR today: INR 1.76 (from 2.4 yesterday)    Assessment/Plan:  - INR is currently subtherapeutic after being held 2 days ago  - While 2.5 mg was given last night, the decrease in INR is likely from warfarin being held 2 days ago   - Will give another 2.5mg tonight   - Possible concomitant drug-drug interactions include: APAP     Pharmacy will continue to follow.    Janae Mccracken, PharmD   [id number]        
  [facility] - Inpatient Rehabilitation Department   Phone: [phone]    Occupational Therapy    [x] Initial Evaluation            [] Daily Treatment Note         [] Discharge Summary      Patient: Juan Grant   : 1940   MRN: [id number]   Date of Service:  2024    Admitting Diagnosis:  AMS (altered mental status)  Current Admission Summary: 84 y.o.  years old male with past medical history with past medical history of hypertension, hyperlipidemia, CAD, A-fib on chronic anticoagulation and other medical problems comes to the hospital with fall and altered mental status.  Degree severe duration Hours.  Patient reportedly had a fall while in the bathroom.  He was unresponsive during this time.  EMS was called who brought him to the emergency room further evaluation.  In the emergency room patient was minimally responsive this neglecting his right side.  CT imaging showed no acute intracranial abnormality.  CTA showed no LVO.  CT chest abdomen showed no acute intrathoracic abnormality and old compression T12 fracture.  During the course of the emergency room he started to improve after a few hours.  MRI brain (-)  Past Medical History:  has a past medical history of Allergic rhinitis, Atrial fibrillation ([facility]), Benign prostatic hypertrophy, Bladder stone, CAD (coronary artery disease), Calculus of gallbladder, Cancer ([facility]), CHF (congestive heart failure) ([facility]), Coronary artery disease involving native coronary artery of native heart without angina pectoris, DDD (degenerative disc disease), lumbar, Falls, GI bleeding, Hyperlipidemia, Hypertension, MI (myocardial infarction) ([facility]), MRSA (methicillin resistant staph aureus) culture positive, Osteomyelitis, Pneumonia, S/P PTCA (percutaneous transluminal coronary angioplasty), SSS (sick sinus syndrome) ([facility]), Unspecified sleep apnea, and Venous (peripheral) insufficiency.  Past Surgical History:  has a past surgical history that includes Carpal 
  [facility] - Inpatient Rehabilitation Department   Phone: [phone]    Occupational Therapy    [] Initial Evaluation            [x] Daily Treatment Note         [] Discharge Summary      Patient: Juan Grant   : 1940   MRN: [id number]   Date of Service:  2024    Admitting Diagnosis:  AMS (altered mental status)  Current Admission Summary: 84 y.o.  years old male with past medical history with past medical history of hypertension, hyperlipidemia, CAD, A-fib on chronic anticoagulation and other medical problems comes to the hospital with fall and altered mental status.  Degree severe duration Hours.  Patient reportedly had a fall while in the bathroom.  He was unresponsive during this time.  EMS was called who brought him to the emergency room further evaluation.  In the emergency room patient was minimally responsive this neglecting his right side.  CT imaging showed no acute intracranial abnormality.  CTA showed no LVO.  CT chest abdomen showed no acute intrathoracic abnormality and old compression T12 fracture.  During the course of the emergency room he started to improve after a few hours.  MRI brain (-)  Past Medical History:  has a past medical history of Allergic rhinitis, Atrial fibrillation ([facility]), Benign prostatic hypertrophy, Bladder stone, CAD (coronary artery disease), Calculus of gallbladder, Cancer ([facility]), CHF (congestive heart failure) ([facility]), Coronary artery disease involving native coronary artery of native heart without angina pectoris, DDD (degenerative disc disease), lumbar, Falls, GI bleeding, Hyperlipidemia, Hypertension, MI (myocardial infarction) ([facility]), MRSA (methicillin resistant staph aureus) culture positive, Osteomyelitis, Pneumonia, S/P PTCA (percutaneous transluminal coronary angioplasty), SSS (sick sinus syndrome) ([facility]), Unspecified sleep apnea, and Venous (peripheral) insufficiency.  Past Surgical History:  has a past surgical history that includes Carpal 
  [facility] - Inpatient Rehabilitation Department   Phone: [phone]    Physical Therapy    [x] Initial Evaluation            [] Daily Treatment Note         [] Discharge Summary      Patient: Juan Grant   : 1940   MRN: [id number]   Date of Service:  2024  Admitting Diagnosis: AMS (altered mental status)  Current Admission Summary: Juan Grant is a(n) 84 y.o. male with past medical history as below including atrial fibrillation, CAD, CHF, MI, and sick sinus syndrome status post pacemaker  who arrives via EMS for altered mental status.  Patient was found after a fall in the shower.  Unknown last known normal.  Baseline is apparently alert oriented and interactive.  Upon MS arrival, patient unable provide any further history.  Nonverbal.  Upon arrival to the emergency department, patient remains unresponsive.  Unable get any further history.  He is not moving the right side of his body.   Past Medical History:  has a past medical history of Allergic rhinitis, Atrial fibrillation ([facility]), Benign prostatic hypertrophy, Bladder stone, CAD (coronary artery disease), Calculus of gallbladder, Cancer ([facility]), CHF (congestive heart failure) ([facility]), Coronary artery disease involving native coronary artery of native heart without angina pectoris, DDD (degenerative disc disease), lumbar, Falls, GI bleeding, Hyperlipidemia, Hypertension, MI (myocardial infarction) ([facility]), MRSA (methicillin resistant staph aureus) culture positive, Osteomyelitis, Pneumonia, S/P PTCA (percutaneous transluminal coronary angioplasty), SSS (sick sinus syndrome) ([facility]), Unspecified sleep apnea, and Venous (peripheral) insufficiency.  Past Surgical History:  has a past surgical history that includes Carpal tunnel release; Coronary angioplasty with stent; Diagnostic Cardiac Cath Lab Procedure; Coronary angioplasty; Cardiac pacemaker placement; Appendectomy; pacemaker placement; eye surgery; Pacemaker Change (10/2016); Foot 
  Physician Progress Note      PATIENT:               JUANCARLOS JOHNSON  [facility] #:                  [id number]  :                       1940  ADMIT DATE:       2024 1:44 AM  DISCH DATE:  RESPONDING  PROVIDER #:        ROS SALCIDO MD          QUERY TEXT:    Pt admitted with  PENELOPE and has encephalopathy documented MD notes . If   possible, please document in progress notes and discharge summary further   specificity regarding the type of encephalopathy:    The medical record reflects the following:  Risk Factors: PENELOPE, Hypokalemia, UTI  Clinical Indicators: MD notes  \"Acute encephalopathy. Patient reportedly   had a fall while in the bathroom.  He was unresponsive during this time.  This   morning patient is more awake alert and close to his baseline per family\";   Nephrology notes - \"PENELOPE, Hypokalemia\"; MD notes  \"UTI-On admission,   patient's UA showed he had 29 WBC\"  Treatment: CBC, CMP, U/A, Head CT, CXR, Neurology/Nephrology consult, Meds-IV   Rocephin, 1L of LR IV bolus, Potassium Chloride, V/S and I/O monitoring  Options provided:  -- Metabolic encephalopathy due to PENELOPE, Hypokalemia and UTI  -- Other - I will add my own diagnosis  -- Disagree - Not applicable / Not valid  -- Disagree - Clinically unable to determine / Unknown  -- Refer to Clinical Documentation Reviewer    PROVIDER RESPONSE TEXT:    Metabolic encephalopathy due to PENELOPE, Hypokalemia and UTI    Query created by: Cassidy Amado on 2024 7:13 PM      Electronically signed by:  ROS SALCIDO MD 2024 10:52 AM          
  Speech Language Pathology  [facility] - Inpatient Rehabilitation Services  [phone]  SLP Dysphagia and Speech Language Cognitive Treatment       Patient: Jaun Grant   : 1940   MRN: [id number]      Evaluation Date: 2024      Admitting Dx: Altered mental status, unspecified altered mental status type [R41.82]  AMS (altered mental status) [R41.82]  Treatment Diagnosis: Cognitive-Linguistic Deficits , Oropharyngeal Dysphagia   Pain: Denies                                  Recommendations      Recommended Diet and Intervention 2024:  Diet Solids Recommendation:  Dysphagia III Soft and bite sized  Liquid Consistency Recommendation:  Thin liquids, No straws  Recommended form of Meds: Meds in puree      Compensatory strategies: Upright as possible with all PO intake , Small bites/sips , Eat/feed slowly, Remain upright 30-45 min     Discharge Recommendations:  Discharge recommendations to be determined pending ongoing follow-up during acute care stay    History/Course of Treatment     H&P: Juan Grant is a(n) 84 y.o. male with past medical history as below including atrial fibrillation, CAD, CHF, MI, and sick sinus syndrome status post pacemaker  who arrives via EMS for altered mental status.  Patient was found after a fall in the shower.  Unknown last known normal.  Baseline is apparently alert oriented and interactive.  Upon MS arrival, patient unable provide any further history.  Nonverbal.  Upon arrival to the emergency department, patient remains unresponsive.  Unable get any further history.  He is not moving the right side of his body.     Imaging:     MRI: 24  IMPRESSION:  Motion artifact degrades the images.  Cerebral atrophy.  Moderate to severe  chronic small vessel ischemic changes.  No acute brain parenchymal  abnormality.       History/Prior Level of Function:   Living Status: lives with his son, daughter close by to provide support  Prior Dysphagia History: None per 
Accompanied patient to MRI. MRI assessment sheet completed. Lift pad under patient. Family informed patient is going for MRI testing. Tesha Ralph RN BSN  
Daughter at the bedside and aware of the care he is receiving and the plan of care. Initial shift assessment completed, see complex assessment in doc flowsheet. Vss, afebrile, denies any pain at this time. Repositioned.Call light within reach, encouraged to call for nurse as needed throughout the shift.   
Dr. Quarles into see patient. States may be discharged today to inpatient rehab unit if ok with neurology sign off. Patient currently working with physical therapist in room. Patient's son at bedside. Tesha Ralph RN BSN   
Dr. Walden nephrologist into see patient. New orders to start IV NS @100cc/hr. Also will resume given the last 2 potassium IVBP doses and one dose of Potassium Effervescent 40 mEQ x 1 dose. Patient's family at bedside. See ROSEANN Ralph RN BSN   
EKG completed and results shown to patient RN and placed in the patient's chart.  
Interrogations of pacemaker reviewed and telemetry reviewed. No issues with chronic dual chamber pacemaker. Battery life remaining is 2.56 years    He will continue to be followed in office.    
Juan Grant  12/13/2024  [id number]    Chief Complaint: AMS (altered mental status)    Subjective   Since seen, medical updates:  -None    Patient reports that he is feeling very tired today.  He had symptomatic hypotension earlier today, was limited in participation with therapy due to this.  Denies any fevers/chills, chest pain.  Reports he is having slightly increased difficulty breathing today.  Denies any cough.         Objective   Objective:  Patient Vitals for the past 24 hrs:   BP Temp Temp src Pulse Resp SpO2 Height   12/13/24 1518 113/65 98 °F (36.7 °C) Oral 62 18 94 % 1.753 m (5' 9.02\")   12/13/24 1103 (!) 93/56 98.1 °F (36.7 °C) Oral 60 18 95 % --   12/13/24 0740 (!) 146/77 98.8 °F (37.1 °C) Oral 61 18 96 % --   12/13/24 0401 118/71 98.5 °F (36.9 °C) Axillary 59 18 97 % --   12/13/24 0018 125/64 98.6 °F (37 °C) Oral 67 18 98 % --   12/12/24 2029 -- -- -- 61 -- -- --   12/12/24 1937 118/69 98.9 °F (37.2 °C) Oral 63 17 97 % --     Gen: No distress, seated in bedside chair.  Tired appearing  HEENT: Normocephalic, left forehead abrasion and ecchymosis.   CV: Extremities warm, perfused.  Resp: No respiratory distress. No increased WOB  Abd: Soft, nontender nondistended  Ext: No edema.   Neuro: Alert, oriented fully.  Moving bilateral upper and lower extremities against gravity.    Laboratory data: Available via EMR.     Therapy progress:    PT    Rolling: Level of difficulty - A Little   Sit to Stand from a Chair: Level of difficulty - A Little  Supine to Sit: Level of difficulty - A Little     Bed to Chair: Physical Assistance Required - A Lot  Ambulate Across Room: Physical Assistance Required - A Lot  Ascend 3-5 Steps With HR: Physical Assistance Required - A Lot    OT    Eating: Physical Assistance Required - A Little  Grooming: Physical Assistance Required - A Little  LB Dressing: Physical Assistance Required - A Lot  UB Dressing: Physical Assistance Required - A Little  Bathing: Physical Assistance 
Nephrology Progress Note                                                                                                                                                                                                                                                                                                                                                               Office : [phone]     Fax :[phone]    Patient's Name: Juan Grant  1:25 PM  12/12/2024    Reason for Consult:  PENELOPE  Requesting Physician:  Ki Cross MD  Chief Complaint:    Chief Complaint   Patient presents with    Altered Mental Status     PT via [facility] after fall in the show and new onset altered mental status.        Assessment/Plan     # PENELOPE  - Creatinine improving   - Likely 2/2 poor intake and Lasix  - Baseline Cr ~ 1.1  - s/p IVF  - Repeat RFP in am   - Discussed with daughter Nisreen at bedside     # Acid- base/ Electrolyte imbalance   Hypokalemia  -Replacement PRN  - Monitor     # AMS- Acute encephalopathy  -Neurology on board  -CT- no acute abnormality  -MRI pending  - [facility]    # A-fib RVR    #CHF  - Echo performed today- pending results    # BPH  - Resume Flomax       History of Present Ilness:    Juan Grant is a 84 y.o. male with a pMHx of A-fib, CAD, CHF, MI, and sick sinus syndrome s/p pacemaker who presents to the ER with altered mental status.  Stroke alert was called by attending physician upon arrival and patient was taken to CT.   He was found after a fall in the shower.   Unknown last normal.  Baseline is alert and oriented. He is being admitted for further workup. We are consulted for an PENELOPE.       Interval hx   Daughter at bedside  Unable to answer questions    Bps controlled  On supplemental O2  Good UOP  Off IVF  Lytes stable  Cr improving    Past Medical History:   Diagnosis Date    Allergic rhinitis     Atrial fibrillation (HCC)     Benign prostatic hypertrophy     
Nephrology Progress Note                                                                                                                                                                                                                                                                                                                                                               Office : [phone]     Fax :[phone]    Patient's Name: Juan Grant  3:45 PM  12/13/2024    Reason for Consult:  PENELOPE  Requesting Physician:  Ki Cross MD  Chief Complaint:    Chief Complaint   Patient presents with    Altered Mental Status     PT via [facility] after fall in the show and new onset altered mental status.        Assessment/Plan     # PENELOPE  - Creatinine improving   - Likely 2/2 poor intake and Lasix  - Baseline Cr ~ 1.1  - s/p IVF  - Repeat RFP in am   - Discussed with lindy Nielson at bedside and son Juan    # Acid- base/ Electrolyte imbalance   Hypokalemia  -Replacement PRN  - Monitor     # AMS- Acute encephalopathy  -Neurology on board  -CT- no acute abnormality  -MRI- no acute abnormality  - EEG- WNL  - Code status updated- DNR-CC    # A-fib RVR    #CHF  - Echo showing EF 55-60%    # BPH  - Resume Flomax       History of Present Ilness:    Juan Grant is a 84 y.o. male with a pMHx of A-fib, CAD, CHF, MI, and sick sinus syndrome s/p pacemaker who presents to the ER with altered mental status.  Stroke alert was called by attending physician upon arrival and patient was taken to CT.   He was found after a fall in the shower.   Unknown last normal.  Baseline is alert and oriented. He is being admitted for further workup. We are consulted for an PENELOPE.       Interval hx     Lindy Nielson at bedside and aysha Martinez at bedside   Juan sitting up in chair- working with therapy  Reports some nausea/lightheadedness after getting up to chair  Denies V/D    Bps controlled  On supplemental O2  Good UOP  Off IVF  Lytes 
Nutrition Note    RECOMMENDATIONS  PO Diet: Continue the current diet  ONS: Order Ensure plus HP at B and magic cup BID at L/D      ASSESSMENT   Consult order for oral nutrition supplements. Pt diet has been upgraded by SLP, he follows vegetarian based diet, not eating much meat. Daughter reported pt has reduced appetite over the past 9 months. .Wt hx review indicates 20lb (12.4%) wt loss over the past 7 months, pt is also on diuretic regimen which may contribute wt loss. Pt would like to try Ensure plus HP and magic cup at this time. Will order ONS and cont to follow up.       Malnutrition Status: Mild malnutrition  Chronic Illness  Findings of the 6 clinical characteristics of malnutrition:  Energy Intake:  Mild decrease in energy intake  Weight Loss:  Unable to assess     Body Fat Loss:  No body fat loss     Muscle Mass Loss:  Mild muscle mass loss Temples (temporalis), Clavicles (pectoralis & deltoids)  Fluid Accumulation:  No fluid accumulation     Strength:  Not Performed      NUTRITION DIAGNOSIS   Inadequate protein-energy intake related to inadequate protein-energy intake as evidenced by intake 26-50%    Goals: PO intake 50% or greater, by next RD assessment     NUTRITION RELATED FINDINGS  Objective: No edema noted. No BM noted. Na + 134  Wounds: None    CURRENT NUTRITION THERAPIES  ADULT DIET; Dysphagia - Soft and Bite Sized; Vegetarian (Lacto-Ovo), No Drinking Straws       PO Intake: 26-50%   PO Supplement Intake:None Ordered    ANTHROPOMETRICS  Current Height: 175.3 cm (5' 9.02\")  Current Weight - Scale: 64 kg (141 lb)    Ideal Body Weight (IBW): 160 lbs  (73 kg)        BMI: 20.8      COMPARATIVE STANDARDS  Total Energy Requirements (kcals/day): 5133-6944     Protein (g):  64-96       Fluid (mL/day):  1598-2385    EDUCATION  Education/Counseling not indicated     The patient will be monitored per nutrition standards of care. Consult dietitian if additional nutrition interventions are needed prior to 
Occupational Therapy  Juan Grant    OT treatment attempted. Pt currently IVA. Will hold at this time and follow up as time/schedule allows.    Thank you,  João Nice, OT    
Pt  discharged to ARU. Report given to receiving nurse. Daughter at bedside at time of transfer.   
([facility])     Benign prostatic hypertrophy     Bladder stone 05/02/2024    CAD (coronary artery disease)     Calculus of gallbladder 05/02/2024    Cancer ([facility])     skin    CHF (congestive heart failure) ([facility]) 08/17/2017    Coronary artery disease involving native coronary artery of native heart without angina pectoris 06/17/2011    DDD (degenerative disc disease), lumbar     Falls 08/17/2017    GI bleeding     Hyperlipidemia     Hypertension     MI (myocardial infarction) ([facility])     MRSA (methicillin resistant staph aureus) culture positive     h/o infection in the blood    Osteomyelitis     left foot    Pneumonia     S/P PTCA (percutaneous transluminal coronary angioplasty) stents x 8    SSS (sick sinus syndrome) ([facility])     Unspecified sleep apnea     Venous (peripheral) insufficiency 12/04/2018       Past Surgical History:   Procedure Laterality Date    APPENDECTOMY      CARDIAC PACEMAKER PLACEMENT      CARPAL TUNNEL RELEASE      CORONARY ANGIOPLASTY      CORONARY ANGIOPLASTY WITH STENT PLACEMENT      DIAGNOSTIC CARDIAC CATH LAB PROCEDURE      EYE SURGERY      FOOT DEBRIDEMENT Left 8/27/2021    INCISION AND DRAINAGE LEFT FOOT performed by Randolph Mir DPM at [facility] OR    PACEMAKER CHANGE  10/2016    PACEMAKER PLACEMENT      TOE AMPUTATION Left 8/27/2021    AMPUTATION OF LEFT SECOND TOE performed by Randolph Mir DPM at [facility] OR       Family History   Problem Relation Age of Onset    Cancer Sister     Coronary Art Dis Brother         reports that he quit smoking about 20 years ago. His smoking use included cigarettes. He started smoking about 50 years ago. He has a 30 pack-year smoking history. He has never used smokeless tobacco. He reports that he does not drink alcohol and does not use drugs.    Allergies:  Avelox [moxifloxacin hcl in nacl], Epinephrine base, Other, Keflex [cephalexin], and Polysporin [bacitracin-polymyxin b]    Current Medications:    pantoprazole (PROTONIX) injection 40 mg, 
Acute/Chronic Illness/injury posing threat to life or bodily function:    [] Severe exacerbation of chronic illness:      Moderate:    []     1 or more chronic illness with exacerbation, progression or side effect of treatment or  []     2 or more stable chronic illnesses or  []     1 acute illness with systemic symptoms     ---------------------------------------------------------------------  B. Risk of Treatment (any 1)   [] Drugs/treatments that require intensive monitoring for toxicity include:    [] Change in code status:    [] Decision to escalate care:    [] Major surgery/procedure with associated risk factors:    [x] Prescription drug management  ----------------------------------------------------------------------  [x] High (any 2)  [] Moderate (any 1)    C. Data (any 2 for high and any 1 for moderate)  [x] Discussed management of the case with: Family, primary team  [x] Imaging personally reviewed and interpreted, includes: MRI brain  [x] Data Review (any 3)  [x] Collateral history obtained from: Family, primary team  [x] All available Consultant notes from yesterday/today were reviewed  [x] All current labs were reviewed and interpreted for clinical significance   [x] Appropriate follow-up labs were ordered      Data  LABS:   Lab Results   Component Value Date/Time     12/13/2024 05:17 AM    K 3.7 12/13/2024 05:17 AM    K 2.9 12/11/2024 02:34 AM     12/13/2024 05:17 AM    CO2 26 12/13/2024 05:17 AM    BUN 13 12/13/2024 05:17 AM    CREATININE 1.2 12/13/2024 05:17 AM    GFRAA >60 09/13/2022 01:54 PM    GFRAA >60 02/21/2013 09:57 AM    LABGLOM 59 12/13/2024 05:17 AM    LABGLOM 66 04/25/2024 05:11 AM    GLUCOSE 86 12/13/2024 05:17 AM    PHOS 4.1 03/07/2012 07:25 AM    MG 2.26 12/13/2024 05:17 AM    CALCIUM 8.4 12/13/2024 05:17 AM     Lab Results   Component Value Date/Time    WBC 7.2 12/13/2024 05:17 AM    RBC 3.41 12/13/2024 05:17 AM    HGB 9.8 12/13/2024 05:17 AM    HCT 29.3 12/13/2024 05:17 
creatinine  Continue anticoagulation  PT, OT and speech  ARU consultation  Hydration antibiotics  Avoid low blood pressure  Follow CMP  Risk of falling a blood thinner discussed with his daughter  DC planning when medically stable        Electronically signed by Jaymie Vásquez MD on 12/12/24 at 2:13 PM EST       This dictation was generated by voice recognition computer software. Although all attempts are made to edit the dictation for accuracy, there may be errors in the transcription that are not intended.     
lactobacillus  1 capsule Oral BID WC     Continuous Infusions:   sodium chloride       PRN Meds:sodium chloride flush, sodium chloride, ondansetron **OR** ondansetron, polyethylene glycol, acetaminophen **OR** acetaminophen, potassium chloride **OR** potassium alternative oral replacement **OR** potassium chloride    
orientation , impaired short term recall , impaired attention , decreased problem solving abilities , and impaired insight/awareness . Per discussion with daughter, pt has minimal cognitive demands in home setting but has not yet fully returned to baseline. Based on today's assessment recommend cognitive linguistic therapy to address deficits and facilitate return to prior level of function .    COMPREHENSION  Auditory Comprehension: Within functional limits     EXPRESSION  Verbal Expression: Within functional limits      Pragmatics/Social Functioning: Mild   Impaired Eye contact      MOTOR SPEECH  Motor Speech: Within functional limits  and Mild   Minimal reduced articulatory precision     VOICE  Voice: Within functional limits     COGNITION    Overall Orientation : Moderate  and Severe    Disoriented to time   Disoriented to situation   Disoriented to place   Oriented to self    Attention: Moderate    Impaired Alternating Attention  Impaired Divided Attention    Memory: Moderate    Impaired Short-term Memory  Impaired Recall of New Learning     Problem Solving: Moderate    Impaired Simple Functional Tasks  Impaired Verbal Reasoning    Safety/Judgement: Moderate    Impaired Insight    Goals     Goals:  Dysphagia Goals: Pt will functionally tolerate recommended diet with no overt clinical s/s of aspiration   Pt will demonstrate understanding of aspiration risk and precautions via education/demonstration with occasional prompting  Pt will advance to least restrictive diet as indicated   Pt will participate in Modified Barium Swallow Study (MBS)  to further assess pharyngeal phase of swallow, assist with diet recommendations and to further direct plan of care     Speech Language/Cognitive Goals: Pt will improve orientation and short term recall  via graded tasks to 80%  Pt will participate in ongoing cognitive assessment with goals to be established as indicated   Pt with improve functional verbal problem solving via 
education    Goals  Patient Goals: Return to PLOF   Short Term Goals:  Time Frame: Prior to D/C  Patient will complete bed mobility at modified independent   Patient will complete transfers at stand by assistance   Patient will ambulate 50 ft with use of LRAD at contact guard assistance  Patient will ascend/descend 1 stairs without use of HR at minimal assistance  Patient to maintain standing at contact guard assistance for 2 minutes.    Above goals reviewed on 12/13/2024.  All goals are ongoing at this time unless indicated above.      Therapy Session Time      Individual Group Co-treatment   Time In 0849       Time Out 0934       Minutes 45         Timed Code Treatment Minutes:  45 Minutes  Total Treatment Minutes:  45 Minutes           Electronically Signed By: MAXIMO GERARDO, [id number]

## 2024-12-16 PROBLEM — E44.0 MODERATE MALNUTRITION (HCC): Status: ACTIVE | Noted: 2024-12-16

## 2024-12-16 PROBLEM — E44.0 MODERATE MALNUTRITION (HCC): Chronic | Status: ACTIVE | Noted: 2024-12-16

## 2024-12-16 LAB
ANION GAP SERPL CALCULATED.3IONS-SCNC: 7 MMOL/L (ref 3–16)
BASOPHILS # BLD: 0 K/UL (ref 0–0.2)
BASOPHILS NFR BLD: 0.6 %
BUN SERPL-MCNC: 12 MG/DL (ref 7–20)
CALCIUM SERPL-MCNC: 8.4 MG/DL (ref 8.3–10.6)
CHLORIDE SERPL-SCNC: 108 MMOL/L (ref 99–110)
CO2 SERPL-SCNC: 25 MMOL/L (ref 21–32)
CREAT SERPL-MCNC: 1 MG/DL (ref 0.8–1.3)
DEPRECATED RDW RBC AUTO: 16.8 % (ref 12.4–15.4)
EKG ATRIAL RATE: 70 BPM
EKG DIAGNOSIS: NORMAL
EKG P-R INTERVAL: 156 MS
EKG Q-T INTERVAL: 420 MS
EKG QRS DURATION: 140 MS
EKG QTC CALCULATION (BAZETT): 453 MS
EKG R AXIS: -17 DEGREES
EKG T AXIS: 57 DEGREES
EKG VENTRICULAR RATE: 70 BPM
EOSINOPHIL # BLD: 0.2 K/UL (ref 0–0.6)
EOSINOPHIL NFR BLD: 3.7 %
GFR SERPLBLD CREATININE-BSD FMLA CKD-EPI: 74 ML/MIN/{1.73_M2}
GLUCOSE SERPL-MCNC: 88 MG/DL (ref 70–99)
HCT VFR BLD AUTO: 30 % (ref 40.5–52.5)
HGB BLD-MCNC: 10 G/DL (ref 13.5–17.5)
INR PPP: 1.58 (ref 0.85–1.15)
LYMPHOCYTES # BLD: 1.5 K/UL (ref 1–5.1)
LYMPHOCYTES NFR BLD: 23.1 %
MCH RBC QN AUTO: 28.7 PG (ref 26–34)
MCHC RBC AUTO-ENTMCNC: 33.4 G/DL (ref 31–36)
MCV RBC AUTO: 86.1 FL (ref 80–100)
MONOCYTES # BLD: 0.6 K/UL (ref 0–1.3)
MONOCYTES NFR BLD: 9 %
NEUTROPHILS # BLD: 4.2 K/UL (ref 1.7–7.7)
NEUTROPHILS NFR BLD: 63.6 %
PLATELET # BLD AUTO: 198 K/UL (ref 135–450)
PMV BLD AUTO: 7.4 FL (ref 5–10.5)
POTASSIUM SERPL-SCNC: 4.2 MMOL/L (ref 3.5–5.1)
PROTHROMBIN TIME: 19 SEC (ref 11.9–14.9)
RBC # BLD AUTO: 3.49 M/UL (ref 4.2–5.9)
SODIUM SERPL-SCNC: 140 MMOL/L (ref 136–145)
TROPONIN, HIGH SENSITIVITY: 48 NG/L (ref 0–22)
WBC # BLD AUTO: 6.5 K/UL (ref 4–11)

## 2024-12-16 PROCEDURE — 84484 ASSAY OF TROPONIN QUANT: CPT

## 2024-12-16 PROCEDURE — 80048 BASIC METABOLIC PNL TOTAL CA: CPT

## 2024-12-16 PROCEDURE — 97165 OT EVAL LOW COMPLEX 30 MIN: CPT

## 2024-12-16 PROCEDURE — 85025 COMPLETE CBC W/AUTO DIFF WBC: CPT

## 2024-12-16 PROCEDURE — 99232 SBSQ HOSP IP/OBS MODERATE 35: CPT | Performed by: HOSPITALIST

## 2024-12-16 PROCEDURE — 2500000003 HC RX 250 WO HCPCS: Performed by: STUDENT IN AN ORGANIZED HEALTH CARE EDUCATION/TRAINING PROGRAM

## 2024-12-16 PROCEDURE — 92610 EVALUATE SWALLOWING FUNCTION: CPT

## 2024-12-16 PROCEDURE — 93010 ELECTROCARDIOGRAM REPORT: CPT | Performed by: INTERNAL MEDICINE

## 2024-12-16 PROCEDURE — 6370000000 HC RX 637 (ALT 250 FOR IP): Performed by: STUDENT IN AN ORGANIZED HEALTH CARE EDUCATION/TRAINING PROGRAM

## 2024-12-16 PROCEDURE — 1280000000 HC REHAB R&B

## 2024-12-16 PROCEDURE — 85610 PROTHROMBIN TIME: CPT

## 2024-12-16 PROCEDURE — 97530 THERAPEUTIC ACTIVITIES: CPT

## 2024-12-16 PROCEDURE — 92523 SPEECH SOUND LANG COMPREHEN: CPT

## 2024-12-16 PROCEDURE — 97162 PT EVAL MOD COMPLEX 30 MIN: CPT

## 2024-12-16 PROCEDURE — 0HBRXZZ EXCISION OF TOE NAIL, EXTERNAL APPROACH: ICD-10-PCS | Performed by: PODIATRIST

## 2024-12-16 PROCEDURE — 97535 SELF CARE MNGMENT TRAINING: CPT

## 2024-12-16 PROCEDURE — 2580000003 HC RX 258: Performed by: STUDENT IN AN ORGANIZED HEALTH CARE EDUCATION/TRAINING PROGRAM

## 2024-12-16 RX ORDER — FAMOTIDINE 20 MG/1
20 TABLET, FILM COATED ORAL 2 TIMES DAILY PRN
Status: DISCONTINUED | OUTPATIENT
Start: 2024-12-16 | End: 2024-12-21 | Stop reason: HOSPADM

## 2024-12-16 RX ORDER — WARFARIN SODIUM 5 MG/1
5 TABLET ORAL
Status: COMPLETED | OUTPATIENT
Start: 2024-12-16 | End: 2024-12-16

## 2024-12-16 RX ORDER — POTASSIUM CHLORIDE 1500 MG/1
10 TABLET, EXTENDED RELEASE ORAL
Status: DISCONTINUED | OUTPATIENT
Start: 2024-12-16 | End: 2024-12-21 | Stop reason: HOSPADM

## 2024-12-16 RX ORDER — ROSUVASTATIN CALCIUM 10 MG/1
5 TABLET, COATED ORAL NIGHTLY
Status: DISCONTINUED | OUTPATIENT
Start: 2024-12-16 | End: 2024-12-21 | Stop reason: HOSPADM

## 2024-12-16 RX ORDER — WARFARIN SODIUM 2.5 MG/1
2.5 TABLET ORAL DAILY
Status: DISCONTINUED | OUTPATIENT
Start: 2024-12-17 | End: 2024-12-18

## 2024-12-16 RX ORDER — AMMONIUM LACTATE 12 G/100G
CREAM TOPICAL
Status: DISCONTINUED | OUTPATIENT
Start: 2024-12-16 | End: 2024-12-21 | Stop reason: HOSPADM

## 2024-12-16 RX ORDER — TAMSULOSIN HYDROCHLORIDE 0.4 MG/1
0.4 CAPSULE ORAL NIGHTLY
Status: DISCONTINUED | OUTPATIENT
Start: 2024-12-16 | End: 2024-12-21 | Stop reason: HOSPADM

## 2024-12-16 RX ADMIN — Medication 1 CAPSULE: at 17:28

## 2024-12-16 RX ADMIN — ROSUVASTATIN CALCIUM 5 MG: 10 TABLET, FILM COATED ORAL at 21:55

## 2024-12-16 RX ADMIN — ONDANSETRON 4 MG: 4 TABLET, ORALLY DISINTEGRATING ORAL at 17:31

## 2024-12-16 RX ADMIN — WARFARIN SODIUM 5 MG: 5 TABLET ORAL at 17:28

## 2024-12-16 RX ADMIN — ACETAMINOPHEN 650 MG: 325 TABLET ORAL at 01:36

## 2024-12-16 RX ADMIN — Medication 1 CAPSULE: at 09:44

## 2024-12-16 RX ADMIN — POTASSIUM CHLORIDE 10 MEQ: 1500 TABLET, EXTENDED RELEASE ORAL at 17:28

## 2024-12-16 RX ADMIN — MICONAZOLE NITRATE: 20 POWDER TOPICAL at 21:56

## 2024-12-16 RX ADMIN — TAMSULOSIN HYDROCHLORIDE 0.4 MG: 0.4 CAPSULE ORAL at 21:55

## 2024-12-16 RX ADMIN — MICONAZOLE NITRATE: 20 POWDER TOPICAL at 14:15

## 2024-12-16 RX ADMIN — PANTOPRAZOLE SODIUM 40 MG: 40 TABLET, DELAYED RELEASE ORAL at 05:47

## 2024-12-16 RX ADMIN — ACETAMINOPHEN 650 MG: 325 TABLET ORAL at 21:55

## 2024-12-16 RX ADMIN — ACETAMINOPHEN 650 MG: 325 TABLET ORAL at 09:44

## 2024-12-16 RX ADMIN — FAMOTIDINE 20 MG: 20 TABLET, FILM COATED ORAL at 21:55

## 2024-12-16 RX ADMIN — SODIUM CHLORIDE, PRESERVATIVE FREE 10 ML: 5 INJECTION INTRAVENOUS at 09:54

## 2024-12-16 RX ADMIN — ONDANSETRON 4 MG: 4 TABLET, ORALLY DISINTEGRATING ORAL at 08:46

## 2024-12-16 RX ADMIN — SODIUM CHLORIDE, PRESERVATIVE FREE 5 ML: 5 INJECTION INTRAVENOUS at 21:56

## 2024-12-16 ASSESSMENT — PAIN DESCRIPTION - DESCRIPTORS
DESCRIPTORS: OTHER (COMMENT)
DESCRIPTORS: DISCOMFORT;SQUEEZING
DESCRIPTORS: OTHER (COMMENT)

## 2024-12-16 ASSESSMENT — PAIN - FUNCTIONAL ASSESSMENT
PAIN_FUNCTIONAL_ASSESSMENT: PREVENTS OR INTERFERES WITH ALL ACTIVE AND SOME PASSIVE ACTIVITIES
PAIN_FUNCTIONAL_ASSESSMENT: ACTIVITIES ARE NOT PREVENTED
PAIN_FUNCTIONAL_ASSESSMENT: PREVENTS OR INTERFERES WITH ALL ACTIVE AND SOME PASSIVE ACTIVITIES
PAIN_FUNCTIONAL_ASSESSMENT: ACTIVITIES ARE NOT PREVENTED

## 2024-12-16 ASSESSMENT — PAIN DESCRIPTION - PAIN TYPE
TYPE: ACUTE PAIN
TYPE: ACUTE PAIN

## 2024-12-16 ASSESSMENT — PAIN SCALES - GENERAL
PAINLEVEL_OUTOF10: 4
PAINLEVEL_OUTOF10: 0
PAINLEVEL_OUTOF10: 6
PAINLEVEL_OUTOF10: 4
PAINLEVEL_OUTOF10: 7
PAINLEVEL_OUTOF10: 0
PAINLEVEL_OUTOF10: 0

## 2024-12-16 ASSESSMENT — PAIN DESCRIPTION - FREQUENCY
FREQUENCY: CONTINUOUS
FREQUENCY: CONTINUOUS

## 2024-12-16 ASSESSMENT — PAIN DESCRIPTION - LOCATION
LOCATION: HEAD;BACK
LOCATION: CHEST;BACK
LOCATION: HEAD;CHEST

## 2024-12-16 ASSESSMENT — PAIN DESCRIPTION - ORIENTATION
ORIENTATION: UPPER;LEFT;MID
ORIENTATION: LEFT;UPPER
ORIENTATION: MID

## 2024-12-16 ASSESSMENT — PAIN SCALES - WONG BAKER
WONGBAKER_NUMERICALRESPONSE: NO HURT
WONGBAKER_NUMERICALRESPONSE: NO HURT

## 2024-12-16 ASSESSMENT — PAIN DESCRIPTION - ONSET
ONSET: ON-GOING
ONSET: ON-GOING

## 2024-12-16 NOTE — CONSULTS
[place] [address]                              CONSULTATION      PATIENT NAME: JUANCARLOS JOHNSON                : 1940  MED REC NO: [id number]                      ROOM: [address] [place] [address]  ACCOUNT NO: [id number]                       ADMIT DATE: 2024  PROVIDER: Jama Cortez MD      CONSULT DATE: 12/15/2024    REASON FOR CONSULTATION:  Requested by the hospitalist service to see the patient for concern about pacemaker malfunction.    HISTORY OF PRESENT ILLNESS:  Mr. Johnson is an 84-year-old gentleman.  He is admitted after a syncopal episode, it was felt to be due to a combination of pain medications and dehydration.  With fluid administration, he has improved.  There is mention that his pacemaker atrial lead was under-sensing.  I reviewed the telemetry recordings here in the hospital.  I saw no evidence of that.  I reviewed his most recent pacemaker interrogation, he does have paroxysms of atrial fibrillation which are of short duration.  He also generally is in an underlying normal sinus rhythm and does not rely on the pacemaker, but when pacemaker is needed, it paces appropriately.  I do not feel any issues with the pacemaker responsible for his loss of consciousness.    PAST MEDICAL HISTORY:  Notable for paroxysmal atrial fibrillation, benign prostatic hypertrophy, coronary artery disease which has been stable, degenerative joint disease, he does have history of GI bleeding, hypertension, myocardial infarction and pneumonia in the past.  He has had balloon angioplasty stenting in the past, status post pacemaker.    SOCIAL HISTORY:  He lives with son, may also move now with daughter.  No cigarette use.  No alcohol use.    FAMILY HISTORY:  Noncontributory.    REVIEW OF SYSTEMS:  14-system review of systems negative.    PHYSICAL EXAMINATION:  VITAL SIGNS:  Currently, he is afebrile, blood pressure 120/70, not orthostatic 
Consult received     
Juan Grant  12/12/2024  [id number]    Chief Complaint: AMS (altered mental status)    Subjective   HPI: Juan Grant is a 84 y.o. male with PMHx notable for HTN, HLD, CAD, CHF, sick sinus syndrome, vertebral compression fractures who presented on 12/11 with fall and confusion. He was found unresponsive in the bathroom by family.  He was noted to have trauma to the left side of his forehead.  In the ED he was minimally responsive, and noted to be neglecting his right side.  CT Head showed no acute intracranial abnormality.  CTA Head/Neck showed no large vessel occlusion or significant stenosis.  MRI Brain showed no acute abnormality, did demonstrate moderate to severe microvascular ischemic changes and cerebral atrophy.  Neurology consulted, performed EEG which was within normal limits.  Patient was also found to have significant leukocytosis on admission, thought secondary to UTI.  He was started on IV ceftriaxone.  Nephrology following for PENELOPE, felt to be prerenal, improving after IV fluids.     Patient's daughter is with him at bedside, and able to provide some collateral history.  Patient reports that he is doing well today overall.  He denies any acute pain complaints.  His daughter notes that his cognition has been improving since admission, though perhaps not fully back to baseline.      Past Medical History:   Diagnosis Date    Allergic rhinitis     Atrial fibrillation ([facility])     Benign prostatic hypertrophy     Bladder stone 05/02/2024    CAD (coronary artery disease)     Calculus of gallbladder 05/02/2024    Cancer ([facility])     skin    CHF (congestive heart failure) ([facility]) 08/17/2017    Coronary artery disease involving native coronary artery of native heart without angina pectoris 06/17/2011    DDD (degenerative disc disease), lumbar     Falls 08/17/2017    GI bleeding     Hyperlipidemia     Hypertension     MI (myocardial infarction) ([facility])     MRSA (methicillin resistant staph aureus) culture positive     
Nephrology Consult Note                                                                                                                                                                                                                                                                                                                                                               Office : [phone]     Fax :[phone]    Patient's Name: Juan Grant  2:00 PM  12/11/2024    Reason for Consult:  PENELOPE  Requesting Physician:  Ki Cross MD  Chief Complaint:    Chief Complaint   Patient presents with    Altered Mental Status     PT via [facility] after fall in the show and new onset altered mental status.        Assessment/Plan     # PENELOPE  - Likely 2/2 poor intake and Lasix  - Baseline Cr ~ 1.1  - Gentle IV saline   - Repeat RFP in am   - Discussed with family member Emma at bedside     # Acid- base/ Electrolyte imbalance   Hypokalemia  -Replacement being done   - Monitor       # AMS- Acute encephalopathy  -Neurology on board  -CT- no acute abnormality  -MRI pending    # A-fib RVR    #CHF  - Echo pending     # BPH  - Resume Flomax         History of Present Ilness:    Juan Grant is a 84 y.o. male with a pMHx of A-fib, CAD, CHF, MI, and sick sinus syndrome s/p pacemaker who presents to the ER with altered mental status.  Stroke alert was called by attending physician upon arrival and patient was taken to CT.   He was found after a fall in the shower.   Unknown last normal.  Baseline is alert and oriented. He is being admitted for further workup. We are consulted for an PENELOPE.       Interval hx     Bps controlled  Lytes stable    Past Medical History:   Diagnosis Date    Allergic rhinitis     Atrial fibrillation ([facility])     Benign prostatic hypertrophy     Bladder stone 05/02/2024    CAD (coronary artery disease)     Calculus of gallbladder 05/02/2024    Cancer ([facility])     skin    CHF (congestive heart failure) 
Risk of Treatment (any 1)   [] Drugs/treatments that require intensive monitoring for toxicity include:    [] Change in code status:    [] Decision to escalate care:    [] Major surgery/procedure with associated risk factors:    [x] Prescription drug management  ----------------------------------------------------------------------  [x] High (any 2)  [] Moderate (any 1)    C. Data (any 2 for high and any 1 for moderate)  [x] Discussed management of the case with: Daughter, nurse  [x] Imaging personally reviewed and interpreted, includes:    [x] Data Review (any 3)  [x] Collateral history obtained from: Daughter  [x] All available Consultant notes from yesterday/today were reviewed  [x] All current labs were reviewed and interpreted for clinical significance   [x] Appropriate follow-up labs were ordered    Data: reviewed   LABS:   Lab Results   Component Value Date/Time     12/11/2024 02:34 AM    K 3.4 12/11/2024 09:45 AM    K 2.9 12/11/2024 02:34 AM    CL 96 12/11/2024 02:34 AM    CO2 23 12/11/2024 02:34 AM    BUN 21 12/11/2024 02:34 AM    CREATININE 1.9 12/11/2024 02:34 AM    GFRAA >60 09/13/2022 01:54 PM    GFRAA >60 02/21/2013 09:57 AM    LABGLOM 34 12/11/2024 02:34 AM    LABGLOM 66 04/25/2024 05:11 AM    GLUCOSE 145 12/11/2024 02:34 AM    PHOS 4.1 03/07/2012 07:25 AM    MG 2.40 12/11/2024 02:34 AM    CALCIUM 8.8 12/11/2024 02:34 AM     Lab Results   Component Value Date/Time    WBC 11.3 12/11/2024 02:34 AM    RBC 4.04 12/11/2024 02:34 AM    HGB 11.3 12/11/2024 02:34 AM    HCT 34.5 12/11/2024 02:34 AM    MCV 85.4 12/11/2024 02:34 AM    RDW 15.9 12/11/2024 02:34 AM     12/11/2024 02:34 AM     Lab Results   Component Value Date    INR 2.31 (H) 12/11/2024    PROTIME 25.4 (H) 12/11/2024       Neuroimaging was independently reviewed by myself and discussed results with the patient  Reviewed notes from different physicians including H&P and ED notes.  Reviewed lab and blood testing    Impression:     Acute

## 2024-12-16 NOTE — CONSULTS
Department of Podiatry Consult Note  Resident       Reason for Consult: Toenail dystrophy  Requesting Physician: Dr. Noé Mcelroy MD    CHIEF COMPLAINT: Elongated toenails    HISTORY OF PRESENT ILLNESS:                The patient is a 84 y.o. male with significant past medical history as listed below. Podiatry was consulted for toenail dystrophy.  Patient states that he is not diabetic.  Patient states that he does not currently follow with the podiatrist.  Patient states that he had previous left second toe amputation which occurred many years ago.  Patient does have subjective complaints consistent with neuropathy.  Patient also states that he has had vascular problems in the past.  Patient denies fever, chills, nausea, vomiting, shortness of breath, chest pain.  Patient has no other pedal complaints at this time.    Past Medical History:        Diagnosis Date    Allergic rhinitis     Atrial fibrillation ([facility])     Benign prostatic hypertrophy     Bladder stone 05/02/2024    CAD (coronary artery disease)     Calculus of gallbladder 05/02/2024    Cancer ([facility])     skin    CHF (congestive heart failure) ([facility]) 08/17/2017    Coronary artery disease involving native coronary artery of native heart without angina pectoris 06/17/2011    DDD (degenerative disc disease), lumbar     Falls 08/17/2017    GI bleeding     Hyperlipidemia     Hypertension     MI (myocardial infarction) ([facility])     MRSA (methicillin resistant staph aureus) culture positive     h/o infection in the blood    Osteomyelitis     left foot    Pneumonia     S/P PTCA (percutaneous transluminal coronary angioplasty) stents x 8    SSS (sick sinus syndrome) ([facility])     Unspecified sleep apnea     Venous (peripheral) insufficiency 12/04/2018       Past Surgical History:        Procedure Laterality Date    APPENDECTOMY      CARDIAC PACEMAKER PLACEMENT      CARPAL TUNNEL RELEASE      CORONARY ANGIOPLASTY      CORONARY ANGIOPLASTY WITH STENT PLACEMENT

## 2024-12-16 NOTE — H&P
dehydration/UTI.    - MRI brain negative for acute stroke  - EEG without epileptiform activity  - Completed course of IV antibiotics (urine culture with only mixed urogenital cira)  - SLP cog/dysphagia eval    #. PENELOPE, resolved  - Cr 1.0 (peaked at 1.9, improved w/ IV fluids)  - Continue to monitor    #. Paroxysmal A-fib  -  Resumed home warfarin. Goal INR 2-3, pharmacy consulted to dose. Remain subtherapeutic.     #. Non-cardiac chest pain  - EKG on 12/15 without acute ischemic changes  - Mild troponin elevation (flat trend), comparable to prior levels upon chart review  - Continue PPI. Pepcid BID PRN for indigestion.     #. Sick sinus syndrome  - s/p pacemaker, recently interrogated by EP without any signs of dysfunction    #. Fungal dermatitis  - Miconazole powder BID    #. Toenail dystrophy  - Podiatry consult    Chronic Conditions:  - Chronic back pain: Continue home fentanyl 50 mcg/hr patch, change every 72 hours. Oxycodone 5 mg q4h PRN for breakthrough pain.     CODE: DNR-CCA  Diet: ADULT DIET; Dysphagia - Soft and Bite Sized; Vegetarian (Lacto-Ovo), No Drinking Straws  ADULT ORAL NUTRITION SUPPLEMENT; Lunch, Dinner; Frozen Oral Supplement  ADULT ORAL NUTRITION SUPPLEMENT; Breakfast, Lunch, Dinner; Standard High Calorie/High Protein Oral Supplement  Bowels: Per protocol  Bladder: Per protocol   Sleep: Melatonin 3 mg nightly PRN  DVT PPx: On warfarin   TDD: TBD  Follow-ups: PCP    Noé Mcelroy MD 12/16/2024, 1:16 PM     * This document was created using dictation software.  While all precautions were taken to ensure accuracy, errors may have occurred.  Please disregard any typographical errors.    Addendum 3:56 PM:   - Reviewed home med list. Added dronedarone for A-fib, rosuvastatin for hyperlipidemia, tamsulosin (retimed to nightly to minimize orthostatic hypotension) for BPH, and potassium chloride supplement.

## 2024-12-17 LAB
INR PPP: 1.58 (ref 0.85–1.15)
PROTHROMBIN TIME: 19 SEC (ref 11.9–14.9)

## 2024-12-17 PROCEDURE — 92526 ORAL FUNCTION THERAPY: CPT

## 2024-12-17 PROCEDURE — 6370000000 HC RX 637 (ALT 250 FOR IP): Performed by: STUDENT IN AN ORGANIZED HEALTH CARE EDUCATION/TRAINING PROGRAM

## 2024-12-17 PROCEDURE — 97116 GAIT TRAINING THERAPY: CPT

## 2024-12-17 PROCEDURE — 97535 SELF CARE MNGMENT TRAINING: CPT

## 2024-12-17 PROCEDURE — 36415 COLL VENOUS BLD VENIPUNCTURE: CPT

## 2024-12-17 PROCEDURE — 97530 THERAPEUTIC ACTIVITIES: CPT

## 2024-12-17 PROCEDURE — 2500000003 HC RX 250 WO HCPCS: Performed by: STUDENT IN AN ORGANIZED HEALTH CARE EDUCATION/TRAINING PROGRAM

## 2024-12-17 PROCEDURE — 1280000000 HC REHAB R&B

## 2024-12-17 PROCEDURE — 97129 THER IVNTJ 1ST 15 MIN: CPT

## 2024-12-17 PROCEDURE — 97110 THERAPEUTIC EXERCISES: CPT

## 2024-12-17 PROCEDURE — 85610 PROTHROMBIN TIME: CPT

## 2024-12-17 PROCEDURE — 2580000003 HC RX 258: Performed by: STUDENT IN AN ORGANIZED HEALTH CARE EDUCATION/TRAINING PROGRAM

## 2024-12-17 PROCEDURE — 99231 SBSQ HOSP IP/OBS SF/LOW 25: CPT | Performed by: HOSPITALIST

## 2024-12-17 RX ORDER — LIDOCAINE 4 G/G
1 PATCH TOPICAL DAILY
Status: DISCONTINUED | OUTPATIENT
Start: 2024-12-17 | End: 2024-12-21 | Stop reason: HOSPADM

## 2024-12-17 RX ADMIN — POTASSIUM CHLORIDE 10 MEQ: 1500 TABLET, EXTENDED RELEASE ORAL at 12:21

## 2024-12-17 RX ADMIN — WARFARIN SODIUM 2.5 MG: 2.5 TABLET ORAL at 18:07

## 2024-12-17 RX ADMIN — SODIUM CHLORIDE, PRESERVATIVE FREE 10 ML: 5 INJECTION INTRAVENOUS at 08:35

## 2024-12-17 RX ADMIN — TAMSULOSIN HYDROCHLORIDE 0.4 MG: 0.4 CAPSULE ORAL at 20:32

## 2024-12-17 RX ADMIN — PANTOPRAZOLE SODIUM 40 MG: 40 TABLET, DELAYED RELEASE ORAL at 05:20

## 2024-12-17 RX ADMIN — Medication 1 CAPSULE: at 08:32

## 2024-12-17 RX ADMIN — MICONAZOLE NITRATE: 20 POWDER TOPICAL at 20:33

## 2024-12-17 RX ADMIN — ROSUVASTATIN CALCIUM 5 MG: 10 TABLET, FILM COATED ORAL at 20:32

## 2024-12-17 RX ADMIN — MELATONIN TAB 3 MG 3 MG: 3 TAB at 20:32

## 2024-12-17 RX ADMIN — Medication 1 CAPSULE: at 18:07

## 2024-12-17 RX ADMIN — ACETAMINOPHEN 650 MG: 325 TABLET ORAL at 03:59

## 2024-12-17 RX ADMIN — ACETAMINOPHEN 650 MG: 325 TABLET ORAL at 08:32

## 2024-12-17 RX ADMIN — POTASSIUM CHLORIDE 10 MEQ: 1500 TABLET, EXTENDED RELEASE ORAL at 18:07

## 2024-12-17 RX ADMIN — POTASSIUM CHLORIDE 10 MEQ: 1500 TABLET, EXTENDED RELEASE ORAL at 08:32

## 2024-12-17 RX ADMIN — ACETAMINOPHEN 650 MG: 325 TABLET ORAL at 20:32

## 2024-12-17 RX ADMIN — MICONAZOLE NITRATE: 20 POWDER TOPICAL at 08:32

## 2024-12-17 ASSESSMENT — PAIN SCALES - GENERAL
PAINLEVEL_OUTOF10: 4
PAINLEVEL_OUTOF10: 0
PAINLEVEL_OUTOF10: 0
PAINLEVEL_OUTOF10: 2
PAINLEVEL_OUTOF10: 6

## 2024-12-17 ASSESSMENT — PAIN DESCRIPTION - DESCRIPTORS
DESCRIPTORS: ACHING
DESCRIPTORS: OTHER (COMMENT)
DESCRIPTORS: ACHING

## 2024-12-17 ASSESSMENT — PAIN SCALES - WONG BAKER: WONGBAKER_NUMERICALRESPONSE: NO HURT

## 2024-12-17 ASSESSMENT — PAIN - FUNCTIONAL ASSESSMENT
PAIN_FUNCTIONAL_ASSESSMENT: PREVENTS OR INTERFERES SOME ACTIVE ACTIVITIES AND ADLS
PAIN_FUNCTIONAL_ASSESSMENT: ACTIVITIES ARE NOT PREVENTED

## 2024-12-17 ASSESSMENT — PAIN DESCRIPTION - ORIENTATION
ORIENTATION: LEFT
ORIENTATION: MID;LOWER;UPPER

## 2024-12-17 ASSESSMENT — PAIN DESCRIPTION - LOCATION
LOCATION: BACK
LOCATION: LEG

## 2024-12-18 LAB
ANION GAP SERPL CALCULATED.3IONS-SCNC: 7 MMOL/L (ref 3–16)
BASOPHILS # BLD: 0 K/UL (ref 0–0.2)
BASOPHILS NFR BLD: 0.5 %
BUN SERPL-MCNC: 18 MG/DL (ref 7–20)
CALCIUM SERPL-MCNC: 8 MG/DL (ref 8.3–10.6)
CHLORIDE SERPL-SCNC: 107 MMOL/L (ref 99–110)
CO2 SERPL-SCNC: 25 MMOL/L (ref 21–32)
CREAT SERPL-MCNC: 1.1 MG/DL (ref 0.8–1.3)
DEPRECATED RDW RBC AUTO: 16.1 % (ref 12.4–15.4)
EOSINOPHIL # BLD: 0.2 K/UL (ref 0–0.6)
EOSINOPHIL NFR BLD: 3.2 %
GFR SERPLBLD CREATININE-BSD FMLA CKD-EPI: 66 ML/MIN/{1.73_M2}
GLUCOSE SERPL-MCNC: 91 MG/DL (ref 70–99)
HCT VFR BLD AUTO: 26.3 % (ref 40.5–52.5)
HGB BLD-MCNC: 8.9 G/DL (ref 13.5–17.5)
INR PPP: 1.89 (ref 0.85–1.15)
LYMPHOCYTES # BLD: 1.6 K/UL (ref 1–5.1)
LYMPHOCYTES NFR BLD: 24.3 %
MCH RBC QN AUTO: 29.1 PG (ref 26–34)
MCHC RBC AUTO-ENTMCNC: 33.9 G/DL (ref 31–36)
MCV RBC AUTO: 85.9 FL (ref 80–100)
MONOCYTES # BLD: 0.5 K/UL (ref 0–1.3)
MONOCYTES NFR BLD: 8.3 %
NEUTROPHILS # BLD: 4.2 K/UL (ref 1.7–7.7)
NEUTROPHILS NFR BLD: 63.7 %
PLATELET # BLD AUTO: 202 K/UL (ref 135–450)
PMV BLD AUTO: 7.7 FL (ref 5–10.5)
POTASSIUM SERPL-SCNC: 4.8 MMOL/L (ref 3.5–5.1)
PROTHROMBIN TIME: 21.8 SEC (ref 11.9–14.9)
RBC # BLD AUTO: 3.06 M/UL (ref 4.2–5.9)
SODIUM SERPL-SCNC: 139 MMOL/L (ref 136–145)
WBC # BLD AUTO: 6.5 K/UL (ref 4–11)

## 2024-12-18 PROCEDURE — 85610 PROTHROMBIN TIME: CPT

## 2024-12-18 PROCEDURE — 92526 ORAL FUNCTION THERAPY: CPT

## 2024-12-18 PROCEDURE — 97110 THERAPEUTIC EXERCISES: CPT

## 2024-12-18 PROCEDURE — 6370000000 HC RX 637 (ALT 250 FOR IP): Performed by: STUDENT IN AN ORGANIZED HEALTH CARE EDUCATION/TRAINING PROGRAM

## 2024-12-18 PROCEDURE — 85025 COMPLETE CBC W/AUTO DIFF WBC: CPT

## 2024-12-18 PROCEDURE — 36415 COLL VENOUS BLD VENIPUNCTURE: CPT

## 2024-12-18 PROCEDURE — 83540 ASSAY OF IRON: CPT

## 2024-12-18 PROCEDURE — 97535 SELF CARE MNGMENT TRAINING: CPT

## 2024-12-18 PROCEDURE — 97116 GAIT TRAINING THERAPY: CPT

## 2024-12-18 PROCEDURE — 80048 BASIC METABOLIC PNL TOTAL CA: CPT

## 2024-12-18 PROCEDURE — 97129 THER IVNTJ 1ST 15 MIN: CPT

## 2024-12-18 PROCEDURE — 83550 IRON BINDING TEST: CPT

## 2024-12-18 PROCEDURE — 82607 VITAMIN B-12: CPT

## 2024-12-18 PROCEDURE — 1280000000 HC REHAB R&B

## 2024-12-18 PROCEDURE — 82728 ASSAY OF FERRITIN: CPT

## 2024-12-18 PROCEDURE — 97530 THERAPEUTIC ACTIVITIES: CPT

## 2024-12-18 PROCEDURE — 99231 SBSQ HOSP IP/OBS SF/LOW 25: CPT | Performed by: HOSPITALIST

## 2024-12-18 PROCEDURE — 82746 ASSAY OF FOLIC ACID SERUM: CPT

## 2024-12-18 RX ORDER — FENTANYL 50 UG/1
1 PATCH TRANSDERMAL
Status: DISCONTINUED | OUTPATIENT
Start: 2024-12-18 | End: 2024-12-21 | Stop reason: HOSPADM

## 2024-12-18 RX ORDER — WARFARIN SODIUM 2.5 MG/1
2.5 TABLET ORAL
Status: DISCONTINUED | OUTPATIENT
Start: 2024-12-19 | End: 2024-12-21 | Stop reason: HOSPADM

## 2024-12-18 RX ORDER — WARFARIN SODIUM 5 MG/1
5 TABLET ORAL
Status: DISCONTINUED | OUTPATIENT
Start: 2024-12-18 | End: 2024-12-21 | Stop reason: HOSPADM

## 2024-12-18 RX ORDER — VALACYCLOVIR HYDROCHLORIDE 500 MG/1
500 TABLET, FILM COATED ORAL 2 TIMES DAILY
Status: COMPLETED | OUTPATIENT
Start: 2024-12-18 | End: 2024-12-21

## 2024-12-18 RX ADMIN — ROSUVASTATIN CALCIUM 5 MG: 10 TABLET, FILM COATED ORAL at 20:18

## 2024-12-18 RX ADMIN — TAMSULOSIN HYDROCHLORIDE 0.4 MG: 0.4 CAPSULE ORAL at 20:17

## 2024-12-18 RX ADMIN — Medication 1 CAPSULE: at 17:25

## 2024-12-18 RX ADMIN — WARFARIN SODIUM 5 MG: 5 TABLET ORAL at 17:25

## 2024-12-18 RX ADMIN — OXYCODONE 5 MG: 5 TABLET ORAL at 20:17

## 2024-12-18 RX ADMIN — POTASSIUM CHLORIDE 10 MEQ: 1500 TABLET, EXTENDED RELEASE ORAL at 12:11

## 2024-12-18 RX ADMIN — MICONAZOLE NITRATE: 20 POWDER TOPICAL at 09:24

## 2024-12-18 RX ADMIN — POTASSIUM CHLORIDE 10 MEQ: 1500 TABLET, EXTENDED RELEASE ORAL at 17:25

## 2024-12-18 RX ADMIN — ONDANSETRON 4 MG: 4 TABLET, ORALLY DISINTEGRATING ORAL at 20:17

## 2024-12-18 RX ADMIN — OXYCODONE 5 MG: 5 TABLET ORAL at 05:03

## 2024-12-18 RX ADMIN — PANTOPRAZOLE SODIUM 40 MG: 40 TABLET, DELAYED RELEASE ORAL at 05:03

## 2024-12-18 RX ADMIN — MICONAZOLE NITRATE: 20 POWDER TOPICAL at 20:19

## 2024-12-18 RX ADMIN — Medication 1 CAPSULE: at 09:23

## 2024-12-18 RX ADMIN — VALACYCLOVIR HYDROCHLORIDE 500 MG: 500 TABLET, FILM COATED ORAL at 21:34

## 2024-12-18 RX ADMIN — MELATONIN TAB 3 MG 3 MG: 3 TAB at 17:25

## 2024-12-18 RX ADMIN — POTASSIUM CHLORIDE 10 MEQ: 1500 TABLET, EXTENDED RELEASE ORAL at 09:23

## 2024-12-18 ASSESSMENT — PAIN DESCRIPTION - ORIENTATION
ORIENTATION: UPPER
ORIENTATION: UPPER

## 2024-12-18 ASSESSMENT — PAIN SCALES - GENERAL
PAINLEVEL_OUTOF10: 7
PAINLEVEL_OUTOF10: 5
PAINLEVEL_OUTOF10: 8
PAINLEVEL_OUTOF10: 4
PAINLEVEL_OUTOF10: 3

## 2024-12-18 ASSESSMENT — PAIN DESCRIPTION - DESCRIPTORS
DESCRIPTORS: ACHING
DESCRIPTORS: ACHING

## 2024-12-18 ASSESSMENT — PAIN DESCRIPTION - LOCATION
LOCATION: BACK
LOCATION: BACK

## 2024-12-18 ASSESSMENT — PAIN - FUNCTIONAL ASSESSMENT: PAIN_FUNCTIONAL_ASSESSMENT: ACTIVITIES ARE NOT PREVENTED

## 2024-12-18 NOTE — ACP (ADVANCE CARE PLANNING)
Advance Care Planning     Advance Care Planning Inpatient Note  [facility] Department    Today's Date: 12/18/2024  Unit: [facility] ACUTE REHAB UNIT    Received request from IDT Member.  Upon review of chart and communication with care team, patient's decision making abilities are not in question.. Patient and Child/Children was/were present in the room during visit.    Goals of ACP Conversation:  Discuss advance care planning documents    Health Care Decision Makers:       Primary Decision Maker: Nisreen Grant - Child - [phone]    Secondary Decision Maker: João Grant - Child - [phone]    Supplemental (Other) Decision Maker: Rod Grant - Child - [phone]  Summary:  Completed New Documents  Updated Healthcare Decision Maker    Advance Care Planning Documents (Patient Wishes):  Healthcare Power of /Advance Directive Appointment of Health Care Agent     Assessment:  Pt participated in ACP education and understood document    Interventions:  Provided education on documents for clarity and greater understanding  Assisted in the completion of documents according to patient's wishes at this time    Care Preferences Communicated:   No    Outcomes/Plan:  ACP Discussion: Completed  New advance directive completed.  Returned original document(s) to patient, as well as copies for distribution to appointed agents  Copy of advance directive given to staff to scan into medical record.    Electronically signed by Chaplain Aubrey on 12/18/2024 at 12:49 PM

## 2024-12-19 ENCOUNTER — TELEPHONE (OUTPATIENT)
Dept: PHARMACY | Age: 84
End: 2024-12-19

## 2024-12-19 LAB
EKG ATRIAL RATE: 61 BPM
EKG DIAGNOSIS: NORMAL
EKG P AXIS: 31 DEGREES
EKG P-R INTERVAL: 184 MS
EKG Q-T INTERVAL: 450 MS
EKG QRS DURATION: 138 MS
EKG QTC CALCULATION (BAZETT): 453 MS
EKG R AXIS: -31 DEGREES
EKG T AXIS: 76 DEGREES
EKG VENTRICULAR RATE: 61 BPM
FERRITIN SERPL IA-MCNC: 90.9 NG/ML (ref 30–400)
FOLATE SERPL-MCNC: 6 NG/ML (ref 4.78–24.2)
GLUCOSE BLD-MCNC: 110 MG/DL (ref 70–99)
INR PPP: 2.09 (ref 0.85–1.15)
IRON SATN MFR SERPL: 9 % (ref 20–50)
IRON SERPL-MCNC: 21 UG/DL (ref 59–158)
PERFORMED ON: ABNORMAL
PROTHROMBIN TIME: 23.5 SEC (ref 11.9–14.9)
TIBC SERPL-MCNC: 232 UG/DL (ref 260–445)
VIT B12 SERPL-MCNC: 243 PG/ML (ref 211–911)

## 2024-12-19 PROCEDURE — 6370000000 HC RX 637 (ALT 250 FOR IP): Performed by: STUDENT IN AN ORGANIZED HEALTH CARE EDUCATION/TRAINING PROGRAM

## 2024-12-19 PROCEDURE — 97110 THERAPEUTIC EXERCISES: CPT

## 2024-12-19 PROCEDURE — 92526 ORAL FUNCTION THERAPY: CPT

## 2024-12-19 PROCEDURE — 97530 THERAPEUTIC ACTIVITIES: CPT

## 2024-12-19 PROCEDURE — 97116 GAIT TRAINING THERAPY: CPT

## 2024-12-19 PROCEDURE — 97129 THER IVNTJ 1ST 15 MIN: CPT

## 2024-12-19 PROCEDURE — 99231 SBSQ HOSP IP/OBS SF/LOW 25: CPT | Performed by: HOSPITALIST

## 2024-12-19 PROCEDURE — 97535 SELF CARE MNGMENT TRAINING: CPT

## 2024-12-19 PROCEDURE — 2500000003 HC RX 250 WO HCPCS: Performed by: STUDENT IN AN ORGANIZED HEALTH CARE EDUCATION/TRAINING PROGRAM

## 2024-12-19 PROCEDURE — 85610 PROTHROMBIN TIME: CPT

## 2024-12-19 PROCEDURE — 1280000000 HC REHAB R&B

## 2024-12-19 PROCEDURE — 93005 ELECTROCARDIOGRAM TRACING: CPT | Performed by: STUDENT IN AN ORGANIZED HEALTH CARE EDUCATION/TRAINING PROGRAM

## 2024-12-19 RX ADMIN — TAMSULOSIN HYDROCHLORIDE 0.4 MG: 0.4 CAPSULE ORAL at 21:06

## 2024-12-19 RX ADMIN — POTASSIUM CHLORIDE 10 MEQ: 1500 TABLET, EXTENDED RELEASE ORAL at 09:32

## 2024-12-19 RX ADMIN — MICONAZOLE NITRATE: 20 POWDER TOPICAL at 21:07

## 2024-12-19 RX ADMIN — POTASSIUM CHLORIDE 10 MEQ: 1500 TABLET, EXTENDED RELEASE ORAL at 17:01

## 2024-12-19 RX ADMIN — VALACYCLOVIR HYDROCHLORIDE 500 MG: 500 TABLET, FILM COATED ORAL at 09:32

## 2024-12-19 RX ADMIN — Medication 1 CAPSULE: at 09:32

## 2024-12-19 RX ADMIN — MELATONIN TAB 3 MG 3 MG: 3 TAB at 17:48

## 2024-12-19 RX ADMIN — VALACYCLOVIR HYDROCHLORIDE 500 MG: 500 TABLET, FILM COATED ORAL at 21:06

## 2024-12-19 RX ADMIN — MICONAZOLE NITRATE: 20 POWDER TOPICAL at 09:34

## 2024-12-19 RX ADMIN — WARFARIN SODIUM 2.5 MG: 2.5 TABLET ORAL at 17:01

## 2024-12-19 RX ADMIN — ROSUVASTATIN CALCIUM 5 MG: 10 TABLET, FILM COATED ORAL at 21:06

## 2024-12-19 RX ADMIN — PANTOPRAZOLE SODIUM 40 MG: 40 TABLET, DELAYED RELEASE ORAL at 05:42

## 2024-12-19 RX ADMIN — Medication 1 CAPSULE: at 17:01

## 2024-12-19 ASSESSMENT — PAIN SCALES - GENERAL
PAINLEVEL_OUTOF10: 0
PAINLEVEL_OUTOF10: 1
PAINLEVEL_OUTOF10: 3

## 2024-12-19 ASSESSMENT — PAIN DESCRIPTION - ORIENTATION: ORIENTATION: MID

## 2024-12-19 ASSESSMENT — PAIN DESCRIPTION - LOCATION: LOCATION: CHEST

## 2024-12-19 ASSESSMENT — PAIN DESCRIPTION - ONSET: ONSET: SUDDEN

## 2024-12-19 ASSESSMENT — PAIN DESCRIPTION - DESCRIPTORS: DESCRIPTORS: SHARP

## 2024-12-19 NOTE — TELEPHONE ENCOUNTER
Patient currently admitted at [facility] with encephalopathy. Will follow hospitalization and schedule outpatient follow-up when needed.     Caroline Gonzalez, PharmD, [facility]  [facility]  [place]: [phone]  [place]: [phone]  12/19/2024 12:16 PM

## 2024-12-19 NOTE — PATIENT CARE CONFERENCE
[facility] Inpatient Rehabilitation Department  Weekly Team Conference Note    Patient Name: Juan Grant      MRN: [id number]  : 1940  (84 y.o.) Gender: male     The team conference for this patient was held on 24 at 1100 am and was led by:  Noé Mcelroy MD     CASE MANAGEMENT:  Assessment:  Patient's discharge date has been set for 24.  Met with patient and daughter Nisreen today during team conference who are in agreement with this date and plan.  [facility] has already accepted for [facility].  Called Carmen w/[facility] and informed of hospital bed need and that patient would be discharging to daughter's address ([address] [place] [address]).  Informed of discharge date and she said it should be delivered without a problem by that time.  Daughter will plan to transport patient home on discharge day.  No other needs identified at this time.    PHYSICAL THERAPY    Current Status:  Bed Mobility:  Bed mobility not completed on this date.  Comments: Patient in recliner chair at beginning and by end of session  Transfers:  Sit to stand transfer: contact guard assistance  Stand to sit transfer: contact guard assistance  Stand step transfer: minimal assistance  Comments: Patient requires max encouragement to complete transfers this date. Patient completes STS from recliner chair and w/c with use of hurrycane  Ambulation:  Surface:level surface  Assistive Device: single point cane  Assistance: contact guard assistance  Distance: 50 ft + 50 ft  Gait Mechanics: Severe forward trunk lean, increased gait speed, decreased step length B   Comments:  Verbal cueing required to ensure proper use of AD (hand use, not forearm). Patient self limiting with ambulation this date, max encouragement required  Stair Mobility:  Number of Steps: 6  Step Height: 6 inch  Hand Rails: (B) handrail  Assistance: contact guard assistance  Comments: Non-reciprocal pattern   Goals:                  Short

## 2024-12-20 LAB
ANION GAP SERPL CALCULATED.3IONS-SCNC: 9 MMOL/L (ref 3–16)
BASOPHILS # BLD: 0 K/UL (ref 0–0.2)
BASOPHILS NFR BLD: 0.6 %
BUN SERPL-MCNC: 20 MG/DL (ref 7–20)
CALCIUM SERPL-MCNC: 8.5 MG/DL (ref 8.3–10.6)
CHLORIDE SERPL-SCNC: 106 MMOL/L (ref 99–110)
CO2 SERPL-SCNC: 24 MMOL/L (ref 21–32)
CREAT SERPL-MCNC: 1.2 MG/DL (ref 0.8–1.3)
DEPRECATED RDW RBC AUTO: 16.2 % (ref 12.4–15.4)
EOSINOPHIL # BLD: 0.2 K/UL (ref 0–0.6)
EOSINOPHIL NFR BLD: 2.8 %
GFR SERPLBLD CREATININE-BSD FMLA CKD-EPI: 59 ML/MIN/{1.73_M2}
GLUCOSE SERPL-MCNC: 83 MG/DL (ref 70–99)
HCT VFR BLD AUTO: 26.8 % (ref 40.5–52.5)
HGB BLD-MCNC: 9.2 G/DL (ref 13.5–17.5)
INR PPP: 2.22 (ref 0.85–1.15)
LYMPHOCYTES # BLD: 1.6 K/UL (ref 1–5.1)
LYMPHOCYTES NFR BLD: 27.7 %
MCH RBC QN AUTO: 29 PG (ref 26–34)
MCHC RBC AUTO-ENTMCNC: 34.2 G/DL (ref 31–36)
MCV RBC AUTO: 85 FL (ref 80–100)
MONOCYTES # BLD: 0.5 K/UL (ref 0–1.3)
MONOCYTES NFR BLD: 8.9 %
NEUTROPHILS # BLD: 3.6 K/UL (ref 1.7–7.7)
NEUTROPHILS NFR BLD: 60 %
PLATELET # BLD AUTO: 246 K/UL (ref 135–450)
PMV BLD AUTO: 7.5 FL (ref 5–10.5)
POTASSIUM SERPL-SCNC: 4.7 MMOL/L (ref 3.5–5.1)
PROTHROMBIN TIME: 24.6 SEC (ref 11.9–14.9)
RBC # BLD AUTO: 3.15 M/UL (ref 4.2–5.9)
SODIUM SERPL-SCNC: 139 MMOL/L (ref 136–145)
WBC # BLD AUTO: 5.9 K/UL (ref 4–11)

## 2024-12-20 PROCEDURE — 80048 BASIC METABOLIC PNL TOTAL CA: CPT

## 2024-12-20 PROCEDURE — 97116 GAIT TRAINING THERAPY: CPT

## 2024-12-20 PROCEDURE — 97535 SELF CARE MNGMENT TRAINING: CPT

## 2024-12-20 PROCEDURE — 92526 ORAL FUNCTION THERAPY: CPT

## 2024-12-20 PROCEDURE — 97530 THERAPEUTIC ACTIVITIES: CPT

## 2024-12-20 PROCEDURE — 1280000000 HC REHAB R&B

## 2024-12-20 PROCEDURE — 97129 THER IVNTJ 1ST 15 MIN: CPT

## 2024-12-20 PROCEDURE — 85610 PROTHROMBIN TIME: CPT

## 2024-12-20 PROCEDURE — 85025 COMPLETE CBC W/AUTO DIFF WBC: CPT

## 2024-12-20 PROCEDURE — 6370000000 HC RX 637 (ALT 250 FOR IP): Performed by: PHYSICAL MEDICINE & REHABILITATION

## 2024-12-20 PROCEDURE — 97110 THERAPEUTIC EXERCISES: CPT

## 2024-12-20 PROCEDURE — 6370000000 HC RX 637 (ALT 250 FOR IP): Performed by: STUDENT IN AN ORGANIZED HEALTH CARE EDUCATION/TRAINING PROGRAM

## 2024-12-20 RX ORDER — AMMONIUM LACTATE 12 G/100G
CREAM TOPICAL
Qty: 1 EACH | Refills: 4 | Status: SHIPPED | OUTPATIENT
Start: 2024-12-20 | End: 2025-01-19

## 2024-12-20 RX ORDER — FENTANYL 50 UG/1
1 PATCH TRANSDERMAL
Qty: 10 PATCH | Refills: 0 | Status: SHIPPED | OUTPATIENT
Start: 2024-12-22 | End: 2025-01-11

## 2024-12-20 RX ORDER — ONDANSETRON 4 MG/1
4 TABLET, ORALLY DISINTEGRATING ORAL EVERY 8 HOURS PRN
Qty: 30 TABLET | Refills: 0 | Status: SHIPPED | OUTPATIENT
Start: 2024-12-20

## 2024-12-20 RX ORDER — LIDOCAINE 4 G/G
1 PATCH TOPICAL DAILY
Qty: 2 EACH | Refills: 1 | Status: SHIPPED | OUTPATIENT
Start: 2024-12-21

## 2024-12-20 RX ORDER — FERROUS SULFATE 325(65) MG
325 TABLET ORAL 2 TIMES DAILY WITH MEALS
Qty: 30 TABLET | Refills: 3 | Status: SHIPPED | OUTPATIENT
Start: 2024-12-20

## 2024-12-20 RX ORDER — OXYCODONE HYDROCHLORIDE 5 MG/1
5 TABLET ORAL EVERY 4 HOURS PRN
Qty: 20 TABLET | Refills: 0 | Status: SHIPPED | OUTPATIENT
Start: 2024-12-20 | End: 2024-12-23

## 2024-12-20 RX ORDER — POLYETHYLENE GLYCOL 3350 17 G/17G
17 POWDER, FOR SOLUTION ORAL DAILY PRN
Qty: 527 G | Refills: 1 | Status: SHIPPED | OUTPATIENT
Start: 2024-12-20 | End: 2025-02-20

## 2024-12-20 RX ORDER — FERROUS SULFATE 325(65) MG
325 TABLET ORAL 2 TIMES DAILY WITH MEALS
Status: DISCONTINUED | OUTPATIENT
Start: 2024-12-20 | End: 2024-12-21 | Stop reason: HOSPADM

## 2024-12-20 RX ADMIN — POTASSIUM CHLORIDE 10 MEQ: 1500 TABLET, EXTENDED RELEASE ORAL at 08:31

## 2024-12-20 RX ADMIN — MELATONIN TAB 3 MG 3 MG: 3 TAB at 17:17

## 2024-12-20 RX ADMIN — Medication 1 CAPSULE: at 17:17

## 2024-12-20 RX ADMIN — PANTOPRAZOLE SODIUM 40 MG: 40 TABLET, DELAYED RELEASE ORAL at 05:53

## 2024-12-20 RX ADMIN — VALACYCLOVIR HYDROCHLORIDE 500 MG: 500 TABLET, FILM COATED ORAL at 20:52

## 2024-12-20 RX ADMIN — TAMSULOSIN HYDROCHLORIDE 0.4 MG: 0.4 CAPSULE ORAL at 20:52

## 2024-12-20 RX ADMIN — Medication 1 CAPSULE: at 08:32

## 2024-12-20 RX ADMIN — VALACYCLOVIR HYDROCHLORIDE 500 MG: 500 TABLET, FILM COATED ORAL at 08:34

## 2024-12-20 RX ADMIN — MICONAZOLE NITRATE: 20 POWDER TOPICAL at 08:40

## 2024-12-20 RX ADMIN — ROSUVASTATIN CALCIUM 5 MG: 10 TABLET, FILM COATED ORAL at 20:53

## 2024-12-20 RX ADMIN — POTASSIUM CHLORIDE 10 MEQ: 1500 TABLET, EXTENDED RELEASE ORAL at 11:03

## 2024-12-20 RX ADMIN — MICONAZOLE NITRATE: 20 POWDER TOPICAL at 20:53

## 2024-12-20 RX ADMIN — POTASSIUM CHLORIDE 10 MEQ: 1500 TABLET, EXTENDED RELEASE ORAL at 17:17

## 2024-12-20 RX ADMIN — FERROUS SULFATE TAB 325 MG (65 MG ELEMENTAL FE) 325 MG: 325 (65 FE) TAB at 17:17

## 2024-12-20 RX ADMIN — OXYCODONE 5 MG: 5 TABLET ORAL at 12:55

## 2024-12-20 RX ADMIN — WARFARIN SODIUM 2.5 MG: 2.5 TABLET ORAL at 17:17

## 2024-12-20 RX ADMIN — FERROUS SULFATE TAB 325 MG (65 MG ELEMENTAL FE) 325 MG: 325 (65 FE) TAB at 11:03

## 2024-12-20 ASSESSMENT — PAIN SCALES - GENERAL
PAINLEVEL_OUTOF10: 6
PAINLEVEL_OUTOF10: 4
PAINLEVEL_OUTOF10: 3
PAINLEVEL_OUTOF10: 0

## 2024-12-20 ASSESSMENT — PAIN DESCRIPTION - LOCATION
LOCATION: BACK
LOCATION: BACK

## 2024-12-20 NOTE — DISCHARGE INSTR - COC
Continuity of Care Form    Patient Name: Juan Grant   :  1940  MRN:  [id number]    Admit date:  12/15/2024  Discharge date:  2024    Code Status Order: DNR-CCA   Advance Directives:   Advance Care Flowsheet Documentation             Admitting Physician:  Heaven Hung MD  PCP: Ki Cross MD    Discharging Nurse: Damon Narvaez  Discharging Hospital Unit/Room#: ARU-4908/4908-01  Discharging Unit Phone Number: [phone]    Emergency Contact:   Extended Emergency Contact Information  Primary Emergency Contact: Nisreen Grant  Address: DAUGHTER   [facility]  Home Phone: [phone]  Mobile Phone: [phone]  Relation: Child  Secondary Emergency Contact: Rod Grant  Mobile Phone: [phone]  Relation: Child    Past Surgical History:  Past Surgical History:   Procedure Laterality Date    APPENDECTOMY      CARDIAC PACEMAKER PLACEMENT      CARPAL TUNNEL RELEASE      CORONARY ANGIOPLASTY      CORONARY ANGIOPLASTY WITH STENT PLACEMENT      DIAGNOSTIC CARDIAC CATH LAB PROCEDURE      EYE SURGERY      FOOT DEBRIDEMENT Left 2021    INCISION AND DRAINAGE LEFT FOOT performed by Randolph Mir DPM at [facility] OR    PACEMAKER CHANGE  10/2016    PACEMAKER PLACEMENT      TOE AMPUTATION Left 2021    AMPUTATION OF LEFT SECOND TOE performed by Randolph Mir DPM at [facility] OR       Immunization History:   Immunization History   Administered Date(s) Administered    COVID-19, PFIZER Bivalent, DO NOT Dilute, (age 12y+), IM, 30 mcg/0.3 mL 2022    COVID-19, PFIZER GRAY top, DO NOT Dilute, (age 12 y+), IM, 30 mcg/0.3 mL 2022    COVID-19, PFIZER PURPLE top, DILUTE for use, (age 12 y+), 30mcg/0.3mL 2021, 2021, 10/14/2021    COVID-19, PFIZER, (age 12y+), IM, 30mcg/0.3mL 02/15/2024    Influenza 2010, 2011    Influenza Virus Vaccine 10/21/2013, 2017    Influenza Whole 10/21/2013    Influenza, FLUAD, (age 65 y+), IM, Quadv, 0.5mL

## 2024-12-20 NOTE — DISCHARGE INSTRUCTIONS
We hope your stay on rehab has exceeded your expectations. Once again the entire Acute Rehab Staff at [facility] wish to thank you for allowing us the privilege to care for you.         A few days after you are discharged from Rehab, you will receive a survey (Shamar  Gancooper) in the mail or through email.  This is a nationally distributed survey sent to thousands of rehab patients throughout the nation.              It is very important to everyone on the rehab unit that we receive feedback based on your experience.          Thank you, we wish you good health always,         Acute Rehab Team      Hospital Preference:     __[facility]        Medical Diagnosis/Conditions    _______________________ (free text)    Emergency Contact:    ________________________________________Phone#________________________      Advanced Directives:    Code Status: ?  []  Full Code  ?  []  DNR  ? []  DNRCC  ? []  DNRCC - Arrest    (as of date of discharge:  _________)      Medical POA: ?  []   Yes ______________________________ ?  []   No                                       (Name and phone number)                     Living Will:   ?   []   Yes    ?  []   No        Insurance Information:    _______________________ (free text)      Individual Responsible  for the coordination of the discharge/follow up:    ______________________________________________________    Functional Status:    VISUAL DEFICITS:    Yes []  No  []       If yes, assisted device:   Wears Glasses Yes []  No  []  Wears Contacts  Yes []  No  []  Legally Blind Yes []  No  []    HEARING DEFICITS:    Yes []  No  []       If yes, assisted device:   Wears Hearing Aids Yes []  No  []  Pocket Talker  Yes []  No  []       Physical Therapist & Contact #:  OccupationalTherapist & Contact #: Kya Cunningham, RICKIR/VIANNEY, [id number] [phone]    Speech Therapist & Contact #:       Activities of Daily Living:     ADL's - Adaptive Equipment used

## 2024-12-20 NOTE — PLAN OF CARE
Problem: Chronic Conditions and Co-morbidities  Goal: Patient's chronic conditions and co-morbidity symptoms are monitored and maintained or improved  12/19/2024 1026 by Lois Hsu RN  Outcome: Progressing    Problem: Discharge Planning  Goal: Discharge to home or other facility with appropriate resources  12/19/2024 1026 by Lois Hsu RN  Outcome: Progressing    Problem: Skin/Tissue Integrity  Goal: Absence of new skin breakdown  Description: 1.  Monitor for areas of redness and/or skin breakdown  2.  Assess vascular access sites hourly  3.  Every 4-6 hours minimum:  Change oxygen saturation probe site  4.  Every 4-6 hours:  If on nasal continuous positive airway pressure, respiratory therapy assess nares and determine need for appliance change or resting period.  12/19/2024 1026 by Lois Hsu RN  Outcome: Progressing     Problem: Safety - Adult  Goal: Free from fall injury  12/19/2024 1026 by Lois Hsu RN  Outcome: Progressing     Problem: ABCDS Injury Assessment  Goal: Absence of physical injury  12/19/2024 1026 by Lois Hsu RN  Outcome: Progressing     Problem: Pain  Goal: Verbalizes/displays adequate comfort level or baseline comfort level  12/19/2024 1026 by Lois Hsu RN  Outcome: Progressing    Problem: Nutrition Deficit:  Goal: Optimize nutritional status  12/19/2024 1026 by Lois Hsu RN  Outcome: Progressing       
  Problem: Chronic Conditions and Co-morbidities  Goal: Patient's chronic conditions and co-morbidity symptoms are monitored and maintained or improved  Outcome: Progressing  Flowsheets  Taken 12/15/2024 2010 by Tomasa Fragoso RN  Care Plan - Patient's Chronic Conditions and Co-Morbidity Symptoms are Monitored and Maintained or Improved:   Monitor and assess patient's chronic conditions and comorbid symptoms for stability, deterioration, or improvement   Collaborate with multidisciplinary team to address chronic and comorbid conditions and prevent exacerbation or deterioration   Update acute care plan with appropriate goals if chronic or comorbid symptoms are exacerbated and prevent overall improvement and discharge  Taken 12/15/2024 1645 by Marce Lopez RN  Care Plan - Patient's Chronic Conditions and Co-Morbidity Symptoms are Monitored and Maintained or Improved: Monitor and assess patient's chronic conditions and comorbid symptoms for stability, deterioration, or improvement     Problem: Skin/Tissue Integrity  Goal: Absence of new skin breakdown  Description: 1.  Monitor for areas of redness and/or skin breakdown  2.  Assess vascular access sites hourly  3.  Every 4-6 hours minimum:  Change oxygen saturation probe site  4.  Every 4-6 hours:  If on nasal continuous positive airway pressure, respiratory therapy assess nares and determine need for appliance change or resting period.  Outcome: Progressing     Problem: Safety - Adult  Goal: Free from fall injury  12/16/2024 0126 by Tomasa Fragoso RN  Outcome: Progressing  12/15/2024 1723 by Marce Lopez RN  Outcome: Progressing  Note: Pt remains free from falls.  Safety precautions in place.  Bed in lowest position, bed/chair wheels locked, call light with in reach, bedside table in reach, bed/chair alarm on, fall risk wrist band on.     Problem: ABCDS Injury Assessment  Goal: Absence of physical injury  Outcome: Progressing  Flowsheets (Taken 12/16/2024 
  Problem: Chronic Conditions and Co-morbidities  Goal: Patient's chronic conditions and co-morbidity symptoms are monitored and maintained or improved  Outcome: Progressing  Flowsheets (Taken 12/16/2024 2152)  Care Plan - Patient's Chronic Conditions and Co-Morbidity Symptoms are Monitored and Maintained or Improved:   Monitor and assess patient's chronic conditions and comorbid symptoms for stability, deterioration, or improvement   Collaborate with multidisciplinary team to address chronic and comorbid conditions and prevent exacerbation or deterioration   Update acute care plan with appropriate goals if chronic or comorbid symptoms are exacerbated and prevent overall improvement and discharge     Problem: Skin/Tissue Integrity  Goal: Absence of new skin breakdown  Description: 1.  Monitor for areas of redness and/or skin breakdown  2.  Assess vascular access sites hourly  3.  Every 4-6 hours minimum:  Change oxygen saturation probe site  4.  Every 4-6 hours:  If on nasal continuous positive airway pressure, respiratory therapy assess nares and determine need for appliance change or resting period.  Outcome: Progressing     Problem: Safety - Adult  Goal: Free from fall injury  Outcome: Progressing     Problem: ABCDS Injury Assessment  Goal: Absence of physical injury  Outcome: Progressing  Flowsheets (Taken 12/17/2024 0400)  Absence of Physical Injury: Implement safety measures based on patient assessment     Problem: Pain  Goal: Verbalizes/displays adequate comfort level or baseline comfort level  Outcome: Progressing  Flowsheets (Taken 12/16/2024 2152)  Verbalizes/displays adequate comfort level or baseline comfort level: Encourage patient to monitor pain and request assistance     
  Problem: Chronic Conditions and Co-morbidities  Goal: Patient's chronic conditions and co-morbidity symptoms are monitored and maintained or improved  Outcome: Progressing  Flowsheets (Taken 12/17/2024 2031)  Care Plan - Patient's Chronic Conditions and Co-Morbidity Symptoms are Monitored and Maintained or Improved:   Monitor and assess patient's chronic conditions and comorbid symptoms for stability, deterioration, or improvement   Collaborate with multidisciplinary team to address chronic and comorbid conditions and prevent exacerbation or deterioration   Update acute care plan with appropriate goals if chronic or comorbid symptoms are exacerbated and prevent overall improvement and discharge     Problem: Discharge Planning  Goal: Discharge to home or other facility with appropriate resources  12/17/2024 2300 by Lorenza Ndiaye, RN  Outcome: Progressing  Flowsheets (Taken 12/17/2024 2031)  Discharge to home or other facility with appropriate resources:   Identify barriers to discharge with patient and caregiver   Refer to discharge planning if patient needs post-hospital services based on physician order or complex needs related to functional status, cognitive ability or social support system     Problem: Skin/Tissue Integrity  Goal: Absence of new skin breakdown  Description: 1.  Monitor for areas of redness and/or skin breakdown  2.  Assess vascular access sites hourly  3.  Every 4-6 hours minimum:  Change oxygen saturation probe site  4.  Every 4-6 hours:  If on nasal continuous positive airway pressure, respiratory therapy assess nares and determine need for appliance change or resting period.  Outcome: Progressing     Problem: Safety - Adult  Goal: Free from fall injury  12/17/2024 2300 by Lorenza Ndiaye, RN  Outcome: Progressing     Problem: Pain  Goal: Verbalizes/displays adequate comfort level or baseline comfort level  12/17/2024 2300 by Lorenza Ndiaye, RN  Outcome: Progressing   
  Problem: Chronic Conditions and Co-morbidities  Goal: Patient's chronic conditions and co-morbidity symptoms are monitored and maintained or improved  Outcome: Progressing  Flowsheets (Taken 12/18/2024 0915 by Marce Lopez, RN)  Care Plan - Patient's Chronic Conditions and Co-Morbidity Symptoms are Monitored and Maintained or Improved: Monitor and assess patient's chronic conditions and comorbid symptoms for stability, deterioration, or improvement     Problem: Discharge Planning  Goal: Discharge to home or other facility with appropriate resources  Outcome: Progressing  Flowsheets (Taken 12/18/2024 0915 by Marce Lopez, RN)  Discharge to home or other facility with appropriate resources: Identify barriers to discharge with patient and caregiver     Problem: Skin/Tissue Integrity  Goal: Absence of new skin breakdown  Description: 1.  Monitor for areas of redness and/or skin breakdown  2.  Assess vascular access sites hourly  3.  Every 4-6 hours minimum:  Change oxygen saturation probe site  4.  Every 4-6 hours:  If on nasal continuous positive airway pressure, respiratory therapy assess nares and determine need for appliance change or resting period.  Outcome: Progressing     Problem: Safety - Adult  Goal: Free from fall injury  12/18/2024 2225 by Lorenza Ndiaye, RN  Outcome: Progressing     Problem: ABCDS Injury Assessment  Goal: Absence of physical injury  Outcome: Progressing  Flowsheets (Taken 12/18/2024 2223)  Absence of Physical Injury: Implement safety measures based on patient assessment     Problem: Pain  Goal: Verbalizes/displays adequate comfort level or baseline comfort level  Outcome: Progressing     Problem: Nutrition Deficit:  Goal: Optimize nutritional status  Outcome: Progressing     
  Problem: Discharge Planning  Goal: Discharge to home or other facility with appropriate resources  Outcome: Progressing     Problem: Safety - Adult  Goal: Free from fall injury  12/16/2024 1052 by Dinorah Robbins RN  Outcome: Progressing  12/16/2024 0126 by Tomasa Fragoso RN  Outcome: Progressing     Problem: Pain  Goal: Verbalizes/displays adequate comfort level or baseline comfort level  12/16/2024 1052 by Dinorah Robbins, RN  Outcome: Progressing     
  Problem: Discharge Planning  Goal: Discharge to home or other facility with appropriate resources  Outcome: Progressing  Flowsheets (Taken 12/16/2024 2152 by Tomasa Fragoso, RN)  Discharge to home or other facility with appropriate resources: Identify discharge learning needs (meds, wound care, etc)     Problem: Safety - Adult  Goal: Free from fall injury  12/17/2024 1007 by Dinorah Robbins, RN  Outcome: Progressing     Problem: Pain  Goal: Verbalizes/displays adequate comfort level or baseline comfort level  12/17/2024 1007 by Dinorah Robbins, RN  Outcome: Progressing     
  Problem: Safety - Adult  Goal: Free from fall injury  12/18/2024 1010 by Marce Lopez RN  Outcome: Progressing  Note: Pt remains free from falls.  Safety precautions in place.  Bed in lowest position, bed/chair wheels locked, call light with in reach, bedside table in reach, bed/chair alarm on, fall risk wrist band on.     
  Problem: Safety - Adult  Goal: Free from fall injury  Outcome: Progressing  Note: Pt remains free from falls.  Safety precautions in place.  Bed in lowest position, bed/chair wheels locked, call light with in reach, bedside table in reach, bed/chair alarm on, fall risk wrist band on.     
  Problem: Skin/Tissue Integrity  Goal: Absence of new skin breakdown  Description: 1.  Monitor for areas of redness and/or skin breakdown  2.  Assess vascular access sites hourly  3.  Every 4-6 hours minimum:  Change oxygen saturation probe site  4.  Every 4-6 hours:  If on nasal continuous positive airway pressure, respiratory therapy assess nares and determine need for appliance change or resting period.  12/19/2024 2308 by Jami Coreas, RN  Outcome: Progressing     Problem: Safety - Adult  Goal: Free from fall injury  12/19/2024 2308 by Jmai Coreas, RN  Outcome: Progressing     Problem: ABCDS Injury Assessment  Goal: Absence of physical injury  12/19/2024 2308 by Jami Coreas, RN  Outcome: Progressing     
Juan Grant was evaluated today and a DME order was entered for variable height hospital bed because he requires assistance for positioning needs not possible in an ordinary bed.  Patient needs variability of bed height to perform patient transfers and for personal cares.  Current body Weight - Scale: 69.9 kg (154 lb).  The need for this equipment was discussed with the patient and he understands and is in agreement.    Noé Mcelroy MD 12/19/24 12:52 PM    
training   [x] education for medical assistive devices   [x] medication education   [] O2 saturation management   [] energy conservation   [] stress management techniques   [x] fall prevention   [] alarms protocol   [] seating and positioning   [] skin/wound care   [] pressure relief instruction   [] dressing changes     [] infection protection   [] DVT prophylaxis  [] assistance with in room safety with transfers to bed, toilet, wheelchair, shower   [] bathroom activities and hygiene  [] Other:    Patient/Caregiver Education for:  [] Disease/sustained injury/management     [x] Medication Use  [] Surgical intervention  [x] Safety  [] Body mechanics and or joint protection  [x] Health maintenance     [] Other:     PHYSICAL THERAPY:  Goals:                   Short Term Goals:  Time Frame: 5-7 days   Patient will complete bed mobility at modified independent   Patient will complete transfers at [facility]   Patient will ambulate 150 ft with use of single point cane at [facility]  Patient will ascend/descend 1 stairs without use of HR at [facility]  Patient will complete car transfer at [facility]               These goals were reviewed with this patient at the time of assessment and Juan Grant is in agreement.     Plan of Care: Pt to be seen 5 out of 7 days per week per ARU protocol ( 75 minutes with PT)                    OCCUPATIONAL THERAPY:  Goals:             Patient Goals: go home   Short Term Goals:  Time Frame: in 5-7 days  Patient will complete lower body ADL at [facility]   Patient will complete toileting at modified independent   Patient will complete functional transfers at [facility]   Patient will complete functional mobility at modified independent   Patient will increase functional standing balance to [facility] for improved ADL/IADL completion  These goals were reviewed with this patient at the time of assessment and Juan Grant is

## 2024-12-21 VITALS
HEART RATE: 63 BPM | SYSTOLIC BLOOD PRESSURE: 113 MMHG | HEIGHT: 69 IN | BODY MASS INDEX: 22.79 KG/M2 | RESPIRATION RATE: 16 BRPM | OXYGEN SATURATION: 95 % | TEMPERATURE: 97.9 F | WEIGHT: 153.88 LBS | DIASTOLIC BLOOD PRESSURE: 50 MMHG

## 2024-12-21 LAB
INR PPP: 2.12 (ref 0.85–1.15)
PROTHROMBIN TIME: 23.7 SEC (ref 11.9–14.9)

## 2024-12-21 PROCEDURE — 85610 PROTHROMBIN TIME: CPT

## 2024-12-21 PROCEDURE — 6370000000 HC RX 637 (ALT 250 FOR IP): Performed by: STUDENT IN AN ORGANIZED HEALTH CARE EDUCATION/TRAINING PROGRAM

## 2024-12-21 PROCEDURE — 6370000000 HC RX 637 (ALT 250 FOR IP): Performed by: PHYSICAL MEDICINE & REHABILITATION

## 2024-12-21 RX ORDER — VALACYCLOVIR HYDROCHLORIDE 1 G/1
500 TABLET, FILM COATED ORAL 2 TIMES DAILY
Qty: 5 TABLET | Refills: 0 | Status: SHIPPED | OUTPATIENT
Start: 2024-12-21 | End: 2024-12-26

## 2024-12-21 RX ADMIN — POTASSIUM CHLORIDE 10 MEQ: 1500 TABLET, EXTENDED RELEASE ORAL at 08:50

## 2024-12-21 RX ADMIN — MICONAZOLE NITRATE: 20 POWDER TOPICAL at 08:52

## 2024-12-21 RX ADMIN — PANTOPRAZOLE SODIUM 40 MG: 40 TABLET, DELAYED RELEASE ORAL at 05:29

## 2024-12-21 RX ADMIN — VALACYCLOVIR HYDROCHLORIDE 500 MG: 500 TABLET, FILM COATED ORAL at 08:50

## 2024-12-21 RX ADMIN — Medication 1 CAPSULE: at 08:50

## 2024-12-21 RX ADMIN — FERROUS SULFATE TAB 325 MG (65 MG ELEMENTAL FE) 325 MG: 325 (65 FE) TAB at 08:50

## 2024-12-21 RX ADMIN — POTASSIUM CHLORIDE 10 MEQ: 1500 TABLET, EXTENDED RELEASE ORAL at 12:23

## 2024-12-21 NOTE — CARE COORDINATION
12/21/24 1223   IMM Letter   IMM Letter given to Patient/Family/Significant other/Guardian/POA/by: patient daughter Nisreen signed/ waived 4 hour wait time   IMM Letter date given: 12/21/24   IMM Letter time given: 1207     Electronically signed by LUZ Townsend on 12/21/24 at 12:23 PM EST    
Case Management Assessment  Initial Evaluation    Date/Time of Evaluation: 12/16/2024 4:11 PM  Assessment Completed by: Juvencio Amado, LUZ, LSW    If patient is discharged prior to next notation, then this note serves as note for discharge by case management.    Patient Name: Juan Grant                   YOB: 1940  Diagnosis: Encephalopathy [G93.40]                   Date / Time: 12/15/2024  3:36 PM    Patient Admission Status: REHAB IP   Readmission Risk (Low < 19, Mod (19-27), High > 27): Readmission Risk Score: 18    Current PCP: Ki Cross MD  PCP verified by CM? Yes    Chart Reviewed: Yes      History Provided by: Child/Family (dtr Nisreen)  Patient Orientation: Alert and Oriented (A&Ox3)    Patient Cognition: Alert    Hospitalization in the last 30 days (Readmission):  No    If yes, Readmission Assessment in  Navigator will be completed.    Advance Directives:      Code Status: DNR-CCA   Patient's Primary Decision Maker is: Legal Next of Kin    Primary Decision Maker: Nisreen Grant - Child - [phone]    Discharge Planning:    Patient lives with: Other (Comment) (Patient normally lives with his son but will be discharging to his daughter, Nisreen's, home ([address] [place] [address], [place] [address])) Type of Home: House (1 level - 1 step)  Primary Care Giver: Self  Patient Support Systems include: Family Members, Children   Current Financial resources: Medicare  Current community resources: None  Current services prior to admission: Durable Medical Equipment            Current DME: Other (Comment) (4WW, cane, lift chair, transport chair, TTB, HHS, RTS)            Type of Home Care services:  OT, PT, Nursing Services    ADLS  Prior functional level: Independent in ADLs/IADLs  Current functional level: Mobility (needs HHC and 24/7 supv)    PT AM-PAC:   /24  OT AM-PAC:   /24    Family can provide assistance at DC: Yes (dtr and ANTONIO will provide 24/7 care between them)  Would you like Case 
NAVJOT called [facility] who confirmed that they have everything to order the hospital bed for patient.  Carmen stated she will put through the orders and paperwork today and will have it scheduled to be delivered tomorrow.  Carmen stated she will call dtr Nisreen and inform this information as well.      Electronically signed by LUZ Osman, WERO on 12/20/2024 at 12:55 PM    
SW received a call from [facility] who stated yes they are able to accept for [facility] services and will be following during pt's admission to ARU.    Electronically signed by LUZ Osman, WERO on 12/17/2024 at 6:09 PM    
Team conference/Weekly Summary         Team conference held today.  Patient's discharge date has been set for 12/21/24.  Met with patient and daughter Nisreen today during team conference who are in agreement with this date and plan.  [facility] has already accepted for [facility].  Called [facility] and informed of hospital bed need and that patient would be discharging to daughter's address ([address] [place] [address]).  Informed of discharge date and she said it should be delivered without a problem by that time.  Daughter will plan to transport patient home on discharge day.  No other needs identified at this time.    Electronically signed by LUZ Osman, WERO on 12/19/2024 at 2:30 PM    
Management Notes: Pt discharging with DME (hospital bed) from [facility] scheduled for delivery today.   PADMINI Carmona, RN, Case Management  Office: [phone]  Electronically signed by Francesca Decker on 12/21/2024 at 12:04 PM      .    PADMINI Carmona, RN, Case Management  Office: [phone]  Electronically signed by Francesca Decker on 12/21/2024 at 12:05 PM

## 2024-12-21 NOTE — DISCHARGE SUMMARY
Physical Medicine & Rehabilitation  Discharge Summary     Patient Identification:  Juan Grant  : 1940  Admit date: 12/15/2024  Discharge date:   Attending provider: Noé Mcelroy MD        Primary care provider: Ki Cross MD     Discharge Diagnoses:   Patient Active Problem List   Diagnosis    Hyperlipidemia    Benign prostatic hyperplasia    PAF (paroxysmal atrial fibrillation) ([facility])    Coronary artery disease involving native coronary artery of native heart without angina pectoris    Cardiac pacemaker in situ    Postsurgical percutaneous transluminal coronary angioplasty status    History of nonmelanoma skin cancer    Tinea manuum, pedis, and unguium    AK (actinic keratosis)    Chronic congestive heart failure ([facility])    HTN (hypertension), benign    Sick sinus syndrome ([facility])    Bradycardia    Gastrointestinal hemorrhage    Pure hyperglyceridemia    Localized edema    Venous (peripheral) insufficiency    PVD (peripheral vascular disease) ([facility])    Solar purpura ([facility])    Osteomyelitis of second toe of left foot    Bilateral cellulitis of lower leg    Open fracture of second toe of left foot    History of MRSA infection    Failure of outpatient treatment    Subacute osteomyelitis of left foot    Encounter for medication counseling    Chest pain    Calculus of gallbladder    Bladder stone    Major depressive disorder, recurrent, mild    Major depressive disorder, recurrent, moderate    Major depressive disorder, recurrent, unspecified    AMS (altered mental status)    Acute encephalopathy    Cerebrovascular accident (CVA) ([facility])    PENELOPE (acute kidney injury) ([facility])    Hypokalemia    Encephalopathy    Moderate malnutrition ([facility])       Discharge Functional Status:      Physical therapy:  Supine to Sit: Independent  Sit to Supine: Independent      Sit to Stand: Supervision or touching assistance  Chair/Bed to Chair Transfer: Supervision or touching assistance  Car Transfer: Supervision or touching

## 2024-12-21 NOTE — PROGRESS NOTES
Pharmacy to Dose Warfarin    Pharmacy consulted to dose warfarin for Afib.    INR Goal: 2-3    INR today: 1.58    Home dose: 2.5 mg daily - follows with [facility]ication Management    Assessment/Plan:  - INR subtherapetic  - will give warfarin 5 mg x1 then resume home regimen  - Possible concomitant drug-drug interactions include: none     Pharmacy will continue to follow.    Chery Snyder, PharmD, BCPS  [id number]  12/16/2024 1:55 PM         
    Pharmacy to Dose Warfarin    Pharmacy consulted to dose warfarin for Afib/Aflutter.    INR Goal: 2-3    INR today: 2.12    Assessment/Plan:  - INR therapeutic today. Continue with home dose of 5 mg today.  - Possible concomitant drug-drug interactions include: NA     Pharmacy will continue to follow.    Yanely Negrete, PharmD  PGY-1 Pharmacy Resident        
    Pharmacy to Dose Warfarin    Pharmacy consulted to dose warfarin for Afib/flutter.    INR Goal: 2-3    INR today: 1.89 after receiving boost doses.    Assessment/Plan:   Home dose is 2.5 mg daily per latest note, previously on 2.5 mg four days a week and 5 mg three days per week.  Since patient has been subtherapeutic consistently, I will increase the dose to 5 mg on Wed and Sat and 2.5 mg all other days of the week.    - Possible concomitant drug-drug interactions include: None     Pharmacy will continue to follow.    Rochelle Taylor Rph, BCPPS  [id number]    
    Pharmacy to Dose Warfarin    Pharmacy consulted to dose warfarin for Afib/flutter.    INR Goal: 2-3    INR today: 2.09    Assessment/Plan:  - INR within goal range after increase of dose to 5mg on Wed and Sat and 2.5 mg all other days of the week.    - Possible concomitant drug-drug interactions include: None     Pharmacy will continue to follow.    Rochelle Taylor Rph, BCPPS  [id number]    
    Pharmacy to Dose Warfarin    Pharmacy consulted to dose warfarin for Afib/flutter.    INR Goal: 2-3    INR today: 2.22    Assessment/Plan:  - INR therapeutic.  No changes  - Possible concomitant drug-drug interactions include: none     Pharmacy will continue to follow.    Rochelle Taylor Rph, BCPPS  [id number]    
  [facility] - Inpatient Rehabilitation Department   Phone: [phone]    Occupational Therapy    [x] Initial Evaluation            [] Daily Treatment Note         [] Discharge Summary      Patient: Juan Grant   : 1940   MRN: [id number]   Date of Service:  2024    Admitting Diagnosis:  Encephalopathy  Current Admission Summary: Juan Grant is a 84 y.o. male with PMHx notable for HTN, HLD, CAD, CHF, sick sinus syndrome, vertebral compression fractures who presented on  with fall and confusion. He was found unresponsive in the bathroom by family.  He was noted to have trauma to the left side of his forehead.  In the ED he was minimally responsive, and noted to be neglecting his right side.  CT Head showed no acute intracranial abnormality.  CTA Head/Neck showed no large vessel occlusion or significant stenosis.  MRI Brain showed no acute abnormality, did demonstrate moderate to severe microvascular ischemic changes and cerebral atrophy.  Neurology consulted, performed EEG which was within normal limits.  Patient was also found to have significant leukocytosis on admission, thought secondary to UTI.  He was started on IV ceftriaxone.  Nephrology following for PENELOPE, felt to be prerenal, improved after IV fluids.  Cardiology evaluated for syncope, interrogated pacemaker without any evidence of dysfunction, felt less likely to have been a cardiac issue responsible for his syncopal episode.  Hospital course otherwise complicated by: Orthostatic hypotension.      Patient reports that he is feeling kind of \"yucky\" today.  He complains mostly of abdominal cramping and nausea.  He was able to have a little bit for breakfast today, but says his appetite is poor.  Denies any fevers/chills, nasal congestion, cough, sore throat, dysuria, diarrhea, or constipation.  He reports stable chronic pain in his back.  Denies any other new concerns.  Past Medical History:  has a past medical history of 
  [facility] - Inpatient Rehabilitation Department   Phone: [phone]    Speech Therapy    [x] Initial Evaluation            [] Daily Treatment Note         [] Discharge Summary      Patient: Juan Grant   : 1940   MRN: [id number]   Date of Service:  2024  Admitting Diagnosis: Encephalopathy  Current Admission Summary: Juan Grant is a 84 y.o. male with PMHx notable for HTN, HLD, CAD, CHF, sick sinus syndrome, vertebral compression fractures who presented on  with fall and confusion. He was found unresponsive in the bathroom by family. He was noted to have trauma to the left side of his forehead. In the ED he was minimally responsive, and noted to be neglecting his right side. CT Head showed no acute intracranial abnormality. CTA Head/Neck showed no large vessel occlusion or significant stenosis. MRI Brain showed no acute abnormality, did demonstrate moderate to severe microvascular ischemic changes and cerebral atrophy. Neurology consulted, performed EEG which was within normal limits. Patient was also found to have significant leukocytosis on admission, thought secondary to UTI. He was started on IV ceftriaxone. Nephrology following for PENELOPE, felt to be prerenal, improving after IV fluids.     Recent MRI Brain/Head CT: 2024  IMPRESSION:  Motion artifact degrades the images.  Cerebral atrophy.  Moderate to severe  chronic small vessel ischemic changes.  No acute brain parenchymal  abnormality.    Recent Chest CT: 2024  IMPRESSION:  1. No acute intrathoracic, intra-abdominal or pelvic abnormality.  2. Moderate hiatal hernia.  3. Sigmoid diverticulosis without evidence of diverticulitis.  4. Old compression fracture of T12.      Instrumental Swallow Study:   Modified Barium Swallow evaluation completed on 2024. Patient presents with oropharyngeal dysphagia secondary to reduced bolus control, prolonged mastication, decreased/prolonged A-P transit, pooling to the 
  [facility] - Inpatient Rehabilitation Department   Phone: [phone]    Speech Therapy    [x] Initial Evaluation            [] Daily Treatment Note         [] Discharge Summary      Patient: Juan Grant   : 1940   MRN: [id number]   Date of Service:  2024  Admitting Diagnosis: Encephalopathy  Current Admission Summary: Juan Grant is a 84 y.o. male with PMHx notable for HTN, HLD, CAD, CHF, sick sinus syndrome, vertebral compression fractures who presented on  with fall and confusion. He was found unresponsive in the bathroom by family. He was noted to have trauma to the left side of his forehead. In the ED he was minimally responsive, and noted to be neglecting his right side. CT Head showed no acute intracranial abnormality. CTA Head/Neck showed no large vessel occlusion or significant stenosis. MRI Brain showed no acute abnormality, did demonstrate moderate to severe microvascular ischemic changes and cerebral atrophy. Neurology consulted, performed EEG which was within normal limits. Patient was also found to have significant leukocytosis on admission, thought secondary to UTI. He was started on IV ceftriaxone. Nephrology following for PENELOPE, felt to be prerenal, improving after IV fluids.     Recent MRI Brain/Head CT: 2024  IMPRESSION:  Motion artifact degrades the images.  Cerebral atrophy.  Moderate to severe  chronic small vessel ischemic changes.  No acute brain parenchymal  abnormality.    Recent Chest CT: 2024  IMPRESSION:  1. No acute intrathoracic, intra-abdominal or pelvic abnormality.  2. Moderate hiatal hernia.  3. Sigmoid diverticulosis without evidence of diverticulitis.  4. Old compression fracture of T12.      Instrumental Swallow Study:   Modified Barium Swallow evaluation completed on 2024. Patient presents with oropharyngeal dysphagia secondary to reduced bolus control, prolonged mastication, decreased/prolonged A-P transit, pooling to the 
  [facility] - Inpatient Rehabilitation Department   Phone: [phone]    Physical Therapy    [] Initial Evaluation            [x] Daily Treatment Note         [] Discharge Summary      Patient: Juan Grant   : 1940   MRN: [id number]   Date of Service:  2024  Admitting Diagnosis: Encephalopathy  Current Admission Summary: 84 y.o. male with PMHx of atrial fibrillation on Warfarin, CAD, CHF, MI, and sick sinus syndrome status post pacemaker who was admitted with Fall.  Patient was found to have a UTI which was treated with IV antibiotics.  Patient was also found to have elevated serum creatinine for which she was seen by nephrology with improvement.  During admission, patient was orthostatic but with IV fluid hydration he improved.  His pacemaker was under sensing but he was seen by cardiology and was found that he has 2.5 years of battery life left.  He was discharged to ARU in stable fashion.   Past Medical History:  has a past medical history of Allergic rhinitis, Atrial fibrillation ([facility]), Benign prostatic hypertrophy, Bladder stone, CAD (coronary artery disease), Calculus of gallbladder, Cancer ([facility]), CHF (congestive heart failure) ([facility]), Coronary artery disease involving native coronary artery of native heart without angina pectoris, DDD (degenerative disc disease), lumbar, Falls, GI bleeding, Hyperlipidemia, Hypertension, MI (myocardial infarction) ([facility]), MRSA (methicillin resistant staph aureus) culture positive, Osteomyelitis, Pneumonia, S/P PTCA (percutaneous transluminal coronary angioplasty), SSS (sick sinus syndrome) ([facility]), Unspecified sleep apnea, and Venous (peripheral) insufficiency.  Past Surgical History:  has a past surgical history that includes Carpal tunnel release; Coronary angioplasty with stent; Diagnostic Cardiac Cath Lab Procedure; Coronary angioplasty; Cardiac pacemaker placement; Appendectomy; pacemaker placement; eye surgery; Pacemaker Change (10/2016); Foot 
  [facility] - Inpatient Rehabilitation Department   Phone: [phone]    Occupational Therapy    [] Initial Evaluation            [x] Daily Treatment Note         [] Discharge Summary      Patient: Juan Grant   : 1940   MRN: [id number]   Date of Service:  2024    Admitting Diagnosis:  Encephalopathy  Current Admission Summary: Juan Grant is a 84 y.o. male with PMHx notable for HTN, HLD, CAD, CHF, sick sinus syndrome, vertebral compression fractures who presented on  with fall and confusion. He was found unresponsive in the bathroom by family.  He was noted to have trauma to the left side of his forehead.  In the ED he was minimally responsive, and noted to be neglecting his right side.  CT Head showed no acute intracranial abnormality.  CTA Head/Neck showed no large vessel occlusion or significant stenosis.  MRI Brain showed no acute abnormality, did demonstrate moderate to severe microvascular ischemic changes and cerebral atrophy.  Neurology consulted, performed EEG which was within normal limits.  Patient was also found to have significant leukocytosis on admission, thought secondary to UTI.  He was started on IV ceftriaxone.  Nephrology following for PENELOPE, felt to be prerenal, improved after IV fluids.  Cardiology evaluated for syncope, interrogated pacemaker without any evidence of dysfunction, felt less likely to have been a cardiac issue responsible for his syncopal episode.  Hospital course otherwise complicated by: Orthostatic hypotension.      Patient reports that he is feeling kind of \"yucky\" today.  He complains mostly of abdominal cramping and nausea.  He was able to have a little bit for breakfast today, but says his appetite is poor.  Denies any fevers/chills, nasal congestion, cough, sore throat, dysuria, diarrhea, or constipation.  He reports stable chronic pain in his back.  Denies any other new concerns.  Past Medical History:  has a past medical history of 
  [facility] - Inpatient Rehabilitation Department   Phone: [phone]    Occupational Therapy    [] Initial Evaluation            [] Daily Treatment Note         [x] Discharge Summary      Patient: Juan Grant   : 1940   MRN: [id number]   Date of Service:  2024    Admitting Diagnosis:  Encephalopathy  Current Admission Summary: Juan Grant is a 84 y.o. male with PMHx notable for HTN, HLD, CAD, CHF, sick sinus syndrome, vertebral compression fractures who presented on  with fall and confusion. He was found unresponsive in the bathroom by family.  He was noted to have trauma to the left side of his forehead.  In the ED he was minimally responsive, and noted to be neglecting his right side.  CT Head showed no acute intracranial abnormality.  CTA Head/Neck showed no large vessel occlusion or significant stenosis.  MRI Brain showed no acute abnormality, did demonstrate moderate to severe microvascular ischemic changes and cerebral atrophy.  Neurology consulted, performed EEG which was within normal limits.  Patient was also found to have significant leukocytosis on admission, thought secondary to UTI.  He was started on IV ceftriaxone.  Nephrology following for PENELOPE, felt to be prerenal, improved after IV fluids.  Cardiology evaluated for syncope, interrogated pacemaker without any evidence of dysfunction, felt less likely to have been a cardiac issue responsible for his syncopal episode.  Hospital course otherwise complicated by: Orthostatic hypotension.      Patient reports that he is feeling kind of \"yucky\" today.  He complains mostly of abdominal cramping and nausea.  He was able to have a little bit for breakfast today, but says his appetite is poor.  Denies any fevers/chills, nasal congestion, cough, sore throat, dysuria, diarrhea, or constipation.  He reports stable chronic pain in his back.  Denies any other new concerns.  Past Medical History:  has a past medical history of 
 PM assessment complete. VSS. Medications given per MAR. Fall precautions in place, hourly rounding, call light and belongings in reach, bed in lowest position, wheels locked in place, side rails up x 2, walkways free of clutter    
 PM assessment complete. VSS. Medications given per MAR. Fall precautions in place, hourly rounding, call light and belongings in reach, bed in lowest position, wheels locked in place, side rails up x 2, walkways free of clutter    
0022- Assessments completed. Alert, oriented x4. Calm and cooperative with care and medications. Patient had c/o pain to his back and c/o \"feeling sick\". Clarified to mean nauseated. Some belching noted. Patient provided with PRN Zofran and oxycodone as ordered. Patient then noted to sit up on the side of his bed stating that he feels better when sitting. Assisted to ambulate to recliner chair. Positioned for comfort and has been resting comfortably since. Using urinal as needed. Alarm on, call light and table within reach. No further needs expressed at this time.    0215- Patient remains up in recliner chair per his request stating that his pain and nausea continue to be tolerable. Noted to doze on and off for small naps. Provided with drink and offered snacks. Assisted to stand and use urinal as needed. Chair alarm on, call light in reach.    0545- Patient continues to state that his pain is tolerable and no nausea reported at this time. Took morning med without difficulty. Declines to get in bed stating he is most comfortable sitting up in chair. Continues to take intermittent naps. Chair alarm on and call light in reach.   
1241- Assessments completed. Patient cooperative with care and medications. No negative behaviors noted thus far. Patient provided with ample time to express thoughts and feelings. Noted to have an episode of urine incontinence earlier this evening stating he was unable to call for assistance. Call light was in reach. Upon providing jerson-care an open wound noted to tip of penis. Patient reports area is not painful, does not burn or itch. Area cleansed, zinc paste applied. C/O chronic back pain reporting that Lidocaine patch was ineffective. PRN Tylenol given. Patient agreeable to try Melatonin to promote rest in prep for therapy tomorrow. Positioned for comfort and provided snack and drink as requested. In bed, alarm on, bed in lowest position, call light and table within reach. No further needs expressed at this time.    0450- Patient assisted to stand at the bedside and urinate into urinal per his request. Patient reporting that he hasn't gotten much sleep due to pain. This nurse educated him on his PRN medications including Oxycodone. Patient hesitant to take medication but stated that he was having a lot of back pain and that he would try. Declined biofreeze and warm blanket. Assisted to position for comfort and propped with pillows as tolerated.     0545- Patient is resting in bed with his eyes closed. Reports pain is \"still there but improved\". Assisted to reposition for comfort. Bed in low position, call light in reach.    0645- Patient resting with eyes closed. Refused to get up for ST this morning stating he is able to rest at this time and would like to stay in bed. Family at bedside, bed in low position, call light in reach, alarm in place.   
4 Eyes Skin Assessment     NAME:  Juan Grant  YOB: 1940  MEDICAL RECORD NUMBER:  [id number]    The patient is being assessed for  Admission    I agree that at least one RN has performed a thorough Head to Toe Skin Assessment on the patient. ALL assessment sites listed below have been assessed.      Areas assessed by both nurses:    Head, Face, Ears, Shoulders, Back, Chest, Arms, Elbows, Hands, Sacrum. Buttock, Coccyx, Ischium, Legs. Feet and Heels, and Under Medical Devices         Does the Patient have a Wound? No noted wound(s)       Christopher Prevention initiated by RN: Yes  Wound Care Orders initiated by RN: No    Pressure Injury (Stage 3,4, Unstageable, DTI, NWPT, and Complex wounds) if present, place Wound referral order by RN under : No    New Ostomies, if present place, Ostomy referral order under : No     Nurse 1 eSignature: Electronically signed by Marce Lopez RN on 12/15/24 at 4:45 PM EST    **SHARE this note so that the co-signing nurse can place an eSignature**    Nurse 2 eSignature: Electronically signed by Fe Olivier RN on 12/15/24 at 7:14 PM EST   
ARU Admission Assessment    Ethnicity  \"Are you of , /a, or Guamanian origin?\"  Check all that apply:  [x] A.  No, not of , /a, or Guamanian Origin  [] B.  Yes, Ecuadorean, Ecuadorean American, Chicano/a  [] C.  Yes, Chinese  [] D.  Yes, Malik  [] E.  Yes, another , , or Guamanian origin  [] X.  Patient unable to respond  [] Y.  Patient declines to respond    Race  \"What is your race?\"  Check all that apply:  [x] A.  White  [] B.  Black or   [] C.   or   [] D.   Bahamian  [] E.  Chinese  [] F.  Angolan  [] G. Chilean  [] H.  Czech  [] I.  Greenlandic  [] J.  Other   [] K.    [] L.  Swedish or Contreras  [] M.  Palauan  [] N.  Other   [] X.  Patient unable to respond  [] Y.  Patient declines to respond  [] Z.  None of the above    Language  A.  \"What is your preferred language?\"   english    B.  \"Do you need or want an  to communicate with a doctor or health care staff?\"  Check only one:  [x] 0.  No  [] 1.  Yes  [] 9.  Unable to determine    Transportation  \"Has lack of transportation kept you from medical appointments, meetings, work, or from getting things needed for daily living?\"Check all that apply:  [] A.  Yes, it has kept me from medical appointments or from getting my medications  [] B.  Yes, it has kept me from non-medical meetings, appointments, work, or from getting things that I need  [x] C.  No  [] X.  Patient unable to respond  [] Y.  Patient declines to respond    Hearing  Ability to hear (with hearing aid or hearing appliances if normally used)  [x]  0.  Adequate - no difficulty in normal conversation, social interaction, listening to TV  []  1.  Minimal difficulty - difficulty in some environments (e.g. when person speaks softly or setting is noisy)  []  2.  Moderate difficulty - speaker has to increase volume and speak distinctly   []  3.  Highly impaired - absence of 
ARU Discharge Assessment    Transportation  \"Has lack of transportation kept you from medical appointments, meetings, work, or from getting things needed for daily living?\"Check all that apply:  [] A.  Yes, it has kept me from medical appointments or from getting my medications  [] B.  Yes, it has kept me from non-medical meetings, appointments, work, or from getting things that I need  [x] C.  No  [] X.  Patient unable to respond  [] Y.  Patient declines to respond    Provision of Current Reconciled Medication List to Subsequent Provider at Discharge  [] No, current reconciled medication list not provided to the subsequent provider.  [x] Yes, current reconciled medication list provided to the subsequent provider. (**Select route of transmission below**)   [x] Via Electronic Health Record   [] Via Health Information Exchange Organization  [] Verbal (e.g. in person, telephone, video conferencing)  [] Paper-based (e.g. fax, copies, printouts)   [] Other Methods (e.g. texting, email, CDs)    Provision of Current Reconciled Medication List to Patient at Discharge  [] No, current reconciled medication list not provided to the patient, family and/or caregiver.   [x] Yes, current reconciled medication list provided to the patient, family and/or caregiver.  (**Select route of transmission below**)   [] Via Electronic Health Record (e.g., electronic access to patient portal)   [] Via Health Information Exchange Organization  [x] Verbal (e.g. in person, telephone, video conferencing)  [x] Paper-based (e.g. fax, copies, printouts)   [] Other Methods (e.g. texting, email, CDs)    Health Literacy  \"How often do you need to have someone help you when you read instructions, pamphlets, or other written material from your doctor or pharmacy?\"  []  0.  Never  []  1.  Rarely  [x]  2.  Sometimes  []  3.  Often  []  4.  Always  []  7.  Patient declines to respond  []  8.  Patient unable to respond    BIMS - **Must be completed in the 
Admission Period/Goal QM Codes for Juan Grant.    QM Admit Code Goal Code   Eating 4 6   Oral Hygiene 4 6   Toileting Hygiene 2 6   Shower/Bathing 4 6   UB Dressing 3 6   LB Dressing 3 6   Putting on/off Footwear 3 6   Rolling Left and Right 4 6   Sit To Lying 3 6   Lying to Sitting on Bedside 3 6   Sit to Stand 3 6   Chair/Bed to Chair Transfer 2 6   Toilet Transfers 3 6   Car Transfers 4 6   Walk 10 Feet 4 6   Walk 50 Feet with Two Turns 4 6   Walk 150 Feet 88 6   Walk 10 Feet on Uneven Surfaces 4 6   1 Step (Curb) 4 6   4 Steps 4 6   12 Steps 4 6   Picking up Object from Floor 4 6   Wheel 50 Feet with 2 Turns 9 -   Type - -   Wheel 150 Feet 9 -   Type - -       The above codes were determined by the treatment team to be the patient's accurate admission assessment codes based on assessment performed soon after the patient's admission and prior to the benefit of services provided by staff, or if appropriate, the patient's usual performance at admission.    OT:  Scott Guajardo OTR/VIANNEY #[id number], 12/18/2024, 1539     PT:  Karen Costello PT, DPT, [id number] 12/18/2024 1304     RN:  Fe Olivier, 12/18/24, 1258     ST:  Lorri Echols M.A. CCC-SLP #[id number] 12/19/24, 0805     :  Bi Zee PT, DPT [id number]  12/19/24  9:16 AM     
Another RN noted that patient had voided in an emesis bag. Patient reportedly threatened to throw the bag of urine at the RN while she was at bedside. Urinal provided to patient; urinal within reach. In bed, alarm on, bed in lowest position, call light and table within reach,  monitoring patient from video monitor at bedside.    2200  Assessment complete. Alert, oriented x4. Reports discomfort to mid chest/epigastric region and back. Given PRN famotidine and PRN Tylenol for relief. Stood at bedside using walker and contact guard assistance to remove pants. The care plan and education has been reviewed and mutually agreed upon with the patient. Fall precautions remain in place.  No further needs expressed at this time.    2330   reports that patient has repeatedly sat up on the side of the bed without assistance. With each instance,  presented to room in an attempt to ensure patient safety by helping him return to a lying position in the bed. Patient disagreeable and argumentative with staff, but would lay back in bed after some time. Upon this RN's entry to room, patient seated on the side of the bed, leaning forward with his head hanging down by his knees. Patient educated on fall and subsequent injury risks associated with this action. Offered to get patient up to chair if he was uncomfortable in the bed, with the understanding that he could not lean forward while in the chair as it would still be unsafe as it placed him at risk for falls and injury. Patient assisted to the chair using walker and contact guard assistance. Alarm engaged, bedside table and call light within reach,  continues to monitor patient from video monitor.    0000   noted patient leaning forward while in chair. Patient reminded to sit upright to decrease risk of falls and subsequent injuries. Fall precautions remain in place.    0030  Patient assisted back to bed 
C/o nausea after eating 50% of breakfast.  Administered zofran po.   
CLINICAL PHARMACY NOTE: MEDS TO BEDS    Total # of Prescriptions Filled: 1   The following medications were delivered to the patient:  PANTOPRAZOLE SODIUM 40MG TBEC    Additional Documentation: Patient daughter picked up=signed  Peg Fontanez Pharmacy Tech  
Discharge instruction reviewed with pt and his daughter. Pt's daughter picked up the pt's meds from outpt pharmacy yesterday. Pt and his daughter are made aware about f/u appointments and are agreeable to the plan. Pt discharged per physician order. Pt is being wheeled upto the car. Pt's daughter is picking up the pt.   
Found the pt sitting at the edge of bed.  The pt stated that he was just sitting because he did not want to lay in bed.  Denying pain.  Assisted the pt to a recliner.  Chair alarm in place, call light and bedside table with in reach.    
Juan Grant  12/17/2024  [id number]    Chief Complaint: Encephalopathy    Subjective    No acute events overnight.     Patient says he continues to feel \"yucky\" today. He is complaining of thoracic back pain (R>L). Agreeable to try lidocaine patch for this.    Last Bowel Movement:  12/16/24        Objective    Patient Vitals for the past 24 hrs:   BP Temp Temp src Pulse Resp SpO2 Height Weight   12/17/24 0830 111/71 97 °F (36.1 °C) Oral 64 18 95 % -- --   12/17/24 0655 -- -- -- -- -- -- -- 65.6 kg (144 lb 10 oz)   12/16/24 2152 134/83 98.2 °F (36.8 °C) Oral 67 16 94 % -- --   12/16/24 1228 -- -- -- -- -- -- 1.753 m (5' 9\") --   12/16/24 0930 (!) 93/51 97.7 °F (36.5 °C) Oral 76 18 97 % -- --     Gen: No distress, pleasant.   HEENT: Normocephalic, atraumatic.   CV: Regular rate and rhythm. Extremities warm, well perfused.   Resp: No respiratory distress. CTAB.  Abd: Soft, nontender.  Back: No ecchymoses. Mildly TTP right thoracic paraspinals.   Ext: No edema.   Neuro: Alert, oriented, appropriately interactive.     Laboratory data: Available via EMR.     Therapy progress:       PT    Supine to Sit: Supervision or touching assistance  Sit to Supine: Supervision or touching assistance   Sit to Stand: Partial/moderate assistance  Chair/Bed to Chair Transfer: Supervision or touching assistance  Car Transfer: Supervision or touching assistance  Ambulation 10 ft: Supervision or touching assistance  Ambulation 50 ft: Supervision or touching assistance  Ambulation 150 ft:    Stairs - 1 Step: Supervision or touching assistance  Stairs - 4 Step: Supervision or touching assistance  Stairs - 12 Step: Supervision or touching assistance    OT    Eating: Independent  Oral Hygiene: Supervision or touching assistance  Bathing: Supervision or touching assistance  Upper Body Dressing: Partial/moderate assistance  Lower Body Dressing: Partial/moderate assistance  Toilet Transfer: Supervision or touching assistance  Toilet Hygiene: 
Juan Grant  12/18/2024  [id number]    Chief Complaint: Encephalopathy    Subjective    No acute events overnight.     Slept poorly, having back pain overnight. Patient would like to try back on the q48h dosing for his Fentanyl patch. Also still has lesion on the end of his penis, which is nonpainful and he says does seem to be healing.     Last Bowel Movement:  12/17/24 (per pt.)        Objective    Patient Vitals for the past 24 hrs:   BP Temp Temp src Pulse Resp SpO2 Weight   12/18/24 0533 -- -- -- -- 16 -- --   12/18/24 0450 -- -- -- -- -- -- 66.7 kg (147 lb 0.8 oz)   12/17/24 2030 121/64 97.8 °F (36.6 °C) Oral 68 16 93 % --     Gen: No distress, pleasant.   HEENT: Normocephalic, atraumatic.   CV: Regular rate and rhythm. Extremities warm, well perfused.   Resp: No respiratory distress. CTAB.  Abd: Soft, nontender.  : Ulcerated lesion on penile plans, with erythematous scalloped borders.  Ext: No edema.   Neuro: Alert, oriented, appropriately interactive.     Laboratory data: Available via EMR.     Therapy progress:       PT    Supine to Sit: Supervision or touching assistance  Sit to Supine: Supervision or touching assistance   Sit to Stand: Partial/moderate assistance  Chair/Bed to Chair Transfer: Supervision or touching assistance  Car Transfer: Supervision or touching assistance  Ambulation 10 ft: Supervision or touching assistance  Ambulation 50 ft: Supervision or touching assistance  Ambulation 150 ft:    Stairs - 1 Step: Supervision or touching assistance  Stairs - 4 Step: Supervision or touching assistance  Stairs - 12 Step: Supervision or touching assistance    OT    Eating: Independent  Oral Hygiene: Supervision or touching assistance  Bathing: Supervision or touching assistance  Upper Body Dressing: Partial/moderate assistance  Lower Body Dressing: Partial/moderate assistance  Toilet Transfer: Supervision or touching assistance  Toilet Hygiene: Supervision or touching assistance    Speech 
Juan Grant  12/19/2024  [id number]    Chief Complaint: Encephalopathy    Subjective    No acute events overnight.     Patient reports that he is doing well today. His pain is improved from yesterday. Denies any new concerns. His daughter is wondering if he may qualify for hospital bed at home.     Last Bowel Movement:  12/17/24 (per pt)        Objective    Patient Vitals for the past 24 hrs:   BP Temp Temp src Pulse Resp SpO2 Weight   12/19/24 0929 (!) 95/56 98.1 °F (36.7 °C) Oral 76 17 97 % --   12/19/24 0622 -- -- -- -- -- -- 69.9 kg (154 lb)   12/18/24 2047 -- -- -- -- 16 -- --   12/18/24 2000 (!) 110/55 98.9 °F (37.2 °C) Oral 64 16 96 % --     Gen: No distress, pleasant.   HEENT: Normocephalic, atraumatic.   CV: Regular rate and rhythm. Extremities warm, well perfused.   Resp: No respiratory distress. CTAB.  Abd: Soft, nontender.  Ext: No edema.   Neuro: Alert, oriented, appropriately interactive.     Laboratory data: Available via EMR.     Therapy progress:       PT    Supine to Sit: Supervision or touching assistance  Sit to Supine: Supervision or touching assistance   Sit to Stand: Partial/moderate assistance  Chair/Bed to Chair Transfer: Supervision or touching assistance  Car Transfer: Supervision or touching assistance  Ambulation 10 ft: Supervision or touching assistance  Ambulation 50 ft: Supervision or touching assistance  Ambulation 150 ft:    Stairs - 1 Step: Supervision or touching assistance  Stairs - 4 Step: Supervision or touching assistance  Stairs - 12 Step: Supervision or touching assistance    OT    Eating: Independent  Oral Hygiene: Supervision or touching assistance  Bathing: Supervision or touching assistance  Upper Body Dressing: Partial/moderate assistance  Lower Body Dressing: Partial/moderate assistance  Toilet Transfer: Supervision or touching assistance  Toilet Hygiene: Supervision or touching assistance    Speech Therapy    Pt presents with moderate to severe cognitive linguistic 
Nephrology Progress Note                                                                                                                                                                                                                                                                                                                                                               Office : [phone]     Fax :[phone]    Patient's Name: Juan Grant  6:24 PM  12/20/2024    Reason for Consult:  PENELOPE  Requesting Physician:  Ki Cross MD  Chief Complaint:    No chief complaint on file.      Assessment/Plan     # PENELOPE on CKD 2  - Resolved   - Likely 2/2 poor intake and Lasix  - Baseline Cr ~ 1.1  - Monitor renal labs   - Discussed with lindy Nielson at bedside     # Acid- base/ Electrolyte imbalance   Hypokalemia- resolved   - On KCL supplement  - Monitor     # AMS- Acute encephalopathy- resolved   -Neurology on board  -CT- no acute abnormality  -MRI- no acute abnormality  - EEG- WNL    # A-fib RVR  - on warfarin + dronedarone    #CHF  - Echo showing EF 55-60%    # BPH  - Resumed Flomax       History of Present Ilness:    Juan Grant is a 84 y.o. male with a pMHx of A-fib, CAD, CHF, MI, and sick sinus syndrome s/p pacemaker who presents to the ER with altered mental status.  Stroke alert was called by attending physician upon arrival and patient was taken to CT.   He was found after a fall in the shower.   Unknown last normal.  Baseline is alert and oriented. He is being admitted for further workup. We are consulted for an PENELOPE.       Interval hx     Working with PT this AM  Reports feeling better today - good appetite  Discussed with daughter Nisreen     Cr around baseline  Lytes stable  Bps labile  States he is having good UOP- not always documented    Past Medical History:   Diagnosis Date    Allergic rhinitis     Atrial fibrillation (HCC)     Benign prostatic hypertrophy     Bladder stone 05/02/2024    CAD 
Nephrology Progress Note                                                                                                                                                                                                                                                                                                                                                               Office : [phone]     Fax :[phone]    Patient's Name: Juan Grant  1:48 PM  12/18/2024    Reason for Consult:  PENELOPE  Requesting Physician:  Ki Cross MD  Chief Complaint:    No chief complaint on file.      Assessment/Plan     # PENELOPE on CKD 2  - Resolved   - Likely 2/2 poor intake and Lasix  - Baseline Cr ~ 1.1  - Monitor renal labs   - Discussed with lindy Nielson at bedside     # Acid- base/ Electrolyte imbalance   Hypokalemia- resolved   - On KCL supplement  - Monitor     # AMS- Acute encephalopathy- resolved   -Neurology on board  -CT- no acute abnormality  -MRI- no acute abnormality  - EEG- WNL    # A-fib RVR  - on warfarin + dronedarone    #CHF  - Echo showing EF 55-60%    # BPH  - Resumed Flomax       History of Present Ilness:    Juan Grant is a 84 y.o. male with a pMHx of A-fib, CAD, CHF, MI, and sick sinus syndrome s/p pacemaker who presents to the ER with altered mental status.  Stroke alert was called by attending physician upon arrival and patient was taken to CT.   He was found after a fall in the shower.   Unknown last normal.  Baseline is alert and oriented. He is being admitted for further workup. We are consulted for an PENELOPE.       Interval hx     Sleepy this morning- working with therapy  Reports ok appetite- drinking protein shakes   Discussed with lindy Nielson at bedside    Bps soft  Ok UOP  Lytes stable  Cr stable    Past Medical History:   Diagnosis Date    Allergic rhinitis     Atrial fibrillation (HCC)     Benign prostatic hypertrophy     Bladder stone 05/02/2024    CAD (coronary artery disease)     
Nephrology Progress Note                                                                                                                                                                                                                                                                                                                                                               Office : [phone]     Fax :[phone]    Patient's Name: Juan Grant  5:21 PM  12/19/2024    Reason for Consult:  PENELOPE  Requesting Physician:  iK Cross MD  Chief Complaint:    No chief complaint on file.      Assessment/Plan     # PENELOPE on CKD 2  - Resolved   - Likely 2/2 poor intake and Lasix  - Baseline Cr ~ 1.1  - Monitor renal labs   - Discussed with lindy Nielson at bedside     # Acid- base/ Electrolyte imbalance   Hypokalemia- resolved   - On KCL supplement  - Monitor     # AMS- Acute encephalopathy- resolved   -Neurology on board  -CT- no acute abnormality  -MRI- no acute abnormality  - EEG- WNL    # A-fib RVR  - on warfarin + dronedarone    #CHF  - Echo showing EF 55-60%    # BPH  - Resumed Flomax       History of Present Ilness:    Juan Grant is a 84 y.o. male with a pMHx of A-fib, CAD, CHF, MI, and sick sinus syndrome s/p pacemaker who presents to the ER with altered mental status.  Stroke alert was called by attending physician upon arrival and patient was taken to CT.   He was found after a fall in the shower.   Unknown last normal.  Baseline is alert and oriented. He is being admitted for further workup. We are consulted for an PENELOPE.       Interval hx     In between therapies  Reports better appetite today- drinking protein shakes   Discussed with daughter Nisreen at bedside  No updated labs today  Bps soft  Ok UOP    Past Medical History:   Diagnosis Date    Allergic rhinitis     Atrial fibrillation (HCC)     Benign prostatic hypertrophy     Bladder stone 05/02/2024    CAD (coronary artery disease)     Calculus of 
Nephrology Progress Note                                                                                                                                                                                                                                                                                                                                                               Office : [phone]     Fax :[phone]    Patient's Name: Juan Grant  1:53 PM  12/16/2024    Reason for Consult:  PENELOPE  Requesting Physician:  Ki Cross MD  Chief Complaint:    No chief complaint on file.      Assessment/Plan     # PENELOPE  - Resolved   - Likely 2/2 poor intake and Lasix  - Baseline Cr ~ 1.1  - Monitor renal labs   - Discussed with lindy Nielson at bedside     # Acid- base/ Electrolyte imbalance   Hypokalemia- resolved   - Monitor     # AMS- Acute encephalopathy- resolved   -Neurology on board  -CT- no acute abnormality  -MRI- no acute abnormality  - EEG- WNL    # A-fib RVR    #CHF  - Echo showing EF 55-60%    # BPH  - Resumef Flomax       History of Present Ilness:    Juan Grant is a 84 y.o. male with a pMHx of A-fib, CAD, CHF, MI, and sick sinus syndrome s/p pacemaker who presents to the ER with altered mental status.  Stroke alert was called by attending physician upon arrival and patient was taken to CT.   He was found after a fall in the shower.   Unknown last normal.  Baseline is alert and oriented. He is being admitted for further workup. We are consulted for an PENELOPE.       Interval hx     Reports some nausea and chest pain this AM- likely due to reflux per daughter Nisreen  Reports ok appetite- drinking protein shakes     Bp soft this AM  Lytes stable  Cr stable    Past Medical History:   Diagnosis Date    Allergic rhinitis     Atrial fibrillation (HCC)     Benign prostatic hypertrophy     Bladder stone 05/02/2024    CAD (coronary artery disease)     Calculus of gallbladder 05/02/2024    Cancer (HCC)     skin 
Nutrition Note    RECOMMENDATIONS  PO Diet: Dysphagia soft & bite sized, vegetarian  ONS: Magic cup BID, Ensure TID     NUTRITION ASSESSMENT   Nutrition intervention triggered for new ARU admission.  Pt receives a dysphagia soft & bite sized, vegetarian diet with variable po intake.  Dtr @ bedside & reports pt's appetite has been poor for months.  Po intake recorded at 26-75% of meals.  States pt has lost 10 lb in past month, however wt hx reveals 7 lb over course of 8 months, which is not considered significant.  Dtr states wts fluctuate d/t fluid build-up in BLE.  Noted pt with hx CHF which likely contributes to wt fluctuations.  Will add JODI modifier. Pt is drinking Ensure & likes magic cups as well.  Will con't to monitor for consistently adequate intake.     Nutrition Related Findings: labs reviewed; no edema noted; LBM 12/16  Wounds: None  Nutrition Education:  Education/Counseling not indicated   Nutrition Goals: PO intake 50% or greater     MALNUTRITION ASSESSMENT   Chronic Illness  Malnutrition Status: Moderate malnutrition  Findings of the 6 clinical characteristics of malnutrition:  Energy Intake:  75% or less estimated energy requirements for 1 month or longer  Weight Loss:  Mild weight loss (12.4% loss in 7 months per EMR)     Body Fat Loss:  Mild body fat loss (moderate) Orbital, Buccal region   Muscle Mass Loss:  Mild muscle mass loss (moderate) Temples (temporalis), Clavicles (pectoralis & deltoids)  Fluid Accumulation:  No fluid accumulation     Strength:  Not Performed      NUTRITION DIAGNOSIS   in context of chronic illness, Moderate malnutrition related to inadequate protein-energy intake, decreased appetite as evidenced by poor intake prior to admission, intake 26-50%, intake 51-75%, criteria as identified in malnutrition assessment      CURRENT NUTRITION THERAPIES  ADULT DIET; Dysphagia - Soft and Bite Sized; Vegetarian (Lacto-Ovo), No Drinking Straws  ADULT ORAL NUTRITION SUPPLEMENT; 
Nutrition Note    RECOMMENDATIONS  PO Diet: Dysphagia soft & bite sized, vegetarian, JODI  ONS: Ensure TID, magic cup BID    NUTRITION ASSESSMENT   Nutrition intervention triggered for f/u.  Pt remains on Dysphagia soft & bite sized diet per MBS, with JODI, vegetarian modifiers.  PO intake remains 26-50% of meals, but pt is taking ONS well (Ensure & magic cup).  Wt stable.  Will con't to monitor & encourage po & supplement intake.     Nutrition Related Findings: labs reviewed; LBM 12/17. No edema noted  Wounds: Open Wounds (tip of penis)  Nutrition Education:  Education/Counseling not indicated   Nutrition Goals: PO intake 50% or greater     MALNUTRITION ASSESSMENT   Chronic Illness  Malnutrition Status: Moderate malnutrition  As previously assessed 12/16.    NUTRITION DIAGNOSIS   in context of chronic illness, Moderate malnutrition related to decreased appetite as evidenced by poor intake prior to admission, intake 26-50%, criteria as identified in malnutrition assessment      CURRENT NUTRITION THERAPIES  ADULT ORAL NUTRITION SUPPLEMENT; Lunch, Dinner; Frozen Oral Supplement  ADULT ORAL NUTRITION SUPPLEMENT; Breakfast, Lunch, Dinner; Standard High Calorie/High Protein Oral Supplement  ADULT DIET; Dysphagia - Soft and Bite Sized; No Added Salt (3-4 gm); Vegetarian (Lacto-Ovo), No Drinking Straws     PO Intake: 26-50%   PO Supplement Intake:%    ANTHROPOMETRICS  Current Height: 175.3 cm (5' 9\")  Current Weight - Scale: 69.9 kg (154 lb)    Ideal Body Weight (IBW): 160 lbs  (73 kg)        BMI: 22.7      COMPARATIVE STANDARDS  Total Energy Requirements (kcals/day): 1921     Protein (g):  83- 104g       Fluid (mL/day):  1 ml/kcal    The patient will be monitored per nutrition standards of care. Consult dietitian if additional nutrition interventions are needed prior to RD reassessment.     Kerri Mckinnon, RD, LD    Contact: [phone]      
Patient noted to be leaning over bed railing. Patient educated on fall risk associated with this action. Patient stated that he was trying to get the foot of his bed flat as he was uncomfortable. Foot of bed noted to already be as flat as possible. Attempted to help patient get into a more comfortable position by making adjustments to HOB. Patient became agitated with staff for adjusting HOB. Patient wanting to dangle on the side of the bed. Educated patient on fall risk associated with being left alone in such a position without supervision from staff. Patient threatening to climb out of bed. Patient stated that staff better get large men in his room to keep him in bed if that was where staff wanted him to be. Patient sitting in bed, bent forward with complaints of nausea and mid chest pain (patient gestured to his epigastric region/mid chest area). Day shift RN reports that patient had recently been belching; day shift RN questioning if patient's symptoms could be related to indigestion. Patient unable to recall what he ate this evening. Patient denies having this pain before. PRN Zofran obtained by day shift RN; administered per MAR. Order placed for stat EKG. Alarm on, bed in lowest position, call light and table within reach, video monitor at bedside. No further needs expressed at this time.    1955  EKG completed. Patient noted to repeatedly lean over bed railing, despite continued fall risk education from staff. Order placed for  to ensure patient safety.    2030  Results of EKG read to on-call ARU provider, Dr. Ignacio: normal sinus rhythm, left bundle branch block. EKG completed yesterday noted to represent: sinus rhythm with occasional premature ventricular complexes, left bundle branch block. Order placed for one time dose of famotidine 20 mg. Plan for patient to receive pantoprazole 40 mg as ordered in the morning. Order placed for troponin level to be drawn.    2055  Patient reports 
Patient was admitted to room 4908 at 1600.  Patient was oriented to the Call Light, Phone, TV, Thermostat, Bed Controls, Bathroom and Emergency Cord.  Patient verbalized and demonstrated understanding of all.  Patient was also given an overview of Unit Routines for Acute Rehab, including the patient's rights and responsibilities as well as the CMS IRF ELIAS Privacy Act Statement providing notice of data collection.  Patient states that their normal bowel regime is daily. Meal times were explained, including how to order food.  The white board, (which is posted on the wall by the door is used for communication) has the Therapy Scheduled that is posted each day along with the name of your doctor, nurse, and therapist for your convenience.  We recommend any family that will be care givers or any care givers the patient has, take part in therapy.  We have no set visiting hours, we suggest non-caregiver friends and family visitors come after therapy (at 4 PM or later) to allow patient to rest in between sessions.      In conjunction with the patient and patient’s family, this nurse worked to establish a tailored Fall TIPS plan to ensure patient safety and compliance:    Falls TIPS Completion    Patient identified as increased risk for harm if fall:  [x] Yes     Fall Risks  History of Falls:    [x] Yes   Medication Side Effects:   [] Yes   Walking Aid:    [x] Yes   IV Pole or Equipment:   [] Yes   Unsteady Walk:     [x] Yes   May Forget or Choose Not to Call: [] Yes     Fall Interventions   Communicate Recent Fall and/or Risk of Harm: [x] Yes   Walking Aids:  Crutches: [] Yes   Cane: [x] Yes   Walker: [] Yes   IV Assistance When Walking: [] Yes   Toileting Schedule: Every 2 Hours  Bedpan:   [] Yes   Assist to Commode: [] Yes   Assist to Bathroom: [x] Yes   Bed Alarm On: [x] Yes   Assistance Out of Bed:  Bedrest: [] Yes   1 Person: [x] Yes   2 People: [] Yes   
Pt comfortably resting in chair. VSS. Denies any pain at this moment. Morning meds given per MAR. PWWW. AXOX4. No bleeding through the urethra. Call light and bedside table in reach. Chair locked and alarm on. The care plan and education has been reviewed and mutually agreed upon with the patient.      
Pt reporting sharp chest pain with ambulation during therapy. VSS. MD notified. EKG ordered. Pt remains in chair, daughter at bedside. Call light in reach, fall precautions in place.     1525: EKG shows [facility]ARYANN notified.   
Pt resting in chair, declined brief change and jerson-care. Offered to assist pt to bathroom or use urinal, pt also declined. Pt wishes to remain in chair at this time, declines pain, expresses no needs. Call light in reach, fall precautions in place.   
Pt's daughter to bring in home supply of multaq tomorrow per pharmacy request   
gallbladder 05/02/2024    Cancer ([facility])     skin    CHF (congestive heart failure) ([facility]) 08/17/2017    Coronary artery disease involving native coronary artery of native heart without angina pectoris 06/17/2011    DDD (degenerative disc disease), lumbar     Falls 08/17/2017    GI bleeding     Hyperlipidemia     Hypertension     MI (myocardial infarction) ([facility])     MRSA (methicillin resistant staph aureus) culture positive     h/o infection in the blood    Osteomyelitis     left foot    Pneumonia     S/P PTCA (percutaneous transluminal coronary angioplasty) stents x 8    SSS (sick sinus syndrome) ([facility])     Unspecified sleep apnea     Venous (peripheral) insufficiency 12/04/2018       Past Surgical History:   Procedure Laterality Date    APPENDECTOMY      CARDIAC PACEMAKER PLACEMENT      CARPAL TUNNEL RELEASE      CORONARY ANGIOPLASTY      CORONARY ANGIOPLASTY WITH STENT PLACEMENT      DIAGNOSTIC CARDIAC CATH LAB PROCEDURE      EYE SURGERY      FOOT DEBRIDEMENT Left 8/27/2021    INCISION AND DRAINAGE LEFT FOOT performed by Randolph Mir DPM at [facility] OR    PACEMAKER CHANGE  10/2016    PACEMAKER PLACEMENT      TOE AMPUTATION Left 8/27/2021    AMPUTATION OF LEFT SECOND TOE performed by Randolph Mir DPM at [facility] OR       Family History   Problem Relation Age of Onset    Cancer Sister     Coronary Art Dis Brother         reports that he quit smoking about 20 years ago. His smoking use included cigarettes. He started smoking about 50 years ago. He has a 30 pack-year smoking history. He has never used smokeless tobacco. He reports that he does not drink alcohol and does not use drugs.    Allergies:  Avelox [moxifloxacin hcl in nacl], Epinephrine base, Other, Keflex [cephalexin], and Polysporin [bacitracin-polymyxin b]    Current Medications:    lidocaine 4 % external patch 1 patch, Daily  miconazole (MICOTIN) 2 % powder, BID  famotidine (PEPCID) tablet 20 mg, BID PRN  melatonin tablet 3 mg, Nightly 
  no hearing aid  Observation:   General Observation:  RA  Posture:   Severe forward head, rounded shoulders, and thoracic kyphosis   Sensation:   reports numbness and tingling in (B) UE, (B) LE  Proprioception:    WFL  Tone:   Normotonic  Coordination Testing:   WFL    ROM:   (B) LE AROM WFL  Strength:   (B) LE strength grossly WFL  Therapist Clinical Decision Making (Complexity): low complexity  Clinical Presentation: stable      Subjective  General: Patient seated in recliner upon arrival and his daughter, Nisreen, is present during beginning of session. He is agreebable to PT evaluation.   Pain: 5/10.  Location: back   Pain Interventions: RN notified and repositioned        Functional Mobility  Bed Mobility:  Supine to Sit: stand by assistance  Sit to Supine: stand by assistance  Rolling Left: stand by assistance  Rolling Right: stand by assistance  Scooting: stand by assistance  Comments:Patient used UE support on bed rail when performing rolling L/R. Patient requires HOB elevated, at baseline, patient sleeps in recliner chair   Transfers:  Sit to stand transfer: contact guard assistance, minimal assistance  Stand to sit transfer: contact guard assistance  Stand step transfer: contact guard assistance  Toilet transfer: contact guard assistance  Car transfer: contact guard assistance, minimal assistance  Comments: Patient initially required Min A when performing first sit to stand to stand fully upright, but required less assistance in other trials. Verbal cueing required to ensure proper hand placement for on RW when performing sit to stand. Patient used UE support of grab bars when performing toilet transfer. Verbal cueing required to ensure maximized safety with car transfer (not using UE support on car door).   Ambulation:  Surface:level surface  Assistive Device: single point cane  Assistance: stand by assistance, contact guard assistance  Distance: 105 feet  Gait Mechanics: Severe forward trunk lean, 
continuously present, does not fluctuate  []  2.  Behavior present, fluctuates (comes and goes, changes in severity)    D.  Altered level of consciousness - Did the patient have altered level of consciousness as indicated by any of the following criteria?  Vigilant - startled easily to any sound or touch  Lethargic - repeatedly dozed off while being asked questions, but responded to voice or touch  Stuporous - very difficulty to arouse and keep aroused for the interview  Comatose - could not be aroused  [x]  0.  Behavior not present  []  1.  Behavior continuously present, does not fluctuate  []  2.  Behavior present, fluctuates (comes and goes, changes in severity)    Mood    \"Over the last 2 weeks, have you been bothered by any of the following problems?\" 1. Symptom Presence    0 = No  1 = Yes  9 = No Response 2. Symptom Frequency    0 = Never or 1 day  1 = 2-6 days (several days)  2 = 7-11 days (half or more of the days)  3 = 12-14 days (nearly every day)  **Leave blank if 'No Reponse'**      Enter scores in boxes    Column 1 Column 2   Little interest or pleasure in doing things   0 0   Feeling down, depressed, or hopeless   0 0   **If either A or B in column 2 is coded “2” or “3”, CONTINUE asking the questions below.  If not, END the interview.**     Trouble falling or staying asleep, or sleeping too much       Feeling tired or having little energy       Poor appetite or overeating       Feeling bad about yourself - or that you are a failure or have let yourself or your family down       Trouble concentrating on things, such as reading the newspaper or watching television       Moving or speaking so slowly that other people could have noticed.  Or the opposite- being so fidgety or restless that you have been moving around a lot more than usual.       Thoughts that you would be better off dead, or of hurting yourself in some way.       Total Severity: Add scores for all frequency responses in column 2 (possible 
syndrome  - s/p pacemaker, recently interrogated by EP without any signs of dysfunction    #. Fungal dermatitis  - Miconazole powder BID     #. EPNELOPE, resolved    #. Toenail dystrophy, resolved  - Podiatry consulted, nails debrided on 12/16. Follow-up outpatient at [facility].      Chronic Conditions:  - Chronic back pain: Continue home fentanyl 50 mcg/hr patch q48h. Oxycodone 5 mg q4h PRN for breakthrough pain. Lidocaine patch daily.   - CAD, HLD: Restarted home rosuvastatin  - BPH: Restarted home Flomax      CODE: DNR-CCA  Diet: ADULT ORAL NUTRITION SUPPLEMENT; Lunch, Dinner; Frozen Oral Supplement  ADULT ORAL NUTRITION SUPPLEMENT; Breakfast, Lunch, Dinner; Standard High Calorie/High Protein Oral Supplement  ADULT DIET; Regular; No Added Salt (3-4 gm); Vegetarian (Lacto-Ovo)  Bowels: Per protocol  Bladder: Per protocol   Sleep: Melatonin 3 mg nightly PRN  DVT PPx: On warfarin   TDD: TBD  Follow-ups: PCP    Future Appointments         Provider Specialty Dept Phone    2/4/2025 7:30 AM SCHEDULE, [facility] Cardiology [phone]    2/5/2025 1:30 PM Samir Lepe MD Cardiology [phone]    5/5/2025 1:00 PM SCHEDULE, [facility] DEVICE CHECK Cardiology [phone]    5/5/2025 1:30 PM Sulema Ewing APRN - CNP Cardiology [phone]              Micah Mora D.O. M.P.H  PM&R  12/20/2024  9:33 AM      * This document was created using dictation software.  While all precautions were taken to ensure accuracy, errors may have occurred.  Please disregard any typographical errors.  
assessment, recommendations and general speech pathology plan of care.   Learning Assessment: Pt requires ongoing learning . Pt's daughter present and verbalized understanding and agreement with plan.     Assessment  Impairments Requiring Therapeutic Intervention: Cognitive-Linguistic Deficits   Prognosis: fair, baseline/chronic memory decline     Clinical Assessment:  Pt presents with moderate to severe cognitive linguistic deficits, most significant with short term memory impacting carry over of new or complex information. He was impulsive and has limited carry over of safety protocols. Pt was able to express basic information and comprehend simple commands. Pt's daughter reported gradual decline in cognitive state for the last several years. He has never been evaluated or diagnosed with cognitive impairment. She reported pt was confused and limited in his communication upon admission but has progressed and feels he is near his baseline.  Pt and pt's daughter agreeable to SLP intervention for implementation of compensatory strategies to facilitate safe return to prior level of function.        Diet Solids Recommendation:   Liquid Consistency Recommendation:   Recommended Form of Meds:   Dysphagia III Soft and bite sized  - per pt's preference    Thin liquids, No straws     Meds whole with water     Allow straws to facilitate drinking while sitting up in chair with postural limitations related to pain. Encourage small single sips.      Recommended Compensatory Swallowing Strategies: Upright as possible with all PO intake , Small bites/sips , Eat/feed slowly, Aspiration Precautions       Plan  Frequency: 30 minutes/day; 5 days per week, as tolerated, until goals met, or discharged from ARU.  Therapeutic Interventions: Diet Tolerance Monitoring , Therapeutic Trials with SLP  Compensatory Cognitive intervention  and Patient/ Family education     Discharge  Barriers to discharge: Inability to effectively communicate in 
cues for thought expansion and to generate more detailed solutions for given situations     Additional Interventions: Provided education re: ways for pt to continue to stay mentally active upon d/c to daughter's house. Pt's daughter able to state things pt enjoys (e.g. reading magazines that has airplanes in it, completing puzzles). Pt unable to identify areas of interest regarding puzzles, word searches or programs he likes to watch on TV.       Education  Barriers To Learning: cognition  Patient Education: Provided education regarding role of SLP, results of assessment, recommendations and general speech pathology plan of care.   Learning Assessment: Pt requires ongoing learning . Pt's daughter present and verbalized understanding and agreement with plan.     Assessment  Impairments Requiring Therapeutic Intervention: Cognitive-Linguistic Deficits   Prognosis: fair, baseline/chronic memory decline     Clinical Assessment:  Pt continues with moderate to severe cognitive linguistic deficits, most significant with short term memory impacting carry over of new or complex information. Pt's daughter reported gradual decline in cognitive state for the last several years. He has never been evaluated or diagnosed with cognitive impairment. She reported pt was confused and limited in his communication upon admission but has progressed and feels he is back to his baseline. Provided educational resources regarding age related memory changes ([employer], Teepa Snow), resources for further evaluation for dementia concerns (neuropsychological testing, ongoing PCP follow up). Daughter verbalized understanding. Provided pt and pt's daughter with ways pt can stay mentally engaged at d/c. No further speech therapy recommended at d/c due to reports of pt returning to baseline mentation.      Diet Solids Recommendation:   Liquid Consistency Recommendation:   Recommended Form of Meds:   Regular texture diet  - recommend for pt to 
date.  Comments:  Balance:  Static Sitting Balance: fair (+): maintains balance at SBA/supervision without use of UE support  Dynamic Sitting Balance: fair (+): maintains balance at SBA/supervision without use of UE support  Static Standing Balance: fair (-): maintains balance at SBA with use of UE support  Dynamic Standing Balance: fair (-): maintains balance at CGA with use of UE support  Comments:       Other Therapeutic Interventions  First Session: Patient completes functional mobility as noted above. Patient completes 1 minute on seated stepper 2x and 5 minutes on seated stepper 1x with max encouragement and use of B LE only via w/c. Patient completes car transfer as noted above. Patient completes 2x5 forward step up onto 6 inch step with R LE and L LE each. Patient returns to recliner chair by end of session. Chair alarm on and call light within reach. Daughter asking questions regarding team conference, all questions answered.     Second Session: Patient seated in recliner chair upon arrival. Patient is agreeable to PT. Patient requires max encouragement throughout session. Patient completes STS from toilet, recliner chair and w/c with use of SPC requiring SBA-CGA. Patient ambulates 25 ft + 15 ft with use of hurrycane and CGA with SOB noticed by end of ambulation. Patient completes seated hip adduction squeeze for 5 second hold, weighted 3 lb marches, and weighted 3 lb LAQ of B LE 2x10 each exercise. Patient completes 5x STS in 22 seconds from w/c with hurrycane for support. Patient returns to recliner chair by end of session. Chair alarm on and call light within reach.        TU.35 seconds     Functional Outcomes                 Cognition  Overall Cognitive Status: Impaired  Arousal/Alertness: delayed responses to stimuli  Following Commands: follows one step commands with repetition  Attention Span: attends with cues to redirect  Memory: appears intact  Safety Judgement: decreased awareness of 
marches, LAQ and AP x 10-15 and encouraged to complete x 2 sets at home.   Patient completes  standing exercises at the ballet bare:  marching x 10 x 2, hip abd x 10 x 2 and heel raises x 10 x 2.  Chair alarm on and call light within reach.        TU.35 seconds     Functional Outcomes                 Cognition  Overall Cognitive Status: Impaired  Arousal/Alertness: delayed responses to stimuli  Following Commands: follows one step commands with repetition  Attention Span: attends with cues to redirect  Memory: appears intact  Safety Judgement: decreased awareness of need for assistance, decreased awareness of need for safety  Initiation: requires cues for some  Sequencing: requires cues for some  Orientation:    oriented to person, oriented to place, oriented to situation, and disoriented to time   Command Following:   accurately follows one step commands    Education  Barriers To Learning: cognition and physical  Patient Education: patient educated on goals, PT role and benefits, plan of care, proper use of assistive device/equipment, transfer training, discharge recommendations  Learning Assessment:  patient verbalizes understanding, would benefit from continued reinforcement    Assessment  Activity Tolerance: Fair; limited by chronic back pain   Impairments Requiring Therapeutic Intervention: decreased functional mobility, decreased ROM, decreased strength, decreased safety awareness, decreased cognition, decreased endurance, decreased balance, increased pain, decreased posture  Prognosis: good  Clinical Assessment: Patient is a 84 year old male admitted for encephalopathy. Prior to admission, patient reports independent with transfers and mod I for ambulation with use of rollator or SPC for functional mobility. Today, patient progresses to SBA for transfers and consistent ambulation of household distances with use of hurrycane. Daughter reports Pt. Is close to baseline with all mobility. He continues 
participatory until pt's son encouraged pt.     Supine>sit: max    STS from EOB:CGA    Functional mobility from bed<>bathroom: CGA w/ cane (poor SPC management)    Pt completed urination in stance: CGA    Pt completed hand hygiene in stance: CGA (encouragement for use of soap)    Transported to<>from therapy area in WC    Stand step t/f from WC<>therapy mat: CGA    Seated on therapy mat for cane exercises (1 set of 10x): shoulder flexion+ hip flexion, torso rotation, and chest press. Pt required increased time for rest breaks, mod verbal cues for accuracy, and visual cues given as needed.     Sit>supine: SBA    Scooting up in bed: dependent    Pt left in bed w/ all needs met, call light w/in reach, bed alarm activated, family at bedside and telesitter in place.       Functional Outcomes                                       Cognition  Overall Cognitive Status: Impaired  Memory: decreased recall of precautions, decreased recall of recent events, decreased short term memory, decreased long term memory  Safety Judgement: decreased awareness of need for assistance, decreased awareness of need for safety  Problem Solving: decreased awareness of errors, assistance required to identify errors made, assistance required to correct errors made  Insights: decreased awareness of deficits  Initiation: requires cues for some  Sequencing: requires cues for some  Comments: per pt's dtr report, his cognition is close to baseline  Orientation:    oriented to person, oriented to place, oriented to situation, and disoriented to time   Command Following:   accurately follows one step commands     Education  Barriers To Learning: cognition and physical  Patient Education: patient educated on goals, OT role and benefits, plan of care, precautions, ADL adaptive strategies, transfer training, discharge recommendations  Learning Assessment:  patient will require reinforcement due to cognitive deficits    Assessment  Activity Tolerance: 
  Patient will ambulate 150 ft with use of single point cane at modified independent  Patient will ascend/descend 1 stairs without use of HR at [facility] independent  Patient will complete car transfer at modified independent    Above goals reviewed on 12/18/2024.  All goals are ongoing at this time unless indicated above.      Therapy Session Time      Individual Group Co-treatment   Time In  0830       Time Out  0915       Minutes  45            Second Session Therapy Time:   Individual Concurrent Group Co-treatment   Time In  1315         Time Out  1345         Minutes  30           Timed Code Treatment Minutes:  45 + 30    Total Treatment Minutes:  75 Minutes      Electronically Signed By: Karen Costello, PT  Karen Costello PT, DPT, [id number]           
30    Electronically Signed By:   Lorri Echols M.A. CCC-SLP #[id number] 12/19/2024 3:18 PM  Speech-Language Pathologist      
Individual Group Co-treatment   Time In 0945      Time Out 1100      Minutes 75           Timed Code Treatment Minutes:  75 Minutes    Total Treatment Minutes:  75 Minutes       Electronically Signed By: ADY Martin/[id number]

## 2024-12-23 ENCOUNTER — TELEPHONE (OUTPATIENT)
Dept: FAMILY MEDICINE CLINIC | Age: 84
End: 2024-12-23

## 2024-12-23 NOTE — TELEPHONE ENCOUNTER
Spoke with patient states he will call us back later to schedule hospital follow up. Questions already asked just need appt made        Care Transitions Initial Follow Up Call    Outreach made within 2 business days of discharge: Yes    Patient: Juan Grant Patient : 1940   MRN: [id number]  Reason for Admission: 12/15-  Discharge Date: 24       Spoke with: Juan    Discharge department/facility: [facility] Interactive Patient Contact:  Was patient able to fill all prescriptions: Yes  Was patient instructed to bring all medications to the follow-up visit: Yes  Is patient taking all medications as directed in the discharge summary? Yes  Does patient understand their discharge instructions: Yes  Does patient have questions or concerns that need addressed prior to 7-14 day follow up office visit: no    Additional needs identified to be addressed with provider  No needs identified             Scheduled appointment with PCP within 7-14 days    Follow Up  Future Appointments   Date Time Provider Department Center   2025  7:30 AM SCHEDULE, [facility] [place] [facility]   2025  1:30 PM Samir Lepe MD [place] [facility]   2025  1:00 PM SCHEDULE, [facility] DEVICE CHECK [place] [facility]   2025  1:30 PM Sulema Ewing APRN - [facility] [place] [facility]       Nancy Means MA

## 2024-12-24 ENCOUNTER — OFFICE VISIT (OUTPATIENT)
Dept: FAMILY MEDICINE CLINIC | Age: 84
End: 2024-12-24

## 2024-12-24 VITALS
WEIGHT: 158 LBS | SYSTOLIC BLOOD PRESSURE: 137 MMHG | OXYGEN SATURATION: 97 % | HEART RATE: 72 BPM | DIASTOLIC BLOOD PRESSURE: 69 MMHG | BODY MASS INDEX: 23.33 KG/M2

## 2024-12-24 DIAGNOSIS — F33.1 MAJOR DEPRESSIVE DISORDER, RECURRENT, MODERATE (HCC): ICD-10-CM

## 2024-12-24 DIAGNOSIS — Z09 HOSPITAL DISCHARGE FOLLOW-UP: Primary | ICD-10-CM

## 2024-12-24 DIAGNOSIS — I10 HTN (HYPERTENSION), BENIGN: ICD-10-CM

## 2024-12-24 DIAGNOSIS — D69.2 SOLAR PURPURA (HCC): ICD-10-CM

## 2024-12-24 DIAGNOSIS — F33.41 RECURRENT MAJOR DEPRESSIVE DISORDER, IN PARTIAL REMISSION (HCC): ICD-10-CM

## 2024-12-24 RX ORDER — NYSTATIN 100000 U/G
CREAM TOPICAL
Qty: 60 G | Refills: 1 | Status: SHIPPED | OUTPATIENT
Start: 2024-12-24

## 2024-12-24 NOTE — PROGRESS NOTES
Post-Discharge Transitional Care Follow Up      Juan Grant   YOB: 1940    Date of Office Visit:  12/24/2024  Date of Hospital Admission: 12/15/24  Date of Hospital Discharge: 12/21/24  Readmission Risk Score (high >=14%. Medium >=10%):Readmission Risk Score: 19.1      Care management risk score Rising risk (score 2-5) and Complex Care (Scores >=6): No Risk Score On File     Non face to face  following discharge, date last encounter closed (first attempt may have been earlier): 12/23/2024     Call initiated 2 business days of discharge: Yes     Hospital discharge follow-up  -     MD DISCHARGE MEDS RECONCILED W/ CURRENT OUTPATIENT MED LIST    Medical Decision Making: high complexity  No follow-ups on file.    On this date 12/24/2024 I have spent 20 minutes reviewing previous notes, test results and face to face with the patient discussing the diagnosis and importance of compliance with the treatment plan as well as documenting on the day of the visit.       Subjective:   HPI patient had a fall at home was found on the floor brought to the hospital for weakness orthostatic hypotension and a UTI with PENELOPE.  Creatinine peaked at 1.9    Inpatient course: Discharge summary reviewed- see chart.  Received IV antibiotics stopped his Lasix and Toprol creatinine came down to 1.1 seen by cardiology and renal.  Was restarted on his Coumadin  Interval history/Current status: After hospitalization 12 11 through 12 15 he was transferred to acute rehab center until 1221.  He is now living at home with his daughter who is with him today  Still feels a little dizzy.  Pain is controlled with his Duragesic patch every 48 hours.  Visiting nurse PT and OT have not been out to see him yet he is not sleeping real well because he stopped take his melatonin.  He was given oxycodone for breakthrough pain but has not needed it.  He has had some mild wheezing and dyspnea on exertion per daughter, ankles are more

## 2025-01-01 ENCOUNTER — RESULTS FOLLOW-UP (OUTPATIENT)
Dept: CARDIOLOGY | Age: 85
End: 2025-01-01

## 2025-01-01 NOTE — TELEPHONE ENCOUNTER
Pt d/c home 12/21/24.  INR stable/therapeutic on d/c. Spoke with pt and scheduled for 1/8/24. He will confirm with his dtr for transportation.    Rachana Crawford, YumikoD, BCACP  [facility]   [facility] [place] Ph: [phone]  [facility] [place] Ph: [phone]  1/1/2025 12:56 PM

## 2025-01-08 ENCOUNTER — TELEPHONE (OUTPATIENT)
Dept: PHARMACY | Age: 85
End: 2025-01-08

## 2025-01-08 DIAGNOSIS — I48.0 PAROXYSMAL ATRIAL FIBRILLATION (HCC): Primary | ICD-10-CM

## 2025-01-08 NOTE — TELEPHONE ENCOUNTER
INR order sent to [facility] for INR draws with home visits.     Caroline Gonzalez, PharmD, [facility]  [facility]  [place]: [phone]  [place]: [phone]  1/8/2025 5:21 PM

## 2025-01-08 NOTE — TELEPHONE ENCOUNTER
Patient is getting home health services through [facility]. LVM with [facility] ([phone]) with verbal order for INR check with next home health visit. Also faxed INR order.     Caroline Gonzalez, PharmD, [facility]  [facility]  [place]: [phone]  [place]: [phone]  1/8/2025 9:00 AM    For Pharmacy Admin Tracking Only  Time Spent (min): 15

## 2025-01-10 ENCOUNTER — ANTI-COAG VISIT (OUTPATIENT)
Dept: PHARMACY | Age: 85
End: 2025-01-10

## 2025-01-10 DIAGNOSIS — I48.0 PAF (PAROXYSMAL ATRIAL FIBRILLATION) (HCC): Primary | ICD-10-CM

## 2025-01-10 LAB
INR BLD: 2.8
PROTIME: NORMAL

## 2025-01-10 NOTE — PROGRESS NOTES
ANTICOAGULATION SERVICE    Juan Grant is a 84 y.o. male with PMHx significant for HLD, PVD, CAD, HRN, Afib who presents to clinic 1/10/2025 for anticoagulation monitoring and adjustment. DOAC not an option due to hx of life threatening bleed while on previously. Patient not interested in watchman device. Patient is brought to appointments by his daughter, Nisreen. He lives with his son, João.     Anticoagulation Indication(s):  Afib    Referring Physician:   Dr. Lepe  Goal INR Range:  2-3  Duration of Anticoagulation Therapy:  Indefinite   Time of day dose taken: AM  Product patient has at home: warfarin 5 mg (peach color)    Pertinent labs:  Lab Results   Component Value Date    INR 2.80 01/10/2025    INR 2.12 (H) 12/21/2024    INR 2.22 (H) 12/20/2024    INR 2.09 (H) 12/19/2024       INR Summary                           Warfarin regimen (mg)  Date INR   A/P   Sun Mon Tue Wed Thu Fri Sat Mg/wk  1/10 2.8 At goal, continue 2.5 2.5 2.5 2.5 2.5 2.5 2.5 17.5  12/9 2.5 At goal, continue 2.5 2.5 2.5 2.5 2.5 2.5 2.5 17.5  11/27 3.3 Above goal, hold x1 2.5 2.5 2.5 2.5 2.5 2.5 2.5 17.5  11/15 2.4 At goal, continue 2.5 2.5 2.5 2.5 2.5 2.5 2.5 17.5  11/8 2.3 At goal, continue 2.5 2.5 2.5 2.5 2.5 2.5 2.5 17.5  11/4 5.6 Above goal, hold  2.5 2.5 0/2.5 0/2.5 2.5 2.5 2.5 17.5  10/2 2.1 At goal, continue  2.5 5 2.5 2.5 2.5 5 2.5 22.5  10/2 2.0 At goal, continue  2.5 5 2.5 2.5 2.5 5 2.5 22.5  9/27 3.7 Above goal, hold 2.5 5 2.5 2.5 2.5 0/5 2.5 22.5  9/23 6.0 Per cardiology  5 2.5 0 2.5 2.5 2.5 2.5   8/13 4.01 Per cardiology  5 2.5 0 2.5 2.5 5 5   3/21 2.3 Per cardiology  5 5 5 2.5 5 5 5 32.5  3/14 1.08 Per cardiology  5 5 5 2.5 5 5 5 32.5      Last CBC:  Lab Results   Component Value Date    RBC 3.15 (L) 12/20/2024    HGB 9.2 (L) 12/20/2024    HCT 26.8 (L) 12/20/2024    MCV 85.0 12/20/2024    MCH 29.0 12/20/2024    MPV 7.5 12/20/2024    RDW 16.2 (H) 12/20/2024     12/20/2024       Patient History:  Recent

## 2025-01-20 ENCOUNTER — ANTI-COAG VISIT (OUTPATIENT)
Dept: PHARMACY | Age: 85
End: 2025-01-20

## 2025-01-20 DIAGNOSIS — I48.0 PAF (PAROXYSMAL ATRIAL FIBRILLATION) (HCC): Primary | ICD-10-CM

## 2025-01-20 LAB
INR BLD: 2.3
PROTIME: NORMAL

## 2025-01-20 NOTE — PROGRESS NOTES
ANTICOAGULATION SERVICE    Juan Grant is a 84 y.o. male with PMHx significant for HLD, PVD, CAD, HRN, Afib who presents to clinic 1/20/2025 for anticoagulation monitoring and adjustment. DOAC not an option due to hx of life threatening bleed while on previously. Patient not interested in watchman device. Patient is brought to appointments by his daughter, Nisreen. He lives with his son, João.     Anticoagulation Indication(s):  Afib    Referring Physician:   Dr. Lepe  Goal INR Range:  2-3  Duration of Anticoagulation Therapy:  Indefinite   Time of day dose taken: AM  Product patient has at home: warfarin 5 mg (peach color)    Pertinent labs:  Lab Results   Component Value Date    INR 2.80 01/10/2025    INR 2.12 (H) 12/21/2024    INR 2.22 (H) 12/20/2024    INR 2.09 (H) 12/19/2024       INR Summary                           Warfarin regimen (mg)  Date INR   A/P   Sun Mon Tue Wed Thu Fri Sat Mg/wk  1/20 2.3 At goal, continue 2.5 2.5 2.5 2.5 2.5 2.5 2.5 17.5  1/10 2.8 At goal, continue 2.5 2.5 2.5 2.5 2.5 2.5 2.5 17.5  12/9 2.5 At goal, continue 2.5 2.5 2.5 2.5 2.5 2.5 2.5 17.5  11/27 3.3 Above goal, hold x1 2.5 2.5 2.5 2.5 2.5 2.5 2.5 17.5  11/15 2.4 At goal, continue 2.5 2.5 2.5 2.5 2.5 2.5 2.5 17.5  11/8 2.3 At goal, continue 2.5 2.5 2.5 2.5 2.5 2.5 2.5 17.5  11/4 5.6 Above goal, hold  2.5 2.5 0/2.5 0/2.5 2.5 2.5 2.5 17.5  10/2 2.1 At goal, continue  2.5 5 2.5 2.5 2.5 5 2.5 22.5  10/2 2.0 At goal, continue  2.5 5 2.5 2.5 2.5 5 2.5 22.5  9/27 3.7 Above goal, hold 2.5 5 2.5 2.5 2.5 0/5 2.5 22.5  9/23 6.0 Per cardiology  5 2.5 0 2.5 2.5 2.5 2.5   8/13 4.01 Per cardiology  5 2.5 0 2.5 2.5 5 5   3/21 2.3 Per cardiology  5 5 5 2.5 5 5 5 32.5  3/14 1.08 Per cardiology  5 5 5 2.5 5 5 5 32.5      Last CBC:  Lab Results   Component Value Date    RBC 3.15 (L) 12/20/2024    HGB 9.2 (L) 12/20/2024    HCT 26.8 (L) 12/20/2024    MCV 85.0 12/20/2024    MCH 29.0 12/20/2024    MPV 7.5 12/20/2024    RDW 16.2 (H) 12/20/2024

## 2025-01-27 ENCOUNTER — ANTI-COAG VISIT (OUTPATIENT)
Dept: PHARMACY | Age: 85
End: 2025-01-27

## 2025-01-27 DIAGNOSIS — I48.0 PAF (PAROXYSMAL ATRIAL FIBRILLATION) (HCC): Primary | ICD-10-CM

## 2025-01-27 LAB
INR BLD: 1.8
PROTIME: NORMAL

## 2025-01-27 NOTE — PROGRESS NOTES
ANTICOAGULATION SERVICE    Juan Grant is a 84 y.o. male with PMHx significant for HLD, PVD, CAD, HRN, Afib who presents to clinic 1/27/2025 for anticoagulation monitoring and adjustment. DOAC not an option due to hx of life threatening bleed while on previously. Patient not interested in watchman device. Patient is brought to appointments by his daughter, Nisreen. He lives with his son, João.     Anticoagulation Indication(s):  Afib    Referring Physician:   Dr. Lepe  Goal INR Range:  2-3  Duration of Anticoagulation Therapy:  Indefinite   Time of day dose taken: AM  Product patient has at home: warfarin 5 mg (peach color)    Pertinent labs:  Lab Results   Component Value Date    INR 1.80 01/27/2025    INR 2.30 01/20/2025    INR 2.80 01/10/2025    INR 2.12 (H) 12/21/2024       INR Summary                           Warfarin regimen (mg)  Date INR   A/P   Sun Mon Tue Wed Thu Fri Sat Mg/wk  1/27 1.8 Below goal, bolus 2.5 5/2.5 2.5 2.5 2.5 2.5 2.5 17.5  1/20 2.3 At goal, continue 2.5 2.5 2.5 2.5 2.5 2.5 2.5 17.5  1/10 2.8 At goal, continue 2.5 2.5 2.5 2.5 2.5 2.5 2.5 17.5  12/9 2.5 At goal, continue 2.5 2.5 2.5 2.5 2.5 2.5 2.5 17.5  11/27 3.3 Above goal, hold x1 2.5 2.5 2.5 2.5 2.5 2.5 2.5 17.5  11/15 2.4 At goal, continue 2.5 2.5 2.5 2.5 2.5 2.5 2.5 17.5  11/8 2.3 At goal, continue 2.5 2.5 2.5 2.5 2.5 2.5 2.5 17.5  11/4 5.6 Above goal, hold  2.5 2.5 0/2.5 0/2.5 2.5 2.5 2.5 17.5  10/2 2.1 At goal, continue  2.5 5 2.5 2.5 2.5 5 2.5 22.5  10/2 2.0 At goal, continue  2.5 5 2.5 2.5 2.5 5 2.5 22.5  9/27 3.7 Above goal, hold 2.5 5 2.5 2.5 2.5 0/5 2.5 22.5  9/23 6.0 Per cardiology  5 2.5 0 2.5 2.5 2.5 2.5   8/13 4.01 Per cardiology  5 2.5 0 2.5 2.5 5 5   3/21 2.3 Per cardiology  5 5 5 2.5 5 5 5 32.5  3/14 1.08 Per cardiology  5 5 5 2.5 5 5 5 32.5      Last CBC:  Lab Results   Component Value Date    RBC 3.15 (L) 12/20/2024    HGB 9.2 (L) 12/20/2024    HCT 26.8 (L) 12/20/2024    MCV 85.0 12/20/2024    MCH 29.0

## 2025-02-03 ENCOUNTER — ANTI-COAG VISIT (OUTPATIENT)
Dept: PHARMACY | Age: 85
End: 2025-02-03

## 2025-02-03 DIAGNOSIS — I48.0 PAF (PAROXYSMAL ATRIAL FIBRILLATION) (HCC): Primary | ICD-10-CM

## 2025-02-03 LAB
INR BLD: 1.9
PROTIME: NORMAL

## 2025-02-03 NOTE — PATIENT INSTRUCTIONS
Thank you for choosing [facility] for your cardiac care.    During your visit today, we reviewed and confirmed your cardiac medications along with  medication prescribed by your other healthcare team members. Please be sure to discuss any  changes to medication with your providers.    Please bring a list of ALL medications (or the bottles) with you to EVERY appointment.  Also include vitamins and over-the-counter medications.    If you need refills for any cardiac medications, please call your pharmacy and they will reach out to us electronically.    Did your provider order testing today? If yes, then you will receive your results in three  possible ways. You can receive a [employer] message, a phone call, or letter in the mail. Please  note, if you are an active [employer] user, some of your testing will be available within 1-2 days.    Finally, please know that it is good for your heart to exercise and follow a healthy, low-fat diet  as advised by your physician and health care providers.    If you are experiencing a medical emergency, please call 911 immediately.    It's easy to register for a [employer] account if you don't already have one. With a [employer]  account you can manage your health record, view test results, schedule appointments and  more.     Dr. Lepe's clinical staff can be reached at the following phone number: [phone]    If any cardiac testing is ordered, please contact central scheduling at [phone] to get your test scheduled.     Get lab work in one week after start Aldactone

## 2025-02-03 NOTE — PROGRESS NOTES
in the right ventricle. Normal systolic function. TAPSE is 2.5 cm. RV Peak S' is 13.1 cm/s.    Tricuspid Valve: The estimated RVSP is 35 mmHg.    Interatrial Septum: No interatrial shunt visualized with color Doppler.   Bubble study was performed, but is not optimal due to poor IV line.   Agitated saline study appears negative with and without provocation.    Right Atrium: Lead present in the right atrium. Right atrium is dilated.    Aorta: Normal sized aortic root. Dilated ascending aorta. Ao ascending diameter is 3.8 cm.    Image quality is suboptimal. Technically difficult study and procedure performed with the patient in a supine position.       Last Carotid Duplex (if available):10/20/15       Summary      - The right internal carotid artery appears to have a lower limits of 16-49%   diameter reducing stenosis based on velocity criteria.   - The left internal carotid artery appears to have a 1-15% diameter reducing   stenosis based on velocity criteria.   - Normal antegrade flow noted in vertebral arteries bilaterally.       Last Coronary Artery Calcium Score:                 All questions and concerns were addressed to the patient/family. Alternatives to my treatment were discussed. The note was completed using EMR. Every effort was made to ensure accuracy; however, inadvertent computerized transcription errors may be present.    Thank you for allowing me to participate in the care of your patient.    I, Samir Lepe MD, personally performed the services prescribed in this documentation as scribed by Ms. Luis CROWELL in my presence and it is both accurate and complete.       Samir Lepe MD  [facility]  [address] , [address], [facility] [address]  Tel   Fax

## 2025-02-03 NOTE — PROGRESS NOTES
26.8 (L) 12/20/2024    MCV 85.0 12/20/2024    MCH 29.0 12/20/2024    MPV 7.5 12/20/2024    RDW 16.2 (H) 12/20/2024     12/20/2024       Patient History:  Recent hospitalizations/HC visits 12/15-12/21: Due to fall at home (orthostatic hypotension)   Recent medication changes None    Medications taken regularly that may interact with warfarin or alter INR Co-Q 10      Warfarin dose taken as prescribed Uses pillbox that is filled by his daughter, Nisreen    Signs/symptoms of bleeding 1/17/25: Significant nose bleed, but no ED visit  Life threatening GI bleed on DOAC (1/2017)    Vitamin K intake Normally has ~0 servings of green, leafy vegetables per week.    Recent vomiting/diarrhea/fever, changes in weight or activity level None reported   Tobacco or alcohol use Patient denies drinking or smoking    Upcoming surgeries or procedures None reported     Assessment/Plan:  Patient's INR was slightly subtherapeutic today per HHRN Eunice ([phone]). He is now getting HH services through [facility] and living with his daughter, Nisreen, who helps with his medical care. HHRN reports they will continue to visit for 3 more weeks. They deny any changes to medications, diet, or missed doses since last week.     He has started taking warfarin in the morning only, which has helped with compliance.    Pt was instructed to bolus with 5 mg then continue with 2.5 mg (1/2 tablet) everyday.     Will recheck INR in one week.     Morgan Fernandez, PharmD  [facility]  Office: [phone]  Fax: [phone]  2/3/2025 10:54 AM      For Pharmacy Admin Tracking Only    Intervention Detail: Dose Adjustment: 1, reason: Therapy Optimization  Total # of Interventions Recommended: 1  Total # of Interventions Accepted: 1  Time Spent (min): 15

## 2025-02-04 ENCOUNTER — TELEPHONE (OUTPATIENT)
Dept: CARDIOLOGY CLINIC | Age: 85
End: 2025-02-04

## 2025-02-04 ENCOUNTER — OFFICE VISIT (OUTPATIENT)
Dept: FAMILY MEDICINE CLINIC | Age: 85
End: 2025-02-04
Payer: MEDICARE

## 2025-02-04 VITALS
HEIGHT: 69 IN | OXYGEN SATURATION: 98 % | RESPIRATION RATE: 15 BRPM | BODY MASS INDEX: 23.33 KG/M2 | HEART RATE: 67 BPM | DIASTOLIC BLOOD PRESSURE: 68 MMHG | SYSTOLIC BLOOD PRESSURE: 122 MMHG

## 2025-02-04 DIAGNOSIS — K62.5 GASTROINTESTINAL HEMORRHAGE ASSOCIATED WITH ANORECTAL SOURCE: Primary | ICD-10-CM

## 2025-02-04 DIAGNOSIS — Z86.73 HISTORY OF STROKE: ICD-10-CM

## 2025-02-04 DIAGNOSIS — I50.42 CHRONIC COMBINED SYSTOLIC AND DIASTOLIC HEART FAILURE (HCC): ICD-10-CM

## 2025-02-04 DIAGNOSIS — N48.1 BALANITIS: Primary | ICD-10-CM

## 2025-02-04 DIAGNOSIS — F33.1 MAJOR DEPRESSIVE DISORDER, RECURRENT, MODERATE (HCC): ICD-10-CM

## 2025-02-04 DIAGNOSIS — I48.11 LONGSTANDING PERSISTENT ATRIAL FIBRILLATION (HCC): ICD-10-CM

## 2025-02-04 DIAGNOSIS — R39.9 UTI SYMPTOMS: ICD-10-CM

## 2025-02-04 LAB
BILIRUBIN, POC: NEGATIVE
BLOOD URINE, POC: NORMAL
CLARITY, POC: CLEAR
COLOR, POC: YELLOW
GLUCOSE URINE, POC: NEGATIVE MG/DL
KETONES, POC: NEGATIVE MG/DL
LEUKOCYTE EST, POC: NORMAL
NITRITE, POC: NEGATIVE
PH, POC: 6.5
PROTEIN, POC: NEGATIVE MG/DL
SPECIFIC GRAVITY, POC: 1.01
UROBILINOGEN, POC: 0.2 MG/DL

## 2025-02-04 PROCEDURE — 3078F DIAST BP <80 MM HG: CPT | Performed by: FAMILY MEDICINE

## 2025-02-04 PROCEDURE — 1036F TOBACCO NON-USER: CPT | Performed by: FAMILY MEDICINE

## 2025-02-04 PROCEDURE — G2211 COMPLEX E/M VISIT ADD ON: HCPCS | Performed by: FAMILY MEDICINE

## 2025-02-04 PROCEDURE — 99214 OFFICE O/P EST MOD 30 MIN: CPT | Performed by: FAMILY MEDICINE

## 2025-02-04 PROCEDURE — 1123F ACP DISCUSS/DSCN MKR DOCD: CPT | Performed by: FAMILY MEDICINE

## 2025-02-04 PROCEDURE — G8427 DOCREV CUR MEDS BY ELIG CLIN: HCPCS | Performed by: FAMILY MEDICINE

## 2025-02-04 PROCEDURE — 1159F MED LIST DOCD IN RCRD: CPT | Performed by: FAMILY MEDICINE

## 2025-02-04 PROCEDURE — G8420 CALC BMI NORM PARAMETERS: HCPCS | Performed by: FAMILY MEDICINE

## 2025-02-04 PROCEDURE — 3074F SYST BP LT 130 MM HG: CPT | Performed by: FAMILY MEDICINE

## 2025-02-04 PROCEDURE — 81002 URINALYSIS NONAUTO W/O SCOPE: CPT | Performed by: FAMILY MEDICINE

## 2025-02-04 RX ORDER — METRONIDAZOLE 500 MG/1
500 TABLET ORAL 3 TIMES DAILY
Qty: 21 TABLET | Refills: 0 | Status: SHIPPED | OUTPATIENT
Start: 2025-02-04 | End: 2025-02-05 | Stop reason: SDUPTHER

## 2025-02-04 ASSESSMENT — PATIENT HEALTH QUESTIONNAIRE - PHQ9
9. THOUGHTS THAT YOU WOULD BE BETTER OFF DEAD, OR OF HURTING YOURSELF: NOT AT ALL
SUM OF ALL RESPONSES TO PHQ9 QUESTIONS 1 & 2: 0
10. IF YOU CHECKED OFF ANY PROBLEMS, HOW DIFFICULT HAVE THESE PROBLEMS MADE IT FOR YOU TO DO YOUR WORK, TAKE CARE OF THINGS AT HOME, OR GET ALONG WITH OTHER PEOPLE: NOT DIFFICULT AT ALL
SUM OF ALL RESPONSES TO PHQ QUESTIONS 1-9: 0
5. POOR APPETITE OR OVEREATING: NOT AT ALL
3. TROUBLE FALLING OR STAYING ASLEEP: NOT AT ALL
SUM OF ALL RESPONSES TO PHQ QUESTIONS 1-9: 0
1. LITTLE INTEREST OR PLEASURE IN DOING THINGS: NOT AT ALL
7. TROUBLE CONCENTRATING ON THINGS, SUCH AS READING THE NEWSPAPER OR WATCHING TELEVISION: NOT AT ALL
SUM OF ALL RESPONSES TO PHQ QUESTIONS 1-9: 0
8. MOVING OR SPEAKING SO SLOWLY THAT OTHER PEOPLE COULD HAVE NOTICED. OR THE OPPOSITE, BEING SO FIGETY OR RESTLESS THAT YOU HAVE BEEN MOVING AROUND A LOT MORE THAN USUAL: NOT AT ALL
6. FEELING BAD ABOUT YOURSELF - OR THAT YOU ARE A FAILURE OR HAVE LET YOURSELF OR YOUR FAMILY DOWN: NOT AT ALL
4. FEELING TIRED OR HAVING LITTLE ENERGY: NOT AT ALL
SUM OF ALL RESPONSES TO PHQ QUESTIONS 1-9: 0
2. FEELING DOWN, DEPRESSED OR HOPELESS: NOT AT ALL

## 2025-02-04 NOTE — PROGRESS NOTES
Juan Grant is a 84 y.o. male.    HPI: Here with daughter for chronic medical visit  Sli itchy and mildly painful genital rash , had acyclivir,valtrex, nystatin, remains unimproved  Denies dysuria, frequency but has had uti several times without symptoms  Mild kelsey but sat goods  Taking duragesic patch with occ     oxycodone for pain - overall is better controlled  Minor chest pain thinks is GERD  Goes to pain doctor in 6 days, cardio tomorrow  VNS q week, she draws inr, call coumadin clinic  Mild epistaxix, no brbpr  Still taking iron  Meds, vitamins and allergies reviewed with pt    ROS: No TIA's or unusual headaches, no dysphagia.  No prolonged cough. No dyspnea or chest pain on exertion.  No abdominal pain, change in bowel habits, black or bloody stools.  No urinary tract symptoms.  No new or unusual musculoskeletal symptoms.       Prior to Visit Medications    Medication Sig Taking? Authorizing Provider   nystatin (MYCOSTATIN) 369460 UNIT/GM cream Apply topically 2 times daily. Yes Ki Cross MD   ondansetron (ZOFRAN-ODT) 4 MG disintegrating tablet Take 1 tablet by mouth every 8 hours as needed for Nausea or Vomiting Yes Micah Mora DO   lidocaine 4 % external patch Place 1 patch onto the skin daily Yes Micah Mora DO   miconazole (MICOTIN) 2 % powder Apply topically 2 times daily. Yes Micah Mora DO   melatonin 3 MG TABS tablet Take 1 tablet by mouth every evening Yes Micah Mora DO   polyethylene glycol (GLYCOLAX) 17 g packet Take 1 packet by mouth daily as needed for Constipation Yes Micah Mora DO   ferrous sulfate (IRON 325) 325 (65 Fe) MG tablet Take 1 tablet by mouth 2 times daily (with meals) Yes Micah Mora DO   pantoprazole (PROTONIX) 40 MG tablet Take 1 tablet by mouth daily TAKE 1 TABLET BY MOUTH EVERY MORNING BEFORE BREAKFAST Yes Dmitriy Quarles MD   tamsulosin (FLOMAX) 0.4 MG capsule TAKE 1 CAPSULE BY MOUTH DAILY Yes Ki Cross MD   potassium

## 2025-02-04 NOTE — TELEPHONE ENCOUNTER
Pt's daughter called stating pt sent over gin transmission today and they just want to make sure it was received.    Cftyp-[phone]

## 2025-02-05 ENCOUNTER — OFFICE VISIT (OUTPATIENT)
Dept: CARDIOLOGY CLINIC | Age: 85
End: 2025-02-05
Payer: MEDICARE

## 2025-02-05 ENCOUNTER — TELEPHONE (OUTPATIENT)
Dept: FAMILY MEDICINE CLINIC | Age: 85
End: 2025-02-05

## 2025-02-05 VITALS
BODY MASS INDEX: 22.59 KG/M2 | WEIGHT: 153 LBS | HEART RATE: 74 BPM | DIASTOLIC BLOOD PRESSURE: 72 MMHG | OXYGEN SATURATION: 92 % | SYSTOLIC BLOOD PRESSURE: 138 MMHG

## 2025-02-05 DIAGNOSIS — I25.10 CORONARY ARTERY DISEASE DUE TO LIPID RICH PLAQUE: ICD-10-CM

## 2025-02-05 DIAGNOSIS — I50.32 CHRONIC DIASTOLIC HF (HEART FAILURE) (HCC): ICD-10-CM

## 2025-02-05 DIAGNOSIS — I48.0 PAROXYSMAL ATRIAL FIBRILLATION (HCC): Primary | ICD-10-CM

## 2025-02-05 DIAGNOSIS — I25.83 CORONARY ARTERY DISEASE DUE TO LIPID RICH PLAQUE: ICD-10-CM

## 2025-02-05 LAB — BACTERIA UR CULT: NORMAL

## 2025-02-05 PROCEDURE — 1160F RVW MEDS BY RX/DR IN RCRD: CPT | Performed by: INTERNAL MEDICINE

## 2025-02-05 PROCEDURE — 1036F TOBACCO NON-USER: CPT | Performed by: INTERNAL MEDICINE

## 2025-02-05 PROCEDURE — G8420 CALC BMI NORM PARAMETERS: HCPCS | Performed by: INTERNAL MEDICINE

## 2025-02-05 PROCEDURE — 3075F SYST BP GE 130 - 139MM HG: CPT | Performed by: INTERNAL MEDICINE

## 2025-02-05 PROCEDURE — 3078F DIAST BP <80 MM HG: CPT | Performed by: INTERNAL MEDICINE

## 2025-02-05 PROCEDURE — 99214 OFFICE O/P EST MOD 30 MIN: CPT | Performed by: INTERNAL MEDICINE

## 2025-02-05 PROCEDURE — G8427 DOCREV CUR MEDS BY ELIG CLIN: HCPCS | Performed by: INTERNAL MEDICINE

## 2025-02-05 PROCEDURE — 1123F ACP DISCUSS/DSCN MKR DOCD: CPT | Performed by: INTERNAL MEDICINE

## 2025-02-05 PROCEDURE — 1159F MED LIST DOCD IN RCRD: CPT | Performed by: INTERNAL MEDICINE

## 2025-02-05 RX ORDER — FUROSEMIDE 40 MG/1
40 TABLET ORAL DAILY
COMMUNITY
Start: 2025-01-17

## 2025-02-05 RX ORDER — METRONIDAZOLE 500 MG/1
500 TABLET ORAL 3 TIMES DAILY
Qty: 21 TABLET | Refills: 0 | Status: SHIPPED | OUTPATIENT
Start: 2025-02-05 | End: 2025-02-12

## 2025-02-05 RX ORDER — WARFARIN SODIUM 5 MG/1
2.5-5 TABLET ORAL SEE ADMIN INSTRUCTIONS
Qty: 180 TABLET | Refills: 3 | Status: SHIPPED | OUTPATIENT
Start: 2025-02-05

## 2025-02-05 RX ORDER — SPIRONOLACTONE 25 MG/1
25 TABLET ORAL DAILY
Qty: 90 TABLET | Refills: 3 | Status: SHIPPED | OUTPATIENT
Start: 2025-02-05

## 2025-02-05 NOTE — TELEPHONE ENCOUNTER
Pharmacy requests clarification on Flagyl states directions say 1tid for 10 days but only written for 21 tablets

## 2025-02-05 NOTE — TELEPHONE ENCOUNTER
Transmission successfully received and will be sent to pt's provider for review.  We will call pt if there is anything concerning/changes that need to be made.  Seeing Dr. JOEL today.      Next scheduled remote 05.06.2025.  Upcoming EP/Device 05.05.2025

## 2025-02-10 ENCOUNTER — ANTI-COAG VISIT (OUTPATIENT)
Dept: PHARMACY | Age: 85
End: 2025-02-10

## 2025-02-10 DIAGNOSIS — I48.0 PAF (PAROXYSMAL ATRIAL FIBRILLATION) (HCC): Primary | ICD-10-CM

## 2025-02-10 LAB
INR BLD: 1.8
PROTIME: NORMAL

## 2025-02-10 NOTE — PROGRESS NOTES
ANTICOAGULATION SERVICE    Juan Grant is a 84 y.o. male with PMHx significant for HLD, PVD, CAD, HRN, Afib who presents to clinic 2/10/2025 for anticoagulation monitoring and adjustment. DOAC not an option due to hx of life threatening bleed while on previously. Patient not interested in watchman device. Patient is brought to appointments by his daughter, Nisreen. He lives with his son, João.     Anticoagulation Indication(s):  Afib    Referring Physician:   Dr. Lepe  Goal INR Range:  2-3  Duration of Anticoagulation Therapy:  Indefinite   Time of day dose taken: AM  Product patient has at home: warfarin 5 mg (peach color)    Pertinent labs:  Lab Results   Component Value Date    INR 1.90 02/03/2025    INR 1.80 01/27/2025    INR 2.30 01/20/2025    INR 2.80 01/10/2025       INR Summary                           Warfarin regimen (mg)  Date INR   A/P   Sun Mon Tue Wed Thu Fri Sat Mg/wk  2/10 1.8 Below goal, bolus 2.5 5 2.5 2.5 2.5 2.5 2.5 20   2/3 1.9 Below goal, bolus 2.5 5/2.5 2.5 2.5 2.5 2.5 2.5 17.5  1/27 1.8 Below goal, bolus 2.5 5/2.5 2.5 2.5 2.5 2.5 2.5 17.5  1/20 2.3 At goal, continue 2.5 2.5 2.5 2.5 2.5 2.5 2.5 17.5  1/10 2.8 At goal, continue 2.5 2.5 2.5 2.5 2.5 2.5 2.5 17.5  12/9 2.5 At goal, continue 2.5 2.5 2.5 2.5 2.5 2.5 2.5 17.5  11/27 3.3 Above goal, hold x1 2.5 2.5 2.5 2.5 2.5 2.5 2.5 17.5  11/15 2.4 At goal, continue 2.5 2.5 2.5 2.5 2.5 2.5 2.5 17.5  11/8 2.3 At goal, continue 2.5 2.5 2.5 2.5 2.5 2.5 2.5 17.5  11/4 5.6 Above goal, hold  2.5 2.5 0/2.5 0/2.5 2.5 2.5 2.5 17.5  10/2 2.1 At goal, continue  2.5 5 2.5 2.5 2.5 5 2.5 22.5  10/2 2.0 At goal, continue  2.5 5 2.5 2.5 2.5 5 2.5 22.5  9/27 3.7 Above goal, hold 2.5 5 2.5 2.5 2.5 0/5 2.5 22.5  9/23 6.0 Per cardiology  5 2.5 0 2.5 2.5 2.5 2.5   8/13 4.01 Per cardiology  5 2.5 0 2.5 2.5 5 5   3/21 2.3 Per cardiology  5 5 5 2.5 5 5 5 32.5  3/14 1.08 Per cardiology  5 5 5 2.5 5 5 5 32.5      Last CBC:  Lab Results   Component Value Date    RBC

## 2025-02-11 DIAGNOSIS — I10 HTN (HYPERTENSION), BENIGN: ICD-10-CM

## 2025-02-11 DIAGNOSIS — I50.42 CHRONIC COMBINED SYSTOLIC AND DIASTOLIC HEART FAILURE (HCC): ICD-10-CM

## 2025-02-11 DIAGNOSIS — K62.5 GASTROINTESTINAL HEMORRHAGE ASSOCIATED WITH ANORECTAL SOURCE: ICD-10-CM

## 2025-02-11 DIAGNOSIS — N48.1 BALANITIS: ICD-10-CM

## 2025-02-12 LAB
ALBUMIN SERPL-MCNC: 4.1 G/DL (ref 3.4–5)
ALBUMIN/GLOB SERPL: 1.1 {RATIO} (ref 1.1–2.2)
ALP SERPL-CCNC: 90 U/L (ref 40–129)
ALT SERPL-CCNC: 9 U/L (ref 10–40)
ANION GAP SERPL CALCULATED.3IONS-SCNC: 12 MMOL/L (ref 3–16)
AST SERPL-CCNC: 24 U/L (ref 15–37)
BASOPHILS # BLD: 0 K/UL (ref 0–0.2)
BASOPHILS NFR BLD: 0.4 %
BILIRUB SERPL-MCNC: 0.4 MG/DL (ref 0–1)
BUN SERPL-MCNC: 21 MG/DL (ref 7–20)
CALCIUM SERPL-MCNC: 9.2 MG/DL (ref 8.3–10.6)
CHLORIDE SERPL-SCNC: 99 MMOL/L (ref 99–110)
CHOLEST SERPL-MCNC: 118 MG/DL (ref 0–199)
CO2 SERPL-SCNC: 28 MMOL/L (ref 21–32)
CREAT SERPL-MCNC: 1.7 MG/DL (ref 0.8–1.3)
DEPRECATED RDW RBC AUTO: 16 % (ref 12.4–15.4)
EOSINOPHIL # BLD: 0.3 K/UL (ref 0–0.6)
EOSINOPHIL NFR BLD: 3.8 %
FERRITIN SERPL IA-MCNC: 56.1 NG/ML (ref 30–400)
GFR SERPLBLD CREATININE-BSD FMLA CKD-EPI: 39 ML/MIN/{1.73_M2}
GLUCOSE P FAST SERPL-MCNC: 108 MG/DL (ref 70–99)
HCT VFR BLD AUTO: 33.8 % (ref 40.5–52.5)
HDLC SERPL-MCNC: 50 MG/DL (ref 40–60)
HGB BLD-MCNC: 11.4 G/DL (ref 13.5–17.5)
IRON SERPL-MCNC: 45 UG/DL (ref 59–158)
LDL CHOLESTEROL: 47 MG/DL
LYMPHOCYTES # BLD: 2.1 K/UL (ref 1–5.1)
LYMPHOCYTES NFR BLD: 28.9 %
MCH RBC QN AUTO: 28.9 PG (ref 26–34)
MCHC RBC AUTO-ENTMCNC: 33.6 G/DL (ref 31–36)
MCV RBC AUTO: 85.8 FL (ref 80–100)
MONOCYTES # BLD: 0.6 K/UL (ref 0–1.3)
MONOCYTES NFR BLD: 8.2 %
NEUTROPHILS # BLD: 4.3 K/UL (ref 1.7–7.7)
NEUTROPHILS NFR BLD: 58.7 %
NT-PROBNP SERPL-MCNC: 486 PG/ML (ref 0–449)
PLATELET # BLD AUTO: 246 K/UL (ref 135–450)
PMV BLD AUTO: 8.8 FL (ref 5–10.5)
POTASSIUM SERPL-SCNC: 3.6 MMOL/L (ref 3.5–5.1)
PROT SERPL-MCNC: 7.7 G/DL (ref 6.4–8.2)
RBC # BLD AUTO: 3.94 M/UL (ref 4.2–5.9)
REAGIN+T PALLIDUM IGG+IGM SERPL-IMP: NORMAL
SODIUM SERPL-SCNC: 139 MMOL/L (ref 136–145)
TRIGL SERPL-MCNC: 104 MG/DL (ref 0–150)
VLDLC SERPL CALC-MCNC: 21 MG/DL
WBC # BLD AUTO: 7.3 K/UL (ref 4–11)

## 2025-02-17 ENCOUNTER — ANTI-COAG VISIT (OUTPATIENT)
Dept: PHARMACY | Age: 85
End: 2025-02-17

## 2025-02-17 DIAGNOSIS — I48.0 PAF (PAROXYSMAL ATRIAL FIBRILLATION) (HCC): Primary | ICD-10-CM

## 2025-02-17 LAB
INR BLD: 2.3
PROTIME: NORMAL

## 2025-02-17 NOTE — PROGRESS NOTES
From: Carmelo Covington  To: Rui Garcia  Sent: 1/23/2024 9:35 AM CST  Subject: MRI     I was wondering if it's possible to get a updated MRI for lumbar. I'm only asking, because since my last one in 2019 I've had some issues with the groin area going numb every so often also it feels like I have a cellphone on vibrate on front right thigh. I failed to bring this up during my last visit.   
cardiology  5 5 5 2.5 5 5 5 32.5      Last CBC:  Lab Results   Component Value Date    RBC 3.94 (L) 02/11/2025    HGB 11.4 (L) 02/11/2025    HCT 33.8 (L) 02/11/2025    MCV 85.8 02/11/2025    MCH 28.9 02/11/2025    MPV 8.8 02/11/2025    RDW 16.0 (H) 02/11/2025     02/11/2025       Patient History:  Recent hospitalizations/HC visits 12/15-12/21: Due to fall at home (orthostatic hypotension)   Recent medication changes None    Medications taken regularly that may interact with warfarin or alter INR Co-Q 10      Warfarin dose taken as prescribed Uses pillbox that is filled by his daughter, Nisreen    Signs/symptoms of bleeding 1/17/25: Significant nose bleed, but no ED visit  Life threatening GI bleed on DOAC (1/2017)    Vitamin K intake Normally has ~0 servings of green, leafy vegetables per week.    Recent vomiting/diarrhea/fever, changes in weight or activity level None reported   Tobacco or alcohol use Patient denies drinking or smoking    Upcoming surgeries or procedures None reported     Assessment/Plan:  Patient's INR was therapeutic today per HHRN Eunice ([phone]). He is now getting HH services through [facility] and living with his daughter, Nisreen, who helps with his medical care. HHRN reports they will continue to visit for 1 more week. They deny any changes to medications, diet, or missed doses since last week. He was prescribed Flagyl but is waiting to see dermatology prior to starting it.     Pt was instructed to continue warfarin to 2.5 mg (1/2 tablet) everyday except 5 mg on Mondays. Will recheck INR in one week.     Caroline Gonzalez, PharmD, BCPS  [facility]  Jackelyn: [phone]  Ericka: [phone]  2/17/2025 3:20 PM    For Pharmacy Admin Tracking Only  Time Spent (min): 15

## 2025-02-24 ENCOUNTER — ANTI-COAG VISIT (OUTPATIENT)
Dept: PHARMACY | Age: 85
End: 2025-02-24

## 2025-02-24 DIAGNOSIS — I48.0 PAF (PAROXYSMAL ATRIAL FIBRILLATION) (HCC): Primary | ICD-10-CM

## 2025-02-24 LAB
INR BLD: 2.5
PROTIME: NORMAL

## 2025-02-24 NOTE — PROGRESS NOTES
ANTICOAGULATION SERVICE    Juan Grant is a 85 y.o. male with PMHx significant for HLD, PVD, CAD, HRN, Afib who presents to clinic 2/24/2025 for anticoagulation monitoring and adjustment. DOAC not an option due to hx of life threatening bleed while on previously. Patient not interested in watchman device. Patient is brought to appointments by his daughter, Nisreen. He lives with his son, João.     Anticoagulation Indication(s):  Afib    Referring Physician:   Dr. Lepe  Goal INR Range:  2-3  Duration of Anticoagulation Therapy:  Indefinite   Time of day dose taken: AM  Product patient has at home: warfarin 5 mg (peach color)    Pertinent labs:  Lab Results   Component Value Date    INR 2.30 02/17/2025    INR 1.80 02/10/2025    INR 1.90 02/03/2025    INR 1.80 01/27/2025       INR Summary                           Warfarin regimen (mg)  Date INR   A/P   Sun Mon Tue Wed Thu Fri Sat Mg/wk  2/24 2.5 At goal, continue  2.5 5 2.5 2.5 2.5 2.5 2.5 20   2/17 2.3 At goal, continue  2.5 5 2.5 2.5 2.5 2.5 2.5 20   2/10 1.8 Below goal, increase 2.5 5 2.5 2.5 2.5 2.5 2.5 20  2/3 1.9 Below goal, bolus 2.5 5/2.5 2.5 2.5 2.5 2.5 2.5 17.5  1/27 1.8 Below goal, bolus 2.5 5/2.5 2.5 2.5 2.5 2.5 2.5 17.5  1/20 2.3 At goal, continue 2.5 2.5 2.5 2.5 2.5 2.5 2.5 17.5  1/10 2.8 At goal, continue 2.5 2.5 2.5 2.5 2.5 2.5 2.5 17.5  12/9 2.5 At goal, continue 2.5 2.5 2.5 2.5 2.5 2.5 2.5 17.5  11/27 3.3 Above goal, hold x1 2.5 2.5 2.5 2.5 2.5 2.5 2.5 17.5  11/15 2.4 At goal, continue 2.5 2.5 2.5 2.5 2.5 2.5 2.5 17.5  11/8 2.3 At goal, continue 2.5 2.5 2.5 2.5 2.5 2.5 2.5 17.5  11/4 5.6 Above goal, hold  2.5 2.5 0/2.5 0/2.5 2.5 2.5 2.5 17.5  10/2 2.1 At goal, continue  2.5 5 2.5 2.5 2.5 5 2.5 22.5  10/2 2.0 At goal, continue  2.5 5 2.5 2.5 2.5 5 2.5 22.5  9/27 3.7 Above goal, hold 2.5 5 2.5 2.5 2.5 0/5 2.5 22.5  9/23 6.0 Per cardiology  5 2.5 0 2.5 2.5 2.5 2.5   8/13 4.01 Per cardiology  5 2.5 0 2.5 2.5 5 5   3/21 2.3 Per

## 2025-02-25 ENCOUNTER — OFFICE VISIT (OUTPATIENT)
Dept: FAMILY MEDICINE CLINIC | Age: 85
End: 2025-02-25

## 2025-02-25 VITALS
TEMPERATURE: 97.5 F | HEIGHT: 69 IN | HEART RATE: 69 BPM | OXYGEN SATURATION: 92 % | DIASTOLIC BLOOD PRESSURE: 74 MMHG | BODY MASS INDEX: 22.59 KG/M2 | SYSTOLIC BLOOD PRESSURE: 132 MMHG

## 2025-02-25 DIAGNOSIS — R05.1 ACUTE COUGH: ICD-10-CM

## 2025-02-25 DIAGNOSIS — I50.42 CHRONIC COMBINED SYSTOLIC AND DIASTOLIC HEART FAILURE (HCC): ICD-10-CM

## 2025-02-25 DIAGNOSIS — R09.89 BIBASILAR CRACKLES: ICD-10-CM

## 2025-02-25 DIAGNOSIS — R60.0 PEDAL EDEMA: ICD-10-CM

## 2025-02-25 DIAGNOSIS — N48.1 BALANITIS: Primary | ICD-10-CM

## 2025-02-25 RX ORDER — CLOTRIMAZOLE 1 %
CREAM (GRAM) TOPICAL
Qty: 40 G | Refills: 1 | Status: SHIPPED | OUTPATIENT
Start: 2025-02-25

## 2025-02-25 RX ORDER — NYSTATIN 100000 [USP'U]/G
POWDER TOPICAL
Qty: 60 G | Refills: 1 | Status: SHIPPED | OUTPATIENT
Start: 2025-02-25

## 2025-02-25 NOTE — PROGRESS NOTES
% cream; Apply topically 2 times daily., Disp-40 g, R-1, Normal  -     nystatin (MYCOSTATIN) [id number] UNIT/GM powder; Apply 3 times daily., Disp-60 g, R-1, Normal  2. Chronic combined systolic and diastolic heart failure (HCC)  3. Bibasilar crackles  4. Acute cough  5. Pedal edema       No orders of the defined types were placed in this encounter.      No follow-ups on file.    HENRY REED MD, MD    2/25/2025  11:00 AM

## 2025-02-27 ENCOUNTER — OFFICE VISIT (OUTPATIENT)
Dept: FAMILY MEDICINE CLINIC | Age: 85
End: 2025-02-27

## 2025-02-27 VITALS
SYSTOLIC BLOOD PRESSURE: 148 MMHG | HEART RATE: 73 BPM | BODY MASS INDEX: 22.27 KG/M2 | WEIGHT: 150.8 LBS | TEMPERATURE: 97.5 F | OXYGEN SATURATION: 95 % | DIASTOLIC BLOOD PRESSURE: 72 MMHG

## 2025-02-27 DIAGNOSIS — R05.1 ACUTE COUGH: ICD-10-CM

## 2025-02-27 DIAGNOSIS — R09.89 BIBASILAR CRACKLES: ICD-10-CM

## 2025-02-27 DIAGNOSIS — I50.42 CHRONIC COMBINED SYSTOLIC AND DIASTOLIC HEART FAILURE (HCC): Primary | ICD-10-CM

## 2025-02-27 DIAGNOSIS — N48.1 BALANITIS: ICD-10-CM

## 2025-02-27 DIAGNOSIS — I50.42 CHRONIC COMBINED SYSTOLIC AND DIASTOLIC HEART FAILURE (HCC): ICD-10-CM

## 2025-02-27 LAB
ANION GAP SERPL CALCULATED.3IONS-SCNC: 13 MMOL/L (ref 3–16)
BASOPHILS # BLD: 0 K/UL (ref 0–0.2)
BASOPHILS NFR BLD: 0.5 %
BUN SERPL-MCNC: 27 MG/DL (ref 7–20)
CALCIUM SERPL-MCNC: 8.4 MG/DL (ref 8.3–10.6)
CHLORIDE SERPL-SCNC: 97 MMOL/L (ref 99–110)
CO2 SERPL-SCNC: 29 MMOL/L (ref 21–32)
CREAT SERPL-MCNC: 1.8 MG/DL (ref 0.8–1.3)
DEPRECATED RDW RBC AUTO: 15.4 % (ref 12.4–15.4)
EOSINOPHIL # BLD: 0.1 K/UL (ref 0–0.6)
EOSINOPHIL NFR BLD: 1.6 %
GFR SERPLBLD CREATININE-BSD FMLA CKD-EPI: 36 ML/MIN/{1.73_M2}
GLUCOSE SERPL-MCNC: 101 MG/DL (ref 70–99)
HCT VFR BLD AUTO: 31.6 % (ref 40.5–52.5)
HGB BLD-MCNC: 10.6 G/DL (ref 13.5–17.5)
LYMPHOCYTES # BLD: 1.5 K/UL (ref 1–5.1)
LYMPHOCYTES NFR BLD: 24.9 %
MCH RBC QN AUTO: 28.4 PG (ref 26–34)
MCHC RBC AUTO-ENTMCNC: 33.6 G/DL (ref 31–36)
MCV RBC AUTO: 84.7 FL (ref 80–100)
MONOCYTES # BLD: 0.6 K/UL (ref 0–1.3)
MONOCYTES NFR BLD: 10.5 %
NEUTROPHILS # BLD: 3.8 K/UL (ref 1.7–7.7)
NEUTROPHILS NFR BLD: 62.5 %
NT-PROBNP SERPL-MCNC: 1086 PG/ML (ref 0–449)
PLATELET # BLD AUTO: 214 K/UL (ref 135–450)
PMV BLD AUTO: 8.4 FL (ref 5–10.5)
POTASSIUM SERPL-SCNC: 3.2 MMOL/L (ref 3.5–5.1)
RBC # BLD AUTO: 3.73 M/UL (ref 4.2–5.9)
SODIUM SERPL-SCNC: 139 MMOL/L (ref 136–145)
WBC # BLD AUTO: 6.1 K/UL (ref 4–11)

## 2025-02-27 RX ORDER — DOXYCYCLINE HYCLATE 100 MG
100 TABLET ORAL 2 TIMES DAILY
Qty: 20 TABLET | Refills: 0 | Status: SHIPPED | OUTPATIENT
Start: 2025-02-27 | End: 2025-03-09

## 2025-02-27 NOTE — PROGRESS NOTES
[facility]  Clinic Note    Date: 2/27/2025                                               /Objective:     Chief Complaint   Patient presents with    Follow-up    Penis Injury     Bleeding, painful     Shortness of Breath    Cough    Fatigue    Dizziness       HPI  Patient is here for follow-up on cough and shortness of breath.  Cough started about 3 days ago.  Shortness of breath started about 4 days ago.  Symptoms are about the same as they were 2 days ago when I saw him.  He has been taking Lasix 40 mg in the morning and 20 mg in the afternoon.  Swelling in the ankles is mild, but does appear to have increased a little bit.  No fever.  Patient's daughter has been checking his O2 sats frequently at home, and they are always above 90%.  Patient is making good urine.    Patient has balanitis.  Has been doing saline washes and also clotrimazole cream and nystatin powder for the past 2 days, although I told them 2 days ago to only use the saline rinse for the first week.  Patient's daughter states the patient has been doing this treatment himself, does not want her doing it.  Is not very painful, the only difference now is a noticed there is been a small amount of bleeding from the head of the penis.         Patient Active Problem List    Diagnosis Date Noted    Pure hyperglyceridemia     Gastrointestinal hemorrhage     History of stroke 02/04/2025    Moderate malnutrition 12/16/2024    Encephalopathy 12/15/2024    AMS (altered mental status) 12/11/2024    Acute encephalopathy 12/11/2024    Cerebrovascular accident (CVA) (HCC) 12/11/2024    PENELOPE (acute kidney injury) 12/11/2024    Hypokalemia 12/11/2024    Major depressive disorder, recurrent, mild 08/05/2024    Major depressive disorder, recurrent, moderate 08/05/2024    Major depressive disorder, recurrent, unspecified 08/05/2024    Calculus of gallbladder 05/02/2024    Bladder stone 05/02/2024    Chest pain 04/23/2024    Subacute osteomyelitis of left

## 2025-03-03 ENCOUNTER — ANTI-COAG VISIT (OUTPATIENT)
Dept: PHARMACY | Age: 85
End: 2025-03-03

## 2025-03-03 ENCOUNTER — OFFICE VISIT (OUTPATIENT)
Dept: FAMILY MEDICINE CLINIC | Age: 85
End: 2025-03-03

## 2025-03-03 ENCOUNTER — TELEPHONE (OUTPATIENT)
Dept: CARDIOLOGY CLINIC | Age: 85
End: 2025-03-03

## 2025-03-03 VITALS
DIASTOLIC BLOOD PRESSURE: 70 MMHG | WEIGHT: 147 LBS | HEART RATE: 63 BPM | TEMPERATURE: 97.6 F | BODY MASS INDEX: 21.71 KG/M2 | SYSTOLIC BLOOD PRESSURE: 124 MMHG | OXYGEN SATURATION: 95 %

## 2025-03-03 DIAGNOSIS — I48.0 PAF (PAROXYSMAL ATRIAL FIBRILLATION) (HCC): Primary | ICD-10-CM

## 2025-03-03 DIAGNOSIS — R05.1 ACUTE COUGH: ICD-10-CM

## 2025-03-03 DIAGNOSIS — I50.42 CHRONIC COMBINED SYSTOLIC AND DIASTOLIC HEART FAILURE (HCC): Primary | ICD-10-CM

## 2025-03-03 DIAGNOSIS — N48.1 BALANITIS: ICD-10-CM

## 2025-03-03 DIAGNOSIS — R06.02 SHORTNESS OF BREATH: ICD-10-CM

## 2025-03-03 LAB
INTERNATIONAL NORMALIZATION RATIO, POC: 3.2
PROTHROMBIN TIME, POC: 0

## 2025-03-03 NOTE — TELEPHONE ENCOUNTER
The patient's daughter Nisreen would like a call back to go over the patient's lab results. Please advise [phone]

## 2025-03-03 NOTE — TELEPHONE ENCOUNTER
Spoke with Nisreen, appointment made next week for pt.  BNP is slightly elevated and PCP was concerned

## 2025-03-03 NOTE — PROGRESS NOTES
cardiology  5 2.5 0 2.5 2.5 5 5   3/21 2.3 Per cardiology  5 5 5 2.5 5 5 5 32.5  3/14 1.08 Per cardiology  5 5 5 2.5 5 5 5 32.5      Last CBC:  Lab Results   Component Value Date    RBC 3.73 (L) 02/27/2025    HGB 10.6 (L) 02/27/2025    HCT 31.6 (L) 02/27/2025    MCV 84.7 02/27/2025    MCH 28.4 02/27/2025    MPV 8.4 02/27/2025    RDW 15.4 02/27/2025     02/27/2025       Patient History:  Recent hospitalizations/HC visits 12/15-12/21: Due to fall at home (orthostatic hypotension)   Recent medication changes None    Medications taken regularly that may interact with warfarin or alter INR Co-Q 10      Warfarin dose taken as prescribed Uses pillbox that is filled by his daughter, Nisreen    Signs/symptoms of bleeding 1/17/25: Significant nose bleed, but no ED visit  Life threatening GI bleed on DOAC (1/2017)    Vitamin K intake Normally has ~0 servings of green, leafy vegetables per week.    Recent vomiting/diarrhea/fever, changes in weight or activity level None reported   Tobacco or alcohol use Patient denies drinking or smoking    Upcoming surgeries or procedures None reported     Assessment/Plan:  Patient's INR 3.2 is above therapeutic range today per HHRN Ali ([phone]). He is now getting  services through [facility] and living with his daughter, Nisreen, who helps with his medical care.  Pt just started Doxy on the 27th on for 10 days.       Pt was instructed to hold dose today and Friday and take  warfarin to 2.5 mg (1/2 tablet) all other days. Pt is already on a half tab daily so will hold to decrease pts weekly dose while he is on abx.   [facility] RN will recheck INR in one week.     Leticia Marques Pharm. D.    [facility]  Jackelyn: [phone]  Ericka: [phone]  3/3/2025 12:59 PM    For Pharmacy Admin Tracking Only    Intervention Detail:   Total # of Interventions Recommended: 1  Total # of Interventions Accepted: 1  Time Spent (min): 15

## 2025-03-03 NOTE — PROGRESS NOTES
[facility]  Clinic Note    Date: 3/3/2025                                               /Objective:     Chief Complaint   Patient presents with    Follow-up       HPI  Patient is here for follow-up on cough/CHF/crackles on his lungs.  Patient had his Lasix increased to 80 mg in the morning and 40 mg in the afternoon last week.  Recent labs show that anemia is stable.  Creatinine is 1.8, which is a slight bump from 2 weeks ago.  BNP was elevated to 1000 from baseline of 500 weeks ago.  Reports that ankle swelling has decreased. Breathing and cough has also improved. No fever.     Patient also has balanitis.  Has been doing the clotrimazole and nystatin powder consistently.  Denies any bleeding or pain for the past few days.         Patient Active Problem List    Diagnosis Date Noted    Pure hyperglyceridemia     Gastrointestinal hemorrhage     History of stroke 02/04/2025    Moderate malnutrition 12/16/2024    Encephalopathy 12/15/2024    AMS (altered mental status) 12/11/2024    Acute encephalopathy 12/11/2024    Cerebrovascular accident (CVA) (Former[facility]) 12/11/2024    PENELOPE (acute kidney injury) 12/11/2024    Hypokalemia 12/11/2024    Major depressive disorder, recurrent, mild 08/05/2024    Major depressive disorder, recurrent, moderate 08/05/2024    Major depressive disorder, recurrent, unspecified 08/05/2024    Calculus of gallbladder 05/02/2024    Bladder stone 05/02/2024    Chest pain 04/23/2024    Subacute osteomyelitis of left foot ([facility])     Encounter for medication counseling     Bilateral cellulitis of lower leg     Open fracture of second toe of left foot     History of MRSA infection     Failure of outpatient treatment     Osteomyelitis of second toe of left foot ([facility]) 08/24/2021    Solar purpura 06/30/2021    PVD (peripheral vascular disease) 11/09/2020    Localized edema 12/04/2018    Venous (peripheral) insufficiency 12/04/2018    Bradycardia     Sick sinus syndrome ([facility]) 08/29/2016    Chronic

## 2025-03-04 RX ORDER — ONDANSETRON 4 MG/1
4 TABLET, ORALLY DISINTEGRATING ORAL EVERY 8 HOURS PRN
Qty: 30 TABLET | Refills: 0 | Status: SHIPPED | OUTPATIENT
Start: 2025-03-04

## 2025-03-04 NOTE — TELEPHONE ENCOUNTER
Medication:   Requested Prescriptions     Pending Prescriptions Disp Refills    ondansetron (ZOFRAN-ODT) 4 MG disintegrating tablet 30 tablet 0     Sig: Take 1 tablet by mouth every 8 hours as needed for Nausea or Vomiting        Last Filled:  12/20/24    Patient Phone Number: [phone] (home)     Last appt: 3/3/2025   Next appt: 5/7/2025    Last OARRS:       7/10/2018     3:43 PM   RX Monitoring   Attestation The Prescription Monitoring Report for this patient was reviewed today.   Periodic Controlled Substance Monitoring No signs of potential drug abuse or diversion identified.

## 2025-03-07 NOTE — PROGRESS NOTES
[facility]     Outpatient Cardiology         Patient Name:  Juan Grant  Primary Care Physician: Ki Cross MD  03/07/25     Assessment & Plan    Assessment / Plan:     Left bundle branch block-no syncope.  Continue to monitor.  Paroxysmal A-fib-had GI bleed with NOAC.  Declined Watchman device.  Currently on Coumadin.  Essential hypertension well-controlled.  Recent UTI-patient was taken off metoprolol as his blood pressure was running low.  Blood pressure today is normal.  Continue to monitor.  Leg swelling, history of chronic diastolic heart failure-patient on Lasix.  Was taking potassium supplement.  Will stop potassium supplement and add Aldactone.  Check BMP.  This would make it easier to swallow the pills.  Can consider switch of Lasix to p.o. Demadex if needed on follow-up if patient's becomes nonresponsive/poorly responsive to Lasix         Chief Complaint:     No chief complaint on file.      History of Present Illness:       HPI     Juan Grant is a 85 y.o. male with PMH of HLD, HTN, PVD, Paroxysmal Afib and SSS s/p PPM here for a 1 month follow up. Previously seen by Dr Love.      Today,         Patient denies any chest pain, shortness of breath, palpitations, presyncope or syncope. No TIA. No claudication.     PMH  Past Medical History:   Diagnosis Date    Allergic rhinitis     Atrial fibrillation (HCC)     Benign prostatic hypertrophy     Bladder stone 05/02/2024    CAD (coronary artery disease)     Calculus of gallbladder 05/02/2024    Cancer ([facility])     skin    CHF (congestive heart failure) ([facility]) 08/17/2017    Coronary artery disease involving native coronary artery of native heart without angina pectoris 06/17/2011    DDD (degenerative disc disease), lumbar     Falls 08/17/2017    GI bleeding     Hyperlipidemia     Hypertension     MI (myocardial infarction) ([facility])     MRSA (methicillin resistant staph aureus) culture positive     h/o infection in the blood

## 2025-03-07 NOTE — PROGRESS NOTES
[facility]     Outpatient Cardiology         Patient Name:  Juan Grant  Primary Care Physician: Ki Cross MD  03/11/25     Assessment & Plan    Assessment / Plan:     Elevated BNP-had episode of acute decompensation of his chronic diastolic heart failure.  Had increased leg swelling and shortness of breath.  Was seen by PCP.  The dose of diuretics was increased with improvement of symptoms.  Leg swelling is improved.  Advised to cut back on the Lasix to 40 mg daily with additional 40 mg p.o. as needed for weight gain more than 3 pounds a day or 5 pounds a week.  Left bundle branch block-no syncope  Paroxysmal A-fib-had GI bleed with NOAC.  Declined Watchman device.  Recheck BMP in 2 weeks.  Follow-up as previously scheduled.           Chief Complaint:     Chief Complaint   Patient presents with    Elevated BNP       History of Present Illness:       HPI     Juan Grant is a 85 y.o. male with PMH of HLD, HTN, PVD, Paroxysmal Afib and SSS s/p PPM here for a 1 month follow up.       Today his daughter is here speaking for him.  She states that pt was pretty short of breath last week and also had an elevated BNP.  PCP suggested follow up with Cardiology.          Patient denies any chest pain, palpitations, presyncope or syncope. No TIA. No claudication.     PMH  Past Medical History:   Diagnosis Date    Allergic rhinitis     Atrial fibrillation (HCC)     Benign prostatic hypertrophy     Bladder stone 05/02/2024    CAD (coronary artery disease)     Calculus of gallbladder 05/02/2024    Cancer ([facility])     skin    CHF (congestive heart failure) ([facility]) 08/17/2017    Coronary artery disease involving native coronary artery of native heart without angina pectoris 06/17/2011    DDD (degenerative disc disease), lumbar     Falls 08/17/2017    GI bleeding     Hyperlipidemia     Hypertension     MI (myocardial infarction) ([facility])     MRSA (methicillin resistant staph aureus) culture positive

## 2025-03-07 NOTE — PATIENT INSTRUCTIONS
Thank you for choosing [facility] for your cardiac care.    During your visit today, we reviewed and confirmed your cardiac medications along with  medication prescribed by your other healthcare team members. Please be sure to discuss any  changes to medication with your providers.    Please bring a list of ALL medications (or the bottles) with you to EVERY appointment.  Also include vitamins and over-the-counter medications.    If you need refills for any cardiac medications, please call your pharmacy and they will reach out to us electronically.    Did your provider order testing today? If yes, then you will receive your results in three  possible ways. You can receive a [employer] message, a phone call, or letter in the mail. Please  note, if you are an active [employer] user, some of your testing will be available within 1-2 days.    Finally, please know that it is good for your heart to exercise and follow a healthy, low-fat diet  as advised by your physician and health care providers.    If you are experiencing a medical emergency, please call 911 immediately.    It's easy to register for a [employer] account if you don't already have one. With a [employer]  account you can manage your health record, view test results, schedule appointments and  more.     Dr. Lepe's clinical staff can be reached at the following phone number: [phone]    If any cardiac testing is ordered, please contact central scheduling at [phone] to get your test scheduled.        No more than 64 oz of fluids a day  Weight self daily, if weight goes up 3 lbs in day or 5 lbs a week take and extra Lasix  Go back to taking Lasix once a day  Get lab work in one week  Let office know if Lasix no longer is working.

## 2025-03-10 ENCOUNTER — OFFICE VISIT (OUTPATIENT)
Dept: CARDIOLOGY CLINIC | Age: 85
End: 2025-03-10
Payer: MEDICARE

## 2025-03-10 VITALS — OXYGEN SATURATION: 94 % | SYSTOLIC BLOOD PRESSURE: 110 MMHG | DIASTOLIC BLOOD PRESSURE: 60 MMHG | HEART RATE: 62 BPM

## 2025-03-10 DIAGNOSIS — I50.32 CHRONIC DIASTOLIC HF (HEART FAILURE) (HCC): Primary | ICD-10-CM

## 2025-03-10 DIAGNOSIS — I44.7 LBBB (LEFT BUNDLE BRANCH BLOCK): ICD-10-CM

## 2025-03-10 PROCEDURE — G8427 DOCREV CUR MEDS BY ELIG CLIN: HCPCS | Performed by: INTERNAL MEDICINE

## 2025-03-10 PROCEDURE — 1123F ACP DISCUSS/DSCN MKR DOCD: CPT | Performed by: INTERNAL MEDICINE

## 2025-03-10 PROCEDURE — 1160F RVW MEDS BY RX/DR IN RCRD: CPT | Performed by: INTERNAL MEDICINE

## 2025-03-10 PROCEDURE — 3078F DIAST BP <80 MM HG: CPT | Performed by: INTERNAL MEDICINE

## 2025-03-10 PROCEDURE — 1159F MED LIST DOCD IN RCRD: CPT | Performed by: INTERNAL MEDICINE

## 2025-03-10 PROCEDURE — 1036F TOBACCO NON-USER: CPT | Performed by: INTERNAL MEDICINE

## 2025-03-10 PROCEDURE — 3074F SYST BP LT 130 MM HG: CPT | Performed by: INTERNAL MEDICINE

## 2025-03-10 PROCEDURE — 99214 OFFICE O/P EST MOD 30 MIN: CPT | Performed by: INTERNAL MEDICINE

## 2025-03-10 PROCEDURE — G8420 CALC BMI NORM PARAMETERS: HCPCS | Performed by: INTERNAL MEDICINE

## 2025-03-10 RX ORDER — FENTANYL 50 UG/1
1 PATCH TRANSDERMAL
COMMUNITY

## 2025-03-10 RX ORDER — AMMONIUM LACTATE 12 G/100G
12 CREAM TOPICAL 2 TIMES DAILY
COMMUNITY

## 2025-03-12 RX ORDER — ROSUVASTATIN CALCIUM 5 MG/1
5 TABLET, COATED ORAL DAILY
Qty: 90 TABLET | Refills: 3 | Status: SHIPPED | OUTPATIENT
Start: 2025-03-12

## 2025-03-12 NOTE — TELEPHONE ENCOUNTER
Requested Prescriptions     Pending Prescriptions Disp Refills    rosuvastatin (CRESTOR) 5 MG tablet 90 tablet 3     Sig: Take 1 tablet by mouth daily            Checked Correct Pharmacy: Yes  Any changes since last refill? No     Number: 90  Refills: 3      Last OV: 3/10/2025 Provider: NATIVIDAD  Next OV: 4/8/2025 Provider: LOVE        Last Labs: 02/11/2025    Lipid:   Lab Results   Component Value Date    CHOL 113 08/31/2022    TRIG 68 08/31/2022    HDL 50 02/11/2025    LDL 47 02/11/2025    VLDL 21 02/11/2025

## 2025-03-18 ENCOUNTER — TELEPHONE (OUTPATIENT)
Dept: FAMILY MEDICINE CLINIC | Age: 85
End: 2025-03-18

## 2025-03-18 ENCOUNTER — ANTI-COAG VISIT (OUTPATIENT)
Dept: PHARMACY | Age: 85
End: 2025-03-18

## 2025-03-18 DIAGNOSIS — I48.0 PAF (PAROXYSMAL ATRIAL FIBRILLATION) (HCC): Primary | ICD-10-CM

## 2025-03-18 LAB
INTERNATIONAL NORMALIZATION RATIO, POC: 2
PROTHROMBIN TIME, POC: 0

## 2025-03-18 NOTE — PROGRESS NOTES
ANTICOAGULATION SERVICE    Juan Grant is a 85 y.o. male with PMHx significant for HLD, PVD, CAD, HRN, Afib who presents to clinic 3/18/2025 for anticoagulation monitoring and adjustment. DOAC not an option due to hx of life threatening bleed while on previously. Patient not interested in watchman device. Patient is brought to appointments by his daughter, Nisreen. He lives with his son, João.     Anticoagulation Indication(s):  Afib    Referring Physician:   Dr. Lepe  Goal INR Range:  2-3  Duration of Anticoagulation Therapy:  Indefinite   Time of day dose taken: AM  Product patient has at home: warfarin 5 mg (peach color)    Pertinent labs:  Lab Results   Component Value Date    INR 2.30 03/10/2025    INR 3.2 03/03/2025    INR 2.50 02/24/2025    INR 2.30 02/17/2025       INR Summary                           Warfarin regimen (mg)  Date INR   A/P   Sun Mon Tue Wed Thu Fri Sat Mg/wk  3/18 2.0 At goal, continue  2.5 5 2.5 2.5 2.5 2.5 2.5 20  3/10 2.3 At goal, continue  2.5 5 2.5 2.5 2.5 2.5 2.5 20  3/3 3.2 Above goal, continue  2.5 0/5 2.5 2.5 2.5 0/2.5 2.5 20   2/24 2.5 At goal, continue  2.5 5 2.5 2.5 2.5 2.5 2.5 20   2/17 2.3 At goal, continue  2.5 5 2.5 2.5 2.5 2.5 2.5 20   2/10 1.8 Below goal, increase 2.5 5 2.5 2.5 2.5 2.5 2.5 20  2/3 1.9 Below goal, bolus 2.5 5/2.5 2.5 2.5 2.5 2.5 2.5 17.5  1/27 1.8 Below goal, bolus 2.5 5/2.5 2.5 2.5 2.5 2.5 2.5 17.5  1/20 2.3 At goal, continue 2.5 2.5 2.5 2.5 2.5 2.5 2.5 17.5  1/10 2.8 At goal, continue 2.5 2.5 2.5 2.5 2.5 2.5 2.5 17.5  12/9 2.5 At goal, continue 2.5 2.5 2.5 2.5 2.5 2.5 2.5 17.5  11/27 3.3 Above goal, hold x1 2.5 2.5 2.5 2.5 2.5 2.5 2.5 17.5  11/15 2.4 At goal, continue 2.5 2.5 2.5 2.5 2.5 2.5 2.5 17.5  11/8 2.3 At goal, continue 2.5 2.5 2.5 2.5 2.5 2.5 2.5 17.5  11/4 5.6 Above goal, hold  2.5 2.5 0/2.5 0/2.5 2.5 2.5 2.5 17.5  10/2 2.1 At goal, continue  2.5 5 2.5 2.5 2.5 5 2.5 22.5  10/2 2.0 At goal, continue

## 2025-03-18 NOTE — TELEPHONE ENCOUNTER
[facility].    Verbal Order needed. Call [phone]    Patient Discharged from the Hospital on 3/14/25    Verbal Order to resume Home Health Care

## 2025-03-19 ENCOUNTER — TELEPHONE (OUTPATIENT)
Dept: PHARMACY | Age: 85
End: 2025-03-19

## 2025-03-19 NOTE — TELEPHONE ENCOUNTER
Juancho Lepe,    This patient has an upcoming procedure scheduled for April 8th, 2025 (biopsy + kidney stone removal). The patient is currently on warfarin that is being managed by our Medication management clinic. Patient was instructed to hold 5 days of warfarin prior to the procedure date. Given the patient's hx of falls, age, PMH, and moderate [id number] score, I would not recommend that the patient be bridged with Lovenox for this procedure. What are your thoughts and/or recommendations on this? Thank you and have a great day!    Adrian Christensen, PharmD  PGY1 Pharmacy Resident   Wireless: [id number]  03/19/25

## 2025-03-20 RX ORDER — TAMSULOSIN HYDROCHLORIDE 0.4 MG/1
0.4 CAPSULE ORAL DAILY
Qty: 90 CAPSULE | Refills: 3 | Status: SHIPPED | OUTPATIENT
Start: 2025-03-20

## 2025-03-21 ENCOUNTER — OFFICE VISIT (OUTPATIENT)
Dept: FAMILY MEDICINE CLINIC | Age: 85
End: 2025-03-21

## 2025-03-21 VITALS — OXYGEN SATURATION: 98 % | HEART RATE: 76 BPM | SYSTOLIC BLOOD PRESSURE: 98 MMHG | DIASTOLIC BLOOD PRESSURE: 64 MMHG

## 2025-03-21 DIAGNOSIS — Z09 HOSPITAL DISCHARGE FOLLOW-UP: ICD-10-CM

## 2025-03-21 DIAGNOSIS — R39.9 UTI SYMPTOMS: Primary | ICD-10-CM

## 2025-03-21 LAB
BILIRUBIN, POC: NEGATIVE
BLOOD URINE, POC: NORMAL
CLARITY, POC: CLEAR
COLOR, POC: YELLOW
GLUCOSE URINE, POC: NEGATIVE MG/DL
KETONES, POC: NEGATIVE MG/DL
LEUKOCYTE EST, POC: NORMAL
NITRITE, POC: NEGATIVE
PH, POC: 8.5
PROTEIN, POC: 30 MG/DL
SPECIFIC GRAVITY, POC: 1.01
UROBILINOGEN, POC: 1 MG/DL

## 2025-03-21 RX ORDER — HYDROXYZINE PAMOATE 25 MG/1
25 CAPSULE ORAL 3 TIMES DAILY PRN
Qty: 30 CAPSULE | Refills: 3 | Status: SHIPPED | OUTPATIENT
Start: 2025-03-21 | End: 2025-04-20

## 2025-03-21 RX ORDER — FERROUS SULFATE 325(65) MG
325 TABLET ORAL 2 TIMES DAILY WITH MEALS
Qty: 60 TABLET | Refills: 3 | Status: SHIPPED | OUTPATIENT
Start: 2025-03-21

## 2025-03-21 RX ORDER — PANTOPRAZOLE SODIUM 40 MG/1
40 TABLET, DELAYED RELEASE ORAL DAILY
Qty: 90 TABLET | Refills: 3 | Status: SHIPPED | OUTPATIENT
Start: 2025-03-21 | End: 2025-06-19

## 2025-03-21 NOTE — PROGRESS NOTES
Post-Discharge Transitional Care Management Progress Note      Juan Grant   YOB: 1940    Date of Office Visit:  3/21/2025  Date of Hospital Admission: 3/11/2025  Date of Hospital Discharge: 3/14/2025    Care management risk score Rising risk (score 2-5) and Complex Care (Scores >=6): No Risk Score On File     Non face to face  following discharge, date last encounter closed (first attempt may have been earlier): *No documented post hospital discharge outreach found in the last 14 days *No documented post hospital discharge outreach found in the last 14 days    Call initiated 2 business days of discharge: *No response recorded in the last 14 days    ASSESSMENT/PLAN:   UTI symptoms  -     POCT Urinalysis no Micro  Hospital discharge follow-up  -     CA DISCHARGE MEDS RECONCILED W/ CURRENT OUTPATIENT MED LIST      Medical Decision Making: high complexity  No follow-ups on file.    On this date 3/21/2025 I have spent 25 minutes reviewing previous notes, test results and face to face with the patient discussing the diagnosis and importance of compliance with the treatment plan as well as documenting on the day of the visit.     Subjective:   HPI:  Follow up of Hospital problems/diagnosis(es): Patient slipped and fell in the bathroom hit his head and because he is on Coumadin he was sent to the emergency room and evaluated    Inpatient course: Discharge summary reviewed- see chart.  Fortunately head CT was negative.  A T9 and T12 compression fracture were seen but these are old.  Creatinines was stable at 1.38.  H&H was 8.2 and 28.6.  INR was therapeutic at 2.2.  Pacemaker check was good.  Potassium was low on admission at 2.9 but replaced and on discharge is 3.5.  Abdominal CT showed a bladder stone.  He did have urinary retention and required a Gonzales catheterization  Interval history/Current status: Here today with his daughter who stays with him and who is with him at the hospital.  He saw urology 2

## 2025-03-23 ENCOUNTER — RESULTS FOLLOW-UP (OUTPATIENT)
Dept: FAMILY MEDICINE CLINIC | Age: 85
End: 2025-03-23

## 2025-03-23 LAB — BACTERIA UR CULT: NORMAL

## 2025-03-25 ENCOUNTER — ANTI-COAG VISIT (OUTPATIENT)
Dept: PHARMACY | Age: 85
End: 2025-03-25

## 2025-03-25 DIAGNOSIS — I48.0 PAF (PAROXYSMAL ATRIAL FIBRILLATION) (HCC): Primary | ICD-10-CM

## 2025-03-25 LAB
INTERNATIONAL NORMALIZATION RATIO, POC: 1.6
PROTHROMBIN TIME, POC: 0

## 2025-03-25 NOTE — PROGRESS NOTES
ANTICOAGULATION SERVICE    Juan Grant is a 85 y.o. male with PMHx significant for HLD, PVD, CAD, HRN, Afib who presents to clinic 3/25/2025 for anticoagulation monitoring and adjustment. DOAC not an option due to hx of life threatening bleed while on previously. Patient not interested in watchman device. Patient is brought to appointments by his daughter, Nisreen. He lives with his son, João.     Anticoagulation Indication(s):  Afib    Referring Physician:   Dr. Lepe  Goal INR Range:  2-3  Duration of Anticoagulation Therapy:  Indefinite   Time of day dose taken: AM  Product patient has at home: warfarin 5 mg (peach color)    Pertinent labs:  Lab Results   Component Value Date    INR 2.0 03/18/2025    INR 2.30 03/10/2025    INR 3.2 03/03/2025    INR 2.50 02/24/2025       INR Summary                           Warfarin regimen (mg)  Date INR   A/P   Sun Mon Tue Wed Thu Fri Sat Mg/wk  3/25 1.6 Below goal,bolus/cont 2.5 5 5/2.5 2.5 2.5 2.5 2.5 20  3/18 2.0 At goal, continue  2.5 5 2.5 2.5 2.5 2.5 2.5 20  3/10 2.3 At goal, continue  2.5 5 2.5 2.5 2.5 2.5 2.5 20  3/3 3.2 Above goal, continue  2.5 0/5 2.5 2.5 2.5 0/2.5 2.5 20   2/24 2.5 At goal, continue  2.5 5 2.5 2.5 2.5 2.5 2.5 20   2/17 2.3 At goal, continue  2.5 5 2.5 2.5 2.5 2.5 2.5 20   2/10 1.8 Below goal, increase 2.5 5 2.5 2.5 2.5 2.5 2.5 20  2/3 1.9 Below goal, bolus 2.5 5/2.5 2.5 2.5 2.5 2.5 2.5 17.5  1/27 1.8 Below goal, bolus 2.5 5/2.5 2.5 2.5 2.5 2.5 2.5 17.5  1/20 2.3 At goal, continue 2.5 2.5 2.5 2.5 2.5 2.5 2.5 17.5  1/10 2.8 At goal, continue 2.5 2.5 2.5 2.5 2.5 2.5 2.5 17.5  12/9 2.5 At goal, continue 2.5 2.5 2.5 2.5 2.5 2.5 2.5 17.5  11/27 3.3 Above goal, hold x1 2.5 2.5 2.5 2.5 2.5 2.5 2.5 17.5  11/15 2.4 At goal, continue 2.5 2.5 2.5 2.5 2.5 2.5 2.5 17.5  11/8 2.3 At goal, continue 2.5 2.5 2.5 2.5 2.5 2.5 2.5 17.5  11/4 5.6 Above goal, hold  2.5 2.5 0/2.5 0/2.5 2.5 2.5 2.5 17.5  10/2 2.1 At goal, continue

## 2025-03-27 ENCOUNTER — TELEPHONE (OUTPATIENT)
Dept: FAMILY MEDICINE CLINIC | Age: 85
End: 2025-03-27

## 2025-03-27 NOTE — TELEPHONE ENCOUNTER
Ariadna from [facility] with OT  [phone]    Called to report bp concern     Patient has been having some dizziness after walking around with OT and half way back he got dizzy with BP-84/60 after resting it was 110/60    Please call and advise

## 2025-04-01 ENCOUNTER — ANTI-COAG VISIT (OUTPATIENT)
Dept: PHARMACY | Age: 85
End: 2025-04-01

## 2025-04-01 ENCOUNTER — TELEPHONE (OUTPATIENT)
Dept: CARDIOLOGY CLINIC | Age: 85
End: 2025-04-01

## 2025-04-01 DIAGNOSIS — I48.0 PAF (PAROXYSMAL ATRIAL FIBRILLATION) (HCC): Primary | ICD-10-CM

## 2025-04-01 DIAGNOSIS — I50.32 CHRONIC DIASTOLIC HF (HEART FAILURE) (HCC): Primary | ICD-10-CM

## 2025-04-01 LAB
INR BLD: 2
PROTIME: NORMAL

## 2025-04-01 RX ORDER — TORSEMIDE 20 MG/1
40 TABLET ORAL DAILY
Qty: 180 TABLET | Refills: 3 | Status: SHIPPED | OUTPATIENT
Start: 2025-04-01

## 2025-04-01 NOTE — TELEPHONE ENCOUNTER
Spoke with patient's daughter and reviewed stopping lasix and starting torsemide. Will take 40 mg on first day and will adjust to 1-2 tablets daily based on weight and swelling. Will check BMP in 1 week. Watching fluids and sticking to a low salt diet.

## 2025-04-01 NOTE — PROGRESS NOTES
ANTICOAGULATION SERVICE    Juan Grant is a 85 y.o. male with PMHx significant for HLD, PVD, CAD, HRN, Afib who presents to clinic 4/1/2025 for anticoagulation monitoring and adjustment. DOAC not an option due to hx of life threatening bleed while on previously. Patient not interested in watchman device. Patient is brought to appointments by his daughter, Nisreen. He lives with his son, João.     Anticoagulation Indication(s):  Afib    Referring Physician:   Dr. Lepe  Goal INR Range:  2-3  Duration of Anticoagulation Therapy:  Indefinite   Time of day dose taken: AM  Product patient has at home: warfarin 5 mg (peach color)    INR Summary                           Warfarin regimen (mg)  Date INR   A/P   Sun Mon Tue Wed Thu Fri Sat Mg/wk  4/1 2.0 At goal, no change 2.5 5 2.5 2.5 2.5 2.5 2.5 20  3/25 1.6 Below goal,bolus/cont 2.5 5 5/2.5 2.5 2.5 2.5 2.5 20  3/18 2.0 At goal, continue  2.5 5 2.5 2.5 2.5 2.5 2.5 20  3/10 2.3 At goal, continue  2.5 5 2.5 2.5 2.5 2.5 2.5 20  3/3 3.2 Above goal, continue  2.5 0/5 2.5 2.5 2.5 0/2.5 2.5 20   2/24 2.5 At goal, continue  2.5 5 2.5 2.5 2.5 2.5 2.5 20   2/17 2.3 At goal, continue  2.5 5 2.5 2.5 2.5 2.5 2.5 20   2/10 1.8 Below goal, increase 2.5 5 2.5 2.5 2.5 2.5 2.5 20  2/3 1.9 Below goal, bolus 2.5 5/2.5 2.5 2.5 2.5 2.5 2.5 17.5  1/27 1.8 Below goal, bolus 2.5 5/2.5 2.5 2.5 2.5 2.5 2.5 17.5  1/20 2.3 At goal, continue 2.5 2.5 2.5 2.5 2.5 2.5 2.5 17.5  1/10 2.8 At goal, continue 2.5 2.5 2.5 2.5 2.5 2.5 2.5 17.5  12/9 2.5 At goal, continue 2.5 2.5 2.5 2.5 2.5 2.5 2.5 17.5  11/27 3.3 Above goal, hold x1 2.5 2.5 2.5 2.5 2.5 2.5 2.5 17.5  11/15 2.4 At goal, continue 2.5 2.5 2.5 2.5 2.5 2.5 2.5 17.5  11/8 2.3 At goal, continue 2.5 2.5 2.5 2.5 2.5 2.5 2.5 17.5  11/4 5.6 Above goal, hold  2.5 2.5 0/2.5 0/2.5 2.5 2.5 2.5 17.5  10/2 2.1 At goal, continue  2.5 5 2.5 2.5 2.5 5 2.5 22.5  10/2 2.0 At goal, continue  2.5 5 2.5 2.5 2.5 5 2.5 22.5  9/27 3.7 Above goal,

## 2025-04-01 NOTE — TELEPHONE ENCOUNTER
Pt's daughter, Nisreen called stating during his last OV with RK it was discussed possibly switching to Demadex from Lasix if lasix was no longer working. Nisreen states over the weekend pt has become swollen in the legs and does not feel like the lasix is working anymore. She would like to discuss switching medications.     [phone]

## 2025-04-08 ENCOUNTER — TELEPHONE (OUTPATIENT)
Dept: FAMILY MEDICINE CLINIC | Age: 85
End: 2025-04-08

## 2025-04-08 ENCOUNTER — TELEPHONE (OUTPATIENT)
Dept: CARDIOLOGY CLINIC | Age: 85
End: 2025-04-08

## 2025-04-08 DIAGNOSIS — R06.02 SHORTNESS OF BREATH: ICD-10-CM

## 2025-04-08 DIAGNOSIS — I50.22 CHRONIC SYSTOLIC CONGESTIVE HEART FAILURE (HCC): Primary | ICD-10-CM

## 2025-04-08 NOTE — TELEPHONE ENCOUNTER
Called Barbi MOILNA and LVM.   Spoke with patient's daughter Nisreen, patient has been taking torsemide 40 mg daily for 1 week, leg edema has not improved and still having shortness of breath. Please advise.

## 2025-04-08 NOTE — TELEPHONE ENCOUNTER
Barbi called from [facility] to report that  switched the patient over to Torsemide 20 mg's at his last visit due to weight gain. Barbi is stating that the patient is complaining of having sob with the new medication, patient's weight is the same but the edema is worse. Please call Barbi at [phone].

## 2025-04-08 NOTE — TELEPHONE ENCOUNTER
Roz from [facility] needs a call      With concerns with patient     Is having more SOB  when walking  to and from the Bathroom   While walking his o2 is 86 and needs to see if Dr Cross can order oxygen for pt     Please call roz from [facility] secure line [phone]

## 2025-04-09 ENCOUNTER — TELEPHONE (OUTPATIENT)
Dept: FAMILY MEDICINE CLINIC | Age: 85
End: 2025-04-09

## 2025-04-09 ENCOUNTER — ANTI-COAG VISIT (OUTPATIENT)
Dept: PHARMACY | Age: 85
End: 2025-04-09

## 2025-04-09 DIAGNOSIS — I48.0 PAF (PAROXYSMAL ATRIAL FIBRILLATION) (HCC): Primary | ICD-10-CM

## 2025-04-09 LAB
INR BLD: 2
PROTIME: NORMAL

## 2025-04-09 NOTE — PROGRESS NOTES
ANTICOAGULATION SERVICE    Juan Grant is a 85 y.o. male with PMHx significant for HLD, PVD, CAD, HRN, Afib who presents to clinic 4/9/2025 for anticoagulation monitoring and adjustment. DOAC not an option due to hx of life threatening bleed while on previously. Patient not interested in watchman device. Patient is brought to appointments by his daughter, Nisreen. He lives with his son, João.     Anticoagulation Indication(s):  Afib    Referring Physician:   Dr. Lepe  Goal INR Range:  2-3  Duration of Anticoagulation Therapy:  Indefinite   Time of day dose taken: AM  Product patient has at home: warfarin 5 mg (peach color)    INR Summary                           Warfarin regimen (mg)  Date INR   A/P   Sun Mon Tue Wed Thu Fri Sat Mg/wk  4/9 2.0 At goal, no change 2.5 5 2.5 2.5 2.5 2.5 2.5 20  4/1 2.0 At goal, no change 2.5 5 2.5 2.5 2.5 2.5 2.5 20  3/25 1.6 Below goal,bolus/cont 2.5 5 5/2.5 2.5 2.5 2.5 2.5 20  3/18 2.0 At goal, continue  2.5 5 2.5 2.5 2.5 2.5 2.5 20  3/10 2.3 At goal, continue  2.5 5 2.5 2.5 2.5 2.5 2.5 20  3/3 3.2 Above goal, continue  2.5 0/5 2.5 2.5 2.5 0/2.5 2.5 20   2/24 2.5 At goal, continue  2.5 5 2.5 2.5 2.5 2.5 2.5 20   2/17 2.3 At goal, continue  2.5 5 2.5 2.5 2.5 2.5 2.5 20   2/10 1.8 Below goal, increase 2.5 5 2.5 2.5 2.5 2.5 2.5 20  2/3 1.9 Below goal, bolus 2.5 5/2.5 2.5 2.5 2.5 2.5 2.5 17.5  1/27 1.8 Below goal, bolus 2.5 5/2.5 2.5 2.5 2.5 2.5 2.5 17.5  1/20 2.3 At goal, continue 2.5 2.5 2.5 2.5 2.5 2.5 2.5 17.5  1/10 2.8 At goal, continue 2.5 2.5 2.5 2.5 2.5 2.5 2.5 17.5  12/9 2.5 At goal, continue 2.5 2.5 2.5 2.5 2.5 2.5 2.5 17.5  11/27 3.3 Above goal, hold x1 2.5 2.5 2.5 2.5 2.5 2.5 2.5 17.5  11/15 2.4 At goal, continue 2.5 2.5 2.5 2.5 2.5 2.5 2.5 17.5  11/8 2.3 At goal, continue 2.5 2.5 2.5 2.5 2.5 2.5 2.5 17.5  11/4 5.6 Above goal, hold  2.5 2.5 0/2.5 0/2.5 2.5 2.5 2.5 17.5  10/2 2.1 At goal, continue  2.5 5 2.5 2.5 2.5 5 2.5 22.5  10/2 2.0 At goal, continue

## 2025-04-09 NOTE — TELEPHONE ENCOUNTER
Roz called back.  Advised Roz of the home oxygen company going out to provide oxygen to the patient.    Roz is requesting that she be notified to make sure she documents the procedure for insurance purposes.    Call back @ [phone]

## 2025-04-10 ENCOUNTER — HOSPITAL ENCOUNTER (OUTPATIENT)
Age: 85
Setting detail: SPECIMEN
Discharge: HOME | End: 2025-04-10
Payer: MEDICARE

## 2025-04-10 DIAGNOSIS — I50.32 CHRONIC DIASTOLIC HF (HEART FAILURE) (HCC): Primary | ICD-10-CM

## 2025-04-10 LAB
ANION GAP SERPL CALCULATED.3IONS-SCNC: 13 MMOL/L (ref 3–16)
BUN SERPL-MCNC: 15 MG/DL (ref 7–20)
CALCIUM SERPL-MCNC: 8.4 MG/DL (ref 8.3–10.6)
CHLORIDE SERPL-SCNC: 100 MMOL/L (ref 99–110)
CO2 SERPL-SCNC: 27 MMOL/L (ref 21–32)
CREAT SERPL-MCNC: 1.5 MG/DL (ref 0.8–1.3)
GFR SERPLBLD CREATININE-BSD FMLA CKD-EPI: 45 ML/MIN/{1.73_M2}
GLUCOSE SERPL-MCNC: 84 MG/DL (ref 70–99)
NT-PROBNP SERPL-MCNC: 1026 PG/ML (ref 0–449)
POTASSIUM SERPL-SCNC: 4.1 MMOL/L (ref 3.5–5.1)
SODIUM SERPL-SCNC: 140 MMOL/L (ref 136–145)

## 2025-04-10 PROCEDURE — 83880 ASSAY OF NATRIURETIC PEPTIDE: CPT

## 2025-04-10 PROCEDURE — 80048 BASIC METABOLIC PNL TOTAL CA: CPT

## 2025-04-10 PROCEDURE — 36415 COLL VENOUS BLD VENIPUNCTURE: CPT

## 2025-04-10 RX ORDER — METOLAZONE 2.5 MG/1
2.5 TABLET ORAL WEEKLY
Qty: 30 TABLET | Refills: 1 | Status: SHIPPED | OUTPATIENT
Start: 2025-04-10

## 2025-04-10 NOTE — TELEPHONE ENCOUNTER
Called [facility] they accept and supply for the patient and his insurance   Medicare requires documentation of a walk test that provides baseline SPO2    Desaturation SPO2 level while walking, and documenting increase of SPO2  with application oxygen for the patient to qualify for at home oxygen then an order needs to be placed l for at home oxygen     They will fax the form over

## 2025-04-10 NOTE — TELEPHONE ENCOUNTER
Placed calls to [facility] for set uop of O2 in home for the patient 4/9/2025 waiting on call back the message state they are going to setup O2 4/9/2025

## 2025-04-11 ENCOUNTER — TELEPHONE (OUTPATIENT)
Dept: PHARMACY | Age: 85
End: 2025-04-11

## 2025-04-11 NOTE — TELEPHONE ENCOUNTER
Saad called to notify clinic that patient was started on metolazone. Notified saad this will not affect his INR level and to continue warfarin dose as instructed.     Caroline Gonzalez, PharmD, [facility]  [facility]  Jackelyn: [phone]  Ericka: [phone]  4/11/2025 4:41 PM    For Pharmacy Admin Tracking Only  Time Spent (min): 10

## 2025-04-11 NOTE — TELEPHONE ENCOUNTER
Pt's daughter called and said that the patient had a walking test done on Monday at home with [facility], so does he need to do do it again in the office?      Please advise

## 2025-04-15 ENCOUNTER — ANTI-COAG VISIT (OUTPATIENT)
Dept: PHARMACY | Age: 85
End: 2025-04-15

## 2025-04-15 DIAGNOSIS — I48.0 PAF (PAROXYSMAL ATRIAL FIBRILLATION) (HCC): Primary | ICD-10-CM

## 2025-04-15 LAB
INR BLD: 1.6
PROTIME: NORMAL

## 2025-04-15 NOTE — TELEPHONE ENCOUNTER
Pt will need 30 day supply sent to [facility] until Maltaq is delivered from [facility].     [facility] Medication Refills:    Medication: Multaq    Dosage: 400 mg BID    Number: 60    Refills: 0    Last Office Visit:     Next Office Visit: 0310/2025    Last Refill: 06/17/2024    Last Labs: BMP/BNP 4/10/2024

## 2025-04-15 NOTE — TELEPHONE ENCOUNTER
Spoke with oxygen company [employer] they require and order for oxygen and chart notes as well the daughter is aware pf this the walk test note is needed for any oxygen company to supply the equipment   Ordered teed up   They will need him to be placed on O2 for documetation purposes while ambulating and she can givce us her clinical note of the walk test to send in with the order

## 2025-04-15 NOTE — PROGRESS NOTES
ANTICOAGULATION SERVICE    Juan Grant is a 85 y.o. male with PMHx significant for HLD, PVD, CAD, HRN, Afib who presents to clinic 4/15/2025 for anticoagulation monitoring and adjustment. DOAC not an option due to hx of life threatening bleed while on previously. Patient not interested in watchman device. Patient is brought to appointments by his daughter, Nisreen. He lives with his son, João.     Anticoagulation Indication(s):  Afib    Referring Physician:   Dr. Lepe  Goal INR Range:  2-3  Duration of Anticoagulation Therapy:  Indefinite   Time of day dose taken: AM  Product patient has at home: warfarin 5 mg (peach color)    INR Summary                           Warfarin regimen (mg)  Date INR   A/P   Sun Mon Tue Wed Thu Fri Sat Mg/wk  4/15 1.6 below, bolus   2.5 5 5/2.5 2.5 2.5 2.5 2.5 20  4/9 2.0 At goal, no change 2.5 5 2.5 2.5 2.5 2.5 2.5 20  4/1 2.0 At goal, no change 2.5 5 2.5 2.5 2.5 2.5 2.5 20  3/25 1.6 Below goal,bolus/cont 2.5 5 5/2.5 2.5 2.5 2.5 2.5 20  3/18 2.0 At goal, continue  2.5 5 2.5 2.5 2.5 2.5 2.5 20  3/10 2.3 At goal, continue  2.5 5 2.5 2.5 2.5 2.5 2.5 20  3/3 3.2 Above goal, continue  2.5 0/5 2.5 2.5 2.5 0/2.5 2.5 20   2/24 2.5 At goal, continue  2.5 5 2.5 2.5 2.5 2.5 2.5 20   2/17 2.3 At goal, continue  2.5 5 2.5 2.5 2.5 2.5 2.5 20   2/10 1.8 Below goal, increase 2.5 5 2.5 2.5 2.5 2.5 2.5 20  2/3 1.9 Below goal, bolus 2.5 5/2.5 2.5 2.5 2.5 2.5 2.5 17.5  1/27 1.8 Below goal, bolus 2.5 5/2.5 2.5 2.5 2.5 2.5 2.5 17.5  1/20 2.3 At goal, continue 2.5 2.5 2.5 2.5 2.5 2.5 2.5 17.5  1/10 2.8 At goal, continue 2.5 2.5 2.5 2.5 2.5 2.5 2.5 17.5  12/9 2.5 At goal, continue 2.5 2.5 2.5 2.5 2.5 2.5 2.5 17.5  11/27 3.3 Above goal, hold x1 2.5 2.5 2.5 2.5 2.5 2.5 2.5 17.5  11/15 2.4 At goal, continue 2.5 2.5 2.5 2.5 2.5 2.5 2.5 17.5  11/8 2.3 At goal, continue 2.5 2.5 2.5 2.5 2.5 2.5 2.5 17.5  11/4 5.6 Above goal, hold  2.5 2.5 0/2.5 0/2.5 2.5 2.5 2.5 17.5  10/2 2.1 At goal, continue

## 2025-04-16 ENCOUNTER — HOSPITAL ENCOUNTER (OUTPATIENT)
Age: 85
Setting detail: SPECIMEN
Discharge: HOME | End: 2025-04-16

## 2025-04-16 NOTE — TELEPHONE ENCOUNTER
The [facility] nurse called in and stated she documented the walk test and can have it faxed to our office by Jimi garcia at  [phone] we call her if any other information is needed  They were given verbal order for a walk test for At home OT and nurse to perform   The first test ws incomplete due to the patients saturations going to 80s

## 2025-04-17 NOTE — TELEPHONE ENCOUNTER
Medication refill: Patient would like a smaller qty. Sent into [employer] until he can receive the current med order from  [facility] [facility] [employer]. Patient is out of Multaq.    Medication  dronedarone hcl (MULTAQ) 400 MG TABS [[id number]]  dronedarone hcl (MULTAQ) 400 MG TABS [[id number]]    Order Details  Dose: 400 mg Route: Oral Frequency: 2 TIMES DAILY WITH MEALS   Dispense Quantity: 60 tablet Refills: 0          Sig: Take 1 tablet by mouth 2 times daily (with meals)     [id number] [address] - P [phone] - F [phone] [[id number]]

## 2025-04-21 ENCOUNTER — HOSPITAL ENCOUNTER (INPATIENT)
Age: 85
LOS: 2 days | Discharge: HOSPICE | DRG: 312 | End: 2025-04-23
Attending: INTERNAL MEDICINE | Admitting: INTERNAL MEDICINE
Payer: MEDICARE

## 2025-04-21 ENCOUNTER — APPOINTMENT (OUTPATIENT)
Dept: CT IMAGING | Age: 85
DRG: 312 | End: 2025-04-21
Payer: MEDICARE

## 2025-04-21 ENCOUNTER — APPOINTMENT (OUTPATIENT)
Dept: GENERAL RADIOLOGY | Age: 85
DRG: 312 | End: 2025-04-21
Payer: MEDICARE

## 2025-04-21 DIAGNOSIS — R55 SYNCOPE AND COLLAPSE: Primary | ICD-10-CM

## 2025-04-21 DIAGNOSIS — E87.6 HYPOKALEMIA: ICD-10-CM

## 2025-04-21 DIAGNOSIS — S12.001G CLOSED NONDISPLACED FRACTURE OF FIRST CERVICAL VERTEBRA WITH DELAYED HEALING, UNSPECIFIED FRACTURE MORPHOLOGY, SUBSEQUENT ENCOUNTER: ICD-10-CM

## 2025-04-21 LAB
ALBUMIN SERPL-MCNC: 3.6 G/DL (ref 3.4–5)
ALBUMIN/GLOB SERPL: 0.8 {RATIO} (ref 1.1–2.2)
ALP SERPL-CCNC: 83 U/L (ref 40–129)
ALT SERPL-CCNC: 7 U/L (ref 10–40)
ANION GAP SERPL CALCULATED.3IONS-SCNC: 12 MMOL/L (ref 3–16)
AST SERPL-CCNC: 25 U/L (ref 15–37)
BACTERIA URNS QL MICRO: ABNORMAL /HPF
BASOPHILS # BLD: 0.1 K/UL (ref 0–0.2)
BASOPHILS NFR BLD: 0.8 %
BILIRUB SERPL-MCNC: 0.6 MG/DL (ref 0–1)
BILIRUB UR QL STRIP.AUTO: NEGATIVE
BUN SERPL-MCNC: 33 MG/DL (ref 7–20)
CALCIUM SERPL-MCNC: 9 MG/DL (ref 8.3–10.6)
CHLORIDE SERPL-SCNC: 90 MMOL/L (ref 99–110)
CLARITY UR: CLEAR
CO2 SERPL-SCNC: 36 MMOL/L (ref 21–32)
COLOR UR: YELLOW
CREAT SERPL-MCNC: 1.8 MG/DL (ref 0.8–1.3)
DEPRECATED RDW RBC AUTO: 16 % (ref 12.4–15.4)
EOSINOPHIL # BLD: 0.3 K/UL (ref 0–0.6)
EOSINOPHIL NFR BLD: 3.4 %
EPI CELLS #/AREA URNS AUTO: 0 /HPF (ref 0–5)
GFR SERPLBLD CREATININE-BSD FMLA CKD-EPI: 36 ML/MIN/{1.73_M2}
GLUCOSE SERPL-MCNC: 111 MG/DL (ref 70–99)
GLUCOSE UR STRIP.AUTO-MCNC: NEGATIVE MG/DL
HCT VFR BLD AUTO: 32.2 % (ref 40.5–52.5)
HGB BLD-MCNC: 10.9 G/DL (ref 13.5–17.5)
HGB UR QL STRIP.AUTO: NEGATIVE
HYALINE CASTS #/AREA URNS AUTO: 2 /LPF (ref 0–8)
INR PPP: 1.57 (ref 0.85–1.15)
KETONES UR STRIP.AUTO-MCNC: NEGATIVE MG/DL
LEUKOCYTE ESTERASE UR QL STRIP.AUTO: ABNORMAL
LYMPHOCYTES # BLD: 2 K/UL (ref 1–5.1)
LYMPHOCYTES NFR BLD: 26.6 %
MAGNESIUM SERPL-MCNC: 2.39 MG/DL (ref 1.8–2.4)
MCH RBC QN AUTO: 27.5 PG (ref 26–34)
MCHC RBC AUTO-ENTMCNC: 33.8 G/DL (ref 31–36)
MCV RBC AUTO: 81.3 FL (ref 80–100)
MONOCYTES # BLD: 0.9 K/UL (ref 0–1.3)
MONOCYTES NFR BLD: 11.4 %
NEUTROPHILS # BLD: 4.4 K/UL (ref 1.7–7.7)
NEUTROPHILS NFR BLD: 57.8 %
NITRITE UR QL STRIP.AUTO: NEGATIVE
NT-PROBNP SERPL-MCNC: 388 PG/ML (ref 0–449)
PH UR STRIP.AUTO: 7.5 [PH] (ref 5–8)
PLATELET # BLD AUTO: 218 K/UL (ref 135–450)
PMV BLD AUTO: 8.2 FL (ref 5–10.5)
POTASSIUM SERPL-SCNC: 2.8 MMOL/L (ref 3.5–5.1)
PROT SERPL-MCNC: 8.3 G/DL (ref 6.4–8.2)
PROT UR STRIP.AUTO-MCNC: NEGATIVE MG/DL
PROTHROMBIN TIME: 18.9 SEC (ref 11.9–14.9)
RBC # BLD AUTO: 3.96 M/UL (ref 4.2–5.9)
RBC CLUMPS #/AREA URNS AUTO: 0 /HPF (ref 0–4)
SODIUM SERPL-SCNC: 138 MMOL/L (ref 136–145)
SP GR UR STRIP.AUTO: <=1.005 (ref 1–1.03)
TROPONIN, HIGH SENSITIVITY: 48 NG/L (ref 0–22)
TROPONIN, HIGH SENSITIVITY: 48 NG/L (ref 0–22)
UA COMPLETE W REFLEX CULTURE PNL UR: ABNORMAL
UA DIPSTICK W REFLEX MICRO PNL UR: YES
URN SPEC COLLECT METH UR: ABNORMAL
UROBILINOGEN UR STRIP-ACNC: 0.2 E.U./DL
WBC # BLD AUTO: 7.6 K/UL (ref 4–11)
WBC #/AREA URNS AUTO: 6 /HPF (ref 0–5)

## 2025-04-21 PROCEDURE — 83735 ASSAY OF MAGNESIUM: CPT

## 2025-04-21 PROCEDURE — 6360000002 HC RX W HCPCS: Performed by: PHYSICIAN ASSISTANT

## 2025-04-21 PROCEDURE — 93005 ELECTROCARDIOGRAM TRACING: CPT | Performed by: INTERNAL MEDICINE

## 2025-04-21 PROCEDURE — 2060000000 HC ICU INTERMEDIATE R&B

## 2025-04-21 PROCEDURE — 2500000003 HC RX 250 WO HCPCS: Performed by: NURSE PRACTITIONER

## 2025-04-21 PROCEDURE — 84484 ASSAY OF TROPONIN QUANT: CPT

## 2025-04-21 PROCEDURE — 81001 URINALYSIS AUTO W/SCOPE: CPT

## 2025-04-21 PROCEDURE — 6370000000 HC RX 637 (ALT 250 FOR IP): Performed by: NURSE PRACTITIONER

## 2025-04-21 PROCEDURE — 83880 ASSAY OF NATRIURETIC PEPTIDE: CPT

## 2025-04-21 PROCEDURE — 70450 CT HEAD/BRAIN W/O DYE: CPT

## 2025-04-21 PROCEDURE — 2580000003 HC RX 258: Performed by: NURSE PRACTITIONER

## 2025-04-21 PROCEDURE — 85025 COMPLETE CBC W/AUTO DIFF WBC: CPT

## 2025-04-21 PROCEDURE — 99285 EMERGENCY DEPT VISIT HI MDM: CPT

## 2025-04-21 PROCEDURE — 96365 THER/PROPH/DIAG IV INF INIT: CPT

## 2025-04-21 PROCEDURE — 85610 PROTHROMBIN TIME: CPT

## 2025-04-21 PROCEDURE — 71045 X-RAY EXAM CHEST 1 VIEW: CPT

## 2025-04-21 PROCEDURE — 80053 COMPREHEN METABOLIC PANEL: CPT

## 2025-04-21 RX ORDER — TAMSULOSIN HYDROCHLORIDE 0.4 MG/1
0.4 CAPSULE ORAL DAILY
Status: DISCONTINUED | OUTPATIENT
Start: 2025-04-22 | End: 2025-04-23 | Stop reason: HOSPADM

## 2025-04-21 RX ORDER — POTASSIUM CHLORIDE 7.45 MG/ML
10 INJECTION INTRAVENOUS ONCE
Status: COMPLETED | OUTPATIENT
Start: 2025-04-21 | End: 2025-04-21

## 2025-04-21 RX ORDER — ACETAMINOPHEN 325 MG/1
650 TABLET ORAL EVERY 6 HOURS PRN
Status: DISCONTINUED | OUTPATIENT
Start: 2025-04-21 | End: 2025-04-23 | Stop reason: HOSPADM

## 2025-04-21 RX ORDER — ONDANSETRON 2 MG/ML
4 INJECTION INTRAMUSCULAR; INTRAVENOUS EVERY 6 HOURS PRN
Status: DISCONTINUED | OUTPATIENT
Start: 2025-04-21 | End: 2025-04-23 | Stop reason: HOSPADM

## 2025-04-21 RX ORDER — SODIUM CHLORIDE 9 MG/ML
INJECTION, SOLUTION INTRAVENOUS PRN
Status: DISCONTINUED | OUTPATIENT
Start: 2025-04-21 | End: 2025-04-23 | Stop reason: HOSPADM

## 2025-04-21 RX ORDER — PANTOPRAZOLE SODIUM 40 MG/1
40 TABLET, DELAYED RELEASE ORAL
Status: DISCONTINUED | OUTPATIENT
Start: 2025-04-22 | End: 2025-04-23 | Stop reason: HOSPADM

## 2025-04-21 RX ORDER — LIDOCAINE 4 G/G
1 PATCH TOPICAL DAILY
Status: DISCONTINUED | OUTPATIENT
Start: 2025-04-21 | End: 2025-04-23 | Stop reason: HOSPADM

## 2025-04-21 RX ORDER — ONDANSETRON 4 MG/1
4 TABLET, ORALLY DISINTEGRATING ORAL EVERY 8 HOURS PRN
Status: DISCONTINUED | OUTPATIENT
Start: 2025-04-21 | End: 2025-04-23 | Stop reason: HOSPADM

## 2025-04-21 RX ORDER — WARFARIN SODIUM 2.5 MG/1
2.5 TABLET ORAL
Status: COMPLETED | OUTPATIENT
Start: 2025-04-21 | End: 2025-04-21

## 2025-04-21 RX ORDER — ACETAMINOPHEN 650 MG/1
650 SUPPOSITORY RECTAL EVERY 6 HOURS PRN
Status: DISCONTINUED | OUTPATIENT
Start: 2025-04-21 | End: 2025-04-23 | Stop reason: HOSPADM

## 2025-04-21 RX ORDER — FERROUS SULFATE 325(65) MG
325 TABLET ORAL 2 TIMES DAILY WITH MEALS
Status: DISCONTINUED | OUTPATIENT
Start: 2025-04-22 | End: 2025-04-23 | Stop reason: HOSPADM

## 2025-04-21 RX ORDER — 0.9 % SODIUM CHLORIDE 0.9 %
1000 INTRAVENOUS SOLUTION INTRAVENOUS ONCE
Status: COMPLETED | OUTPATIENT
Start: 2025-04-21 | End: 2025-04-22

## 2025-04-21 RX ORDER — ROSUVASTATIN CALCIUM 10 MG/1
5 TABLET, COATED ORAL NIGHTLY
Status: DISCONTINUED | OUTPATIENT
Start: 2025-04-21 | End: 2025-04-23 | Stop reason: HOSPADM

## 2025-04-21 RX ORDER — SODIUM CHLORIDE 0.9 % (FLUSH) 0.9 %
5-40 SYRINGE (ML) INJECTION PRN
Status: DISCONTINUED | OUTPATIENT
Start: 2025-04-21 | End: 2025-04-23 | Stop reason: HOSPADM

## 2025-04-21 RX ORDER — SODIUM CHLORIDE 0.9 % (FLUSH) 0.9 %
5-40 SYRINGE (ML) INJECTION EVERY 12 HOURS SCHEDULED
Status: DISCONTINUED | OUTPATIENT
Start: 2025-04-21 | End: 2025-04-23 | Stop reason: HOSPADM

## 2025-04-21 RX ORDER — FENTANYL 50 UG/1
1 PATCH TRANSDERMAL
Status: DISCONTINUED | OUTPATIENT
Start: 2025-04-24 | End: 2025-04-22

## 2025-04-21 RX ORDER — POLYETHYLENE GLYCOL 3350 17 G/17G
17 POWDER, FOR SOLUTION ORAL DAILY PRN
Status: DISCONTINUED | OUTPATIENT
Start: 2025-04-21 | End: 2025-04-23 | Stop reason: HOSPADM

## 2025-04-21 RX ADMIN — ROSUVASTATIN CALCIUM 5 MG: 10 TABLET, FILM COATED ORAL at 23:38

## 2025-04-21 RX ADMIN — POTASSIUM CHLORIDE 10 MEQ: 7.46 INJECTION, SOLUTION INTRAVENOUS at 20:24

## 2025-04-21 RX ADMIN — SODIUM CHLORIDE, PRESERVATIVE FREE 10 ML: 5 INJECTION INTRAVENOUS at 23:38

## 2025-04-21 RX ADMIN — SODIUM CHLORIDE 1000 ML: 0.9 INJECTION, SOLUTION INTRAVENOUS at 23:42

## 2025-04-21 RX ADMIN — WARFARIN SODIUM 2.5 MG: 2.5 TABLET ORAL at 23:38

## 2025-04-21 ASSESSMENT — ENCOUNTER SYMPTOMS
COUGH: 0
VOMITING: 0
RHINORRHEA: 0
NAUSEA: 0
ABDOMINAL PAIN: 0
DIARRHEA: 0
BACK PAIN: 1
SHORTNESS OF BREATH: 0

## 2025-04-21 ASSESSMENT — LIFESTYLE VARIABLES
HOW OFTEN DO YOU HAVE A DRINK CONTAINING ALCOHOL: NEVER
HOW MANY STANDARD DRINKS CONTAINING ALCOHOL DO YOU HAVE ON A TYPICAL DAY: PATIENT DOES NOT DRINK

## 2025-04-21 ASSESSMENT — PAIN - FUNCTIONAL ASSESSMENT: PAIN_FUNCTIONAL_ASSESSMENT: NONE - DENIES PAIN

## 2025-04-21 NOTE — ED PROVIDER NOTES
[facility] EMERGENCY DEPARTMENT  EMERGENCY DEPARTMENT ENCOUNTER        Pt Name: Juan Grant  MRN: [id number]  Birthdate 1940  Date of evaluation: 4/21/2025  Provider: Aracelis Paige PA-C  PCP: Ki Cross MD  Note Started: 7:10 PM EDT 4/21/25      MATT. I have evaluated this patient.        CHIEF COMPLAINT       Chief Complaint   Patient presents with    Dizziness     Pt in from home via EMS. Daughter reported to EMS that pt \"stood up and got dizzy\". No fall and no injury. Daughter is hoping for pt consult to discuss placement.        HISTORY OF PRESENT ILLNESS: 1 or more Elements     History From: Patient  Limitations to history : None    Juan Grant is a 85 y.o. male who presents to the emergency department today for evaluation for concerns of lightheadedness.  Per the daughter, she called EMS because the patient stood up and he states that he felt lightheaded.  When asked that the patient felt lightheaded that he was going to pass out or dizzy that the room was spinning or feeling off balance he does confirm that he felt lightheaded and felt that he was 1 to pass out.  He did not pass out.  Did not fall or hit his head.  He has no headaches.  No visual changes.  He has no numbness, tingling or weakness.  The patient has a history of CABG, he has a pacemaker in place, he has a history of atrial fibrillation and is anticoagulated on Coumadin.  Patient states that due to the episode of lightheadedness his daughter called EMS when.  He arrived to the ED as no complaints of dizziness or lightheadedness.  Patient states that he is complaining of pain to his lower back however he states that this is chronic for him.  He denies falling or injuring himself.  He has no fever or chills.  No cough.  He denies any vomiting or diarrhea.  No other complaints    Nursing Notes were all reviewed and agreed with or any disagreements were addressed in the HPI.    REVIEW OF SYSTEMS :      Review of

## 2025-04-22 ENCOUNTER — APPOINTMENT (OUTPATIENT)
Dept: GENERAL RADIOLOGY | Age: 85
DRG: 312 | End: 2025-04-22
Payer: MEDICARE

## 2025-04-22 ENCOUNTER — HOSPITAL ENCOUNTER (INPATIENT)
Age: 85
Discharge: HOME | DRG: 312 | End: 2025-04-24
Payer: MEDICARE

## 2025-04-22 VITALS
WEIGHT: 177 LBS | DIASTOLIC BLOOD PRESSURE: 70 MMHG | BODY MASS INDEX: 25.34 KG/M2 | SYSTOLIC BLOOD PRESSURE: 113 MMHG | HEIGHT: 70 IN

## 2025-04-22 LAB
ANION GAP SERPL CALCULATED.3IONS-SCNC: 15 MMOL/L (ref 3–16)
BASOPHILS # BLD: 0 K/UL (ref 0–0.2)
BASOPHILS NFR BLD: 0.7 %
BUN SERPL-MCNC: 30 MG/DL (ref 7–20)
CALCIUM SERPL-MCNC: 8.6 MG/DL (ref 8.3–10.6)
CHLORIDE SERPL-SCNC: 94 MMOL/L (ref 99–110)
CO2 SERPL-SCNC: 31 MMOL/L (ref 21–32)
CREAT SERPL-MCNC: 1.8 MG/DL (ref 0.8–1.3)
DEPRECATED RDW RBC AUTO: 16.4 % (ref 12.4–15.4)
ECHO AO ASC DIAM: 3.5 CM
ECHO AO ASCENDING AORTA INDEX: 1.77 CM/M2
ECHO AV AREA PEAK VELOCITY: 2.1 CM2
ECHO AV AREA VTI: 2.1 CM2
ECHO AV AREA/BSA PEAK VELOCITY: 1.1 CM2/M2
ECHO AV AREA/BSA VTI: 1.1 CM2/M2
ECHO AV MEAN GRADIENT: 4 MMHG
ECHO AV MEAN VELOCITY: 1 M/S
ECHO AV PEAK GRADIENT: 8 MMHG
ECHO AV PEAK VELOCITY: 1.4 M/S
ECHO AV VELOCITY RATIO: 0.71
ECHO AV VTI: 32.3 CM
ECHO BSA: 1.99 M2
ECHO LA AREA 2C: 13.1 CM2
ECHO LA AREA 4C: 21.5 CM2
ECHO LA MAJOR AXIS: 6.7 CM
ECHO LA MINOR AXIS: 4.5 CM
ECHO LA VOL MOD A2C: 32 ML (ref 18–58)
ECHO LA VOL MOD A4C: 55 ML (ref 18–58)
ECHO LA VOLUME INDEX MOD A2C: 16 ML/M2 (ref 16–34)
ECHO LA VOLUME INDEX MOD A4C: 28 ML/M2 (ref 16–34)
ECHO LV E' LATERAL VELOCITY: 7.83 CM/S
ECHO LV E' SEPTAL VELOCITY: 5.36 CM/S
ECHO LV EDV A2C: 111 ML
ECHO LV EDV A4C: 109 ML
ECHO LV EDV INDEX A4C: 55 ML/M2
ECHO LV EDV NDEX A2C: 56 ML/M2
ECHO LV EF PHYSICIAN: 58 %
ECHO LV EJECTION FRACTION A2C: 53 %
ECHO LV EJECTION FRACTION A4C: 58 %
ECHO LV EJECTION FRACTION BIPLANE: 55 % (ref 55–100)
ECHO LV ESV A2C: 52 ML
ECHO LV ESV A4C: 46 ML
ECHO LV ESV INDEX A2C: 26 ML/M2
ECHO LV ESV INDEX A4C: 23 ML/M2
ECHO LV FRACTIONAL SHORTENING: 31 % (ref 28–44)
ECHO LV INTERNAL DIMENSION DIASTOLE INDEX: 1.82 CM/M2
ECHO LV INTERNAL DIMENSION DIASTOLIC: 3.6 CM (ref 4.2–5.9)
ECHO LV INTERNAL DIMENSION SYSTOLIC INDEX: 1.26 CM/M2
ECHO LV INTERNAL DIMENSION SYSTOLIC: 2.5 CM
ECHO LV IVSD: 0.9 CM (ref 0.6–1)
ECHO LV MASS 2D: 92.8 G (ref 88–224)
ECHO LV MASS INDEX 2D: 46.9 G/M2 (ref 49–115)
ECHO LV POSTERIOR WALL DIASTOLIC: 0.9 CM (ref 0.6–1)
ECHO LV RELATIVE WALL THICKNESS RATIO: 0.5
ECHO LVOT AREA: 3.1 CM2
ECHO LVOT AV VTI INDEX: 0.65
ECHO LVOT DIAM: 2 CM
ECHO LVOT MEAN GRADIENT: 2 MMHG
ECHO LVOT PEAK GRADIENT: 4 MMHG
ECHO LVOT PEAK VELOCITY: 1 M/S
ECHO LVOT STROKE VOLUME INDEX: 33.5 ML/M2
ECHO LVOT SV: 66.3 ML
ECHO LVOT VTI: 21.1 CM
ECHO MV A VELOCITY: 0.79 M/S
ECHO MV AREA VTI: 2.6 CM2
ECHO MV E DECELERATION TIME (DT): 259 MS
ECHO MV E VELOCITY: 0.57 M/S
ECHO MV E/A RATIO: 0.72
ECHO MV E/E' LATERAL: 7.28
ECHO MV E/E' RATIO (AVERAGED): 8.96
ECHO MV E/E' SEPTAL: 10.63
ECHO MV LVOT VTI INDEX: 1.2
ECHO MV MAX VELOCITY: 0.7 M/S
ECHO MV MEAN GRADIENT: 1 MMHG
ECHO MV MEAN VELOCITY: 0.4 M/S
ECHO MV PEAK GRADIENT: 2 MMHG
ECHO MV VTI: 25.4 CM
ECHO PV MAX VELOCITY: 0.6 M/S
ECHO PV PEAK GRADIENT: 2 MMHG
ECHO RA AREA 4C: 16.9 CM2
ECHO RA END SYSTOLIC VOLUME APICAL 4 CHAMBER INDEX BSA: 25 ML/M2
ECHO RA VOLUME: 50 ML
ECHO RV BASAL DIMENSION: 3.5 CM
ECHO RV FREE WALL PEAK S': 10.9 CM/S
ECHO RV TAPSE: 2.5 CM (ref 1.7–?)
ECHO TV REGURGITANT MAX VELOCITY: 2.23 M/S
ECHO TV REGURGITANT PEAK GRADIENT: 20 MMHG
EKG DIAGNOSIS: NORMAL
EKG Q-T INTERVAL: 476 MS
EKG QRS DURATION: 106 MS
EKG QTC CALCULATION (BAZETT): 487 MS
EKG R AXIS: -8 DEGREES
EKG T AXIS: 58 DEGREES
EKG VENTRICULAR RATE: 63 BPM
EOSINOPHIL # BLD: 0.2 K/UL (ref 0–0.6)
EOSINOPHIL NFR BLD: 3.5 %
GFR SERPLBLD CREATININE-BSD FMLA CKD-EPI: 36 ML/MIN/{1.73_M2}
GLUCOSE SERPL-MCNC: 101 MG/DL (ref 70–99)
HCT VFR BLD AUTO: 32 % (ref 40.5–52.5)
HGB BLD-MCNC: 10.6 G/DL (ref 13.5–17.5)
INR PPP: 1.67 (ref 0.85–1.15)
LYMPHOCYTES # BLD: 1.9 K/UL (ref 1–5.1)
LYMPHOCYTES NFR BLD: 28.1 %
MAGNESIUM SERPL-MCNC: 2.17 MG/DL (ref 1.8–2.4)
MCH RBC QN AUTO: 27 PG (ref 26–34)
MCHC RBC AUTO-ENTMCNC: 33.1 G/DL (ref 31–36)
MCV RBC AUTO: 81.5 FL (ref 80–100)
MONOCYTES # BLD: 0.7 K/UL (ref 0–1.3)
MONOCYTES NFR BLD: 10.8 %
NEUTROPHILS # BLD: 3.8 K/UL (ref 1.7–7.7)
NEUTROPHILS NFR BLD: 56.9 %
PLATELET # BLD AUTO: 211 K/UL (ref 135–450)
PMV BLD AUTO: 8.1 FL (ref 5–10.5)
POTASSIUM SERPL-SCNC: 2.7 MMOL/L (ref 3.5–5.1)
PROTHROMBIN TIME: 19.8 SEC (ref 11.9–14.9)
RBC # BLD AUTO: 3.92 M/UL (ref 4.2–5.9)
SODIUM SERPL-SCNC: 140 MMOL/L (ref 136–145)
WBC # BLD AUTO: 6.7 K/UL (ref 4–11)

## 2025-04-22 PROCEDURE — 6370000000 HC RX 637 (ALT 250 FOR IP): Performed by: NURSE PRACTITIONER

## 2025-04-22 PROCEDURE — 95816 EEG AWAKE AND DROWSY: CPT

## 2025-04-22 PROCEDURE — 85610 PROTHROMBIN TIME: CPT

## 2025-04-22 PROCEDURE — 6360000004 HC RX CONTRAST MEDICATION: Performed by: INTERNAL MEDICINE

## 2025-04-22 PROCEDURE — 2500000003 HC RX 250 WO HCPCS: Performed by: NURSE PRACTITIONER

## 2025-04-22 PROCEDURE — 83735 ASSAY OF MAGNESIUM: CPT

## 2025-04-22 PROCEDURE — 6370000000 HC RX 637 (ALT 250 FOR IP): Performed by: INTERNAL MEDICINE

## 2025-04-22 PROCEDURE — 36415 COLL VENOUS BLD VENIPUNCTURE: CPT

## 2025-04-22 PROCEDURE — C8929 TTE W OR WO FOL WCON,DOPPLER: HCPCS

## 2025-04-22 PROCEDURE — 80048 BASIC METABOLIC PNL TOTAL CA: CPT

## 2025-04-22 PROCEDURE — 95816 EEG AWAKE AND DROWSY: CPT | Performed by: PSYCHIATRY & NEUROLOGY

## 2025-04-22 PROCEDURE — 74018 RADEX ABDOMEN 1 VIEW: CPT

## 2025-04-22 PROCEDURE — 1200000000 HC SEMI PRIVATE

## 2025-04-22 PROCEDURE — 93010 ELECTROCARDIOGRAM REPORT: CPT | Performed by: INTERNAL MEDICINE

## 2025-04-22 PROCEDURE — 85025 COMPLETE CBC W/AUTO DIFF WBC: CPT

## 2025-04-22 RX ORDER — POTASSIUM CHLORIDE 750 MG/1
30 TABLET, EXTENDED RELEASE ORAL
Status: COMPLETED | OUTPATIENT
Start: 2025-04-22 | End: 2025-04-22

## 2025-04-22 RX ORDER — DIAZEPAM 10 MG/2ML
2.5 INJECTION, SOLUTION INTRAMUSCULAR; INTRAVENOUS EVERY 4 HOURS PRN
Status: DISCONTINUED | OUTPATIENT
Start: 2025-04-22 | End: 2025-04-23 | Stop reason: HOSPADM

## 2025-04-22 RX ORDER — WARFARIN SODIUM 5 MG/1
5 TABLET ORAL
Status: COMPLETED | OUTPATIENT
Start: 2025-04-22 | End: 2025-04-22

## 2025-04-22 RX ADMIN — SODIUM CHLORIDE, PRESERVATIVE FREE 10 ML: 5 INJECTION INTRAVENOUS at 21:38

## 2025-04-22 RX ADMIN — FERROUS SULFATE TAB 325 MG (65 MG ELEMENTAL FE) 325 MG: 325 (65 FE) TAB at 09:13

## 2025-04-22 RX ADMIN — SULFUR HEXAFLUORIDE 2 ML: 60.7; .19; .19 INJECTION, POWDER, LYOPHILIZED, FOR SUSPENSION INTRAVENOUS; INTRAVESICAL at 14:55

## 2025-04-22 RX ADMIN — POTASSIUM CHLORIDE 30 MEQ: 750 TABLET, FILM COATED, EXTENDED RELEASE ORAL at 09:13

## 2025-04-22 RX ADMIN — ROSUVASTATIN CALCIUM 5 MG: 10 TABLET, FILM COATED ORAL at 21:38

## 2025-04-22 RX ADMIN — TAMSULOSIN HYDROCHLORIDE 0.4 MG: 0.4 CAPSULE ORAL at 09:13

## 2025-04-22 RX ADMIN — SODIUM CHLORIDE, PRESERVATIVE FREE 10 ML: 5 INJECTION INTRAVENOUS at 09:14

## 2025-04-22 RX ADMIN — FERROUS SULFATE TAB 325 MG (65 MG ELEMENTAL FE) 325 MG: 325 (65 FE) TAB at 17:23

## 2025-04-22 RX ADMIN — POTASSIUM CHLORIDE 30 MEQ: 750 TABLET, FILM COATED, EXTENDED RELEASE ORAL at 06:19

## 2025-04-22 RX ADMIN — WARFARIN SODIUM 5 MG: 5 TABLET ORAL at 17:23

## 2025-04-22 RX ADMIN — PANTOPRAZOLE SODIUM 40 MG: 40 TABLET, DELAYED RELEASE ORAL at 05:05

## 2025-04-22 NOTE — PROGRESS NOTES
Pharmacy to Dose Warfarin    Pharmacy consulted to dose warfarin for Afib.    INR Goal: 2-3    INR today: 1.67    Assessment/Plan:  - INR is subtherapeutic   - Will give 5 mg boost today  - Possible concomitant drug-drug interactions include: APAP, crestor     Pharmacy will continue to follow.    Yanely Negrete, PharmD  PGY-1 Pharmacy Resident

## 2025-04-22 NOTE — ACP (ADVANCE CARE PLANNING)
Advanced Care Planning Note.    Purpose of Encounter: Advanced care planning in light of Afib  Parties In Attendance: Patient, daughter  Decisional Capacity: No  Subjective: Patient denies CP and SOB  Objective: Cr 1.8  Goals of Care Determination: Patient/POA want limited support (No CPR, no vent, no HD, consider surgery, no trach, no PEG)  Plan:  EEG, Echo, PT/OT/SLP, Neuro consult, consider hospice care  Code Status: Limited   Time spent on Advanced care Plannin minutes  Advanced Care Planning Documents: Completed advanced directives on chart, daughter is the POA.    Michele Toledo MD  2025 5:04 PM

## 2025-04-22 NOTE — PROGRESS NOTES
Clinical Pharmacy Note: Pharmacy to Dose Warfarin    Pharmacy consulted by Twyla Scales to dose warfarin.    Juan Grant is a 85 y.o. male  is receiving warfarin for indication: A-fib.    INR Goal Range: 2-3  Prior to admission warfarin dosing regimen: 5mg on Monday and 2.5mg all other days  INR today:   Lab Results   Component Value Date/Time    INR 1.57 04/21/2025 07:17 PM       Assessment/Plan:  INR is subtherapeutic on prior to admission dosing regimen.   Based on today's assessment, dose warfarin 2.5mg.  Daily INR is ordered. Pharmacy will continue to monitor and make adjustments to regimen as necessary.     Thank you for the consult,     Moise Bess, YumikoD

## 2025-04-22 NOTE — ED NOTES
Patient Name: Juan Grant  : 1940 85 y.o.  MRN: [id number]  ED Room #: ED-0011/11     Chief complaint:   Chief Complaint   Patient presents with    Dizziness     Pt in from home via EMS. Daughter reported to EMS that pt \"stood up and got dizzy\". No fall and no injury. Daughter is hoping for pt consult to discuss placement.      Hospital Problem/Diagnosis:   Hospital Problems           Last Modified POA    * (Principal) Near syncope 2025 Yes         O2 Flow Rate:O2 Device: None (Room air)   (if applicable)  Cardiac Rhythm:   (if applicable)  Active LDA's:   Peripheral IV 25 Right Forearm (Active)            How does patient ambulate? Contact Guard/Cane    2. How does patient take pills? Whole with Water    3. Is patient alert? Alert    4. Is patient oriented? To Person, To Place, To Time, and To Situation    5.   Patient arrived from:  home  Facility Name: ___________________________________________    6. If patient is disoriented or from a Skill Nursing Facility has family been notified of admission? Yes    7. Patient belongings? Belongings: Clothing and Cane    Disposition of belongings? Kept with Patient     8. Any specific patient or family belongings/needs/dynamics?   a. N/A    9. Miscellaneous comments/pending orders?  a. ED orders complete at this time.       If there are any additional questions please reach out to the Emergency Department.

## 2025-04-22 NOTE — PROGRESS NOTES
Unable to obtain full set of orthostatic vitals.    Lying:   /68 HR 60  Sitting: BP 79/48 HR 73  Standing: Became syncopal and too unsteady to obtain    Patient placed back in bed safely at this time.  Bed alarm engaged.

## 2025-04-22 NOTE — PROGRESS NOTES
At shift change patient noted to have a bottle of \"Dronedarone\" at the bedside. This medication appears to be from home with no label from our pharmacy. NP notified via perfect serve. Medication will be placed in omnicel in patient bin.

## 2025-04-22 NOTE — PROCEDURES
Patient: Juan Grant    MR Number: [id number]  YOB: 1940  Date of Visit: 4/22/2025    Clinical History:  The patient is a 85 y.o. years old male with acute encephalopathy and syncope      Method:  The EEG was performed utilizing the international 10/20 of electrode placements of both referential and bipolar montages. The patient was awake through out the recording.  Photic stimulation was performed.    Findings:  The background of the EEG showed normal alpha posterior background of 8- HZ and amplitude of 20-40 UV. This background was symmetric, waxing and waning, and reactive with eye opening and closure. The patient did not fall asleep during such recording. No spike or sharp waves were seen.  Photic stimulation did not activate EEG.     Impression:  This awake EEG  is within normal limits. There is no evidence of epileptiform discharges, focal, or lateralizing abnormalities.      Jaymie Hernandez MD      Board certified in clinical neurophysiology

## 2025-04-22 NOTE — PLAN OF CARE
Problem: Safety - Adult  Goal: Free from fall injury  4/22/2025 1138 by Leno Tripathi, RN  Outcome: Progressing  Note: Pt remains free from falls. Safety precautions in place. Bed in lowest position, bed wheels locked, call light with in reach, bed alarm on, yellow blanket in place, fall risk wrist band on, SAFE outside of doorway. Will continue to monitor.  4/22/2025 0032 by Hammad Hilliard, RN  Outcome: Progressing

## 2025-04-22 NOTE — PROGRESS NOTES
Hospitalist Progress Note      PCP: Ki Cross MD    Date of Admission: 4/21/2025    Chief Complaint: Syncope    Hospital Course: 86 yo M with chronic back pain on Fentanyl patch, Afib on Coumadin, CAD s/p CABG, CKD 3 came to ER with syncope.  Admitted as inpatient for syncope and fecal impaction.  EEG with no epileptiform activity.  Fentanyl patch stopped given progressing memory changes with daughter.  Acutely negative MRI Brain in Dec 2024 for recurrent falls.  Ordered molasses enema for fecal impaction.     Subjective:  Patient has been progressively declining at home with daughter.  Has \"episodes\" of staring here and at home.  Apparently has been on Fentanyl patches with changes every 48 hrs.  No CP, SOB, HA or abdominal pain.       Medications:  Reviewed    Infusion Medications    sodium chloride       Scheduled Medications    warfarin  5 mg Oral Once    ferrous sulfate  325 mg Oral BID WC    lidocaine  1 patch TransDERmal Daily    pantoprazole  40 mg Oral QAM AC    rosuvastatin  5 mg Oral Nightly    tamsulosin  0.4 mg Oral Daily    sodium chloride flush  5-40 mL IntraVENous 2 times per day    warfarin placeholder: dosing by pharmacy   Oral RX Placeholder     PRN Meds: diazePAM, ziprasidone (GEODON) 20 mg in sterile water 1 mL injection, sodium chloride flush, sodium chloride, ondansetron **OR** ondansetron, polyethylene glycol, acetaminophen **OR** acetaminophen, sulfur hexafluoride microspheres      Intake/Output Summary (Last 24 hours) at 4/22/2025 1655  Last data filed at 4/22/2025 0428  Gross per 24 hour   Intake 1065.6 ml   Output 775 ml   Net 290.6 ml       Physical Exam Performed:    /73   Pulse 61   Temp 98.4 °F (36.9 °C) (Axillary)   Resp 18   Ht 1.778 m (5' 10\")   Wt 80.3 kg (177 lb)   SpO2 94%   BMI 25.40 kg/m²     General appearance: Chronically ill appearing, NAD  HEENT: Pupils equal, round, and reactive to light. Conjunctivae/corneas clear.  Neck: Supple, with full

## 2025-04-22 NOTE — PROGRESS NOTES
Syncopal episode again this morning at 0655, this time while sitting at bedside with daughter.  Patient swaying and not responding to instructions.  Checked patients pupils which were reactive and just after patient more responsive.  Patient consciousness back to baseline and returned laying back in bed safely

## 2025-04-22 NOTE — H&P
disease)     Calculus of gallbladder 2024    Cancer ([facility])     skin    CHF (congestive heart failure) ([facility]) 2017    Coronary artery disease involving native coronary artery of native heart without angina pectoris 2011    DDD (degenerative disc disease), lumbar     Falls 2017    GI bleeding     Hyperlipidemia     Hypertension     MI (myocardial infarction) ([facility])     MRSA (methicillin resistant staph aureus) culture positive     h/o infection in the blood    Osteomyelitis     left foot    Pneumonia     S/P PTCA (percutaneous transluminal coronary angioplasty) stents x 8    SSS (sick sinus syndrome) ([facility])     Unspecified sleep apnea     Venous (peripheral) insufficiency 2018     PSHX:  has a past surgical history that includes Carpal tunnel release; Coronary angioplasty with stent; Diagnostic Cardiac Cath Lab Procedure; Coronary angioplasty; Cardiac pacemaker placement; Appendectomy; pacemaker placement; eye surgery; Pacemaker Change (10/2016); Foot Debridement (Left, 2021); and Toe amputation (Left, 2021).  Allergies:   Allergies   Allergen Reactions    Avelox [Moxifloxacin Hcl In Nacl] Anaphylaxis    Epinephrine Base     Other      [place] per PT - swelling size of silver dollar at site per PT     Keflex [Cephalexin] Nausea And Vomiting    Polysporin [Bacitracin-Polymyxin B] Rash     Fam HX:  family history includes Cancer in his sister; Coronary Art Dis in his brother.  Soc HX:   Social History     Socioeconomic History    Marital status:      Spouse name: None    Number of children: 2    Years of education: None    Highest education level: None   Tobacco Use    Smoking status: Former     Current packs/day: 0.00     Average packs/day: 1 pack/day for 30.0 years (30.0 ttl pk-yrs)     Types: Cigarettes     Start date: 1974     Quit date: 2004     Years since quittin.0    Smokeless tobacco: Never   Vaping Use    Vaping status: Never Used   Substance and

## 2025-04-23 ENCOUNTER — APPOINTMENT (OUTPATIENT)
Dept: CT IMAGING | Age: 85
DRG: 312 | End: 2025-04-23
Payer: MEDICARE

## 2025-04-23 ENCOUNTER — APPOINTMENT (OUTPATIENT)
Dept: GENERAL RADIOLOGY | Age: 85
DRG: 312 | End: 2025-04-23
Payer: MEDICARE

## 2025-04-23 VITALS
WEIGHT: 170.4 LBS | HEIGHT: 70 IN | OXYGEN SATURATION: 93 % | BODY MASS INDEX: 24.39 KG/M2 | HEART RATE: 65 BPM | SYSTOLIC BLOOD PRESSURE: 125 MMHG | TEMPERATURE: 97.7 F | DIASTOLIC BLOOD PRESSURE: 59 MMHG | RESPIRATION RATE: 17 BRPM

## 2025-04-23 LAB
GLUCOSE BLD-MCNC: 101 MG/DL (ref 70–99)
INR PPP: 1.69 (ref 0.85–1.15)
PERFORMED ON: ABNORMAL
PROTHROMBIN TIME: 20 SEC (ref 11.9–14.9)

## 2025-04-23 PROCEDURE — 70450 CT HEAD/BRAIN W/O DYE: CPT

## 2025-04-23 PROCEDURE — 6370000000 HC RX 637 (ALT 250 FOR IP): Performed by: NURSE PRACTITIONER

## 2025-04-23 PROCEDURE — 72125 CT NECK SPINE W/O DYE: CPT

## 2025-04-23 PROCEDURE — 6360000002 HC RX W HCPCS: Performed by: INTERNAL MEDICINE

## 2025-04-23 PROCEDURE — 73560 X-RAY EXAM OF KNEE 1 OR 2: CPT

## 2025-04-23 PROCEDURE — 2500000003 HC RX 250 WO HCPCS: Performed by: NURSE PRACTITIONER

## 2025-04-23 PROCEDURE — 36415 COLL VENOUS BLD VENIPUNCTURE: CPT

## 2025-04-23 PROCEDURE — APPNB30 APP NON BILLABLE TIME 0-30 MINS

## 2025-04-23 PROCEDURE — 85610 PROTHROMBIN TIME: CPT

## 2025-04-23 PROCEDURE — 6370000000 HC RX 637 (ALT 250 FOR IP): Performed by: INTERNAL MEDICINE

## 2025-04-23 RX ORDER — LORAZEPAM 2 MG/ML
1 CONCENTRATE ORAL
Qty: 30 ML | Refills: 0 | Status: SHIPPED | OUTPATIENT
Start: 2025-04-23 | End: 2025-05-07

## 2025-04-23 RX ORDER — FENTANYL 50 UG/1
1 PATCH TRANSDERMAL
Refills: 0 | Status: DISCONTINUED | OUTPATIENT
Start: 2025-04-23 | End: 2025-04-23 | Stop reason: HOSPADM

## 2025-04-23 RX ORDER — SCOPOLAMINE 1 MG/3D
1 PATCH, EXTENDED RELEASE TRANSDERMAL
Qty: 5 PATCH | Refills: 0 | Status: SHIPPED | OUTPATIENT
Start: 2025-04-23

## 2025-04-23 RX ORDER — FENTANYL 50 UG/1
1 PATCH TRANSDERMAL
Refills: 0 | Status: DISCONTINUED | OUTPATIENT
Start: 2025-04-23 | End: 2025-04-23

## 2025-04-23 RX ORDER — MORPHINE SULFATE 100 MG/5ML
10 SOLUTION ORAL
Qty: 30 ML | Refills: 0 | Status: SHIPPED | OUTPATIENT
Start: 2025-04-23 | End: 2025-04-30

## 2025-04-23 RX ORDER — WARFARIN SODIUM 5 MG/1
5 TABLET ORAL
Status: DISCONTINUED | OUTPATIENT
Start: 2025-04-23 | End: 2025-04-23 | Stop reason: HOSPADM

## 2025-04-23 RX ORDER — HYDROMORPHONE HYDROCHLORIDE 1 MG/ML
1 INJECTION, SOLUTION INTRAMUSCULAR; INTRAVENOUS; SUBCUTANEOUS
Status: DISCONTINUED | OUTPATIENT
Start: 2025-04-23 | End: 2025-04-23 | Stop reason: HOSPADM

## 2025-04-23 RX ORDER — FENTANYL 50 UG/1
1 PATCH TRANSDERMAL
Qty: 7 PATCH | Refills: 0 | Status: SHIPPED | OUTPATIENT
Start: 2025-04-23 | End: 2025-05-14

## 2025-04-23 RX ADMIN — DIAZEPAM 2.5 MG: 5 INJECTION, SOLUTION INTRAMUSCULAR; INTRAVENOUS at 05:20

## 2025-04-23 RX ADMIN — FERROUS SULFATE TAB 325 MG (65 MG ELEMENTAL FE) 325 MG: 325 (65 FE) TAB at 09:10

## 2025-04-23 RX ADMIN — DIAZEPAM 2.5 MG: 5 INJECTION, SOLUTION INTRAMUSCULAR; INTRAVENOUS at 00:27

## 2025-04-23 RX ADMIN — SODIUM CHLORIDE, PRESERVATIVE FREE 10 ML: 5 INJECTION INTRAVENOUS at 09:10

## 2025-04-23 RX ADMIN — HYDROMORPHONE HYDROCHLORIDE 1 MG: 1 INJECTION, SOLUTION INTRAMUSCULAR; INTRAVENOUS; SUBCUTANEOUS at 09:11

## 2025-04-23 RX ADMIN — TAMSULOSIN HYDROCHLORIDE 0.4 MG: 0.4 CAPSULE ORAL at 09:10

## 2025-04-23 ASSESSMENT — PAIN SCALES - GENERAL
PAINLEVEL_OUTOF10: 7
PAINLEVEL_OUTOF10: 6
PAINLEVEL_OUTOF10: 10

## 2025-04-23 ASSESSMENT — PAIN DESCRIPTION - DESCRIPTORS
DESCRIPTORS: DISCOMFORT
DESCRIPTORS: ACHING
DESCRIPTORS: DISCOMFORT

## 2025-04-23 ASSESSMENT — PAIN DESCRIPTION - LOCATION
LOCATION: NECK

## 2025-04-23 ASSESSMENT — PAIN DESCRIPTION - ORIENTATION: ORIENTATION: MID

## 2025-04-23 ASSESSMENT — PAIN SCALES - WONG BAKER: WONGBAKER_NUMERICALRESPONSE: HURTS A LITTLE BIT

## 2025-04-23 NOTE — PROGRESS NOTES
Pharmacy to Dose Warfarin    Pharmacy consulted to dose warfarin for Afib.    INR Goal: 2-3    INR today: 1.69    Assessment/Plan:  - INR is subtherapeutic   - Will give 5 mg boost today  - Possible concomitant drug-drug interactions include: APAP, crestor     Pharmacy will continue to follow.    Yanely Negrete, PharmD  PGY-1 Pharmacy Resident

## 2025-04-23 NOTE — PROGRESS NOTES
Patient assisted to the bathroom. Patient had a medium size hard bowel movement. Enema offered again, patient refusing at this time.

## 2025-04-23 NOTE — CARE COORDINATION
Discharge Planning:    CM was informed by attending provider, pt is DC home with hospice. Consult for hospice is placed.    Pt's family wants [facility]. KESHAWN called Cary with [facility] [phone] and provided pt's information and pt's daughter Nisreen's information. Cary reports she will call family and let CM know.    Electronically signed by Jean-Claude Bruce on 4/23/2025 at 8:23 AM

## 2025-04-23 NOTE — SIGNIFICANT EVENT
Notified by RN that patient sustained a fall and had \"goose egg\" on his forehead along with a small laceration.  Patient was promptly evaluated at bedside.    Subjective: Upon examination, patient was sitting on edge of bed.  Patient and staff state that patient attempted to get out of bed prior to calling for help and struck his head, specifically left forehead.  He is admitted for syncope workup and is on warfarin, last dose being 5 mg on 4/21.  Of note, RNs state that patient is also a chronic pain patient.  At time of my examination, he endorses pain and tenderness at the site of his left forehead hematoma, as well as on his right hand and left knee.  He also endorses dull neck pain. RNs     Physical exam:  General: Cranky but cooperative.  Not in acute distress.  Neck: TTP on back right side. No bruising or hematomas noted. Trachea midline.  Cardiovascular: RRR. Hypertensive 163/78. No crepitus  Pulmonary: Airway intact. CTAB. NWOB on room air.   Abdomen: Soft, non-tender, non-distended  Musculoskeletal: TTP on left knee at site of new bruise. Baseline hunched posture noted. +5/5 strength in upper and lower extremities bilaterally. Neurovascularly intact. No step offs, bruising, or deformities noted along spine.  Skin: large hematoma with 1 cm skin avulsion noted on left forehead above eyebrow, no active bleeding noted with application of dressing. Friction burn/abrasion noted under L eyebrow. U-shaped skin tear noted on R dorsum of hand, no active bleeding noted after application of dressing. Old left arm hematoma.  Neurologic: Neurovascularly intact without any focal sensory/motor deficits. Grossly non-focal, cranial nerves I-XII intact. GCS 15.  Psychiatric: Cranky but cooperative   Peripheral Pulses: +2 radial/PTDP pulses bilaterally, equal bilaterally    Plan:  84 yo M with chronic back pain on Fentanyl patch, Afib on Coumadin, CAD s/p CABG, CKD 3 admitted for syncope workup and fecal impaction. Neurology

## 2025-04-23 NOTE — DISCHARGE SUMMARY
Hospital Medicine Discharge Summary    Patient: Juan Grant     Gender: male  : 1940   Age: 85 y.o.  MRN: [id number]    Admitting Physician: No admitting provider for patient encounter.  Discharge Physician: Michele Toledo MD     Code Status: DNR-CC     Admit Date: 2025   Discharge Date: 2025      Disposition:  Home with [facility]    Discharge Diagnoses:    Active Hospital Problems    Diagnosis Date Noted    Near syncope [R55] 2025       Follow-up appointments:  one week    Outpatient to do list: F/U with PCP PRN    Condition at Discharge:  Terminal    Hospital Course:    86 yo M with chronic back pain on Fentanyl patch, Afib on Coumadin, CAD s/p CABG, CKD 3 came to ER with syncope.  Admitted as inpatient for syncope and fecal impaction.  EEG with no epileptiform activity.  Fentanyl patch stopped given progressing memory changes with daughter.  Acutely negative MRI Brain in Dec 2024 for recurrent falls.  Ordered molasses enema for fecal impaction.      Patient had a fall on morning of .    CT C spine:  IMPRESSION:  1. Minimally displaced fracture at the base of the dens (Type II fracture).  2. Non-displaced fracture of the left C1 posterior arch.    Patient had no acute neurologic deficits.  Case reviewed with Jovany Vasquez (Neurosurgery NP at [facility]).  Given lack of neurologic deficits, recommended cervical collar and OP follow up.  Cervical collar placed on patient.    Care was extensively coordinated and discussed with patient and daughter.  Given progressing falls, patient felt comfort care aligned best with his goals of care to be at home.    Changed to DNR-CC.  Gonzales placed for comfort.  [facility] consulted for home hospice.  Written for comfort Roxanol and Ativan intensol.  Will resume Fentanyl patch.  Coumadin stopped given comfort care.    I signed an [place] DNR form and sent all meds to be filled in OP Pharmacy prior to DC.    F/U with PCP PRN.    Discharge

## 2025-04-23 NOTE — CARE COORDINATION
Case Management Assessment  Initial Evaluation    Date/Time of Evaluation: 4/23/2025 8:11 AM  Assessment Completed by: Uma Arnold RN    If patient is discharged prior to next notation, then this note serves as note for discharge by case management.    Patient Name: Juan Grant                   YOB: 1940  Diagnosis: Syncope and collapse [R55]  Hypokalemia [E87.6]  Near syncope [R55]                   Date / Time: 4/21/2025  6:28 PM    Patient Admission Status: Inpatient   Readmission Risk (Low < 19, Mod (19-27), High > 27): Readmission Risk Score: 20.6    Current PCP: Ki Cross MD  PCP verified by CM? Yes    Chart Reviewed: Yes      History Provided by: Medical Record  Patient Orientation: Alert and Oriented, Person    Patient Cognition: Alert    Hospitalization in the last 30 days (Readmission):  No    If yes, Readmission Assessment in CM Navigator will be completed.    Advance Directives:      Code Status: Limited   Patient's Primary Decision Maker is: Named in Scanned ACP Document    Primary Decision Maker: Nisreen Grant - Child - [phone]    Secondary Decision Maker: João Grant - Child - [phone]    Supplemental (Other) Decision Maker: Rod Grant - Child - [phone]    Discharge Planning:    Patient lives with: (P) Family Members Type of Home: (P) House  Primary Care Giver: Family  Patient Support Systems include: Family Members   Current Financial resources: (P) [place]  Current community resources: (P) None  Current services prior to admission: (P) Durable Medical Equipment, Home Care (Prior [facility])            Current DME: (P) Walker, Hospital Bed, Cane, Wheelchair, Other (Comment) (lift chair)            Type of Home Care services:  (P) Nursing Services    ADLS  Prior functional level: (P) Assistance with the following:, Mobility  Current functional level: (P) Assistance with the following:, Mobility    PT AM-PAC:   /24  OT AM-PAC:

## 2025-04-23 NOTE — PROGRESS NOTES
Neurology progress note    Chart reviewed and discussed with primary team  Patient made DNR CC this morning and hospice referral was made.    Will follow from periphery at this time please call with any questions

## 2025-04-23 NOTE — PROGRESS NOTES
CLINICAL PHARMACY NOTE: MEDS TO BEDS    Total # of Prescriptions Filled: 3   The following medications were delivered to the patient:  Scopolamine 1 mg 3 days  Morphine sulfate 20 mg/ ml   Lorazepam Oral con 2 mg /ml    Additional Documentation: Daughter picked up meds in outpatient pharmacy.  Leticia Alvarez [facility]

## 2025-04-23 NOTE — PROGRESS NOTES
C-Collar placed per provider's request. Education provided on reason and utilization of C-Collar. Per Provider recommendation the collar should be worn when out of bed but not required while in bed. Daughter at bedside voiced awareness of fall this morning and reason for cervical precautions.     Dr. Toledo aware of fall, see provider's note for more detail .

## 2025-04-23 NOTE — DISCHARGE INSTR - COC
Continuity of Care Form    Patient Name: Juan Grant   :  1940  MRN:  [id number]    Admit date:  2025  Discharge date:  2025    Code Status Order: DNR-CC   Advance Directives:     Admitting Physician:  No admitting provider for patient encounter.  PCP: Ki Cross MD    Discharging Nurse: [facility] Unit/Room#: 3TN-3378/3378-01  Discharging Unit Phone Number: [phone]    Emergency Contact:   Extended Emergency Contact Information  Primary Emergency Contact: Nisreen Grant  Address: DAUGHTER   [facility] [place]  Home Phone: [phone]  Mobile Phone: [phone]  Relation: Child  Secondary Emergency Contact: Rod Grant  Mobile Phone: [phone]  Relation: Child    Past Surgical History:  Past Surgical History:   Procedure Laterality Date    APPENDECTOMY      CARDIAC PACEMAKER PLACEMENT      CARPAL TUNNEL RELEASE      CORONARY ANGIOPLASTY      CORONARY ANGIOPLASTY WITH STENT PLACEMENT      DIAGNOSTIC CARDIAC CATH LAB PROCEDURE      EYE SURGERY      FOOT DEBRIDEMENT Left 2021    INCISION AND DRAINAGE LEFT FOOT performed by Randolph Mir DPM at [facility] OR    PACEMAKER CHANGE  10/2016    PACEMAKER PLACEMENT      TOE AMPUTATION Left 2021    AMPUTATION OF LEFT SECOND TOE performed by Randolph Mir DPM at [facility] OR       Immunization History:   Immunization History   Administered Date(s) Administered    COVID-19, PFIZER Bivalent, DO NOT Dilute, (age 12y+), IM, 30 mcg/0.3 mL 2022    COVID-19, PFIZER GRAY top, DO NOT Dilute, (age 12 y+), IM, 30 mcg/0.3 mL 2022    COVID-19, PFIZER PURPLE top, DILUTE for use, (age 12 y+), 30mcg/0.3mL 2021, 2021, 10/14/2021    COVID-19, PFIZER, , (age 12y+), IM, 30mcg/0.3mL 02/15/2024, 2025    Influenza 2010, 2011    Influenza Virus Vaccine 10/21/2013, 2017    Influenza Whole 10/21/2013    Influenza, FLUAD, (age 65 y+), IM, Quadv, 0.5mL 10/15/2020, 2021,

## 2025-04-23 NOTE — ED PROVIDER NOTES
I was contacted by the hospitalist.  Patient had mechanical fall while upstairs and was placed in a c-collar.  They have asked me to place the order for this.  I did not see or evaluate the patient.     Sola Dickson PA-C  04/23/25 2390

## 2025-04-23 NOTE — PLAN OF CARE
Problem: Pain  Goal: Verbalizes/displays adequate comfort level or baseline comfort level  Outcome: Progressing  Flowsheets (Taken 4/23/2025 1009)  Verbalizes/displays adequate comfort level or baseline comfort level: Administer analgesics based on type and severity of pain and evaluate response  Note: Patient medicated with diluadid for neck pain that was self-rated 7/10.

## 2025-04-23 NOTE — DISCHARGE INSTRUCTIONS
Your information:  Name: Juan Grant  : 1940    Your Discharge Instructions    What to do after you leave the hospital:    Read, review and familiarize yourself with the information provided below and in a separate packet on   Neck fracture    Diet: Dysphagia soft and bite sized    Recommended activity:   as tolerated.  Wear Cervical collar while out of bed.   Avoid strenuous activity until instructed by your physician to resume; balance rest with periods of light to normal activity.     If you experience any of the following: Unusual or inadequately controlled pain; unusual transient shortness of breath; recurrent or persistent nausea, heartburn, palpitations or lightheadedness; increased swelling; increased fatigue; fever >100; please follow up with @PCP@ [unfilled] or go to the Emergency Room.      Home Health/ Outpatient Services:   [place] [address]  Phone: [phone]  Fax: [phone]       Information obtained by:  By signing below, I understand and acknowledge receipt of the instructions indicated above, and I understand that if any problems occur once I leave the hospital I am to contact @PCP@.

## 2025-04-23 NOTE — CARE COORDINATION
Case Management -  Discharge Note      Patient Name: Juan Grant                   YOB: 1940  Room: [address] [place], [place] [id number]-            Readmission Risk (Low < 19, Mod (19-27), High > 27): Readmission Risk Score: 20.6    Current PCP: Ki Cross MD      (IMM) Important Message from Medicare:    Has pt received appropriate compliance notices before being discharged if required: yes  Compliance doc:  [] 2nd IMM; [] Code 44 [] Rahman  Date Given: 4/21/25 Given By: resgistration    PT AM-PAC:   /24  OT AM-PAC:   /24    Patient/patient representative has been educated on the benefits of hospice services as well as the possible risks of declining recommended services. Patient/patient representative has acknowledged the information provided and decided on the following discharge plan. Patient/ patient representative has been provided freedom of choice regarding service provider, supported by basic dialogue that supports the patient's individualized plan of care/goals.        [facility] agency notified of discharge:  [] Yes [] No  [x] NA    Family notified of discharge:  [x] Yes  [] No  [] NA    Facility notified of discharge:  [] Yes  [] No  [x] NA    Pt is being discharged with Outpt IV Antibiotics  [] Yes [] No  [x] NA  If yes, make sure MICHELLE is faxed to [facility] agency, and meds are called in to pharmacy by RN from MICHELLE orders only.      Financial    Payor: MEDICARE / Plan: MEDICARE PART A AND B / Product Type: *No Product type* /     Pharmacy:  Potential assistance Purchasing Medications: No  Meds-to-Beds request: Yes      [facility] - [place], [place] - [address] - P [phone] - F [phone]  [address] [address]  Phone: [phone] Fax: [phone]    [id number] - [place], [place] - [address] - P [phone] - F [phone]  [address] [address]  Phone: [phone] Fax: [phone]      Notes:    Additional

## 2025-04-23 NOTE — PROGRESS NOTES
Patient fell out of bed. Small laceration noted to left side of forehead, skin tear to top of right hand, reddness noted to left knee. Patient complaining of head and neck pain. PA notified via perfect serve, came to bedside to assess. Orders being placed for imaging.

## 2025-04-23 NOTE — SIGNIFICANT EVENT
Overnight events noted.    Patient examined at bedside.    He is awake and able to move his arms and legs.  He has no focal neurologic deficits.    Spoke with Jovany Vasquez (Neurosurgery NP at [facility]).  As patient has no neurologic deficit, his age and overall condition; will proceed with placing cervical collar and no further intervention.  He can wear the collar when OOB.    Discussed in detail with patient and daughter (Nisreen) at bedside.    Upon review of events and condition, will proceed with hospice care at home.    I have written for Dilaudid IV PRN pain while here.    I have changed to DNR-CC and placed consult for home hospice.    Discussed with patient and daughter in detail at bedside.    Discussed with nursing and CM.    Michele Toledo MD

## 2025-04-24 ENCOUNTER — CARE COORDINATION (OUTPATIENT)
Dept: CASE MANAGEMENT | Age: 85
End: 2025-04-24

## 2025-04-24 NOTE — CARE COORDINATION
Chart review for CT discharge phone call it is noted that Juan discharged to his daughter's home with [facility].  Call placed to [facility]. Spoke with reception - they states Juan did enroll in Hospice services with them yesterday. No CT outreach at this time.    Leeann Del Angel RN BSN  Care Transition Nurse  [phone]

## 2025-05-12 ENCOUNTER — TELEPHONE (OUTPATIENT)
Dept: PHARMACY | Age: 85
End: 2025-05-12

## 2025-05-12 NOTE — TELEPHONE ENCOUNTER
I called patient to reschedule his missed appt, spoke with a family member that stated he passed away about 10 days ago while in hospice.     Kya Huitron PharmD 5/12/2025 11:39 AM    [facility]  Jackelyn: [phone]  Ericka: [phone]

## 2025-06-13 ENCOUNTER — TELEPHONE (OUTPATIENT)
Dept: CARDIOLOGY CLINIC | Age: 85
End: 2025-06-13

## 2025-06-13 NOTE — TELEPHONE ENCOUNTER
Samia from [facility] came by and stated she was waiting on an order to be signed.  She said several attempts were made to fax over the form.  She stated she needs the form signed by Dr. JOEL.  She said she will come back on Monday to pick the form up.  The form is put into the mailbox.  Call back [phone]

## (undated) DEVICE — SPONGE LAP W18XL18IN WHT COT 4 PLY FLD STRUNG RADPQ DISP ST

## (undated) DEVICE — GAUZE,PACKING STRIP,IODOFORM,1"X5YD,STRL: Brand: CURAD

## (undated) DEVICE — CURITY NON-ADHERENT STRIPS: Brand: CURITY

## (undated) DEVICE — SUTURE PROL SZ 3-0 L18IN NONABSORBABLE BLU L19MM FS-2 3/8 8665G

## (undated) DEVICE — ELECTRODE PT RET AD L9FT HI MOIST COND ADH HYDRGEL CORDED

## (undated) DEVICE — BNDG,ELSTC,MATRIX,STRL,4"X5YD,LF,HOOK&LP: Brand: MEDLINE

## (undated) DEVICE — 3M™ COBAN™ STERILE SELF-ADHERENT WRAP, 1584S, 4 IN X 5 YD (10 CM X 4,5 M), 18 ROLLS/CASE: Brand: 3M™ COBAN™

## (undated) DEVICE — GAUZE,SPONGE,4"X4",8PLY,STRL,LF,10/TRAY: Brand: MEDLINE

## (undated) DEVICE — DRESSING FOAM W10XL10CM ABSRB SFT CNFRM SAFETAC LAYR

## (undated) DEVICE — STERILE LATEX POWDER-FREE SURGICAL GLOVESWITH NITRILE COATING: Brand: PROTEXIS

## (undated) DEVICE — SUTURE ETHLN SZ 3-0 L18IN NONABSORBABLE BLK L24MM PS-1 3/8 1663G

## (undated) DEVICE — COLLECTOR SPEC RAYON LIQ STUART DBL FOR THRT VAG SKIN WND

## (undated) DEVICE — HYPODERMIC SAFETY NEEDLE: Brand: MAGELLAN

## (undated) DEVICE — BANDAGE,GAUZE,BULKEE II,4.5"X4.1YD,STRL: Brand: MEDLINE

## (undated) DEVICE — PACK PROCEDURE SURG EXTREMITY MFFOP CUST

## (undated) DEVICE — PAD,ABDOMINAL,8"X10",ST,LF: Brand: MEDLINE

## (undated) DEVICE — GLOVE ORANGE PI 8 1/2   MSG9085

## (undated) DEVICE — BASIC SINGLE BASIN 1-LF: Brand: MEDLINE INDUSTRIES, INC.

## (undated) DEVICE — OPTIFOAM GENTLE,NON-BORDERED,4X4: Brand: MEDLINE

## (undated) DEVICE — T-DRAPE,EXTREMITY,STERILE: Brand: MEDLINE

## (undated) DEVICE — PRECISION THIN (7.0 X 0.38 X 15.0MM)

## (undated) DEVICE — SOLUTION IRRIG 500ML 0.9% SOD CHL USP POUR PLAS BTL

## (undated) DEVICE — GLOVE ORANGE PI 8   MSG9080

## (undated) DEVICE — SYRINGE, LUER LOCK, 10ML: Brand: MEDLINE

## (undated) DEVICE — SOLUTION IRRIG 3000ML 0.9% SOD CHL USP UROMATIC PLAS CONT

## (undated) DEVICE — INTENDED FOR TISSUE SEPARATION, AND OTHER PROCEDURES THAT REQUIRE A SHARP SURGICAL BLADE TO PUNCTURE OR CUT.: Brand: BARD-PARKER ® STAINLESS STEEL BLADES

## (undated) DEVICE — TOWEL,OR,DSP,ST,BLUE,STD,4/PK,20PK/CS: Brand: MEDLINE

## (undated) DEVICE — SWAB CULT SGL AMIES W/O CHAR FOR THRT VAG SKIN HRT CULTSWAB

## (undated) DEVICE — TRAY PREP DRY W/ PREM GLV 2 APPL 6 SPNG 2 UNDPD 1 OVERWRAP

## (undated) DEVICE — BANDAGE COMPR W6INXL10YD ST M E WHITE/BEIGE

## (undated) DEVICE — MEDICINE CUP, GRADUATED, STER: Brand: MEDLINE

## (undated) DEVICE — CONTAINER,SPECIMEN,OR STERILE,4OZ: Brand: MEDLINE

## (undated) DEVICE — HANDPIECE SET WITH HIGH FLOW TIP AND SUCTION TUBE: Brand: INTERPULSE

## (undated) DEVICE — MASC TURNOVER KIT: Brand: MEDLINE INDUSTRIES, INC.